# Patient Record
Sex: MALE | Race: WHITE | Employment: UNEMPLOYED | URBAN - METROPOLITAN AREA
[De-identification: names, ages, dates, MRNs, and addresses within clinical notes are randomized per-mention and may not be internally consistent; named-entity substitution may affect disease eponyms.]

---

## 2017-01-04 ENCOUNTER — HOSPITAL ENCOUNTER (OUTPATIENT)
Dept: PREADMISSION TESTING | Age: 47
Discharge: HOME | End: 2017-01-04
Attending: SURGERY | Admitting: SURGERY

## 2017-01-04 VITALS — WEIGHT: 202 LBS | HEIGHT: 70 IN | BODY MASS INDEX: 28.92 KG/M2

## 2017-01-04 LAB
ABO/RH: NORMAL
ANION GAP SERPL CALCULATED.3IONS-SCNC: 9 MMOL/L (ref 3–16)
ANTIBODY SCREEN: NORMAL
BASOPHILS ABSOLUTE: 0 K/UL (ref 0–0.2)
BASOPHILS RELATIVE PERCENT: 0.7 %
BUN BLDV-MCNC: 16 MG/DL (ref 7–20)
CALCIUM SERPL-MCNC: 9.2 MG/DL (ref 8.3–10.6)
CHLORIDE BLD-SCNC: 98 MMOL/L (ref 99–110)
CO2: 29 MMOL/L (ref 21–32)
CREAT SERPL-MCNC: 0.9 MG/DL (ref 0.9–1.3)
EOSINOPHILS ABSOLUTE: 0.2 K/UL (ref 0–0.6)
EOSINOPHILS RELATIVE PERCENT: 2.7 %
GFR AFRICAN AMERICAN: >60
GFR NON-AFRICAN AMERICAN: >60
GLUCOSE BLD-MCNC: 88 MG/DL (ref 70–99)
HCT VFR BLD CALC: 38.3 % (ref 40.5–52.5)
HEMOGLOBIN: 13.2 G/DL (ref 13.5–17.5)
LYMPHOCYTES ABSOLUTE: 0.9 K/UL (ref 1–5.1)
LYMPHOCYTES RELATIVE PERCENT: 13.9 %
MCH RBC QN AUTO: 32.6 PG (ref 26–34)
MCHC RBC AUTO-ENTMCNC: 34.5 G/DL (ref 31–36)
MCV RBC AUTO: 94.3 FL (ref 80–100)
MONOCYTES ABSOLUTE: 0.7 K/UL (ref 0–1.3)
MONOCYTES RELATIVE PERCENT: 11 %
NEUTROPHILS ABSOLUTE: 4.6 K/UL (ref 1.7–7.7)
NEUTROPHILS RELATIVE PERCENT: 71.7 %
PDW BLD-RTO: 15.5 % (ref 12.4–15.4)
PLATELET # BLD: 192 K/UL (ref 135–450)
PMV BLD AUTO: 7.4 FL (ref 5–10.5)
POTASSIUM SERPL-SCNC: 4.6 MMOL/L (ref 3.5–5.1)
RBC # BLD: 4.06 M/UL (ref 4.2–5.9)
SODIUM BLD-SCNC: 136 MMOL/L (ref 136–145)
WBC # BLD: 6.4 K/UL (ref 4–11)

## 2017-01-04 RX ORDER — ACETAMINOPHEN 325 MG/1
650 TABLET ORAL EVERY 6 HOURS PRN
Status: ON HOLD | COMMUNITY
End: 2017-01-14 | Stop reason: HOSPADM

## 2017-01-10 ASSESSMENT — ENCOUNTER SYMPTOMS: SHORTNESS OF BREATH: 1

## 2017-01-15 ENCOUNTER — CARE COORDINATION (OUTPATIENT)
Dept: CASE MANAGEMENT | Age: 47
End: 2017-01-15

## 2017-01-16 ENCOUNTER — CARE COORDINATION (OUTPATIENT)
Dept: CASE MANAGEMENT | Age: 47
End: 2017-01-16

## 2017-01-16 ENCOUNTER — TELEPHONE (OUTPATIENT)
Dept: PHARMACY | Age: 47
End: 2017-01-16

## 2017-01-18 ENCOUNTER — CARE COORDINATION (OUTPATIENT)
Dept: CASE MANAGEMENT | Age: 47
End: 2017-01-18

## 2017-01-20 ENCOUNTER — CARE COORDINATION (OUTPATIENT)
Dept: CASE MANAGEMENT | Age: 47
End: 2017-01-20

## 2017-01-21 ENCOUNTER — CARE COORDINATION (OUTPATIENT)
Dept: CASE MANAGEMENT | Age: 47
End: 2017-01-21

## 2017-01-22 ENCOUNTER — CARE COORDINATION (OUTPATIENT)
Dept: CASE MANAGEMENT | Age: 47
End: 2017-01-22

## 2017-01-23 ENCOUNTER — CARE COORDINATION (OUTPATIENT)
Dept: CASE MANAGEMENT | Age: 47
End: 2017-01-23

## 2017-01-24 ENCOUNTER — OFFICE VISIT (OUTPATIENT)
Dept: SURGERY | Age: 47
End: 2017-01-24

## 2017-01-24 VITALS — WEIGHT: 186 LBS | BODY MASS INDEX: 26.69 KG/M2 | SYSTOLIC BLOOD PRESSURE: 96 MMHG | DIASTOLIC BLOOD PRESSURE: 70 MMHG

## 2017-01-24 DIAGNOSIS — C20 RECTAL CANCER (HCC): Primary | ICD-10-CM

## 2017-01-24 PROCEDURE — 99024 POSTOP FOLLOW-UP VISIT: CPT | Performed by: SURGERY

## 2017-01-24 RX ORDER — HYDROCODONE BITARTRATE AND ACETAMINOPHEN 5; 325 MG/1; MG/1
2 TABLET ORAL EVERY 4 HOURS PRN
Qty: 40 TABLET | Refills: 0 | Status: SHIPPED | OUTPATIENT
Start: 2017-01-24 | End: 2017-01-31

## 2017-01-27 ENCOUNTER — TELEPHONE (OUTPATIENT)
Dept: SURGERY | Age: 47
End: 2017-01-27

## 2017-01-27 RX ORDER — CYCLOBENZAPRINE HCL 10 MG
10 TABLET ORAL EVERY 8 HOURS PRN
Qty: 30 TABLET | Refills: 0 | Status: ON HOLD | OUTPATIENT
Start: 2017-01-27 | End: 2017-02-07 | Stop reason: HOSPADM

## 2017-02-04 PROBLEM — C18.9 COLON CANCER (HCC): Status: ACTIVE | Noted: 2017-02-04

## 2017-02-04 PROBLEM — K65.1 ABSCESS OF MALE PELVIS (HCC): Status: ACTIVE | Noted: 2017-02-04

## 2017-02-08 ENCOUNTER — EPISODE CHANGES (OUTPATIENT)
Dept: CASE MANAGEMENT | Age: 47
End: 2017-02-08

## 2017-02-13 ENCOUNTER — HOSPITAL ENCOUNTER (OUTPATIENT)
Dept: SURGERY | Age: 47
Discharge: HOME | End: 2017-02-13
Attending: SURGERY | Admitting: SURGERY

## 2017-02-13 VITALS
SYSTOLIC BLOOD PRESSURE: 125 MMHG | BODY MASS INDEX: 27.93 KG/M2 | RESPIRATION RATE: 18 BRPM | HEART RATE: 74 BPM | TEMPERATURE: 97.1 F | WEIGHT: 194.67 LBS | DIASTOLIC BLOOD PRESSURE: 77 MMHG | OXYGEN SATURATION: 100 %

## 2017-02-13 DIAGNOSIS — R52 PAIN: ICD-10-CM

## 2017-02-13 LAB
ANION GAP SERPL CALCULATED.3IONS-SCNC: 16 MMOL/L (ref 3–16)
BASOPHILS ABSOLUTE: 0.1 K/UL (ref 0–0.2)
BASOPHILS RELATIVE PERCENT: 1.1 %
BUN BLDV-MCNC: 11 MG/DL (ref 7–20)
CALCIUM SERPL-MCNC: 10 MG/DL (ref 8.3–10.6)
CHLORIDE BLD-SCNC: 101 MMOL/L (ref 99–110)
CO2: 21 MMOL/L (ref 21–32)
CREAT SERPL-MCNC: 0.7 MG/DL (ref 0.9–1.3)
EOSINOPHILS ABSOLUTE: 0.2 K/UL (ref 0–0.6)
EOSINOPHILS RELATIVE PERCENT: 1.5 %
GFR AFRICAN AMERICAN: >60
GFR NON-AFRICAN AMERICAN: >60
GLUCOSE BLD-MCNC: 102 MG/DL (ref 70–99)
HCT VFR BLD CALC: 33.9 % (ref 40.5–52.5)
HEMOGLOBIN: 11.4 G/DL (ref 13.5–17.5)
LYMPHOCYTES ABSOLUTE: 1.1 K/UL (ref 1–5.1)
LYMPHOCYTES RELATIVE PERCENT: 10.8 %
MCH RBC QN AUTO: 30.7 PG (ref 26–34)
MCHC RBC AUTO-ENTMCNC: 33.7 G/DL (ref 31–36)
MCV RBC AUTO: 91.2 FL (ref 80–100)
MONOCYTES ABSOLUTE: 0.9 K/UL (ref 0–1.3)
MONOCYTES RELATIVE PERCENT: 8.8 %
NEUTROPHILS ABSOLUTE: 8.2 K/UL (ref 1.7–7.7)
NEUTROPHILS RELATIVE PERCENT: 77.8 %
PDW BLD-RTO: 13.4 % (ref 12.4–15.4)
PLATELET # BLD: 464 K/UL (ref 135–450)
PMV BLD AUTO: 6.5 FL (ref 5–10.5)
POTASSIUM SERPL-SCNC: 4.4 MMOL/L (ref 3.5–5.1)
RBC # BLD: 3.71 M/UL (ref 4.2–5.9)
SODIUM BLD-SCNC: 138 MMOL/L (ref 136–145)
WBC # BLD: 10.6 K/UL (ref 4–11)

## 2017-02-13 PROCEDURE — 36561 INSERT TUNNELED CV CATH: CPT | Performed by: SURGERY

## 2017-02-13 PROCEDURE — 77001 FLUOROGUIDE FOR VEIN DEVICE: CPT | Performed by: SURGERY

## 2017-02-13 RX ORDER — SODIUM CHLORIDE, SODIUM LACTATE, POTASSIUM CHLORIDE, CALCIUM CHLORIDE 600; 310; 30; 20 MG/100ML; MG/100ML; MG/100ML; MG/100ML
INJECTION, SOLUTION INTRAVENOUS
Status: COMPLETED
Start: 2017-02-13 | End: 2017-02-13

## 2017-02-13 RX ORDER — OXYCODONE HYDROCHLORIDE 5 MG/1
10 TABLET ORAL PRN
Status: ACTIVE | OUTPATIENT
Start: 2017-02-13 | End: 2017-02-13

## 2017-02-13 RX ORDER — LIDOCAINE HYDROCHLORIDE 10 MG/ML
0.5 INJECTION, SOLUTION EPIDURAL; INFILTRATION; INTRACAUDAL; PERINEURAL ONCE
Status: DISCONTINUED | OUTPATIENT
Start: 2017-02-13 | End: 2017-02-14 | Stop reason: HOSPADM

## 2017-02-13 RX ORDER — HYDROMORPHONE HCL 110MG/55ML
0.5 PATIENT CONTROLLED ANALGESIA SYRINGE INTRAVENOUS EVERY 5 MIN PRN
Status: DISCONTINUED | OUTPATIENT
Start: 2017-02-13 | End: 2017-02-14 | Stop reason: HOSPADM

## 2017-02-13 RX ORDER — SODIUM CHLORIDE, SODIUM LACTATE, POTASSIUM CHLORIDE, CALCIUM CHLORIDE 600; 310; 30; 20 MG/100ML; MG/100ML; MG/100ML; MG/100ML
INJECTION, SOLUTION INTRAVENOUS CONTINUOUS
Status: DISCONTINUED | OUTPATIENT
Start: 2017-02-13 | End: 2017-02-14 | Stop reason: HOSPADM

## 2017-02-13 RX ORDER — CEFAZOLIN SODIUM 2 G/100ML
2 INJECTION, SOLUTION INTRAVENOUS
Status: COMPLETED | OUTPATIENT
Start: 2017-02-13 | End: 2017-02-13

## 2017-02-13 RX ORDER — HYDROMORPHONE HCL 110MG/55ML
0.25 PATIENT CONTROLLED ANALGESIA SYRINGE INTRAVENOUS EVERY 5 MIN PRN
Status: DISCONTINUED | OUTPATIENT
Start: 2017-02-13 | End: 2017-02-14 | Stop reason: HOSPADM

## 2017-02-13 RX ORDER — FENTANYL CITRATE 50 UG/ML
50 INJECTION, SOLUTION INTRAMUSCULAR; INTRAVENOUS EVERY 5 MIN PRN
Status: DISCONTINUED | OUTPATIENT
Start: 2017-02-13 | End: 2017-02-14 | Stop reason: HOSPADM

## 2017-02-13 RX ORDER — CARVEDILOL 12.5 MG/1
TABLET ORAL
Qty: 60 TABLET | Refills: 5 | Status: ON HOLD | OUTPATIENT
Start: 2017-02-13 | End: 2017-08-25

## 2017-02-13 RX ORDER — DIPHENHYDRAMINE HYDROCHLORIDE 50 MG/ML
12.5 INJECTION INTRAMUSCULAR; INTRAVENOUS
Status: ACTIVE | OUTPATIENT
Start: 2017-02-13 | End: 2017-02-13

## 2017-02-13 RX ORDER — LABETALOL HYDROCHLORIDE 5 MG/ML
5 INJECTION, SOLUTION INTRAVENOUS EVERY 10 MIN PRN
Status: DISCONTINUED | OUTPATIENT
Start: 2017-02-13 | End: 2017-02-14 | Stop reason: HOSPADM

## 2017-02-13 RX ORDER — CEFAZOLIN SODIUM 2 G/100ML
INJECTION, SOLUTION INTRAVENOUS
Status: DISPENSED
Start: 2017-02-13 | End: 2017-02-13

## 2017-02-13 RX ORDER — PROMETHAZINE HYDROCHLORIDE 25 MG/ML
6.25 INJECTION, SOLUTION INTRAMUSCULAR; INTRAVENOUS PRN
Status: DISCONTINUED | OUTPATIENT
Start: 2017-02-13 | End: 2017-02-14 | Stop reason: HOSPADM

## 2017-02-13 RX ORDER — OXYCODONE HYDROCHLORIDE 5 MG/1
5 TABLET ORAL PRN
Status: ACTIVE | OUTPATIENT
Start: 2017-02-13 | End: 2017-02-13

## 2017-02-13 RX ORDER — SPIRONOLACTONE 50 MG/1
TABLET, FILM COATED ORAL
Qty: 30 TABLET | Refills: 5 | Status: SHIPPED | OUTPATIENT
Start: 2017-02-13 | End: 2017-07-17 | Stop reason: SDUPTHER

## 2017-02-13 RX ORDER — MEPERIDINE HYDROCHLORIDE 25 MG/ML
12.5 INJECTION INTRAMUSCULAR; INTRAVENOUS; SUBCUTANEOUS EVERY 5 MIN PRN
Status: DISCONTINUED | OUTPATIENT
Start: 2017-02-13 | End: 2017-02-14 | Stop reason: HOSPADM

## 2017-02-13 RX ORDER — HYDROCODONE BITARTRATE AND ACETAMINOPHEN 5; 325 MG/1; MG/1
2 TABLET ORAL EVERY 4 HOURS PRN
Qty: 30 TABLET | Refills: 0 | Status: SHIPPED | OUTPATIENT
Start: 2017-02-13 | End: 2017-02-20

## 2017-02-13 RX ADMIN — SODIUM CHLORIDE, SODIUM LACTATE, POTASSIUM CHLORIDE, CALCIUM CHLORIDE: 600; 310; 30; 20 INJECTION, SOLUTION INTRAVENOUS at 08:18

## 2017-02-13 RX ADMIN — CEFAZOLIN SODIUM 2 G: 2 INJECTION, SOLUTION INTRAVENOUS at 08:25

## 2017-02-13 ASSESSMENT — PAIN - FUNCTIONAL ASSESSMENT: PAIN_FUNCTIONAL_ASSESSMENT: 0-10

## 2017-02-13 ASSESSMENT — PAIN SCALES - GENERAL
PAINLEVEL_OUTOF10: 0
PAINLEVEL_OUTOF10: 0

## 2017-02-13 ASSESSMENT — ENCOUNTER SYMPTOMS: SHORTNESS OF BREATH: 1

## 2017-02-16 ENCOUNTER — HOSPITAL ENCOUNTER (OUTPATIENT)
Dept: CT IMAGING | Age: 47
Discharge: HOME | End: 2017-02-16
Attending: SURGERY | Admitting: SURGERY

## 2017-02-16 DIAGNOSIS — I42.8 OTHER CARDIOMYOPATHIES (HCC): ICD-10-CM

## 2017-02-17 ENCOUNTER — TELEPHONE (OUTPATIENT)
Dept: SURGERY | Age: 47
End: 2017-02-17

## 2017-02-17 RX ORDER — HYDROCODONE BITARTRATE AND ACETAMINOPHEN 5; 325 MG/1; MG/1
2 TABLET ORAL EVERY 4 HOURS PRN
Qty: 30 TABLET | Refills: 0 | Status: CANCELLED | OUTPATIENT
Start: 2017-02-17 | End: 2017-02-24

## 2017-02-21 ENCOUNTER — OFFICE VISIT (OUTPATIENT)
Dept: SURGERY | Age: 47
End: 2017-02-21

## 2017-02-21 VITALS — DIASTOLIC BLOOD PRESSURE: 70 MMHG | SYSTOLIC BLOOD PRESSURE: 120 MMHG | BODY MASS INDEX: 28.98 KG/M2 | WEIGHT: 202 LBS

## 2017-02-21 DIAGNOSIS — C20 RECTAL CANCER (HCC): Primary | ICD-10-CM

## 2017-02-21 PROCEDURE — 99024 POSTOP FOLLOW-UP VISIT: CPT | Performed by: SURGERY

## 2017-02-21 RX ORDER — HYDROCODONE BITARTRATE AND ACETAMINOPHEN 5; 325 MG/1; MG/1
2 TABLET ORAL EVERY 6 HOURS PRN
Qty: 40 TABLET | Refills: 0 | Status: SHIPPED | OUTPATIENT
Start: 2017-02-21 | End: 2017-02-27 | Stop reason: SDUPTHER

## 2017-02-23 ENCOUNTER — HOSPITAL ENCOUNTER (OUTPATIENT)
Dept: SURGERY | Age: 47
Discharge: HOME | End: 2017-02-23
Admitting: SURGERY

## 2017-02-23 VITALS
OXYGEN SATURATION: 97 % | TEMPERATURE: 98.2 F | RESPIRATION RATE: 18 BRPM | DIASTOLIC BLOOD PRESSURE: 59 MMHG | HEART RATE: 85 BPM | SYSTOLIC BLOOD PRESSURE: 115 MMHG

## 2017-02-23 DIAGNOSIS — C20 RECTAL CANCER (HCC): ICD-10-CM

## 2017-02-23 LAB
ANION GAP SERPL CALCULATED.3IONS-SCNC: 13 MMOL/L (ref 3–16)
APTT: 37.5 SEC (ref 21–31.8)
BASOPHILS ABSOLUTE: 0.1 K/UL (ref 0–0.2)
BASOPHILS RELATIVE PERCENT: 0.8 %
BUN BLDV-MCNC: 22 MG/DL (ref 7–20)
CALCIUM SERPL-MCNC: 9.2 MG/DL (ref 8.3–10.6)
CHLORIDE BLD-SCNC: 101 MMOL/L (ref 99–110)
CO2: 26 MMOL/L (ref 21–32)
CREAT SERPL-MCNC: 0.7 MG/DL (ref 0.9–1.3)
EOSINOPHILS ABSOLUTE: 0.2 K/UL (ref 0–0.6)
EOSINOPHILS RELATIVE PERCENT: 3.2 %
GFR AFRICAN AMERICAN: >60
GFR NON-AFRICAN AMERICAN: >60
GLUCOSE BLD-MCNC: 94 MG/DL (ref 70–99)
HCT VFR BLD CALC: 30.5 % (ref 40.5–52.5)
HEMOGLOBIN: 10.3 G/DL (ref 13.5–17.5)
INR BLD: 1.18 (ref 0.85–1.15)
LYMPHOCYTES ABSOLUTE: 0.9 K/UL (ref 1–5.1)
LYMPHOCYTES RELATIVE PERCENT: 11.7 %
MCH RBC QN AUTO: 30.7 PG (ref 26–34)
MCHC RBC AUTO-ENTMCNC: 33.8 G/DL (ref 31–36)
MCV RBC AUTO: 91 FL (ref 80–100)
MONOCYTES ABSOLUTE: 0.8 K/UL (ref 0–1.3)
MONOCYTES RELATIVE PERCENT: 10.5 %
NEUTROPHILS ABSOLUTE: 5.6 K/UL (ref 1.7–7.7)
NEUTROPHILS RELATIVE PERCENT: 73.8 %
PDW BLD-RTO: 13.9 % (ref 12.4–15.4)
PLATELET # BLD: 288 K/UL (ref 135–450)
PMV BLD AUTO: 6.9 FL (ref 5–10.5)
POTASSIUM SERPL-SCNC: 4.2 MMOL/L (ref 3.5–5.1)
PROTHROMBIN TIME: 13.3 SEC (ref 9.6–13)
RBC # BLD: 3.35 M/UL (ref 4.2–5.9)
SODIUM BLD-SCNC: 140 MMOL/L (ref 136–145)
WBC # BLD: 7.6 K/UL (ref 4–11)

## 2017-02-23 RX ORDER — SODIUM CHLORIDE 9 MG/ML
INJECTION, SOLUTION INTRAVENOUS
Status: DISCONTINUED
Start: 2017-02-23 | End: 2017-02-24 | Stop reason: HOSPADM

## 2017-02-23 RX ORDER — MIDAZOLAM HYDROCHLORIDE 1 MG/ML
INJECTION INTRAMUSCULAR; INTRAVENOUS
Status: COMPLETED | OUTPATIENT
Start: 2017-02-23 | End: 2017-02-23

## 2017-02-23 RX ORDER — FENTANYL CITRATE 50 UG/ML
INJECTION, SOLUTION INTRAMUSCULAR; INTRAVENOUS
Status: COMPLETED | OUTPATIENT
Start: 2017-02-23 | End: 2017-02-23

## 2017-02-23 RX ADMIN — MIDAZOLAM HYDROCHLORIDE 1 MG: 1 INJECTION INTRAMUSCULAR; INTRAVENOUS at 14:11

## 2017-02-23 RX ADMIN — MIDAZOLAM HYDROCHLORIDE 1 MG: 1 INJECTION INTRAMUSCULAR; INTRAVENOUS at 13:54

## 2017-02-23 RX ADMIN — MIDAZOLAM HYDROCHLORIDE 1 MG: 1 INJECTION INTRAMUSCULAR; INTRAVENOUS at 14:05

## 2017-02-23 RX ADMIN — FENTANYL CITRATE 50 MCG: 50 INJECTION, SOLUTION INTRAMUSCULAR; INTRAVENOUS at 13:54

## 2017-02-23 RX ADMIN — FENTANYL CITRATE 50 MCG: 50 INJECTION, SOLUTION INTRAMUSCULAR; INTRAVENOUS at 14:11

## 2017-02-23 RX ADMIN — FENTANYL CITRATE 50 MCG: 50 INJECTION, SOLUTION INTRAMUSCULAR; INTRAVENOUS at 14:05

## 2017-02-23 RX ADMIN — MIDAZOLAM HYDROCHLORIDE 1 MG: 1 INJECTION INTRAMUSCULAR; INTRAVENOUS at 14:08

## 2017-02-23 RX ADMIN — FENTANYL CITRATE 50 MCG: 50 INJECTION, SOLUTION INTRAMUSCULAR; INTRAVENOUS at 14:08

## 2017-02-23 RX ADMIN — MIDAZOLAM HYDROCHLORIDE 1 MG: 1 INJECTION INTRAMUSCULAR; INTRAVENOUS at 13:49

## 2017-02-23 RX ADMIN — FENTANYL CITRATE 50 MCG: 50 INJECTION, SOLUTION INTRAMUSCULAR; INTRAVENOUS at 13:49

## 2017-02-23 ASSESSMENT — PAIN DESCRIPTION - PAIN TYPE: TYPE: ACUTE PAIN

## 2017-02-23 ASSESSMENT — PAIN - FUNCTIONAL ASSESSMENT: PAIN_FUNCTIONAL_ASSESSMENT: 0-10

## 2017-02-23 ASSESSMENT — PAIN SCALES - GENERAL
PAINLEVEL_OUTOF10: 4

## 2017-02-23 ASSESSMENT — PAIN DESCRIPTION - LOCATION: LOCATION: ABDOMEN

## 2017-02-27 ENCOUNTER — OFFICE VISIT (OUTPATIENT)
Dept: CARDIOLOGY CLINIC | Age: 47
End: 2017-02-27

## 2017-02-27 ENCOUNTER — HOSPITAL ENCOUNTER (OUTPATIENT)
Dept: NON INVASIVE DIAGNOSTICS | Age: 47
Discharge: HOME | End: 2017-02-27
Attending: NURSE PRACTITIONER | Admitting: NURSE PRACTITIONER

## 2017-02-27 ENCOUNTER — TELEPHONE (OUTPATIENT)
Dept: SURGERY | Age: 47
End: 2017-02-27

## 2017-02-27 VITALS
WEIGHT: 199 LBS | BODY MASS INDEX: 28.49 KG/M2 | HEART RATE: 64 BPM | SYSTOLIC BLOOD PRESSURE: 116 MMHG | DIASTOLIC BLOOD PRESSURE: 80 MMHG | HEIGHT: 70 IN

## 2017-02-27 DIAGNOSIS — E78.2 MIXED HYPERLIPIDEMIA: Chronic | ICD-10-CM

## 2017-02-27 DIAGNOSIS — I42.8 OTHER CARDIOMYOPATHIES (HCC): ICD-10-CM

## 2017-02-27 DIAGNOSIS — I50.22 CHRONIC SYSTOLIC HEART FAILURE (HCC): Primary | Chronic | ICD-10-CM

## 2017-02-27 DIAGNOSIS — I42.9 CARDIOMYOPATHY, IDIOPATHIC (HCC): Chronic | ICD-10-CM

## 2017-02-27 DIAGNOSIS — C20 RECTAL CANCER (HCC): ICD-10-CM

## 2017-02-27 DIAGNOSIS — I10 ESSENTIAL HYPERTENSION: Chronic | ICD-10-CM

## 2017-02-27 LAB
LV EF: 33 %
LVEF MODALITY: NORMAL

## 2017-02-27 PROCEDURE — 99214 OFFICE O/P EST MOD 30 MIN: CPT | Performed by: INTERNAL MEDICINE

## 2017-02-27 RX ORDER — HYDROCODONE BITARTRATE AND ACETAMINOPHEN 5; 325 MG/1; MG/1
2 TABLET ORAL EVERY 6 HOURS PRN
Qty: 40 TABLET | Refills: 0 | Status: SHIPPED | OUTPATIENT
Start: 2017-02-27 | End: 2017-03-06

## 2017-02-27 RX ADMIN — Medication 25 MILLICURIE: at 14:27

## 2017-02-28 ENCOUNTER — NURSE ONLY (OUTPATIENT)
Dept: CARDIOLOGY CLINIC | Age: 47
End: 2017-02-28

## 2017-02-28 DIAGNOSIS — I42.9 CARDIOMYOPATHY (HCC): Primary | ICD-10-CM

## 2017-02-28 DIAGNOSIS — I50.21 ACUTE SYSTOLIC CONGESTIVE HEART FAILURE (HCC): ICD-10-CM

## 2017-03-07 ENCOUNTER — OFFICE VISIT (OUTPATIENT)
Dept: SURGERY | Age: 47
End: 2017-03-07

## 2017-03-07 VITALS — DIASTOLIC BLOOD PRESSURE: 60 MMHG | SYSTOLIC BLOOD PRESSURE: 100 MMHG

## 2017-03-07 DIAGNOSIS — C20 RECTAL CANCER (HCC): Primary | ICD-10-CM

## 2017-03-07 PROCEDURE — 99024 POSTOP FOLLOW-UP VISIT: CPT | Performed by: SURGERY

## 2017-03-07 RX ORDER — HYDROCODONE BITARTRATE AND ACETAMINOPHEN 5; 325 MG/1; MG/1
2 TABLET ORAL EVERY 6 HOURS PRN
Qty: 30 TABLET | Refills: 0 | Status: SHIPPED | OUTPATIENT
Start: 2017-03-07 | End: 2017-03-14

## 2017-03-13 ENCOUNTER — HOSPITAL ENCOUNTER (OUTPATIENT)
Dept: CT IMAGING | Age: 47
Discharge: HOME | End: 2017-03-13
Attending: SURGERY | Admitting: SURGERY

## 2017-03-13 DIAGNOSIS — K65.1 PERITONEAL ABSCESS (HCC): ICD-10-CM

## 2017-03-13 DIAGNOSIS — K65.1 ABSCESS OF MALE PELVIS (HCC): Primary | ICD-10-CM

## 2017-03-13 DIAGNOSIS — K65.1 ABSCESS OF MALE PELVIS (HCC): ICD-10-CM

## 2017-03-13 RX ORDER — 0.9 % SODIUM CHLORIDE 0.9 %
10 VIAL (ML) INJECTION ONCE
Status: COMPLETED | OUTPATIENT
Start: 2017-03-13 | End: 2017-03-13

## 2017-03-13 RX ADMIN — Medication 10 ML: at 16:04

## 2017-03-14 ENCOUNTER — OFFICE VISIT (OUTPATIENT)
Dept: SURGERY | Age: 47
End: 2017-03-14

## 2017-03-14 VITALS — SYSTOLIC BLOOD PRESSURE: 130 MMHG | DIASTOLIC BLOOD PRESSURE: 60 MMHG

## 2017-03-14 DIAGNOSIS — C20 RECTAL CANCER (HCC): Primary | ICD-10-CM

## 2017-03-14 PROCEDURE — 99024 POSTOP FOLLOW-UP VISIT: CPT | Performed by: SURGERY

## 2017-03-14 RX ORDER — CYCLOBENZAPRINE HCL 5 MG
5 TABLET ORAL 3 TIMES DAILY PRN
Qty: 60 TABLET | Refills: 1 | Status: SHIPPED | OUTPATIENT
Start: 2017-03-14 | End: 2017-03-28

## 2017-03-14 RX ORDER — HYDROCODONE BITARTRATE AND ACETAMINOPHEN 7.5; 325 MG/1; MG/1
1 TABLET ORAL EVERY 6 HOURS PRN
Qty: 40 TABLET | Refills: 0 | Status: SHIPPED | OUTPATIENT
Start: 2017-03-14 | End: 2017-03-29 | Stop reason: SDUPTHER

## 2017-03-15 ENCOUNTER — TELEPHONE (OUTPATIENT)
Dept: SURGERY | Age: 47
End: 2017-03-15

## 2017-03-17 ENCOUNTER — TELEPHONE (OUTPATIENT)
Dept: SURGERY | Age: 47
End: 2017-03-17

## 2017-03-23 ENCOUNTER — OFFICE VISIT (OUTPATIENT)
Dept: SURGERY | Age: 47
End: 2017-03-23

## 2017-03-23 VITALS — SYSTOLIC BLOOD PRESSURE: 90 MMHG | DIASTOLIC BLOOD PRESSURE: 62 MMHG

## 2017-03-23 DIAGNOSIS — C20 RECTAL CANCER (HCC): Primary | ICD-10-CM

## 2017-03-23 PROCEDURE — 99024 POSTOP FOLLOW-UP VISIT: CPT | Performed by: SURGERY

## 2017-03-23 RX ORDER — OXYCODONE HYDROCHLORIDE AND ACETAMINOPHEN 5; 325 MG/1; MG/1
1 TABLET ORAL EVERY 4 HOURS PRN
Qty: 30 TABLET | Refills: 0 | Status: SHIPPED | OUTPATIENT
Start: 2017-03-23 | End: 2017-03-30

## 2017-03-27 ENCOUNTER — OFFICE VISIT (OUTPATIENT)
Dept: SURGERY | Age: 47
End: 2017-03-27

## 2017-03-27 VITALS — SYSTOLIC BLOOD PRESSURE: 112 MMHG | DIASTOLIC BLOOD PRESSURE: 60 MMHG

## 2017-03-27 DIAGNOSIS — C20 RECTAL CANCER (HCC): Primary | ICD-10-CM

## 2017-03-27 PROCEDURE — 99024 POSTOP FOLLOW-UP VISIT: CPT | Performed by: SURGERY

## 2017-04-06 ENCOUNTER — OFFICE VISIT (OUTPATIENT)
Dept: SURGERY | Age: 47
End: 2017-04-06

## 2017-04-06 VITALS — WEIGHT: 216 LBS | SYSTOLIC BLOOD PRESSURE: 100 MMHG | DIASTOLIC BLOOD PRESSURE: 60 MMHG | BODY MASS INDEX: 30.99 KG/M2

## 2017-04-06 DIAGNOSIS — C20 RECTAL CANCER (HCC): ICD-10-CM

## 2017-04-06 DIAGNOSIS — K65.1 PERITONEAL ABSCESS (HCC): Primary | ICD-10-CM

## 2017-04-06 DIAGNOSIS — K65.1 ABSCESS OF MALE PELVIS (HCC): ICD-10-CM

## 2017-04-06 PROCEDURE — 99024 POSTOP FOLLOW-UP VISIT: CPT | Performed by: SURGERY

## 2017-04-06 PROCEDURE — 93268 ECG RECORD/REVIEW: CPT | Performed by: INTERNAL MEDICINE

## 2017-04-18 ENCOUNTER — HOSPITAL ENCOUNTER (OUTPATIENT)
Dept: OTHER | Age: 47
Discharge: HOME | End: 2017-04-18
Attending: INTERNAL MEDICINE | Admitting: INTERNAL MEDICINE

## 2017-04-18 ENCOUNTER — HOSPITAL ENCOUNTER (OUTPATIENT)
Dept: CT IMAGING | Age: 47
Discharge: HOME | End: 2017-04-18
Attending: SURGERY | Admitting: SURGERY

## 2017-04-18 DIAGNOSIS — K65.1 PERITONEAL ABSCESS (HCC): ICD-10-CM

## 2017-04-18 LAB
ANION GAP SERPL CALCULATED.3IONS-SCNC: 10 MMOL/L (ref 3–16)
BUN BLDV-MCNC: 13 MG/DL (ref 7–20)
CALCIUM SERPL-MCNC: 9.3 MG/DL (ref 8.3–10.6)
CHLORIDE BLD-SCNC: 101 MMOL/L (ref 99–110)
CO2: 28 MMOL/L (ref 21–32)
CREAT SERPL-MCNC: 0.9 MG/DL (ref 0.9–1.3)
GFR AFRICAN AMERICAN: >60
GFR NON-AFRICAN AMERICAN: >60
GLUCOSE BLD-MCNC: 102 MG/DL (ref 70–99)
POTASSIUM SERPL-SCNC: 5 MMOL/L (ref 3.5–5.1)
PRO-BNP: 3571 PG/ML (ref 0–124)
SODIUM BLD-SCNC: 139 MMOL/L (ref 136–145)

## 2017-04-19 ENCOUNTER — TELEPHONE (OUTPATIENT)
Dept: CARDIOLOGY CLINIC | Age: 47
End: 2017-04-19

## 2017-04-19 PROBLEM — Z51.11 ENCOUNTER FOR ANTINEOPLASTIC CHEMOTHERAPY: Status: ACTIVE | Noted: 2017-04-19

## 2017-04-20 ENCOUNTER — OFFICE VISIT (OUTPATIENT)
Dept: SURGERY | Age: 47
End: 2017-04-20

## 2017-04-20 VITALS — WEIGHT: 216 LBS | BODY MASS INDEX: 30.99 KG/M2 | DIASTOLIC BLOOD PRESSURE: 60 MMHG | SYSTOLIC BLOOD PRESSURE: 122 MMHG

## 2017-04-20 DIAGNOSIS — C20 RECTAL CANCER (HCC): Primary | ICD-10-CM

## 2017-04-20 PROCEDURE — 99024 POSTOP FOLLOW-UP VISIT: CPT | Performed by: SURGERY

## 2017-04-27 ENCOUNTER — OFFICE VISIT (OUTPATIENT)
Dept: SURGERY | Age: 47
End: 2017-04-27

## 2017-04-27 VITALS — SYSTOLIC BLOOD PRESSURE: 122 MMHG | DIASTOLIC BLOOD PRESSURE: 60 MMHG

## 2017-04-27 DIAGNOSIS — C20 RECTAL CANCER (HCC): Primary | ICD-10-CM

## 2017-04-27 PROCEDURE — 99024 POSTOP FOLLOW-UP VISIT: CPT | Performed by: SURGERY

## 2017-05-03 ENCOUNTER — TELEPHONE (OUTPATIENT)
Dept: CARDIOLOGY CLINIC | Age: 47
End: 2017-05-03

## 2017-05-03 ENCOUNTER — HOSPITAL ENCOUNTER (OUTPATIENT)
Dept: NON INVASIVE DIAGNOSTICS | Age: 47
Discharge: HOME | End: 2017-05-03
Attending: INTERNAL MEDICINE | Admitting: INTERNAL MEDICINE

## 2017-05-03 DIAGNOSIS — I10 ESSENTIAL HYPERTENSION: Chronic | ICD-10-CM

## 2017-05-03 DIAGNOSIS — I42.9 CARDIOMYOPATHY, IDIOPATHIC (HCC): Chronic | ICD-10-CM

## 2017-05-03 DIAGNOSIS — I50.22 CHRONIC SYSTOLIC HEART FAILURE (HCC): Primary | Chronic | ICD-10-CM

## 2017-05-03 DIAGNOSIS — I42.8 OTHER CARDIOMYOPATHIES (HCC): ICD-10-CM

## 2017-05-04 ENCOUNTER — HOSPITAL ENCOUNTER (OUTPATIENT)
Dept: NON INVASIVE DIAGNOSTICS | Age: 47
Discharge: HOME | End: 2017-05-04
Attending: INTERNAL MEDICINE | Admitting: INTERNAL MEDICINE

## 2017-05-04 ENCOUNTER — OFFICE VISIT (OUTPATIENT)
Dept: CARDIOLOGY CLINIC | Age: 47
End: 2017-05-04

## 2017-05-04 VITALS
BODY MASS INDEX: 29.41 KG/M2 | WEIGHT: 205 LBS | DIASTOLIC BLOOD PRESSURE: 64 MMHG | SYSTOLIC BLOOD PRESSURE: 92 MMHG | HEART RATE: 80 BPM

## 2017-05-04 DIAGNOSIS — R06.02 SHORTNESS OF BREATH: Chronic | ICD-10-CM

## 2017-05-04 DIAGNOSIS — I50.22 CHRONIC SYSTOLIC CONGESTIVE HEART FAILURE (HCC): ICD-10-CM

## 2017-05-04 DIAGNOSIS — I50.22 CHRONIC SYSTOLIC HEART FAILURE (HCC): Primary | Chronic | ICD-10-CM

## 2017-05-04 DIAGNOSIS — I42.9 CARDIOMYOPATHY, IDIOPATHIC (HCC): Chronic | ICD-10-CM

## 2017-05-04 DIAGNOSIS — I10 ESSENTIAL HYPERTENSION: Chronic | ICD-10-CM

## 2017-05-04 LAB
LV EF: 29 %
LVEF MODALITY: NORMAL

## 2017-05-04 PROCEDURE — 99214 OFFICE O/P EST MOD 30 MIN: CPT | Performed by: INTERNAL MEDICINE

## 2017-05-04 RX ADMIN — Medication 25 MILLICURIE: at 13:46

## 2017-05-09 ENCOUNTER — OFFICE VISIT (OUTPATIENT)
Dept: SURGERY | Age: 47
End: 2017-05-09

## 2017-05-09 VITALS — SYSTOLIC BLOOD PRESSURE: 90 MMHG | WEIGHT: 216 LBS | BODY MASS INDEX: 30.99 KG/M2 | DIASTOLIC BLOOD PRESSURE: 62 MMHG

## 2017-05-09 DIAGNOSIS — K65.1 ABSCESS OF MALE PELVIS (HCC): ICD-10-CM

## 2017-05-09 DIAGNOSIS — C20 RECTAL CANCER (HCC): Primary | ICD-10-CM

## 2017-05-09 PROCEDURE — 99212 OFFICE O/P EST SF 10 MIN: CPT | Performed by: SURGERY

## 2017-05-16 PROBLEM — D64.9 ANEMIA: Status: ACTIVE | Noted: 2017-05-16

## 2017-05-17 ENCOUNTER — CARE COORDINATION (OUTPATIENT)
Dept: CASE MANAGEMENT | Age: 47
End: 2017-05-17

## 2017-05-19 ENCOUNTER — CARE COORDINATOR VISIT (OUTPATIENT)
Dept: CASE MANAGEMENT | Age: 47
End: 2017-05-19

## 2017-05-23 ENCOUNTER — OFFICE VISIT (OUTPATIENT)
Dept: SURGERY | Age: 47
End: 2017-05-23

## 2017-05-23 VITALS — SYSTOLIC BLOOD PRESSURE: 100 MMHG | BODY MASS INDEX: 30.42 KG/M2 | DIASTOLIC BLOOD PRESSURE: 60 MMHG | WEIGHT: 212 LBS

## 2017-05-23 DIAGNOSIS — R10.31 RLQ ABDOMINAL PAIN: ICD-10-CM

## 2017-05-23 DIAGNOSIS — C20 RECTAL CANCER (HCC): Primary | ICD-10-CM

## 2017-05-23 PROCEDURE — 99213 OFFICE O/P EST LOW 20 MIN: CPT | Performed by: SURGERY

## 2017-06-07 PROBLEM — G47.01 INSOMNIA DUE TO MEDICAL CONDITION: Status: ACTIVE | Noted: 2017-06-07

## 2017-06-07 PROBLEM — Z51.11 ENCOUNTER FOR ANTINEOPLASTIC CHEMOTHERAPY: Status: ACTIVE | Noted: 2017-06-07

## 2017-06-23 ENCOUNTER — OFFICE VISIT (OUTPATIENT)
Dept: CARDIOLOGY CLINIC | Age: 47
End: 2017-06-23

## 2017-06-23 VITALS
DIASTOLIC BLOOD PRESSURE: 56 MMHG | BODY MASS INDEX: 33.49 KG/M2 | SYSTOLIC BLOOD PRESSURE: 90 MMHG | HEART RATE: 68 BPM | WEIGHT: 221 LBS | HEIGHT: 68 IN

## 2017-06-23 DIAGNOSIS — I50.22 CHRONIC SYSTOLIC HEART FAILURE (HCC): Primary | Chronic | ICD-10-CM

## 2017-06-23 DIAGNOSIS — R06.02 SHORTNESS OF BREATH: Chronic | ICD-10-CM

## 2017-06-23 DIAGNOSIS — I42.9 CARDIOMYOPATHY, IDIOPATHIC (HCC): Chronic | ICD-10-CM

## 2017-06-23 PROCEDURE — 99214 OFFICE O/P EST MOD 30 MIN: CPT | Performed by: INTERNAL MEDICINE

## 2017-07-07 ENCOUNTER — TELEPHONE (OUTPATIENT)
Dept: CARDIOLOGY CLINIC | Age: 47
End: 2017-07-07

## 2017-07-10 ENCOUNTER — HOSPITAL ENCOUNTER (OUTPATIENT)
Dept: OTHER | Age: 47
Discharge: HOME | End: 2017-07-10
Attending: INTERNAL MEDICINE | Admitting: INTERNAL MEDICINE

## 2017-07-10 ENCOUNTER — TELEPHONE (OUTPATIENT)
Dept: CARDIOLOGY CLINIC | Age: 47
End: 2017-07-10

## 2017-07-10 ENCOUNTER — OFFICE VISIT (OUTPATIENT)
Dept: CARDIOLOGY CLINIC | Age: 47
End: 2017-07-10

## 2017-07-10 VITALS
HEART RATE: 70 BPM | OXYGEN SATURATION: 97 % | HEIGHT: 70 IN | SYSTOLIC BLOOD PRESSURE: 98 MMHG | WEIGHT: 222 LBS | DIASTOLIC BLOOD PRESSURE: 60 MMHG | BODY MASS INDEX: 31.78 KG/M2

## 2017-07-10 DIAGNOSIS — I42.9 CARDIOMYOPATHY (HCC): Chronic | ICD-10-CM

## 2017-07-10 DIAGNOSIS — I50.22 CHRONIC SYSTOLIC HEART FAILURE (HCC): Chronic | ICD-10-CM

## 2017-07-10 DIAGNOSIS — E78.2 MIXED HYPERLIPIDEMIA: Chronic | ICD-10-CM

## 2017-07-10 DIAGNOSIS — I50.22 CHRONIC SYSTOLIC HEART FAILURE (HCC): Primary | Chronic | ICD-10-CM

## 2017-07-10 DIAGNOSIS — I10 ESSENTIAL HYPERTENSION: Chronic | ICD-10-CM

## 2017-07-10 DIAGNOSIS — I42.9 CARDIOMYOPATHY, IDIOPATHIC (HCC): Chronic | ICD-10-CM

## 2017-07-10 DIAGNOSIS — R06.02 SHORTNESS OF BREATH: Chronic | ICD-10-CM

## 2017-07-10 LAB
ANION GAP SERPL CALCULATED.3IONS-SCNC: 14 MMOL/L (ref 3–16)
BUN BLDV-MCNC: 22 MG/DL (ref 7–20)
CALCIUM SERPL-MCNC: 9.3 MG/DL (ref 8.3–10.6)
CHLORIDE BLD-SCNC: 94 MMOL/L (ref 99–110)
CO2: 25 MMOL/L (ref 21–32)
CREAT SERPL-MCNC: 1.1 MG/DL (ref 0.9–1.3)
GFR AFRICAN AMERICAN: >60
GFR NON-AFRICAN AMERICAN: >60
GLUCOSE BLD-MCNC: 103 MG/DL (ref 70–99)
POTASSIUM SERPL-SCNC: 4.1 MMOL/L (ref 3.5–5.1)
PRO-BNP: 2394 PG/ML (ref 0–124)
SODIUM BLD-SCNC: 133 MMOL/L (ref 136–145)

## 2017-07-10 PROCEDURE — 99214 OFFICE O/P EST MOD 30 MIN: CPT | Performed by: NURSE PRACTITIONER

## 2017-07-17 ENCOUNTER — OFFICE VISIT (OUTPATIENT)
Dept: CARDIOLOGY CLINIC | Age: 47
End: 2017-07-17

## 2017-07-17 ENCOUNTER — HOSPITAL ENCOUNTER (OUTPATIENT)
Dept: OTHER | Age: 47
Discharge: HOME | End: 2017-07-17
Attending: NURSE PRACTITIONER | Admitting: NURSE PRACTITIONER

## 2017-07-17 VITALS
HEART RATE: 62 BPM | SYSTOLIC BLOOD PRESSURE: 86 MMHG | BODY MASS INDEX: 31.21 KG/M2 | HEIGHT: 70 IN | WEIGHT: 218 LBS | DIASTOLIC BLOOD PRESSURE: 52 MMHG

## 2017-07-17 DIAGNOSIS — I10 ESSENTIAL HYPERTENSION: Primary | Chronic | ICD-10-CM

## 2017-07-17 DIAGNOSIS — I50.22 CHRONIC SYSTOLIC HEART FAILURE (HCC): Chronic | ICD-10-CM

## 2017-07-17 DIAGNOSIS — I42.9 CARDIOMYOPATHY (HCC): Chronic | ICD-10-CM

## 2017-07-17 DIAGNOSIS — E78.2 MIXED HYPERLIPIDEMIA: Chronic | ICD-10-CM

## 2017-07-17 LAB
ANION GAP SERPL CALCULATED.3IONS-SCNC: 14 MMOL/L (ref 3–16)
BUN BLDV-MCNC: 19 MG/DL (ref 7–20)
CALCIUM SERPL-MCNC: 9.9 MG/DL (ref 8.3–10.6)
CHLORIDE BLD-SCNC: 98 MMOL/L (ref 99–110)
CO2: 27 MMOL/L (ref 21–32)
CREAT SERPL-MCNC: 1 MG/DL (ref 0.9–1.3)
GFR AFRICAN AMERICAN: >60
GFR NON-AFRICAN AMERICAN: >60
GLUCOSE BLD-MCNC: 102 MG/DL (ref 70–99)
POTASSIUM SERPL-SCNC: 4.3 MMOL/L (ref 3.5–5.1)
PRO-BNP: 3263 PG/ML (ref 0–124)
SODIUM BLD-SCNC: 139 MMOL/L (ref 136–145)

## 2017-07-17 PROCEDURE — 99214 OFFICE O/P EST MOD 30 MIN: CPT | Performed by: NURSE PRACTITIONER

## 2017-07-17 RX ORDER — TORSEMIDE 20 MG/1
10 TABLET ORAL PRN
Qty: 30 TABLET | Refills: 0 | Status: ON HOLD
Start: 2017-07-17 | End: 2017-08-25 | Stop reason: HOSPADM

## 2017-07-17 RX ORDER — SPIRONOLACTONE 50 MG/1
25 TABLET, FILM COATED ORAL DAILY
Qty: 30 TABLET | Refills: 5 | Status: ON HOLD
Start: 2017-07-17 | End: 2017-08-25 | Stop reason: HOSPADM

## 2017-07-24 PROBLEM — I50.23 ACUTE ON CHRONIC SYSTOLIC (CONGESTIVE) HEART FAILURE (HCC): Status: ACTIVE | Noted: 2017-07-24

## 2017-07-28 ENCOUNTER — TELEPHONE (OUTPATIENT)
Dept: OTHER | Age: 47
End: 2017-07-28

## 2017-08-01 ENCOUNTER — HOSPITAL ENCOUNTER (OUTPATIENT)
Dept: OTHER | Age: 47
Discharge: HOME | End: 2017-08-01
Attending: INTERNAL MEDICINE | Admitting: INTERNAL MEDICINE

## 2017-08-01 ENCOUNTER — OFFICE VISIT (OUTPATIENT)
Dept: CARDIOLOGY CLINIC | Age: 47
End: 2017-08-01

## 2017-08-01 VITALS
DIASTOLIC BLOOD PRESSURE: 56 MMHG | BODY MASS INDEX: 30.35 KG/M2 | WEIGHT: 212 LBS | SYSTOLIC BLOOD PRESSURE: 108 MMHG | HEIGHT: 70 IN | HEART RATE: 68 BPM

## 2017-08-01 DIAGNOSIS — I50.22 CHRONIC SYSTOLIC HEART FAILURE (HCC): Primary | Chronic | ICD-10-CM

## 2017-08-01 DIAGNOSIS — R06.02 SHORTNESS OF BREATH: Chronic | ICD-10-CM

## 2017-08-01 DIAGNOSIS — C20 RECTAL CANCER (HCC): ICD-10-CM

## 2017-08-01 DIAGNOSIS — I50.22 CHRONIC SYSTOLIC HEART FAILURE (HCC): Chronic | ICD-10-CM

## 2017-08-01 LAB
ANION GAP SERPL CALCULATED.3IONS-SCNC: 16 MMOL/L (ref 3–16)
BUN BLDV-MCNC: 12 MG/DL (ref 7–20)
CALCIUM SERPL-MCNC: 9.9 MG/DL (ref 8.3–10.6)
CHLORIDE BLD-SCNC: 99 MMOL/L (ref 99–110)
CO2: 25 MMOL/L (ref 21–32)
CREAT SERPL-MCNC: 0.9 MG/DL (ref 0.9–1.3)
GFR AFRICAN AMERICAN: >60
GFR NON-AFRICAN AMERICAN: >60
GLUCOSE BLD-MCNC: 74 MG/DL (ref 70–99)
POTASSIUM SERPL-SCNC: 4.8 MMOL/L (ref 3.5–5.1)
PRO-BNP: 5800 PG/ML (ref 0–124)
SODIUM BLD-SCNC: 140 MMOL/L (ref 136–145)

## 2017-08-01 PROCEDURE — 99214 OFFICE O/P EST MOD 30 MIN: CPT | Performed by: INTERNAL MEDICINE

## 2017-08-08 ENCOUNTER — OFFICE VISIT (OUTPATIENT)
Dept: SURGERY | Age: 47
End: 2017-08-08

## 2017-08-08 VITALS — WEIGHT: 212 LBS | SYSTOLIC BLOOD PRESSURE: 121 MMHG | BODY MASS INDEX: 30.42 KG/M2 | DIASTOLIC BLOOD PRESSURE: 60 MMHG

## 2017-08-08 DIAGNOSIS — C20 RECTAL CANCER (HCC): Primary | ICD-10-CM

## 2017-08-08 PROCEDURE — 99213 OFFICE O/P EST LOW 20 MIN: CPT | Performed by: SURGERY

## 2017-08-11 ENCOUNTER — OFFICE VISIT (OUTPATIENT)
Dept: CARDIOLOGY CLINIC | Age: 47
End: 2017-08-11

## 2017-08-11 ENCOUNTER — HOSPITAL ENCOUNTER (OUTPATIENT)
Dept: OTHER | Age: 47
Discharge: HOME | End: 2017-08-11
Attending: INTERNAL MEDICINE | Admitting: INTERNAL MEDICINE

## 2017-08-11 VITALS
DIASTOLIC BLOOD PRESSURE: 50 MMHG | BODY MASS INDEX: 30.28 KG/M2 | HEART RATE: 60 BPM | HEIGHT: 70 IN | SYSTOLIC BLOOD PRESSURE: 94 MMHG | WEIGHT: 211.5 LBS

## 2017-08-11 DIAGNOSIS — I50.22 CHRONIC SYSTOLIC HEART FAILURE (HCC): Chronic | ICD-10-CM

## 2017-08-11 DIAGNOSIS — I42.9 CARDIOMYOPATHY, IDIOPATHIC (HCC): Chronic | ICD-10-CM

## 2017-08-11 DIAGNOSIS — R06.02 SHORTNESS OF BREATH: Chronic | ICD-10-CM

## 2017-08-11 DIAGNOSIS — I10 ESSENTIAL HYPERTENSION: Chronic | ICD-10-CM

## 2017-08-11 DIAGNOSIS — I50.22 CHRONIC SYSTOLIC HEART FAILURE (HCC): Primary | Chronic | ICD-10-CM

## 2017-08-11 LAB
ANION GAP SERPL CALCULATED.3IONS-SCNC: 13 MMOL/L (ref 3–16)
BUN BLDV-MCNC: 15 MG/DL (ref 7–20)
CALCIUM SERPL-MCNC: 9.8 MG/DL (ref 8.3–10.6)
CHLORIDE BLD-SCNC: 101 MMOL/L (ref 99–110)
CO2: 25 MMOL/L (ref 21–32)
CREAT SERPL-MCNC: 1.1 MG/DL (ref 0.9–1.3)
GFR AFRICAN AMERICAN: >60
GFR NON-AFRICAN AMERICAN: >60
GLUCOSE BLD-MCNC: 89 MG/DL (ref 70–99)
POTASSIUM SERPL-SCNC: 5.2 MMOL/L (ref 3.5–5.1)
PRO-BNP: 3406 PG/ML (ref 0–124)
SODIUM BLD-SCNC: 139 MMOL/L (ref 136–145)

## 2017-08-11 PROCEDURE — 99214 OFFICE O/P EST MOD 30 MIN: CPT | Performed by: INTERNAL MEDICINE

## 2017-08-15 PROBLEM — R06.09 DYSPNEA ON EXERTION: Status: ACTIVE | Noted: 2017-08-15

## 2017-08-21 PROBLEM — K65.1 ABSCESS OF MALE PELVIS (HCC): Status: RESOLVED | Noted: 2017-02-04 | Resolved: 2017-08-21

## 2017-08-21 PROBLEM — I50.23 ACUTE ON CHRONIC SYSTOLIC (CONGESTIVE) HEART FAILURE (HCC): Status: RESOLVED | Noted: 2017-07-24 | Resolved: 2017-08-21

## 2017-08-29 ENCOUNTER — TELEPHONE (OUTPATIENT)
Dept: OTHER | Age: 47
End: 2017-08-29

## 2017-08-31 ENCOUNTER — OFFICE VISIT (OUTPATIENT)
Dept: CARDIOLOGY CLINIC | Age: 47
End: 2017-08-31

## 2017-08-31 VITALS
WEIGHT: 204 LBS | HEIGHT: 64 IN | DIASTOLIC BLOOD PRESSURE: 60 MMHG | BODY MASS INDEX: 34.83 KG/M2 | HEART RATE: 60 BPM | SYSTOLIC BLOOD PRESSURE: 96 MMHG

## 2017-08-31 DIAGNOSIS — I42.9 CARDIOMYOPATHY, IDIOPATHIC (HCC): Chronic | ICD-10-CM

## 2017-08-31 DIAGNOSIS — R06.02 SOB (SHORTNESS OF BREATH): Chronic | ICD-10-CM

## 2017-08-31 DIAGNOSIS — I10 ESSENTIAL HYPERTENSION: Chronic | ICD-10-CM

## 2017-08-31 DIAGNOSIS — I50.22 CHRONIC SYSTOLIC HEART FAILURE (HCC): Primary | Chronic | ICD-10-CM

## 2017-08-31 PROCEDURE — 99215 OFFICE O/P EST HI 40 MIN: CPT | Performed by: INTERNAL MEDICINE

## 2017-08-31 RX ORDER — TORSEMIDE 20 MG/1
TABLET ORAL
Qty: 90 TABLET | Refills: 3 | Status: SHIPPED | OUTPATIENT
Start: 2017-08-31 | End: 2018-10-22 | Stop reason: SDUPTHER

## 2017-09-11 ENCOUNTER — TELEPHONE (OUTPATIENT)
Dept: CARDIOLOGY CLINIC | Age: 47
End: 2017-09-11

## 2017-09-13 ENCOUNTER — TELEPHONE (OUTPATIENT)
Dept: CARDIOLOGY CLINIC | Age: 47
End: 2017-09-13

## 2017-09-18 RX ORDER — CARVEDILOL 12.5 MG/1
6.25 TABLET ORAL 2 TIMES DAILY
Qty: 90 TABLET | Refills: 3 | Status: SHIPPED | OUTPATIENT
Start: 2017-09-18 | End: 2018-06-01 | Stop reason: SDUPTHER

## 2017-09-26 ENCOUNTER — OFFICE VISIT (OUTPATIENT)
Dept: CARDIOLOGY CLINIC | Age: 47
End: 2017-09-26

## 2017-09-26 VITALS
BODY MASS INDEX: 29.08 KG/M2 | OXYGEN SATURATION: 97 % | HEART RATE: 64 BPM | DIASTOLIC BLOOD PRESSURE: 68 MMHG | SYSTOLIC BLOOD PRESSURE: 118 MMHG | WEIGHT: 203.12 LBS | HEIGHT: 70 IN

## 2017-09-26 DIAGNOSIS — E78.2 MIXED HYPERLIPIDEMIA: Chronic | ICD-10-CM

## 2017-09-26 DIAGNOSIS — I50.22 CHRONIC SYSTOLIC HEART FAILURE (HCC): Chronic | ICD-10-CM

## 2017-09-26 DIAGNOSIS — I10 ESSENTIAL HYPERTENSION: Chronic | ICD-10-CM

## 2017-09-26 DIAGNOSIS — I42.0 DILATED CARDIOMYOPATHY (HCC): Primary | Chronic | ICD-10-CM

## 2017-09-26 PROCEDURE — 93000 ELECTROCARDIOGRAM COMPLETE: CPT | Performed by: INTERNAL MEDICINE

## 2017-09-26 PROCEDURE — 99215 OFFICE O/P EST HI 40 MIN: CPT | Performed by: INTERNAL MEDICINE

## 2017-10-03 ENCOUNTER — HOSPITAL ENCOUNTER (OUTPATIENT)
Dept: ENDOSCOPY | Age: 47
Discharge: HOME | End: 2017-10-03
Attending: INTERNAL MEDICINE | Admitting: INTERNAL MEDICINE

## 2017-10-03 VITALS
DIASTOLIC BLOOD PRESSURE: 73 MMHG | SYSTOLIC BLOOD PRESSURE: 103 MMHG | RESPIRATION RATE: 16 BRPM | OXYGEN SATURATION: 96 % | TEMPERATURE: 98 F | HEART RATE: 63 BPM

## 2017-10-03 RX ORDER — SODIUM CHLORIDE 0.9 % (FLUSH) 0.9 %
10 SYRINGE (ML) INJECTION PRN
Status: DISCONTINUED | OUTPATIENT
Start: 2017-10-03 | End: 2017-10-04 | Stop reason: HOSPADM

## 2017-10-03 RX ORDER — SODIUM CHLORIDE 9 MG/ML
INJECTION, SOLUTION INTRAVENOUS CONTINUOUS
Status: CANCELLED | OUTPATIENT
Start: 2017-10-03

## 2017-10-03 RX ORDER — SODIUM CHLORIDE 0.9 % (FLUSH) 0.9 %
10 SYRINGE (ML) INJECTION EVERY 12 HOURS SCHEDULED
Status: DISCONTINUED | OUTPATIENT
Start: 2017-10-03 | End: 2017-10-04 | Stop reason: HOSPADM

## 2017-10-03 RX ORDER — SODIUM CHLORIDE 0.9 % (FLUSH) 0.9 %
10 SYRINGE (ML) INJECTION EVERY 12 HOURS SCHEDULED
Status: CANCELLED | OUTPATIENT
Start: 2017-10-03

## 2017-10-03 RX ORDER — LIDOCAINE HYDROCHLORIDE 10 MG/ML
1 INJECTION, SOLUTION EPIDURAL; INFILTRATION; INTRACAUDAL; PERINEURAL
Status: CANCELLED | OUTPATIENT
Start: 2017-10-03 | End: 2017-10-03

## 2017-10-03 RX ORDER — SODIUM CHLORIDE 0.9 % (FLUSH) 0.9 %
10 SYRINGE (ML) INJECTION PRN
Status: CANCELLED | OUTPATIENT
Start: 2017-10-03

## 2017-10-03 RX ORDER — SODIUM CHLORIDE 9 MG/ML
INJECTION, SOLUTION INTRAVENOUS CONTINUOUS
Status: DISCONTINUED | OUTPATIENT
Start: 2017-10-03 | End: 2017-10-04 | Stop reason: HOSPADM

## 2017-10-03 RX ORDER — ONDANSETRON 2 MG/ML
4 INJECTION INTRAMUSCULAR; INTRAVENOUS PRN
Status: CANCELLED | OUTPATIENT
Start: 2017-10-03

## 2017-10-03 RX ADMIN — SODIUM CHLORIDE: 9 INJECTION, SOLUTION INTRAVENOUS at 09:15

## 2017-10-03 ASSESSMENT — ENCOUNTER SYMPTOMS
DYSPNEA ACTIVITY LEVEL: NO INTERVAL CHANGE
SHORTNESS OF BREATH: 1

## 2017-10-03 NOTE — IP AVS SNAPSHOT
After Visit Summary  (Discharge Instructions)    Medication List for Home    Based on the information you provided to us as well as any changes during this visit, the following is your updated medication list.  Compare this with your prescription bottles at home. If you have any questions or concerns, contact your primary care physician's office. Daily Medication List (This medication list can be shared with any healthcare provider who is helping you manage your medications)      You told us you were taking these medications at home, but the amount or how often you take this medication has CHANGED        Last Dose    Next Dose Due AM NOON PM NIGHT    traZODone 50 MG tablet   Commonly known as:  DESYREL   Take 1 tablet by mouth nightly as needed for Sleep   What changed:  how much to take                                           These are medications you told us you were taking at home, CONTINUE taking them after you leave the hospital        Last Dose    Next Dose Due AM NOON PM NIGHT    carvedilol 12.5 MG tablet   Commonly known as:  COREG   Take 0.5 tablets by mouth 2 times daily                                         diazepam 5 MG tablet   Commonly known as:  VALIUM   Take 5 mg by mouth three times daily. digoxin 125 MCG tablet   Commonly known as:  LANOXIN   Take 1 tablet by mouth daily                                         HYDROcodone-acetaminophen 7.5-325 MG per tablet   Commonly known as:  NORCO   1-2 tabs po q6-8h prn pain. lisinopril 5 MG tablet   Commonly known as:  PRINIVIL;ZESTRIL   Take 1 tablet by mouth 2 times daily                                         lovastatin 40 MG tablet   Commonly known as:  MEVACOR   Take 40 mg by mouth. Take 1 tablet (40 mg total) by mouth at bedtime.                                          pantoprazole sodium 40 MG Pack packet   Commonly known as:  PROTONIX Take 1 packet by mouth every morning (before breakfast)                                         prochlorperazine 10 MG tablet   Commonly known as:  COMPAZINE   Take 1 tablet by mouth every 6 hours as needed (nausea or vomiting)                                         sertraline 100 MG tablet   Commonly known as:  ZOLOFT   Take 200 mg by mouth nightly                                         spironolactone 25 MG tablet   Commonly known as:  ALDACTONE   Take 1 tablet by mouth daily                                         therapeutic multivitamin-minerals tablet   Take 1 tablet by mouth daily. torsemide 20 MG tablet   Commonly known as:  DEMADEX   Take 1 tablet by mouth daily                                         torsemide 20 MG tablet   Commonly known as:  DEMADEX   Take 1 tablet MWF and start today for 2 doses this week. ZYRTEC PO   Take 1 tablet by mouth daily. Take only as needed for scrachy eyes. Allergies as of 10/3/2017        Reactions    Vancomycin Hives, Swelling      Immunizations as of 10/3/2017     No immunizations on file. Last Vitals          Most Recent Value    Temp  98.5 °F (36.9 °C)    Pulse  66    Resp  16    BP  111/73         After Visit Summary    This summary was created for you. Thank you for entrusting your care to us. The following information includes details about your hospital/visit stay along with steps you should take to help with your recovery once you leave the hospital.  In this packet, you will find information about the topics listed below:    · Instructions about your medications including a list of your home medications  · A summary of your hospital visit  · Follow-up appointments once you have left the hospital  · Your care plan at home      You may receive a survey regarding the care you received during your stay. Please arrive 15 minutes prior to appointment, bring photo ID and insurance card. [address]  [address]       10/26/2017 8:00 AM     Appointment with Mitch Del Valle MD; [facility] CARDIAC CATH LAB RM 03 at [address]\[facility] [place] [facility]\"" ([phone])   [address]       10/30/2017 11:30 AM     Appointment with Christin Viramontes at [address] [facility] ([phone])   [address] [place] [address]        Date Due    HIV screening is recommended for all people regardless of risk factors  aged 15-65 years at least once (lifetime) who have never been HIV tested. 7/20/1985    Tetanus Combination Vaccine (1 - Tdap) 7/20/1989    Pneumococcal Vaccine - Pneumovax for adults aged 19-64 years with: chronic heart disease, chronic lung disease, diabetes mellitus, alcoholism, chronic liver disease, or cigarette smoking. (1 of 1 - PPSV23) 7/20/1989    Diabetes Screening 6/21/2016    Yearly Flu Vaccine (1) 9/1/2017    Cholesterol Screening 8/16/2022                 Care Plan Once You Return Home    This section includes instructions you will need to follow once you leave the hospital.  Your care team will discuss these with you, so you and those caring for you know how to best care for your health needs at home. This section may also include educational information about certain health topics that may be of help to you. Discharge Instructions       ANESTHESIA DISCHARGE INSTRUCTIONS    · Wear your seatbelt home. · You are under the influence of drugs-do not drink alcohol,drive,operate machinery,or make any important decisions or sign any legal documentsfor 24 hours  · Children should not ride bikes,skate boards or play on gym sets  for 24 hours after surgery. · A responsible adult needs to be with you for 24 hours. · You may experience lightheadedness,dizziness,or sleepiness following surgery.

## 2017-10-03 NOTE — H&P
[address] [place] [address]   Pre-operative History and Physical    Patient: Remedios Liriano  : 1970  Acct#:     History Obtained From:  patient    HISTORY OF PRESENT ILLNESS:    The patient is a 52 y.o. male with significant past medical history of epigastric pain  Past Medical History:        Diagnosis Date    Allergic     Anesthesia     tremors    Arthritis     CAD (coronary artery disease)     [facility]     colon    CHF (congestive heart failure) ([facility]     Clostridium difficile infection 2016    PCR+    Depression motion     Diverticulitis     Diverticulitis     Diverticulosis     History of alcohol abuse     Hyperlipidemia     Hypertension     Irregular heart beat     states been told he has had in past. Never treated. Sees PCP every 3 mos.  and EKG yearly    Pneumonia     Right knee pain     Shoulder injury     and arthrisits, injury rotaotr cuff    Sleep difficulties     with depression     Past Surgical History:        Procedure Laterality Date    ABSCESS DRAINAGE      CARDIAC CATHETERIZATION  2016    No stents placed    CHOLECYSTECTOMY      COLECTOMY      COLONOSCOPY  2016    with biopsy and polypectomy    ENDOSCOPIC ULTRASOUND (LOWER)  2016    for staging - Dr. Pena Villegas  tube right ear    JOINT REPLACEMENT      left TKR    KNEE ARTHROSCOPY Right [id number]    KNEE ARTHROSCOPY Right 2014    medial meniscus    KNEE SURGERY  2011    removal of tibial component    OTHER SURGICAL HISTORY  13    ACL Rt knee meniscus repair synovectomy    SHOULDER ARTHROSCOPY Right 9/22/15    SUBACROMIAL DECOMPRESSION, DISTAL CLAVICLE EXCISION, LABRAL DEBRIDEMETN    SINUS SURGERY  4 surgeries    SMALL INTESTINE SURGERY  2017    LAPAROSCOPIC LOW ANTERIOR RESECTION, ILEOSTOMY    TUNNELED VENOUS PORT PLACEMENT Left 2017    PORT-A-CATH INSERTION                     TUNNELED VENOUS PORT PLACEMENT      history. His smokeless tobacco use includes Chew. Family History:       Problem Relation Age of Onset    Cancer Mother     High Blood Pressure Father     Cancer Father        PHYSICAL EXAM:      There were no vitals taken for this visit. I        Heart:  Normal apical impulse, regular rate and rhythm, normal S1 and S2, no S3 or S4, and no murmur noted    Lungs:  No increased work of breathing, good air exchange, clear to auscultation bilaterally, no crackles or wheezing    Abdomen:  Ileostomy, normal bowel sounds, soft, non-distended, non-tender, no masses palpated, no hepatosplenomegally      ASA Grade:  ASA 4 - Patient with severe systemic disease that is a constant threat to life    Mallampati Class:  Class I: Soft palate, uvula, fauces, pillars visible  __________  Class II: Soft palate, uvula, fauces visible  __________   Class III: Soft palate, base of uvula visible  _____x_____  Class IV: Hard palate only visible   __________      ASSESSMENT AND PLAN:    1. Patient is a 52 y.o. male here for EGD with anesthesia  2. Procedure options, risks and benefits reviewed with patient. Patient expresses understanding.     Hilario Viera MD  [address]

## 2017-10-03 NOTE — ANESTHESIA PRE-OP
[address] [facility] Anesthesiology  Pre-Anesthesia Evaluation/Consultation       Name:  Stephanie Bernabe                                         Age:  52 y.o. MRN:    [id number]           Procedure (Scheduled): EGD ESOPHAGOGASTRODUODENOSCOPY   Surgeon:     Dr. Manuel Ghotra MD     Allergies   Allergen Reactions    Vancomycin Hives and Swelling     Patient Active Problem List   Diagnosis    S/P total knee replacement using cement    Mixed hyperlipidemia    Essential hypertension    Depression    Umbilical hernia    Lower abdominal pain    Rectal mass    Hepatitis    Localized edema    SOB (shortness of breath)    Acute systolic heart failure (HCC)    Anasarca    Dilated cardiomyopathy (HCC)    Abnormal nuclear cardiac imaging test    Rectal cancer ([address]    Neoplasm related pain    Chronic systolic heart failure ([address]    Cardiomyopathy, idiopathic ([address]    Colon cancer ([address]    Poor venous access    Encounter for antineoplastic chemotherapy    Anemia    Insomnia due to medical condition    Encounter for antineoplastic chemotherapy    Chest pain    Dyspnea on exertion    Peristomal hernia    Episodic lightheadedness     Past Medical History:   Diagnosis Date    Allergic     Anesthesia     tremors    Arthritis     CAD (coronary artery disease)     Cancer ([address]     colon    CHF (congestive heart failure) (HCC)     Clostridium difficile infection 07/11/2016    PCR+    Depression motion     Diverticulitis     Diverticulitis     Diverticulosis     History of alcohol abuse     Hyperlipidemia     Hypertension     Irregular heart beat     states been told he has had in past. Never treated. Sees PCP every 3 mos.  and EKG yearly    Pneumonia     Right knee pain     Shoulder injury     and arthrisits, injury rotaotr cuff    Sleep difficulties     with depression     Past Surgical History:   Procedure Laterality Date    ABSCESS DRAINAGE      CARDIAC CATHETERIZATION  09/14/2016 No stents placed    CHOLECYSTECTOMY      COLECTOMY      COLONOSCOPY  08/29/2016    with biopsy and polypectomy    ENDOSCOPIC ULTRASOUND (LOWER)  09/14/2016    for staging - Dr. Gamez Andclaude  tube right ear    JOINT REPLACEMENT      left TKR    KNEE ARTHROSCOPY Right [id number]    KNEE ARTHROSCOPY Right 8/4/2014    medial meniscus    KNEE SURGERY  08/24/2011    removal of tibial component    OTHER SURGICAL HISTORY  7/1/13    ACL Rt knee meniscus repair synovectomy    SHOULDER ARTHROSCOPY Right 9/22/15    SUBACROMIAL DECOMPRESSION, DISTAL CLAVICLE EXCISION, LABRAL DEBRIDEMETN    SINUS SURGERY  4 surgeries    SMALL INTESTINE SURGERY  01/11/2017    LAPAROSCOPIC LOW ANTERIOR RESECTION, ILEOSTOMY    TUNNELED VENOUS PORT PLACEMENT Left 02/13/2017    PORT-A-CATH INSERTION                     TUNNELED VENOUS PORT PLACEMENT      UMBILICAL HERNIA REPAIR  11/11/14     Social History   Substance Use Topics    Smoking status: Former Smoker     Packs/day: 1.00     Years: 5.00     Quit date: 1/1/2007    Smokeless tobacco: Current User     Types: Chew      Comment: Occas dips     Alcohol use No      Comment: history of alcohol abuse        Prior to Admission medications    Medication Sig Start Date End Date Taking? Authorizing Provider   carvedilol (COREG) 12.5 MG tablet Take 0.5 tablets by mouth 2 times daily 9/18/17   Mercy Gonzalez MD   torsemide (DEMADEX) 20 MG tablet Take 1 tablet [facility] and start today for 2 doses this week. 8/31/17   Mercy Gonzalez MD   lisinopril (PRINIVIL;ZESTRIL) 5 MG tablet Take 1 tablet by mouth 2 times daily 8/25/17   Mercy Gonzalez MD   spironolactone (ALDACTONE) 25 MG tablet Take 1 tablet by mouth daily 8/25/17   Mercy Gonzalez MD   torsemide [facility]) 20 MG tablet Take 1 tablet by mouth daily 8/25/17   Mercy Gonzalez MD   HYDROcodone-acetaminophen [facility]) 7.5-325 MG per tablet 1-2 tabs po q6-8h prn pain.  8/2/17   Donna Li, ARUNA   traZODone (DESYREL) 50 MG tablet Take 1 tablet by mouth nightly as needed for Sleep  Patient taking differently: Take 100 mg by mouth nightly as needed for Sleep  6/12/17   Kemar Son MD   pantoprazole sodium (PROTONIX) 40 MG PACK packet Take 1 packet by mouth every morning (before breakfast) 3/29/17   Kemar Son MD   prochlorperazine (COMPAZINE) 10 MG tablet Take 1 tablet by mouth every 6 hours as needed (nausea or vomiting) 3/29/17   Kemar Son MD   digoxin (LANOXIN) 125 MCG tablet Take 1 tablet by mouth daily 12/16/16   Venezoltana , NP   lovastatin (MEVACOR) 40 MG tablet Take 40 mg by mouth. Take 1 tablet (40 mg total) by mouth at bedtime. 8/19/13   Historical Provider, MD   sertraline (ZOLOFT) 100 MG tablet Take 200 mg by mouth nightly     Historical Provider, MD   diazepam (VALIUM) 5 MG tablet Take 5 mg by mouth three times daily. Historical Provider, MD   therapeutic multivitamin-minerals (THERAGRAN-M) tablet Take 1 tablet by mouth daily. Historical Provider, MD   Cetirizine HCl (ZYRTEC PO) Take 1 tablet by mouth daily. Take only as needed for scrachy eyes. Historical Provider, MD       Current Outpatient Prescriptions   Medication Sig Dispense Refill    carvedilol (COREG) 12.5 MG tablet Take 0.5 tablets by mouth 2 times daily 90 tablet 3    torsemide (DEMADEX) 20 MG tablet Take 1 tablet MWF and start today for 2 doses this week. 90 tablet 3    lisinopril (PRINIVIL;ZESTRIL) 5 MG tablet Take 1 tablet by mouth 2 times daily 60 tablet 11    spironolactone (ALDACTONE) 25 MG tablet Take 1 tablet by mouth daily 30 tablet 3    torsemide (DEMADEX) 20 MG tablet Take 1 tablet by mouth daily 30 tablet 3    HYDROcodone-acetaminophen (NORCO) 7.5-325 MG per tablet 1-2 tabs po q6-8h prn pain.  120 tablet 0    traZODone (DESYREL) 50 MG tablet Take 1 tablet by mouth nightly as needed for Sleep (Patient taking differently: Take 100 mg by mouth nightly as needed for Sleep ) 30 tablet 0    3.2 09/26/2017    CALCIUM 9.2 09/26/2017    GFRAA 25 09/26/2017    GFRAA >60 12/19/2012    LABGLOM 21 09/26/2017    GLUCOSE 111 09/26/2017    GLUCOSE 131 07/05/2017       Coags   Lab Results   Component Value Date    PROTIME 13.3 02/23/2017    INR 1.18 02/23/2017    APTT 37.5 02/23/2017       HCG (If Applicable) No results found for: PREGTESTUR, PREGSERUM, HCG, HCGQUANT     ABGs  Lab Results   Component Value Date    PHART 7.329 01/11/2017    PO2ART 165 01/11/2017    APT2BTY 49 01/11/2017    JGY7GKH 25.5 01/11/2017    BEART -1 01/11/2017    L6BXFKZI 99 01/11/2017        Type & Screen (If Applicable)  Lab Results   Component Value Date    LABABO O 08/24/2011    LABRH Positive 08/24/2011         POCGlucose  No results for input(s): GLUCOSE in the last 72 hours. NPO Status  > 8 hours                       BMI  There is no height or weight on file to calculate BMI. Estimated body mass index is 29.14 kg/(m^2) as calculated from the following:    Height as of 9/26/17: 5' 10\" (1.778 m). Weight as of 9/26/17: 203 lb 1.9 oz (92.1 kg).       Additional Testing (Echo, Stress, ECG, PFTs, etc)        Anesthesia Evaluation  Patient summary reviewed and Nursing notes reviewed no history of anesthetic complications:   Airway: Mallampati: III  TM distance: >3 FB   Neck ROM: full  Mouth opening: > = 3 FB Dental: normal exam         Pulmonary:normal exam    (+) shortness of breath: chronic,      (-) pneumonia       Cardiovascular:  Exercise tolerance: poor (<4 METS),   (+) hypertension: moderate, valvular problems/murmurs: MR, CAD: non-obstructive, dysrhythmias:, CHF: systolic, orthopnea, PND, MONTEZ: no interval change, hyperlipidemia      NYHA Classification: III  ECG reviewed  Rhythm: regular  Rate: normal  Echocardiogram reviewed  Stress test reviewed       ROS comment: 9/17:  -Limited bedside echocardiogram was performed to evaluate EF.   -Left ventricular size is moderately increased.   -Global ejection fraction is severely decreased and estimated at the 20%   range.  -EF 22% by 3D volume evaluation.   -Moderate mitral regurgitation is present.   Left atria enlargement. Beta Blocker:  Dose within 24 Hrs   Neuro/Psych:   (+) psychiatric history: stable with treatment   GI/Hepatic/Renal:   (+) liver disease:,         Endo/Other:    (+) : arthritis: OA. Abdominal:                    Anesthesia Plan    ASA 4     MAC   (Plan for MAC with standard ASA monitoring. Additional monitoring as dictated by intra-operative course. Patient appropriately NPO for the procedure. Risk/Benefits reviewed with patient and all anesthetic questions answered prior to procedure.  )  intravenous induction   Anesthetic plan and risks discussed with patient. Plan discussed with CRNA. DOS STAFF ADDENDUM:    Pt seen and examined, chart reviewed (including anesthesia, drug and allergy history). No interval changes to history and physical examination. Anesthetic plan, risks, benefits, alternatives, and personnel involved discussed with patient. Patient verbalized an understanding and agrees to proceed.       Jodi Lopez DO  October 3, 2017  8:52 AM

## 2017-10-10 ENCOUNTER — HOSPITAL ENCOUNTER (OUTPATIENT)
Dept: OTHER | Age: 47
Discharge: HOME | End: 2017-10-10
Attending: INTERNAL MEDICINE | Admitting: INTERNAL MEDICINE

## 2017-10-10 ENCOUNTER — OFFICE VISIT (OUTPATIENT)
Dept: SURGERY | Age: 47
End: 2017-10-10

## 2017-10-10 ENCOUNTER — OFFICE VISIT (OUTPATIENT)
Dept: CARDIOLOGY CLINIC | Age: 47
End: 2017-10-10

## 2017-10-10 VITALS
HEIGHT: 70 IN | DIASTOLIC BLOOD PRESSURE: 52 MMHG | HEART RATE: 61 BPM | WEIGHT: 205 LBS | SYSTOLIC BLOOD PRESSURE: 90 MMHG | BODY MASS INDEX: 29.35 KG/M2

## 2017-10-10 VITALS — DIASTOLIC BLOOD PRESSURE: 52 MMHG | WEIGHT: 205 LBS | SYSTOLIC BLOOD PRESSURE: 90 MMHG | BODY MASS INDEX: 29.41 KG/M2

## 2017-10-10 DIAGNOSIS — I50.22 CHRONIC SYSTOLIC HEART FAILURE (HCC): Primary | Chronic | ICD-10-CM

## 2017-10-10 DIAGNOSIS — I42.0 DILATED CARDIOMYOPATHY (HCC): Chronic | ICD-10-CM

## 2017-10-10 DIAGNOSIS — R06.02 SOB (SHORTNESS OF BREATH): Chronic | ICD-10-CM

## 2017-10-10 DIAGNOSIS — I42.9 CARDIOMYOPATHY, IDIOPATHIC (HCC): Chronic | ICD-10-CM

## 2017-10-10 DIAGNOSIS — C20 RECTAL CANCER (HCC): Primary | ICD-10-CM

## 2017-10-10 DIAGNOSIS — I10 ESSENTIAL HYPERTENSION: Chronic | ICD-10-CM

## 2017-10-10 LAB
ANION GAP SERPL CALCULATED.3IONS-SCNC: 16 MMOL/L (ref 3–16)
BASOPHILS ABSOLUTE: 0.1 K/UL (ref 0–0.2)
BASOPHILS RELATIVE PERCENT: 1 %
BUN BLDV-MCNC: 23 MG/DL (ref 7–20)
CALCIUM SERPL-MCNC: 9.7 MG/DL (ref 8.3–10.6)
CHLORIDE BLD-SCNC: 99 MMOL/L (ref 99–110)
CO2: 26 MMOL/L (ref 21–32)
CREAT SERPL-MCNC: 1.4 MG/DL (ref 0.9–1.3)
EOSINOPHILS ABSOLUTE: 0.4 K/UL (ref 0–0.6)
EOSINOPHILS RELATIVE PERCENT: 3 %
GFR AFRICAN AMERICAN: >60
GFR NON-AFRICAN AMERICAN: 54
GLUCOSE BLD-MCNC: 66 MG/DL (ref 70–99)
HCT VFR BLD CALC: 40.4 % (ref 40.5–52.5)
HEMOGLOBIN: 13.8 G/DL (ref 13.5–17.5)
LYMPHOCYTES ABSOLUTE: 0.9 K/UL (ref 1–5.1)
LYMPHOCYTES RELATIVE PERCENT: 7 %
MCH RBC QN AUTO: 31.1 PG (ref 26–34)
MCHC RBC AUTO-ENTMCNC: 34.1 G/DL (ref 31–36)
MCV RBC AUTO: 91.3 FL (ref 80–100)
MONOCYTES ABSOLUTE: 1.1 K/UL (ref 0–1.3)
MONOCYTES RELATIVE PERCENT: 9 %
MYELOCYTE PERCENT: 2 %
NEUTROPHILS ABSOLUTE: 9.8 K/UL (ref 1.7–7.7)
NEUTROPHILS RELATIVE PERCENT: 78 %
PDW BLD-RTO: 14.1 % (ref 12.4–15.4)
PLATELET # BLD: 249 K/UL (ref 135–450)
PLATELET SLIDE REVIEW: ADEQUATE
PMV BLD AUTO: 7 FL (ref 5–10.5)
POTASSIUM SERPL-SCNC: 3.9 MMOL/L (ref 3.5–5.1)
PRO-BNP: 3689 PG/ML (ref 0–124)
RBC # BLD: 4.42 M/UL (ref 4.2–5.9)
RBC # BLD: NORMAL 10*6/UL
SLIDE REVIEW: ABNORMAL
SODIUM BLD-SCNC: 141 MMOL/L (ref 136–145)
WBC # BLD: 12.2 K/UL (ref 4–11)

## 2017-10-10 PROCEDURE — 99213 OFFICE O/P EST LOW 20 MIN: CPT | Performed by: SURGERY

## 2017-10-10 PROCEDURE — 99214 OFFICE O/P EST MOD 30 MIN: CPT | Performed by: INTERNAL MEDICINE

## 2017-10-10 NOTE — PROGRESS NOTES
[facility] GENERAL AND LAPAROSCOPIC SURGERY          PATIENT NAME: Thomas Guevara     TODAY'S DATE: 10/10/2017    SUBJECTIVE:    Pt with some upper abd pain, sore and cramping after eating. Pt with good stomal output No N/V. No F/C. No new lower abd issues.      OBJECTIVE:  VITALS:  BP (!) 90/52   Wt 205 lb (93 kg)   BMI 29.41 kg/m²     CONSTITUTIONAL:  awake and alert  LUNGS:  clear to auscultation  HEART: RRR  ABDOMEN:  normal bowel sounds, soft, non-distended, non-tender, no mass     Data:    Radiology Review:  No new abd studies      ASSESSMENT AND PLAN:  Rectal cancer  Completed chemo  Now to do AICD  See back in Nov and begin holm / studies to check for next surgical options    Jeb Nageotte

## 2017-10-26 ENCOUNTER — TELEPHONE (OUTPATIENT)
Dept: CARDIOLOGY CLINIC | Age: 47
End: 2017-10-26

## 2017-10-26 NOTE — TELEPHONE ENCOUNTER
[facility] order pharmacy wants [facility] to be aware that this patient just filled a prescription for Diazepam 5mg on 10/24/17 from Dr Samuel Hickman .

## 2017-10-31 ENCOUNTER — PROCEDURE VISIT (OUTPATIENT)
Dept: CARDIOLOGY CLINIC | Age: 47
End: 2017-10-31

## 2017-10-31 DIAGNOSIS — Z95.810 ICD (IMPLANTABLE CARDIOVERTER-DEFIBRILLATOR) IN PLACE: Primary | ICD-10-CM

## 2017-10-31 DIAGNOSIS — I42.9 CARDIOMYOPATHY, IDIOPATHIC (HCC): Chronic | ICD-10-CM

## 2017-10-31 PROCEDURE — 93282 PRGRMG EVAL IMPLANTABLE DFB: CPT | Performed by: INTERNAL MEDICINE

## 2017-10-31 NOTE — PROGRESS NOTES
Patient comes in for programming evaluation for his defibrillator. All sensing and pacing parameters are within normal range. His incision is healing nicely. No changes need to be made at this time. Please see interrogation for more detail. Patient will follow up in 3 months in office or remotely.

## 2017-11-01 ENCOUNTER — HOSPITAL ENCOUNTER (OUTPATIENT)
Dept: OTHER | Age: 47
Discharge: HOME | End: 2017-11-30
Attending: INTERNAL MEDICINE | Admitting: INTERNAL MEDICINE

## 2017-11-02 ENCOUNTER — TELEPHONE (OUTPATIENT)
Dept: CARDIOLOGY CLINIC | Age: 47
End: 2017-11-02

## 2017-11-02 RX ORDER — DIGOXIN 125 MCG
125 TABLET ORAL DAILY
Qty: 90 TABLET | Refills: 3 | Status: SHIPPED | OUTPATIENT
Start: 2017-11-02 | End: 2017-11-03 | Stop reason: SDUPTHER

## 2017-11-02 NOTE — TELEPHONE ENCOUNTER
Review of records indicate he has been on both digoxin 0.125 mg daily and valium 5 mg tid since 9/2016 without issue. Has there been a change in dosing that pharmacist is concerned about?

## 2017-11-02 NOTE — TELEPHONE ENCOUNTER
Neeru - patient prescribed valium. Pharmacy calling due to a possible interaction with digoxin. Should they fill valium?   TY

## 2017-11-03 RX ORDER — DIGOXIN 125 MCG
125 TABLET ORAL DAILY
Qty: 30 TABLET | Refills: 3 | Status: SHIPPED | OUTPATIENT
Start: 2017-11-03 | End: 2017-11-03 | Stop reason: SDUPTHER

## 2017-11-03 RX ORDER — DIGOXIN 125 MCG
125 TABLET ORAL DAILY
Qty: 90 TABLET | Refills: 3 | Status: SHIPPED | OUTPATIENT
Start: 2017-11-03 | End: 2018-10-29 | Stop reason: SDUPTHER

## 2017-11-08 ENCOUNTER — TELEPHONE (OUTPATIENT)
Dept: CARDIOLOGY CLINIC | Age: 47
End: 2017-11-08

## 2017-11-08 NOTE — TELEPHONE ENCOUNTER
Who is requesting records: Jeferson Jean     What is needed: Need last OV notes    Phone number of the doctor/facility where records are to be sent:     Fax number of the doctor/facility where records are to be sent[de-identified] [phone]

## 2017-11-16 ENCOUNTER — OFFICE VISIT (OUTPATIENT)
Dept: SURGERY | Age: 47
End: 2017-11-16

## 2017-11-16 VITALS — DIASTOLIC BLOOD PRESSURE: 80 MMHG | BODY MASS INDEX: 29.84 KG/M2 | SYSTOLIC BLOOD PRESSURE: 130 MMHG | WEIGHT: 208 LBS

## 2017-11-16 DIAGNOSIS — Z93.2 ILEOSTOMY IN PLACE (HCC): ICD-10-CM

## 2017-11-16 DIAGNOSIS — C20 RECTAL CANCER (HCC): Primary | ICD-10-CM

## 2017-11-16 PROCEDURE — 99213 OFFICE O/P EST LOW 20 MIN: CPT | Performed by: SURGERY

## 2017-11-16 NOTE — PROGRESS NOTES
[facility] GENERAL AND LAPAROSCOPIC SURGERY          PATIENT NAME: Norman Guevara     TODAY'S DATE: 11/16/2017    SUBJECTIVE:    Pt having abd pain, cramping in mid abd, feels decreased appetite, no emesis. Has same mild tenderness around stoma, able to drink fluids. No F/C. OBJECTIVE:  VITALS:  /80   Wt 208 lb (94.3 kg)   BMI 29.84 kg/m²     CONSTITUTIONAL:  awake and alert  LUNGS:  clear to auscultation, RR  ABDOMEN:  normal bowel sounds, soft, non-distended, tenderness noted in mid ab and or right in stoma region. Has hernia in that region with prolapse     Data:       Ref.  Range 10/24/2017 12:16 10/26/2017 07:24 10/26/2017 07:45 10/26/2017 07:48   Sodium Latest Ref Range: 136 - 145 mmol/L 141      Potassium Latest Ref Range: 3.5 - 5.1 mmol/L 5.3 (H)      Chloride Latest Ref Range: 99 - 110 mmol/L 105      CO2 Latest Ref Range: 21 - 32 mmol/L 20 (L)      BUN Latest Ref Range: 7 - 20 mg/dL 12      Creatinine Latest Ref Range: 0.9 - 1.3 mg/dL 0.8 (L)      Anion Gap Latest Ref Range: 3 - 16  16      GFR Non- Latest Ref Range: >60  >60   >60   GFR  Latest Ref Range: >60  >60   >60   Glucose Latest Ref Range: 70 - 99 mg/dL 110 (H)      POC Glucose Latest Ref Range: 70 - 99 mg/dl    96   Calcium Latest Ref Range: 8.3 - 10.6 mg/dL 9.7      POC Sodium Latest Ref Range: 136 - 145 mmol/L    139   POC Potassium Latest Ref Range: 3.5 - 5.1 mmol/L    4.5   POC BUN Latest Ref Range: 7 - 18 mg/dL    15   POC Creatinine Latest Ref Range: 0.9 - 1.3 mg/dL    0.9   POC Hematocrit Latest Ref Range: 41 - 53 %    37 (L)   Pro-BNP Latest Ref Range: 0 - 124 pg/mL 2,213 (H)      WBC Latest Ref Range: 4.0 - 11.0 K/uL   7.6    Hemoglobin Quant Latest Ref Range: 13.5 - 17.5 g/dL   13.2 (L)    Platelet Count Latest Ref Range: 135 - 450 K/uL   213        Radiology Review:  8/2017 CT      ASSESSMENT AND PLAN:  Rectal Cancer  Ileostomy in place    Stable post recent defib placement  Stoma area coiled in SQ, maybe worse and cramping  Will check CT and see pt back to discuss plans for possible tx / revision.  Need also to see if pelvic collection is resolved     Bety Sim

## 2017-11-21 ENCOUNTER — HOSPITAL ENCOUNTER (OUTPATIENT)
Dept: CT IMAGING | Age: 47
Discharge: HOME | End: 2017-11-21
Attending: SURGERY | Admitting: SURGERY

## 2017-11-21 DIAGNOSIS — Z93.2 ILEOSTOMY IN PLACE (HCC): ICD-10-CM

## 2017-11-21 DIAGNOSIS — C20 RECTAL CANCER (HCC): ICD-10-CM

## 2017-11-21 DIAGNOSIS — C20 MALIGNANT NEOPLASM OF RECTUM (HCC): ICD-10-CM

## 2017-11-21 LAB
ANION GAP SERPL CALCULATED.3IONS-SCNC: 14 MMOL/L (ref 3–16)
BUN BLDV-MCNC: 12 MG/DL (ref 7–20)
CALCIUM SERPL-MCNC: 9.8 MG/DL (ref 8.3–10.6)
CHLORIDE BLD-SCNC: 99 MMOL/L (ref 99–110)
CO2: 25 MMOL/L (ref 21–32)
CREAT SERPL-MCNC: 0.8 MG/DL (ref 0.9–1.3)
GFR AFRICAN AMERICAN: >60
GFR NON-AFRICAN AMERICAN: >60
GLUCOSE BLD-MCNC: 85 MG/DL (ref 70–99)
POTASSIUM SERPL-SCNC: 4.7 MMOL/L (ref 3.5–5.1)
PRO-BNP: 2534 PG/ML (ref 0–124)
SODIUM BLD-SCNC: 138 MMOL/L (ref 136–145)

## 2017-11-27 ENCOUNTER — TELEPHONE (OUTPATIENT)
Dept: CARDIOLOGY CLINIC | Age: 47
End: 2017-11-27

## 2017-11-27 NOTE — TELEPHONE ENCOUNTER
Medication Refill    When was your last appointment with cardiology? 10/10/17  (if 1year or longer, please schedule an appointment)    Medication needing refilled:spironolactone (ALDACTONE) 25 MG tablet    Doseage of the medication: 25 mg    How are you taking this medication (QD, BID, TID, QID, PRN):Take 1 tablet by mouth daily - Oral    Patient want a 30 or 90 day supply called in:90    Which Pharmacy are we sending the medication to:    Pharmacy Phone number:    Pharmacy Fax number:

## 2017-11-28 ENCOUNTER — OFFICE VISIT (OUTPATIENT)
Dept: SURGERY | Age: 47
End: 2017-11-28

## 2017-11-28 VITALS — DIASTOLIC BLOOD PRESSURE: 78 MMHG | SYSTOLIC BLOOD PRESSURE: 120 MMHG | BODY MASS INDEX: 29.84 KG/M2 | WEIGHT: 208 LBS

## 2017-11-28 DIAGNOSIS — I10 ESSENTIAL HYPERTENSION: Primary | Chronic | ICD-10-CM

## 2017-11-28 DIAGNOSIS — C20 RECTAL CANCER (HCC): ICD-10-CM

## 2017-11-28 DIAGNOSIS — K46.9 PERISTOMAL HERNIA: ICD-10-CM

## 2017-11-28 DIAGNOSIS — Z93.2 ILEOSTOMY IN PLACE (HCC): Primary | ICD-10-CM

## 2017-11-28 PROCEDURE — 99213 OFFICE O/P EST LOW 20 MIN: CPT | Performed by: SURGERY

## 2017-11-28 RX ORDER — SPIRONOLACTONE 25 MG/1
25 TABLET ORAL DAILY
Qty: 90 TABLET | Refills: 1 | Status: SHIPPED | OUTPATIENT
Start: 2017-11-28 | End: 2018-06-14 | Stop reason: SDUPTHER

## 2017-11-28 NOTE — PROGRESS NOTES
[facility] GENERAL AND LAPAROSCOPIC SURGERY          PATIENT NAME: Leo Fitzpatrick     TODAY'S DATE: 11/28/2017    SUBJECTIVE:    Pt returns after CT. Having pain in / around stomal area. No emesis, feels cramping, can't eat well. No F/C. Some stomal output. No recent CP, SOB or cardiac concerns. OBJECTIVE:  VITALS:  /78   Wt 208 lb (94.3 kg)   BMI 29.84 kg/m²     CONSTITUTIONAL:  awake and alert  LUNGS:  clear to auscultation  HEART: RRR  ABDOMEN:  hypoactive bowel sounds, soft, non-distended, tenderness noted on the right, and across from there, with tenderness around stoma     Data:     Ref.  Range 11/23/2017 21:36   Sodium Latest Ref Range: 136 - 145 mmol/L 136   Potassium Latest Ref Range: 3.5 - 5.1 mmol/L 4.3   Chloride Latest Ref Range: 99 - 110 mmol/L 97 (L)   CO2 Latest Ref Range: 21 - 32 mmol/L 25   BUN Latest Ref Range: 7 - 20 mg/dL 15   Creatinine Latest Ref Range: 0.9 - 1.3 mg/dL 0.7 (L)   Anion Gap Latest Ref Range: 3 - 16  14   GFR Non- Latest Ref Range: >60  >60   GFR  Latest Ref Range: >60  >60   Lactic Acid Latest Ref Range: 0.4 - 2.0 mmol/L 1.5   Glucose Latest Ref Range: 70 - 99 mg/dL 114 (H)   Calcium Latest Ref Range: 8.3 - 10.6 mg/dL 9.9   Total Protein Latest Ref Range: 6.4 - 8.2 g/dL 7.6   Pro-BNP Latest Ref Range: 0 - 124 pg/mL 3,161 (H)   Troponin Latest Ref Range: <0.01 ng/mL <0.01   Albumin Latest Ref Range: 3.4 - 5.0 g/dL 4.5   Globulin Latest Units: g/dL 3.1   Albumin/Globulin Ratio Latest Ref Range: 1.1 - 2.2  1.5   Alk Phos Latest Ref Range: 40 - 129 U/L 89   ALT Latest Ref Range: 10 - 40 U/L 15   AST Latest Ref Range: 15 - 37 U/L 12 (L)   Bilirubin Latest Ref Range: 0.0 - 1.0 mg/dL <0.2   Digoxin Lvl Latest Ref Range: 0.8 - 2.0 ng/mL <0.3 (L)   WBC Latest Ref Range: 4.0 - 11.0 K/uL 10.8   RBC Latest Ref Range: 4.20 - 5.90 M/uL 4.10 (L)   Hemoglobin Quant Latest Ref Range: 13.5 - 17.5 g/dL 13.2 (L)   Hematocrit Latest Ref Range: 40.5 - 52.5 % 37.9 (L)   MCV Latest Ref Range: 80.0 - 100.0 fL 92.5   MCH Latest Ref Range: 26.0 - 34.0 pg 32.3   MCHC Latest Ref Range: 31.0 - 36.0 g/dL 34.9   MPV Latest Ref Range: 5.0 - 10.5 fL 6.5   RDW Latest Ref Range: 12.4 - 15.4 % 14.6         Radiology Review:  CT scan reviewed I office with pt      ASSESSMENT AND PLAN:  Ileostomy in place, peristomal hernia with partial obstruction  Rectal cancer  Has presacral collection, smaller but present, not ready for reanastomosis yet, so will close loop ileostomy, repair stomal hernia, and do new end TV colon colostomy   DW pt in detail, plans, R/B/A reviewed, will do at [facility]    Shira Banks

## 2017-11-28 NOTE — LETTER
Surgery Scheduling Form  PATIENT DEMOGRAPHICS:  Patient Name:  Michi Choudhary  Patient :  1970   Patient SS#:      Patient Phone:  [phone] (home) [phone] (work) Alt. Patient Phone:    Patient Address:  [address] [place] [address] Road  PCP:  Manish Mcfadden MD   Insurance:  Payor: [employer] / Plan: [employer]AN [address] [id number] / Product Type: *No Product type* /   Insurance ID Number:    Payor/Plan Subscr  Sex Relation Sub. Ins. ID Effective Group Num   1. MEDICARE - MEAnastacio Police 1970 Male  [id number]                                    [address]   2. [address] 1977 Female  [id number]                                   [address]     Allergies: Vancomycin; Corn-containing products; and Eggs or egg-derived products  DIAGNOSIS & PROCEDURE:  Diagnosis:   Parastomal Hernia  Operation:  Open repair of parastomal hernia and colostomy placement  Location:  [facility] [place] MAIN  Surgeon:  Spenser Kraft   SCHEDULING INFORMATION:   Surgeon's Scheduling Instruction:  elective  Requested Date: 17   OR Time: 12:15 pm  Patient Arrival Time: 10:45 am  OR Time Required:  90  Minutes  Anesthesia:  General  Equipment:  none   Status:  Inpatient  PAT Required:  In Epic  Patient Requested to see PCP for Pre-op H & P:  NO  Special Comments:       17  Time : 4:13 pm    Conformation Number:   ______________________________________________________________________  PRE-CERTIFICATION INFORMATION:  ICD 10 Code: K43.5         CPT Code: 83261, 46604      Insurance Correspondence:

## 2017-11-30 ENCOUNTER — HOSPITAL ENCOUNTER (OUTPATIENT)
Dept: NON INVASIVE DIAGNOSTICS | Age: 47
Discharge: HOME | End: 2017-11-30
Attending: SURGERY | Admitting: SURGERY

## 2017-11-30 DIAGNOSIS — I42.9 CARDIOMYOPATHY, IDIOPATHIC (HCC): Primary | Chronic | ICD-10-CM

## 2017-11-30 DIAGNOSIS — I42.8 OTHER CARDIOMYOPATHIES (CODE): ICD-10-CM

## 2017-11-30 LAB
LV EF: 23 %
LVEF MODALITY: NORMAL

## 2017-12-01 ENCOUNTER — HOSPITAL ENCOUNTER (OUTPATIENT)
Dept: OTHER | Age: 47
Discharge: HOME | End: 2017-12-31
Attending: INTERNAL MEDICINE | Admitting: INTERNAL MEDICINE

## 2017-12-07 ENCOUNTER — TELEPHONE (OUTPATIENT)
Dept: SURGERY | Age: 47
End: 2017-12-07

## 2017-12-07 NOTE — TELEPHONE ENCOUNTER
Talked to Dr. Ann Christian, pt needs to stop eating and drink water only for the rest of the night. Let the laxatives work and cut back on the narcotics because that is causing his constipation.

## 2017-12-19 ENCOUNTER — OFFICE VISIT (OUTPATIENT)
Dept: SURGERY | Age: 47
End: 2017-12-19

## 2017-12-19 VITALS — WEIGHT: 198 LBS | SYSTOLIC BLOOD PRESSURE: 108 MMHG | DIASTOLIC BLOOD PRESSURE: 58 MMHG | BODY MASS INDEX: 28.41 KG/M2

## 2017-12-19 DIAGNOSIS — Z93.3 COLOSTOMY IN PLACE (HCC): Primary | ICD-10-CM

## 2017-12-19 PROCEDURE — 99024 POSTOP FOLLOW-UP VISIT: CPT | Performed by: SURGERY

## 2018-01-09 DIAGNOSIS — I50.22 CHRONIC SYSTOLIC HEART FAILURE (HCC): Primary | Chronic | ICD-10-CM

## 2018-01-09 DIAGNOSIS — Z95.810 ICD (IMPLANTABLE CARDIOVERTER-DEFIBRILLATOR) IN PLACE: ICD-10-CM

## 2018-01-10 ENCOUNTER — OFFICE VISIT (OUTPATIENT)
Dept: CARDIOLOGY CLINIC | Age: 48
End: 2018-01-10

## 2018-01-10 ENCOUNTER — TELEPHONE (OUTPATIENT)
Dept: CARDIOLOGY CLINIC | Age: 48
End: 2018-01-10

## 2018-01-10 VITALS
DIASTOLIC BLOOD PRESSURE: 50 MMHG | HEART RATE: 66 BPM | WEIGHT: 204.4 LBS | RESPIRATION RATE: 16 BRPM | HEIGHT: 70 IN | BODY MASS INDEX: 29.26 KG/M2 | OXYGEN SATURATION: 97 % | SYSTOLIC BLOOD PRESSURE: 90 MMHG

## 2018-01-10 DIAGNOSIS — Z95.810 ICD (IMPLANTABLE CARDIOVERTER-DEFIBRILLATOR) IN PLACE: ICD-10-CM

## 2018-01-10 DIAGNOSIS — D64.9 ANEMIA, UNSPECIFIED TYPE: ICD-10-CM

## 2018-01-10 DIAGNOSIS — R06.02 SOB (SHORTNESS OF BREATH): Chronic | ICD-10-CM

## 2018-01-10 DIAGNOSIS — I50.22 CHRONIC SYSTOLIC HEART FAILURE (HCC): Chronic | ICD-10-CM

## 2018-01-10 DIAGNOSIS — I42.9 CARDIOMYOPATHY, IDIOPATHIC (HCC): Chronic | ICD-10-CM

## 2018-01-10 DIAGNOSIS — E78.2 MIXED HYPERLIPIDEMIA: Chronic | ICD-10-CM

## 2018-01-10 DIAGNOSIS — I50.22 CHRONIC SYSTOLIC HEART FAILURE (HCC): Primary | Chronic | ICD-10-CM

## 2018-01-10 DIAGNOSIS — R06.02 SOB (SHORTNESS OF BREATH): Primary | Chronic | ICD-10-CM

## 2018-01-10 DIAGNOSIS — I42.8 NON-ISCHEMIC CARDIOMYOPATHY (HCC): Chronic | ICD-10-CM

## 2018-01-10 DIAGNOSIS — I10 ESSENTIAL HYPERTENSION: Chronic | ICD-10-CM

## 2018-01-10 PROCEDURE — 99214 OFFICE O/P EST MOD 30 MIN: CPT | Performed by: INTERNAL MEDICINE

## 2018-01-10 PROCEDURE — 93290 INTERROG DEV EVAL ICPMS IP: CPT | Performed by: INTERNAL MEDICINE

## 2018-01-10 RX ORDER — LANOLIN ALCOHOL/MO/W.PET/CERES
10 CREAM (GRAM) TOPICAL NIGHTLY PRN
COMMUNITY

## 2018-01-10 NOTE — PROGRESS NOTES
on Lisinopril. He states his blood pressure remains on the low side. Recent Hospitalization or Testing:   Echo 11/30/17:  Left ventricular size is markedly dilated and hypokinetic. LV function is   severely reduced at 20-25%   Mild mitral regurgitation is present.   The left atrium is dilated.   Pacer / ICD wire is visualized in the right ventricle.       Lab Results   Component Value Date    LVEF 20 08/16/2017    LVEFMODE Echo 08/16/2017     Echo 11/13/17:  Left ventricular size is markedly dilated and hypokinetic. LV function is   severely reduced at 20-25%   Mild mitral regurgitation is present.   The left atrium is dilated.   Pacer / ICD wire is visualized in the right ventricle.   Mild tricuspid regurgitation with RVSP estimated at 30 mmHg. Echo 8/25/2017  Summary   Technically limited examination to eval LVEF.   Dilated left ventricle.   Severely decreased left ventricular systolic function with an estimated EF   20-25%.   Mild to moderate mitral regurgitation.  Christean Chroman is mild-moderate tricuspid regurgitation with RVSP estimated at 46   mmHg.   There is a trivial posterior pericardial effusion noted.     Echo 8/15/2017   Summary   -Limited bedside echocardiogram was performed to evaluate EF.   -Left ventricular size is moderately increased.   -Global ejection fraction is severely decreased and estimated at the 20%   range.  -EF 22% by 3D volume evaluation.   -Moderate mitral regurgitation is present.   Left atria enlargement. MUGA 5/3/17  Abnormal resting left ventricular function with severe global hypokinesis  and EF= 29% . Echo: 9/6/16:  Global ejection fraction is severely decreased and estimated from 15 % to 20 %. Global LV hypokinesis. Mildly dilated left atrium. RV systolic function appears to be reduced. Moderate mitral regurgitation is present. There is moderate tricuspid regurgitation with RVSP estimated at 38 mmHg. This is suggestive of mild pulmonary hypertension.   Mild pulmonic regurgitation present. There is a moderate pleural effusion noted measuring 7.0 cm. [facility] 9/15/16:  Cath via radial unable to manipulate catheter into ascending aorta-small subintimal dissection in subclavian occurred. Cath via right femoral shows normal coronaries,LVEDP 20-25. EF 15%  No coronary artery disease, EF 15%, LVEDP 20-25 mmHg. NYHA:   II  ACC/ AHA Stage:    C    Past Medical History:   has a past medical history of Allergic; Anesthesia; Arthritis; CAD (coronary artery disease); Cancer [facility]); CHF (congestive heart failure) ([facility] [address] Clostridium difficile infection; Depression motion; Diverticulitis; Diverticulitis; Diverticulosis; History of alcohol abuse; Hyperlipidemia; Hypertension; Irregular heart beat; Pneumonia; Right knee pain; Shoulder injury; and Sleep difficulties. Surgical History:   has a past surgical history that includes sinus surgery (4 surgeries); Inner ear surgery (tube right ear); knee surgery (08/24/2011); other surgical history (7/1/13); Knee arthroscopy (Right, [id number]); Knee arthroscopy (Right, 8/4/2014); Umbilical hernia repair (11/11/14); joint replacement; Shoulder arthroscopy (Right, 9/22/15); Colonoscopy (08/29/2016); Endoscopic ultrasonography, GI (09/14/2016); Cardiac catheterization (09/14/2016); Small intestine surgery (01/11/2017); Tunneled venous port placement (Left, 02/13/2017); Cholecystectomy; colectomy; Abscess Drainage; Tunneled venous port placement; hernia repair; Upper gastrointestinal endoscopy (10/03/2017); pacemaker placement (10/26/2017); hernia repair (12/01/2017); Small intestine surgery (12/01/2017); and other surgical history (12/01/2017). Social History:   reports that he quit smoking about 11 years ago. He has a 5.00 pack-year smoking history. He quit smokeless tobacco use about 5 months ago. His smokeless tobacco use included Chew. He reports that he does not drink alcohol or use drugs.    Family History:   Family History   Problem Relation Age of Onset    Cancer Mother     High Blood Pressure Father     Cancer Father        Home Medications:  Prior to Admission medications    Medication Sig Start Date End Date Taking? Authorizing Provider   melatonin 3 MG TABS tablet Take 3 mg by mouth nightly as needed   Yes Historical Provider, MD   spironolactone (ALDACTONE) 25 MG tablet Take 1 tablet by mouth daily 11/28/17  Yes David Bello MD   digoxin [facility]) 125 MCG tablet Take 1 tablet by mouth daily 11/3/17  Yes Purnima Valero MD   carvedilol (COREG) 12.5 MG tablet Take 0.5 tablets by mouth 2 times daily 9/18/17  Yes David Bello MD   torsemide (DEMADEX) 20 MG tablet Take 1 tablet MWF and start today for 2 doses this week. Patient taking differently: Take 20 mg by mouth Take 1 tablet MWF 8/31/17  Yes David Bello MD   lisinopril (PRINIVIL;ZESTRIL) 5 MG tablet Take 1 tablet by mouth 2 times daily 8/25/17  Yes David Bello MD   HYDROcodone-acetaminophen [facility]) 7.5-325 MG per tablet 1-2 tabs po q6-8h prn pain. 8/2/17  Yes Maryann Wakefield CNP   traZODone (DESYREL) 50 MG tablet Take 1 tablet by mouth nightly as needed for Sleep  Patient taking differently: Take 100 mg by mouth nightly as needed for Sleep  6/12/17  Yes Leopoldo Baker, MD   prochlorperazine (COMPAZINE) 10 MG tablet Take 1 tablet by mouth every 6 hours as needed (nausea or vomiting) 3/29/17  Yes Leopoldo Baker, MD   lovastatin (MEVACOR) 40 MG tablet Take 40 mg by mouth. Take 1 tablet (40 mg total) by mouth at bedtime. 8/19/13  Yes Historical Provider, MD   sertraline (ZOLOFT) 100 MG tablet Take 100 mg by mouth nightly    Yes Historical Provider, MD   diazepam (VALIUM) 5 MG tablet Take 5 mg by mouth three times daily. Yes Historical Provider, MD   therapeutic multivitamin-minerals (THERAGRAN-M) tablet Take 1 tablet by mouth daily. Yes Historical Provider, MD   Cetirizine HCl (ZYRTEC PO) Take 1 tablet by mouth daily. Take only as needed for scrachy eyes.    Yes surgery 12/2017 due to hypotension)   2. Cardiomyopathy, idiopathic ([address] --Possibly related to chemotherapy. 8/2017 Echo EF 20-25%. AICD implanted 10/26/17.  11/2017 Echo EF 20-25%. 3. Mixed hyperlipidemia -8/2017 - , TG- 286, HDL- 37, LDL- 133.    4. Essential hypertension --BP (!) 90/50 (Site: Right Arm, Position: Sitting, Cuff Size: Medium Adult)   Pulse 66   Resp 16   Ht 5' 10\" (1.778 m)   Wt 204 lb 6.4 oz (92.7 kg)   SpO2 97%   BMI 29.33 kg/m²   Has occasional light headedness at time, but none reported today. Plan:   1. Continue medications  2. Get standing blood work drawn today. 3.  ICD interrogation every three months.    4.  Follow up in 3months    I appreciate the opportunity of cooperating in the care of this individual.    Kenny Webber MD. [facility] [place]

## 2018-01-17 DIAGNOSIS — Z95.810 ICD (IMPLANTABLE CARDIOVERTER-DEFIBRILLATOR) IN PLACE: ICD-10-CM

## 2018-01-17 DIAGNOSIS — I50.22 CHRONIC SYSTOLIC HEART FAILURE (HCC): Chronic | ICD-10-CM

## 2018-01-17 DIAGNOSIS — I10 ESSENTIAL HYPERTENSION: Chronic | ICD-10-CM

## 2018-01-17 DIAGNOSIS — D64.9 ANEMIA, UNSPECIFIED TYPE: ICD-10-CM

## 2018-01-17 DIAGNOSIS — I42.9 CARDIOMYOPATHY, IDIOPATHIC (HCC): Chronic | ICD-10-CM

## 2018-01-17 DIAGNOSIS — R06.02 SOB (SHORTNESS OF BREATH): Chronic | ICD-10-CM

## 2018-01-17 LAB
ANION GAP SERPL CALCULATED.3IONS-SCNC: 12 MMOL/L (ref 3–16)
BASOPHILS ABSOLUTE: 0 K/UL (ref 0–0.2)
BASOPHILS RELATIVE PERCENT: 0.6 %
BUN BLDV-MCNC: 9 MG/DL (ref 7–20)
CALCIUM SERPL-MCNC: 9.2 MG/DL (ref 8.3–10.6)
CHLORIDE BLD-SCNC: 102 MMOL/L (ref 99–110)
CO2: 28 MMOL/L (ref 21–32)
CREAT SERPL-MCNC: 0.7 MG/DL (ref 0.9–1.3)
EOSINOPHILS ABSOLUTE: 0.2 K/UL (ref 0–0.6)
EOSINOPHILS RELATIVE PERCENT: 2.2 %
GFR AFRICAN AMERICAN: >60
GFR NON-AFRICAN AMERICAN: >60
GLUCOSE BLD-MCNC: 99 MG/DL (ref 70–99)
HCT VFR BLD CALC: 37.4 % (ref 40.5–52.5)
HEMOGLOBIN: 12.8 G/DL (ref 13.5–17.5)
LYMPHOCYTES ABSOLUTE: 0.8 K/UL (ref 1–5.1)
LYMPHOCYTES RELATIVE PERCENT: 11.2 %
MCH RBC QN AUTO: 32.8 PG (ref 26–34)
MCHC RBC AUTO-ENTMCNC: 34.2 G/DL (ref 31–36)
MCV RBC AUTO: 95.8 FL (ref 80–100)
MONOCYTES ABSOLUTE: 0.6 K/UL (ref 0–1.3)
MONOCYTES RELATIVE PERCENT: 7.8 %
NEUTROPHILS ABSOLUTE: 5.5 K/UL (ref 1.7–7.7)
NEUTROPHILS RELATIVE PERCENT: 78.2 %
PDW BLD-RTO: 14.1 % (ref 12.4–15.4)
PLATELET # BLD: 213 K/UL (ref 135–450)
PMV BLD AUTO: 7.6 FL (ref 5–10.5)
POTASSIUM SERPL-SCNC: 4.3 MMOL/L (ref 3.5–5.1)
PRO-BNP: 5173 PG/ML (ref 0–124)
RBC # BLD: 3.91 M/UL (ref 4.2–5.9)
SODIUM BLD-SCNC: 142 MMOL/L (ref 136–145)
WBC # BLD: 7.1 K/UL (ref 4–11)

## 2018-01-22 ENCOUNTER — TELEPHONE (OUTPATIENT)
Dept: CARDIOLOGY CLINIC | Age: 48
End: 2018-01-22

## 2018-01-24 PROBLEM — J18.9 PNEUMONIA: Status: ACTIVE | Noted: 2018-01-24

## 2018-03-06 ENCOUNTER — OFFICE VISIT (OUTPATIENT)
Dept: SURGERY | Age: 48
End: 2018-03-06

## 2018-03-06 VITALS — BODY MASS INDEX: 29.27 KG/M2 | DIASTOLIC BLOOD PRESSURE: 68 MMHG | WEIGHT: 204 LBS | SYSTOLIC BLOOD PRESSURE: 110 MMHG

## 2018-03-06 DIAGNOSIS — C20 RECTAL CANCER (HCC): Primary | ICD-10-CM

## 2018-03-06 PROCEDURE — 99213 OFFICE O/P EST LOW 20 MIN: CPT | Performed by: SURGERY

## 2018-03-06 NOTE — PROGRESS NOTES
administration of   intravenous contrast. Multiplanar reformatted images are provided for review. Dose modulation, iterative reconstruction, and/or weight based adjustment of   the mA/kV was utilized to reduce the radiation dose to as low as reasonably   achievable. COMPARISON:   11/21/2017 and chest x-ray 01/23/2018 at 2153 hours       HISTORY:   ORDERING PHYSICIAN PROVIDED HISTORY: ABDOMINAL PAIN   TECHNOLOGIST PROVIDED HISTORY:   Additional Contrast?->None   Technologist Provided Reason for Exam: abdominal pain   abd pain   Acuity: Unknown   Type of Encounter: Unknown   Relevant Medical/Surgical History: (pain around colostomy site, states he   felt it pop 3 weeks ago and pain is getting worse)       FINDINGS:   Lower Chest: There is extensive airspace disease in the middle lobe and right   lower lobe with relative sparing peripherally. There is minimal left lower   lobe airspace disease. There are trace/small bilateral low-attenuation   pleural effusions. The distal esophagus is mildly dilated with a thick wall. The heart is mildly to moderately enlarged with an AICD. Organs: The liver, spleen, pancreas, adrenal glands and kidneys are normal.   There are no calcified gallstones       GI/Bowel: There is a right lower quadrant colostomy with parastomal hernia   containing fat and small amount of fluid. Evidence prior bowel surgery with   stable posttreatment changes in the presacral soft tissues. The appendix is   normal.       Pelvis: Urinary bladder is grossly normal.       Peritoneum/Retroperitoneum: There is no adenopathy, free air or free fluid. Bones/Soft Tissues: There are bilateral pars interarticularis defects at L5. There is no significant spondylolisthesis. No acute bone finding. Impression   There is extensive airspace disease in the middle lobe and right lower lobe   with relative peripheral sparing. Lung disease was incompletely visualized.        There are

## 2018-03-09 ENCOUNTER — HOSPITAL ENCOUNTER (OUTPATIENT)
Dept: GENERAL RADIOLOGY | Age: 48
Discharge: HOME | End: 2018-03-09
Attending: SURGERY | Admitting: SURGERY

## 2018-03-09 DIAGNOSIS — C20 RECTAL CANCER (HCC): ICD-10-CM

## 2018-03-09 DIAGNOSIS — C20 MALIGNANT NEOPLASM OF RECTUM (HCC): ICD-10-CM

## 2018-03-13 ENCOUNTER — TELEPHONE (OUTPATIENT)
Dept: SURGERY | Age: 48
End: 2018-03-13

## 2018-03-13 NOTE — TELEPHONE ENCOUNTER
Pt called wondering what the next step is after the [facility] Water Soluble Enema. He had the procedure 03/09/18.  He was wondering if he would contacted or if he needed to make an appt    Please Advise  [phone]

## 2018-03-19 ENCOUNTER — HOSPITAL ENCOUNTER (OUTPATIENT)
Dept: ENDOSCOPY | Age: 48
Discharge: HOME | End: 2018-03-19
Attending: SURGERY | Admitting: SURGERY

## 2018-03-19 VITALS
SYSTOLIC BLOOD PRESSURE: 140 MMHG | HEART RATE: 71 BPM | OXYGEN SATURATION: 97 % | HEIGHT: 70 IN | BODY MASS INDEX: 29.2 KG/M2 | WEIGHT: 204 LBS | TEMPERATURE: 98 F | DIASTOLIC BLOOD PRESSURE: 69 MMHG | RESPIRATION RATE: 16 BRPM

## 2018-03-19 PROCEDURE — 45330 DIAGNOSTIC SIGMOIDOSCOPY: CPT | Performed by: SURGERY

## 2018-03-19 RX ORDER — SODIUM CHLORIDE 0.9 % (FLUSH) 0.9 %
10 SYRINGE (ML) INJECTION PRN
Status: DISCONTINUED | OUTPATIENT
Start: 2018-03-19 | End: 2018-03-20 | Stop reason: HOSPADM

## 2018-03-19 RX ORDER — SODIUM CHLORIDE 9 MG/ML
INJECTION, SOLUTION INTRAVENOUS CONTINUOUS
Status: DISCONTINUED | OUTPATIENT
Start: 2018-03-19 | End: 2018-03-20 | Stop reason: HOSPADM

## 2018-03-19 RX ORDER — SODIUM CHLORIDE 0.9 % (FLUSH) 0.9 %
10 SYRINGE (ML) INJECTION EVERY 12 HOURS SCHEDULED
Status: DISCONTINUED | OUTPATIENT
Start: 2018-03-19 | End: 2018-03-20 | Stop reason: HOSPADM

## 2018-03-19 RX ORDER — RANITIDINE 150 MG/1
150 TABLET ORAL DAILY
COMMUNITY

## 2018-03-19 RX ADMIN — SODIUM CHLORIDE: 9 INJECTION, SOLUTION INTRAVENOUS at 12:45

## 2018-03-19 ASSESSMENT — PAIN - FUNCTIONAL ASSESSMENT: PAIN_FUNCTIONAL_ASSESSMENT: 0-10

## 2018-03-19 ASSESSMENT — ENCOUNTER SYMPTOMS: SHORTNESS OF BREATH: 1

## 2018-03-19 NOTE — H&P
4 surgeries    SMALL INTESTINE SURGERY  01/11/2017    LAPAROSCOPIC LOW ANTERIOR RESECTION, ILEOSTOMY    SMALL INTESTINE SURGERY  12/01/2017    small bowel resection    TUNNELED VENOUS PORT PLACEMENT Left 02/13/2017    PORT-A-CATH INSERTION                     TUNNELED VENOUS PORT PLACEMENT      UMBILICAL HERNIA REPAIR  11/11/14    UPPER GASTROINTESTINAL ENDOSCOPY  10/03/2017       Current Medications:   Current Facility-Administered Medications: 0.9 % sodium chloride infusion, , Intravenous, Continuous  sodium chloride flush 0.9 % injection 10 mL, 10 mL, Intravenous, 2 times per day  sodium chloride flush 0.9 % injection 10 mL, 10 mL, Intravenous, PRN  Prior to Admission medications    Medication Sig Start Date End Date Taking? Authorizing Provider   ranitidine (ZANTAC) 150 MG tablet Take 150 mg by mouth daily   Yes Historical Provider, MD   melatonin 3 MG TABS tablet Take 3 mg by mouth nightly as needed   Yes Historical Provider, MD   spironolactone (ALDACTONE) 25 MG tablet Take 1 tablet by mouth daily 11/28/17  Yes Carmelina Buckner MD   digoxin [facility]) 125 MCG tablet Take 1 tablet by mouth daily 11/3/17  Yes Luzmaria Bardales MD   carvedilol (COREG) 12.5 MG tablet Take 0.5 tablets by mouth 2 times daily 9/18/17  Yes Carmelina Buckner MD   lisinopril (PRINIVIL;ZESTRIL) 5 MG tablet Take 1 tablet by mouth 2 times daily 8/25/17  Yes Carmelina Buckner MD   HYDROcodone-acetaminophen [facility]) 7.5-325 MG per tablet 1-2 tabs po q6-8h prn pain. 8/2/17  Yes Malik Hutchinson CNP   traZODone (DESYREL) 50 MG tablet Take 1 tablet by mouth nightly as needed for Sleep  Patient taking differently: Take 100 mg by mouth nightly as needed for Sleep  6/12/17  Yes Aimee Dominguez MD   lovastatin (MEVACOR) 40 MG tablet Take 40 mg by mouth. Take 1 tablet (40 mg total) by mouth at bedtime.  8/19/13  Yes Historical Provider, MD   sertraline (ZOLOFT) 100 MG tablet Take 100 mg by mouth nightly    Yes Historical Provider, MD   diazepam (VALIUM) 5

## 2018-03-19 NOTE — PROGRESS NOTES
Awake, VSS tolerating po, nopain, seen by anesthesia and Dr. Stovall [facility]

## 2018-03-19 NOTE — PROGRESS NOTES
Teaching / education initiated regarding perioperative experience, expectations, and pain management during stay. Patient verbalized understanding. Pt to unit for flexible sigmoidoscopy. Pt with hx of colon cancer. Pt reporting current stoma site herniated. Patient to be evaluated for possible colostomy reversal. Patient reporting ABD pain at 5. Colostomy bag intact,large bulging area noted at stoma site.

## 2018-03-19 NOTE — ANESTHESIA PRE-OP
Historical Provider, MD   Cetirizine HCl (ZYRTEC PO) Take 1 tablet by mouth daily. Take only as needed for scrachy eyes. Historical Provider, MD       Current Outpatient Prescriptions   Medication Sig Dispense Refill    lidocaine (LIDODERM) 5 % Place 1 patch onto the skin daily 12 hours on, 12 hours off. 30 patch 0    melatonin 3 MG TABS tablet Take 3 mg by mouth nightly as needed      spironolactone (ALDACTONE) 25 MG tablet Take 1 tablet by mouth daily 90 tablet 1    digoxin (LANOXIN) 125 MCG tablet Take 1 tablet by mouth daily 90 tablet 3    carvedilol (COREG) 12.5 MG tablet Take 0.5 tablets by mouth 2 times daily 90 tablet 3    torsemide (DEMADEX) 20 MG tablet Take 1 tablet MWF and start today for 2 doses this week. (Patient taking differently: Take 20 mg by mouth Take 1 tablet MWF) 90 tablet 3    lisinopril (PRINIVIL;ZESTRIL) 5 MG tablet Take 1 tablet by mouth 2 times daily 60 tablet 11    HYDROcodone-acetaminophen (NORCO) 7.5-325 MG per tablet 1-2 tabs po q6-8h prn pain. 120 tablet 0    traZODone (DESYREL) 50 MG tablet Take 1 tablet by mouth nightly as needed for Sleep (Patient taking differently: Take 100 mg by mouth nightly as needed for Sleep ) 30 tablet 0    prochlorperazine (COMPAZINE) 10 MG tablet Take 1 tablet by mouth every 6 hours as needed (nausea or vomiting) 120 tablet 3    lovastatin (MEVACOR) 40 MG tablet Take 40 mg by mouth. Take 1 tablet (40 mg total) by mouth at bedtime.  sertraline (ZOLOFT) 100 MG tablet Take 100 mg by mouth nightly       diazepam (VALIUM) 5 MG tablet Take 5 mg by mouth three times daily.  therapeutic multivitamin-minerals (THERAGRAN-M) tablet Take 1 tablet by mouth daily.  Cetirizine HCl (ZYRTEC PO) Take 1 tablet by mouth daily. Take only as needed for scrachy eyes.        Current Facility-Administered Medications   Medication Dose Route Frequency Provider Last Rate Last Dose    0.9 % sodium chloride infusion   Intravenous Continuous Duane KONG

## 2018-03-21 ENCOUNTER — HOSPITAL ENCOUNTER (OUTPATIENT)
Dept: OTHER | Age: 48
Discharge: HOME | End: 2018-03-21
Attending: FAMILY MEDICINE | Admitting: FAMILY MEDICINE

## 2018-03-21 DIAGNOSIS — J69.0 ASPIRATION PNEUMONIA, UNSPECIFIED ASPIRATION PNEUMONIA TYPE, UNSPECIFIED LATERALITY, UNSPECIFIED PART OF LUNG (HCC): ICD-10-CM

## 2018-03-22 NOTE — ANESTHESIA POST-OP
Anesthesia Post-op Note    Patient: Alpa Austin  MRN: [id number]  YOB: 1970  Date of evaluation: 3/22/2018  Time:  1:15 PM     Procedure(s) Performed:     Last Vitals: BP (!) 140/69   Pulse 71   Temp 98 °F (36.7 °C) (Temporal)   Resp 16   Ht 5' 10\" (1.778 m)   Wt 204 lb (92.5 kg)   SpO2 97%   BMI 29.27 kg/m²     Mikaela Phase I: Mikaela Score: 10    Mikaela Phase II: Mikaela Score: 10    Anesthesia Post Evaluation    Final anesthesia type: MAC  Patient location during evaluation: PACU  Patient participation: complete - patient participated  Level of consciousness: awake and alert  Pain score: 0  Airway patency: patent  Nausea & Vomiting: no nausea and no vomiting  Complications: no  Cardiovascular status: blood pressure returned to baseline  Respiratory status: acceptable  Hydration status: euvolemic        Vidya Guillen DO  1:15 PM

## 2018-04-11 ENCOUNTER — OFFICE VISIT (OUTPATIENT)
Dept: CARDIOLOGY CLINIC | Age: 48
End: 2018-04-11

## 2018-04-11 VITALS
HEART RATE: 77 BPM | HEIGHT: 70 IN | DIASTOLIC BLOOD PRESSURE: 64 MMHG | WEIGHT: 207 LBS | SYSTOLIC BLOOD PRESSURE: 112 MMHG | BODY MASS INDEX: 29.63 KG/M2

## 2018-04-11 DIAGNOSIS — Z95.810 ICD (IMPLANTABLE CARDIOVERTER-DEFIBRILLATOR) IN PLACE: ICD-10-CM

## 2018-04-11 DIAGNOSIS — I10 ESSENTIAL HYPERTENSION: Chronic | ICD-10-CM

## 2018-04-11 DIAGNOSIS — I42.8 NON-ISCHEMIC CARDIOMYOPATHY (HCC): Chronic | ICD-10-CM

## 2018-04-11 DIAGNOSIS — I49.9 IRREGULAR HEART RATE: ICD-10-CM

## 2018-04-11 DIAGNOSIS — R06.02 SOB (SHORTNESS OF BREATH): Chronic | ICD-10-CM

## 2018-04-11 DIAGNOSIS — I50.22 CHRONIC SYSTOLIC HEART FAILURE (HCC): Chronic | ICD-10-CM

## 2018-04-11 DIAGNOSIS — Z01.810 PREOP CARDIOVASCULAR EXAM: Primary | ICD-10-CM

## 2018-04-11 PROCEDURE — 93290 INTERROG DEV EVAL ICPMS IP: CPT | Performed by: INTERNAL MEDICINE

## 2018-04-11 PROCEDURE — 99215 OFFICE O/P EST HI 40 MIN: CPT | Performed by: INTERNAL MEDICINE

## 2018-04-18 PROBLEM — Z93.3 COLOSTOMY IN PLACE (HCC): Status: ACTIVE | Noted: 2018-04-18

## 2018-04-26 ENCOUNTER — OFFICE VISIT (OUTPATIENT)
Dept: SURGERY | Age: 48
End: 2018-04-26

## 2018-04-26 VITALS — SYSTOLIC BLOOD PRESSURE: 102 MMHG | BODY MASS INDEX: 29.13 KG/M2 | WEIGHT: 203 LBS | DIASTOLIC BLOOD PRESSURE: 62 MMHG

## 2018-04-26 DIAGNOSIS — Z93.3 COLOSTOMY IN PLACE (HCC): Primary | ICD-10-CM

## 2018-04-26 PROCEDURE — 99024 POSTOP FOLLOW-UP VISIT: CPT | Performed by: SURGERY

## 2018-05-03 ENCOUNTER — OFFICE VISIT (OUTPATIENT)
Dept: SURGERY | Age: 48
End: 2018-05-03

## 2018-05-03 VITALS — BODY MASS INDEX: 29.13 KG/M2 | DIASTOLIC BLOOD PRESSURE: 68 MMHG | SYSTOLIC BLOOD PRESSURE: 110 MMHG | WEIGHT: 203 LBS

## 2018-05-03 DIAGNOSIS — Z93.3 COLOSTOMY IN PLACE (HCC): Primary | ICD-10-CM

## 2018-05-03 PROCEDURE — 99024 POSTOP FOLLOW-UP VISIT: CPT | Performed by: SURGERY

## 2018-05-04 ENCOUNTER — TELEPHONE (OUTPATIENT)
Dept: SURGERY | Age: 48
End: 2018-05-04

## 2018-05-04 ENCOUNTER — HOSPITAL ENCOUNTER (OUTPATIENT)
Dept: OTHER | Age: 48
Discharge: HOME | End: 2018-05-04
Attending: SURGERY | Admitting: SURGERY

## 2018-05-04 DIAGNOSIS — R10.30 LOWER ABDOMINAL PAIN: ICD-10-CM

## 2018-05-04 DIAGNOSIS — K92.1 BLACK STOOL: Primary | ICD-10-CM

## 2018-05-04 LAB
ANION GAP SERPL CALCULATED.3IONS-SCNC: 12 MMOL/L (ref 3–16)
BUN BLDV-MCNC: 9 MG/DL (ref 7–20)
CALCIUM SERPL-MCNC: 9.2 MG/DL (ref 8.3–10.6)
CHLORIDE BLD-SCNC: 101 MMOL/L (ref 99–110)
CO2: 27 MMOL/L (ref 21–32)
CREAT SERPL-MCNC: 0.8 MG/DL (ref 0.9–1.3)
GFR AFRICAN AMERICAN: >60
GFR NON-AFRICAN AMERICAN: >60
GLUCOSE BLD-MCNC: 107 MG/DL (ref 70–99)
HCT VFR BLD CALC: 34.5 % (ref 40.5–52.5)
HEMOGLOBIN: 11.6 G/DL (ref 13.5–17.5)
MCH RBC QN AUTO: 32 PG (ref 26–34)
MCHC RBC AUTO-ENTMCNC: 33.8 G/DL (ref 31–36)
MCV RBC AUTO: 94.5 FL (ref 80–100)
PDW BLD-RTO: 13.3 % (ref 12.4–15.4)
PLATELET # BLD: 316 K/UL (ref 135–450)
PMV BLD AUTO: 6.7 FL (ref 5–10.5)
POTASSIUM SERPL-SCNC: 4.5 MMOL/L (ref 3.5–5.1)
RBC # BLD: 3.65 M/UL (ref 4.2–5.9)
SODIUM BLD-SCNC: 140 MMOL/L (ref 136–145)
WBC # BLD: 8.3 K/UL (ref 4–11)

## 2018-05-11 PROBLEM — Z01.810 PREOP CARDIOVASCULAR EXAM: Status: RESOLVED | Noted: 2018-04-11 | Resolved: 2018-05-11

## 2018-05-15 ENCOUNTER — NURSE ONLY (OUTPATIENT)
Dept: CARDIOLOGY CLINIC | Age: 48
End: 2018-05-15

## 2018-05-15 DIAGNOSIS — I50.22 CHRONIC SYSTOLIC HEART FAILURE (HCC): Chronic | ICD-10-CM

## 2018-05-15 DIAGNOSIS — I42.9 CARDIOMYOPATHY, IDIOPATHIC (HCC): Chronic | ICD-10-CM

## 2018-05-15 DIAGNOSIS — Z95.810 ICD (IMPLANTABLE CARDIOVERTER-DEFIBRILLATOR) IN PLACE: Primary | ICD-10-CM

## 2018-05-15 PROCEDURE — 93297 REM INTERROG DEV EVAL ICPMS: CPT | Performed by: INTERNAL MEDICINE

## 2018-05-15 PROCEDURE — 93296 REM INTERROG EVL PM/IDS: CPT | Performed by: INTERNAL MEDICINE

## 2018-05-15 PROCEDURE — 93295 DEV INTERROG REMOTE 1/2/MLT: CPT | Performed by: INTERNAL MEDICINE

## 2018-05-24 ENCOUNTER — OFFICE VISIT (OUTPATIENT)
Dept: SURGERY | Age: 48
End: 2018-05-24

## 2018-05-24 ENCOUNTER — TELEPHONE (OUTPATIENT)
Dept: SURGERY | Age: 48
End: 2018-05-24

## 2018-05-24 VITALS — DIASTOLIC BLOOD PRESSURE: 60 MMHG | SYSTOLIC BLOOD PRESSURE: 112 MMHG | WEIGHT: 198 LBS | BODY MASS INDEX: 28.41 KG/M2

## 2018-05-24 DIAGNOSIS — Z93.3 COLOSTOMY IN PLACE (HCC): Primary | ICD-10-CM

## 2018-05-24 PROCEDURE — 99024 POSTOP FOLLOW-UP VISIT: CPT | Performed by: SURGERY

## 2018-05-29 ENCOUNTER — HOSPITAL ENCOUNTER (OUTPATIENT)
Dept: CT IMAGING | Age: 48
Discharge: HOME | End: 2018-05-29
Attending: SURGERY | Admitting: SURGERY

## 2018-05-29 DIAGNOSIS — K52.9 NONINFECTIVE GASTROENTERITIS AND COLITIS: ICD-10-CM

## 2018-05-29 DIAGNOSIS — K52.9 COLITIS: Primary | ICD-10-CM

## 2018-05-29 DIAGNOSIS — K52.9 COLITIS: ICD-10-CM

## 2018-05-29 LAB
BASOPHILS ABSOLUTE: 0.1 K/UL (ref 0–0.2)
BASOPHILS RELATIVE PERCENT: 0.7 %
EOSINOPHILS ABSOLUTE: 0.1 K/UL (ref 0–0.6)
EOSINOPHILS RELATIVE PERCENT: 1.5 %
HCT VFR BLD CALC: 36.5 % (ref 40.5–52.5)
HEMOGLOBIN: 12.5 G/DL (ref 13.5–17.5)
LYMPHOCYTES ABSOLUTE: 1.1 K/UL (ref 1–5.1)
LYMPHOCYTES RELATIVE PERCENT: 13.1 %
MCH RBC QN AUTO: 31.2 PG (ref 26–34)
MCHC RBC AUTO-ENTMCNC: 34.3 G/DL (ref 31–36)
MCV RBC AUTO: 90.9 FL (ref 80–100)
MONOCYTES ABSOLUTE: 0.9 K/UL (ref 0–1.3)
MONOCYTES RELATIVE PERCENT: 10.3 %
NEUTROPHILS ABSOLUTE: 6.5 K/UL (ref 1.7–7.7)
NEUTROPHILS RELATIVE PERCENT: 74.4 %
PDW BLD-RTO: 13.3 % (ref 12.4–15.4)
PLATELET # BLD: 300 K/UL (ref 135–450)
PMV BLD AUTO: 6.4 FL (ref 5–10.5)
RBC # BLD: 4.01 M/UL (ref 4.2–5.9)
WBC # BLD: 8.7 K/UL (ref 4–11)

## 2018-05-30 ENCOUNTER — TELEPHONE (OUTPATIENT)
Dept: SURGERY | Age: 48
End: 2018-05-30

## 2018-05-30 RX ORDER — LEVOFLOXACIN 750 MG/1
750 TABLET ORAL DAILY
Qty: 10 TABLET | Refills: 0 | Status: SHIPPED | OUTPATIENT
Start: 2018-05-30 | End: 2018-06-09

## 2018-06-01 ENCOUNTER — OFFICE VISIT (OUTPATIENT)
Dept: CARDIOLOGY CLINIC | Age: 48
End: 2018-06-01

## 2018-06-01 VITALS
BODY MASS INDEX: 27.63 KG/M2 | SYSTOLIC BLOOD PRESSURE: 106 MMHG | WEIGHT: 193 LBS | DIASTOLIC BLOOD PRESSURE: 66 MMHG | HEIGHT: 70 IN | HEART RATE: 64 BPM

## 2018-06-01 DIAGNOSIS — I42.9 CARDIOMYOPATHY, IDIOPATHIC (HCC): Chronic | ICD-10-CM

## 2018-06-01 DIAGNOSIS — E78.2 MIXED HYPERLIPIDEMIA: Chronic | ICD-10-CM

## 2018-06-01 DIAGNOSIS — Z95.810 ICD (IMPLANTABLE CARDIOVERTER-DEFIBRILLATOR) IN PLACE: ICD-10-CM

## 2018-06-01 DIAGNOSIS — I50.22 CHRONIC SYSTOLIC HEART FAILURE (HCC): Primary | Chronic | ICD-10-CM

## 2018-06-01 DIAGNOSIS — R06.02 SOB (SHORTNESS OF BREATH): Chronic | ICD-10-CM

## 2018-06-01 DIAGNOSIS — I10 ESSENTIAL HYPERTENSION: Chronic | ICD-10-CM

## 2018-06-01 PROCEDURE — 99214 OFFICE O/P EST MOD 30 MIN: CPT | Performed by: INTERNAL MEDICINE

## 2018-06-01 PROCEDURE — 93290 INTERROG DEV EVAL ICPMS IP: CPT | Performed by: INTERNAL MEDICINE

## 2018-06-01 RX ORDER — CARVEDILOL 12.5 MG/1
TABLET ORAL
Qty: 180 TABLET | Refills: 1 | Status: SHIPPED | OUTPATIENT
Start: 2018-06-01 | End: 2018-09-27 | Stop reason: DRUGHIGH

## 2018-06-01 RX ORDER — LISINOPRIL 5 MG/1
5 TABLET ORAL 2 TIMES DAILY
Qty: 180 TABLET | Refills: 1 | Status: SHIPPED | OUTPATIENT
Start: 2018-06-01 | End: 2018-12-02 | Stop reason: SDUPTHER

## 2018-06-07 ENCOUNTER — OFFICE VISIT (OUTPATIENT)
Dept: SURGERY | Age: 48
End: 2018-06-07

## 2018-06-07 VITALS — WEIGHT: 190 LBS | SYSTOLIC BLOOD PRESSURE: 90 MMHG | BODY MASS INDEX: 27.26 KG/M2 | DIASTOLIC BLOOD PRESSURE: 60 MMHG

## 2018-06-07 DIAGNOSIS — C20 RECTAL CANCER (HCC): Primary | ICD-10-CM

## 2018-06-07 PROCEDURE — 99024 POSTOP FOLLOW-UP VISIT: CPT | Performed by: SURGERY

## 2018-06-13 DIAGNOSIS — I10 ESSENTIAL HYPERTENSION: Chronic | ICD-10-CM

## 2018-06-13 RX ORDER — SPIRONOLACTONE 25 MG/1
TABLET ORAL
Qty: 90 TABLET | Refills: 1 | Status: CANCELLED | OUTPATIENT
Start: 2018-06-13

## 2018-06-14 ENCOUNTER — OFFICE VISIT (OUTPATIENT)
Dept: SURGERY | Age: 48
End: 2018-06-14

## 2018-06-14 VITALS — BODY MASS INDEX: 28.27 KG/M2 | SYSTOLIC BLOOD PRESSURE: 100 MMHG | WEIGHT: 197 LBS | DIASTOLIC BLOOD PRESSURE: 62 MMHG

## 2018-06-14 DIAGNOSIS — C20 RECTAL CANCER (HCC): Primary | ICD-10-CM

## 2018-06-14 DIAGNOSIS — I10 ESSENTIAL HYPERTENSION: Chronic | ICD-10-CM

## 2018-06-14 PROCEDURE — 99024 POSTOP FOLLOW-UP VISIT: CPT | Performed by: SURGERY

## 2018-06-14 RX ORDER — LEVOFLOXACIN 750 MG/1
750 TABLET ORAL DAILY
Qty: 14 TABLET | Refills: 0 | Status: SHIPPED | OUTPATIENT
Start: 2018-06-14 | End: 2018-06-28

## 2018-06-14 RX ORDER — SPIRONOLACTONE 25 MG/1
25 TABLET ORAL DAILY
Qty: 90 TABLET | Refills: 1 | Status: SHIPPED | OUTPATIENT
Start: 2018-06-14 | End: 2018-10-03 | Stop reason: SDUPTHER

## 2018-07-03 ENCOUNTER — OFFICE VISIT (OUTPATIENT)
Dept: SURGERY | Age: 48
End: 2018-07-03

## 2018-07-03 VITALS — BODY MASS INDEX: 27.98 KG/M2 | DIASTOLIC BLOOD PRESSURE: 68 MMHG | WEIGHT: 195 LBS | SYSTOLIC BLOOD PRESSURE: 116 MMHG

## 2018-07-03 DIAGNOSIS — Z93.3 COLOSTOMY IN PLACE (HCC): Primary | ICD-10-CM

## 2018-07-03 PROCEDURE — 99024 POSTOP FOLLOW-UP VISIT: CPT | Performed by: SURGERY

## 2018-07-31 ENCOUNTER — OFFICE VISIT (OUTPATIENT)
Dept: SURGERY | Age: 48
End: 2018-07-31

## 2018-07-31 VITALS — WEIGHT: 195 LBS | BODY MASS INDEX: 27.98 KG/M2 | SYSTOLIC BLOOD PRESSURE: 110 MMHG | DIASTOLIC BLOOD PRESSURE: 66 MMHG

## 2018-07-31 DIAGNOSIS — R10.9 RIGHT SIDED ABDOMINAL PAIN: Primary | ICD-10-CM

## 2018-07-31 PROCEDURE — 99213 OFFICE O/P EST LOW 20 MIN: CPT | Performed by: SURGERY

## 2018-08-08 ENCOUNTER — OFFICE VISIT (OUTPATIENT)
Dept: CARDIOLOGY CLINIC | Age: 48
End: 2018-08-08

## 2018-08-08 VITALS
WEIGHT: 193 LBS | SYSTOLIC BLOOD PRESSURE: 100 MMHG | HEART RATE: 56 BPM | BODY MASS INDEX: 27.02 KG/M2 | DIASTOLIC BLOOD PRESSURE: 50 MMHG | HEIGHT: 71 IN

## 2018-08-08 DIAGNOSIS — R06.02 SOB (SHORTNESS OF BREATH): Chronic | ICD-10-CM

## 2018-08-08 DIAGNOSIS — Z95.810 ICD (IMPLANTABLE CARDIOVERTER-DEFIBRILLATOR) IN PLACE: ICD-10-CM

## 2018-08-08 DIAGNOSIS — I10 ESSENTIAL HYPERTENSION: Chronic | ICD-10-CM

## 2018-08-08 DIAGNOSIS — I50.22 CHRONIC SYSTOLIC HEART FAILURE (HCC): Primary | Chronic | ICD-10-CM

## 2018-08-08 DIAGNOSIS — I42.9 CARDIOMYOPATHY, IDIOPATHIC (HCC): Chronic | ICD-10-CM

## 2018-08-08 PROCEDURE — 99214 OFFICE O/P EST MOD 30 MIN: CPT | Performed by: INTERNAL MEDICINE

## 2018-08-08 RX ORDER — SIMETHICONE 80 MG
80 TABLET,CHEWABLE ORAL EVERY 6 HOURS PRN
COMMUNITY

## 2018-08-08 NOTE — PROGRESS NOTES
regurgitation is present.   The left atrium is dilated.   Pacer / ICD wire is visualized in the right ventricle.       Lab Results   Component Value Date    LVEF 20 08/16/2017    LVEFMODE Echo 08/16/2017     Echo 11/13/17:  Left ventricular size is markedly dilated and hypokinetic. LV function is   severely reduced at 20-25%   Mild mitral regurgitation is present.   The left atrium is dilated.   Pacer / ICD wire is visualized in the right ventricle.   Mild tricuspid regurgitation with RVSP estimated at 30 mmHg. Echo 8/25/2017  Summary   Technically limited examination to eval LVEF.   Dilated left ventricle.   Severely decreased left ventricular systolic function with an estimated EF   20-25%.   Mild to moderate mitral regurgitation.  K[place] is mild-moderate tricuspid regurgitation with RVSP estimated at 46   mmHg.   There is a trivial posterior pericardial effusion noted.     Echo 8/15/2017   Summary   Limited bedside echocardiogram was performed to evaluate EF.   Left ventricular size is moderately increased.   Global ejection fraction is severely decreased and estimated at the 20%   range. EF 22% by 3D volume evaluation. Moderate mitral regurgitation is present. Left atria enlargement. MUGA 5/3/17  Abnormal resting left ventricular function with severe global hypokinesis  and EF= 29% . Echo: 9/6/16:  Global ejection fraction is severely decreased and estimated from 15 % to 20 %. Global LV hypokinesis. Mildly dilated left atrium. RV systolic function appears to be reduced. Moderate mitral regurgitation is present. There is moderate tricuspid regurgitation with RVSP estimated at 38 mmHg. This is suggestive of mild pulmonary hypertension. Mild pulmonic regurgitation present. There is a moderate pleural effusion noted measuring 7.0 cm. [facility] 9/15/16:  Cath via radial unable to manipulate catheter into ascending aorta-small subintimal dissection in subclavian occurred.  Cath via right femoral Date End Date Taking? Authorizing Provider   simethicone (MYLICON) 80 MG chewable tablet Take 80 mg by mouth every 6 hours as needed for Flatulence   Yes Historical Provider, MD   spironolactone (ALDACTONE) 25 MG tablet Take 1 tablet by mouth daily 6/14/18  Yes Meri Daniels MD   carvedilol (COREG) 12.5 MG tablet Take a half tab in the morning and a whole tab at night 6/1/18  Yes Meri Daniels MD   lisinopril (PRINIVIL;ZESTRIL) 5 MG tablet Take 1 tablet by mouth 2 times daily 6/1/18  Yes Meri Daniels MD   ranitidine (ZANTAC) 150 MG tablet Take 150 mg by mouth daily   Yes Historical Provider, MD   melatonin 3 MG TABS tablet Take 3 mg by mouth nightly as needed   Yes Historical Provider, MD   digoxin (LANOXIN) 125 MCG tablet Take 1 tablet by mouth daily 11/3/17  Yes King Reynoso MD   torsemide (DEMADEX) 20 MG tablet Take 1 tablet MW and start today for 2 doses this week. Patient taking differently: Take 20 mg by mouth Take 1 tablet MWF 8/31/17  Yes Meri Daniels MD   HYDROcodone-acetaminophen [facility]) 7.5-325 MG per tablet 1-2 tabs po q6-8h prn pain. 8/2/17  Yes BENITEZ Fox - CNP   lovastatin (MEVACOR) 40 MG tablet Take 40 mg by mouth. Take 1 tablet (40 mg total) by mouth at bedtime. 8/19/13  Yes Historical Provider, MD   sertraline (ZOLOFT) 100 MG tablet Take 100 mg by mouth nightly    Yes Historical Provider, MD   diazepam (VALIUM) 5 MG tablet Take 5 mg by mouth three times daily. Yes Historical Provider, MD   Cetirizine HCl (ZYRTEC PO) Take 1 tablet by mouth daily. Take only as needed for scrachy eyes.    Yes Historical Provider, MD   lidocaine (LIDODERM) 5 % Place 1 patch onto the skin daily 12 hours on, 12 hours off. 3/5/18   Anisha Farnsworth DO   traZODone (DESYREL) 50 MG tablet Take 1 tablet by mouth nightly as needed for Sleep  Patient taking differently: Take 100 mg by mouth nightly as needed for Sleep  6/12/17   Kelley Mahonye MD   prochlorperazine (COMPAZINE) 10 MG tablet Take 1 tablet by mouth every 6 hours as needed (nausea or vomiting) 3/29/17   Chirag Hsu MD        Allergies:  Vancomycin; Bactrim [sulfamethoxazole-trimethoprim]; Corn-containing products; Eggs or egg-derived products; and Seasonal     Review of Systems:   · Constitutional: there has been no unanticipated weight loss. There's been no change in energy level, sleep pattern, or activity level. · Eyes: No visual changes or diplopia. No scleral icterus. · ENT: No Headaches, hearing loss or vertigo. No mouth sores or sore throat. · Cardiovascular: Reviewed in HPI  · Respiratory: No cough or wheezing, no sputum production. No hematemesis. · Gastrointestinal: + for constipation and abdominal pain  · Genitourinary: No dysuria, trouble voiding, or hematuria. · Musculoskeletal:  No gait disturbance, weakness or joint complaints. · Integumentary: No rash or pruritis. · Neurological: No headache, diplopia, change in muscle strength, numbness or tingling. No change in gait, balance, coordination, mood, affect, memory, mentation, behavior. · Psychiatric: No anxiety, no depression. · Endocrine: No malaise, fatigue or temperature intolerance. No excessive thirst, fluid intake, or urination. No tremor. · Hematologic/Lymphatic: No abnormal bruising or bleeding, blood clots or swollen lymph nodes. · Allergic/Immunologic: No nasal congestion or hives.     Physical Examination:    Vitals:    08/08/18 1613   BP: (!) 100/50   Pulse: 56   Weight: 193 lb (87.5 kg)   Height: 5' 10.5\" (1.791 m)      Constitutional and General Appearance: Warm and dry, no apparent distress, normal coloration  HEENT:  Normocephalic, atraumatic   Respiratory:  · Normal excursion and expansion without use of accessory muscles   · Resp Auscultation: Normal breath sounds without dullness  Cardiovascular:  · The apical impulses not displaced  · Heart tones are crisp and normal  · JVP less than 8 cm H2O  · Regular rate and rhythm, normal S1S2, no m/g/r/c  · Peripheral pulses are symmetrical and full  · There is no clubbing, cyanosis of the extremities. · No edema. · Pedal Pulses: 2+ and equal .  Abdomen:  · No masses or tenderness. · Liver/Spleen: No Abnormalities Noted. Neurological/Psychiatric:  · Alert and oriented in all spheres  · Moves all extremities well  · Exhibits normal gait balance and coordination  · No abnormalities of mood, affect, memory, mentation, or behavior are noted    Lab Data:  CBC:   Lab Results   Component Value Date    WBC 9.1 07/30/2018    WBC 8.7 05/29/2018    WBC 8.3 05/04/2018    RBC 4.28 07/30/2018    RBC 4.01 05/29/2018    RBC 3.65 05/04/2018    RBC 3.74 07/05/2017    RBC 3.91 06/21/2017    RBC 3.95 06/07/2017    HGB 13.3 07/30/2018    HGB 12.5 05/29/2018    HGB 11.6 05/04/2018    HCT 38.9 07/30/2018    HCT 36.5 05/29/2018    HCT 34.5 05/04/2018    MCV 91.0 07/30/2018    MCV 90.9 05/29/2018    MCV 94.5 05/04/2018    RDW 13.3 07/30/2018    RDW 13.3 05/29/2018    RDW 13.3 05/04/2018     07/30/2018     05/29/2018     05/04/2018     BMP:   Lab Results   Component Value Date     07/30/2018     05/04/2018     04/23/2018    K 4.8 07/30/2018    K 4.5 05/04/2018    K 4.1 04/23/2018    K 4.3 04/19/2018    CL 96 07/30/2018     05/04/2018    CL 96 04/23/2018    CO2 26 07/30/2018    CO2 27 05/04/2018    CO2 27 04/23/2018    PHOS 3.6 04/23/2018    BUN 12 07/30/2018    BUN 9 05/04/2018    BUN 9 04/23/2018    CREATININE 0.8 07/30/2018    CREATININE 0.8 05/04/2018    CREATININE 0.6 04/23/2018     BNP:   Lab Results   Component Value Date    PROBNP 3,440 04/21/2018    PROBNP 5,029 03/05/2018    PROBNP 8,239 01/24/2018         Assessment:    1. Chronic systolic heart failure ([facility] [address]    Pro bnp 1/24/18 -- 8239 (time of admission of PNA)  Pro bnp 3/5/18--5029.     Pro bnp 4/21/18 3440  Taking Coreg dose to 6.25 mg in am and 12.5 mg in pm  Spironolactone, Torsemide, Dig, and Lisinopril (off [place]a Fail

## 2018-08-16 ENCOUNTER — OFFICE VISIT (OUTPATIENT)
Dept: SURGERY | Age: 48
End: 2018-08-16

## 2018-08-16 VITALS — SYSTOLIC BLOOD PRESSURE: 108 MMHG | BODY MASS INDEX: 26.17 KG/M2 | WEIGHT: 185 LBS | DIASTOLIC BLOOD PRESSURE: 70 MMHG

## 2018-08-16 DIAGNOSIS — C20 RECTAL CANCER (HCC): Primary | ICD-10-CM

## 2018-08-16 PROCEDURE — 99212 OFFICE O/P EST SF 10 MIN: CPT | Performed by: SURGERY

## 2018-08-21 ENCOUNTER — NURSE ONLY (OUTPATIENT)
Dept: CARDIOLOGY CLINIC | Age: 48
End: 2018-08-21

## 2018-08-21 DIAGNOSIS — I42.9 CARDIOMYOPATHY, IDIOPATHIC (HCC): Chronic | ICD-10-CM

## 2018-08-21 DIAGNOSIS — Z95.810 ICD (IMPLANTABLE CARDIOVERTER-DEFIBRILLATOR) IN PLACE: ICD-10-CM

## 2018-08-21 DIAGNOSIS — I50.21 ACUTE SYSTOLIC HEART FAILURE (HCC): Chronic | ICD-10-CM

## 2018-08-21 PROCEDURE — 93297 REM INTERROG DEV EVAL ICPMS: CPT | Performed by: INTERNAL MEDICINE

## 2018-08-21 PROCEDURE — 93296 REM INTERROG EVL PM/IDS: CPT | Performed by: INTERNAL MEDICINE

## 2018-08-21 PROCEDURE — 93295 DEV INTERROG REMOTE 1/2/MLT: CPT | Performed by: INTERNAL MEDICINE

## 2018-08-21 NOTE — LETTER
[facility] [phone]  [address] [address] [phone]    Pacemaker/Defibrillator Clinic          08/23/18        Jude Guevara  [address] [place] [address]        Dear Mercedes Carlson    This letter is to inform you that we received the transmission from your monitor at home that checks your pacemaker and/or defibrillator, or implanted heart monitor. Your device shows normal function. The next date your monitor will automatically transmit will be 11-27-18. Please do not send additional routine transmissions unless specifically requested. Your device and monitor are wireless and most transmit cellularly, but please periodically check your monitor is still plugged in to the electrical outlet. If you still use the telephone land line to send please ensure the connection to the phone thuy is secure. This will help to ensure successful automatic transmissions in the future. Also, the monitor needs to be close to you while sleeping at night. Please be aware that the remote device transmission sites are periodically monitored only during regular business hours during which simultaneous in-office device clinics are being run. If your transmission requires attention, we will contact you as soon as possible. Thank you.             Shubham 81

## 2018-08-30 ENCOUNTER — TELEPHONE (OUTPATIENT)
Dept: SURGERY | Age: 48
End: 2018-08-30

## 2018-08-30 RX ORDER — DICYCLOMINE HYDROCHLORIDE 10 MG/1
10 CAPSULE ORAL 3 TIMES DAILY
Qty: 90 CAPSULE | Refills: 3 | Status: SHIPPED | OUTPATIENT
Start: 2018-08-30 | End: 2018-10-02 | Stop reason: SDUPTHER

## 2018-09-19 ENCOUNTER — HOSPITAL ENCOUNTER (OUTPATIENT)
Dept: NON INVASIVE DIAGNOSTICS | Age: 48
Discharge: HOME | End: 2018-09-19
Attending: INTERNAL MEDICINE | Admitting: INTERNAL MEDICINE

## 2018-09-19 DIAGNOSIS — I10 ESSENTIAL HYPERTENSION: Chronic | ICD-10-CM

## 2018-09-19 DIAGNOSIS — I10 ESSENTIAL (PRIMARY) HYPERTENSION: ICD-10-CM

## 2018-09-19 DIAGNOSIS — I50.22 CHRONIC SYSTOLIC HEART FAILURE (HCC): Chronic | ICD-10-CM

## 2018-09-19 DIAGNOSIS — Z95.810 ICD (IMPLANTABLE CARDIOVERTER-DEFIBRILLATOR) IN PLACE: ICD-10-CM

## 2018-09-19 DIAGNOSIS — I42.9 CARDIOMYOPATHY, IDIOPATHIC (HCC): Chronic | ICD-10-CM

## 2018-09-19 DIAGNOSIS — R06.02 SOB (SHORTNESS OF BREATH): Chronic | ICD-10-CM

## 2018-09-19 LAB
ANION GAP SERPL CALCULATED.3IONS-SCNC: 13 MMOL/L (ref 3–16)
BUN BLDV-MCNC: 15 MG/DL (ref 7–20)
CALCIUM SERPL-MCNC: 9.9 MG/DL (ref 8.3–10.6)
CHLORIDE BLD-SCNC: 96 MMOL/L (ref 99–110)
CO2: 28 MMOL/L (ref 21–32)
CREAT SERPL-MCNC: 0.8 MG/DL (ref 0.9–1.3)
GFR AFRICAN AMERICAN: >60
GFR NON-AFRICAN AMERICAN: >60
GLUCOSE BLD-MCNC: 104 MG/DL (ref 70–99)
LEFT VENTRICULAR EJECTION FRACTION HIGH VALUE: 30 %
LEFT VENTRICULAR EJECTION FRACTION MODE: NORMAL
LV EF: 25 %
POTASSIUM SERPL-SCNC: 5.1 MMOL/L (ref 3.5–5.1)
PRO-BNP: 3256 PG/ML (ref 0–124)
SODIUM BLD-SCNC: 137 MMOL/L (ref 136–145)

## 2018-09-20 ENCOUNTER — TELEPHONE (OUTPATIENT)
Dept: CARDIOLOGY CLINIC | Age: 48
End: 2018-09-20

## 2018-09-20 NOTE — TELEPHONE ENCOUNTER
Had an echo yesterday and is very anxious to find out if his EF went up .  He has appt next week and he will keep the appt but wants to know the results of his echo today please

## 2018-09-21 NOTE — TELEPHONE ENCOUNTER
Pt wants you to know that he had to reschedule his upcoming appointment due to conflict with another appt. His appt is now with [facility] on 9/27. He wants to remind you that at this appt you were going to consider restarting Entresto. He says he's game for anything you want to do. If you could coordinate with [facility], he would be happy to follow whatever plan you have in mind. He is thrilled with the increase in his EF. Relayed Results and instructions as directed  You can give him that info. Probably 25-30%, calculated to 33%.   LAKHWINDER  Prev 11/2017 EF 20-25%

## 2018-09-21 NOTE — TELEPHONE ENCOUNTER
Bessie Pena, I want to retry him on Entresto. You see him on the 27th. [facility]?!   LAKHWINDER

## 2018-09-25 ENCOUNTER — OFFICE VISIT (OUTPATIENT)
Dept: SURGERY | Age: 48
End: 2018-09-25
Payer: COMMERCIAL

## 2018-09-25 VITALS — BODY MASS INDEX: 26.88 KG/M2 | DIASTOLIC BLOOD PRESSURE: 60 MMHG | WEIGHT: 190 LBS | SYSTOLIC BLOOD PRESSURE: 110 MMHG

## 2018-09-25 DIAGNOSIS — C20 RECTAL CANCER (HCC): Primary | ICD-10-CM

## 2018-09-25 PROCEDURE — 99212 OFFICE O/P EST SF 10 MIN: CPT | Performed by: SURGERY

## 2018-09-25 NOTE — PROGRESS NOTES
[facility] GENERAL AND LAPAROSCOPIC SURGERY          PATIENT NAME: Javier Guevara     TODAY'S DATE: 9/25/2018    SUBJECTIVE:    Pt with some concern of abd pain, mid, lower and upper, moderate, intermittent, increased with pressure. No weight change, no N/V. Has had some loose and irregular BM. No F/C.      OBJECTIVE:  VITALS:  /60   Wt 190 lb (86.2 kg)   BMI 26.88 kg/m²     CONSTITUTIONAL:  awake and alert  LUNGS:  clear to auscultation  ABDOMEN:  normal bowel sounds, soft, non-distended, non-tender     Data:    Radiology Review:  None      ASSESSMENT AND PLAN:  Rectal Ca  Abd pain, no focal abn PE issues  Possible enteritis with loose BM  Will check back in a week  Do rectal exam then too if abnormal concern persists of irregular BM, possible stricture      Taryn Andrade

## 2018-09-26 PROBLEM — Z51.11 ENCOUNTER FOR ANTINEOPLASTIC CHEMOTHERAPY: Status: RESOLVED | Noted: 2017-06-07 | Resolved: 2018-09-26

## 2018-09-26 PROBLEM — Z51.11 ENCOUNTER FOR ANTINEOPLASTIC CHEMOTHERAPY: Status: RESOLVED | Noted: 2017-04-19 | Resolved: 2018-09-26

## 2018-09-27 ENCOUNTER — OFFICE VISIT (OUTPATIENT)
Dept: CARDIOLOGY CLINIC | Age: 48
End: 2018-09-27
Payer: COMMERCIAL

## 2018-09-27 VITALS
SYSTOLIC BLOOD PRESSURE: 100 MMHG | HEART RATE: 68 BPM | WEIGHT: 187.12 LBS | HEIGHT: 71 IN | BODY MASS INDEX: 26.2 KG/M2 | OXYGEN SATURATION: 97 % | RESPIRATION RATE: 16 BRPM | DIASTOLIC BLOOD PRESSURE: 52 MMHG

## 2018-09-27 DIAGNOSIS — Z95.810 ICD (IMPLANTABLE CARDIOVERTER-DEFIBRILLATOR) IN PLACE: ICD-10-CM

## 2018-09-27 DIAGNOSIS — I10 ESSENTIAL HYPERTENSION: ICD-10-CM

## 2018-09-27 DIAGNOSIS — I42.8 NICM (NONISCHEMIC CARDIOMYOPATHY) (HCC): ICD-10-CM

## 2018-09-27 DIAGNOSIS — I50.22 CHRONIC SYSTOLIC HEART FAILURE (HCC): Primary | Chronic | ICD-10-CM

## 2018-09-27 PROCEDURE — 93290 INTERROG DEV EVAL ICPMS IP: CPT | Performed by: NURSE PRACTITIONER

## 2018-09-27 PROCEDURE — 99214 OFFICE O/P EST MOD 30 MIN: CPT | Performed by: NURSE PRACTITIONER

## 2018-09-27 RX ORDER — CARVEDILOL 12.5 MG/1
TABLET ORAL
Qty: 180 TABLET | Refills: 1 | Status: SHIPPED
Start: 2018-09-27 | End: 2018-10-01 | Stop reason: SDUPTHER

## 2018-09-27 NOTE — PATIENT INSTRUCTIONS
1. Continue current medications. 2. Call office when GI symptoms improved and we will transition to [facility] [employer]. 3. Anticipate recheck labs two weeks after starting Entresto. 4. Follow up in two weeks after starting Entresto. 5. Check with surgery regarding resuming the gym. 6. Check potassium content of protein drinks.

## 2018-09-27 NOTE — PROGRESS NOTES
[facility] [place] - Cardiology      Chief Complaint: \"GI symptoms\"    Chief Complaint   Patient presents with    Follow-up     Denies chest pain, dizziness, edema or sob. Recent uppper/lower GI infection (Dr. Jaleel Issa for this)   Nikos Junior Congestive Heart Failure    Hypertension    Cardiomyopathy    Fatigue       History of present illness: Kiran Garcia is a 50 y.o. male with PMH of NICM, sCHF, HTN and rectal cancer. C 9/15/16 with no coronary artery disease, EF 15%, LVEDP 20-25 mmHg. ICD placed 10/2016 for primary prevention. Most recent echo (9/2018) showed dilated LV, EF 25-30%. He had been on Entresto at one time but this was stopped secondary to hypotension following surgery in December for small bowel resection, hernia repair and colostomy placement. Colostomy was closed 4/2018,   He has been on antibiotic for abd cellulitis. Patient is here today for follow up chronic systolic heart failure. He presents to office ambulatory. He says he has been \"hanging in there. \" For the past week, he has been struggling with diarrhea and abdominal discomfort. He has seen by Dr Arsen Hairston and per patient was told he has lower GI infection, presently on liquid diet. In regards to heart failure, he denies shortness of breath, orthopnea, PND, chest pain, lightheadedness, edema. He is currently taking torsemide 20 mg three days a week. Weight 187 lbs (baseline 183-187 lbs). Carelink today with stable fluid status.             Past Medical History:   Diagnosis Date    Allergic     Anesthesia     tremors    Arthritis     CAD (coronary artery disease)     Cancer (HCC)     colon    CHF [facility]congestive heart failure) ([facility]     Clostridium difficile infection 07/11/2016    PCR+    Depression motion     Diverticulitis     Diverticulitis     Diverticulosis     History of alcohol abuse     Hyperlipidemia     Hypertension     Irregular heart beat     states been told he has had in past. 1/1/2007    Smokeless tobacco: Former User     Types: Chew     Quit date: 8/10/2017    Alcohol use No      Comment: history of alcohol abuse        Review of Systems:   Constitutional: No significant change in weight, + fatigue, no weakness. HEENT: No change in vision or ringing in the ears. Respiratory: No MONTEZ, PND, orthopnea or cough. Cardiovascular: No chest pain, palpitations, edema. GI: No n/v, + abdominal pain, + diarrhea. No melena, no hematochezia  : No dysuria or hematuria. Skin: No rash or new skin lesions. Musculoskeletal: No new muscle or joint pain. Neurological: No lightheadedness, dizziness, syncope or TIA-like symptoms. Psychiatric: No anxiety, insomnia or depression    Physical Exam:  BP (!) 100/52 (Site: Right Upper Arm, Position: Sitting, Cuff Size: Large Adult)   Pulse 68   Resp 16   Ht 5' 10.5\" (1.791 m)   Wt 187 lb 1.9 oz (84.9 kg)   SpO2 97%   BMI 26.47 kg/m²     General:  Awake, alert, oriented in NAD  Skin:  Warm and dry. No unusual bruising or rash  HEENT: Normocephalic and atraumatic. Oral mucosa moist, no cyanosis. Neck:  Supple. No JVP appreciated  Chest:  Normal effort.   Clear to auscultation  Cardiovascular:  RRR, S1/S2, no murmur/gallop/rub  Abdomen:  Soft, nontender, +bowel sounds  Extremities:  No edema  Neurological: No focal deficits  Psychological: Normal mood and affect      Current Outpatient Prescriptions   Medication Sig Dispense Refill    dicyclomine (BENTYL) 10 MG capsule Take 1 capsule by mouth 3 times daily 90 capsule 3    simethicone (MYLICON) 80 MG chewable tablet Take 80 mg by mouth every 6 hours as needed for Flatulence      spironolactone (ALDACTONE) 25 MG tablet Take 1 tablet by mouth daily 90 tablet 1    carvedilol (COREG) 12.5 MG tablet Take a half tab in the morning and a whole tab at night (Patient taking differently: Take a 1 tab in am and 1/2 tab in pm) 180 tablet 1    lisinopril (PRINIVIL;ZESTRIL) 5 MG tablet Take 1 tablet by mouth 2 times daily 180 tablet 1    ranitidine (ZANTAC) 150 MG tablet Take 150 mg by mouth daily      melatonin 3 MG TABS tablet Take 3 mg by mouth nightly as needed      digoxin (LANOXIN) 125 MCG tablet Take 1 tablet by mouth daily 90 tablet 3    torsemide (DEMADEX) 20 MG tablet Take 1 tablet MWF and start today for 2 doses this week. (Patient taking differently: Take 20 mg by mouth Take 1 tablet MWF) 90 tablet 3    HYDROcodone-acetaminophen (NORCO) 7.5-325 MG per tablet 1-2 tabs po q6-8h prn pain. 120 tablet 0    prochlorperazine (COMPAZINE) 10 MG tablet Take 1 tablet by mouth every 6 hours as needed (nausea or vomiting) 120 tablet 3    lovastatin (MEVACOR) 40 MG tablet Take 40 mg by mouth. Take 1 tablet (40 mg total) by mouth at bedtime.  sertraline (ZOLOFT) 100 MG tablet Take 100 mg by mouth nightly       diazepam (VALIUM) 5 MG tablet Take 5 mg by mouth three times daily.  Cetirizine HCl (ZYRTEC PO) Take 1 tablet by mouth daily. Take only as needed for scrachy eyes. No current facility-administered medications for this visit. Labs:   Lab Results   Component Value Date    WBC 9.1 07/30/2018    HGB 13.3 (L) 07/30/2018    HCT 38.9 (L) 07/30/2018    MCV 91.0 07/30/2018     07/30/2018     Lab Results   Component Value Date     09/19/2018    K 5.1 09/19/2018    K 4.3 04/19/2018    CL 96 09/19/2018    CO2 28 09/19/2018    BUN 15 09/19/2018    CREATININE 0.8 09/19/2018    GLUCOSE 104 09/19/2018    GLUCOSE 131 07/05/2017    CALCIUM 9.9 09/19/2018      Lab Results   Component Value Date    TRIG 286 08/16/2017    HDL 37 08/16/2017    HDL 36 02/20/2012    LDLCALC 133 08/16/2017    LABVLDL 57 08/16/2017       Diagnostics:    Echo 9/19/2018:  Summary   -Limited echo to evaluate ejection fraction.   -Left ventricular cavity size is dilated with normal wall thickness.   -Overall left ventricular systolic function appears reduced with a estimated   ejection fraction of 25-30%.    -3D ejection fraction calculated at 33.4%   Pacer / ICD wire is visualized in the right heart. [facility] 9/15/2016:  Cath via radial unable to manipulate catheter into ascending aorta-small subintimal dissection in subclavian occurred. Cath via right femoral shows normal coronaries,LVEDP 20-25. EF 15%    Echo 11/30/2017:  Summary  Left ventricular size is markedly dilated and hypokinetic. LV function is   severely reduced at 20-25%  Mild mitral regurgitation is present. The left atrium is dilated. Pacer / ICD wire is visualized in the right ventricle. Mild tricuspid regurgitation with RVSP estimated at 30 mmHg. Assessment:  1. Chronic systolic heart failure. NYHA class II. Appears stable. 2. NICM (nonischemic cardiomyopathy). On ACE, evidence based beta blocker and aldosterone antagonist.    3. ICD (implantable cardioverter-defibrillator) in place. Followed in device clinic. Optivol today with stable fluid status, no evidence atrial fib. 4. Essential hypertension. Controlled. I am reluctant to start patient on Entresto today while he is actively being treated for what sounds like enteritis. He has an appointment next week with Dr Ryan Mandujano. I have asked Natty Parham to call once symptoms have abated and will considering starting Entresto at that time. Plan:  1. Continue current medications. 2. Call office when GI symptoms improved and we will transition to [facility]. 3. Anticipate recheck labs two weeks after starting Entresto. 4. Follow up in office two weeks after starting Entresto. 5. Check with surgery regarding resuming the gym. 6. Check potassium content of protein drinks. Thank you for allowing me to participate in the care of your patient. Gasper Hartmann, APRN-CNP        QUALITY MEASURES  1. Tobacco Cessation Counseling: N/A  2. BP retake if >140/90: N/A  3. Communication to PCP: Office note forwarded/faxed to PCP  4.  CAD antiplatelet therapy: N/A  5. CAD lipid lowering therapy:

## 2018-10-01 RX ORDER — CARVEDILOL 12.5 MG/1
TABLET ORAL
Qty: 30 TABLET | Refills: 0 | OUTPATIENT
Start: 2018-10-01

## 2018-10-01 RX ORDER — CARVEDILOL 12.5 MG/1
TABLET ORAL
Qty: 135 TABLET | Refills: 1 | Status: SHIPPED | OUTPATIENT
Start: 2018-10-01 | End: 2019-03-13 | Stop reason: SDUPTHER

## 2018-10-01 NOTE — TELEPHONE ENCOUNTER
Last appt 9/27/18  Follow up in 3 weeks (10/18)    Next appt --- none on file     Please advise - pended medication with 0 refill

## 2018-10-02 ENCOUNTER — OFFICE VISIT (OUTPATIENT)
Dept: SURGERY | Age: 48
End: 2018-10-02
Payer: COMMERCIAL

## 2018-10-02 VITALS — BODY MASS INDEX: 26.74 KG/M2 | WEIGHT: 189 LBS | DIASTOLIC BLOOD PRESSURE: 68 MMHG | SYSTOLIC BLOOD PRESSURE: 104 MMHG

## 2018-10-02 DIAGNOSIS — C20 RECTAL CANCER (HCC): Primary | ICD-10-CM

## 2018-10-02 PROCEDURE — 99212 OFFICE O/P EST SF 10 MIN: CPT | Performed by: SURGERY

## 2018-10-02 RX ORDER — DICYCLOMINE HYDROCHLORIDE 10 MG/1
10 CAPSULE ORAL 3 TIMES DAILY
Qty: 90 CAPSULE | Refills: 3 | Status: SHIPPED | OUTPATIENT
Start: 2018-10-02 | End: 2018-12-21 | Stop reason: ALTCHOICE

## 2018-10-02 RX ORDER — METRONIDAZOLE 250 MG/1
250 TABLET ORAL 3 TIMES DAILY
Qty: 30 TABLET | Refills: 0 | Status: SHIPPED | OUTPATIENT
Start: 2018-10-02 | End: 2018-10-12

## 2018-10-03 DIAGNOSIS — I10 ESSENTIAL HYPERTENSION: Chronic | ICD-10-CM

## 2018-10-03 RX ORDER — SPIRONOLACTONE 25 MG/1
TABLET ORAL
Qty: 90 TABLET | Refills: 1 | Status: SHIPPED | OUTPATIENT
Start: 2018-10-03 | End: 2019-02-13 | Stop reason: SINTOL

## 2018-10-08 ENCOUNTER — TELEPHONE (OUTPATIENT)
Dept: CARDIOLOGY CLINIC | Age: 48
End: 2018-10-08

## 2018-10-08 NOTE — TELEPHONE ENCOUNTER
Pt calling still has an infection and is still on Antibiotics that is why he hasn't called back to usha appt with [facility].  Pls call to advise Thank you

## 2018-10-16 ENCOUNTER — OFFICE VISIT (OUTPATIENT)
Dept: SURGERY | Age: 48
End: 2018-10-16
Payer: COMMERCIAL

## 2018-10-16 VITALS — SYSTOLIC BLOOD PRESSURE: 96 MMHG | WEIGHT: 186 LBS | DIASTOLIC BLOOD PRESSURE: 52 MMHG | BODY MASS INDEX: 26.31 KG/M2

## 2018-10-16 DIAGNOSIS — C20 RECTAL CANCER (HCC): Primary | ICD-10-CM

## 2018-10-16 PROCEDURE — 99213 OFFICE O/P EST LOW 20 MIN: CPT | Performed by: SURGERY

## 2018-10-22 DIAGNOSIS — I42.9 CARDIOMYOPATHY, IDIOPATHIC (HCC): Chronic | ICD-10-CM

## 2018-10-22 DIAGNOSIS — R06.02 SOB (SHORTNESS OF BREATH): Chronic | ICD-10-CM

## 2018-10-22 DIAGNOSIS — I10 ESSENTIAL HYPERTENSION: Chronic | ICD-10-CM

## 2018-10-22 DIAGNOSIS — I50.22 CHRONIC SYSTOLIC HEART FAILURE (HCC): Chronic | ICD-10-CM

## 2018-10-22 RX ORDER — TORSEMIDE 20 MG/1
TABLET ORAL
Qty: 36 TABLET | Refills: 3 | Status: SHIPPED | OUTPATIENT
Start: 2018-10-22 | End: 2019-02-13 | Stop reason: SDUPTHER

## 2018-10-22 NOTE — TELEPHONE ENCOUNTER
Medication Refill    When was your last appointment with cardiology?  (if 1year or longer, please schedule an appointment)    Medication needing refilled:  torsemide (DEMADEX) 20 MG tablet       Doseage of the medication:    How are you taking this medication (QD, BID, TID, QID, PRN):    Patient want a 30 or 90 day supply called in:90    Which Pharmacy are we sending the medication to:  :  [facility] [address] [facility] [address] [phone]  Pharmacy Phone number:    Pharmacy Fax number:

## 2018-10-23 ENCOUNTER — HOSPITAL ENCOUNTER (OUTPATIENT)
Dept: CT IMAGING | Age: 48
Discharge: HOME | End: 2018-10-23
Payer: COMMERCIAL

## 2018-10-23 ENCOUNTER — TELEPHONE (OUTPATIENT)
Dept: SURGERY | Age: 48
End: 2018-10-23

## 2018-10-23 DIAGNOSIS — C20 RECTAL CANCER (HCC): ICD-10-CM

## 2018-10-23 LAB
A/G RATIO: 1.5 (ref 1.1–2.2)
ALBUMIN SERPL-MCNC: 4.8 G/DL (ref 3.4–5)
ALP BLD-CCNC: 111 U/L (ref 40–129)
ALT SERPL-CCNC: 13 U/L (ref 10–40)
ANION GAP SERPL CALCULATED.3IONS-SCNC: 10 MMOL/L (ref 3–16)
AST SERPL-CCNC: 17 U/L (ref 15–37)
BILIRUB SERPL-MCNC: <0.2 MG/DL (ref 0–1)
BUN BLDV-MCNC: 19 MG/DL (ref 7–20)
CALCIUM SERPL-MCNC: 10.2 MG/DL (ref 8.3–10.6)
CHLORIDE BLD-SCNC: 98 MMOL/L (ref 99–110)
CO2: 28 MMOL/L (ref 21–32)
CREAT SERPL-MCNC: 0.8 MG/DL (ref 0.9–1.3)
GFR AFRICAN AMERICAN: >60
GFR NON-AFRICAN AMERICAN: >60
GLOBULIN: 3.1 G/DL
GLUCOSE BLD-MCNC: 99 MG/DL (ref 70–99)
POTASSIUM SERPL-SCNC: 5 MMOL/L (ref 3.5–5.1)
SODIUM BLD-SCNC: 136 MMOL/L (ref 136–145)
TOTAL PROTEIN: 7.9 G/DL (ref 6.4–8.2)

## 2018-10-23 PROCEDURE — 74177 CT ABD & PELVIS W/CONTRAST: CPT

## 2018-10-23 PROCEDURE — 6360000004 HC RX CONTRAST MEDICATION: Performed by: SURGERY

## 2018-10-23 PROCEDURE — 36415 COLL VENOUS BLD VENIPUNCTURE: CPT

## 2018-10-23 PROCEDURE — 80053 COMPREHEN METABOLIC PANEL: CPT

## 2018-10-23 RX ADMIN — IOHEXOL 50 ML: 240 INJECTION, SOLUTION INTRATHECAL; INTRAVASCULAR; INTRAVENOUS; ORAL at 14:43

## 2018-10-23 RX ADMIN — IOPAMIDOL 75 ML: 755 INJECTION, SOLUTION INTRAVENOUS at 14:42

## 2018-10-29 ENCOUNTER — TELEPHONE (OUTPATIENT)
Dept: SURGERY | Age: 48
End: 2018-10-29

## 2018-10-29 RX ORDER — DIGOXIN 125 MCG
TABLET ORAL
Qty: 90 TABLET | Refills: 0 | Status: SHIPPED | OUTPATIENT
Start: 2018-10-29 | End: 2019-01-23 | Stop reason: SDUPTHER

## 2018-11-06 ENCOUNTER — OFFICE VISIT (OUTPATIENT)
Dept: SURGERY | Age: 48
End: 2018-11-06
Payer: COMMERCIAL

## 2018-11-06 VITALS — WEIGHT: 198 LBS | BODY MASS INDEX: 28.01 KG/M2 | DIASTOLIC BLOOD PRESSURE: 78 MMHG | SYSTOLIC BLOOD PRESSURE: 116 MMHG

## 2018-11-06 DIAGNOSIS — C20 RECTAL CANCER (HCC): Primary | ICD-10-CM

## 2018-11-06 PROCEDURE — 99212 OFFICE O/P EST SF 10 MIN: CPT | Performed by: SURGERY

## 2018-11-13 ENCOUNTER — OFFICE VISIT (OUTPATIENT)
Dept: SURGERY | Age: 48
End: 2018-11-13
Payer: COMMERCIAL

## 2018-11-13 VITALS — DIASTOLIC BLOOD PRESSURE: 65 MMHG | SYSTOLIC BLOOD PRESSURE: 110 MMHG | BODY MASS INDEX: 28.01 KG/M2 | WEIGHT: 198 LBS

## 2018-11-13 DIAGNOSIS — K92.2 LOWER GI BLEED: Primary | ICD-10-CM

## 2018-11-13 DIAGNOSIS — K92.1 HEMATOCHEZIA: ICD-10-CM

## 2018-11-13 DIAGNOSIS — K92.2 LOWER GI BLEED: ICD-10-CM

## 2018-11-13 LAB
ANION GAP SERPL CALCULATED.3IONS-SCNC: 14 MMOL/L (ref 3–16)
BASOPHILS ABSOLUTE: 0 K/UL (ref 0–0.2)
BASOPHILS RELATIVE PERCENT: 0.4 %
BUN BLDV-MCNC: 22 MG/DL (ref 7–20)
CALCIUM SERPL-MCNC: 9.8 MG/DL (ref 8.3–10.6)
CHLORIDE BLD-SCNC: 96 MMOL/L (ref 99–110)
CO2: 24 MMOL/L (ref 21–32)
CREAT SERPL-MCNC: 0.6 MG/DL (ref 0.9–1.3)
EOSINOPHILS ABSOLUTE: 0.3 K/UL (ref 0–0.6)
EOSINOPHILS RELATIVE PERCENT: 2.4 %
GFR AFRICAN AMERICAN: >60
GFR NON-AFRICAN AMERICAN: >60
GLUCOSE BLD-MCNC: 91 MG/DL (ref 70–99)
HCT VFR BLD CALC: 36.8 % (ref 40.5–52.5)
HEMOGLOBIN: 12.2 G/DL (ref 13.5–17.5)
LYMPHOCYTES ABSOLUTE: 1 K/UL (ref 1–5.1)
LYMPHOCYTES RELATIVE PERCENT: 9.8 %
MCH RBC QN AUTO: 30.6 PG (ref 26–34)
MCHC RBC AUTO-ENTMCNC: 33.2 G/DL (ref 31–36)
MCV RBC AUTO: 92.1 FL (ref 80–100)
MONOCYTES ABSOLUTE: 1.5 K/UL (ref 0–1.3)
MONOCYTES RELATIVE PERCENT: 14.5 %
NEUTROPHILS ABSOLUTE: 7.7 K/UL (ref 1.7–7.7)
NEUTROPHILS RELATIVE PERCENT: 72.9 %
PDW BLD-RTO: 13 % (ref 12.4–15.4)
PLATELET # BLD: 305 K/UL (ref 135–450)
PMV BLD AUTO: 7.1 FL (ref 5–10.5)
POTASSIUM SERPL-SCNC: 4.4 MMOL/L (ref 3.5–5.1)
RBC # BLD: 3.99 M/UL (ref 4.2–5.9)
SODIUM BLD-SCNC: 134 MMOL/L (ref 136–145)
WBC # BLD: 10.6 K/UL (ref 4–11)

## 2018-11-13 PROCEDURE — 99213 OFFICE O/P EST LOW 20 MIN: CPT | Performed by: SURGERY

## 2018-11-18 ENCOUNTER — HOSPITAL ENCOUNTER (EMERGENCY)
Age: 48
Discharge: HOME | End: 2018-11-19
Attending: EMERGENCY MEDICINE
Payer: COMMERCIAL

## 2018-11-18 ENCOUNTER — APPOINTMENT (OUTPATIENT)
Dept: CT IMAGING | Age: 48
End: 2018-11-18
Payer: COMMERCIAL

## 2018-11-18 DIAGNOSIS — R10.30 LOWER ABDOMINAL PAIN: ICD-10-CM

## 2018-11-18 DIAGNOSIS — K92.2 LOWER GI BLEED: Primary | ICD-10-CM

## 2018-11-18 LAB
A/G RATIO: 1.2 (ref 1.1–2.2)
ALBUMIN SERPL-MCNC: 4.4 G/DL (ref 3.4–5)
ALP BLD-CCNC: 118 U/L (ref 40–129)
ALT SERPL-CCNC: 18 U/L (ref 10–40)
ANION GAP SERPL CALCULATED.3IONS-SCNC: 13 MMOL/L (ref 3–16)
APTT: 47.2 SEC (ref 26–36)
AST SERPL-CCNC: 18 U/L (ref 15–37)
BANDED NEUTROPHILS RELATIVE PERCENT: 4 % (ref 0–7)
BASOPHILS ABSOLUTE: 0.1 K/UL (ref 0–0.2)
BASOPHILS RELATIVE PERCENT: 1 %
BILIRUB SERPL-MCNC: <0.2 MG/DL (ref 0–1)
BILIRUBIN URINE: NEGATIVE
BLOOD, URINE: ABNORMAL
BUN BLDV-MCNC: 18 MG/DL (ref 7–20)
CALCIUM SERPL-MCNC: 10.4 MG/DL (ref 8.3–10.6)
CHLORIDE BLD-SCNC: 99 MMOL/L (ref 99–110)
CLARITY: CLEAR
CO2: 28 MMOL/L (ref 21–32)
COLOR: YELLOW
CREAT SERPL-MCNC: 0.6 MG/DL (ref 0.9–1.3)
EOSINOPHILS ABSOLUTE: 0 K/UL (ref 0–0.6)
EOSINOPHILS RELATIVE PERCENT: 0 %
EPITHELIAL CELLS, UA: 0 /HPF (ref 0–5)
GFR AFRICAN AMERICAN: >60
GFR NON-AFRICAN AMERICAN: >60
GLOBULIN: 3.6 G/DL
GLUCOSE BLD-MCNC: 88 MG/DL (ref 70–99)
GLUCOSE URINE: NEGATIVE MG/DL
HCT VFR BLD CALC: 35.1 % (ref 40.5–52.5)
HEMOGLOBIN: 11.9 G/DL (ref 13.5–17.5)
HYALINE CASTS: 0 /LPF (ref 0–8)
INR BLD: 1.09 (ref 0.86–1.14)
KETONES, URINE: NEGATIVE MG/DL
LEUKOCYTE ESTERASE, URINE: NEGATIVE
LYMPHOCYTES ABSOLUTE: 1.3 K/UL (ref 1–5.1)
LYMPHOCYTES RELATIVE PERCENT: 17 %
MCH RBC QN AUTO: 31.2 PG (ref 26–34)
MCHC RBC AUTO-ENTMCNC: 34 G/DL (ref 31–36)
MCV RBC AUTO: 91.9 FL (ref 80–100)
METAMYELOCYTES RELATIVE PERCENT: 1 %
MICROSCOPIC EXAMINATION: YES
MONOCYTES ABSOLUTE: 0.6 K/UL (ref 0–1.3)
MONOCYTES RELATIVE PERCENT: 8 %
NEUTROPHILS ABSOLUTE: 5.8 K/UL (ref 1.7–7.7)
NEUTROPHILS RELATIVE PERCENT: 69 %
NITRITE, URINE: NEGATIVE
PDW BLD-RTO: 12.8 % (ref 12.4–15.4)
PH UA: 5.5
PLATELET # BLD: 329 K/UL (ref 135–450)
PLATELET SLIDE REVIEW: ADEQUATE
PMV BLD AUTO: 6.6 FL (ref 5–10.5)
POLYCHROMASIA: ABNORMAL
POTASSIUM SERPL-SCNC: 4.7 MMOL/L (ref 3.5–5.1)
PROTEIN UA: NEGATIVE MG/DL
PROTHROMBIN TIME: 12.4 SEC (ref 9.8–13)
RBC # BLD: 3.82 M/UL (ref 4.2–5.9)
RBC # BLD: NORMAL 10*6/UL
RBC UA: 1 /HPF (ref 0–4)
SLIDE REVIEW: ABNORMAL
SODIUM BLD-SCNC: 140 MMOL/L (ref 136–145)
SPECIFIC GRAVITY UA: 1.02
TOTAL PROTEIN: 8 G/DL (ref 6.4–8.2)
URINE REFLEX TO CULTURE: ABNORMAL
URINE TYPE: ABNORMAL
UROBILINOGEN, URINE: 0.2 E.U./DL
WBC # BLD: 7.8 K/UL (ref 4–11)
WBC UA: 0 /HPF (ref 0–5)

## 2018-11-18 PROCEDURE — 85610 PROTHROMBIN TIME: CPT

## 2018-11-18 PROCEDURE — 85025 COMPLETE CBC W/AUTO DIFF WBC: CPT

## 2018-11-18 PROCEDURE — 80053 COMPREHEN METABOLIC PANEL: CPT

## 2018-11-18 PROCEDURE — 85730 THROMBOPLASTIN TIME PARTIAL: CPT

## 2018-11-18 PROCEDURE — 81001 URINALYSIS AUTO W/SCOPE: CPT

## 2018-11-18 PROCEDURE — 6360000002 HC RX W HCPCS: Performed by: EMERGENCY MEDICINE

## 2018-11-18 PROCEDURE — 99284 EMERGENCY DEPT VISIT MOD MDM: CPT

## 2018-11-18 PROCEDURE — 96374 THER/PROPH/DIAG INJ IV PUSH: CPT

## 2018-11-18 RX ORDER — MORPHINE SULFATE 4 MG/ML
4 INJECTION, SOLUTION INTRAMUSCULAR; INTRAVENOUS ONCE
Status: COMPLETED | OUTPATIENT
Start: 2018-11-19 | End: 2018-11-18

## 2018-11-18 RX ADMIN — MORPHINE SULFATE 4 MG: 4 INJECTION INTRAVENOUS at 23:55

## 2018-11-18 ASSESSMENT — PAIN SCALES - GENERAL: PAINLEVEL_OUTOF10: 8

## 2018-11-19 ENCOUNTER — APPOINTMENT (OUTPATIENT)
Dept: CT IMAGING | Age: 48
End: 2018-11-19
Payer: COMMERCIAL

## 2018-11-19 VITALS
HEART RATE: 75 BPM | HEIGHT: 71 IN | SYSTOLIC BLOOD PRESSURE: 161 MMHG | WEIGHT: 198 LBS | OXYGEN SATURATION: 97 % | TEMPERATURE: 98.2 F | DIASTOLIC BLOOD PRESSURE: 69 MMHG | RESPIRATION RATE: 18 BRPM | BODY MASS INDEX: 27.72 KG/M2

## 2018-11-19 PROCEDURE — 6360000004 HC RX CONTRAST MEDICATION: Performed by: EMERGENCY MEDICINE

## 2018-11-19 PROCEDURE — 74177 CT ABD & PELVIS W/CONTRAST: CPT

## 2018-11-19 RX ORDER — FENTANYL CITRATE 50 UG/ML
25 INJECTION, SOLUTION INTRAMUSCULAR; INTRAVENOUS ONCE
Status: DISCONTINUED | OUTPATIENT
Start: 2018-11-19 | End: 2018-11-19 | Stop reason: HOSPADM

## 2018-11-19 RX ADMIN — IOPAMIDOL 75 ML: 755 INJECTION, SOLUTION INTRAVENOUS at 00:23

## 2018-11-19 NOTE — ED PROVIDER NOTES
Marital status:      Spouse name: N/A    Number of children: N/A    Years of education: N/A     Social History Main Topics    Smoking status: Former Smoker     Packs/day: 1.00     Years: 5.00     Quit date: 1/1/2007    Smokeless tobacco: Former User     Types: Chew     Quit date: 8/10/2017    Alcohol use No      Comment: history of alcohol abuse     Drug use: No    Sexual activity: No     Other Topics Concern    None     Social History Narrative    None       Medications/Allergies     Previous Medications    CARVEDILOL (COREG) 12.5 MG TABLET    Take a 1 tab in am and 1/2 tab in pm    CETIRIZINE HCL (ZYRTEC PO)    Take 1 tablet by mouth daily. Take only as needed for scrachy eyes. DIAZEPAM (VALIUM) 5 MG TABLET    Take 5 mg by mouth three times daily. DICYCLOMINE (BENTYL) 10 MG CAPSULE    Take 1 capsule by mouth 3 times daily    DIGOXIN (LANOXIN) 125 MCG TABLET    TAKE ONE TABLET BY MOUTH ONE TIME A DAY    HYDROCODONE-ACETAMINOPHEN (NORCO) 7.5-325 MG PER TABLET    1-2 tabs po q6-8h prn pain. LISINOPRIL (PRINIVIL;ZESTRIL) 5 MG TABLET    Take 1 tablet by mouth 2 times daily    LOVASTATIN (MEVACOR) 40 MG TABLET    Take 40 mg by mouth. Take 1 tablet (40 mg total) by mouth at bedtime.     MELATONIN 3 MG TABS TABLET    Take 3 mg by mouth nightly as needed    PROCHLORPERAZINE (COMPAZINE) 10 MG TABLET    Take 1 tablet by mouth every 6 hours as needed (nausea or vomiting)    RANITIDINE (ZANTAC) 150 MG TABLET    Take 150 mg by mouth daily    SERTRALINE (ZOLOFT) 100 MG TABLET    Take 100 mg by mouth nightly     SIMETHICONE (MYLICON) 80 MG CHEWABLE TABLET    Take 80 mg by mouth every 6 hours as needed for Flatulence    SPIRONOLACTONE (ALDACTONE) 25 MG TABLET    TAKE ONE TABLET BY MOUTH ONE TIME A DAY    TORSEMIDE (DEMADEX) 20 MG TABLET    Take 1 tablet MWF     Allergies   Allergen Reactions    Vancomycin Hives and Swelling     Throat swells    Bactrim [Sulfamethoxazole-Trimethoprim] Other (See (A) Negative    pH, UA 5.5 5.0 - 8.0    Protein, UA Negative Negative mg/dL    Urobilinogen, Urine 0.2 <2.0 E.U./dL    Nitrite, Urine Negative Negative    Leukocyte Esterase, Urine Negative Negative    Microscopic Examination YES     Urine Reflex to Culture Not Indicated     Urine Type Not Specified    CBC auto differential   Result Value Ref Range    WBC 7.8 4.0 - 11.0 K/uL    RBC 3.82 (L) 4.20 - 5.90 M/uL    Hemoglobin 11.9 (L) 13.5 - 17.5 g/dL    Hematocrit 35.1 (L) 40.5 - 52.5 %    MCV 91.9 80.0 - 100.0 fL    MCH 31.2 26.0 - 34.0 pg    MCHC 34.0 31.0 - 36.0 g/dL    RDW 12.8 12.4 - 15.4 %    Platelets 060 642 - 465 K/uL    MPV 6.6 5.0 - 10.5 fL    PLATELET SLIDE REVIEW Adequate     SLIDE REVIEW see below     Neutrophils % 69.0 %    Lymphocytes % 17.0 %    Monocytes % 8.0 %    Eosinophils % 0.0 %    Basophils % 1.0 %    Neutrophils # 5.8 1.7 - 7.7 K/uL    Lymphocytes # 1.3 1.0 - 5.1 K/uL    Monocytes # 0.6 0.0 - 1.3 K/uL    Eosinophils # 0.0 0.0 - 0.6 K/uL    Basophils # 0.1 0.0 - 0.2 K/uL    Bands Relative 4 0 - 7 %    Metamyelocytes Relative 1 (A) %    RBC Morphology Normal     Polychromasia Occasional (A)    Comprehensive metabolic panel   Result Value Ref Range    Sodium 140 136 - 145 mmol/L    Potassium 4.7 3.5 - 5.1 mmol/L    Chloride 99 99 - 110 mmol/L    CO2 28 21 - 32 mmol/L    Anion Gap 13 3 - 16    Glucose 88 70 - 99 mg/dL    BUN 18 7 - 20 mg/dL    CREATININE 0.6 (L) 0.9 - 1.3 mg/dL    GFR Non-African American >60 >60    GFR African American >60 >60    Calcium 10.4 8.3 - 10.6 mg/dL    Total Protein 8.0 6.4 - 8.2 g/dL    Alb 4.4 3.4 - 5.0 g/dL    Albumin/Globulin Ratio 1.2 1.1 - 2.2    Total Bilirubin <0.2 0.0 - 1.0 mg/dL    Alkaline Phosphatase 118 40 - 129 U/L    ALT 18 10 - 40 U/L    AST 18 15 - 37 U/L    Globulin 3.6 g/dL   Protime-INR   Result Value Ref Range    Protime 12.4 9.8 - 13.0 sec    INR 1.09 0.86 - 1.14   APTT   Result Value Ref Range    aPTT 47.2 (H) 26.0 - 36.0 sec   Microscopic

## 2018-11-21 ENCOUNTER — ANESTHESIA EVENT (OUTPATIENT)
Dept: ENDOSCOPY | Age: 48
End: 2018-11-21
Payer: COMMERCIAL

## 2018-11-23 ENCOUNTER — ANESTHESIA (OUTPATIENT)
Dept: ENDOSCOPY | Age: 48
End: 2018-11-23
Payer: COMMERCIAL

## 2018-11-23 ENCOUNTER — HOSPITAL ENCOUNTER (OUTPATIENT)
Age: 48
Setting detail: OUTPATIENT SURGERY
Discharge: HOME | End: 2018-11-23
Attending: INTERNAL MEDICINE | Admitting: INTERNAL MEDICINE
Payer: COMMERCIAL

## 2018-11-23 VITALS — OXYGEN SATURATION: 99 % | SYSTOLIC BLOOD PRESSURE: 87 MMHG | DIASTOLIC BLOOD PRESSURE: 51 MMHG

## 2018-11-23 VITALS
HEIGHT: 70 IN | WEIGHT: 190.6 LBS | HEART RATE: 71 BPM | BODY MASS INDEX: 27.29 KG/M2 | TEMPERATURE: 97.8 F | RESPIRATION RATE: 15 BRPM | DIASTOLIC BLOOD PRESSURE: 68 MMHG | SYSTOLIC BLOOD PRESSURE: 114 MMHG | OXYGEN SATURATION: 98 %

## 2018-11-23 PROCEDURE — 6360000002 HC RX W HCPCS: Performed by: NURSE ANESTHETIST, CERTIFIED REGISTERED

## 2018-11-23 PROCEDURE — 7100000010 HC PHASE II RECOVERY - FIRST 15 MIN: Performed by: INTERNAL MEDICINE

## 2018-11-23 PROCEDURE — 6360000002 HC RX W HCPCS: Performed by: FAMILY MEDICINE

## 2018-11-23 PROCEDURE — 3700000000 HC ANESTHESIA ATTENDED CARE: Performed by: INTERNAL MEDICINE

## 2018-11-23 PROCEDURE — 7100000011 HC PHASE II RECOVERY - ADDTL 15 MIN: Performed by: INTERNAL MEDICINE

## 2018-11-23 PROCEDURE — 2709999900 HC NON-CHARGEABLE SUPPLY: Performed by: INTERNAL MEDICINE

## 2018-11-23 PROCEDURE — 88305 TISSUE EXAM BY PATHOLOGIST: CPT

## 2018-11-23 PROCEDURE — 2580000003 HC RX 258: Performed by: ANESTHESIOLOGY

## 2018-11-23 PROCEDURE — 7100000000 HC PACU RECOVERY - FIRST 15 MIN: Performed by: INTERNAL MEDICINE

## 2018-11-23 PROCEDURE — 3609008300 HC SIGMOIDOSCOPY W/BIOPSY SINGLE/MULTIPLE: Performed by: INTERNAL MEDICINE

## 2018-11-23 PROCEDURE — 7100000001 HC PACU RECOVERY - ADDTL 15 MIN: Performed by: INTERNAL MEDICINE

## 2018-11-23 RX ORDER — FENTANYL CITRATE 50 UG/ML
100 INJECTION, SOLUTION INTRAMUSCULAR; INTRAVENOUS ONCE
Status: COMPLETED | OUTPATIENT
Start: 2018-11-23 | End: 2018-11-23

## 2018-11-23 RX ORDER — SODIUM CHLORIDE 0.9 % (FLUSH) 0.9 %
10 SYRINGE (ML) INJECTION PRN
Status: DISCONTINUED | OUTPATIENT
Start: 2018-11-23 | End: 2018-11-23 | Stop reason: HOSPADM

## 2018-11-23 RX ORDER — SODIUM CHLORIDE 9 MG/ML
INJECTION, SOLUTION INTRAVENOUS CONTINUOUS
Status: DISCONTINUED | OUTPATIENT
Start: 2018-11-23 | End: 2018-11-23 | Stop reason: HOSPADM

## 2018-11-23 RX ORDER — SODIUM CHLORIDE 0.9 % (FLUSH) 0.9 %
10 SYRINGE (ML) INJECTION EVERY 12 HOURS SCHEDULED
Status: DISCONTINUED | OUTPATIENT
Start: 2018-11-23 | End: 2018-11-23 | Stop reason: HOSPADM

## 2018-11-23 RX ORDER — LIDOCAINE HYDROCHLORIDE 10 MG/ML
1 INJECTION, SOLUTION EPIDURAL; INFILTRATION; INTRACAUDAL; PERINEURAL
Status: DISCONTINUED | OUTPATIENT
Start: 2018-11-23 | End: 2018-11-23 | Stop reason: HOSPADM

## 2018-11-23 RX ORDER — PROPOFOL 10 MG/ML
INJECTION, EMULSION INTRAVENOUS PRN
Status: DISCONTINUED | OUTPATIENT
Start: 2018-11-23 | End: 2018-11-23 | Stop reason: SDUPTHER

## 2018-11-23 RX ADMIN — PHENYLEPHRINE HYDROCHLORIDE 100 MCG: 10 INJECTION INTRAVENOUS at 09:54

## 2018-11-23 RX ADMIN — PROPOFOL 10 MG: 10 INJECTION, EMULSION INTRAVENOUS at 09:50

## 2018-11-23 RX ADMIN — SODIUM CHLORIDE: 9 INJECTION, SOLUTION INTRAVENOUS at 09:33

## 2018-11-23 RX ADMIN — FENTANYL CITRATE 50 MCG: 50 INJECTION INTRAMUSCULAR; INTRAVENOUS at 09:29

## 2018-11-23 RX ADMIN — PROPOFOL 30 MG: 10 INJECTION, EMULSION INTRAVENOUS at 09:48

## 2018-11-23 RX ADMIN — PROPOFOL 10 MG: 10 INJECTION, EMULSION INTRAVENOUS at 09:52

## 2018-11-23 ASSESSMENT — PULMONARY FUNCTION TESTS
PIF_VALUE: 0

## 2018-11-23 ASSESSMENT — ENCOUNTER SYMPTOMS: SHORTNESS OF BREATH: 1

## 2018-11-23 ASSESSMENT — PAIN SCALES - GENERAL
PAINLEVEL_OUTOF10: 0
PAINLEVEL_OUTOF10: 8
PAINLEVEL_OUTOF10: 0

## 2018-11-23 ASSESSMENT — PAIN DESCRIPTION - DESCRIPTORS: DESCRIPTORS: ACHING;CRAMPING

## 2018-11-23 ASSESSMENT — PAIN - FUNCTIONAL ASSESSMENT: PAIN_FUNCTIONAL_ASSESSMENT: 0-10

## 2018-11-23 NOTE — ANESTHESIA PRE PROCEDURE
Department of Anesthesiology  Preprocedure Note       Name:  Sanya Kiser   Age:  50 y.o.  :  1970                                          MRN:  [id number]         Date:  2018      Surgeon: Norman Sims):  Sean Ojeda MD    Procedure: FLEXIBLE SIGMOIDOSCOPY (N/A )    Medications prior to admission:   Prior to Admission medications    Medication Sig Start Date End Date Taking? Authorizing Provider   digoxin (LANOXIN) 125 MCG tablet TAKE ONE TABLET BY MOUTH ONE TIME A DAY 10/29/18  Yes BENITEZ Liz CNP   spironolactone (ALDACTONE) 25 MG tablet TAKE ONE TABLET BY MOUTH ONE TIME A DAY 10/3/18  Yes Jitendra Rodriguez MD   carvedilol (COREG) 12.5 MG tablet Take a 1 tab in am and 1/2 tab in pm 10/1/18  Yes BENITEZ Liz CNP   simethicone (MYLICON) 80 MG chewable tablet Take 80 mg by mouth every 6 hours as needed for Flatulence   Yes Historical Provider, MD   lisinopril (PRINIVIL;ZESTRIL) 5 MG tablet Take 1 tablet by mouth 2 times daily 18  Yes Jitendra Rodriguez MD   ranitidine (ZANTAC) 150 MG tablet Take 150 mg by mouth daily   Yes Historical Provider, MD   melatonin 3 MG TABS tablet Take 3 mg by mouth nightly as needed   Yes Historical Provider, MD   HYDROcodone-acetaminophen (NORCO) 7.5-325 MG per tablet 1-2 tabs po q6-8h prn pain. 17  Yes BENITEZ Morales CNP   lovastatin (MEVACOR) 40 MG tablet Take 40 mg by mouth. Take 1 tablet (40 mg total) by mouth at bedtime. 13  Yes Historical Provider, MD   sertraline (ZOLOFT) 100 MG tablet Take 100 mg by mouth nightly    Yes Historical Provider, MD   diazepam (VALIUM) 5 MG tablet Take 5 mg by mouth three times daily. Yes Historical Provider, MD   Cetirizine HCl (ZYRTEC PO) Take 1 tablet by mouth daily. Take only as needed for scrachy eyes.    Yes Historical Provider, MD   torsemide (DEMADEX) 20 MG tablet Take 1 tablet MW 10/22/18   Jitendra Rodriguez MD   dicyclomine (BENTYL) 10 MG capsule Take 1 capsule by mouth 3 times 3.74 07/05/2017    HGB 11.9 11/18/2018    HCT 35.1 11/18/2018    MCV 91.9 11/18/2018    RDW 12.8 11/18/2018     11/18/2018       CMP:   Lab Results   Component Value Date     11/18/2018    K 4.7 11/18/2018    K 4.3 04/19/2018    CL 99 11/18/2018    CO2 28 11/18/2018    BUN 18 11/18/2018    CREATININE 0.6 11/18/2018    GFRAA >60 11/18/2018    GFRAA >60 12/19/2012    AGRATIO 1.2 11/18/2018    LABGLOM >60 11/18/2018    GLUCOSE 88 11/18/2018    GLUCOSE 131 07/05/2017    PROT 8.0 11/18/2018    PROT 6.3 07/05/2017    CALCIUM 10.4 11/18/2018    BILITOT <0.2 11/18/2018    ALKPHOS 118 11/18/2018    AST 18 11/18/2018    ALT 18 11/18/2018       POC Tests: No results for input(s): POCGLU, POCNA, POCK, POCCL, POCBUN, POCHEMO, POCHCT in the last 72 hours.     Coags:   Lab Results   Component Value Date    PROTIME 12.4 11/18/2018    INR 1.09 11/18/2018    APTT 47.2 11/18/2018       HCG (If Applicable): No results found for: PREGTESTUR, PREGSERUM, HCG, HCGQUANT     ABGs:   Lab Results   Component Value Date    PHART 7.329 01/11/2017    PO2ART 165 01/11/2017    HRE5NEZ 49 01/11/2017    LMK9RDZ 25.5 01/11/2017    BEART -1 01/11/2017    F0DUGIWY 99 01/11/2017        Type & Screen (If Applicable):  Lab Results   Component Value Date    LABABO O 08/24/2011    LABRH Positive 08/24/2011       Anesthesia Evaluation   history of anesthetic complications:   Airway: Mallampati: II  TM distance: >3 FB   Neck ROM: full  Mouth opening: > = 3 FB Dental:          Pulmonary:   (+) pneumonia:  shortness of breath:                             Cardiovascular:    (+) hypertension:, pacemaker:, CAD:, dysrhythmias:, CHF:, MONTEZ:,         Rhythm: regular  Rate: normal                    Neuro/Psych:               GI/Hepatic/Renal:   (+) liver disease:,           Endo/Other:                     Abdominal:           Vascular:                                 Conclusions      Summary   -Limited echo to evaluate ejection fraction.   -Left ventricular

## 2018-11-23 NOTE — PROGRESS NOTES
Patient getting fentanyl  IV per Dr. Roth Fall order. Placed on pulse oximeter.  \[id number]\
surgery. All body piercing jewelry must be removed. 11. If you have ___dentures, they will be removed before going to the OR; we will provide you a container. If you wear ___contact lenses or ___glasses, they will be removed; please bring a case for them. 12. Please see your family doctor/pediatrician for a history & physical and/or concerning medications. Bring any test results/reports from your physician's office. PCP__________________Phone___________H&P Appt. Date________             13 If you  have a Living Will and Durable Power of  for Healthcare, please bring in a copy. 15. Notify your Surgeon if you develop any illness between now and surgery  time, cough, cold, fever, sore throat, nausea, vomiting, etc.  Please notify your surgeon if you experience dizziness, shortness of breath or blurred vision between now & the time of your surgery             15. DO NOT shave your operative site 96 hours prior to surgery. For face & neck surgery, men may use an electric razor 48 hours prior to surgery. 16. Shower the night before surgery with ___Antibacterial soap ___Hibiclens             17. To provide excellent care visitors will be limited to one in the room at any given time. 18.  Please bring picture ID and insurance card. 19.  Visit our web site for additional information:  [email or web address]              20.During flu season no children under the age of 15 are permitted in the hospital for the safety of all patients. 21. If you take a long acting insulin in the evening only  take half of your usual  dose the night  before your procedure              22. If you use a c-pap please bring DOS if staying overnight,             23.For your convenience [facility] has a pharmacy on site to fill your prescriptions.              24. If you use oxygen and have a portable tank please bring it  with you the DOS

## 2018-11-25 PROCEDURE — 93297 REM INTERROG DEV EVAL ICPMS: CPT | Performed by: INTERNAL MEDICINE

## 2018-11-25 PROCEDURE — 93296 REM INTERROG EVL PM/IDS: CPT | Performed by: INTERNAL MEDICINE

## 2018-11-25 PROCEDURE — 93295 DEV INTERROG REMOTE 1/2/MLT: CPT | Performed by: INTERNAL MEDICINE

## 2018-11-27 ENCOUNTER — NURSE ONLY (OUTPATIENT)
Dept: CARDIOLOGY CLINIC | Age: 48
End: 2018-11-27
Payer: COMMERCIAL

## 2018-11-27 DIAGNOSIS — I50.21 ACUTE SYSTOLIC HEART FAILURE (HCC): Chronic | ICD-10-CM

## 2018-11-27 DIAGNOSIS — I42.9 CARDIOMYOPATHY, IDIOPATHIC (HCC): Chronic | ICD-10-CM

## 2018-11-27 DIAGNOSIS — Z95.810 ICD (IMPLANTABLE CARDIOVERTER-DEFIBRILLATOR) IN PLACE: ICD-10-CM

## 2018-12-03 ENCOUNTER — TELEPHONE (OUTPATIENT)
Dept: CARDIOLOGY CLINIC | Age: 48
End: 2018-12-03

## 2018-12-03 RX ORDER — LISINOPRIL 5 MG/1
TABLET ORAL
Qty: 180 TABLET | Refills: 1 | Status: SHIPPED | OUTPATIENT
Start: 2018-12-03 | End: 2018-12-21 | Stop reason: ALTCHOICE

## 2018-12-03 NOTE — TELEPHONE ENCOUNTER
Pt calling was supposed to call [facility] back awhile ago about his medication changes and has been sick, had some procedures, and other things going on and hasn't had time until now to get back to [facility]. Needs to talk to someone to check in with what's been going on.  Pls call to advise thank you

## 2018-12-04 ENCOUNTER — OFFICE VISIT (OUTPATIENT)
Dept: SURGERY | Age: 48
End: 2018-12-04
Payer: COMMERCIAL

## 2018-12-04 VITALS — WEIGHT: 193 LBS | BODY MASS INDEX: 27.69 KG/M2 | DIASTOLIC BLOOD PRESSURE: 72 MMHG | SYSTOLIC BLOOD PRESSURE: 110 MMHG

## 2018-12-04 DIAGNOSIS — K92.1 HEMATOCHEZIA: ICD-10-CM

## 2018-12-04 DIAGNOSIS — C20 RECTAL CANCER (HCC): Primary | ICD-10-CM

## 2018-12-04 PROCEDURE — 99213 OFFICE O/P EST LOW 20 MIN: CPT | Performed by: SURGERY

## 2018-12-17 ENCOUNTER — HOSPITAL ENCOUNTER (EMERGENCY)
Age: 48
Discharge: HOME | End: 2018-12-17
Attending: EMERGENCY MEDICINE
Payer: COMMERCIAL

## 2018-12-17 ENCOUNTER — APPOINTMENT (OUTPATIENT)
Dept: CT IMAGING | Age: 48
End: 2018-12-17
Payer: COMMERCIAL

## 2018-12-17 VITALS
HEART RATE: 79 BPM | HEIGHT: 70 IN | SYSTOLIC BLOOD PRESSURE: 116 MMHG | TEMPERATURE: 98 F | RESPIRATION RATE: 18 BRPM | BODY MASS INDEX: 27.2 KG/M2 | WEIGHT: 190 LBS | OXYGEN SATURATION: 98 % | DIASTOLIC BLOOD PRESSURE: 65 MMHG

## 2018-12-17 DIAGNOSIS — Z87.2 HX OF ABSCESS OF SKIN AND SUBCUTANEOUS TISSUE: Primary | ICD-10-CM

## 2018-12-17 LAB
A/G RATIO: 0.9 (ref 1.1–2.2)
ALBUMIN SERPL-MCNC: 3.7 G/DL (ref 3.4–5)
ALP BLD-CCNC: 110 U/L (ref 40–129)
ALT SERPL-CCNC: 26 U/L (ref 10–40)
ANION GAP SERPL CALCULATED.3IONS-SCNC: 12 MMOL/L (ref 3–16)
AST SERPL-CCNC: 28 U/L (ref 15–37)
BASOPHILS ABSOLUTE: 0 K/UL (ref 0–0.2)
BASOPHILS RELATIVE PERCENT: 0.6 %
BILIRUB SERPL-MCNC: <0.2 MG/DL (ref 0–1)
BUN BLDV-MCNC: 15 MG/DL (ref 7–20)
CALCIUM SERPL-MCNC: 9.5 MG/DL (ref 8.3–10.6)
CHLORIDE BLD-SCNC: 101 MMOL/L (ref 99–110)
CO2: 21 MMOL/L (ref 21–32)
CREAT SERPL-MCNC: 0.6 MG/DL (ref 0.9–1.3)
EOSINOPHILS ABSOLUTE: 0.2 K/UL (ref 0–0.6)
EOSINOPHILS RELATIVE PERCENT: 2.1 %
GFR AFRICAN AMERICAN: >60
GFR NON-AFRICAN AMERICAN: >60
GLOBULIN: 3.9 G/DL
GLUCOSE BLD-MCNC: 142 MG/DL (ref 70–99)
HCT VFR BLD CALC: 35.5 % (ref 40.5–52.5)
HEMOGLOBIN: 11.7 G/DL (ref 13.5–17.5)
LACTIC ACID: 1.7 MMOL/L (ref 0.4–2)
LYMPHOCYTES ABSOLUTE: 0.9 K/UL (ref 1–5.1)
LYMPHOCYTES RELATIVE PERCENT: 10.8 %
MCH RBC QN AUTO: 30.2 PG (ref 26–34)
MCHC RBC AUTO-ENTMCNC: 33 G/DL (ref 31–36)
MCV RBC AUTO: 91.7 FL (ref 80–100)
MONOCYTES ABSOLUTE: 1.1 K/UL (ref 0–1.3)
MONOCYTES RELATIVE PERCENT: 12.6 %
NEUTROPHILS ABSOLUTE: 6.4 K/UL (ref 1.7–7.7)
NEUTROPHILS RELATIVE PERCENT: 73.9 %
PDW BLD-RTO: 12.6 % (ref 12.4–15.4)
PLATELET # BLD: 226 K/UL (ref 135–450)
PMV BLD AUTO: 6.6 FL (ref 5–10.5)
POTASSIUM REFLEX MAGNESIUM: 4.5 MMOL/L (ref 3.5–5.1)
RBC # BLD: 3.87 M/UL (ref 4.2–5.9)
SODIUM BLD-SCNC: 134 MMOL/L (ref 136–145)
TOTAL PROTEIN: 7.6 G/DL (ref 6.4–8.2)
WBC # BLD: 8.6 K/UL (ref 4–11)

## 2018-12-17 PROCEDURE — 80053 COMPREHEN METABOLIC PANEL: CPT

## 2018-12-17 PROCEDURE — 96376 TX/PRO/DX INJ SAME DRUG ADON: CPT

## 2018-12-17 PROCEDURE — 96375 TX/PRO/DX INJ NEW DRUG ADDON: CPT

## 2018-12-17 PROCEDURE — 96361 HYDRATE IV INFUSION ADD-ON: CPT

## 2018-12-17 PROCEDURE — 74177 CT ABD & PELVIS W/CONTRAST: CPT

## 2018-12-17 PROCEDURE — 6360000002 HC RX W HCPCS: Performed by: PHYSICIAN ASSISTANT

## 2018-12-17 PROCEDURE — 85025 COMPLETE CBC W/AUTO DIFF WBC: CPT

## 2018-12-17 PROCEDURE — 6360000004 HC RX CONTRAST MEDICATION: Performed by: PHYSICIAN ASSISTANT

## 2018-12-17 PROCEDURE — 87040 BLOOD CULTURE FOR BACTERIA: CPT

## 2018-12-17 PROCEDURE — 83605 ASSAY OF LACTIC ACID: CPT

## 2018-12-17 PROCEDURE — 2580000003 HC RX 258: Performed by: PHYSICIAN ASSISTANT

## 2018-12-17 PROCEDURE — 99284 EMERGENCY DEPT VISIT MOD MDM: CPT

## 2018-12-17 PROCEDURE — 6360000002 HC RX W HCPCS: Performed by: EMERGENCY MEDICINE

## 2018-12-17 PROCEDURE — 96374 THER/PROPH/DIAG INJ IV PUSH: CPT

## 2018-12-17 RX ORDER — ONDANSETRON 2 MG/ML
4 INJECTION INTRAMUSCULAR; INTRAVENOUS ONCE
Status: COMPLETED | OUTPATIENT
Start: 2018-12-17 | End: 2018-12-17

## 2018-12-17 RX ORDER — 0.9 % SODIUM CHLORIDE 0.9 %
1000 INTRAVENOUS SOLUTION INTRAVENOUS ONCE
Status: COMPLETED | OUTPATIENT
Start: 2018-12-17 | End: 2018-12-17

## 2018-12-17 RX ADMIN — HYDROMORPHONE HYDROCHLORIDE 1 MG: 1 INJECTION, SOLUTION INTRAMUSCULAR; INTRAVENOUS; SUBCUTANEOUS at 15:28

## 2018-12-17 RX ADMIN — SODIUM CHLORIDE 1000 ML: 9 INJECTION, SOLUTION INTRAVENOUS at 12:03

## 2018-12-17 RX ADMIN — HYDROMORPHONE HYDROCHLORIDE 0.5 MG: 1 INJECTION, SOLUTION INTRAMUSCULAR; INTRAVENOUS; SUBCUTANEOUS at 12:03

## 2018-12-17 RX ADMIN — IOPAMIDOL 75 ML: 755 INJECTION, SOLUTION INTRAVENOUS at 13:18

## 2018-12-17 RX ADMIN — ONDANSETRON HYDROCHLORIDE 4 MG: 2 INJECTION, SOLUTION INTRAMUSCULAR; INTRAVENOUS at 12:03

## 2018-12-17 RX ADMIN — HYDROMORPHONE HYDROCHLORIDE 0.5 MG: 1 INJECTION, SOLUTION INTRAMUSCULAR; INTRAVENOUS; SUBCUTANEOUS at 13:05

## 2018-12-17 ASSESSMENT — PAIN SCALES - GENERAL
PAINLEVEL_OUTOF10: 7
PAINLEVEL_OUTOF10: 7
PAINLEVEL_OUTOF10: 8

## 2018-12-17 ASSESSMENT — PAIN DESCRIPTION - ORIENTATION: ORIENTATION: RIGHT

## 2018-12-17 ASSESSMENT — ENCOUNTER SYMPTOMS
COUGH: 0
VOMITING: 0
RECTAL PAIN: 0
ABDOMINAL PAIN: 1
RESPIRATORY NEGATIVE: 1
BACK PAIN: 0
NAUSEA: 0
COLOR CHANGE: 0
CONSTIPATION: 0
SHORTNESS OF BREATH: 0
DIARRHEA: 0

## 2018-12-17 ASSESSMENT — PAIN DESCRIPTION - PAIN TYPE: TYPE: CHRONIC PAIN

## 2018-12-17 ASSESSMENT — PAIN DESCRIPTION - PROGRESSION: CLINICAL_PROGRESSION: NOT CHANGED

## 2018-12-17 ASSESSMENT — PAIN DESCRIPTION - LOCATION
LOCATION: BUTTOCKS
LOCATION: BUTTOCKS;BACK

## 2018-12-17 NOTE — ED PROVIDER NOTES
 HERNIA REPAIR      HERNIA REPAIR  12/01/2017    peristomal hernia repair    HERNIA REPAIR  04/18/2018    S/P: colostomy closure with hernia repair with mesh, with Dr. Lucie Bartlett at [facility].  INNER EAR SURGERY  tube right ear    JOINT REPLACEMENT      left TKR    KNEE ARTHROSCOPY Right [id number]    KNEE ARTHROSCOPY Right 8/4/2014    medial meniscus    KNEE SURGERY  08/24/2011    removal of tibial component    OTHER SURGICAL HISTORY  7/1/13    ACL Rt knee meniscus repair synovectomy    OTHER SURGICAL HISTORY  12/01/2017    takedown of ileostomy    PACEMAKER PLACEMENT  10/26/2017    ICD placed right chest    REVISION COLOSTOMY  04/18/2018    S/P: colostomy closure with hernia repair with mesh, with Dr. Lucie Bartlett at [facility].     SHOULDER ARTHROSCOPY Right 9/22/15    SUBACROMIAL DECOMPRESSION, DISTAL CLAVICLE EXCISION, LABRAL DEBRIDEMETN    SIGMOIDOSCOPY N/A 11/23/2018    SIGMOIDOSCOPY BIOPSY FLEXIBLE performed by Yovana Mondragon MD at [address]  4 surgeries    SMALL INTESTINE SURGERY  01/11/2017    LAPAROSCOPIC LOW ANTERIOR RESECTION, ILEOSTOMY    SMALL INTESTINE SURGERY  12/01/2017    small bowel resection    TUNNELED VENOUS PORT PLACEMENT Left 02/13/2017    PORT-A-CATH INSERTION                     TUNNELED VENOUS PORT PLACEMENT      UMBILICAL HERNIA REPAIR  11/11/14    UPPER GASTROINTESTINAL ENDOSCOPY  10/03/2017         CURRENTMEDICATIONS       Discharge Medication List as of 12/17/2018  3:30 PM      CONTINUE these medications which have NOT CHANGED    Details   lisinopril (PRINIVIL;ZESTRIL) 5 MG tablet TAKE 1 TABLET BY MOUTH TWO TIMES A DAY, Disp-180 tablet, R-1Normal      digoxin (LANOXIN) 125 MCG tablet TAKE ONE TABLET BY MOUTH ONE TIME A DAY, Disp-90 tablet, R-0Normal      torsemide (DEMADEX) 20 MG tablet Take 1 tablet MWF, Disp-36 tablet, R-3Normal      spironolactone (ALDACTONE) 25 MG tablet TAKE ONE TABLET BY MOUTH ONE TIME A DAY, Lymphocytes # 0.9 (*)     All other components within normal limits    Narrative:     Performed at:  [facility]  [address] [facility], [address]   Phone [phone]   COMPREHENSIVE METABOLIC PANEL W/ REFLEX TO MG FOR LOW K - Abnormal; Notable for the following:     Sodium 134 (*)     Glucose 142 (*)     CREATININE 0.6 (*)     Albumin/Globulin Ratio 0.9 (*)     All other components within normal limits    Narrative:     Performed at:  [facility]  [address] [facility], [address]   Phone [phone]   CULTURE BLOOD #2   CULTURE BLOOD #1   LACTIC ACID, PLASMA    Narrative:     Performed at:  [facility]  [address] [facility], [address]   Phone [phone]       All other labs were within normal range or not returned as of this dictation. EKG: All EKG's are interpreted by the Emergency Department Physician who either signs orCo-signs this chart in the absence of a cardiologist.  Please see their note for interpretation of EKG. RADIOLOGY:   Non-plain film images such as CT, Ultrasound and MRI are read by the radiologist. Plain radiographic images are visualized andpreliminarily interpreted by the  ED Provider with the below findings:        Interpretation perthe Radiologist below, if available at the time of this note:    CT ABDOMEN PELVIS W IV CONTRAST Additional Contrast? None   Final Result   1. Stable appearing amorphous soft tissue with central gas in the presacral   region along posterior aspect of the colorectal resection and anastomosis. 2. Unchanged hernia through right rectus abdominus muscle at level of   umbilicus containing small portion of small bowel probably related to prior   colostomy. 3. No significant new findings.            Ct Abdomen Pelvis W Iv Contrast Additional Contrast? None    Result Date: 12/17/2018  EXAMINATION: CT OF THE ABDOMEN AND PELVIS WITH CONTRAST

## 2018-12-18 ENCOUNTER — OFFICE VISIT (OUTPATIENT)
Dept: SURGERY | Age: 48
End: 2018-12-18
Payer: COMMERCIAL

## 2018-12-18 VITALS — BODY MASS INDEX: 27.98 KG/M2 | WEIGHT: 195 LBS | SYSTOLIC BLOOD PRESSURE: 116 MMHG | DIASTOLIC BLOOD PRESSURE: 70 MMHG

## 2018-12-18 DIAGNOSIS — L03.317 CELLULITIS, GLUTEAL, RIGHT: Primary | ICD-10-CM

## 2018-12-18 PROCEDURE — 99213 OFFICE O/P EST LOW 20 MIN: CPT | Performed by: SURGERY

## 2018-12-18 RX ORDER — LEVOFLOXACIN 750 MG/1
750 TABLET ORAL DAILY
Qty: 14 TABLET | Refills: 0 | Status: SHIPPED | OUTPATIENT
Start: 2018-12-18 | End: 2018-12-27 | Stop reason: SDUPTHER

## 2018-12-18 NOTE — PROGRESS NOTES
abnormalities. Kidneys, adrenal glands and gallbladder unremarkable. GI/Bowel: There is limited evaluation due to absence of oral contrast.   Stomach grossly normal.  Small duodenal diverticulum in the 3rd part noted. Small bowel shows no obstruction or focal lesions. Bowel resection and   anastomotic suture line noted in the low rectal region with amorphous soft   tissue surrounding the anastomosis. Pelvis: Amorphous presacral soft tissue with central gas collection along   posterior aspect of anastomosis measuring about 4.3 x 6.2 X 9.2 cm not   significantly changed. Normal appendix. Under distended urinary bladder with   circumferential wall thickening probably due to the underdistention. Similar   finding on prior. Peritoneum/Retroperitoneum: No ascites. No significant lymphadenopathy. Bones/Soft Tissues: No acute abnormality of the bones. Unchanged hernia through right rectus abdominus muscle at level of umbilicus   containing small portion of small bowel probably related to prior colostomy. Impression   1. Stable appearing amorphous soft tissue with central gas in the presacral   region along posterior aspect of the colorectal resection and anastomosis. 2. Unchanged hernia through right rectus abdominus muscle at level of   umbilicus containing small portion of small bowel probably related to prior   colostomy. 3. No significant new findings.                ASSESSMENT AND PLAN:  Gluteal tenderness and nodularity / induration on the right, possible cellulitis or infection of the area  Will treat with with abx - Levaquin ordered  Do local care, ice, sitz baths  Constipated on CT - reviewed - pt to add laxative  See back in 2 weeks    Marianna Strauss

## 2018-12-19 ENCOUNTER — TELEPHONE (OUTPATIENT)
Dept: CARDIOLOGY CLINIC | Age: 48
End: 2018-12-19

## 2018-12-20 NOTE — PROGRESS NOTES
diazepam (VALIUM) 5 MG tablet Take 5 mg by mouth three times daily. Historical Provider, MD   Cetirizine HCl (ZYRTEC PO) Take 1 tablet by mouth daily. Take only as needed for scrachy eyes. Historical Provider, MD      Allergies:  Vancomycin; Bactrim [sulfamethoxazole-trimethoprim]; Corn-containing products; Eggs or egg-derived products; and Seasonal     Review of Systems:   · Constitutional: there has been no unanticipated weight loss. There's been no change in energy level, sleep pattern, or activity level. · Eyes: No visual changes or diplopia. No scleral icterus. · ENT: No Headaches, hearing loss or vertigo. No mouth sores or sore throat. · Cardiovascular: Reviewed in HPI  · Respiratory: No cough or wheezing, no sputum production. No hematemesis. · Gastrointestinal: + for constipation and abdominal pain  · Genitourinary: No dysuria, trouble voiding, or hematuria. · Musculoskeletal:  No gait disturbance, weakness or joint complaints. · Integumentary: No rash or pruritis. · Neurological: No headache, diplopia, change in muscle strength, numbness or tingling. No change in gait, balance, coordination, mood, affect, memory, mentation, behavior. · Psychiatric: No anxiety, no depression. · Endocrine: No malaise, fatigue or temperature intolerance. No excessive thirst, fluid intake, or urination. No tremor. · Hematologic/Lymphatic: No abnormal bruising or bleeding, blood clots or swollen lymph nodes. · Allergic/Immunologic: No nasal congestion or hives.     Physical Examination:    Vitals:    12/21/18 1323   BP: 126/64   Site: Left Upper Arm   Position: Sitting   Cuff Size: Medium Adult   Pulse: 93   Resp: 16   SpO2: 99%   Weight: 192 lb 12.8 oz (87.5 kg)   Height: 5' 10.5\" (1.791 m)      Constitutional and General Appearance: Warm and dry, no apparent distress, normal coloration  HEENT:  Normocephalic, atraumatic   Respiratory:  · Normal excursion and expansion without use of accessory muscles · Resp Auscultation: Normal breath sounds without dullness  Cardiovascular:  · The apical impulses not displaced  · Heart tones are crisp and normal  · JVP less than 8 cm H2O  · Regular rate and rhythm, normal S1S2, no m/g/r/c  · Peripheral pulses are symmetrical and full  · There is no clubbing, cyanosis of the extremities. · No edema. · Pedal Pulses: 2+ and equal .  Abdomen:  · No masses or tenderness. · Liver/Spleen: No Abnormalities Noted. Neurological/Psychiatric:  · Alert and oriented in all spheres  · Moves all extremities well  · Exhibits normal gait balance and coordination  · No abnormalities of mood, affect, memory, mentation, or behavior are noted    Lab Data:  CBC:   Lab Results   Component Value Date    WBC 8.6 12/17/2018    WBC 7.8 11/18/2018    WBC 10.6 11/13/2018    RBC 3.87 12/17/2018    RBC 3.82 11/18/2018    RBC 3.99 11/13/2018    RBC 3.74 07/05/2017    RBC 3.91 06/21/2017    RBC 3.95 06/07/2017    HGB 11.7 12/17/2018    HGB 11.9 11/18/2018    HGB 12.2 11/13/2018    HCT 35.5 12/17/2018    HCT 35.1 11/18/2018    HCT 36.8 11/13/2018    MCV 91.7 12/17/2018    MCV 91.9 11/18/2018    MCV 92.1 11/13/2018    RDW 12.6 12/17/2018    RDW 12.8 11/18/2018    RDW 13.0 11/13/2018     12/17/2018     11/18/2018     11/13/2018     BMP:   Lab Results   Component Value Date     12/17/2018     11/18/2018     11/13/2018    K 4.5 12/17/2018    K 4.7 11/18/2018    K 4.4 11/13/2018    K 5.0 10/23/2018    K 4.3 04/19/2018     12/17/2018    CL 99 11/18/2018    CL 96 11/13/2018    CO2 21 12/17/2018    CO2 28 11/18/2018    CO2 24 11/13/2018    PHOS 3.6 04/23/2018    BUN 15 12/17/2018    BUN 18 11/18/2018    BUN 22 11/13/2018    CREATININE 0.6 12/17/2018    CREATININE 0.6 11/18/2018    CREATININE 0.6 11/13/2018     BNP:   Lab Results   Component Value Date    PROBNP 3,256 09/19/2018    PROBNP 3,440 04/21/2018    PROBNP 5,029 03/05/2018     Assessment:    1.  Chronic systolic

## 2018-12-21 ENCOUNTER — OFFICE VISIT (OUTPATIENT)
Dept: CARDIOLOGY CLINIC | Age: 48
End: 2018-12-21
Payer: COMMERCIAL

## 2018-12-21 VITALS
BODY MASS INDEX: 26.99 KG/M2 | SYSTOLIC BLOOD PRESSURE: 126 MMHG | HEART RATE: 93 BPM | WEIGHT: 192.8 LBS | HEIGHT: 71 IN | DIASTOLIC BLOOD PRESSURE: 64 MMHG | OXYGEN SATURATION: 99 % | RESPIRATION RATE: 16 BRPM

## 2018-12-21 DIAGNOSIS — I10 ESSENTIAL HYPERTENSION: Chronic | ICD-10-CM

## 2018-12-21 DIAGNOSIS — I42.8 NICM (NONISCHEMIC CARDIOMYOPATHY) (HCC): Chronic | ICD-10-CM

## 2018-12-21 DIAGNOSIS — E78.2 MIXED HYPERLIPIDEMIA: Chronic | ICD-10-CM

## 2018-12-21 DIAGNOSIS — Z95.810 ICD (IMPLANTABLE CARDIOVERTER-DEFIBRILLATOR) IN PLACE: ICD-10-CM

## 2018-12-21 DIAGNOSIS — R06.02 SOB (SHORTNESS OF BREATH): Chronic | ICD-10-CM

## 2018-12-21 DIAGNOSIS — I50.22 CHRONIC SYSTOLIC HEART FAILURE (HCC): Primary | Chronic | ICD-10-CM

## 2018-12-21 PROCEDURE — 99214 OFFICE O/P EST MOD 30 MIN: CPT | Performed by: INTERNAL MEDICINE

## 2018-12-21 PROCEDURE — 93290 INTERROG DEV EVAL ICPMS IP: CPT | Performed by: INTERNAL MEDICINE

## 2018-12-21 RX ORDER — NORTRIPTYLINE HYDROCHLORIDE 10 MG/1
10 CAPSULE ORAL NIGHTLY
Qty: 30 CAPSULE | Refills: 3
Start: 2018-12-21 | End: 2019-01-01

## 2018-12-22 LAB — BLOOD CULTURE, ROUTINE: NORMAL

## 2018-12-26 ENCOUNTER — TELEPHONE (OUTPATIENT)
Dept: SURGERY | Age: 48
End: 2018-12-26

## 2018-12-26 NOTE — TELEPHONE ENCOUNTER
Talked to Dr. Moody Leger, wants to see the pt in the office tomorrow for possible I&D. Called pt and made appointment.

## 2018-12-27 ENCOUNTER — TELEPHONE (OUTPATIENT)
Dept: CARDIOLOGY CLINIC | Age: 48
End: 2018-12-27

## 2018-12-27 ENCOUNTER — OFFICE VISIT (OUTPATIENT)
Dept: SURGERY | Age: 48
End: 2018-12-27
Payer: COMMERCIAL

## 2018-12-27 VITALS — DIASTOLIC BLOOD PRESSURE: 46 MMHG | WEIGHT: 198 LBS | SYSTOLIC BLOOD PRESSURE: 82 MMHG | BODY MASS INDEX: 28.01 KG/M2

## 2018-12-27 DIAGNOSIS — L02.31 ABSCESS OF BUTTOCK, RIGHT: Primary | ICD-10-CM

## 2018-12-27 PROCEDURE — 10061 I&D ABSCESS COMP/MULTIPLE: CPT | Performed by: SURGERY

## 2018-12-27 PROCEDURE — 99213 OFFICE O/P EST LOW 20 MIN: CPT | Performed by: SURGERY

## 2018-12-27 RX ORDER — LEVOFLOXACIN 750 MG/1
750 TABLET ORAL DAILY
Qty: 14 TABLET | Refills: 0 | Status: SHIPPED | OUTPATIENT
Start: 2018-12-27 | End: 2019-01-10

## 2018-12-27 NOTE — TELEPHONE ENCOUNTER
Pt was seen in Dr Tr Vazquez' office today to mimi an abscess. Pt b/p was very low. Pt would like someone to call Katty at Dr Troy Fenton office [phone] to get reading on b/p. Needs to know if pt should stop Entresto. Please advise.

## 2018-12-27 NOTE — TELEPHONE ENCOUNTER
Spoke to pt and he states that he has not been taking the Lisinopril. Pt stated that he did hear the message from [address] [facility] [place] just wanted to clarify that he had the correct instructions. Pt has an OV with Dr. Catherine Romo in a few days. He will have his office check his BP. Pt has a wrist BP cuff at home as well and will check his BP occasionally.

## 2019-01-01 ENCOUNTER — APPOINTMENT (OUTPATIENT)
Dept: CT IMAGING | Age: 49
DRG: 441 | End: 2019-01-01
Payer: COMMERCIAL

## 2019-01-01 ENCOUNTER — OFFICE VISIT (OUTPATIENT)
Dept: CARDIOLOGY CLINIC | Age: 49
End: 2019-01-01
Payer: COMMERCIAL

## 2019-01-01 ENCOUNTER — ANESTHESIA EVENT (OUTPATIENT)
Dept: OPERATING ROOM | Age: 49
DRG: 329 | End: 2019-01-01
Payer: COMMERCIAL

## 2019-01-01 ENCOUNTER — APPOINTMENT (OUTPATIENT)
Dept: CT IMAGING | Age: 49
End: 2019-01-01
Payer: COMMERCIAL

## 2019-01-01 ENCOUNTER — TELEPHONE (OUTPATIENT)
Dept: PAIN MANAGEMENT | Age: 49
End: 2019-01-01

## 2019-01-01 ENCOUNTER — HOSPITAL ENCOUNTER (OUTPATIENT)
Age: 49
Discharge: HOME | End: 2019-08-02
Payer: COMMERCIAL

## 2019-01-01 ENCOUNTER — HOSPITAL ENCOUNTER (OUTPATIENT)
Age: 49
Setting detail: OUTPATIENT SURGERY
Discharge: HOME | End: 2019-05-14
Attending: INTERNAL MEDICINE | Admitting: INTERNAL MEDICINE
Payer: COMMERCIAL

## 2019-01-01 ENCOUNTER — TELEPHONE (OUTPATIENT)
Dept: CARDIOLOGY CLINIC | Age: 49
End: 2019-01-01

## 2019-01-01 ENCOUNTER — HOSPITAL ENCOUNTER (INPATIENT)
Age: 49
LOS: 6 days | Discharge: HOME | DRG: 292 | End: 2019-06-07
Attending: EMERGENCY MEDICINE | Admitting: INTERNAL MEDICINE
Payer: COMMERCIAL

## 2019-01-01 ENCOUNTER — OFFICE VISIT (OUTPATIENT)
Dept: SURGERY | Age: 49
End: 2019-01-01
Payer: COMMERCIAL

## 2019-01-01 ENCOUNTER — OFFICE VISIT (OUTPATIENT)
Dept: SURGERY | Age: 49
End: 2019-01-01

## 2019-01-01 ENCOUNTER — CARE COORDINATION (OUTPATIENT)
Dept: OTHER | Facility: CLINIC | Age: 49
End: 2019-01-01

## 2019-01-01 ENCOUNTER — HOSPITAL ENCOUNTER (OUTPATIENT)
Age: 49
Discharge: HOME | End: 2019-10-08
Payer: COMMERCIAL

## 2019-01-01 ENCOUNTER — CARE COORDINATION (OUTPATIENT)
Dept: CASE MANAGEMENT | Age: 49
End: 2019-01-01

## 2019-01-01 ENCOUNTER — APPOINTMENT (OUTPATIENT)
Dept: GENERAL RADIOLOGY | Age: 49
End: 2019-01-01
Payer: COMMERCIAL

## 2019-01-01 ENCOUNTER — OFFICE VISIT (OUTPATIENT)
Dept: PAIN MANAGEMENT | Age: 49
End: 2019-01-01
Payer: COMMERCIAL

## 2019-01-01 ENCOUNTER — HOSPITAL ENCOUNTER (OUTPATIENT)
Age: 49
Discharge: HOME | End: 2019-07-10
Payer: COMMERCIAL

## 2019-01-01 ENCOUNTER — TELEPHONE (OUTPATIENT)
Dept: SURGERY | Age: 49
End: 2019-01-01

## 2019-01-01 ENCOUNTER — APPOINTMENT (OUTPATIENT)
Dept: CT IMAGING | Age: 49
DRG: 292 | End: 2019-01-01
Payer: COMMERCIAL

## 2019-01-01 ENCOUNTER — APPOINTMENT (OUTPATIENT)
Dept: GENERAL RADIOLOGY | Age: 49
DRG: 193 | End: 2019-01-01
Payer: COMMERCIAL

## 2019-01-01 ENCOUNTER — HOSPITAL ENCOUNTER (EMERGENCY)
Age: 49
Discharge: HOME | End: 2019-05-06
Attending: EMERGENCY MEDICINE
Payer: COMMERCIAL

## 2019-01-01 ENCOUNTER — HOSPITAL ENCOUNTER (EMERGENCY)
Age: 49
Discharge: HOME | End: 2019-04-02
Attending: EMERGENCY MEDICINE
Payer: COMMERCIAL

## 2019-01-01 ENCOUNTER — APPOINTMENT (OUTPATIENT)
Dept: ULTRASOUND IMAGING | Age: 49
DRG: 441 | End: 2019-01-01
Payer: COMMERCIAL

## 2019-01-01 ENCOUNTER — APPOINTMENT (OUTPATIENT)
Dept: CT IMAGING | Age: 49
DRG: 193 | End: 2019-01-01
Payer: COMMERCIAL

## 2019-01-01 ENCOUNTER — NURSE ONLY (OUTPATIENT)
Dept: CARDIOLOGY CLINIC | Age: 49
End: 2019-01-01
Payer: COMMERCIAL

## 2019-01-01 ENCOUNTER — HOSPITAL ENCOUNTER (EMERGENCY)
Age: 49
Discharge: HOME | End: 2019-12-01
Attending: EMERGENCY MEDICINE
Payer: COMMERCIAL

## 2019-01-01 ENCOUNTER — APPOINTMENT (OUTPATIENT)
Dept: GENERAL RADIOLOGY | Age: 49
DRG: 441 | End: 2019-01-01
Payer: COMMERCIAL

## 2019-01-01 ENCOUNTER — TELEPHONE (OUTPATIENT)
Dept: OTHER | Facility: CLINIC | Age: 49
End: 2019-01-01

## 2019-01-01 ENCOUNTER — ANESTHESIA (OUTPATIENT)
Dept: ENDOSCOPY | Age: 49
End: 2019-01-01

## 2019-01-01 ENCOUNTER — APPOINTMENT (OUTPATIENT)
Dept: GENERAL RADIOLOGY | Age: 49
DRG: 291 | End: 2019-01-01
Payer: COMMERCIAL

## 2019-01-01 ENCOUNTER — APPOINTMENT (OUTPATIENT)
Dept: ULTRASOUND IMAGING | Age: 49
DRG: 291 | End: 2019-01-01
Payer: COMMERCIAL

## 2019-01-01 ENCOUNTER — HOSPITAL ENCOUNTER (INPATIENT)
Age: 49
LOS: 1 days | Discharge: HOME | DRG: 193 | End: 2019-11-08
Attending: EMERGENCY MEDICINE | Admitting: INTERNAL MEDICINE
Payer: COMMERCIAL

## 2019-01-01 ENCOUNTER — APPOINTMENT (OUTPATIENT)
Dept: GENERAL RADIOLOGY | Age: 49
DRG: 292 | End: 2019-01-01
Payer: COMMERCIAL

## 2019-01-01 ENCOUNTER — HOSPITAL ENCOUNTER (EMERGENCY)
Age: 49
Discharge: HOME | End: 2019-10-29
Payer: COMMERCIAL

## 2019-01-01 ENCOUNTER — APPOINTMENT (OUTPATIENT)
Dept: CT IMAGING | Age: 49
DRG: 291 | End: 2019-01-01
Payer: COMMERCIAL

## 2019-01-01 ENCOUNTER — HOSPITAL ENCOUNTER (INPATIENT)
Age: 49
LOS: 10 days | Discharge: HOME | DRG: 441 | End: 2019-12-18
Attending: EMERGENCY MEDICINE | Admitting: INTERNAL MEDICINE
Payer: COMMERCIAL

## 2019-01-01 ENCOUNTER — HOSPITAL ENCOUNTER (OUTPATIENT)
Age: 49
Setting detail: OBSERVATION
Discharge: HOME | End: 2019-04-11
Attending: EMERGENCY MEDICINE | Admitting: INTERNAL MEDICINE
Payer: COMMERCIAL

## 2019-01-01 ENCOUNTER — ANESTHESIA (OUTPATIENT)
Dept: ENDOSCOPY | Age: 49
End: 2019-01-01
Payer: COMMERCIAL

## 2019-01-01 ENCOUNTER — HOSPITAL ENCOUNTER (OUTPATIENT)
Dept: CT IMAGING | Age: 49
Discharge: HOME | End: 2019-10-29
Payer: COMMERCIAL

## 2019-01-01 ENCOUNTER — ANESTHESIA (OUTPATIENT)
Dept: OPERATING ROOM | Age: 49
DRG: 329 | End: 2019-01-01
Payer: COMMERCIAL

## 2019-01-01 ENCOUNTER — TELEPHONE (OUTPATIENT)
Dept: OTHER | Age: 49
End: 2019-01-01

## 2019-01-01 ENCOUNTER — HOSPITAL ENCOUNTER (INPATIENT)
Age: 49
LOS: 3 days | Discharge: HOME | DRG: 291 | End: 2019-12-23
Attending: EMERGENCY MEDICINE | Admitting: INTERNAL MEDICINE
Payer: COMMERCIAL

## 2019-01-01 ENCOUNTER — ANESTHESIA EVENT (OUTPATIENT)
Dept: ENDOSCOPY | Age: 49
End: 2019-01-01
Payer: COMMERCIAL

## 2019-01-01 ENCOUNTER — HOSPITAL ENCOUNTER (INPATIENT)
Age: 49
LOS: 10 days | Discharge: NURSING, REHAB OR LONG-TERM CARE | DRG: 291 | End: 2020-01-09
Attending: EMERGENCY MEDICINE | Admitting: INTERNAL MEDICINE
Payer: COMMERCIAL

## 2019-01-01 ENCOUNTER — ANESTHESIA EVENT (OUTPATIENT)
Dept: ENDOSCOPY | Age: 49
End: 2019-01-01

## 2019-01-01 ENCOUNTER — HOSPITAL ENCOUNTER (INPATIENT)
Age: 49
LOS: 3 days | Discharge: HOME | DRG: 329 | End: 2019-08-24
Attending: SURGERY | Admitting: SURGERY
Payer: COMMERCIAL

## 2019-01-01 VITALS
SYSTOLIC BLOOD PRESSURE: 110 MMHG | HEIGHT: 70 IN | DIASTOLIC BLOOD PRESSURE: 56 MMHG | OXYGEN SATURATION: 96 % | BODY MASS INDEX: 29.08 KG/M2 | WEIGHT: 203.12 LBS | HEART RATE: 94 BPM

## 2019-01-01 VITALS — DIASTOLIC BLOOD PRESSURE: 63 MMHG | WEIGHT: 179 LBS | BODY MASS INDEX: 25.68 KG/M2 | SYSTOLIC BLOOD PRESSURE: 109 MMHG

## 2019-01-01 VITALS
RESPIRATION RATE: 16 BRPM | BODY MASS INDEX: 27.87 KG/M2 | TEMPERATURE: 97.8 F | HEART RATE: 84 BPM | OXYGEN SATURATION: 97 % | HEIGHT: 70 IN | WEIGHT: 194.7 LBS | DIASTOLIC BLOOD PRESSURE: 64 MMHG | SYSTOLIC BLOOD PRESSURE: 98 MMHG

## 2019-01-01 VITALS
RESPIRATION RATE: 18 BRPM | HEART RATE: 95 BPM | WEIGHT: 187 LBS | HEIGHT: 70 IN | TEMPERATURE: 98.4 F | DIASTOLIC BLOOD PRESSURE: 79 MMHG | BODY MASS INDEX: 26.77 KG/M2 | SYSTOLIC BLOOD PRESSURE: 116 MMHG | OXYGEN SATURATION: 100 %

## 2019-01-01 VITALS
DIASTOLIC BLOOD PRESSURE: 56 MMHG | WEIGHT: 164.9 LBS | OXYGEN SATURATION: 96 % | SYSTOLIC BLOOD PRESSURE: 100 MMHG | RESPIRATION RATE: 16 BRPM | BODY MASS INDEX: 23.66 KG/M2 | HEART RATE: 84 BPM

## 2019-01-01 VITALS
SYSTOLIC BLOOD PRESSURE: 96 MMHG | WEIGHT: 166 LBS | HEART RATE: 76 BPM | BODY MASS INDEX: 23.82 KG/M2 | DIASTOLIC BLOOD PRESSURE: 62 MMHG

## 2019-01-01 VITALS — WEIGHT: 197 LBS | BODY MASS INDEX: 28.27 KG/M2 | DIASTOLIC BLOOD PRESSURE: 76 MMHG | SYSTOLIC BLOOD PRESSURE: 118 MMHG

## 2019-01-01 VITALS
DIASTOLIC BLOOD PRESSURE: 57 MMHG | SYSTOLIC BLOOD PRESSURE: 86 MMHG | BODY MASS INDEX: 24.39 KG/M2 | HEART RATE: 86 BPM | WEIGHT: 170 LBS

## 2019-01-01 VITALS
DIASTOLIC BLOOD PRESSURE: 61 MMHG | BODY MASS INDEX: 26.69 KG/M2 | WEIGHT: 186 LBS | SYSTOLIC BLOOD PRESSURE: 97 MMHG | HEART RATE: 92 BPM

## 2019-01-01 VITALS
HEIGHT: 70 IN | TEMPERATURE: 98.2 F | DIASTOLIC BLOOD PRESSURE: 79 MMHG | SYSTOLIC BLOOD PRESSURE: 104 MMHG | BODY MASS INDEX: 29.06 KG/M2 | WEIGHT: 203 LBS | HEART RATE: 96 BPM | RESPIRATION RATE: 16 BRPM | OXYGEN SATURATION: 96 %

## 2019-01-01 VITALS
BODY MASS INDEX: 26.34 KG/M2 | SYSTOLIC BLOOD PRESSURE: 100 MMHG | WEIGHT: 184 LBS | DIASTOLIC BLOOD PRESSURE: 60 MMHG | HEIGHT: 70 IN | HEART RATE: 62 BPM

## 2019-01-01 VITALS
WEIGHT: 196.2 LBS | SYSTOLIC BLOOD PRESSURE: 109 MMHG | TEMPERATURE: 98.5 F | HEIGHT: 70 IN | RESPIRATION RATE: 18 BRPM | BODY MASS INDEX: 28.09 KG/M2 | DIASTOLIC BLOOD PRESSURE: 69 MMHG | HEART RATE: 73 BPM | OXYGEN SATURATION: 95 %

## 2019-01-01 VITALS
OXYGEN SATURATION: 98 % | RESPIRATION RATE: 16 BRPM | TEMPERATURE: 97.9 F | SYSTOLIC BLOOD PRESSURE: 95 MMHG | HEART RATE: 85 BPM | DIASTOLIC BLOOD PRESSURE: 58 MMHG | WEIGHT: 158 LBS | BODY MASS INDEX: 22.62 KG/M2 | HEIGHT: 70 IN

## 2019-01-01 VITALS
SYSTOLIC BLOOD PRESSURE: 104 MMHG | BODY MASS INDEX: 26.79 KG/M2 | OXYGEN SATURATION: 99 % | WEIGHT: 186.7 LBS | DIASTOLIC BLOOD PRESSURE: 60 MMHG | HEART RATE: 62 BPM

## 2019-01-01 VITALS
DIASTOLIC BLOOD PRESSURE: 66 MMHG | RESPIRATION RATE: 15 BRPM | HEART RATE: 92 BPM | TEMPERATURE: 98.2 F | OXYGEN SATURATION: 96 % | SYSTOLIC BLOOD PRESSURE: 101 MMHG | WEIGHT: 173.06 LBS | HEIGHT: 70 IN | BODY MASS INDEX: 24.78 KG/M2

## 2019-01-01 VITALS
HEIGHT: 70 IN | HEART RATE: 91 BPM | SYSTOLIC BLOOD PRESSURE: 102 MMHG | BODY MASS INDEX: 25.77 KG/M2 | DIASTOLIC BLOOD PRESSURE: 68 MMHG | WEIGHT: 180 LBS

## 2019-01-01 VITALS
DIASTOLIC BLOOD PRESSURE: 62 MMHG | OXYGEN SATURATION: 99 % | HEART RATE: 70 BPM | RESPIRATION RATE: 16 BRPM | WEIGHT: 172 LBS | HEIGHT: 70 IN | SYSTOLIC BLOOD PRESSURE: 103 MMHG | BODY MASS INDEX: 24.62 KG/M2 | TEMPERATURE: 97 F

## 2019-01-01 VITALS
BODY MASS INDEX: 27.57 KG/M2 | RESPIRATION RATE: 16 BRPM | SYSTOLIC BLOOD PRESSURE: 114 MMHG | HEIGHT: 70 IN | DIASTOLIC BLOOD PRESSURE: 64 MMHG | OXYGEN SATURATION: 98 % | WEIGHT: 192.6 LBS | TEMPERATURE: 98.3 F | HEART RATE: 102 BPM

## 2019-01-01 VITALS
HEART RATE: 85 BPM | SYSTOLIC BLOOD PRESSURE: 95 MMHG | BODY MASS INDEX: 24.39 KG/M2 | DIASTOLIC BLOOD PRESSURE: 62 MMHG | WEIGHT: 170 LBS

## 2019-01-01 VITALS
OXYGEN SATURATION: 97 % | DIASTOLIC BLOOD PRESSURE: 67 MMHG | TEMPERATURE: 96.6 F | WEIGHT: 197.2 LBS | HEIGHT: 70 IN | SYSTOLIC BLOOD PRESSURE: 104 MMHG | BODY MASS INDEX: 28.23 KG/M2 | RESPIRATION RATE: 14 BRPM | HEART RATE: 99 BPM

## 2019-01-01 VITALS
HEIGHT: 70 IN | RESPIRATION RATE: 18 BRPM | WEIGHT: 184 LBS | BODY MASS INDEX: 26.34 KG/M2 | TEMPERATURE: 98.1 F | OXYGEN SATURATION: 94 % | HEART RATE: 64 BPM | DIASTOLIC BLOOD PRESSURE: 61 MMHG | SYSTOLIC BLOOD PRESSURE: 103 MMHG

## 2019-01-01 VITALS — WEIGHT: 197 LBS | BODY MASS INDEX: 28.27 KG/M2 | SYSTOLIC BLOOD PRESSURE: 118 MMHG | DIASTOLIC BLOOD PRESSURE: 80 MMHG

## 2019-01-01 VITALS
RESPIRATION RATE: 11 BRPM | DIASTOLIC BLOOD PRESSURE: 58 MMHG | OXYGEN SATURATION: 99 % | SYSTOLIC BLOOD PRESSURE: 96 MMHG | TEMPERATURE: 97.2 F

## 2019-01-01 VITALS — WEIGHT: 173 LBS | BODY MASS INDEX: 24.82 KG/M2 | DIASTOLIC BLOOD PRESSURE: 60 MMHG | SYSTOLIC BLOOD PRESSURE: 98 MMHG

## 2019-01-01 VITALS
DIASTOLIC BLOOD PRESSURE: 68 MMHG | WEIGHT: 177 LBS | BODY MASS INDEX: 25.4 KG/M2 | HEART RATE: 92 BPM | SYSTOLIC BLOOD PRESSURE: 106 MMHG

## 2019-01-01 VITALS
BODY MASS INDEX: 24.36 KG/M2 | WEIGHT: 170.12 LBS | OXYGEN SATURATION: 97 % | RESPIRATION RATE: 15 BRPM | HEIGHT: 70 IN | SYSTOLIC BLOOD PRESSURE: 98 MMHG | HEART RATE: 72 BPM | DIASTOLIC BLOOD PRESSURE: 56 MMHG

## 2019-01-01 VITALS — DIASTOLIC BLOOD PRESSURE: 76 MMHG | BODY MASS INDEX: 26.26 KG/M2 | SYSTOLIC BLOOD PRESSURE: 96 MMHG | WEIGHT: 183 LBS

## 2019-01-01 VITALS
OXYGEN SATURATION: 97 % | HEART RATE: 78 BPM | DIASTOLIC BLOOD PRESSURE: 58 MMHG | HEIGHT: 70 IN | RESPIRATION RATE: 16 BRPM | BODY MASS INDEX: 26.36 KG/M2 | WEIGHT: 184.12 LBS | SYSTOLIC BLOOD PRESSURE: 98 MMHG

## 2019-01-01 VITALS — DIASTOLIC BLOOD PRESSURE: 68 MMHG | BODY MASS INDEX: 24.54 KG/M2 | WEIGHT: 171 LBS | SYSTOLIC BLOOD PRESSURE: 110 MMHG

## 2019-01-01 VITALS — SYSTOLIC BLOOD PRESSURE: 118 MMHG | WEIGHT: 162 LBS | DIASTOLIC BLOOD PRESSURE: 66 MMHG | BODY MASS INDEX: 23.24 KG/M2

## 2019-01-01 VITALS
RESPIRATION RATE: 18 BRPM | DIASTOLIC BLOOD PRESSURE: 66 MMHG | BODY MASS INDEX: 25.98 KG/M2 | SYSTOLIC BLOOD PRESSURE: 100 MMHG | OXYGEN SATURATION: 99 % | HEART RATE: 80 BPM | WEIGHT: 181.5 LBS | TEMPERATURE: 98.6 F | HEIGHT: 70 IN

## 2019-01-01 VITALS
HEIGHT: 70 IN | WEIGHT: 170.5 LBS | OXYGEN SATURATION: 99 % | HEART RATE: 71 BPM | BODY MASS INDEX: 24.41 KG/M2 | SYSTOLIC BLOOD PRESSURE: 108 MMHG | DIASTOLIC BLOOD PRESSURE: 70 MMHG

## 2019-01-01 VITALS — WEIGHT: 176 LBS | BODY MASS INDEX: 25.25 KG/M2 | DIASTOLIC BLOOD PRESSURE: 56 MMHG | SYSTOLIC BLOOD PRESSURE: 90 MMHG

## 2019-01-01 VITALS — DIASTOLIC BLOOD PRESSURE: 61 MMHG | WEIGHT: 177 LBS | BODY MASS INDEX: 25.4 KG/M2 | SYSTOLIC BLOOD PRESSURE: 92 MMHG

## 2019-01-01 VITALS — DIASTOLIC BLOOD PRESSURE: 56 MMHG | SYSTOLIC BLOOD PRESSURE: 92 MMHG | OXYGEN SATURATION: 99 %

## 2019-01-01 DIAGNOSIS — I10 ESSENTIAL HYPERTENSION: ICD-10-CM

## 2019-01-01 DIAGNOSIS — Z95.810 ICD (IMPLANTABLE CARDIOVERTER-DEFIBRILLATOR) IN PLACE: ICD-10-CM

## 2019-01-01 DIAGNOSIS — I50.22 SYSTOLIC CHF, CHRONIC (HCC): Primary | Chronic | ICD-10-CM

## 2019-01-01 DIAGNOSIS — L02.31 ABSCESS OF BUTTOCK, RIGHT: Primary | ICD-10-CM

## 2019-01-01 DIAGNOSIS — M79.7 FIBROMYALGIA: ICD-10-CM

## 2019-01-01 DIAGNOSIS — G89.4 CHRONIC PAIN SYNDROME: ICD-10-CM

## 2019-01-01 DIAGNOSIS — F51.01 PRIMARY INSOMNIA: ICD-10-CM

## 2019-01-01 DIAGNOSIS — I50.22 CHRONIC SYSTOLIC HEART FAILURE (HCC): Primary | Chronic | ICD-10-CM

## 2019-01-01 DIAGNOSIS — K92.1 HEMATOCHEZIA: ICD-10-CM

## 2019-01-01 DIAGNOSIS — R60.9 EDEMA, UNSPECIFIED TYPE: ICD-10-CM

## 2019-01-01 DIAGNOSIS — K43.0 RECURRENT INCISIONAL HERNIA WITH INCARCERATION: ICD-10-CM

## 2019-01-01 DIAGNOSIS — T45.1X5S ADVERSE EFFECT OF CHEMOTHERAPY, SEQUELA: ICD-10-CM

## 2019-01-01 DIAGNOSIS — I42.9 CARDIOMYOPATHY, IDIOPATHIC (HCC): Chronic | ICD-10-CM

## 2019-01-01 DIAGNOSIS — I42.9 CARDIOMYOPATHY, IDIOPATHIC (HCC): ICD-10-CM

## 2019-01-01 DIAGNOSIS — I42.8 NICM (NONISCHEMIC CARDIOMYOPATHY) (HCC): Chronic | ICD-10-CM

## 2019-01-01 DIAGNOSIS — E87.1 HYPONATREMIA: ICD-10-CM

## 2019-01-01 DIAGNOSIS — I10 ESSENTIAL HYPERTENSION: Chronic | ICD-10-CM

## 2019-01-01 DIAGNOSIS — K94.09 COLOSTOMY HERNIA (HCC): Primary | ICD-10-CM

## 2019-01-01 DIAGNOSIS — E87.8 HYPOCHLOREMIA: ICD-10-CM

## 2019-01-01 DIAGNOSIS — I42.8 NICM (NONISCHEMIC CARDIOMYOPATHY) (HCC): ICD-10-CM

## 2019-01-01 DIAGNOSIS — I50.22 CHRONIC SYSTOLIC HEART FAILURE (HCC): Chronic | ICD-10-CM

## 2019-01-01 DIAGNOSIS — I50.22 SYSTOLIC CHF, CHRONIC (HCC): Chronic | ICD-10-CM

## 2019-01-01 DIAGNOSIS — K59.03 DRUG-INDUCED CONSTIPATION: Primary | ICD-10-CM

## 2019-01-01 DIAGNOSIS — T45.1X5A ADVERSE EFFECT OF CHEMOTHERAPY, INITIAL ENCOUNTER: ICD-10-CM

## 2019-01-01 DIAGNOSIS — R06.02 SOB (SHORTNESS OF BREATH): Chronic | ICD-10-CM

## 2019-01-01 DIAGNOSIS — I42.9 CARDIOMYOPATHY, IDIOPATHIC (HCC): Primary | ICD-10-CM

## 2019-01-01 DIAGNOSIS — Z48.89 ENCOUNTER FOR POSTOPERATIVE CARE: ICD-10-CM

## 2019-01-01 DIAGNOSIS — R06.02 SOB (SHORTNESS OF BREATH): ICD-10-CM

## 2019-01-01 DIAGNOSIS — Z93.3 COLOSTOMY STATUS (HCC): ICD-10-CM

## 2019-01-01 DIAGNOSIS — R10.84 GENERALIZED ABDOMINAL PAIN: ICD-10-CM

## 2019-01-01 DIAGNOSIS — Z93.3 COLOSTOMY IN PLACE (HCC): Primary | ICD-10-CM

## 2019-01-01 DIAGNOSIS — R07.9 CHEST PAIN, UNSPECIFIED TYPE: Primary | ICD-10-CM

## 2019-01-01 DIAGNOSIS — C20 RECTAL CANCER (HCC): Primary | ICD-10-CM

## 2019-01-01 DIAGNOSIS — R10.84 GENERALIZED ABDOMINAL PAIN: Primary | ICD-10-CM

## 2019-01-01 DIAGNOSIS — R10.30 LOWER ABDOMINAL PAIN: Primary | ICD-10-CM

## 2019-01-01 DIAGNOSIS — C18.9 MALIGNANT NEOPLASM OF COLON, UNSPECIFIED PART OF COLON (HCC): ICD-10-CM

## 2019-01-01 DIAGNOSIS — R11.2 NAUSEA AND VOMITING, INTRACTABILITY OF VOMITING NOT SPECIFIED, UNSPECIFIED VOMITING TYPE: Primary | ICD-10-CM

## 2019-01-01 DIAGNOSIS — I50.22 CHRONIC SYSTOLIC HEART FAILURE (HCC): Primary | ICD-10-CM

## 2019-01-01 DIAGNOSIS — F33.1 MODERATE EPISODE OF RECURRENT MAJOR DEPRESSIVE DISORDER (HCC): ICD-10-CM

## 2019-01-01 DIAGNOSIS — G89.29 OTHER CHRONIC PAIN: ICD-10-CM

## 2019-01-01 DIAGNOSIS — Z95.810 AICD (AUTOMATIC CARDIOVERTER/DEFIBRILLATOR) PRESENT: ICD-10-CM

## 2019-01-01 DIAGNOSIS — K43.2 INCISIONAL HERNIA, WITHOUT OBSTRUCTION OR GANGRENE: Primary | ICD-10-CM

## 2019-01-01 DIAGNOSIS — I50.23 ACUTE ON CHRONIC SYSTOLIC (CONGESTIVE) HEART FAILURE (HCC): ICD-10-CM

## 2019-01-01 DIAGNOSIS — Z01.810 PREOP CARDIOVASCULAR EXAM: ICD-10-CM

## 2019-01-01 DIAGNOSIS — C20 RECTAL CANCER (HCC): ICD-10-CM

## 2019-01-01 DIAGNOSIS — R10.9 ACUTE ABDOMINAL PAIN: ICD-10-CM

## 2019-01-01 DIAGNOSIS — R61 DIAPHORESIS: ICD-10-CM

## 2019-01-01 DIAGNOSIS — J67.9 PULMONARY ALVEOLITIS (HCC): Primary | ICD-10-CM

## 2019-01-01 DIAGNOSIS — J06.9 ACUTE UPPER RESPIRATORY INFECTION: ICD-10-CM

## 2019-01-01 DIAGNOSIS — R10.9 LEFT SIDED ABDOMINAL PAIN: ICD-10-CM

## 2019-01-01 DIAGNOSIS — I95.9 HYPOTENSION, UNSPECIFIED HYPOTENSION TYPE: ICD-10-CM

## 2019-01-01 DIAGNOSIS — K80.50 CHOLEDOCHOLITHIASIS: ICD-10-CM

## 2019-01-01 DIAGNOSIS — G89.4 CHRONIC PAIN SYNDROME: Primary | ICD-10-CM

## 2019-01-01 DIAGNOSIS — Z48.89 ENCOUNTER FOR POSTOPERATIVE CARE: Primary | ICD-10-CM

## 2019-01-01 DIAGNOSIS — K43.0 RECURRENT INCISIONAL HERNIA WITH INCARCERATION: Primary | ICD-10-CM

## 2019-01-01 DIAGNOSIS — R10.9 ABDOMINAL PAIN, UNSPECIFIED ABDOMINAL LOCATION: Primary | ICD-10-CM

## 2019-01-01 DIAGNOSIS — I50.22 CHRONIC SYSTOLIC HEART FAILURE (HCC): ICD-10-CM

## 2019-01-01 DIAGNOSIS — R10.9 ABDOMINAL PAIN, UNSPECIFIED ABDOMINAL LOCATION: ICD-10-CM

## 2019-01-01 DIAGNOSIS — Z85.038 HISTORY OF COLON CANCER: ICD-10-CM

## 2019-01-01 LAB
A/G RATIO: 1 (ref 1.1–2.2)
A/G RATIO: 1.1 (ref 1.1–2.2)
A/G RATIO: 1.2 (ref 1.1–2.2)
A/G RATIO: 1.3 (ref 1.1–2.2)
A/G RATIO: 1.4 (ref 1.1–2.2)
A/G RATIO: 1.4 (ref 1.1–2.2)
A/G RATIO: 1.5 (ref 1.1–2.2)
A/G RATIO: 2.5 (ref 1.1–2.2)
ABO/RH: NORMAL
ABO/RH: NORMAL
ACETAMINOPHEN LEVEL: <5 UG/ML (ref 10–30)
ALBUMIN SERPL-MCNC: 2.7 G/DL (ref 3.4–5)
ALBUMIN SERPL-MCNC: 2.8 G/DL (ref 3.4–5)
ALBUMIN SERPL-MCNC: 2.9 G/DL (ref 3.4–5)
ALBUMIN SERPL-MCNC: 3.1 G/DL (ref 3.4–5)
ALBUMIN SERPL-MCNC: 3.2 G/DL (ref 3.4–5)
ALBUMIN SERPL-MCNC: 3.3 G/DL (ref 3.4–5)
ALBUMIN SERPL-MCNC: 3.4 G/DL (ref 3.4–5)
ALBUMIN SERPL-MCNC: 3.5 G/DL (ref 3.4–5)
ALBUMIN SERPL-MCNC: 3.5 G/DL (ref 3.4–5)
ALBUMIN SERPL-MCNC: 3.6 G/DL (ref 3.4–5)
ALBUMIN SERPL-MCNC: 3.6 G/DL (ref 3.4–5)
ALBUMIN SERPL-MCNC: 3.7 G/DL (ref 3.4–5)
ALBUMIN SERPL-MCNC: 3.8 G/DL (ref 3.4–5)
ALBUMIN SERPL-MCNC: 3.8 G/DL (ref 3.4–5)
ALBUMIN SERPL-MCNC: 3.9 G/DL (ref 3.4–5)
ALBUMIN SERPL-MCNC: 4 G/DL (ref 3.4–5)
ALBUMIN SERPL-MCNC: 4.2 G/DL (ref 3.4–5)
ALBUMIN SERPL-MCNC: 4.3 G/DL (ref 3.4–5)
ALBUMIN SERPL-MCNC: 4.7 G/DL (ref 3.4–5)
ALBUMIN SERPL-MCNC: 5.2 G/DL (ref 3.4–5)
ALP BLD-CCNC: 119 U/L (ref 40–129)
ALP BLD-CCNC: 121 U/L (ref 40–129)
ALP BLD-CCNC: 121 U/L (ref 40–129)
ALP BLD-CCNC: 154 U/L (ref 40–129)
ALP BLD-CCNC: 157 U/L (ref 40–129)
ALP BLD-CCNC: 166 U/L (ref 40–129)
ALP BLD-CCNC: 166 U/L (ref 40–129)
ALP BLD-CCNC: 167 U/L (ref 40–129)
ALP BLD-CCNC: 167 U/L (ref 40–129)
ALP BLD-CCNC: 170 U/L (ref 40–129)
ALP BLD-CCNC: 171 U/L (ref 40–129)
ALP BLD-CCNC: 180 U/L (ref 40–129)
ALP BLD-CCNC: 187 U/L (ref 40–129)
ALP BLD-CCNC: 198 U/L (ref 40–129)
ALP BLD-CCNC: 199 U/L (ref 40–129)
ALP BLD-CCNC: 200 U/L (ref 40–129)
ALP BLD-CCNC: 205 U/L (ref 40–129)
ALP BLD-CCNC: 222 U/L (ref 40–129)
ALP BLD-CCNC: 76 U/L (ref 40–129)
ALP BLD-CCNC: 78 U/L (ref 40–129)
ALP BLD-CCNC: 79 U/L (ref 40–129)
ALP BLD-CCNC: 79 U/L (ref 40–129)
ALP BLD-CCNC: 88 U/L (ref 40–129)
ALP BLD-CCNC: 92 U/L (ref 40–129)
ALP BLD-CCNC: 93 U/L (ref 40–129)
ALPHA-1 ANTITRYPSIN PHENOTYPE: ABNORMAL
ALPHA-1 ANTITRYPSIN: 232 MG/DL (ref 90–200)
ALT SERPL-CCNC: 1073 U/L (ref 10–40)
ALT SERPL-CCNC: 11 U/L (ref 10–40)
ALT SERPL-CCNC: 11 U/L (ref 10–40)
ALT SERPL-CCNC: 1189 U/L (ref 10–40)
ALT SERPL-CCNC: 1339 U/L (ref 10–40)
ALT SERPL-CCNC: 1354 U/L (ref 10–40)
ALT SERPL-CCNC: 14 U/L (ref 10–40)
ALT SERPL-CCNC: 14 U/L (ref 10–40)
ALT SERPL-CCNC: 1413 U/L (ref 10–40)
ALT SERPL-CCNC: 15 U/L (ref 10–40)
ALT SERPL-CCNC: 15 U/L (ref 10–40)
ALT SERPL-CCNC: 168 U/L (ref 10–40)
ALT SERPL-CCNC: 177 U/L (ref 10–40)
ALT SERPL-CCNC: 23 U/L (ref 10–40)
ALT SERPL-CCNC: 251 U/L (ref 10–40)
ALT SERPL-CCNC: 291 U/L (ref 10–40)
ALT SERPL-CCNC: 348 U/L (ref 10–40)
ALT SERPL-CCNC: 442 U/L (ref 10–40)
ALT SERPL-CCNC: 534 U/L (ref 10–40)
ALT SERPL-CCNC: 681 U/L (ref 10–40)
ALT SERPL-CCNC: 729 U/L (ref 10–40)
ALT SERPL-CCNC: 838 U/L (ref 10–40)
ALT SERPL-CCNC: 857 U/L (ref 10–40)
ALT SERPL-CCNC: 881 U/L (ref 10–40)
ALT SERPL-CCNC: 9 U/L (ref 10–40)
ALT SERPL-CCNC: 9 U/L (ref 10–40)
ALT SERPL-CCNC: 917 U/L (ref 10–40)
AMMONIA: 21 UMOL/L (ref 16–60)
AMMONIA: 43 UMOL/L (ref 16–60)
AMMONIA: 53 UMOL/L (ref 16–60)
AMPHETAMINES SCREEN BLOOD: NEGATIVE NG/ML
AMYLASE: 34 U/L (ref 25–115)
AMYLASE: 38 U/L (ref 25–115)
AMYLASE: 40 U/L (ref 25–115)
AMYLASE: 44 U/L (ref 25–115)
ANION GAP SERPL CALCULATED.3IONS-SCNC: 10 MMOL/L (ref 3–16)
ANION GAP SERPL CALCULATED.3IONS-SCNC: 11 MMOL/L (ref 3–16)
ANION GAP SERPL CALCULATED.3IONS-SCNC: 12 MMOL/L (ref 3–16)
ANION GAP SERPL CALCULATED.3IONS-SCNC: 13 MMOL/L (ref 3–16)
ANION GAP SERPL CALCULATED.3IONS-SCNC: 14 MMOL/L (ref 3–16)
ANION GAP SERPL CALCULATED.3IONS-SCNC: 15 MMOL/L (ref 3–16)
ANION GAP SERPL CALCULATED.3IONS-SCNC: 15 MMOL/L (ref 3–16)
ANION GAP SERPL CALCULATED.3IONS-SCNC: 16 MMOL/L (ref 3–16)
ANION GAP SERPL CALCULATED.3IONS-SCNC: 16 MMOL/L (ref 3–16)
ANION GAP SERPL CALCULATED.3IONS-SCNC: 22 MMOL/L (ref 3–16)
ANION GAP SERPL CALCULATED.3IONS-SCNC: 25 MMOL/L (ref 3–16)
ANION GAP SERPL CALCULATED.3IONS-SCNC: 8 MMOL/L (ref 3–16)
ANION GAP SERPL CALCULATED.3IONS-SCNC: 9 MMOL/L (ref 3–16)
ANISOCYTOSIS: ABNORMAL
ANTI-NUCLEAR ANTIBODY (ANA): NEGATIVE
ANTIBODY SCREEN: NORMAL
ANTIBODY SCREEN: NORMAL
APTT: 33.6 SEC (ref 26–36)
APTT: 35.7 SEC (ref 26–36)
APTT: 40.8 SEC (ref 26–36)
APTT: 45.1 SEC (ref 24.2–36.2)
AST SERPL-CCNC: 103 U/L (ref 15–37)
AST SERPL-CCNC: 11 U/L (ref 15–37)
AST SERPL-CCNC: 13 U/L (ref 15–37)
AST SERPL-CCNC: 138 U/L (ref 15–37)
AST SERPL-CCNC: 14 U/L (ref 15–37)
AST SERPL-CCNC: 15 U/L (ref 15–37)
AST SERPL-CCNC: 15 U/L (ref 15–37)
AST SERPL-CCNC: 1508 U/L (ref 15–37)
AST SERPL-CCNC: 17 U/L (ref 15–37)
AST SERPL-CCNC: 18 U/L (ref 15–37)
AST SERPL-CCNC: 1842 U/L (ref 15–37)
AST SERPL-CCNC: 190 U/L (ref 15–37)
AST SERPL-CCNC: 20 U/L (ref 15–37)
AST SERPL-CCNC: 227 U/L (ref 15–37)
AST SERPL-CCNC: 227 U/L (ref 15–37)
AST SERPL-CCNC: 25 U/L (ref 15–37)
AST SERPL-CCNC: 257 U/L (ref 15–37)
AST SERPL-CCNC: 3159 U/L (ref 15–37)
AST SERPL-CCNC: 3650 U/L (ref 15–37)
AST SERPL-CCNC: 427 U/L (ref 15–37)
AST SERPL-CCNC: 464 U/L (ref 15–37)
AST SERPL-CCNC: 54 U/L (ref 15–37)
AST SERPL-CCNC: 574 U/L (ref 15–37)
AST SERPL-CCNC: 788 U/L (ref 15–37)
AST SERPL-CCNC: 797 U/L (ref 15–37)
AST SERPL-CCNC: 88 U/L (ref 15–37)
AST SERPL-CCNC: 92 U/L (ref 15–37)
BACTERIA: ABNORMAL /HPF
BANDED NEUTROPHILS RELATIVE PERCENT: 4 % (ref 0–7)
BARBITURATES SCREEN BLOOD: NEGATIVE NG/ML
BASE EXCESS ARTERIAL: 0.9 MMOL/L (ref -3–3)
BASE EXCESS ARTERIAL: 9.1 MMOL/L (ref -3–3)
BASOPHILS ABSOLUTE: 0 K/UL (ref 0–0.2)
BASOPHILS ABSOLUTE: 0.1 K/UL (ref 0–0.2)
BASOPHILS RELATIVE PERCENT: 0 %
BASOPHILS RELATIVE PERCENT: 0.1 %
BASOPHILS RELATIVE PERCENT: 0.1 %
BASOPHILS RELATIVE PERCENT: 0.3 %
BASOPHILS RELATIVE PERCENT: 0.4 %
BASOPHILS RELATIVE PERCENT: 0.5 %
BASOPHILS RELATIVE PERCENT: 0.6 %
BASOPHILS RELATIVE PERCENT: 0.7 %
BASOPHILS RELATIVE PERCENT: 0.8 %
BASOPHILS RELATIVE PERCENT: 1 %
BENZODIAZEPINES SCREEN BLOOD: POSITIVE NG/ML
BILIRUB SERPL-MCNC: 0.3 MG/DL (ref 0–1)
BILIRUB SERPL-MCNC: 0.4 MG/DL (ref 0–1)
BILIRUB SERPL-MCNC: 0.4 MG/DL (ref 0–1)
BILIRUB SERPL-MCNC: 0.6 MG/DL (ref 0–1)
BILIRUB SERPL-MCNC: 0.7 MG/DL (ref 0–1)
BILIRUB SERPL-MCNC: 0.8 MG/DL (ref 0–1)
BILIRUB SERPL-MCNC: 0.9 MG/DL (ref 0–1)
BILIRUB SERPL-MCNC: 1 MG/DL (ref 0–1)
BILIRUB SERPL-MCNC: 1 MG/DL (ref 0–1)
BILIRUB SERPL-MCNC: 1.2 MG/DL (ref 0–1)
BILIRUB SERPL-MCNC: 1.2 MG/DL (ref 0–1)
BILIRUB SERPL-MCNC: 1.3 MG/DL (ref 0–1)
BILIRUB SERPL-MCNC: 1.4 MG/DL (ref 0–1)
BILIRUB SERPL-MCNC: 1.5 MG/DL (ref 0–1)
BILIRUB SERPL-MCNC: 1.8 MG/DL (ref 0–1)
BILIRUB SERPL-MCNC: 2.3 MG/DL (ref 0–1)
BILIRUB SERPL-MCNC: 2.5 MG/DL (ref 0–1)
BILIRUB SERPL-MCNC: 3.1 MG/DL (ref 0–1)
BILIRUB SERPL-MCNC: <0.2 MG/DL (ref 0–1)
BILIRUB SERPL-MCNC: <0.2 MG/DL (ref 0–1)
BILIRUBIN DIRECT: 0.3 MG/DL (ref 0–0.3)
BILIRUBIN DIRECT: 0.3 MG/DL (ref 0–0.3)
BILIRUBIN DIRECT: 0.4 MG/DL (ref 0–0.3)
BILIRUBIN DIRECT: 0.5 MG/DL (ref 0–0.3)
BILIRUBIN DIRECT: 0.6 MG/DL (ref 0–0.3)
BILIRUBIN DIRECT: 0.6 MG/DL (ref 0–0.3)
BILIRUBIN DIRECT: 0.7 MG/DL (ref 0–0.3)
BILIRUBIN DIRECT: 0.8 MG/DL (ref 0–0.3)
BILIRUBIN DIRECT: <0.2 MG/DL (ref 0–0.3)
BILIRUBIN URINE: ABNORMAL
BILIRUBIN URINE: NEGATIVE
BILIRUBIN, INDIRECT: 0.3 MG/DL (ref 0–1)
BILIRUBIN, INDIRECT: 0.3 MG/DL (ref 0–1)
BILIRUBIN, INDIRECT: 0.4 MG/DL (ref 0–1)
BILIRUBIN, INDIRECT: 0.5 MG/DL (ref 0–1)
BILIRUBIN, INDIRECT: 0.6 MG/DL (ref 0–1)
BILIRUBIN, INDIRECT: 0.6 MG/DL (ref 0–1)
BILIRUBIN, INDIRECT: NORMAL MG/DL (ref 0–1)
BLOOD CULTURE, ROUTINE: NORMAL
BLOOD, URINE: ABNORMAL
BLOOD, URINE: NEGATIVE
BUN BLDV-MCNC: 11 MG/DL (ref 7–20)
BUN BLDV-MCNC: 11 MG/DL (ref 7–20)
BUN BLDV-MCNC: 12 MG/DL (ref 7–20)
BUN BLDV-MCNC: 12 MG/DL (ref 7–20)
BUN BLDV-MCNC: 14 MG/DL (ref 7–20)
BUN BLDV-MCNC: 15 MG/DL (ref 7–20)
BUN BLDV-MCNC: 15 MG/DL (ref 7–20)
BUN BLDV-MCNC: 16 MG/DL (ref 7–20)
BUN BLDV-MCNC: 16 MG/DL (ref 7–20)
BUN BLDV-MCNC: 18 MG/DL (ref 7–20)
BUN BLDV-MCNC: 19 MG/DL (ref 7–20)
BUN BLDV-MCNC: 20 MG/DL (ref 7–20)
BUN BLDV-MCNC: 20 MG/DL (ref 7–20)
BUN BLDV-MCNC: 22 MG/DL (ref 7–20)
BUN BLDV-MCNC: 23 MG/DL (ref 7–20)
BUN BLDV-MCNC: 24 MG/DL (ref 7–20)
BUN BLDV-MCNC: 25 MG/DL (ref 7–20)
BUN BLDV-MCNC: 25 MG/DL (ref 7–20)
BUN BLDV-MCNC: 26 MG/DL (ref 7–20)
BUN BLDV-MCNC: 26 MG/DL (ref 7–20)
BUN BLDV-MCNC: 28 MG/DL (ref 7–20)
BUN BLDV-MCNC: 28 MG/DL (ref 7–20)
BUN BLDV-MCNC: 30 MG/DL (ref 7–20)
BUN BLDV-MCNC: 45 MG/DL (ref 7–20)
BUN BLDV-MCNC: 46 MG/DL (ref 7–20)
BUN BLDV-MCNC: 5 MG/DL (ref 7–20)
BUN BLDV-MCNC: 6 MG/DL (ref 7–20)
BUN BLDV-MCNC: 7 MG/DL (ref 7–20)
BUN BLDV-MCNC: 9 MG/DL (ref 7–20)
BUPRENORPHINE: NEGATIVE NG/ML
C DIFF TOXIN/ANTIGEN: NORMAL
C. DIFFICILE TOXIN MOLECULAR: ABNORMAL
CALCIUM SERPL-MCNC: 10.1 MG/DL (ref 8.3–10.6)
CALCIUM SERPL-MCNC: 10.3 MG/DL (ref 8.3–10.6)
CALCIUM SERPL-MCNC: 7.8 MG/DL (ref 8.3–10.6)
CALCIUM SERPL-MCNC: 7.9 MG/DL (ref 8.3–10.6)
CALCIUM SERPL-MCNC: 7.9 MG/DL (ref 8.3–10.6)
CALCIUM SERPL-MCNC: 8 MG/DL (ref 8.3–10.6)
CALCIUM SERPL-MCNC: 8.1 MG/DL (ref 8.3–10.6)
CALCIUM SERPL-MCNC: 8.2 MG/DL (ref 8.3–10.6)
CALCIUM SERPL-MCNC: 8.3 MG/DL (ref 8.3–10.6)
CALCIUM SERPL-MCNC: 8.5 MG/DL (ref 8.3–10.6)
CALCIUM SERPL-MCNC: 8.6 MG/DL (ref 8.3–10.6)
CALCIUM SERPL-MCNC: 8.7 MG/DL (ref 8.3–10.6)
CALCIUM SERPL-MCNC: 8.7 MG/DL (ref 8.3–10.6)
CALCIUM SERPL-MCNC: 8.8 MG/DL (ref 8.3–10.6)
CALCIUM SERPL-MCNC: 9.1 MG/DL (ref 8.3–10.6)
CALCIUM SERPL-MCNC: 9.2 MG/DL (ref 8.3–10.6)
CALCIUM SERPL-MCNC: 9.2 MG/DL (ref 8.3–10.6)
CALCIUM SERPL-MCNC: 9.3 MG/DL (ref 8.3–10.6)
CALCIUM SERPL-MCNC: 9.4 MG/DL (ref 8.3–10.6)
CALCIUM SERPL-MCNC: 9.5 MG/DL (ref 8.3–10.6)
CALCIUM SERPL-MCNC: 9.6 MG/DL (ref 8.3–10.6)
CALCIUM SERPL-MCNC: 9.8 MG/DL (ref 8.3–10.6)
CALCIUM SERPL-MCNC: 9.8 MG/DL (ref 8.3–10.6)
CALCIUM SERPL-MCNC: 9.9 MG/DL (ref 8.3–10.6)
CANNABINOID SCREEN BLOOD: NEGATIVE NG/ML
CARBOXYHEMOGLOBIN ARTERIAL: 1.1 % (ref 0–1.5)
CARBOXYHEMOGLOBIN ARTERIAL: 1.1 % (ref 0–1.5)
CASTS 2: ABNORMAL /LPF
CASTS: ABNORMAL /LPF
CERULOPLASMIN: 53 MG/DL (ref 15–30)
CHLORIDE BLD-SCNC: 100 MMOL/L (ref 99–110)
CHLORIDE BLD-SCNC: 100 MMOL/L (ref 99–110)
CHLORIDE BLD-SCNC: 101 MMOL/L (ref 99–110)
CHLORIDE BLD-SCNC: 107 MMOL/L (ref 99–110)
CHLORIDE BLD-SCNC: 77 MMOL/L (ref 99–110)
CHLORIDE BLD-SCNC: 82 MMOL/L (ref 99–110)
CHLORIDE BLD-SCNC: 83 MMOL/L (ref 99–110)
CHLORIDE BLD-SCNC: 86 MMOL/L (ref 99–110)
CHLORIDE BLD-SCNC: 88 MMOL/L (ref 99–110)
CHLORIDE BLD-SCNC: 88 MMOL/L (ref 99–110)
CHLORIDE BLD-SCNC: 89 MMOL/L (ref 99–110)
CHLORIDE BLD-SCNC: 89 MMOL/L (ref 99–110)
CHLORIDE BLD-SCNC: 90 MMOL/L (ref 99–110)
CHLORIDE BLD-SCNC: 90 MMOL/L (ref 99–110)
CHLORIDE BLD-SCNC: 92 MMOL/L (ref 99–110)
CHLORIDE BLD-SCNC: 94 MMOL/L (ref 99–110)
CHLORIDE BLD-SCNC: 95 MMOL/L (ref 99–110)
CHLORIDE BLD-SCNC: 95 MMOL/L (ref 99–110)
CHLORIDE BLD-SCNC: 96 MMOL/L (ref 99–110)
CHLORIDE BLD-SCNC: 97 MMOL/L (ref 99–110)
CHLORIDE BLD-SCNC: 98 MMOL/L (ref 99–110)
CHLORIDE BLD-SCNC: 99 MMOL/L (ref 99–110)
CHLORIDE BLD-SCNC: 99 MMOL/L (ref 99–110)
CHOLESTEROL, TOTAL: 85 MG/DL (ref 0–199)
CLARITY: ABNORMAL
CLARITY: ABNORMAL
CLARITY: CLEAR
CMV DNA QNT PCR: <2.6 LOG CPY/ML
CMV DNA QUANTATATIVE INTERPRETATION: <2.4 LOG IU/ML
CMV DNA QUANTATATIVE INTERPRETATION: NOT DETECTED
CMV DNA QUANTITATIVE: <227 IU/ML
CMV SOURCE: NORMAL
CMVQ COPY/ML: <390 CPY/ML
CO2: 20 MMOL/L (ref 21–32)
CO2: 23 MMOL/L (ref 21–32)
CO2: 24 MMOL/L (ref 21–32)
CO2: 24 MMOL/L (ref 21–32)
CO2: 25 MMOL/L (ref 21–32)
CO2: 26 MMOL/L (ref 21–32)
CO2: 27 MMOL/L (ref 21–32)
CO2: 27 MMOL/L (ref 21–32)
CO2: 28 MMOL/L (ref 21–32)
CO2: 29 MMOL/L (ref 21–32)
CO2: 30 MMOL/L (ref 21–32)
CO2: 31 MMOL/L (ref 21–32)
CO2: 31 MMOL/L (ref 21–32)
CO2: 32 MMOL/L (ref 21–32)
CO2: 33 MMOL/L (ref 21–32)
CO2: 35 MMOL/L (ref 21–32)
CO2: 37 MMOL/L (ref 21–32)
COCAINE SCREEN BLOOD: NEGATIVE NG/ML
COLOR: ABNORMAL
COLOR: YELLOW
CREAT SERPL-MCNC: 0.5 MG/DL (ref 0.9–1.3)
CREAT SERPL-MCNC: 0.6 MG/DL (ref 0.9–1.3)
CREAT SERPL-MCNC: 0.7 MG/DL (ref 0.9–1.3)
CREAT SERPL-MCNC: 0.8 MG/DL (ref 0.9–1.3)
CREAT SERPL-MCNC: 0.9 MG/DL (ref 0.9–1.3)
CREAT SERPL-MCNC: 1 MG/DL (ref 0.9–1.3)
CREAT SERPL-MCNC: 1.1 MG/DL (ref 0.9–1.3)
CREAT SERPL-MCNC: 1.2 MG/DL (ref 0.9–1.3)
CREAT SERPL-MCNC: 1.4 MG/DL (ref 0.9–1.3)
CREAT SERPL-MCNC: 1.6 MG/DL (ref 0.9–1.3)
CREAT SERPL-MCNC: 1.9 MG/DL (ref 0.9–1.3)
CULTURE, BLOOD 2: NORMAL
CULTURE, RESPIRATORY: NORMAL
D DIMER: <200 NG/ML DDU (ref 0–229)
DIGOXIN LEVEL: <0.3 NG/ML (ref 0.8–2)
EKG ATRIAL RATE: 110 BPM
EKG ATRIAL RATE: 78 BPM
EKG ATRIAL RATE: 80 BPM
EKG ATRIAL RATE: 82 BPM
EKG ATRIAL RATE: 87 BPM
EKG ATRIAL RATE: 88 BPM
EKG ATRIAL RATE: 94 BPM
EKG ATRIAL RATE: 94 BPM
EKG ATRIAL RATE: 99 BPM
EKG DIAGNOSIS: NORMAL
EKG P AXIS: 12 DEGREES
EKG P AXIS: 21 DEGREES
EKG P AXIS: 32 DEGREES
EKG P AXIS: 42 DEGREES
EKG P AXIS: 45 DEGREES
EKG P AXIS: 61 DEGREES
EKG P AXIS: 63 DEGREES
EKG P AXIS: 66 DEGREES
EKG P AXIS: 75 DEGREES
EKG P-R INTERVAL: 176 MS
EKG P-R INTERVAL: 176 MS
EKG P-R INTERVAL: 180 MS
EKG P-R INTERVAL: 184 MS
EKG P-R INTERVAL: 186 MS
EKG P-R INTERVAL: 188 MS
EKG P-R INTERVAL: 200 MS
EKG P-R INTERVAL: 202 MS
EKG P-R INTERVAL: 220 MS
EKG Q-T INTERVAL: 342 MS
EKG Q-T INTERVAL: 370 MS
EKG Q-T INTERVAL: 374 MS
EKG Q-T INTERVAL: 384 MS
EKG Q-T INTERVAL: 386 MS
EKG Q-T INTERVAL: 390 MS
EKG Q-T INTERVAL: 390 MS
EKG Q-T INTERVAL: 406 MS
EKG Q-T INTERVAL: 434 MS
EKG QRS DURATION: 114 MS
EKG QRS DURATION: 114 MS
EKG QRS DURATION: 118 MS
EKG QRS DURATION: 118 MS
EKG QRS DURATION: 122 MS
EKG QRS DURATION: 124 MS
EKG QRS DURATION: 124 MS
EKG QRS DURATION: 128 MS
EKG QRS DURATION: 128 MS
EKG QTC CALCULATION (BAZETT): 432 MS
EKG QTC CALCULATION (BAZETT): 462 MS
EKG QTC CALCULATION (BAZETT): 464 MS
EKG QTC CALCULATION (BAZETT): 467 MS
EKG QTC CALCULATION (BAZETT): 468 MS
EKG QTC CALCULATION (BAZETT): 471 MS
EKG QTC CALCULATION (BAZETT): 480 MS
EKG QTC CALCULATION (BAZETT): 494 MS
EKG QTC CALCULATION (BAZETT): 500 MS
EKG R AXIS: -19 DEGREES
EKG R AXIS: -28 DEGREES
EKG R AXIS: -29 DEGREES
EKG R AXIS: -31 DEGREES
EKG R AXIS: -32 DEGREES
EKG R AXIS: -35 DEGREES
EKG R AXIS: -39 DEGREES
EKG R AXIS: -46 DEGREES
EKG R AXIS: -53 DEGREES
EKG T AXIS: 30 DEGREES
EKG T AXIS: 39 DEGREES
EKG T AXIS: 55 DEGREES
EKG T AXIS: 62 DEGREES
EKG T AXIS: 73 DEGREES
EKG T AXIS: 78 DEGREES
EKG T AXIS: 87 DEGREES
EKG T AXIS: 92 DEGREES
EKG T AXIS: 92 DEGREES
EKG VENTRICULAR RATE: 110 BPM
EKG VENTRICULAR RATE: 78 BPM
EKG VENTRICULAR RATE: 80 BPM
EKG VENTRICULAR RATE: 82 BPM
EKG VENTRICULAR RATE: 87 BPM
EKG VENTRICULAR RATE: 88 BPM
EKG VENTRICULAR RATE: 94 BPM
EKG VENTRICULAR RATE: 94 BPM
EKG VENTRICULAR RATE: 99 BPM
EOSINOPHILS ABSOLUTE: 0 K/UL (ref 0–0.6)
EOSINOPHILS ABSOLUTE: 0.1 K/UL (ref 0–0.6)
EOSINOPHILS ABSOLUTE: 0.2 K/UL (ref 0–0.6)
EOSINOPHILS ABSOLUTE: 0.3 K/UL (ref 0–0.6)
EOSINOPHILS RELATIVE PERCENT: 0 %
EOSINOPHILS RELATIVE PERCENT: 0.1 %
EOSINOPHILS RELATIVE PERCENT: 0.2 %
EOSINOPHILS RELATIVE PERCENT: 0.5 %
EOSINOPHILS RELATIVE PERCENT: 0.6 %
EOSINOPHILS RELATIVE PERCENT: 0.7 %
EOSINOPHILS RELATIVE PERCENT: 0.8 %
EOSINOPHILS RELATIVE PERCENT: 0.8 %
EOSINOPHILS RELATIVE PERCENT: 0.9 %
EOSINOPHILS RELATIVE PERCENT: 0.9 %
EOSINOPHILS RELATIVE PERCENT: 1 %
EOSINOPHILS RELATIVE PERCENT: 1 %
EOSINOPHILS RELATIVE PERCENT: 1.1 %
EOSINOPHILS RELATIVE PERCENT: 1.1 %
EOSINOPHILS RELATIVE PERCENT: 1.2 %
EOSINOPHILS RELATIVE PERCENT: 1.3 %
EOSINOPHILS RELATIVE PERCENT: 1.4 %
EOSINOPHILS RELATIVE PERCENT: 1.5 %
EOSINOPHILS RELATIVE PERCENT: 1.6 %
EOSINOPHILS RELATIVE PERCENT: 1.6 %
EOSINOPHILS RELATIVE PERCENT: 1.7 %
EOSINOPHILS RELATIVE PERCENT: 1.7 %
EOSINOPHILS RELATIVE PERCENT: 1.9 %
EOSINOPHILS RELATIVE PERCENT: 2 %
EOSINOPHILS RELATIVE PERCENT: 2.2 %
EOSINOPHILS RELATIVE PERCENT: 2.3 %
EOSINOPHILS RELATIVE PERCENT: 2.5 %
EOSINOPHILS RELATIVE PERCENT: 3 %
EOSINOPHILS RELATIVE PERCENT: 3.1 %
EOSINOPHILS RELATIVE PERCENT: 3.9 %
EPITHELIAL CELLS, UA: 0 /HPF (ref 0–5)
EPITHELIAL CELLS, UA: 1 /HPF (ref 0–5)
F-ACTIN AB IGG: 6 UNITS (ref 0–19)
GFR AFRICAN AMERICAN: 46
GFR AFRICAN AMERICAN: 56
GFR AFRICAN AMERICAN: >60
GFR NON-AFRICAN AMERICAN: 38
GFR NON-AFRICAN AMERICAN: 46
GFR NON-AFRICAN AMERICAN: 54
GFR NON-AFRICAN AMERICAN: >60
GLOBULIN: 2.1 G/DL
GLOBULIN: 2.3 G/DL
GLOBULIN: 2.5 G/DL
GLOBULIN: 2.6 G/DL
GLOBULIN: 2.6 G/DL
GLOBULIN: 2.7 G/DL
GLOBULIN: 2.7 G/DL
GLOBULIN: 2.8 G/DL
GLOBULIN: 2.8 G/DL
GLOBULIN: 2.9 G/DL
GLOBULIN: 3 G/DL
GLOBULIN: 3 G/DL
GLOBULIN: 3.1 G/DL
GLOBULIN: 3.2 G/DL
GLUCOSE BLD-MCNC: 102 MG/DL (ref 70–99)
GLUCOSE BLD-MCNC: 105 MG/DL (ref 70–99)
GLUCOSE BLD-MCNC: 107 MG/DL (ref 70–99)
GLUCOSE BLD-MCNC: 107 MG/DL (ref 70–99)
GLUCOSE BLD-MCNC: 109 MG/DL (ref 70–99)
GLUCOSE BLD-MCNC: 111 MG/DL (ref 70–99)
GLUCOSE BLD-MCNC: 111 MG/DL (ref 70–99)
GLUCOSE BLD-MCNC: 113 MG/DL (ref 70–99)
GLUCOSE BLD-MCNC: 113 MG/DL (ref 70–99)
GLUCOSE BLD-MCNC: 115 MG/DL (ref 70–99)
GLUCOSE BLD-MCNC: 117 MG/DL (ref 70–99)
GLUCOSE BLD-MCNC: 125 MG/DL (ref 70–99)
GLUCOSE BLD-MCNC: 131 MG/DL (ref 70–99)
GLUCOSE BLD-MCNC: 132 MG/DL (ref 70–99)
GLUCOSE BLD-MCNC: 132 MG/DL (ref 70–99)
GLUCOSE BLD-MCNC: 144 MG/DL (ref 70–99)
GLUCOSE BLD-MCNC: 146 MG/DL (ref 70–99)
GLUCOSE BLD-MCNC: 154 MG/DL (ref 70–99)
GLUCOSE BLD-MCNC: 161 MG/DL (ref 70–99)
GLUCOSE BLD-MCNC: 231 MG/DL (ref 70–99)
GLUCOSE BLD-MCNC: 78 MG/DL (ref 70–99)
GLUCOSE BLD-MCNC: 79 MG/DL (ref 70–99)
GLUCOSE BLD-MCNC: 82 MG/DL (ref 70–99)
GLUCOSE BLD-MCNC: 82 MG/DL (ref 70–99)
GLUCOSE BLD-MCNC: 83 MG/DL (ref 70–99)
GLUCOSE BLD-MCNC: 84 MG/DL (ref 70–99)
GLUCOSE BLD-MCNC: 86 MG/DL (ref 70–99)
GLUCOSE BLD-MCNC: 87 MG/DL (ref 70–99)
GLUCOSE BLD-MCNC: 88 MG/DL (ref 70–99)
GLUCOSE BLD-MCNC: 89 MG/DL (ref 70–99)
GLUCOSE BLD-MCNC: 90 MG/DL (ref 70–99)
GLUCOSE BLD-MCNC: 90 MG/DL (ref 70–99)
GLUCOSE BLD-MCNC: 91 MG/DL (ref 70–99)
GLUCOSE BLD-MCNC: 91 MG/DL (ref 70–99)
GLUCOSE BLD-MCNC: 93 MG/DL (ref 70–99)
GLUCOSE BLD-MCNC: 94 MG/DL (ref 70–99)
GLUCOSE BLD-MCNC: 97 MG/DL (ref 70–99)
GLUCOSE BLD-MCNC: 97 MG/DL (ref 70–99)
GLUCOSE BLD-MCNC: 98 MG/DL (ref 70–99)
GLUCOSE BLD-MCNC: 99 MG/DL (ref 70–99)
GLUCOSE URINE: NEGATIVE MG/DL
GRAM STAIN RESULT: NORMAL
HAV IGM SER IA-ACNC: NORMAL
HAV IGM SER IA-ACNC: NORMAL
HCO3 ARTERIAL: 24.2 MMOL/L (ref 21–29)
HCO3 ARTERIAL: 32.6 MMOL/L (ref 21–29)
HCT VFR BLD CALC: 34.2 % (ref 40.5–52.5)
HCT VFR BLD CALC: 35.5 % (ref 40.5–52.5)
HCT VFR BLD CALC: 35.7 % (ref 40.5–52.5)
HCT VFR BLD CALC: 35.8 % (ref 40.5–52.5)
HCT VFR BLD CALC: 36.1 % (ref 40.5–52.5)
HCT VFR BLD CALC: 36.8 % (ref 40.5–52.5)
HCT VFR BLD CALC: 37 % (ref 40.5–52.5)
HCT VFR BLD CALC: 37.1 % (ref 40.5–52.5)
HCT VFR BLD CALC: 37.2 % (ref 40.5–52.5)
HCT VFR BLD CALC: 37.3 % (ref 40.5–52.5)
HCT VFR BLD CALC: 37.6 % (ref 40.5–52.5)
HCT VFR BLD CALC: 37.7 % (ref 40.5–52.5)
HCT VFR BLD CALC: 38 % (ref 40.5–52.5)
HCT VFR BLD CALC: 39.6 % (ref 40.5–52.5)
HCT VFR BLD CALC: 39.6 % (ref 40.5–52.5)
HCT VFR BLD CALC: 39.8 % (ref 40.5–52.5)
HCT VFR BLD CALC: 40 % (ref 40.5–52.5)
HCT VFR BLD CALC: 40 % (ref 40.5–52.5)
HCT VFR BLD CALC: 40.1 % (ref 40.5–52.5)
HCT VFR BLD CALC: 40.3 % (ref 40.5–52.5)
HCT VFR BLD CALC: 40.3 % (ref 40.5–52.5)
HCT VFR BLD CALC: 40.5 % (ref 40.5–52.5)
HCT VFR BLD CALC: 40.5 % (ref 40.5–52.5)
HCT VFR BLD CALC: 40.7 % (ref 40.5–52.5)
HCT VFR BLD CALC: 40.7 % (ref 40.5–52.5)
HCT VFR BLD CALC: 41.1 % (ref 40.5–52.5)
HCT VFR BLD CALC: 41.2 % (ref 40.5–52.5)
HCT VFR BLD CALC: 41.4 % (ref 40.5–52.5)
HCT VFR BLD CALC: 41.9 % (ref 40.5–52.5)
HCT VFR BLD CALC: 42 % (ref 40.5–52.5)
HCT VFR BLD CALC: 42.2 % (ref 40.5–52.5)
HCT VFR BLD CALC: 42.8 % (ref 40.5–52.5)
HCT VFR BLD CALC: 42.8 % (ref 40.5–52.5)
HCT VFR BLD CALC: 43.4 % (ref 40.5–52.5)
HCT VFR BLD CALC: 45.2 % (ref 40.5–52.5)
HCT VFR BLD CALC: 46.9 % (ref 40.5–52.5)
HDLC SERPL-MCNC: 30 MG/DL (ref 40–60)
HEMOGLOBIN, ART, EXTENDED: 14.5 G/DL (ref 13.5–17.5)
HEMOGLOBIN, ART, EXTENDED: 15.1 G/DL (ref 13.5–17.5)
HEMOGLOBIN: 11.7 G/DL (ref 13.5–17.5)
HEMOGLOBIN: 12.2 G/DL (ref 13.5–17.5)
HEMOGLOBIN: 12.2 G/DL (ref 13.5–17.5)
HEMOGLOBIN: 12.3 G/DL (ref 13.5–17.5)
HEMOGLOBIN: 12.3 G/DL (ref 13.5–17.5)
HEMOGLOBIN: 12.6 G/DL (ref 13.5–17.5)
HEMOGLOBIN: 12.6 G/DL (ref 13.5–17.5)
HEMOGLOBIN: 12.7 G/DL (ref 13.5–17.5)
HEMOGLOBIN: 12.7 G/DL (ref 13.5–17.5)
HEMOGLOBIN: 12.8 G/DL (ref 13.5–17.5)
HEMOGLOBIN: 12.9 G/DL (ref 13.5–17.5)
HEMOGLOBIN: 12.9 G/DL (ref 13.5–17.5)
HEMOGLOBIN: 13 G/DL (ref 13.5–17.5)
HEMOGLOBIN: 13.2 G/DL (ref 13.5–17.5)
HEMOGLOBIN: 13.3 G/DL (ref 13.5–17.5)
HEMOGLOBIN: 13.4 G/DL (ref 13.5–17.5)
HEMOGLOBIN: 13.4 G/DL (ref 13.5–17.5)
HEMOGLOBIN: 13.5 G/DL (ref 13.5–17.5)
HEMOGLOBIN: 13.6 G/DL (ref 13.5–17.5)
HEMOGLOBIN: 13.7 G/DL (ref 13.5–17.5)
HEMOGLOBIN: 13.9 G/DL (ref 13.5–17.5)
HEMOGLOBIN: 13.9 G/DL (ref 13.5–17.5)
HEMOGLOBIN: 14 G/DL (ref 13.5–17.5)
HEMOGLOBIN: 14.1 G/DL (ref 13.5–17.5)
HEMOGLOBIN: 14.2 G/DL (ref 13.5–17.5)
HEMOGLOBIN: 14.2 G/DL (ref 13.5–17.5)
HEMOGLOBIN: 14.5 G/DL (ref 13.5–17.5)
HEMOGLOBIN: 14.6 G/DL (ref 13.5–17.5)
HEMOGLOBIN: 15.4 G/DL (ref 13.5–17.5)
HEPATITIS B CORE IGM ANTIBODY: NORMAL
HEPATITIS B CORE IGM ANTIBODY: NORMAL
HEPATITIS B SURFACE ANTIGEN INTERPRETATION: NORMAL
HEPATITIS B SURFACE ANTIGEN INTERPRETATION: NORMAL
HEPATITIS C ANTIBODY INTERPRETATION: NORMAL
HEPATITIS C ANTIBODY INTERPRETATION: NORMAL
HERPES SIMPLEX VIRUS BY PCR: NOT DETECTED
HSV SOURCE: NORMAL
INR BLD: 1.1 (ref 0.86–1.14)
INR BLD: 1.13 (ref 0.86–1.14)
INR BLD: 1.32 (ref 0.86–1.14)
INR BLD: 1.35 (ref 0.86–1.14)
INR BLD: 1.48 (ref 0.86–1.14)
INR BLD: 1.48 (ref 0.86–1.14)
INR BLD: 1.5 (ref 0.86–1.14)
INR BLD: 1.57 (ref 0.86–1.14)
INR BLD: 1.64 (ref 0.86–1.14)
INR BLD: 1.69 (ref 0.86–1.14)
INR BLD: 1.78 (ref 0.86–1.14)
INR BLD: 1.86 (ref 0.86–1.14)
INR BLD: 2.14 (ref 0.86–1.14)
INR BLD: 2.15 (ref 0.86–1.14)
INR BLD: 3.49 (ref 0.86–1.14)
INR BLD: 4.24 (ref 0.86–1.14)
INR BLD: 4.75 (ref 0.86–1.14)
KETONES, URINE: ABNORMAL MG/DL
KETONES, URINE: NEGATIVE MG/DL
LACTIC ACID, SEPSIS: 0.7 MMOL/L (ref 0.4–1.9)
LACTIC ACID, SEPSIS: 1.7 MMOL/L (ref 0.4–1.9)
LACTIC ACID, SEPSIS: 12.7 MMOL/L (ref 0.4–1.9)
LACTIC ACID, SEPSIS: 2.8 MMOL/L (ref 0.4–1.9)
LACTIC ACID: 0.7 MMOL/L (ref 0.4–2)
LACTIC ACID: 1.5 MMOL/L (ref 0.4–2)
LACTIC ACID: 1.7 MMOL/L (ref 0.4–2)
LACTIC ACID: 1.9 MMOL/L (ref 0.4–2)
LACTIC ACID: 12 MMOL/L (ref 0.4–2)
LACTIC ACID: 2.2 MMOL/L (ref 0.4–2)
LACTIC ACID: 3.6 MMOL/L (ref 0.4–2)
LACTIC ACID: 9.1 MMOL/L (ref 0.4–2)
LDL CHOLESTEROL CALCULATED: 42 MG/DL
LEFT VENTRICULAR EJECTION FRACTION HIGH VALUE: 35 %
LEFT VENTRICULAR EJECTION FRACTION MODE: NORMAL
LEUKOCYTE ESTERASE, URINE: NEGATIVE
LIPASE: 10 U/L (ref 13–60)
LIPASE: 11 U/L (ref 13–60)
LIPASE: 11 U/L (ref 13–60)
LIPASE: 13 U/L (ref 13–60)
LIPASE: 13 U/L (ref 13–60)
LIPASE: 19 U/L (ref 13–60)
LIPASE: 24 U/L (ref 13–60)
LIPASE: 27 U/L (ref 13–60)
LIPASE: 30 U/L (ref 13–60)
LIPASE: 8 U/L (ref 13–60)
LV EF: 20 %
LV EF: 30 %
LV EF: 33 %
LVEF MODALITY: NORMAL
LVEF MODALITY: NORMAL
LYMPHOCYTES ABSOLUTE: 0.3 K/UL (ref 1–5.1)
LYMPHOCYTES ABSOLUTE: 0.3 K/UL (ref 1–5.1)
LYMPHOCYTES ABSOLUTE: 0.5 K/UL (ref 1–5.1)
LYMPHOCYTES ABSOLUTE: 0.5 K/UL (ref 1–5.1)
LYMPHOCYTES ABSOLUTE: 0.6 K/UL (ref 1–5.1)
LYMPHOCYTES ABSOLUTE: 0.7 K/UL (ref 1–5.1)
LYMPHOCYTES ABSOLUTE: 0.8 K/UL (ref 1–5.1)
LYMPHOCYTES ABSOLUTE: 0.9 K/UL (ref 1–5.1)
LYMPHOCYTES ABSOLUTE: 1 K/UL (ref 1–5.1)
LYMPHOCYTES ABSOLUTE: 1 K/UL (ref 1–5.1)
LYMPHOCYTES ABSOLUTE: 1.1 K/UL (ref 1–5.1)
LYMPHOCYTES ABSOLUTE: 1.1 K/UL (ref 1–5.1)
LYMPHOCYTES ABSOLUTE: 1.2 K/UL (ref 1–5.1)
LYMPHOCYTES ABSOLUTE: 1.2 K/UL (ref 1–5.1)
LYMPHOCYTES ABSOLUTE: 2.3 K/UL (ref 1–5.1)
LYMPHOCYTES RELATIVE PERCENT: 10.1 %
LYMPHOCYTES RELATIVE PERCENT: 11.3 %
LYMPHOCYTES RELATIVE PERCENT: 11.4 %
LYMPHOCYTES RELATIVE PERCENT: 11.5 %
LYMPHOCYTES RELATIVE PERCENT: 12.3 %
LYMPHOCYTES RELATIVE PERCENT: 12.5 %
LYMPHOCYTES RELATIVE PERCENT: 12.8 %
LYMPHOCYTES RELATIVE PERCENT: 13.5 %
LYMPHOCYTES RELATIVE PERCENT: 13.6 %
LYMPHOCYTES RELATIVE PERCENT: 13.9 %
LYMPHOCYTES RELATIVE PERCENT: 14.2 %
LYMPHOCYTES RELATIVE PERCENT: 15.5 %
LYMPHOCYTES RELATIVE PERCENT: 15.7 %
LYMPHOCYTES RELATIVE PERCENT: 18.3 %
LYMPHOCYTES RELATIVE PERCENT: 2.3 %
LYMPHOCYTES RELATIVE PERCENT: 21 %
LYMPHOCYTES RELATIVE PERCENT: 3.2 %
LYMPHOCYTES RELATIVE PERCENT: 3.5 %
LYMPHOCYTES RELATIVE PERCENT: 4.2 %
LYMPHOCYTES RELATIVE PERCENT: 6.5 %
LYMPHOCYTES RELATIVE PERCENT: 7 %
LYMPHOCYTES RELATIVE PERCENT: 7.6 %
LYMPHOCYTES RELATIVE PERCENT: 7.9 %
LYMPHOCYTES RELATIVE PERCENT: 8 %
LYMPHOCYTES RELATIVE PERCENT: 8 %
LYMPHOCYTES RELATIVE PERCENT: 8.2 %
LYMPHOCYTES RELATIVE PERCENT: 8.2 %
LYMPHOCYTES RELATIVE PERCENT: 8.6 %
LYMPHOCYTES RELATIVE PERCENT: 9.1 %
LYMPHOCYTES RELATIVE PERCENT: 9.2 %
LYMPHOCYTES RELATIVE PERCENT: 9.3 %
LYMPHOCYTES RELATIVE PERCENT: 9.4 %
LYMPHOCYTES RELATIVE PERCENT: 9.6 %
LYMPHOCYTES RELATIVE PERCENT: 9.8 %
Lab: NORMAL
MAGNESIUM: 1.8 MG/DL (ref 1.8–2.4)
MAGNESIUM: 1.9 MG/DL (ref 1.8–2.4)
MAGNESIUM: 2 MG/DL (ref 1.8–2.4)
MAGNESIUM: 2 MG/DL (ref 1.8–2.4)
MAGNESIUM: 2.2 MG/DL (ref 1.8–2.4)
MAGNESIUM: 2.2 MG/DL (ref 1.8–2.4)
MAGNESIUM: 2.3 MG/DL (ref 1.8–2.4)
MAGNESIUM: 2.4 MG/DL (ref 1.8–2.4)
MAGNESIUM: 2.4 MG/DL (ref 1.8–2.4)
MAGNESIUM: 2.5 MG/DL (ref 1.8–2.4)
MCH RBC QN AUTO: 30.5 PG (ref 26–34)
MCH RBC QN AUTO: 30.6 PG (ref 26–34)
MCH RBC QN AUTO: 30.7 PG (ref 26–34)
MCH RBC QN AUTO: 30.8 PG (ref 26–34)
MCH RBC QN AUTO: 30.8 PG (ref 26–34)
MCH RBC QN AUTO: 30.9 PG (ref 26–34)
MCH RBC QN AUTO: 30.9 PG (ref 26–34)
MCH RBC QN AUTO: 31.1 PG (ref 26–34)
MCH RBC QN AUTO: 31.2 PG (ref 26–34)
MCH RBC QN AUTO: 31.3 PG (ref 26–34)
MCH RBC QN AUTO: 31.3 PG (ref 26–34)
MCH RBC QN AUTO: 31.4 PG (ref 26–34)
MCH RBC QN AUTO: 31.4 PG (ref 26–34)
MCH RBC QN AUTO: 31.6 PG (ref 26–34)
MCH RBC QN AUTO: 31.6 PG (ref 26–34)
MCH RBC QN AUTO: 31.8 PG (ref 26–34)
MCH RBC QN AUTO: 31.9 PG (ref 26–34)
MCH RBC QN AUTO: 31.9 PG (ref 26–34)
MCH RBC QN AUTO: 32 PG (ref 26–34)
MCH RBC QN AUTO: 32.2 PG (ref 26–34)
MCH RBC QN AUTO: 32.5 PG (ref 26–34)
MCH RBC QN AUTO: 32.5 PG (ref 26–34)
MCH RBC QN AUTO: 32.7 PG (ref 26–34)
MCH RBC QN AUTO: 32.8 PG (ref 26–34)
MCH RBC QN AUTO: 32.8 PG (ref 26–34)
MCH RBC QN AUTO: 33 PG (ref 26–34)
MCH RBC QN AUTO: 33 PG (ref 26–34)
MCHC RBC AUTO-ENTMCNC: 32.3 G/DL (ref 31–36)
MCHC RBC AUTO-ENTMCNC: 32.8 G/DL (ref 31–36)
MCHC RBC AUTO-ENTMCNC: 32.9 G/DL (ref 31–36)
MCHC RBC AUTO-ENTMCNC: 32.9 G/DL (ref 31–36)
MCHC RBC AUTO-ENTMCNC: 33 G/DL (ref 31–36)
MCHC RBC AUTO-ENTMCNC: 33.2 G/DL (ref 31–36)
MCHC RBC AUTO-ENTMCNC: 33.3 G/DL (ref 31–36)
MCHC RBC AUTO-ENTMCNC: 33.4 G/DL (ref 31–36)
MCHC RBC AUTO-ENTMCNC: 33.5 G/DL (ref 31–36)
MCHC RBC AUTO-ENTMCNC: 33.5 G/DL (ref 31–36)
MCHC RBC AUTO-ENTMCNC: 33.6 G/DL (ref 31–36)
MCHC RBC AUTO-ENTMCNC: 33.7 G/DL (ref 31–36)
MCHC RBC AUTO-ENTMCNC: 33.7 G/DL (ref 31–36)
MCHC RBC AUTO-ENTMCNC: 33.9 G/DL (ref 31–36)
MCHC RBC AUTO-ENTMCNC: 34 G/DL (ref 31–36)
MCHC RBC AUTO-ENTMCNC: 34.1 G/DL (ref 31–36)
MCHC RBC AUTO-ENTMCNC: 34.1 G/DL (ref 31–36)
MCHC RBC AUTO-ENTMCNC: 34.2 G/DL (ref 31–36)
MCHC RBC AUTO-ENTMCNC: 34.3 G/DL (ref 31–36)
MCHC RBC AUTO-ENTMCNC: 34.4 G/DL (ref 31–36)
MCHC RBC AUTO-ENTMCNC: 34.4 G/DL (ref 31–36)
MCHC RBC AUTO-ENTMCNC: 34.5 G/DL (ref 31–36)
MCHC RBC AUTO-ENTMCNC: 34.6 G/DL (ref 31–36)
MCV RBC AUTO: 90.7 FL (ref 80–100)
MCV RBC AUTO: 90.8 FL (ref 80–100)
MCV RBC AUTO: 91.2 FL (ref 80–100)
MCV RBC AUTO: 91.6 FL (ref 80–100)
MCV RBC AUTO: 91.9 FL (ref 80–100)
MCV RBC AUTO: 92.1 FL (ref 80–100)
MCV RBC AUTO: 92.5 FL (ref 80–100)
MCV RBC AUTO: 92.8 FL (ref 80–100)
MCV RBC AUTO: 93 FL (ref 80–100)
MCV RBC AUTO: 93.1 FL (ref 80–100)
MCV RBC AUTO: 93.1 FL (ref 80–100)
MCV RBC AUTO: 93.2 FL (ref 80–100)
MCV RBC AUTO: 93.3 FL (ref 80–100)
MCV RBC AUTO: 93.5 FL (ref 80–100)
MCV RBC AUTO: 93.6 FL (ref 80–100)
MCV RBC AUTO: 93.7 FL (ref 80–100)
MCV RBC AUTO: 93.8 FL (ref 80–100)
MCV RBC AUTO: 93.9 FL (ref 80–100)
MCV RBC AUTO: 94 FL (ref 80–100)
MCV RBC AUTO: 94.4 FL (ref 80–100)
MCV RBC AUTO: 94.6 FL (ref 80–100)
MCV RBC AUTO: 95.4 FL (ref 80–100)
MCV RBC AUTO: 95.4 FL (ref 80–100)
MCV RBC AUTO: 95.6 FL (ref 80–100)
MCV RBC AUTO: 95.6 FL (ref 80–100)
MCV RBC AUTO: 95.8 FL (ref 80–100)
MCV RBC AUTO: 96.7 FL (ref 80–100)
METHADONE SCREEN BLOOD: NEGATIVE NG/ML
METHAMPHETAMINES SERUM/ PLASMA: NEGATIVE NG/ML
METHEMOGLOBIN ARTERIAL: 0.2 %
METHEMOGLOBIN ARTERIAL: 0.3 %
MICROSCOPIC EXAMINATION: NORMAL
MICROSCOPIC EXAMINATION: YES
MICROSCOPIC EXAMINATION: YES
MITOCHONDRIAL M2 AB, IGG: 9.2 UNITS (ref 0–20)
MONO TEST: NEGATIVE
MONOCYTES ABSOLUTE: 0.1 K/UL (ref 0–1.3)
MONOCYTES ABSOLUTE: 0.3 K/UL (ref 0–1.3)
MONOCYTES ABSOLUTE: 0.5 K/UL (ref 0–1.3)
MONOCYTES ABSOLUTE: 0.5 K/UL (ref 0–1.3)
MONOCYTES ABSOLUTE: 0.6 K/UL (ref 0–1.3)
MONOCYTES ABSOLUTE: 0.7 K/UL (ref 0–1.3)
MONOCYTES ABSOLUTE: 0.8 K/UL (ref 0–1.3)
MONOCYTES ABSOLUTE: 0.9 K/UL (ref 0–1.3)
MONOCYTES ABSOLUTE: 1 K/UL (ref 0–1.3)
MONOCYTES ABSOLUTE: 1.1 K/UL (ref 0–1.3)
MONOCYTES ABSOLUTE: 1.2 K/UL (ref 0–1.3)
MONOCYTES ABSOLUTE: 1.2 K/UL (ref 0–1.3)
MONOCYTES ABSOLUTE: 1.3 K/UL (ref 0–1.3)
MONOCYTES RELATIVE PERCENT: 0.5 %
MONOCYTES RELATIVE PERCENT: 10 %
MONOCYTES RELATIVE PERCENT: 10.1 %
MONOCYTES RELATIVE PERCENT: 10.5 %
MONOCYTES RELATIVE PERCENT: 10.5 %
MONOCYTES RELATIVE PERCENT: 10.8 %
MONOCYTES RELATIVE PERCENT: 11 %
MONOCYTES RELATIVE PERCENT: 11 %
MONOCYTES RELATIVE PERCENT: 11.4 %
MONOCYTES RELATIVE PERCENT: 11.8 %
MONOCYTES RELATIVE PERCENT: 11.8 %
MONOCYTES RELATIVE PERCENT: 12.2 %
MONOCYTES RELATIVE PERCENT: 12.5 %
MONOCYTES RELATIVE PERCENT: 12.7 %
MONOCYTES RELATIVE PERCENT: 12.9 %
MONOCYTES RELATIVE PERCENT: 13 %
MONOCYTES RELATIVE PERCENT: 13 %
MONOCYTES RELATIVE PERCENT: 13.5 %
MONOCYTES RELATIVE PERCENT: 13.9 %
MONOCYTES RELATIVE PERCENT: 14.5 %
MONOCYTES RELATIVE PERCENT: 14.5 %
MONOCYTES RELATIVE PERCENT: 15.5 %
MONOCYTES RELATIVE PERCENT: 15.7 %
MONOCYTES RELATIVE PERCENT: 15.7 %
MONOCYTES RELATIVE PERCENT: 16 %
MONOCYTES RELATIVE PERCENT: 2.7 %
MONOCYTES RELATIVE PERCENT: 6 %
MONOCYTES RELATIVE PERCENT: 6.6 %
MONOCYTES RELATIVE PERCENT: 7.7 %
MONOCYTES RELATIVE PERCENT: 8.4 %
MONOCYTES RELATIVE PERCENT: 8.5 %
MONOCYTES RELATIVE PERCENT: 9 %
NEUTROPHILS ABSOLUTE: 10.6 K/UL (ref 1.7–7.7)
NEUTROPHILS ABSOLUTE: 12.4 K/UL (ref 1.7–7.7)
NEUTROPHILS ABSOLUTE: 13.1 K/UL (ref 1.7–7.7)
NEUTROPHILS ABSOLUTE: 3.1 K/UL (ref 1.7–7.7)
NEUTROPHILS ABSOLUTE: 3.4 K/UL (ref 1.7–7.7)
NEUTROPHILS ABSOLUTE: 4.5 K/UL (ref 1.7–7.7)
NEUTROPHILS ABSOLUTE: 4.6 K/UL (ref 1.7–7.7)
NEUTROPHILS ABSOLUTE: 4.9 K/UL (ref 1.7–7.7)
NEUTROPHILS ABSOLUTE: 5 K/UL (ref 1.7–7.7)
NEUTROPHILS ABSOLUTE: 5 K/UL (ref 1.7–7.7)
NEUTROPHILS ABSOLUTE: 5.1 K/UL (ref 1.7–7.7)
NEUTROPHILS ABSOLUTE: 5.1 K/UL (ref 1.7–7.7)
NEUTROPHILS ABSOLUTE: 5.2 K/UL (ref 1.7–7.7)
NEUTROPHILS ABSOLUTE: 5.6 K/UL (ref 1.7–7.7)
NEUTROPHILS ABSOLUTE: 5.6 K/UL (ref 1.7–7.7)
NEUTROPHILS ABSOLUTE: 5.8 K/UL (ref 1.7–7.7)
NEUTROPHILS ABSOLUTE: 5.9 K/UL (ref 1.7–7.7)
NEUTROPHILS ABSOLUTE: 6 K/UL (ref 1.7–7.7)
NEUTROPHILS ABSOLUTE: 6.2 K/UL (ref 1.7–7.7)
NEUTROPHILS ABSOLUTE: 6.4 K/UL (ref 1.7–7.7)
NEUTROPHILS ABSOLUTE: 6.6 K/UL (ref 1.7–7.7)
NEUTROPHILS ABSOLUTE: 6.9 K/UL (ref 1.7–7.7)
NEUTROPHILS ABSOLUTE: 7.2 K/UL (ref 1.7–7.7)
NEUTROPHILS ABSOLUTE: 7.4 K/UL (ref 1.7–7.7)
NEUTROPHILS ABSOLUTE: 7.7 K/UL (ref 1.7–7.7)
NEUTROPHILS ABSOLUTE: 7.7 K/UL (ref 1.7–7.7)
NEUTROPHILS ABSOLUTE: 9.2 K/UL (ref 1.7–7.7)
NEUTROPHILS ABSOLUTE: 9.2 K/UL (ref 1.7–7.7)
NEUTROPHILS RELATIVE PERCENT: 66 %
NEUTROPHILS RELATIVE PERCENT: 66 %
NEUTROPHILS RELATIVE PERCENT: 68.3 %
NEUTROPHILS RELATIVE PERCENT: 68.8 %
NEUTROPHILS RELATIVE PERCENT: 69.4 %
NEUTROPHILS RELATIVE PERCENT: 69.8 %
NEUTROPHILS RELATIVE PERCENT: 69.9 %
NEUTROPHILS RELATIVE PERCENT: 70.4 %
NEUTROPHILS RELATIVE PERCENT: 71.2 %
NEUTROPHILS RELATIVE PERCENT: 71.7 %
NEUTROPHILS RELATIVE PERCENT: 73.1 %
NEUTROPHILS RELATIVE PERCENT: 73.3 %
NEUTROPHILS RELATIVE PERCENT: 73.7 %
NEUTROPHILS RELATIVE PERCENT: 73.8 %
NEUTROPHILS RELATIVE PERCENT: 74.6 %
NEUTROPHILS RELATIVE PERCENT: 74.7 %
NEUTROPHILS RELATIVE PERCENT: 75.1 %
NEUTROPHILS RELATIVE PERCENT: 76.1 %
NEUTROPHILS RELATIVE PERCENT: 77.7 %
NEUTROPHILS RELATIVE PERCENT: 78.1 %
NEUTROPHILS RELATIVE PERCENT: 78.1 %
NEUTROPHILS RELATIVE PERCENT: 79.2 %
NEUTROPHILS RELATIVE PERCENT: 79.3 %
NEUTROPHILS RELATIVE PERCENT: 79.3 %
NEUTROPHILS RELATIVE PERCENT: 79.5 %
NEUTROPHILS RELATIVE PERCENT: 79.5 %
NEUTROPHILS RELATIVE PERCENT: 79.8 %
NEUTROPHILS RELATIVE PERCENT: 81.1 %
NEUTROPHILS RELATIVE PERCENT: 81.2 %
NEUTROPHILS RELATIVE PERCENT: 83.4 %
NEUTROPHILS RELATIVE PERCENT: 88.4 %
NEUTROPHILS RELATIVE PERCENT: 88.4 %
NEUTROPHILS RELATIVE PERCENT: 94 %
NEUTROPHILS RELATIVE PERCENT: 97 %
NITRITE, URINE: NEGATIVE
O2 CONTENT ARTERIAL: 20 ML/DL
O2 CONTENT ARTERIAL: 21 ML/DL
O2 SAT, ARTERIAL: 98.9 %
O2 SAT, ARTERIAL: 99.2 %
O2 THERAPY: ABNORMAL
O2 THERAPY: ABNORMAL
OPIATES SCREEN BLOOD: NEGATIVE NG/ML
ORGANISM: ABNORMAL
OXYCODONE: NEGATIVE NG/ML
PCO2 ARTERIAL: 34 MMHG (ref 35–45)
PCO2 ARTERIAL: 39.1 MMHG (ref 35–45)
PDW BLD-RTO: 13.2 % (ref 12.4–15.4)
PDW BLD-RTO: 13.3 % (ref 12.4–15.4)
PDW BLD-RTO: 13.4 % (ref 12.4–15.4)
PDW BLD-RTO: 13.4 % (ref 12.4–15.4)
PDW BLD-RTO: 13.6 % (ref 12.4–15.4)
PDW BLD-RTO: 13.8 % (ref 12.4–15.4)
PDW BLD-RTO: 13.9 % (ref 12.4–15.4)
PDW BLD-RTO: 13.9 % (ref 12.4–15.4)
PDW BLD-RTO: 14 % (ref 12.4–15.4)
PDW BLD-RTO: 14.1 % (ref 12.4–15.4)
PDW BLD-RTO: 14.2 % (ref 12.4–15.4)
PDW BLD-RTO: 14.3 % (ref 12.4–15.4)
PDW BLD-RTO: 14.3 % (ref 12.4–15.4)
PDW BLD-RTO: 14.4 % (ref 12.4–15.4)
PDW BLD-RTO: 14.5 % (ref 12.4–15.4)
PDW BLD-RTO: 14.5 % (ref 12.4–15.4)
PDW BLD-RTO: 14.6 % (ref 12.4–15.4)
PDW BLD-RTO: 14.6 % (ref 12.4–15.4)
PDW BLD-RTO: 14.7 % (ref 12.4–15.4)
PDW BLD-RTO: 15.3 % (ref 12.4–15.4)
PDW BLD-RTO: 15.3 % (ref 12.4–15.4)
PDW BLD-RTO: 15.4 % (ref 12.4–15.4)
PDW BLD-RTO: 15.5 % (ref 12.4–15.4)
PDW BLD-RTO: 15.6 % (ref 12.4–15.4)
PDW BLD-RTO: 15.7 % (ref 12.4–15.4)
PDW BLD-RTO: 16.1 % (ref 12.4–15.4)
PDW BLD-RTO: 16.1 % (ref 12.4–15.4)
PDW BLD-RTO: 16.2 % (ref 12.4–15.4)
PDW BLD-RTO: 16.5 % (ref 12.4–15.4)
PERFORMED ON: ABNORMAL
PERFORMED ON: ABNORMAL
PERFORMED ON: NORMAL
PH ARTERIAL: 7.46 (ref 7.35–7.45)
PH ARTERIAL: 7.53 (ref 7.35–7.45)
PH UA: 5 (ref 5–8)
PH UA: 5.5 (ref 5–8)
PH UA: 5.5 (ref 5–8)
PH UA: 6.5 (ref 5–8)
PH UA: 7 (ref 5–8)
PH UA: 7.5 (ref 5–8)
PHENCYCLIDINE SCREEN BLOOD: NEGATIVE NG/ML
PHOSPHORUS: 3.2 MG/DL (ref 2.5–4.9)
PHOSPHORUS: 3.6 MG/DL (ref 2.5–4.9)
PHOSPHORUS: 3.7 MG/DL (ref 2.5–4.9)
PHOSPHORUS: 4.2 MG/DL (ref 2.5–4.9)
PHOSPHORUS: 4.3 MG/DL (ref 2.5–4.9)
PHOSPHORUS: 4.6 MG/DL (ref 2.5–4.9)
PLATELET # BLD: 106 K/UL (ref 135–450)
PLATELET # BLD: 177 K/UL (ref 135–450)
PLATELET # BLD: 178 K/UL (ref 135–450)
PLATELET # BLD: 192 K/UL (ref 135–450)
PLATELET # BLD: 193 K/UL (ref 135–450)
PLATELET # BLD: 196 K/UL (ref 135–450)
PLATELET # BLD: 201 K/UL (ref 135–450)
PLATELET # BLD: 206 K/UL (ref 135–450)
PLATELET # BLD: 208 K/UL (ref 135–450)
PLATELET # BLD: 210 K/UL (ref 135–450)
PLATELET # BLD: 212 K/UL (ref 135–450)
PLATELET # BLD: 213 K/UL (ref 135–450)
PLATELET # BLD: 213 K/UL (ref 135–450)
PLATELET # BLD: 216 K/UL (ref 135–450)
PLATELET # BLD: 217 K/UL (ref 135–450)
PLATELET # BLD: 218 K/UL (ref 135–450)
PLATELET # BLD: 221 K/UL (ref 135–450)
PLATELET # BLD: 223 K/UL (ref 135–450)
PLATELET # BLD: 229 K/UL (ref 135–450)
PLATELET # BLD: 233 K/UL (ref 135–450)
PLATELET # BLD: 237 K/UL (ref 135–450)
PLATELET # BLD: 237 K/UL (ref 135–450)
PLATELET # BLD: 239 K/UL (ref 135–450)
PLATELET # BLD: 240 K/UL (ref 135–450)
PLATELET # BLD: 247 K/UL (ref 135–450)
PLATELET # BLD: 247 K/UL (ref 135–450)
PLATELET # BLD: 248 K/UL (ref 135–450)
PLATELET # BLD: 249 K/UL (ref 135–450)
PLATELET # BLD: 254 K/UL (ref 135–450)
PLATELET # BLD: 255 K/UL (ref 135–450)
PLATELET # BLD: 264 K/UL (ref 135–450)
PLATELET # BLD: 266 K/UL (ref 135–450)
PLATELET # BLD: 267 K/UL (ref 135–450)
PLATELET # BLD: 277 K/UL (ref 135–450)
PLATELET # BLD: 285 K/UL (ref 135–450)
PLATELET # BLD: 86 K/UL (ref 135–450)
PLATELET SLIDE REVIEW: ABNORMAL
PMV BLD AUTO: 6.9 FL (ref 5–10.5)
PMV BLD AUTO: 7 FL (ref 5–10.5)
PMV BLD AUTO: 7.3 FL (ref 5–10.5)
PMV BLD AUTO: 7.3 FL (ref 5–10.5)
PMV BLD AUTO: 7.4 FL (ref 5–10.5)
PMV BLD AUTO: 7.5 FL (ref 5–10.5)
PMV BLD AUTO: 7.6 FL (ref 5–10.5)
PMV BLD AUTO: 7.7 FL (ref 5–10.5)
PMV BLD AUTO: 7.8 FL (ref 5–10.5)
PMV BLD AUTO: 7.8 FL (ref 5–10.5)
PMV BLD AUTO: 7.9 FL (ref 5–10.5)
PMV BLD AUTO: 7.9 FL (ref 5–10.5)
PMV BLD AUTO: 8 FL (ref 5–10.5)
PMV BLD AUTO: 8.1 FL (ref 5–10.5)
PMV BLD AUTO: 8.5 FL (ref 5–10.5)
PMV BLD AUTO: 8.7 FL (ref 5–10.5)
PO2 ARTERIAL: 109 MMHG (ref 75–108)
PO2 ARTERIAL: 125 MMHG (ref 75–108)
POTASSIUM REFLEX MAGNESIUM: 3.4 MMOL/L (ref 3.5–5.1)
POTASSIUM REFLEX MAGNESIUM: 3.6 MMOL/L (ref 3.5–5.1)
POTASSIUM REFLEX MAGNESIUM: 3.8 MMOL/L (ref 3.5–5.1)
POTASSIUM REFLEX MAGNESIUM: 4.1 MMOL/L (ref 3.5–5.1)
POTASSIUM REFLEX MAGNESIUM: 4.2 MMOL/L (ref 3.5–5.1)
POTASSIUM REFLEX MAGNESIUM: 4.3 MMOL/L (ref 3.5–5.1)
POTASSIUM REFLEX MAGNESIUM: 4.3 MMOL/L (ref 3.5–5.1)
POTASSIUM SERPL-SCNC: 2.5 MMOL/L (ref 3.5–5.1)
POTASSIUM SERPL-SCNC: 2.9 MMOL/L (ref 3.5–5.1)
POTASSIUM SERPL-SCNC: 3.2 MMOL/L (ref 3.5–5.1)
POTASSIUM SERPL-SCNC: 3.3 MMOL/L (ref 3.5–5.1)
POTASSIUM SERPL-SCNC: 3.4 MMOL/L (ref 3.5–5.1)
POTASSIUM SERPL-SCNC: 3.5 MMOL/L (ref 3.5–5.1)
POTASSIUM SERPL-SCNC: 3.6 MMOL/L (ref 3.5–5.1)
POTASSIUM SERPL-SCNC: 3.7 MMOL/L (ref 3.5–5.1)
POTASSIUM SERPL-SCNC: 3.8 MMOL/L (ref 3.5–5.1)
POTASSIUM SERPL-SCNC: 4 MMOL/L (ref 3.5–5.1)
POTASSIUM SERPL-SCNC: 4.1 MMOL/L (ref 3.5–5.1)
POTASSIUM SERPL-SCNC: 4.2 MMOL/L (ref 3.5–5.1)
POTASSIUM SERPL-SCNC: 4.2 MMOL/L (ref 3.5–5.1)
POTASSIUM SERPL-SCNC: 4.3 MMOL/L (ref 3.5–5.1)
POTASSIUM SERPL-SCNC: 4.4 MMOL/L (ref 3.5–5.1)
POTASSIUM SERPL-SCNC: 4.5 MMOL/L (ref 3.5–5.1)
POTASSIUM SERPL-SCNC: 4.9 MMOL/L (ref 3.5–5.1)
POTASSIUM SERPL-SCNC: 5 MMOL/L (ref 3.5–5.1)
POTASSIUM SERPL-SCNC: 5 MMOL/L (ref 3.5–5.1)
POTASSIUM SERPL-SCNC: 5.3 MMOL/L (ref 3.5–5.1)
PRO-BNP: 1950 PG/ML (ref 0–124)
PRO-BNP: 3931 PG/ML (ref 0–124)
PRO-BNP: 3950 PG/ML (ref 0–124)
PRO-BNP: 4951 PG/ML (ref 0–124)
PRO-BNP: 5141 PG/ML (ref 0–124)
PRO-BNP: 5411 PG/ML (ref 0–124)
PRO-BNP: 5501 PG/ML (ref 0–124)
PRO-BNP: 5645 PG/ML (ref 0–124)
PRO-BNP: 8173 PG/ML (ref 0–124)
PRO-BNP: 9730 PG/ML (ref 0–124)
PRO-BNP: ABNORMAL PG/ML (ref 0–124)
PROCALCITONIN: 0.03 NG/ML (ref 0–0.15)
PROCALCITONIN: 0.49 NG/ML (ref 0–0.15)
PROTEIN UA: 100 MG/DL
PROTEIN UA: NEGATIVE MG/DL
PROTHROMBIN TIME: 12.5 SEC (ref 9.8–13)
PROTHROMBIN TIME: 12.9 SEC (ref 9.8–13)
PROTHROMBIN TIME: 15 SEC (ref 9.8–13)
PROTHROMBIN TIME: 15.7 SEC (ref 10–13.2)
PROTHROMBIN TIME: 17.2 SEC (ref 10–13.2)
PROTHROMBIN TIME: 17.2 SEC (ref 10–13.2)
PROTHROMBIN TIME: 17.5 SEC (ref 10–13.2)
PROTHROMBIN TIME: 18.3 SEC (ref 10–13.2)
PROTHROMBIN TIME: 19.1 SEC (ref 10–13.2)
PROTHROMBIN TIME: 19.7 SEC (ref 10–13.2)
PROTHROMBIN TIME: 20.8 SEC (ref 10–13.2)
PROTHROMBIN TIME: 21.7 SEC (ref 10–13.2)
PROTHROMBIN TIME: 25 SEC (ref 10–13.2)
PROTHROMBIN TIME: 25.1 SEC (ref 10–13.2)
PROTHROMBIN TIME: 41 SEC (ref 10–13.2)
PROTHROMBIN TIME: 49.9 SEC (ref 10–13.2)
PROTHROMBIN TIME: 56 SEC (ref 10–13.2)
RAPID INFLUENZA  B AGN: NEGATIVE
RAPID INFLUENZA A AGN: NEGATIVE
RBC # BLD: 3.67 M/UL (ref 4.2–5.9)
RBC # BLD: 3.74 M/UL (ref 4.2–5.9)
RBC # BLD: 3.78 M/UL (ref 4.2–5.9)
RBC # BLD: 3.84 M/UL (ref 4.2–5.9)
RBC # BLD: 3.88 M/UL (ref 4.2–5.9)
RBC # BLD: 3.91 M/UL (ref 4.2–5.9)
RBC # BLD: 3.93 M/UL (ref 4.2–5.9)
RBC # BLD: 3.93 M/UL (ref 4.2–5.9)
RBC # BLD: 3.94 M/UL (ref 4.2–5.9)
RBC # BLD: 3.98 M/UL (ref 4.2–5.9)
RBC # BLD: 3.99 M/UL (ref 4.2–5.9)
RBC # BLD: 4 M/UL (ref 4.2–5.9)
RBC # BLD: 4.08 M/UL (ref 4.2–5.9)
RBC # BLD: 4.23 M/UL (ref 4.2–5.9)
RBC # BLD: 4.23 M/UL (ref 4.2–5.9)
RBC # BLD: 4.27 M/UL (ref 4.2–5.9)
RBC # BLD: 4.28 M/UL (ref 4.2–5.9)
RBC # BLD: 4.3 M/UL (ref 4.2–5.9)
RBC # BLD: 4.3 M/UL (ref 4.2–5.9)
RBC # BLD: 4.35 M/UL (ref 4.2–5.9)
RBC # BLD: 4.35 M/UL (ref 4.2–5.9)
RBC # BLD: 4.36 M/UL (ref 4.2–5.9)
RBC # BLD: 4.37 M/UL (ref 4.2–5.9)
RBC # BLD: 4.41 M/UL (ref 4.2–5.9)
RBC # BLD: 4.44 M/UL (ref 4.2–5.9)
RBC # BLD: 4.47 M/UL (ref 4.2–5.9)
RBC # BLD: 4.49 M/UL (ref 4.2–5.9)
RBC # BLD: 4.55 M/UL (ref 4.2–5.9)
RBC # BLD: 4.56 M/UL (ref 4.2–5.9)
RBC # BLD: 4.59 M/UL (ref 4.2–5.9)
RBC # BLD: 4.62 M/UL (ref 4.2–5.9)
RBC # BLD: 4.66 M/UL (ref 4.2–5.9)
RBC # BLD: 4.72 M/UL (ref 4.2–5.9)
RBC # BLD: 5.01 M/UL (ref 4.2–5.9)
RBC UA: 1 /HPF (ref 0–4)
RBC UA: 4 /HPF (ref 0–4)
REASON FOR REJECTION: NORMAL
REJECTED TEST: NORMAL
SALICYLATE, SERUM: 0.4 MG/DL (ref 15–30)
SLIDE REVIEW: ABNORMAL
SODIUM BLD-SCNC: 122 MMOL/L (ref 136–145)
SODIUM BLD-SCNC: 122 MMOL/L (ref 136–145)
SODIUM BLD-SCNC: 129 MMOL/L (ref 136–145)
SODIUM BLD-SCNC: 130 MMOL/L (ref 136–145)
SODIUM BLD-SCNC: 132 MMOL/L (ref 136–145)
SODIUM BLD-SCNC: 133 MMOL/L (ref 136–145)
SODIUM BLD-SCNC: 134 MMOL/L (ref 136–145)
SODIUM BLD-SCNC: 135 MMOL/L (ref 136–145)
SODIUM BLD-SCNC: 136 MMOL/L (ref 136–145)
SODIUM BLD-SCNC: 137 MMOL/L (ref 136–145)
SODIUM BLD-SCNC: 138 MMOL/L (ref 136–145)
SODIUM BLD-SCNC: 139 MMOL/L (ref 136–145)
SODIUM BLD-SCNC: 140 MMOL/L (ref 136–145)
SODIUM BLD-SCNC: 141 MMOL/L (ref 136–145)
SODIUM BLD-SCNC: 144 MMOL/L (ref 136–145)
SODIUM BLD-SCNC: 145 MMOL/L (ref 136–145)
SPECIFIC GRAVITY UA: 1 (ref 1–1.03)
SPECIFIC GRAVITY UA: 1 (ref 1–1.03)
SPECIFIC GRAVITY UA: 1.01 (ref 1–1.03)
SPECIFIC GRAVITY UA: 1.03 (ref 1–1.03)
STOMATOCYTES: ABNORMAL
TCO2 ARTERIAL: 56.6 MMOL/L
TCO2 ARTERIAL: 75.7 MMOL/L
TOTAL PROTEIN: 5 G/DL (ref 6.4–8.2)
TOTAL PROTEIN: 5.5 G/DL (ref 6.4–8.2)
TOTAL PROTEIN: 5.7 G/DL (ref 6.4–8.2)
TOTAL PROTEIN: 5.7 G/DL (ref 6.4–8.2)
TOTAL PROTEIN: 5.8 G/DL (ref 6.4–8.2)
TOTAL PROTEIN: 5.9 G/DL (ref 6.4–8.2)
TOTAL PROTEIN: 6 G/DL (ref 6.4–8.2)
TOTAL PROTEIN: 6.1 G/DL (ref 6.4–8.2)
TOTAL PROTEIN: 6.1 G/DL (ref 6.4–8.2)
TOTAL PROTEIN: 6.2 G/DL (ref 6.4–8.2)
TOTAL PROTEIN: 6.3 G/DL (ref 6.4–8.2)
TOTAL PROTEIN: 6.5 G/DL (ref 6.4–8.2)
TOTAL PROTEIN: 6.7 G/DL (ref 6.4–8.2)
TOTAL PROTEIN: 6.8 G/DL (ref 6.4–8.2)
TOTAL PROTEIN: 7 G/DL (ref 6.4–8.2)
TOTAL PROTEIN: 7 G/DL (ref 6.4–8.2)
TOTAL PROTEIN: 7.3 G/DL (ref 6.4–8.2)
TOTAL PROTEIN: 7.3 G/DL (ref 6.4–8.2)
TOTAL PROTEIN: 7.8 G/DL (ref 6.4–8.2)
TRIGL SERPL-MCNC: 66 MG/DL (ref 0–150)
TRIGL SERPL-MCNC: 80 MG/DL (ref 0–150)
TRIGL SERPL-MCNC: 88 MG/DL (ref 0–150)
TROPONIN: 0.01 NG/ML
TROPONIN: <0.01 NG/ML
TSH SERPL DL<=0.05 MIU/L-ACNC: 2.49 UIU/ML (ref 0.27–4.2)
URINE REFLEX TO CULTURE: ABNORMAL
URINE REFLEX TO CULTURE: ABNORMAL
URINE REFLEX TO CULTURE: NORMAL
URINE TYPE: ABNORMAL
URINE TYPE: ABNORMAL
URINE TYPE: NORMAL
UROBILINOGEN, URINE: 0.2 E.U./DL
UROBILINOGEN, URINE: 1 E.U./DL
UROBILINOGEN, URINE: 1 E.U./DL
VITAMIN B-12: >2000 PG/ML (ref 211–911)
VLDLC SERPL CALC-MCNC: 13 MG/DL
WBC # BLD: 10.5 K/UL (ref 4–11)
WBC # BLD: 10.9 K/UL (ref 4–11)
WBC # BLD: 11 K/UL (ref 4–11)
WBC # BLD: 11.3 K/UL (ref 4–11)
WBC # BLD: 14 K/UL (ref 4–11)
WBC # BLD: 14.8 K/UL (ref 4–11)
WBC # BLD: 4.5 K/UL (ref 4–11)
WBC # BLD: 5.2 K/UL (ref 4–11)
WBC # BLD: 6.1 K/UL (ref 4–11)
WBC # BLD: 6.2 K/UL (ref 4–11)
WBC # BLD: 6.5 K/UL (ref 4–11)
WBC # BLD: 6.9 K/UL (ref 4–11)
WBC # BLD: 7 K/UL (ref 4–11)
WBC # BLD: 7.1 K/UL (ref 4–11)
WBC # BLD: 7.2 K/UL (ref 4–11)
WBC # BLD: 7.3 K/UL (ref 4–11)
WBC # BLD: 7.3 K/UL (ref 4–11)
WBC # BLD: 7.4 K/UL (ref 4–11)
WBC # BLD: 7.6 K/UL (ref 4–11)
WBC # BLD: 7.6 K/UL (ref 4–11)
WBC # BLD: 7.7 K/UL (ref 4–11)
WBC # BLD: 7.8 K/UL (ref 4–11)
WBC # BLD: 7.9 K/UL (ref 4–11)
WBC # BLD: 8.1 K/UL (ref 4–11)
WBC # BLD: 8.1 K/UL (ref 4–11)
WBC # BLD: 8.3 K/UL (ref 4–11)
WBC # BLD: 8.3 K/UL (ref 4–11)
WBC # BLD: 8.5 K/UL (ref 4–11)
WBC # BLD: 8.5 K/UL (ref 4–11)
WBC # BLD: 8.7 K/UL (ref 4–11)
WBC # BLD: 8.8 K/UL (ref 4–11)
WBC # BLD: 9.1 K/UL (ref 4–11)
WBC # BLD: 9.3 K/UL (ref 4–11)
WBC # BLD: 9.5 K/UL (ref 4–11)
WBC # BLD: 9.7 K/UL (ref 4–11)
WBC # BLD: 9.8 K/UL (ref 4–11)
WBC UA: 1 /HPF (ref 0–5)
WBC UA: 1 /HPF (ref 0–5)

## 2019-01-01 PROCEDURE — 6360000002 HC RX W HCPCS: Performed by: INTERNAL MEDICINE

## 2019-01-01 PROCEDURE — 96374 THER/PROPH/DIAG INJ IV PUSH: CPT

## 2019-01-01 PROCEDURE — 99214 OFFICE O/P EST MOD 30 MIN: CPT | Performed by: CLINICAL NURSE SPECIALIST

## 2019-01-01 PROCEDURE — 80053 COMPREHEN METABOLIC PANEL: CPT

## 2019-01-01 PROCEDURE — 6370000000 HC RX 637 (ALT 250 FOR IP): Performed by: INTERNAL MEDICINE

## 2019-01-01 PROCEDURE — 74176 CT ABD & PELVIS W/O CONTRAST: CPT

## 2019-01-01 PROCEDURE — 2500000003 HC RX 250 WO HCPCS: Performed by: INTERNAL MEDICINE

## 2019-01-01 PROCEDURE — 83605 ASSAY OF LACTIC ACID: CPT

## 2019-01-01 PROCEDURE — 99233 SBSQ HOSP IP/OBS HIGH 50: CPT | Performed by: INTERNAL MEDICINE

## 2019-01-01 PROCEDURE — 99223 1ST HOSP IP/OBS HIGH 75: CPT | Performed by: INTERNAL MEDICINE

## 2019-01-01 PROCEDURE — 2580000003 HC RX 258: Performed by: INTERNAL MEDICINE

## 2019-01-01 PROCEDURE — 83880 ASSAY OF NATRIURETIC PEPTIDE: CPT

## 2019-01-01 PROCEDURE — 80048 BASIC METABOLIC PNL TOTAL CA: CPT

## 2019-01-01 PROCEDURE — 93005 ELECTROCARDIOGRAM TRACING: CPT | Performed by: EMERGENCY MEDICINE

## 2019-01-01 PROCEDURE — 80076 HEPATIC FUNCTION PANEL: CPT

## 2019-01-01 PROCEDURE — 71046 X-RAY EXAM CHEST 2 VIEWS: CPT

## 2019-01-01 PROCEDURE — 6360000002 HC RX W HCPCS: Performed by: EMERGENCY MEDICINE

## 2019-01-01 PROCEDURE — 83735 ASSAY OF MAGNESIUM: CPT

## 2019-01-01 PROCEDURE — 85610 PROTHROMBIN TIME: CPT

## 2019-01-01 PROCEDURE — 83690 ASSAY OF LIPASE: CPT

## 2019-01-01 PROCEDURE — 6360000002 HC RX W HCPCS: Performed by: SURGERY

## 2019-01-01 PROCEDURE — 96361 HYDRATE IV INFUSION ADD-ON: CPT

## 2019-01-01 PROCEDURE — G0378 HOSPITAL OBSERVATION PER HR: HCPCS

## 2019-01-01 PROCEDURE — 99213 OFFICE O/P EST LOW 20 MIN: CPT | Performed by: INTERNAL MEDICINE

## 2019-01-01 PROCEDURE — 2000000000 HC ICU R&B

## 2019-01-01 PROCEDURE — 87804 INFLUENZA ASSAY W/OPTIC: CPT

## 2019-01-01 PROCEDURE — 96372 THER/PROPH/DIAG INJ SC/IM: CPT

## 2019-01-01 PROCEDURE — 99213 OFFICE O/P EST LOW 20 MIN: CPT | Performed by: SURGERY

## 2019-01-01 PROCEDURE — 2700000000 HC OXYGEN THERAPY PER DAY

## 2019-01-01 PROCEDURE — 76705 ECHO EXAM OF ABDOMEN: CPT

## 2019-01-01 PROCEDURE — 94760 N-INVAS EAR/PLS OXIMETRY 1: CPT

## 2019-01-01 PROCEDURE — 80069 RENAL FUNCTION PANEL: CPT

## 2019-01-01 PROCEDURE — 2500000003 HC RX 250 WO HCPCS: Performed by: SURGERY

## 2019-01-01 PROCEDURE — 2709999900 HC NON-CHARGEABLE SUPPLY: Performed by: SURGERY

## 2019-01-01 PROCEDURE — 99284 EMERGENCY DEPT VISIT MOD MDM: CPT

## 2019-01-01 PROCEDURE — 99232 SBSQ HOSP IP/OBS MODERATE 35: CPT | Performed by: INTERNAL MEDICINE

## 2019-01-01 PROCEDURE — 70450 CT HEAD/BRAIN W/O DYE: CPT

## 2019-01-01 PROCEDURE — 93010 ELECTROCARDIOGRAM REPORT: CPT | Performed by: INTERNAL MEDICINE

## 2019-01-01 PROCEDURE — 6370000000 HC RX 637 (ALT 250 FOR IP): Performed by: PHYSICIAN ASSISTANT

## 2019-01-01 PROCEDURE — 96375 TX/PRO/DX INJ NEW DRUG ADDON: CPT

## 2019-01-01 PROCEDURE — 84484 ASSAY OF TROPONIN QUANT: CPT

## 2019-01-01 PROCEDURE — 94761 N-INVAS EAR/PLS OXIMETRY MLT: CPT

## 2019-01-01 PROCEDURE — 74177 CT ABD & PELVIS W/CONTRAST: CPT

## 2019-01-01 PROCEDURE — 80162 ASSAY OF DIGOXIN TOTAL: CPT

## 2019-01-01 PROCEDURE — 82140 ASSAY OF AMMONIA: CPT

## 2019-01-01 PROCEDURE — C8923 2D TTE W OR W/O FOL W/CON,CO: HCPCS

## 2019-01-01 PROCEDURE — 99214 OFFICE O/P EST MOD 30 MIN: CPT | Performed by: SURGERY

## 2019-01-01 PROCEDURE — 44320 COLOSTOMY: CPT | Performed by: SURGERY

## 2019-01-01 PROCEDURE — 99233 SBSQ HOSP IP/OBS HIGH 50: CPT | Performed by: NURSE PRACTITIONER

## 2019-01-01 PROCEDURE — 97116 GAIT TRAINING THERAPY: CPT

## 2019-01-01 PROCEDURE — 99222 1ST HOSP IP/OBS MODERATE 55: CPT | Performed by: INTERNAL MEDICINE

## 2019-01-01 PROCEDURE — 99215 OFFICE O/P EST HI 40 MIN: CPT | Performed by: INTERNAL MEDICINE

## 2019-01-01 PROCEDURE — 93297 REM INTERROG DEV EVAL ICPMS: CPT | Performed by: INTERNAL MEDICINE

## 2019-01-01 PROCEDURE — 99232 SBSQ HOSP IP/OBS MODERATE 35: CPT | Performed by: CLINICAL NURSE SPECIALIST

## 2019-01-01 PROCEDURE — 1200000000 HC SEMI PRIVATE

## 2019-01-01 PROCEDURE — 71045 X-RAY EXAM CHEST 1 VIEW: CPT

## 2019-01-01 PROCEDURE — 6360000002 HC RX W HCPCS: Performed by: PHYSICIAN ASSISTANT

## 2019-01-01 PROCEDURE — 6370000000 HC RX 637 (ALT 250 FOR IP): Performed by: SURGERY

## 2019-01-01 PROCEDURE — 93295 DEV INTERROG REMOTE 1/2/MLT: CPT | Performed by: INTERNAL MEDICINE

## 2019-01-01 PROCEDURE — C9113 INJ PANTOPRAZOLE SODIUM, VIA: HCPCS | Performed by: INTERNAL MEDICINE

## 2019-01-01 PROCEDURE — 36591 DRAW BLOOD OFF VENOUS DEVICE: CPT

## 2019-01-01 PROCEDURE — 81001 URINALYSIS AUTO W/SCOPE: CPT

## 2019-01-01 PROCEDURE — 96376 TX/PRO/DX INJ SAME DRUG ADON: CPT

## 2019-01-01 PROCEDURE — 36415 COLL VENOUS BLD VENIPUNCTURE: CPT

## 2019-01-01 PROCEDURE — 99024 POSTOP FOLLOW-UP VISIT: CPT | Performed by: SURGERY

## 2019-01-01 PROCEDURE — 85025 COMPLETE CBC W/AUTO DIFF WBC: CPT

## 2019-01-01 PROCEDURE — 6370000000 HC RX 637 (ALT 250 FOR IP): Performed by: FAMILY MEDICINE

## 2019-01-01 PROCEDURE — 88302 TISSUE EXAM BY PATHOLOGIST: CPT

## 2019-01-01 PROCEDURE — 93975 VASCULAR STUDY: CPT

## 2019-01-01 PROCEDURE — 6370000000 HC RX 637 (ALT 250 FOR IP): Performed by: EMERGENCY MEDICINE

## 2019-01-01 PROCEDURE — 82104 ALPHA-1-ANTITRYPSIN PHENO: CPT

## 2019-01-01 PROCEDURE — C9113 INJ PANTOPRAZOLE SODIUM, VIA: HCPCS | Performed by: EMERGENCY MEDICINE

## 2019-01-01 PROCEDURE — 99291 CRITICAL CARE FIRST HOUR: CPT | Performed by: INTERNAL MEDICINE

## 2019-01-01 PROCEDURE — 80061 LIPID PANEL: CPT

## 2019-01-01 PROCEDURE — 83516 IMMUNOASSAY NONANTIBODY: CPT

## 2019-01-01 PROCEDURE — 99244 OFF/OP CNSLTJ NEW/EST MOD 40: CPT | Performed by: INTERNAL MEDICINE

## 2019-01-01 PROCEDURE — 2580000003 HC RX 258: Performed by: ANESTHESIOLOGY

## 2019-01-01 PROCEDURE — 97165 OT EVAL LOW COMPLEX 30 MIN: CPT

## 2019-01-01 PROCEDURE — 99214 OFFICE O/P EST MOD 30 MIN: CPT | Performed by: INTERNAL MEDICINE

## 2019-01-01 PROCEDURE — 97530 THERAPEUTIC ACTIVITIES: CPT

## 2019-01-01 PROCEDURE — 99214 OFFICE O/P EST MOD 30 MIN: CPT | Performed by: NURSE PRACTITIONER

## 2019-01-01 PROCEDURE — 97161 PT EVAL LOW COMPLEX 20 MIN: CPT

## 2019-01-01 PROCEDURE — 82150 ASSAY OF AMYLASE: CPT

## 2019-01-01 PROCEDURE — 87449 NOS EACH ORGANISM AG IA: CPT

## 2019-01-01 PROCEDURE — 99282 EMERGENCY DEPT VISIT SF MDM: CPT

## 2019-01-01 PROCEDURE — 93005 ELECTROCARDIOGRAM TRACING: CPT | Performed by: INTERNAL MEDICINE

## 2019-01-01 PROCEDURE — 36600 WITHDRAWAL OF ARTERIAL BLOOD: CPT

## 2019-01-01 PROCEDURE — C1726 CATH, BAL DIL, NON-VASCULAR: HCPCS | Performed by: INTERNAL MEDICINE

## 2019-01-01 PROCEDURE — 84145 PROCALCITONIN (PCT): CPT

## 2019-01-01 PROCEDURE — 2580000003 HC RX 258: Performed by: PHYSICIAN ASSISTANT

## 2019-01-01 PROCEDURE — 3600000004 HC SURGERY LEVEL 4 BASE: Performed by: SURGERY

## 2019-01-01 PROCEDURE — 2580000003 HC RX 258: Performed by: SURGERY

## 2019-01-01 PROCEDURE — 82803 BLOOD GASES ANY COMBINATION: CPT

## 2019-01-01 PROCEDURE — 3609009400 HC COLONOSCOPY WITH DILATION: Performed by: INTERNAL MEDICINE

## 2019-01-01 PROCEDURE — 81003 URINALYSIS AUTO W/O SCOPE: CPT

## 2019-01-01 PROCEDURE — C1713 ANCHOR/SCREW BN/BN,TIS/BN: HCPCS | Performed by: SURGERY

## 2019-01-01 PROCEDURE — 7100000001 HC PACU RECOVERY - ADDTL 15 MIN: Performed by: SURGERY

## 2019-01-01 PROCEDURE — 86308 HETEROPHILE ANTIBODY SCREEN: CPT

## 2019-01-01 PROCEDURE — 99233 SBSQ HOSP IP/OBS HIGH 50: CPT | Performed by: CLINICAL NURSE SPECIALIST

## 2019-01-01 PROCEDURE — 93290 INTERROG DEV EVAL ICPMS IP: CPT | Performed by: CLINICAL NURSE SPECIALIST

## 2019-01-01 PROCEDURE — 99285 EMERGENCY DEPT VISIT HI MDM: CPT

## 2019-01-01 PROCEDURE — 88305 TISSUE EXAM BY PATHOLOGIST: CPT

## 2019-01-01 PROCEDURE — 82103 ALPHA-1-ANTITRYPSIN TOTAL: CPT

## 2019-01-01 PROCEDURE — 6360000002 HC RX W HCPCS

## 2019-01-01 PROCEDURE — 87040 BLOOD CULTURE FOR BACTERIA: CPT

## 2019-01-01 PROCEDURE — G0480 DRUG TEST DEF 1-7 CLASSES: HCPCS

## 2019-01-01 PROCEDURE — 6360000004 HC RX CONTRAST MEDICATION: Performed by: PHYSICIAN ASSISTANT

## 2019-01-01 PROCEDURE — 96365 THER/PROPH/DIAG IV INF INIT: CPT

## 2019-01-01 PROCEDURE — 80074 ACUTE HEPATITIS PANEL: CPT

## 2019-01-01 PROCEDURE — 93005 ELECTROCARDIOGRAM TRACING: CPT | Performed by: PHYSICIAN ASSISTANT

## 2019-01-01 PROCEDURE — 2500000003 HC RX 250 WO HCPCS: Performed by: NURSE ANESTHETIST, CERTIFIED REGISTERED

## 2019-01-01 PROCEDURE — 6370000000 HC RX 637 (ALT 250 FOR IP): Performed by: NURSE PRACTITIONER

## 2019-01-01 PROCEDURE — 99222 1ST HOSP IP/OBS MODERATE 55: CPT | Performed by: SURGERY

## 2019-01-01 PROCEDURE — 84478 ASSAY OF TRIGLYCERIDES: CPT

## 2019-01-01 PROCEDURE — 2580000003 HC RX 258: Performed by: EMERGENCY MEDICINE

## 2019-01-01 PROCEDURE — APPNB30 APP NON BILLABLE TIME 0-30 MINS: Performed by: NURSE PRACTITIONER

## 2019-01-01 PROCEDURE — 85730 THROMBOPLASTIN TIME PARTIAL: CPT

## 2019-01-01 PROCEDURE — 85027 COMPLETE CBC AUTOMATED: CPT

## 2019-01-01 PROCEDURE — 6370000000 HC RX 637 (ALT 250 FOR IP): Performed by: CLINICAL NURSE SPECIALIST

## 2019-01-01 PROCEDURE — 2580000003 HC RX 258: Performed by: NURSE ANESTHETIST, CERTIFIED REGISTERED

## 2019-01-01 PROCEDURE — C9113 INJ PANTOPRAZOLE SODIUM, VIA: HCPCS | Performed by: SURGERY

## 2019-01-01 PROCEDURE — 94640 AIRWAY INHALATION TREATMENT: CPT

## 2019-01-01 PROCEDURE — 86900 BLOOD TYPING SEROLOGIC ABO: CPT

## 2019-01-01 PROCEDURE — 93296 REM INTERROG EVL PM/IDS: CPT | Performed by: INTERNAL MEDICINE

## 2019-01-01 PROCEDURE — 93290 INTERROG DEV EVAL ICPMS IP: CPT | Performed by: INTERNAL MEDICINE

## 2019-01-01 PROCEDURE — 6370000000 HC RX 637 (ALT 250 FOR IP): Performed by: ANESTHESIOLOGY

## 2019-01-01 PROCEDURE — 86901 BLOOD TYPING SEROLOGIC RH(D): CPT

## 2019-01-01 PROCEDURE — 2580000003 HC RX 258

## 2019-01-01 PROCEDURE — 6360000002 HC RX W HCPCS: Performed by: FAMILY MEDICINE

## 2019-01-01 PROCEDURE — 2060000000 HC ICU INTERMEDIATE R&B

## 2019-01-01 PROCEDURE — 6360000002 HC RX W HCPCS: Performed by: NURSE ANESTHETIST, CERTIFIED REGISTERED

## 2019-01-01 PROCEDURE — 87205 SMEAR GRAM STAIN: CPT

## 2019-01-01 PROCEDURE — 85379 FIBRIN DEGRADATION QUANT: CPT

## 2019-01-01 PROCEDURE — 84132 ASSAY OF SERUM POTASSIUM: CPT

## 2019-01-01 PROCEDURE — 49566 PR REPAIR RECURR INCIS HERNIA,STRANG: CPT | Performed by: SURGERY

## 2019-01-01 PROCEDURE — 82607 VITAMIN B-12: CPT

## 2019-01-01 PROCEDURE — 99232 SBSQ HOSP IP/OBS MODERATE 35: CPT | Performed by: SURGERY

## 2019-01-01 PROCEDURE — 49568 PR IMPLANT MESH HERNIA REPAIR/DEBRIDEMENT CLOSURE: CPT | Performed by: SURGERY

## 2019-01-01 PROCEDURE — 93005 ELECTROCARDIOGRAM TRACING: CPT

## 2019-01-01 PROCEDURE — 82390 ASSAY OF CERULOPLASMIN: CPT

## 2019-01-01 PROCEDURE — 93306 TTE W/DOPPLER COMPLETE: CPT

## 2019-01-01 PROCEDURE — 87324 CLOSTRIDIUM AG IA: CPT

## 2019-01-01 PROCEDURE — 3600000014 HC SURGERY LEVEL 4 ADDTL 15MIN: Performed by: SURGERY

## 2019-01-01 PROCEDURE — 87529 HSV DNA AMP PROBE: CPT

## 2019-01-01 PROCEDURE — 99255 IP/OBS CONSLTJ NEW/EST HI 80: CPT | Performed by: INTERNAL MEDICINE

## 2019-01-01 PROCEDURE — 80307 DRUG TEST PRSMV CHEM ANLYZR: CPT

## 2019-01-01 PROCEDURE — 6360000004 HC RX CONTRAST MEDICATION: Performed by: EMERGENCY MEDICINE

## 2019-01-01 PROCEDURE — 3700000001 HC ADD 15 MINUTES (ANESTHESIA): Performed by: INTERNAL MEDICINE

## 2019-01-01 PROCEDURE — 2500000003 HC RX 250 WO HCPCS: Performed by: EMERGENCY MEDICINE

## 2019-01-01 PROCEDURE — 6360000004 HC RX CONTRAST MEDICATION: Performed by: INTERNAL MEDICINE

## 2019-01-01 PROCEDURE — 87493 C DIFF AMPLIFIED PROBE: CPT

## 2019-01-01 PROCEDURE — 99223 1ST HOSP IP/OBS HIGH 75: CPT | Performed by: PSYCHIATRY & NEUROLOGY

## 2019-01-01 PROCEDURE — 2720000010 HC SURG SUPPLY STERILE: Performed by: SURGERY

## 2019-01-01 PROCEDURE — 96367 TX/PROPH/DG ADDL SEQ IV INF: CPT

## 2019-01-01 PROCEDURE — 93290 INTERROG DEV EVAL ICPMS IP: CPT | Performed by: NURSE PRACTITIONER

## 2019-01-01 PROCEDURE — 86850 RBC ANTIBODY SCREEN: CPT

## 2019-01-01 PROCEDURE — 84443 ASSAY THYROID STIM HORMONE: CPT

## 2019-01-01 PROCEDURE — 2580000003 HC RX 258: Performed by: HOSPITALIST

## 2019-01-01 PROCEDURE — 71260 CT THORAX DX C+: CPT

## 2019-01-01 PROCEDURE — 0D1L0Z4 BYPASS TRANSVERSE COLON TO CUTANEOUS, OPEN APPROACH: ICD-10-PCS | Performed by: SURGERY

## 2019-01-01 PROCEDURE — 99212 OFFICE O/P EST SF 10 MIN: CPT | Performed by: SURGERY

## 2019-01-01 PROCEDURE — 7100000000 HC PACU RECOVERY - FIRST 15 MIN: Performed by: INTERNAL MEDICINE

## 2019-01-01 PROCEDURE — APPSS15 APP SPLIT SHARED TIME 0-15 MINUTES: Performed by: NURSE PRACTITIONER

## 2019-01-01 PROCEDURE — 0WUF0JZ SUPPLEMENT ABDOMINAL WALL WITH SYNTHETIC SUBSTITUTE, OPEN APPROACH: ICD-10-PCS | Performed by: SURGERY

## 2019-01-01 PROCEDURE — 6360000002 HC RX W HCPCS: Performed by: ANESTHESIOLOGY

## 2019-01-01 PROCEDURE — 3700000000 HC ANESTHESIA ATTENDED CARE: Performed by: SURGERY

## 2019-01-01 PROCEDURE — 97162 PT EVAL MOD COMPLEX 30 MIN: CPT

## 2019-01-01 PROCEDURE — 97535 SELF CARE MNGMENT TRAINING: CPT

## 2019-01-01 PROCEDURE — 2709999900 HC NON-CHARGEABLE SUPPLY: Performed by: INTERNAL MEDICINE

## 2019-01-01 PROCEDURE — 99225 PR SBSQ OBSERVATION CARE/DAY 25 MINUTES: CPT | Performed by: SURGERY

## 2019-01-01 PROCEDURE — 7100000010 HC PHASE II RECOVERY - FIRST 15 MIN: Performed by: INTERNAL MEDICINE

## 2019-01-01 PROCEDURE — C9113 INJ PANTOPRAZOLE SODIUM, VIA: HCPCS

## 2019-01-01 PROCEDURE — 74019 RADEX ABDOMEN 2 VIEWS: CPT

## 2019-01-01 PROCEDURE — 6360000004 HC RX CONTRAST MEDICATION: Performed by: SURGERY

## 2019-01-01 PROCEDURE — 93283 PRGRMG EVAL IMPLANTABLE DFB: CPT | Performed by: INTERNAL MEDICINE

## 2019-01-01 PROCEDURE — 7100000011 HC PHASE II RECOVERY - ADDTL 15 MIN: Performed by: INTERNAL MEDICINE

## 2019-01-01 PROCEDURE — 02HV33Z INSERTION OF INFUSION DEVICE INTO SUPERIOR VENA CAVA, PERCUTANEOUS APPROACH: ICD-10-PCS | Performed by: INTERNAL MEDICINE

## 2019-01-01 PROCEDURE — 0DBU0ZZ EXCISION OF OMENTUM, OPEN APPROACH: ICD-10-PCS | Performed by: SURGERY

## 2019-01-01 PROCEDURE — 87070 CULTURE OTHR SPECIMN AEROBIC: CPT

## 2019-01-01 PROCEDURE — 7100000000 HC PACU RECOVERY - FIRST 15 MIN: Performed by: SURGERY

## 2019-01-01 PROCEDURE — 51702 INSERT TEMP BLADDER CATH: CPT

## 2019-01-01 PROCEDURE — 7100000001 HC PACU RECOVERY - ADDTL 15 MIN: Performed by: INTERNAL MEDICINE

## 2019-01-01 PROCEDURE — 86038 ANTINUCLEAR ANTIBODIES: CPT

## 2019-01-01 PROCEDURE — 36556 INSERT NON-TUNNEL CV CATH: CPT

## 2019-01-01 PROCEDURE — 3700000001 HC ADD 15 MINUTES (ANESTHESIA): Performed by: SURGERY

## 2019-01-01 PROCEDURE — 3700000000 HC ANESTHESIA ATTENDED CARE: Performed by: INTERNAL MEDICINE

## 2019-01-01 PROCEDURE — 94660 CPAP INITIATION&MGMT: CPT

## 2019-01-01 PROCEDURE — 99232 SBSQ HOSP IP/OBS MODERATE 35: CPT | Performed by: PSYCHIATRY & NEUROLOGY

## 2019-01-01 DEVICE — MESH SURG W9XL15CM ABSRB BIO-A: Type: IMPLANTABLE DEVICE | Site: ABDOMEN | Status: FUNCTIONAL

## 2019-01-01 RX ORDER — DIGOXIN 125 MCG
125 TABLET ORAL DAILY
Status: DISCONTINUED | OUTPATIENT
Start: 2019-01-01 | End: 2019-01-01

## 2019-01-01 RX ORDER — SODIUM CHLORIDE 0.9 % (FLUSH) 0.9 %
10 SYRINGE (ML) INJECTION PRN
Status: DISCONTINUED | OUTPATIENT
Start: 2019-01-01 | End: 2019-01-01 | Stop reason: HOSPADM

## 2019-01-01 RX ORDER — POTASSIUM CHLORIDE 7.45 MG/ML
10 INJECTION INTRAVENOUS PRN
Status: DISCONTINUED | OUTPATIENT
Start: 2019-01-01 | End: 2019-01-01

## 2019-01-01 RX ORDER — SODIUM CHLORIDE 0.9 % (FLUSH) 0.9 %
10 SYRINGE (ML) INJECTION EVERY 12 HOURS SCHEDULED
Status: DISCONTINUED | OUTPATIENT
Start: 2019-01-01 | End: 2020-01-01 | Stop reason: HOSPADM

## 2019-01-01 RX ORDER — EPLERENONE 25 MG/1
25 TABLET, FILM COATED ORAL DAILY
Status: DISCONTINUED | OUTPATIENT
Start: 2019-01-01 | End: 2019-01-01

## 2019-01-01 RX ORDER — 0.9 % SODIUM CHLORIDE 0.9 %
1000 INTRAVENOUS SOLUTION INTRAVENOUS ONCE
Status: COMPLETED | OUTPATIENT
Start: 2019-01-01 | End: 2019-01-01

## 2019-01-01 RX ORDER — HYDROMORPHONE HYDROCHLORIDE 1 MG/ML
1 INJECTION, SOLUTION INTRAMUSCULAR; INTRAVENOUS; SUBCUTANEOUS EVERY 4 HOURS PRN
Status: CANCELLED | OUTPATIENT
Start: 2019-01-01

## 2019-01-01 RX ORDER — FUROSEMIDE 10 MG/ML
40 INJECTION INTRAMUSCULAR; INTRAVENOUS ONCE
Status: COMPLETED | OUTPATIENT
Start: 2019-01-01 | End: 2019-01-01

## 2019-01-01 RX ORDER — EPLERENONE 25 MG/1
12.5 TABLET, FILM COATED ORAL DAILY
Status: DISCONTINUED | OUTPATIENT
Start: 2019-01-01 | End: 2019-01-01

## 2019-01-01 RX ORDER — PANTOPRAZOLE SODIUM 40 MG/10ML
INJECTION, POWDER, LYOPHILIZED, FOR SOLUTION INTRAVENOUS
Status: COMPLETED
Start: 2019-01-01 | End: 2019-01-01

## 2019-01-01 RX ORDER — HYDRALAZINE HYDROCHLORIDE 20 MG/ML
10 INJECTION INTRAMUSCULAR; INTRAVENOUS EVERY 6 HOURS PRN
Status: DISCONTINUED | OUTPATIENT
Start: 2019-01-01 | End: 2019-01-01 | Stop reason: HOSPADM

## 2019-01-01 RX ORDER — BUPRENORPHINE 7.5 UG/H
1 PATCH TRANSDERMAL WEEKLY
Status: ON HOLD | COMMUNITY
End: 2019-01-01 | Stop reason: HOSPADM

## 2019-01-01 RX ORDER — QUETIAPINE FUMARATE 25 MG/1
25-50 TABLET, FILM COATED ORAL NIGHTLY
Qty: 60 TABLET | Refills: 0 | Status: SHIPPED | OUTPATIENT
Start: 2019-01-01 | End: 2019-01-01 | Stop reason: SDUPTHER

## 2019-01-01 RX ORDER — 0.9 % SODIUM CHLORIDE 0.9 %
250 INTRAVENOUS SOLUTION INTRAVENOUS ONCE
Status: COMPLETED | OUTPATIENT
Start: 2019-01-01 | End: 2019-01-01

## 2019-01-01 RX ORDER — SODIUM CHLORIDE 9 MG/ML
INJECTION, SOLUTION INTRAVENOUS CONTINUOUS
Status: DISCONTINUED | OUTPATIENT
Start: 2019-01-01 | End: 2019-01-01

## 2019-01-01 RX ORDER — RANITIDINE 150 MG/1
150 TABLET ORAL DAILY
Status: DISCONTINUED | OUTPATIENT
Start: 2019-01-01 | End: 2019-01-01

## 2019-01-01 RX ORDER — CARVEDILOL 12.5 MG/1
TABLET ORAL
Qty: 180 TABLET | Refills: 0
Start: 2019-01-01 | End: 2019-01-01

## 2019-01-01 RX ORDER — MORPHINE SULFATE 4 MG/ML
INJECTION, SOLUTION INTRAMUSCULAR; INTRAVENOUS
Status: COMPLETED
Start: 2019-01-01 | End: 2019-01-01

## 2019-01-01 RX ORDER — FUROSEMIDE 10 MG/ML
40 INJECTION INTRAMUSCULAR; INTRAVENOUS 2 TIMES DAILY
Status: DISCONTINUED | OUTPATIENT
Start: 2019-01-01 | End: 2019-01-01 | Stop reason: HOSPADM

## 2019-01-01 RX ORDER — METRONIDAZOLE 500 MG/1
500 TABLET ORAL EVERY 8 HOURS SCHEDULED
Qty: 42 TABLET | Refills: 0 | Status: ON HOLD | OUTPATIENT
Start: 2019-01-01 | End: 2020-01-01 | Stop reason: HOSPADM

## 2019-01-01 RX ORDER — MORPHINE SULFATE 4 MG/ML
4 INJECTION, SOLUTION INTRAMUSCULAR; INTRAVENOUS
Status: DISCONTINUED | OUTPATIENT
Start: 2019-01-01 | End: 2019-01-01 | Stop reason: SDUPTHER

## 2019-01-01 RX ORDER — KETOROLAC TROMETHAMINE 30 MG/ML
15 INJECTION, SOLUTION INTRAMUSCULAR; INTRAVENOUS ONCE
Status: COMPLETED | OUTPATIENT
Start: 2019-01-01 | End: 2019-01-01

## 2019-01-01 RX ORDER — LUBIPROSTONE 24 UG/1
24 CAPSULE, GELATIN COATED ORAL
Qty: 30 CAPSULE | Refills: 0 | Status: SHIPPED | OUTPATIENT
Start: 2019-01-01 | End: 2019-01-01

## 2019-01-01 RX ORDER — 0.9 % SODIUM CHLORIDE 0.9 %
500 INTRAVENOUS SOLUTION INTRAVENOUS PRN
Status: DISCONTINUED | OUTPATIENT
Start: 2019-01-01 | End: 2019-01-01 | Stop reason: HOSPADM

## 2019-01-01 RX ORDER — BUPRENORPHINE 7.5 UG/H
1 PATCH TRANSDERMAL WEEKLY
Status: DISCONTINUED | OUTPATIENT
Start: 2019-01-01 | End: 2019-01-01 | Stop reason: HOSPADM

## 2019-01-01 RX ORDER — SODIUM CHLORIDE 0.9 % (FLUSH) 0.9 %
SYRINGE (ML) INJECTION
Status: DISCONTINUED
Start: 2019-01-01 | End: 2019-01-01 | Stop reason: HOSPADM

## 2019-01-01 RX ORDER — POTASSIUM CHLORIDE 20 MEQ/1
20 TABLET, EXTENDED RELEASE ORAL 2 TIMES DAILY WITH MEALS
Status: DISCONTINUED | OUTPATIENT
Start: 2019-01-01 | End: 2019-01-01 | Stop reason: HOSPADM

## 2019-01-01 RX ORDER — PANTOPRAZOLE SODIUM 40 MG/10ML
40 INJECTION, POWDER, LYOPHILIZED, FOR SOLUTION INTRAVENOUS DAILY
Status: DISCONTINUED | OUTPATIENT
Start: 2019-01-01 | End: 2020-01-01 | Stop reason: HOSPADM

## 2019-01-01 RX ORDER — QUETIAPINE FUMARATE 25 MG/1
25-50 TABLET, FILM COATED ORAL NIGHTLY
Qty: 60 TABLET | Refills: 0 | Status: ON HOLD | OUTPATIENT
Start: 2019-01-01 | End: 2019-01-01 | Stop reason: HOSPADM

## 2019-01-01 RX ORDER — EPLERENONE 25 MG/1
25 TABLET, FILM COATED ORAL DAILY
Status: DISCONTINUED | OUTPATIENT
Start: 2019-01-01 | End: 2019-01-01 | Stop reason: HOSPADM

## 2019-01-01 RX ORDER — ONDANSETRON 2 MG/ML
INJECTION INTRAMUSCULAR; INTRAVENOUS PRN
Status: DISCONTINUED | OUTPATIENT
Start: 2019-01-01 | End: 2019-01-01 | Stop reason: SDUPTHER

## 2019-01-01 RX ORDER — BUPRENORPHINE 7.5 UG/H
1 PATCH TRANSDERMAL WEEKLY
Qty: 4 PATCH | Refills: 0 | Status: SHIPPED | OUTPATIENT
Start: 2019-01-01 | End: 2019-01-01 | Stop reason: SDUPTHER

## 2019-01-01 RX ORDER — TORSEMIDE 20 MG/1
20 TABLET ORAL DAILY
Status: DISCONTINUED | OUTPATIENT
Start: 2019-01-01 | End: 2019-01-01

## 2019-01-01 RX ORDER — POTASSIUM CHLORIDE 7.45 MG/ML
10 INJECTION INTRAVENOUS PRN
Status: DISCONTINUED | OUTPATIENT
Start: 2019-01-01 | End: 2019-01-01 | Stop reason: SDUPTHER

## 2019-01-01 RX ORDER — ONDANSETRON 2 MG/ML
4 INJECTION INTRAMUSCULAR; INTRAVENOUS EVERY 30 MIN PRN
Status: DISCONTINUED | OUTPATIENT
Start: 2019-01-01 | End: 2019-01-01

## 2019-01-01 RX ORDER — QUETIAPINE FUMARATE 25 MG/1
25 TABLET, FILM COATED ORAL NIGHTLY
Status: DISCONTINUED | OUTPATIENT
Start: 2019-01-01 | End: 2019-01-01 | Stop reason: HOSPADM

## 2019-01-01 RX ORDER — PANTOPRAZOLE SODIUM 40 MG/1
40 TABLET, DELAYED RELEASE ORAL
Status: DISCONTINUED | OUTPATIENT
Start: 2019-01-01 | End: 2019-01-01 | Stop reason: HOSPADM

## 2019-01-01 RX ORDER — LEVOFLOXACIN 5 MG/ML
500 INJECTION, SOLUTION INTRAVENOUS EVERY 24 HOURS
Status: DISCONTINUED | OUTPATIENT
Start: 2019-01-01 | End: 2019-01-01 | Stop reason: HOSPADM

## 2019-01-01 RX ORDER — AMOXICILLIN AND CLAVULANATE POTASSIUM 875; 125 MG/1; MG/1
1 TABLET, FILM COATED ORAL 2 TIMES DAILY
Qty: 20 TABLET | Refills: 0 | Status: SHIPPED | OUTPATIENT
Start: 2019-01-01 | End: 2019-01-01

## 2019-01-01 RX ORDER — CARVEDILOL 6.25 MG/1
6.25 TABLET ORAL 2 TIMES DAILY WITH MEALS
Status: DISCONTINUED | OUTPATIENT
Start: 2019-01-01 | End: 2019-01-01 | Stop reason: HOSPADM

## 2019-01-01 RX ORDER — LIDOCAINE HYDROCHLORIDE 10 MG/ML
1 INJECTION, SOLUTION EPIDURAL; INFILTRATION; INTRACAUDAL; PERINEURAL
Status: DISCONTINUED | OUTPATIENT
Start: 2019-01-01 | End: 2019-01-01 | Stop reason: HOSPADM

## 2019-01-01 RX ORDER — MORPHINE SULFATE 2 MG/ML
2 INJECTION, SOLUTION INTRAMUSCULAR; INTRAVENOUS EVERY 4 HOURS PRN
Status: DISCONTINUED | OUTPATIENT
Start: 2019-01-01 | End: 2019-01-01 | Stop reason: HOSPADM

## 2019-01-01 RX ORDER — 0.9 % SODIUM CHLORIDE 0.9 %
10 VIAL (ML) INJECTION DAILY
Status: DISCONTINUED | OUTPATIENT
Start: 2019-01-01 | End: 2019-01-01 | Stop reason: HOSPADM

## 2019-01-01 RX ORDER — DULOXETIN HYDROCHLORIDE 30 MG/1
30 CAPSULE, DELAYED RELEASE ORAL DAILY
Status: DISCONTINUED | OUTPATIENT
Start: 2019-01-01 | End: 2019-01-01 | Stop reason: HOSPADM

## 2019-01-01 RX ORDER — TORSEMIDE 20 MG/1
40 TABLET ORAL DAILY
Status: DISCONTINUED | OUTPATIENT
Start: 2019-01-01 | End: 2019-01-01 | Stop reason: HOSPADM

## 2019-01-01 RX ORDER — DULOXETIN HYDROCHLORIDE 30 MG/1
30 CAPSULE, DELAYED RELEASE ORAL DAILY
Qty: 30 CAPSULE | Refills: 0 | Status: SHIPPED | OUTPATIENT
Start: 2019-01-01 | End: 2019-01-01 | Stop reason: SDUPTHER

## 2019-01-01 RX ORDER — POTASSIUM CHLORIDE 7.45 MG/ML
10 INJECTION INTRAVENOUS
Status: DISPENSED | OUTPATIENT
Start: 2019-01-01 | End: 2019-01-01

## 2019-01-01 RX ORDER — MORPHINE SULFATE 4 MG/ML
4 INJECTION, SOLUTION INTRAMUSCULAR; INTRAVENOUS ONCE
Status: COMPLETED | OUTPATIENT
Start: 2019-01-01 | End: 2019-01-01

## 2019-01-01 RX ORDER — FUROSEMIDE 10 MG/ML
40 INJECTION INTRAMUSCULAR; INTRAVENOUS 2 TIMES DAILY
Status: DISCONTINUED | OUTPATIENT
Start: 2019-01-01 | End: 2019-01-01

## 2019-01-01 RX ORDER — HYDRALAZINE HYDROCHLORIDE 20 MG/ML
10 INJECTION INTRAMUSCULAR; INTRAVENOUS EVERY 6 HOURS PRN
Status: DISCONTINUED | OUTPATIENT
Start: 2019-01-01 | End: 2019-01-01 | Stop reason: SDUPTHER

## 2019-01-01 RX ORDER — LANOLIN ALCOHOL/MO/W.PET/CERES
3 CREAM (GRAM) TOPICAL NIGHTLY PRN
Status: DISCONTINUED | OUTPATIENT
Start: 2019-01-01 | End: 2019-01-01 | Stop reason: HOSPADM

## 2019-01-01 RX ORDER — KETOROLAC TROMETHAMINE 30 MG/ML
30 INJECTION, SOLUTION INTRAMUSCULAR; INTRAVENOUS EVERY 6 HOURS PRN
Status: DISPENSED | OUTPATIENT
Start: 2019-01-01 | End: 2019-01-01

## 2019-01-01 RX ORDER — HYDROCODONE BITARTRATE AND ACETAMINOPHEN 5; 325 MG/1; MG/1
1 TABLET ORAL EVERY 6 HOURS PRN
Qty: 56 TABLET | Refills: 0 | Status: SHIPPED | OUTPATIENT
Start: 2019-01-01 | End: 2019-01-01

## 2019-01-01 RX ORDER — MORPHINE SULFATE 2 MG/ML
1 INJECTION, SOLUTION INTRAMUSCULAR; INTRAVENOUS EVERY 4 HOURS PRN
Status: DISCONTINUED | OUTPATIENT
Start: 2019-01-01 | End: 2019-01-01 | Stop reason: HOSPADM

## 2019-01-01 RX ORDER — FUROSEMIDE 10 MG/ML
60 INJECTION INTRAMUSCULAR; INTRAVENOUS ONCE
Status: COMPLETED | OUTPATIENT
Start: 2019-01-01 | End: 2019-01-01

## 2019-01-01 RX ORDER — LISINOPRIL 5 MG/1
5 TABLET ORAL DAILY
Qty: 90 TABLET | Refills: 3 | Status: SHIPPED
Start: 2019-01-01 | End: 2020-01-01

## 2019-01-01 RX ORDER — FENTANYL CITRATE 50 UG/ML
INJECTION, SOLUTION INTRAMUSCULAR; INTRAVENOUS PRN
Status: DISCONTINUED | OUTPATIENT
Start: 2019-01-01 | End: 2019-01-01 | Stop reason: SDUPTHER

## 2019-01-01 RX ORDER — ONDANSETRON 2 MG/ML
4 INJECTION INTRAMUSCULAR; INTRAVENOUS EVERY 4 HOURS PRN
Status: DISCONTINUED | OUTPATIENT
Start: 2019-01-01 | End: 2019-01-01 | Stop reason: HOSPADM

## 2019-01-01 RX ORDER — SIMETHICONE 80 MG
80 TABLET,CHEWABLE ORAL 4 TIMES DAILY PRN
Status: DISCONTINUED | OUTPATIENT
Start: 2019-01-01 | End: 2019-01-01 | Stop reason: HOSPADM

## 2019-01-01 RX ORDER — TORSEMIDE 20 MG/1
40 TABLET ORAL 2 TIMES DAILY
Qty: 60 TABLET | Refills: 3 | Status: ON HOLD | OUTPATIENT
Start: 2019-01-01 | End: 2019-01-01 | Stop reason: HOSPADM

## 2019-01-01 RX ORDER — DEXTROSE, SODIUM CHLORIDE, AND POTASSIUM CHLORIDE 5; .45; .15 G/100ML; G/100ML; G/100ML
INJECTION INTRAVENOUS CONTINUOUS
Status: DISCONTINUED | OUTPATIENT
Start: 2019-01-01 | End: 2019-01-01

## 2019-01-01 RX ORDER — DIAZEPAM 5 MG/1
5 TABLET ORAL 3 TIMES DAILY
Status: DISCONTINUED | OUTPATIENT
Start: 2019-01-01 | End: 2019-01-01

## 2019-01-01 RX ORDER — TORSEMIDE 20 MG/1
40 TABLET ORAL 2 TIMES DAILY
Status: DISCONTINUED | OUTPATIENT
Start: 2019-01-01 | End: 2019-01-01 | Stop reason: HOSPADM

## 2019-01-01 RX ORDER — PREGABALIN 25 MG/1
75 CAPSULE ORAL 2 TIMES DAILY
Status: DISCONTINUED | OUTPATIENT
Start: 2019-01-01 | End: 2019-01-01 | Stop reason: HOSPADM

## 2019-01-01 RX ORDER — MILRINONE LACTATE 0.2 MG/ML
0.2 INJECTION, SOLUTION INTRAVENOUS CONTINUOUS
Status: DISCONTINUED | OUTPATIENT
Start: 2019-01-01 | End: 2020-01-01

## 2019-01-01 RX ORDER — FAMOTIDINE 20 MG/1
20 TABLET, FILM COATED ORAL DAILY
Status: DISCONTINUED | OUTPATIENT
Start: 2019-01-01 | End: 2019-01-01 | Stop reason: HOSPADM

## 2019-01-01 RX ORDER — SIMETHICONE 80 MG
80 TABLET,CHEWABLE ORAL EVERY 6 HOURS PRN
Status: DISCONTINUED | OUTPATIENT
Start: 2019-01-01 | End: 2019-01-01 | Stop reason: HOSPADM

## 2019-01-01 RX ORDER — MORPHINE SULFATE 2 MG/ML
1 INJECTION, SOLUTION INTRAMUSCULAR; INTRAVENOUS EVERY 4 HOURS PRN
Status: DISCONTINUED | OUTPATIENT
Start: 2019-01-01 | End: 2019-01-01

## 2019-01-01 RX ORDER — DOBUTAMINE HYDROCHLORIDE 200 MG/100ML
2.5 INJECTION INTRAVENOUS CONTINUOUS
Status: DISCONTINUED | OUTPATIENT
Start: 2019-01-01 | End: 2019-01-01

## 2019-01-01 RX ORDER — SODIUM CHLORIDE 0.9 % (FLUSH) 0.9 %
10 SYRINGE (ML) INJECTION PRN
Status: DISCONTINUED | OUTPATIENT
Start: 2019-01-01 | End: 2020-01-01 | Stop reason: HOSPADM

## 2019-01-01 RX ORDER — IPRATROPIUM BROMIDE AND ALBUTEROL SULFATE 2.5; .5 MG/3ML; MG/3ML
1 SOLUTION RESPIRATORY (INHALATION)
Status: DISCONTINUED | OUTPATIENT
Start: 2019-01-01 | End: 2019-01-01 | Stop reason: HOSPADM

## 2019-01-01 RX ORDER — POTASSIUM CHLORIDE 20 MEQ/1
40 TABLET, EXTENDED RELEASE ORAL PRN
Status: DISCONTINUED | OUTPATIENT
Start: 2019-01-01 | End: 2019-01-01 | Stop reason: SDUPTHER

## 2019-01-01 RX ORDER — DICYCLOMINE HYDROCHLORIDE 10 MG/1
10 CAPSULE ORAL EVERY 6 HOURS PRN
Status: DISCONTINUED | OUTPATIENT
Start: 2019-01-01 | End: 2019-01-01 | Stop reason: HOSPADM

## 2019-01-01 RX ORDER — FUROSEMIDE 10 MG/ML
20 INJECTION INTRAMUSCULAR; INTRAVENOUS ONCE
Status: COMPLETED | OUTPATIENT
Start: 2019-01-01 | End: 2019-01-01

## 2019-01-01 RX ORDER — LISINOPRIL 10 MG/1
5 TABLET ORAL DAILY
Status: DISCONTINUED | OUTPATIENT
Start: 2019-01-01 | End: 2019-01-01 | Stop reason: HOSPADM

## 2019-01-01 RX ORDER — PEG-3350, SODIUM SULFATE, SODIUM CHLORIDE, POTASSIUM CHLORIDE, SODIUM ASCORBATE AND ASCORBIC ACID 7.5-2.691G
100 KIT ORAL ONCE
Status: DISCONTINUED | OUTPATIENT
Start: 2019-01-01 | End: 2019-01-01

## 2019-01-01 RX ORDER — HYDROMORPHONE HCL 110MG/55ML
0.25 PATIENT CONTROLLED ANALGESIA SYRINGE INTRAVENOUS EVERY 5 MIN PRN
Status: DISCONTINUED | OUTPATIENT
Start: 2019-01-01 | End: 2019-01-01 | Stop reason: HOSPADM

## 2019-01-01 RX ORDER — CARVEDILOL 12.5 MG/1
6.25 TABLET ORAL 2 TIMES DAILY WITH MEALS
Qty: 60 TABLET | Refills: 3
Start: 2019-01-01 | End: 2019-01-01 | Stop reason: SDUPTHER

## 2019-01-01 RX ORDER — POTASSIUM CHLORIDE 20 MEQ/1
40 TABLET, EXTENDED RELEASE ORAL PRN
Status: DISCONTINUED | OUTPATIENT
Start: 2019-01-01 | End: 2019-01-01 | Stop reason: HOSPADM

## 2019-01-01 RX ORDER — 0.9 % SODIUM CHLORIDE 0.9 %
1500 INTRAVENOUS SOLUTION INTRAVENOUS ONCE
Status: DISCONTINUED | OUTPATIENT
Start: 2019-01-01 | End: 2019-01-01

## 2019-01-01 RX ORDER — SODIUM CHLORIDE 9 MG/ML
INJECTION, SOLUTION INTRAVENOUS CONTINUOUS
Status: DISCONTINUED | OUTPATIENT
Start: 2019-01-01 | End: 2019-01-01 | Stop reason: SDUPTHER

## 2019-01-01 RX ORDER — TORSEMIDE 20 MG/1
20 TABLET ORAL DAILY
Status: DISCONTINUED | OUTPATIENT
Start: 2019-01-01 | End: 2019-01-01 | Stop reason: ALTCHOICE

## 2019-01-01 RX ORDER — SODIUM CHLORIDE, SODIUM LACTATE, POTASSIUM CHLORIDE, AND CALCIUM CHLORIDE .6; .31; .03; .02 G/100ML; G/100ML; G/100ML; G/100ML
500 INJECTION, SOLUTION INTRAVENOUS ONCE
Status: DISCONTINUED | OUTPATIENT
Start: 2019-01-01 | End: 2019-01-01

## 2019-01-01 RX ORDER — ACETAMINOPHEN 325 MG/1
650 TABLET ORAL EVERY 4 HOURS PRN
Status: DISCONTINUED | OUTPATIENT
Start: 2019-01-01 | End: 2019-01-01 | Stop reason: HOSPADM

## 2019-01-01 RX ORDER — AMOXICILLIN AND CLAVULANATE POTASSIUM 875; 125 MG/1; MG/1
1 TABLET, FILM COATED ORAL 2 TIMES DAILY
COMMUNITY
End: 2019-01-01

## 2019-01-01 RX ORDER — EPLERENONE 25 MG/1
25 TABLET, FILM COATED ORAL DAILY
Qty: 30 TABLET | Refills: 3 | Status: SHIPPED | OUTPATIENT
Start: 2019-01-01 | End: 2019-01-01 | Stop reason: SDUPTHER

## 2019-01-01 RX ORDER — LISINOPRIL 5 MG/1
5 TABLET ORAL DAILY
Status: DISCONTINUED | OUTPATIENT
Start: 2019-01-01 | End: 2019-01-01 | Stop reason: HOSPADM

## 2019-01-01 RX ORDER — QUETIAPINE FUMARATE 50 MG/1
50 TABLET, FILM COATED ORAL NIGHTLY
Qty: 60 TABLET | Refills: 3 | Status: SHIPPED | OUTPATIENT
Start: 2019-01-01 | End: 2019-01-01 | Stop reason: DRUGHIGH

## 2019-01-01 RX ORDER — DIAZEPAM 5 MG/1
5 TABLET ORAL 3 TIMES DAILY
Status: DISCONTINUED | OUTPATIENT
Start: 2019-01-01 | End: 2019-01-01 | Stop reason: HOSPADM

## 2019-01-01 RX ORDER — HYDROCODONE BITARTRATE AND ACETAMINOPHEN 5; 325 MG/1; MG/1
1-2 TABLET ORAL EVERY 4 HOURS PRN
Status: ON HOLD | COMMUNITY
End: 2020-01-01 | Stop reason: SDUPTHER

## 2019-01-01 RX ORDER — HYDROCODONE BITARTRATE AND ACETAMINOPHEN 5; 325 MG/1; MG/1
1 TABLET ORAL EVERY 4 HOURS PRN
Status: DISCONTINUED | OUTPATIENT
Start: 2019-01-01 | End: 2019-01-01 | Stop reason: HOSPADM

## 2019-01-01 RX ORDER — MORPHINE SULFATE 2 MG/ML
0.5 INJECTION, SOLUTION INTRAMUSCULAR; INTRAVENOUS EVERY 4 HOURS PRN
Status: DISCONTINUED | OUTPATIENT
Start: 2019-01-01 | End: 2019-01-01 | Stop reason: HOSPADM

## 2019-01-01 RX ORDER — POTASSIUM CHLORIDE 7.45 MG/ML
10 INJECTION INTRAVENOUS PRN
Status: DISCONTINUED | OUTPATIENT
Start: 2019-01-01 | End: 2020-01-01 | Stop reason: HOSPADM

## 2019-01-01 RX ORDER — PREGABALIN 75 MG/1
75 CAPSULE ORAL DAILY
Status: DISCONTINUED | OUTPATIENT
Start: 2019-01-01 | End: 2019-01-01 | Stop reason: HOSPADM

## 2019-01-01 RX ORDER — 0.9 % SODIUM CHLORIDE 0.9 %
500 INTRAVENOUS SOLUTION INTRAVENOUS PRN
Status: DISCONTINUED | OUTPATIENT
Start: 2019-01-01 | End: 2019-01-01 | Stop reason: SDUPTHER

## 2019-01-01 RX ORDER — PREGABALIN 75 MG/1
CAPSULE ORAL
Qty: 90 CAPSULE | Refills: 0 | Status: SHIPPED | OUTPATIENT
Start: 2019-01-01 | End: 2019-01-01 | Stop reason: SDUPTHER

## 2019-01-01 RX ORDER — DICYCLOMINE HYDROCHLORIDE 10 MG/1
10 CAPSULE ORAL ONCE
Status: COMPLETED | OUTPATIENT
Start: 2019-01-01 | End: 2019-01-01

## 2019-01-01 RX ORDER — ONDANSETRON 2 MG/ML
4 INJECTION INTRAMUSCULAR; INTRAVENOUS EVERY 6 HOURS PRN
Status: DISCONTINUED | OUTPATIENT
Start: 2019-01-01 | End: 2019-01-01 | Stop reason: SDUPTHER

## 2019-01-01 RX ORDER — MIDAZOLAM HYDROCHLORIDE 1 MG/ML
2 INJECTION INTRAMUSCULAR; INTRAVENOUS ONCE
Status: COMPLETED | OUTPATIENT
Start: 2019-01-01 | End: 2019-01-01

## 2019-01-01 RX ORDER — EPLERENONE 25 MG/1
25 TABLET, FILM COATED ORAL DAILY
Qty: 90 TABLET | Refills: 3 | Status: ON HOLD
Start: 2019-01-01 | End: 2020-01-01 | Stop reason: HOSPADM

## 2019-01-01 RX ORDER — PANTOPRAZOLE SODIUM 40 MG/10ML
40 INJECTION, POWDER, LYOPHILIZED, FOR SOLUTION INTRAVENOUS ONCE
Status: COMPLETED | OUTPATIENT
Start: 2019-01-01 | End: 2019-01-01

## 2019-01-01 RX ORDER — FUROSEMIDE 40 MG/1
40 TABLET ORAL 2 TIMES DAILY
Status: DISCONTINUED | OUTPATIENT
Start: 2019-01-01 | End: 2019-01-01 | Stop reason: HOSPADM

## 2019-01-01 RX ORDER — CARVEDILOL 6.25 MG/1
12.5 TABLET ORAL 2 TIMES DAILY WITH MEALS
Status: DISCONTINUED | OUTPATIENT
Start: 2019-01-01 | End: 2019-01-01

## 2019-01-01 RX ORDER — SODIUM CHLORIDE 9 MG/ML
INJECTION, SOLUTION INTRAVENOUS CONTINUOUS
Status: CANCELLED | OUTPATIENT
Start: 2019-01-01

## 2019-01-01 RX ORDER — FUROSEMIDE 10 MG/ML
80 INJECTION INTRAMUSCULAR; INTRAVENOUS ONCE
Status: COMPLETED | OUTPATIENT
Start: 2019-01-01 | End: 2019-01-01

## 2019-01-01 RX ORDER — POTASSIUM CHLORIDE 7.45 MG/ML
10 INJECTION INTRAVENOUS PRN
Status: DISCONTINUED | OUTPATIENT
Start: 2019-01-01 | End: 2019-01-01 | Stop reason: HOSPADM

## 2019-01-01 RX ORDER — DIGOXIN 125 MCG
125 TABLET ORAL DAILY
Status: DISCONTINUED | OUTPATIENT
Start: 2019-01-01 | End: 2019-01-01 | Stop reason: HOSPADM

## 2019-01-01 RX ORDER — METHYLPREDNISOLONE SODIUM SUCCINATE 125 MG/2ML
60 INJECTION, POWDER, LYOPHILIZED, FOR SOLUTION INTRAMUSCULAR; INTRAVENOUS EVERY 6 HOURS
Status: DISCONTINUED | OUTPATIENT
Start: 2019-01-01 | End: 2019-01-01 | Stop reason: HOSPADM

## 2019-01-01 RX ORDER — NORTRIPTYLINE HYDROCHLORIDE 25 MG/1
25 CAPSULE ORAL NIGHTLY
Status: DISCONTINUED | OUTPATIENT
Start: 2019-01-01 | End: 2019-01-01 | Stop reason: HOSPADM

## 2019-01-01 RX ORDER — QUETIAPINE FUMARATE 25 MG/1
50 TABLET, FILM COATED ORAL NIGHTLY
Status: DISCONTINUED | OUTPATIENT
Start: 2019-01-01 | End: 2019-01-01 | Stop reason: HOSPADM

## 2019-01-01 RX ORDER — IPRATROPIUM BROMIDE AND ALBUTEROL SULFATE 2.5; .5 MG/3ML; MG/3ML
3 SOLUTION RESPIRATORY (INHALATION) ONCE
Status: COMPLETED | OUTPATIENT
Start: 2019-01-01 | End: 2019-01-01

## 2019-01-01 RX ORDER — ASPIRIN 81 MG/1
324 TABLET, CHEWABLE ORAL ONCE
Status: COMPLETED | OUTPATIENT
Start: 2019-01-01 | End: 2019-01-01

## 2019-01-01 RX ORDER — ROSUVASTATIN CALCIUM 10 MG/1
5 TABLET, COATED ORAL NIGHTLY
Status: DISCONTINUED | OUTPATIENT
Start: 2019-01-01 | End: 2019-01-01 | Stop reason: HOSPADM

## 2019-01-01 RX ORDER — ONDANSETRON 2 MG/ML
4 INJECTION INTRAMUSCULAR; INTRAVENOUS
Status: DISCONTINUED | OUTPATIENT
Start: 2019-01-01 | End: 2019-01-01 | Stop reason: SDUPTHER

## 2019-01-01 RX ORDER — LISINOPRIL 5 MG/1
2.5 TABLET ORAL EVERY 12 HOURS
Status: DISCONTINUED | OUTPATIENT
Start: 2019-01-01 | End: 2019-01-01 | Stop reason: HOSPADM

## 2019-01-01 RX ORDER — EPLERENONE 25 MG/1
25 TABLET, FILM COATED ORAL DAILY
Status: DISCONTINUED | OUTPATIENT
Start: 2019-01-01 | End: 2019-01-01 | Stop reason: SDUPTHER

## 2019-01-01 RX ORDER — ONDANSETRON 2 MG/ML
4 INJECTION INTRAMUSCULAR; INTRAVENOUS ONCE
Status: COMPLETED | OUTPATIENT
Start: 2019-01-01 | End: 2019-01-01

## 2019-01-01 RX ORDER — ACETAMINOPHEN 10 MG/ML
1000 INJECTION, SOLUTION INTRAVENOUS EVERY 6 HOURS
Status: COMPLETED | OUTPATIENT
Start: 2019-01-01 | End: 2019-01-01

## 2019-01-01 RX ORDER — ONDANSETRON 2 MG/ML
4 INJECTION INTRAMUSCULAR; INTRAVENOUS EVERY 6 HOURS PRN
Status: DISCONTINUED | OUTPATIENT
Start: 2019-01-01 | End: 2019-01-01 | Stop reason: HOSPADM

## 2019-01-01 RX ORDER — FUROSEMIDE 10 MG/ML
20 INJECTION INTRAMUSCULAR; INTRAVENOUS 2 TIMES DAILY
Status: DISCONTINUED | OUTPATIENT
Start: 2019-01-01 | End: 2019-01-01

## 2019-01-01 RX ORDER — SODIUM CHLORIDE 0.9 % (FLUSH) 0.9 %
10 SYRINGE (ML) INJECTION EVERY 12 HOURS SCHEDULED
Status: DISCONTINUED | OUTPATIENT
Start: 2019-01-01 | End: 2019-01-01 | Stop reason: HOSPADM

## 2019-01-01 RX ORDER — MORPHINE SULFATE 4 MG/ML
4 INJECTION, SOLUTION INTRAMUSCULAR; INTRAVENOUS
Status: DISCONTINUED | OUTPATIENT
Start: 2019-01-01 | End: 2019-01-01

## 2019-01-01 RX ORDER — PREGABALIN 75 MG/1
150 CAPSULE ORAL NIGHTLY
Status: DISCONTINUED | OUTPATIENT
Start: 2019-01-01 | End: 2019-01-01 | Stop reason: HOSPADM

## 2019-01-01 RX ORDER — MAGNESIUM SULFATE 1 G/100ML
1 INJECTION INTRAVENOUS ONCE
Status: COMPLETED | OUTPATIENT
Start: 2019-01-01 | End: 2019-01-01

## 2019-01-01 RX ORDER — MAGNESIUM HYDROXIDE 1200 MG/15ML
LIQUID ORAL CONTINUOUS PRN
Status: COMPLETED | OUTPATIENT
Start: 2019-01-01 | End: 2019-01-01

## 2019-01-01 RX ORDER — NEOSTIGMINE METHYLSULFATE 5 MG/5 ML
SYRINGE (ML) INTRAVENOUS PRN
Status: DISCONTINUED | OUTPATIENT
Start: 2019-01-01 | End: 2019-01-01 | Stop reason: SDUPTHER

## 2019-01-01 RX ORDER — DOBUTAMINE HYDROCHLORIDE 200 MG/100ML
7.5 INJECTION INTRAVENOUS CONTINUOUS
Status: DISCONTINUED | OUTPATIENT
Start: 2019-01-01 | End: 2019-01-01

## 2019-01-01 RX ORDER — HYDROMORPHONE HYDROCHLORIDE 1 MG/ML
1 INJECTION, SOLUTION INTRAMUSCULAR; INTRAVENOUS; SUBCUTANEOUS ONCE
Status: COMPLETED | OUTPATIENT
Start: 2019-01-01 | End: 2019-01-01

## 2019-01-01 RX ORDER — POTASSIUM CHLORIDE 20 MEQ/1
40 TABLET, EXTENDED RELEASE ORAL ONCE
Status: COMPLETED | OUTPATIENT
Start: 2019-01-01 | End: 2019-01-01

## 2019-01-01 RX ORDER — ACETAMINOPHEN 80 MG
TABLET,CHEWABLE ORAL
Status: DISPENSED
Start: 2019-01-01 | End: 2019-01-01

## 2019-01-01 RX ORDER — BUPRENORPHINE 7.5 UG/H
1 PATCH TRANSDERMAL WEEKLY
Qty: 4 PATCH | Refills: 1 | Status: SHIPPED | OUTPATIENT
Start: 2019-01-01 | End: 2019-01-01

## 2019-01-01 RX ORDER — METHYLPREDNISOLONE SODIUM SUCCINATE 125 MG/2ML
125 INJECTION, POWDER, LYOPHILIZED, FOR SOLUTION INTRAMUSCULAR; INTRAVENOUS ONCE
Status: COMPLETED | OUTPATIENT
Start: 2019-01-01 | End: 2019-01-01

## 2019-01-01 RX ORDER — CARVEDILOL 6.25 MG/1
12.5 TABLET ORAL 2 TIMES DAILY WITH MEALS
Status: DISCONTINUED | OUTPATIENT
Start: 2019-01-01 | End: 2019-01-01 | Stop reason: HOSPADM

## 2019-01-01 RX ORDER — DULOXETIN HYDROCHLORIDE 30 MG/1
30 CAPSULE, DELAYED RELEASE ORAL DAILY
Qty: 30 CAPSULE | Refills: 0 | Status: SHIPPED | OUTPATIENT
Start: 2019-01-01

## 2019-01-01 RX ORDER — NORTRIPTYLINE HYDROCHLORIDE 10 MG/1
10 CAPSULE ORAL NIGHTLY
Status: DISCONTINUED | OUTPATIENT
Start: 2019-01-01 | End: 2019-01-01 | Stop reason: HOSPADM

## 2019-01-01 RX ORDER — MILRINONE LACTATE 0.2 MG/ML
0.1 INJECTION, SOLUTION INTRAVENOUS CONTINUOUS
Status: DISCONTINUED | OUTPATIENT
Start: 2019-01-01 | End: 2019-01-01 | Stop reason: HOSPADM

## 2019-01-01 RX ORDER — FUROSEMIDE 10 MG/ML
40 INJECTION INTRAMUSCULAR; INTRAVENOUS 3 TIMES DAILY
Status: DISCONTINUED | OUTPATIENT
Start: 2019-01-01 | End: 2019-01-01

## 2019-01-01 RX ORDER — CARVEDILOL 3.12 MG/1
3.12 TABLET ORAL 2 TIMES DAILY WITH MEALS
Status: DISCONTINUED | OUTPATIENT
Start: 2019-01-01 | End: 2020-01-01 | Stop reason: HOSPADM

## 2019-01-01 RX ORDER — PANTOPRAZOLE SODIUM 40 MG/10ML
40 INJECTION, POWDER, LYOPHILIZED, FOR SOLUTION INTRAVENOUS DAILY
Status: DISCONTINUED | OUTPATIENT
Start: 2019-01-01 | End: 2019-01-01 | Stop reason: HOSPADM

## 2019-01-01 RX ORDER — LEVOFLOXACIN 5 MG/ML
500 INJECTION, SOLUTION INTRAVENOUS ONCE
Status: COMPLETED | OUTPATIENT
Start: 2019-01-01 | End: 2019-01-01

## 2019-01-01 RX ORDER — PROMETHAZINE HYDROCHLORIDE 25 MG/ML
12.5 INJECTION, SOLUTION INTRAMUSCULAR; INTRAVENOUS ONCE
Status: COMPLETED | OUTPATIENT
Start: 2019-01-01 | End: 2019-01-01

## 2019-01-01 RX ORDER — SODIUM CHLORIDE 9 MG/ML
INJECTION, SOLUTION INTRAVENOUS
Status: DISPENSED
Start: 2019-01-01 | End: 2019-01-01

## 2019-01-01 RX ORDER — TORSEMIDE 20 MG/1
40 TABLET ORAL EVERY OTHER DAY
Qty: 36 TABLET | Refills: 3 | Status: SHIPPED | OUTPATIENT
Start: 2019-01-01 | End: 2019-01-01

## 2019-01-01 RX ORDER — MIDAZOLAM HYDROCHLORIDE 1 MG/ML
1 INJECTION INTRAMUSCULAR; INTRAVENOUS ONCE
Status: COMPLETED | OUTPATIENT
Start: 2019-01-01 | End: 2019-01-01

## 2019-01-01 RX ORDER — FUROSEMIDE 10 MG/ML
80 INJECTION INTRAMUSCULAR; INTRAVENOUS 2 TIMES DAILY
Status: DISCONTINUED | OUTPATIENT
Start: 2019-01-01 | End: 2020-01-01

## 2019-01-01 RX ORDER — PROPOFOL 10 MG/ML
INJECTION, EMULSION INTRAVENOUS PRN
Status: DISCONTINUED | OUTPATIENT
Start: 2019-01-01 | End: 2019-01-01 | Stop reason: SDUPTHER

## 2019-01-01 RX ORDER — MAGNESIUM SULFATE 1 G/100ML
1 INJECTION INTRAVENOUS PRN
Status: DISCONTINUED | OUTPATIENT
Start: 2019-01-01 | End: 2019-01-01 | Stop reason: HOSPADM

## 2019-01-01 RX ORDER — DULOXETIN HYDROCHLORIDE 30 MG/1
30 CAPSULE, DELAYED RELEASE ORAL DAILY
Qty: 30 CAPSULE | Refills: 1 | Status: SHIPPED | OUTPATIENT
Start: 2019-01-01 | End: 2019-01-01 | Stop reason: SDUPTHER

## 2019-01-01 RX ORDER — CETIRIZINE HYDROCHLORIDE 10 MG/1
10 TABLET ORAL DAILY
Status: DISCONTINUED | OUTPATIENT
Start: 2019-01-01 | End: 2019-01-01 | Stop reason: HOSPADM

## 2019-01-01 RX ORDER — BUPRENORPHINE 7.5 UG/H
1 PATCH TRANSDERMAL WEEKLY
COMMUNITY
End: 2019-01-01 | Stop reason: SDUPTHER

## 2019-01-01 RX ORDER — ACETAMINOPHEN 10 MG/ML
INJECTION, SOLUTION INTRAVENOUS
Status: COMPLETED
Start: 2019-01-01 | End: 2019-01-01

## 2019-01-01 RX ORDER — LOVASTATIN 20 MG/1
40 TABLET ORAL NIGHTLY
Status: DISCONTINUED | OUTPATIENT
Start: 2019-01-01 | End: 2019-01-01 | Stop reason: CLARIF

## 2019-01-01 RX ORDER — BUPRENORPHINE 7.5 UG/H
1 PATCH TRANSDERMAL WEEKLY
Qty: 4 PATCH | Refills: 0 | Status: SHIPPED | OUTPATIENT
Start: 2019-01-01 | End: 2019-01-01

## 2019-01-01 RX ORDER — BUPRENORPHINE 7.5 UG/H
1 PATCH TRANSDERMAL WEEKLY
Qty: 4 PATCH | Refills: 0 | OUTPATIENT
Start: 2019-01-01 | End: 2019-01-01 | Stop reason: SDUPTHER

## 2019-01-01 RX ORDER — LANOLIN ALCOHOL/MO/W.PET/CERES
10 CREAM (GRAM) TOPICAL NIGHTLY PRN
Status: DISCONTINUED | OUTPATIENT
Start: 2019-01-01 | End: 2019-01-01 | Stop reason: HOSPADM

## 2019-01-01 RX ORDER — CARVEDILOL 12.5 MG/1
6.25 TABLET ORAL 2 TIMES DAILY WITH MEALS
Qty: 90 TABLET | Refills: 3 | Status: ON HOLD | OUTPATIENT
Start: 2019-01-01 | End: 2020-01-01 | Stop reason: HOSPADM

## 2019-01-01 RX ORDER — METOCLOPRAMIDE HYDROCHLORIDE 5 MG/ML
10 INJECTION INTRAMUSCULAR; INTRAVENOUS EVERY 6 HOURS
Status: COMPLETED | OUTPATIENT
Start: 2019-01-01 | End: 2019-01-01

## 2019-01-01 RX ORDER — LIDOCAINE HYDROCHLORIDE 20 MG/ML
INJECTION, SOLUTION EPIDURAL; INFILTRATION; INTRACAUDAL; PERINEURAL PRN
Status: DISCONTINUED | OUTPATIENT
Start: 2019-01-01 | End: 2019-01-01 | Stop reason: SDUPTHER

## 2019-01-01 RX ORDER — LACTULOSE 10 G/15ML
20 SOLUTION ORAL 4 TIMES DAILY
Status: DISCONTINUED | OUTPATIENT
Start: 2019-01-01 | End: 2019-01-01 | Stop reason: HOSPADM

## 2019-01-01 RX ORDER — FUROSEMIDE 40 MG/1
40 TABLET ORAL 2 TIMES DAILY
Qty: 60 TABLET | Refills: 3 | Status: SHIPPED | OUTPATIENT
Start: 2019-01-01 | End: 2020-01-01 | Stop reason: SDUPTHER

## 2019-01-01 RX ORDER — RANITIDINE 150 MG/1
150 TABLET ORAL DAILY
Status: DISCONTINUED | OUTPATIENT
Start: 2019-01-01 | End: 2019-01-01 | Stop reason: CLARIF

## 2019-01-01 RX ORDER — HYDROCODONE BITARTRATE AND ACETAMINOPHEN 7.5; 325 MG/1; MG/1
2 TABLET ORAL EVERY 6 HOURS PRN
Status: DISCONTINUED | OUTPATIENT
Start: 2019-01-01 | End: 2019-01-01

## 2019-01-01 RX ORDER — TORSEMIDE 20 MG/1
40 TABLET ORAL DAILY
Qty: 60 TABLET | Refills: 3 | Status: SHIPPED | OUTPATIENT
Start: 2019-01-01 | End: 2019-01-01

## 2019-01-01 RX ORDER — HYDROCODONE BITARTRATE AND ACETAMINOPHEN 5; 325 MG/1; MG/1
1 TABLET ORAL EVERY 6 HOURS PRN
Qty: 70 TABLET | Refills: 0 | Status: SHIPPED | OUTPATIENT
Start: 2019-01-01 | End: 2019-01-01 | Stop reason: SDUPTHER

## 2019-01-01 RX ORDER — CETIRIZINE HYDROCHLORIDE 10 MG/1
10 TABLET ORAL DAILY
Status: DISCONTINUED | OUTPATIENT
Start: 2019-01-01 | End: 2019-01-01

## 2019-01-01 RX ORDER — MORPHINE SULFATE 2 MG/ML
2 INJECTION, SOLUTION INTRAMUSCULAR; INTRAVENOUS
Status: DISCONTINUED | OUTPATIENT
Start: 2019-01-01 | End: 2019-01-01 | Stop reason: HOSPADM

## 2019-01-01 RX ORDER — ALBUTEROL SULFATE 2.5 MG/3ML
2.5 SOLUTION RESPIRATORY (INHALATION) ONCE
Status: DISCONTINUED | OUTPATIENT
Start: 2019-01-01 | End: 2019-01-01

## 2019-01-01 RX ORDER — FUROSEMIDE 10 MG/ML
80 INJECTION INTRAMUSCULAR; INTRAVENOUS DAILY
Status: DISCONTINUED | OUTPATIENT
Start: 2019-01-01 | End: 2019-01-01

## 2019-01-01 RX ORDER — TORSEMIDE 20 MG/1
TABLET ORAL
Qty: 36 TABLET | Refills: 3 | Status: ON HOLD
Start: 2019-01-01 | End: 2019-01-01 | Stop reason: HOSPADM

## 2019-01-01 RX ORDER — DIGOXIN 125 MCG
125 TABLET ORAL DAILY
Qty: 90 TABLET | Refills: 3 | Status: SHIPPED | OUTPATIENT
Start: 2019-01-01 | End: 2020-01-01 | Stop reason: SDUPTHER

## 2019-01-01 RX ORDER — FAMOTIDINE 20 MG/1
20 TABLET, FILM COATED ORAL 2 TIMES DAILY
Status: DISCONTINUED | OUTPATIENT
Start: 2019-01-01 | End: 2019-01-01 | Stop reason: RX

## 2019-01-01 RX ORDER — LISINOPRIL 5 MG/1
2.5 TABLET ORAL 2 TIMES DAILY
Status: DISCONTINUED | OUTPATIENT
Start: 2019-01-01 | End: 2019-01-01

## 2019-01-01 RX ORDER — 0.9 % SODIUM CHLORIDE 0.9 %
500 INTRAVENOUS SOLUTION INTRAVENOUS ONCE
Status: DISCONTINUED | OUTPATIENT
Start: 2019-01-01 | End: 2019-01-01 | Stop reason: HOSPADM

## 2019-01-01 RX ORDER — QUETIAPINE FUMARATE 25 MG/1
25 TABLET, FILM COATED ORAL NIGHTLY
Status: DISCONTINUED | OUTPATIENT
Start: 2019-01-01 | End: 2019-01-01

## 2019-01-01 RX ORDER — HYDROMORPHONE HCL 110MG/55ML
0.5 PATIENT CONTROLLED ANALGESIA SYRINGE INTRAVENOUS EVERY 5 MIN PRN
Status: COMPLETED | OUTPATIENT
Start: 2019-01-01 | End: 2019-01-01

## 2019-01-01 RX ORDER — LEVOFLOXACIN 500 MG/1
500 TABLET, FILM COATED ORAL DAILY
Qty: 7 TABLET | Refills: 0 | Status: SHIPPED | OUTPATIENT
Start: 2019-01-01 | End: 2019-01-01

## 2019-01-01 RX ORDER — METRONIDAZOLE 250 MG/1
500 TABLET ORAL EVERY 8 HOURS SCHEDULED
Status: DISCONTINUED | OUTPATIENT
Start: 2019-01-01 | End: 2019-01-01 | Stop reason: HOSPADM

## 2019-01-01 RX ORDER — PREGABALIN 75 MG/1
75 CAPSULE ORAL 3 TIMES DAILY
Status: DISCONTINUED | OUTPATIENT
Start: 2019-01-01 | End: 2019-01-01 | Stop reason: HOSPADM

## 2019-01-01 RX ORDER — LIDOCAINE HYDROCHLORIDE 20 MG/ML
INJECTION, SOLUTION INFILTRATION; PERINEURAL PRN
Status: DISCONTINUED | OUTPATIENT
Start: 2019-01-01 | End: 2019-01-01 | Stop reason: SDUPTHER

## 2019-01-01 RX ORDER — 0.9 % SODIUM CHLORIDE 0.9 %
500 INTRAVENOUS SOLUTION INTRAVENOUS ONCE
Status: COMPLETED | OUTPATIENT
Start: 2019-01-01 | End: 2019-01-01

## 2019-01-01 RX ORDER — HYOSCYAMINE SULFATE 0.5 MG/ML
500 INJECTION, SOLUTION SUBCUTANEOUS ONCE
Status: DISCONTINUED | OUTPATIENT
Start: 2019-01-01 | End: 2019-01-01 | Stop reason: HOSPADM

## 2019-01-01 RX ORDER — TORSEMIDE 20 MG/1
40 TABLET ORAL 2 TIMES DAILY
Status: DISCONTINUED | OUTPATIENT
Start: 2019-01-01 | End: 2019-01-01

## 2019-01-01 RX ORDER — GLYCOPYRROLATE 1 MG/5 ML
SYRINGE (ML) INTRAVENOUS PRN
Status: DISCONTINUED | OUTPATIENT
Start: 2019-01-01 | End: 2019-01-01 | Stop reason: SDUPTHER

## 2019-01-01 RX ORDER — MORPHINE SULFATE 4 MG/ML
4 INJECTION, SOLUTION INTRAMUSCULAR; INTRAVENOUS
Status: DISCONTINUED | OUTPATIENT
Start: 2019-01-01 | End: 2019-01-01 | Stop reason: HOSPADM

## 2019-01-01 RX ORDER — EPHEDRINE SULFATE 50 MG/ML
INJECTION INTRAVENOUS PRN
Status: DISCONTINUED | OUTPATIENT
Start: 2019-01-01 | End: 2019-01-01 | Stop reason: SDUPTHER

## 2019-01-01 RX ORDER — 0.9 % SODIUM CHLORIDE 0.9 %
30 INTRAVENOUS SOLUTION INTRAVENOUS ONCE
Status: COMPLETED | OUTPATIENT
Start: 2019-01-01 | End: 2019-01-01

## 2019-01-01 RX ORDER — DIGOXIN 125 MCG
125 TABLET ORAL DAILY
Status: DISCONTINUED | OUTPATIENT
Start: 2019-01-01 | End: 2020-01-01 | Stop reason: HOSPADM

## 2019-01-01 RX ORDER — MAGNESIUM SULFATE 1 G/100ML
1 INJECTION INTRAVENOUS PRN
Status: DISCONTINUED | OUTPATIENT
Start: 2019-01-01 | End: 2020-01-01 | Stop reason: HOSPADM

## 2019-01-01 RX ORDER — MORPHINE SULFATE 4 MG/ML
4 INJECTION, SOLUTION INTRAMUSCULAR; INTRAVENOUS
Status: COMPLETED | OUTPATIENT
Start: 2019-01-01 | End: 2019-01-01

## 2019-01-01 RX ORDER — PREGABALIN 75 MG/1
75 CAPSULE ORAL 2 TIMES DAILY
Qty: 60 CAPSULE | Refills: 3 | Status: SHIPPED | OUTPATIENT
Start: 2019-01-01 | End: 2019-01-01 | Stop reason: DRUGHIGH

## 2019-01-01 RX ORDER — LANOLIN ALCOHOL/MO/W.PET/CERES
9 CREAM (GRAM) TOPICAL NIGHTLY PRN
Status: DISCONTINUED | OUTPATIENT
Start: 2019-01-01 | End: 2019-01-01 | Stop reason: HOSPADM

## 2019-01-01 RX ORDER — MORPHINE SULFATE 2 MG/ML
2 INJECTION, SOLUTION INTRAMUSCULAR; INTRAVENOUS ONCE
Status: COMPLETED | OUTPATIENT
Start: 2019-01-01 | End: 2019-01-01

## 2019-01-01 RX ORDER — IPRATROPIUM BROMIDE AND ALBUTEROL SULFATE 2.5; .5 MG/3ML; MG/3ML
1 SOLUTION RESPIRATORY (INHALATION) ONCE
Status: COMPLETED | OUTPATIENT
Start: 2019-01-01 | End: 2019-01-01

## 2019-01-01 RX ORDER — ONDANSETRON 2 MG/ML
4 INJECTION INTRAMUSCULAR; INTRAVENOUS EVERY 6 HOURS PRN
Status: DISCONTINUED | OUTPATIENT
Start: 2019-01-01 | End: 2020-01-01 | Stop reason: HOSPADM

## 2019-01-01 RX ORDER — SODIUM CHLORIDE 9 MG/ML
INJECTION, SOLUTION INTRAVENOUS CONTINUOUS
Status: DISCONTINUED | OUTPATIENT
Start: 2019-01-01 | End: 2019-01-01 | Stop reason: HOSPADM

## 2019-01-01 RX ORDER — FUROSEMIDE 10 MG/ML
40 INJECTION INTRAMUSCULAR; INTRAVENOUS ONCE
Status: DISCONTINUED | OUTPATIENT
Start: 2019-01-01 | End: 2019-01-01

## 2019-01-01 RX ORDER — HYDROCODONE BITARTRATE AND ACETAMINOPHEN 7.5; 325 MG/1; MG/1
.5-1 TABLET ORAL 3 TIMES DAILY
Qty: 56 TABLET | Refills: 0 | Status: SHIPPED | OUTPATIENT
Start: 2019-01-01 | End: 2019-01-01 | Stop reason: SDUPTHER

## 2019-01-01 RX ORDER — LINEZOLID 2 MG/ML
600 INJECTION, SOLUTION INTRAVENOUS EVERY 12 HOURS
Status: DISCONTINUED | OUTPATIENT
Start: 2019-01-01 | End: 2020-01-01

## 2019-01-01 RX ORDER — LOVASTATIN 40 MG/1
40 TABLET ORAL NIGHTLY
Status: DISCONTINUED | OUTPATIENT
Start: 2019-01-01 | End: 2019-01-01 | Stop reason: CLARIF

## 2019-01-01 RX ORDER — ATORVASTATIN CALCIUM 20 MG/1
20 TABLET, FILM COATED ORAL NIGHTLY
Status: DISCONTINUED | OUTPATIENT
Start: 2019-01-01 | End: 2019-01-01 | Stop reason: HOSPADM

## 2019-01-01 RX ORDER — KETAMINE HYDROCHLORIDE 10 MG/ML
INJECTION, SOLUTION INTRAMUSCULAR; INTRAVENOUS PRN
Status: DISCONTINUED | OUTPATIENT
Start: 2019-01-01 | End: 2019-01-01 | Stop reason: SDUPTHER

## 2019-01-01 RX ORDER — LUBIPROSTONE 24 UG/1
24 CAPSULE, GELATIN COATED ORAL
Qty: 30 CAPSULE | Refills: 1 | Status: SHIPPED | OUTPATIENT
Start: 2019-01-01 | End: 2019-01-01 | Stop reason: SDUPTHER

## 2019-01-01 RX ORDER — HYDROCODONE BITARTRATE AND ACETAMINOPHEN 5; 325 MG/1; MG/1
2 TABLET ORAL EVERY 4 HOURS PRN
Status: DISCONTINUED | OUTPATIENT
Start: 2019-01-01 | End: 2019-01-01 | Stop reason: HOSPADM

## 2019-01-01 RX ORDER — MORPHINE SULFATE 4 MG/ML
INJECTION, SOLUTION INTRAMUSCULAR; INTRAVENOUS
Status: DISPENSED
Start: 2019-01-01 | End: 2019-01-01

## 2019-01-01 RX ORDER — TORSEMIDE 20 MG/1
40 TABLET ORAL EVERY OTHER DAY
Status: DISCONTINUED | OUTPATIENT
Start: 2019-01-01 | End: 2019-01-01 | Stop reason: HOSPADM

## 2019-01-01 RX ORDER — DOBUTAMINE HYDROCHLORIDE 200 MG/100ML
10 INJECTION INTRAVENOUS CONTINUOUS
Status: DISCONTINUED | OUTPATIENT
Start: 2019-01-01 | End: 2019-01-01 | Stop reason: SDUPTHER

## 2019-01-01 RX ORDER — SODIUM CHLORIDE 9 MG/ML
INJECTION, SOLUTION INTRAVENOUS CONTINUOUS PRN
Status: DISCONTINUED | OUTPATIENT
Start: 2019-01-01 | End: 2019-01-01 | Stop reason: SDUPTHER

## 2019-01-01 RX ORDER — PREGABALIN 75 MG/1
CAPSULE ORAL
Qty: 90 CAPSULE | Refills: 0 | OUTPATIENT
Start: 2019-01-01 | End: 2020-01-01

## 2019-01-01 RX ORDER — PREGABALIN 75 MG/1
75 CAPSULE ORAL 2 TIMES DAILY
Status: DISCONTINUED | OUTPATIENT
Start: 2019-01-01 | End: 2019-01-01

## 2019-01-01 RX ORDER — SODIUM CHLORIDE 9 MG/ML
INJECTION, SOLUTION INTRAVENOUS
Status: COMPLETED
Start: 2019-01-01 | End: 2019-01-01

## 2019-01-01 RX ORDER — OXYCODONE HYDROCHLORIDE 5 MG/1
5 TABLET ORAL EVERY 6 HOURS PRN
Status: DISCONTINUED | OUTPATIENT
Start: 2019-01-01 | End: 2019-01-01 | Stop reason: HOSPADM

## 2019-01-01 RX ORDER — KETOROLAC TROMETHAMINE 30 MG/ML
30 INJECTION, SOLUTION INTRAMUSCULAR; INTRAVENOUS ONCE
Status: COMPLETED | OUTPATIENT
Start: 2019-01-01 | End: 2019-01-01

## 2019-01-01 RX ORDER — PHENYLEPHRINE HCL IN 0.9% NACL 1 MG/10 ML
SYRINGE (ML) INTRAVENOUS PRN
Status: DISCONTINUED | OUTPATIENT
Start: 2019-01-01 | End: 2019-01-01 | Stop reason: SDUPTHER

## 2019-01-01 RX ORDER — CIPROFLOXACIN 2 MG/ML
400 INJECTION, SOLUTION INTRAVENOUS ONCE
Status: COMPLETED | OUTPATIENT
Start: 2019-01-01 | End: 2019-01-01

## 2019-01-01 RX ORDER — HYDROCODONE BITARTRATE AND ACETAMINOPHEN 7.5; 325 MG/1; MG/1
2 TABLET ORAL EVERY 4 HOURS PRN
Status: DISCONTINUED | OUTPATIENT
Start: 2019-01-01 | End: 2019-01-01 | Stop reason: HOSPADM

## 2019-01-01 RX ORDER — CARVEDILOL 3.12 MG/1
6.25 TABLET ORAL 2 TIMES DAILY WITH MEALS
Status: DISCONTINUED | OUTPATIENT
Start: 2019-01-01 | End: 2019-01-01 | Stop reason: HOSPADM

## 2019-01-01 RX ORDER — FUROSEMIDE 10 MG/ML
80 INJECTION INTRAMUSCULAR; INTRAVENOUS 2 TIMES DAILY
Status: DISCONTINUED | OUTPATIENT
Start: 2019-01-01 | End: 2019-01-01 | Stop reason: HOSPADM

## 2019-01-01 RX ORDER — FUROSEMIDE 10 MG/ML
INJECTION INTRAMUSCULAR; INTRAVENOUS
Status: DISPENSED
Start: 2019-01-01 | End: 2019-01-01

## 2019-01-01 RX ORDER — PHYTONADIONE 10 MG/ML
10 INJECTION, EMULSION INTRAMUSCULAR; INTRAVENOUS; SUBCUTANEOUS ONCE
Status: COMPLETED | OUTPATIENT
Start: 2019-01-01 | End: 2019-01-01

## 2019-01-01 RX ORDER — PREDNISONE 10 MG/1
TABLET ORAL
Qty: 40 TABLET | Refills: 0 | Status: SHIPPED | OUTPATIENT
Start: 2019-01-01 | End: 2019-01-01

## 2019-01-01 RX ORDER — PROMETHAZINE HYDROCHLORIDE AND CODEINE PHOSPHATE 6.25; 1 MG/5ML; MG/5ML
5 SYRUP ORAL EVERY 4 HOURS PRN
Status: DISCONTINUED | OUTPATIENT
Start: 2019-01-01 | End: 2019-01-01 | Stop reason: HOSPADM

## 2019-01-01 RX ORDER — FAMOTIDINE 20 MG/1
20 TABLET, FILM COATED ORAL 2 TIMES DAILY
Status: DISCONTINUED | OUTPATIENT
Start: 2019-01-01 | End: 2019-01-01 | Stop reason: HOSPADM

## 2019-01-01 RX ORDER — LISINOPRIL 5 MG/1
5 TABLET ORAL DAILY
Status: DISCONTINUED | OUTPATIENT
Start: 2019-01-01 | End: 2019-01-01

## 2019-01-01 RX ORDER — PREGABALIN 75 MG/1
75 CAPSULE ORAL EVERY MORNING
Status: DISCONTINUED | OUTPATIENT
Start: 2019-01-01 | End: 2019-01-01 | Stop reason: HOSPADM

## 2019-01-01 RX ORDER — DEXAMETHASONE SODIUM PHOSPHATE 4 MG/ML
INJECTION, SOLUTION INTRA-ARTICULAR; INTRALESIONAL; INTRAMUSCULAR; INTRAVENOUS; SOFT TISSUE PRN
Status: DISCONTINUED | OUTPATIENT
Start: 2019-01-01 | End: 2019-01-01 | Stop reason: SDUPTHER

## 2019-01-01 RX ORDER — KETOROLAC TROMETHAMINE 15 MG/ML
15 INJECTION, SOLUTION INTRAMUSCULAR; INTRAVENOUS EVERY 6 HOURS PRN
Status: DISCONTINUED | OUTPATIENT
Start: 2019-01-01 | End: 2019-01-01 | Stop reason: HOSPADM

## 2019-01-01 RX ORDER — VECURONIUM BROMIDE 1 MG/ML
INJECTION, POWDER, LYOPHILIZED, FOR SOLUTION INTRAVENOUS PRN
Status: DISCONTINUED | OUTPATIENT
Start: 2019-01-01 | End: 2019-01-01 | Stop reason: SDUPTHER

## 2019-01-01 RX ORDER — POTASSIUM CHLORIDE 29.8 MG/ML
20 INJECTION INTRAVENOUS PRN
Status: DISCONTINUED | OUTPATIENT
Start: 2019-01-01 | End: 2020-01-01 | Stop reason: HOSPADM

## 2019-01-01 RX ORDER — POTASSIUM CHLORIDE 20 MEQ/1
40 TABLET, EXTENDED RELEASE ORAL PRN
Status: DISCONTINUED | OUTPATIENT
Start: 2019-01-01 | End: 2020-01-01 | Stop reason: HOSPADM

## 2019-01-01 RX ORDER — CIPROFLOXACIN 2 MG/ML
400 INJECTION, SOLUTION INTRAVENOUS EVERY 12 HOURS
Status: DISCONTINUED | OUTPATIENT
Start: 2019-01-01 | End: 2019-01-01 | Stop reason: HOSPADM

## 2019-01-01 RX ORDER — PREGABALIN 75 MG/1
CAPSULE ORAL
Qty: 90 CAPSULE | Refills: 0 | OUTPATIENT
Start: 2019-01-01 | End: 2019-01-01 | Stop reason: SDUPTHER

## 2019-01-01 RX ORDER — HYDROCODONE BITARTRATE AND ACETAMINOPHEN 7.5; 325 MG/1; MG/1
1 TABLET ORAL EVERY 6 HOURS PRN
Status: DISCONTINUED | OUTPATIENT
Start: 2019-01-01 | End: 2019-01-01 | Stop reason: HOSPADM

## 2019-01-01 RX ORDER — MORPHINE SULFATE 2 MG/ML
2 INJECTION, SOLUTION INTRAMUSCULAR; INTRAVENOUS ONCE
Status: DISCONTINUED | OUTPATIENT
Start: 2019-01-01 | End: 2019-01-01 | Stop reason: HOSPADM

## 2019-01-01 RX ORDER — MIDAZOLAM HYDROCHLORIDE 1 MG/ML
INJECTION INTRAMUSCULAR; INTRAVENOUS PRN
Status: DISCONTINUED | OUTPATIENT
Start: 2019-01-01 | End: 2019-01-01 | Stop reason: SDUPTHER

## 2019-01-01 RX ORDER — QUETIAPINE FUMARATE 25 MG/1
TABLET, FILM COATED ORAL
Qty: 60 TABLET | Refills: 0 | Status: ON HOLD | OUTPATIENT
Start: 2019-01-01 | End: 2019-01-01 | Stop reason: HOSPADM

## 2019-01-01 RX ORDER — HYDROCODONE BITARTRATE AND ACETAMINOPHEN 7.5; 325 MG/1; MG/1
.5-1 TABLET ORAL EVERY 6 HOURS PRN
Qty: 84 TABLET | Refills: 0 | Status: SHIPPED | OUTPATIENT
Start: 2019-01-01 | End: 2019-01-01

## 2019-01-01 RX ORDER — PREGABALIN 75 MG/1
CAPSULE ORAL
Qty: 90 CAPSULE | Refills: 0 | Status: ON HOLD | OUTPATIENT
Start: 2019-01-01 | End: 2019-01-01 | Stop reason: HOSPADM

## 2019-01-01 RX ORDER — DICYCLOMINE HYDROCHLORIDE 10 MG/1
10 CAPSULE ORAL EVERY 6 HOURS PRN
Qty: 20 CAPSULE | Refills: 0 | Status: ON HOLD | OUTPATIENT
Start: 2019-01-01 | End: 2020-01-01 | Stop reason: HOSPADM

## 2019-01-01 RX ORDER — HYDROCODONE BITARTRATE AND ACETAMINOPHEN 7.5; 325 MG/1; MG/1
1-2 TABLET ORAL EVERY 6 HOURS PRN
Qty: 20 TABLET | Refills: 0 | Status: SHIPPED | OUTPATIENT
Start: 2019-01-01 | End: 2019-01-01 | Stop reason: SDUPTHER

## 2019-01-01 RX ORDER — FUROSEMIDE 10 MG/ML
40 INJECTION INTRAMUSCULAR; INTRAVENOUS DAILY
Status: DISCONTINUED | OUTPATIENT
Start: 2019-01-01 | End: 2019-01-01 | Stop reason: HOSPADM

## 2019-01-01 RX ORDER — FUROSEMIDE 10 MG/ML
80 INJECTION INTRAMUSCULAR; INTRAVENOUS ONCE
Status: DISCONTINUED | OUTPATIENT
Start: 2019-01-01 | End: 2019-01-01

## 2019-01-01 RX ORDER — BENZONATATE 100 MG/1
100 CAPSULE ORAL 3 TIMES DAILY PRN
Status: DISCONTINUED | OUTPATIENT
Start: 2019-01-01 | End: 2019-01-01 | Stop reason: HOSPADM

## 2019-01-01 RX ORDER — CARVEDILOL 12.5 MG/1
6.25 TABLET ORAL 2 TIMES DAILY WITH MEALS
Qty: 30 TABLET | Refills: 2 | Status: SHIPPED | OUTPATIENT
Start: 2019-01-01 | End: 2019-01-01 | Stop reason: SDUPTHER

## 2019-01-01 RX ORDER — POTASSIUM CHLORIDE 20 MEQ/1
20 TABLET, EXTENDED RELEASE ORAL ONCE
Status: COMPLETED | OUTPATIENT
Start: 2019-01-01 | End: 2019-01-01

## 2019-01-01 RX ORDER — ONDANSETRON 2 MG/ML
4 INJECTION INTRAMUSCULAR; INTRAVENOUS
Status: DISCONTINUED | OUTPATIENT
Start: 2019-01-01 | End: 2019-01-01 | Stop reason: HOSPADM

## 2019-01-01 RX ORDER — ACETAMINOPHEN 80 MG
TABLET,CHEWABLE ORAL
Status: COMPLETED
Start: 2019-01-01 | End: 2019-01-01

## 2019-01-01 RX ORDER — PREGABALIN 75 MG/1
75 CAPSULE ORAL 2 TIMES DAILY
Status: DISCONTINUED | OUTPATIENT
Start: 2019-01-01 | End: 2019-01-01 | Stop reason: HOSPADM

## 2019-01-01 RX ORDER — TORSEMIDE 20 MG/1
20 TABLET ORAL DAILY
Qty: 30 TABLET | Refills: 3 | Status: SHIPPED | OUTPATIENT
Start: 2019-01-01 | End: 2019-01-01

## 2019-01-01 RX ORDER — PROPOFOL 10 MG/ML
INJECTION, EMULSION INTRAVENOUS PRN
Status: DISCONTINUED | OUTPATIENT
Start: 2019-01-01 | End: 2019-01-01

## 2019-01-01 RX ORDER — METRONIDAZOLE 500 MG/1
500 TABLET ORAL EVERY 8 HOURS SCHEDULED
Qty: 30 TABLET | Refills: 0 | Status: SHIPPED | OUTPATIENT
Start: 2019-01-01 | End: 2019-01-01

## 2019-01-01 RX ORDER — SODIUM CHLORIDE 9 MG/ML
1000 INJECTION, SOLUTION INTRAVENOUS ONCE
Status: COMPLETED | OUTPATIENT
Start: 2019-01-01 | End: 2019-01-01

## 2019-01-01 RX ORDER — HYDROCODONE BITARTRATE AND ACETAMINOPHEN 5; 325 MG/1; MG/1
1 TABLET ORAL EVERY 4 HOURS PRN
Status: CANCELLED | OUTPATIENT
Start: 2019-01-01

## 2019-01-01 RX ORDER — SODIUM CHLORIDE, SODIUM LACTATE, POTASSIUM CHLORIDE, CALCIUM CHLORIDE 600; 310; 30; 20 MG/100ML; MG/100ML; MG/100ML; MG/100ML
INJECTION, SOLUTION INTRAVENOUS CONTINUOUS
Status: DISCONTINUED | OUTPATIENT
Start: 2019-01-01 | End: 2019-01-01

## 2019-01-01 RX ADMIN — TORSEMIDE 40 MG: 20 TABLET ORAL at 21:42

## 2019-01-01 RX ADMIN — FAMOTIDINE 20 MG: 20 TABLET ORAL at 20:50

## 2019-01-01 RX ADMIN — POTASSIUM CHLORIDE 40 MEQ: 1500 TABLET, EXTENDED RELEASE ORAL at 09:31

## 2019-01-01 RX ADMIN — OXYCODONE HYDROCHLORIDE 5 MG: 5 TABLET ORAL at 04:43

## 2019-01-01 RX ADMIN — MORPHINE SULFATE 4 MG: 4 INJECTION INTRAVENOUS at 19:01

## 2019-01-01 RX ADMIN — SACUBITRIL AND VALSARTAN 1 TABLET: 24; 26 TABLET, FILM COATED ORAL at 21:04

## 2019-01-01 RX ADMIN — KETOROLAC TROMETHAMINE 15 MG: 15 INJECTION, SOLUTION INTRAMUSCULAR; INTRAVENOUS at 03:40

## 2019-01-01 RX ADMIN — ACETAMINOPHEN 1000 MG: 10 INJECTION, SOLUTION INTRAVENOUS at 22:52

## 2019-01-01 RX ADMIN — SACUBITRIL AND VALSARTAN 1 TABLET: 24; 26 TABLET, FILM COATED ORAL at 09:48

## 2019-01-01 RX ADMIN — DULOXETINE HYDROCHLORIDE 30 MG: 30 CAPSULE, DELAYED RELEASE ORAL at 08:47

## 2019-01-01 RX ADMIN — PREGABALIN 75 MG: 25 CAPSULE ORAL at 00:41

## 2019-01-01 RX ADMIN — PANTOPRAZOLE SODIUM 40 MG: 40 INJECTION, POWDER, FOR SOLUTION INTRAVENOUS at 18:52

## 2019-01-01 RX ADMIN — ONDANSETRON 4 MG: 2 INJECTION INTRAMUSCULAR; INTRAVENOUS at 23:09

## 2019-01-01 RX ADMIN — DULOXETINE HYDROCHLORIDE 30 MG: 30 CAPSULE, DELAYED RELEASE ORAL at 10:21

## 2019-01-01 RX ADMIN — ROSUVASTATIN CALCIUM 5 MG: 10 TABLET, FILM COATED ORAL at 23:57

## 2019-01-01 RX ADMIN — EPLERENONE 25 MG: 25 TABLET, FILM COATED ORAL at 10:23

## 2019-01-01 RX ADMIN — ENOXAPARIN SODIUM 40 MG: 40 INJECTION SUBCUTANEOUS at 11:29

## 2019-01-01 RX ADMIN — POTASSIUM CHLORIDE, DEXTROSE MONOHYDRATE AND SODIUM CHLORIDE: 150; 5; 450 INJECTION, SOLUTION INTRAVENOUS at 04:17

## 2019-01-01 RX ADMIN — CARVEDILOL 6.25 MG: 6.25 TABLET, FILM COATED ORAL at 09:36

## 2019-01-01 RX ADMIN — TAZOBACTAM SODIUM AND PIPERACILLIN SODIUM 3.38 G: 375; 3 INJECTION, SOLUTION INTRAVENOUS at 16:19

## 2019-01-01 RX ADMIN — EPLERENONE 25 MG: 25 TABLET, FILM COATED ORAL at 11:36

## 2019-01-01 RX ADMIN — HYDROCODONE BITARTRATE AND ACETAMINOPHEN 2 TABLET: 7.5; 325 TABLET ORAL at 05:12

## 2019-01-01 RX ADMIN — CARVEDILOL 6.25 MG: 6.25 TABLET, FILM COATED ORAL at 08:50

## 2019-01-01 RX ADMIN — PREGABALIN 75 MG: 75 CAPSULE ORAL at 09:05

## 2019-01-01 RX ADMIN — IPRATROPIUM BROMIDE AND ALBUTEROL SULFATE 1 AMPULE: .5; 3 SOLUTION RESPIRATORY (INHALATION) at 09:10

## 2019-01-01 RX ADMIN — CEFEPIME HYDROCHLORIDE 2 G: 2 INJECTION, POWDER, FOR SOLUTION INTRAVENOUS at 22:54

## 2019-01-01 RX ADMIN — MORPHINE SULFATE 4 MG: 4 INJECTION INTRAVENOUS at 19:48

## 2019-01-01 RX ADMIN — BENZONATATE 100 MG: 100 CAPSULE ORAL at 13:57

## 2019-01-01 RX ADMIN — ONDANSETRON 4 MG: 2 INJECTION INTRAMUSCULAR; INTRAVENOUS at 09:37

## 2019-01-01 RX ADMIN — FUROSEMIDE 20 MG: 10 INJECTION, SOLUTION INTRAMUSCULAR; INTRAVENOUS at 10:01

## 2019-01-01 RX ADMIN — TORSEMIDE 20 MG: 20 TABLET ORAL at 13:02

## 2019-01-01 RX ADMIN — ALTEPLASE 1 MG: 2.2 INJECTION, POWDER, LYOPHILIZED, FOR SOLUTION INTRAVENOUS at 19:29

## 2019-01-01 RX ADMIN — HYDROMORPHONE HYDROCHLORIDE 1 MG: 1 INJECTION, SOLUTION INTRAMUSCULAR; INTRAVENOUS; SUBCUTANEOUS at 07:40

## 2019-01-01 RX ADMIN — Medication 200 MCG: at 15:58

## 2019-01-01 RX ADMIN — METRONIDAZOLE 500 MG: 500 INJECTION, SOLUTION INTRAVENOUS at 14:53

## 2019-01-01 RX ADMIN — ACETAMINOPHEN 650 MG: 325 TABLET, FILM COATED ORAL at 08:50

## 2019-01-01 RX ADMIN — TORSEMIDE 40 MG: 20 TABLET ORAL at 08:42

## 2019-01-01 RX ADMIN — METOCLOPRAMIDE 10 MG: 5 INJECTION, SOLUTION INTRAMUSCULAR; INTRAVENOUS at 05:25

## 2019-01-01 RX ADMIN — SODIUM CHLORIDE: 9 INJECTION, SOLUTION INTRAVENOUS at 20:49

## 2019-01-01 RX ADMIN — ENOXAPARIN SODIUM 40 MG: 40 INJECTION SUBCUTANEOUS at 09:05

## 2019-01-01 RX ADMIN — METRONIDAZOLE 500 MG: 500 INJECTION, SOLUTION INTRAVENOUS at 15:15

## 2019-01-01 RX ADMIN — Medication 10 ML: at 08:40

## 2019-01-01 RX ADMIN — FAMOTIDINE 20 MG: 20 TABLET ORAL at 10:10

## 2019-01-01 RX ADMIN — LACTULOSE 20 G: 20 SOLUTION ORAL at 11:35

## 2019-01-01 RX ADMIN — MELATONIN TAB 3 MG 3 MG: 3 TAB at 21:31

## 2019-01-01 RX ADMIN — FAMOTIDINE 20 MG: 20 TABLET, FILM COATED ORAL at 09:06

## 2019-01-01 RX ADMIN — CARVEDILOL 12.5 MG: 6.25 TABLET, FILM COATED ORAL at 09:48

## 2019-01-01 RX ADMIN — ROSUVASTATIN CALCIUM 5 MG: 10 TABLET, FILM COATED ORAL at 21:04

## 2019-01-01 RX ADMIN — KETOROLAC TROMETHAMINE 15 MG: 15 INJECTION, SOLUTION INTRAMUSCULAR; INTRAVENOUS at 08:52

## 2019-01-01 RX ADMIN — ONDANSETRON 4 MG: 2 INJECTION INTRAMUSCULAR; INTRAVENOUS at 18:27

## 2019-01-01 RX ADMIN — TAZOBACTAM SODIUM AND PIPERACILLIN SODIUM 3.38 G: 375; 3 INJECTION, SOLUTION INTRAVENOUS at 06:39

## 2019-01-01 RX ADMIN — MORPHINE SULFATE 2 MG: 2 INJECTION, SOLUTION INTRAMUSCULAR; INTRAVENOUS at 17:35

## 2019-01-01 RX ADMIN — ALBUTEROL SULFATE 5 MG: 2.5 SOLUTION RESPIRATORY (INHALATION) at 07:59

## 2019-01-01 RX ADMIN — ATORVASTATIN CALCIUM 20 MG: 20 TABLET, FILM COATED ORAL at 21:57

## 2019-01-01 RX ADMIN — Medication 10 ML: at 22:21

## 2019-01-01 RX ADMIN — LISINOPRIL 2.5 MG: 5 TABLET ORAL at 09:36

## 2019-01-01 RX ADMIN — LINEZOLID 600 MG: 600 INJECTION, SOLUTION INTRAVENOUS at 13:20

## 2019-01-01 RX ADMIN — FUROSEMIDE 40 MG: 10 INJECTION, SOLUTION INTRAMUSCULAR; INTRAVENOUS at 18:18

## 2019-01-01 RX ADMIN — QUETIAPINE FUMARATE 25 MG: 25 TABLET ORAL at 21:31

## 2019-01-01 RX ADMIN — MORPHINE SULFATE 4 MG: 4 INJECTION INTRAVENOUS at 08:28

## 2019-01-01 RX ADMIN — BENZOCAINE AND MENTHOL 1 LOZENGE: 15; 3.6 LOZENGE ORAL at 22:12

## 2019-01-01 RX ADMIN — EPLERENONE 25 MG: 25 TABLET, FILM COATED ORAL at 09:37

## 2019-01-01 RX ADMIN — CARVEDILOL 6.25 MG: 6.25 TABLET, FILM COATED ORAL at 16:33

## 2019-01-01 RX ADMIN — FUROSEMIDE 80 MG: 10 INJECTION, SOLUTION INTRAMUSCULAR; INTRAVENOUS at 10:56

## 2019-01-01 RX ADMIN — FAMOTIDINE 20 MG: 20 TABLET ORAL at 21:42

## 2019-01-01 RX ADMIN — Medication 10 ML: at 20:22

## 2019-01-01 RX ADMIN — FAMOTIDINE 20 MG: 20 TABLET ORAL at 10:57

## 2019-01-01 RX ADMIN — POTASSIUM CHLORIDE 20 MEQ: 1500 TABLET, EXTENDED RELEASE ORAL at 09:08

## 2019-01-01 RX ADMIN — ENOXAPARIN SODIUM 40 MG: 40 INJECTION SUBCUTANEOUS at 08:15

## 2019-01-01 RX ADMIN — MORPHINE SULFATE 4 MG: 4 INJECTION INTRAVENOUS at 05:25

## 2019-01-01 RX ADMIN — Medication 10 ML: at 21:34

## 2019-01-01 RX ADMIN — NORTRIPTYLINE HYDROCHLORIDE 10 MG: 10 CAPSULE ORAL at 20:36

## 2019-01-01 RX ADMIN — MORPHINE SULFATE 4 MG: 4 INJECTION INTRAVENOUS at 21:11

## 2019-01-01 RX ADMIN — MORPHINE SULFATE 2 MG: 2 INJECTION, SOLUTION INTRAMUSCULAR; INTRAVENOUS at 01:31

## 2019-01-01 RX ADMIN — Medication 10 ML: at 21:18

## 2019-01-01 RX ADMIN — OXYCODONE HYDROCHLORIDE 5 MG: 5 TABLET ORAL at 22:10

## 2019-01-01 RX ADMIN — ENOXAPARIN SODIUM 40 MG: 40 INJECTION SUBCUTANEOUS at 08:10

## 2019-01-01 RX ADMIN — ACETAMINOPHEN 650 MG: 325 TABLET, FILM COATED ORAL at 19:53

## 2019-01-01 RX ADMIN — DICYCLOMINE HYDROCHLORIDE 10 MG: 10 CAPSULE ORAL at 09:15

## 2019-01-01 RX ADMIN — ROSUVASTATIN CALCIUM 5 MG: 10 TABLET, FILM COATED ORAL at 21:06

## 2019-01-01 RX ADMIN — KETOROLAC TROMETHAMINE 30 MG: 30 INJECTION, SOLUTION INTRAMUSCULAR at 09:21

## 2019-01-01 RX ADMIN — DICYCLOMINE HYDROCHLORIDE 10 MG: 10 CAPSULE ORAL at 16:33

## 2019-01-01 RX ADMIN — MORPHINE SULFATE 1 MG: 2 INJECTION, SOLUTION INTRAMUSCULAR; INTRAVENOUS at 16:15

## 2019-01-01 RX ADMIN — PREGABALIN 75 MG: 75 CAPSULE ORAL at 10:34

## 2019-01-01 RX ADMIN — QUETIAPINE FUMARATE 25 MG: 25 TABLET ORAL at 21:56

## 2019-01-01 RX ADMIN — LISINOPRIL 2.5 MG: 5 TABLET ORAL at 09:43

## 2019-01-01 RX ADMIN — QUETIAPINE FUMARATE 50 MG: 25 TABLET ORAL at 19:53

## 2019-01-01 RX ADMIN — LACTULOSE 20 G: 20 SOLUTION ORAL at 08:53

## 2019-01-01 RX ADMIN — CARVEDILOL 6.25 MG: 6.25 TABLET, FILM COATED ORAL at 09:43

## 2019-01-01 RX ADMIN — MORPHINE SULFATE 4 MG: 4 INJECTION INTRAVENOUS at 19:29

## 2019-01-01 RX ADMIN — KETOROLAC TROMETHAMINE 15 MG: 15 INJECTION, SOLUTION INTRAMUSCULAR; INTRAVENOUS at 09:55

## 2019-01-01 RX ADMIN — DIGOXIN 125 MCG: 125 TABLET ORAL at 09:48

## 2019-01-01 RX ADMIN — PREGABALIN 150 MG: 75 CAPSULE ORAL at 21:28

## 2019-01-01 RX ADMIN — Medication 10 ML: at 00:28

## 2019-01-01 RX ADMIN — PANTOPRAZOLE SODIUM 40 MG: 40 INJECTION, POWDER, FOR SOLUTION INTRAVENOUS at 17:07

## 2019-01-01 RX ADMIN — FUROSEMIDE 80 MG: 10 INJECTION, SOLUTION INTRAMUSCULAR; INTRAVENOUS at 09:35

## 2019-01-01 RX ADMIN — HYDROCODONE BITARTRATE AND ACETAMINOPHEN 2 TABLET: 5; 325 TABLET ORAL at 13:47

## 2019-01-01 RX ADMIN — DICYCLOMINE HYDROCHLORIDE 10 MG: 10 CAPSULE ORAL at 04:53

## 2019-01-01 RX ADMIN — POTASSIUM CHLORIDE, DEXTROSE MONOHYDRATE AND SODIUM CHLORIDE: 150; 5; 450 INJECTION, SOLUTION INTRAVENOUS at 18:19

## 2019-01-01 RX ADMIN — ONDANSETRON 4 MG: 2 INJECTION INTRAMUSCULAR; INTRAVENOUS at 18:56

## 2019-01-01 RX ADMIN — ONDANSETRON 4 MG: 2 INJECTION INTRAMUSCULAR; INTRAVENOUS at 10:37

## 2019-01-01 RX ADMIN — IOPAMIDOL 75 ML: 755 INJECTION, SOLUTION INTRAVENOUS at 20:24

## 2019-01-01 RX ADMIN — DULOXETINE HYDROCHLORIDE 30 MG: 30 CAPSULE, DELAYED RELEASE ORAL at 09:17

## 2019-01-01 RX ADMIN — Medication 5 ML: at 10:27

## 2019-01-01 RX ADMIN — ACETAMINOPHEN 1000 MG: 10 INJECTION, SOLUTION INTRAVENOUS at 11:09

## 2019-01-01 RX ADMIN — LIDOCAINE HYDROCHLORIDE: 20 SOLUTION ORAL; TOPICAL at 14:50

## 2019-01-01 RX ADMIN — CARVEDILOL 6.25 MG: 6.25 TABLET, FILM COATED ORAL at 18:06

## 2019-01-01 RX ADMIN — TORSEMIDE 40 MG: 20 TABLET ORAL at 09:28

## 2019-01-01 RX ADMIN — FUROSEMIDE 40 MG: 10 INJECTION, SOLUTION INTRAMUSCULAR; INTRAVENOUS at 08:09

## 2019-01-01 RX ADMIN — SACUBITRIL AND VALSARTAN 0.5 TABLET: 24; 26 TABLET, FILM COATED ORAL at 08:51

## 2019-01-01 RX ADMIN — SODIUM CHLORIDE: 9 INJECTION, SOLUTION INTRAVENOUS at 07:01

## 2019-01-01 RX ADMIN — MORPHINE SULFATE 4 MG: 4 INJECTION, SOLUTION INTRAMUSCULAR; INTRAVENOUS at 04:26

## 2019-01-01 RX ADMIN — DIAZEPAM 5 MG: 5 TABLET ORAL at 13:32

## 2019-01-01 RX ADMIN — FAMOTIDINE 20 MG: 10 INJECTION, SOLUTION INTRAVENOUS at 21:16

## 2019-01-01 RX ADMIN — ROSUVASTATIN CALCIUM 5 MG: 10 TABLET, FILM COATED ORAL at 22:29

## 2019-01-01 RX ADMIN — DIAZEPAM 5 MG: 5 TABLET ORAL at 22:52

## 2019-01-01 RX ADMIN — LISINOPRIL 5 MG: 10 TABLET ORAL at 15:18

## 2019-01-01 RX ADMIN — Medication 10 ML: at 11:30

## 2019-01-01 RX ADMIN — HYDROCODONE BITARTRATE AND ACETAMINOPHEN 2 TABLET: 7.5; 325 TABLET ORAL at 00:52

## 2019-01-01 RX ADMIN — FAMOTIDINE 20 MG: 20 TABLET ORAL at 21:17

## 2019-01-01 RX ADMIN — IOHEXOL 50 ML: 240 INJECTION, SOLUTION INTRATHECAL; INTRAVASCULAR; INTRAVENOUS; ORAL at 12:40

## 2019-01-01 RX ADMIN — MORPHINE SULFATE 4 MG: 4 INJECTION INTRAVENOUS at 23:09

## 2019-01-01 RX ADMIN — SODIUM CHLORIDE: 9 INJECTION, SOLUTION INTRAVENOUS at 11:28

## 2019-01-01 RX ADMIN — MORPHINE SULFATE 4 MG: 4 INJECTION INTRAVENOUS at 23:20

## 2019-01-01 RX ADMIN — DIAZEPAM 5 MG: 5 TABLET ORAL at 14:36

## 2019-01-01 RX ADMIN — DICYCLOMINE HYDROCHLORIDE 10 MG: 10 CAPSULE ORAL at 22:19

## 2019-01-01 RX ADMIN — PANTOPRAZOLE SODIUM 40 MG: 40 INJECTION, POWDER, LYOPHILIZED, FOR SOLUTION INTRAVENOUS at 17:07

## 2019-01-01 RX ADMIN — ROSUVASTATIN CALCIUM 5 MG: 10 TABLET, FILM COATED ORAL at 23:28

## 2019-01-01 RX ADMIN — FUROSEMIDE 40 MG: 10 INJECTION, SOLUTION INTRAMUSCULAR; INTRAVENOUS at 02:20

## 2019-01-01 RX ADMIN — HYDROCODONE BITARTRATE AND ACETAMINOPHEN 1 TABLET: 7.5; 325 TABLET ORAL at 12:16

## 2019-01-01 RX ADMIN — Medication 10 ML: at 17:26

## 2019-01-01 RX ADMIN — LINEZOLID 600 MG: 600 INJECTION, SOLUTION INTRAVENOUS at 00:38

## 2019-01-01 RX ADMIN — ROSUVASTATIN CALCIUM 5 MG: 10 TABLET, FILM COATED ORAL at 22:09

## 2019-01-01 RX ADMIN — MORPHINE SULFATE 0.5 MG: 2 INJECTION, SOLUTION INTRAMUSCULAR; INTRAVENOUS at 04:42

## 2019-01-01 RX ADMIN — MORPHINE SULFATE 4 MG: 4 INJECTION INTRAVENOUS at 16:56

## 2019-01-01 RX ADMIN — QUETIAPINE FUMARATE 50 MG: 25 TABLET ORAL at 23:21

## 2019-01-01 RX ADMIN — FUROSEMIDE 40 MG: 10 INJECTION, SOLUTION INTRAMUSCULAR; INTRAVENOUS at 17:21

## 2019-01-01 RX ADMIN — PREGABALIN 75 MG: 75 CAPSULE ORAL at 20:39

## 2019-01-01 RX ADMIN — MIDAZOLAM 1 MG: 1 INJECTION INTRAMUSCULAR; INTRAVENOUS at 14:05

## 2019-01-01 RX ADMIN — CETIRIZINE HYDROCHLORIDE 10 MG: 10 TABLET, FILM COATED ORAL at 00:23

## 2019-01-01 RX ADMIN — BENZOCAINE AND MENTHOL 1 LOZENGE: 15; 3.6 LOZENGE ORAL at 01:43

## 2019-01-01 RX ADMIN — Medication 10 ML: at 20:04

## 2019-01-01 RX ADMIN — PANTOPRAZOLE SODIUM 40 MG: 40 INJECTION, POWDER, LYOPHILIZED, FOR SOLUTION INTRAVENOUS at 08:41

## 2019-01-01 RX ADMIN — QUETIAPINE FUMARATE 25 MG: 25 TABLET ORAL at 21:28

## 2019-01-01 RX ADMIN — OXYCODONE HYDROCHLORIDE 5 MG: 5 TABLET ORAL at 06:55

## 2019-01-01 RX ADMIN — ENOXAPARIN SODIUM 40 MG: 40 INJECTION SUBCUTANEOUS at 09:28

## 2019-01-01 RX ADMIN — DICYCLOMINE HYDROCHLORIDE 10 MG: 10 CAPSULE ORAL at 01:29

## 2019-01-01 RX ADMIN — PREGABALIN 75 MG: 25 CAPSULE ORAL at 22:54

## 2019-01-01 RX ADMIN — PREGABALIN 75 MG: 75 CAPSULE ORAL at 20:36

## 2019-01-01 RX ADMIN — DIAZEPAM 5 MG: 5 TABLET ORAL at 16:31

## 2019-01-01 RX ADMIN — CEFEPIME HYDROCHLORIDE 2 G: 2 INJECTION, POWDER, FOR SOLUTION INTRAVENOUS at 23:57

## 2019-01-01 RX ADMIN — Medication 10 ML: at 08:55

## 2019-01-01 RX ADMIN — TORSEMIDE 20 MG: 20 TABLET ORAL at 09:33

## 2019-01-01 RX ADMIN — HYDROCODONE BITARTRATE AND ACETAMINOPHEN 2 TABLET: 7.5; 325 TABLET ORAL at 21:04

## 2019-01-01 RX ADMIN — PREGABALIN 75 MG: 25 CAPSULE ORAL at 23:57

## 2019-01-01 RX ADMIN — LIDOCAINE HYDROCHLORIDE 100 MG: 20 INJECTION, SOLUTION EPIDURAL; INFILTRATION; INTRACAUDAL; PERINEURAL at 15:01

## 2019-01-01 RX ADMIN — MELATONIN TAB 3 MG 3 MG: 3 TAB at 22:09

## 2019-01-01 RX ADMIN — PREGABALIN 75 MG: 75 CAPSULE ORAL at 23:21

## 2019-01-01 RX ADMIN — KETOROLAC TROMETHAMINE 15 MG: 30 INJECTION, SOLUTION INTRAMUSCULAR at 00:31

## 2019-01-01 RX ADMIN — DIAZEPAM 5 MG: 5 TABLET ORAL at 21:06

## 2019-01-01 RX ADMIN — FENTANYL CITRATE 50 MCG: 50 INJECTION, SOLUTION INTRAMUSCULAR; INTRAVENOUS at 14:59

## 2019-01-01 RX ADMIN — ENOXAPARIN SODIUM 40 MG: 40 INJECTION SUBCUTANEOUS at 10:00

## 2019-01-01 RX ADMIN — ENOXAPARIN SODIUM 40 MG: 40 INJECTION SUBCUTANEOUS at 08:57

## 2019-01-01 RX ADMIN — IOHEXOL 50 ML: 240 INJECTION, SOLUTION INTRATHECAL; INTRAVASCULAR; INTRAVENOUS; ORAL at 16:00

## 2019-01-01 RX ADMIN — DIAZEPAM 5 MG: 5 TABLET ORAL at 08:15

## 2019-01-01 RX ADMIN — CARVEDILOL 12.5 MG: 6.25 TABLET, FILM COATED ORAL at 09:54

## 2019-01-01 RX ADMIN — ENOXAPARIN SODIUM 40 MG: 40 INJECTION SUBCUTANEOUS at 08:51

## 2019-01-01 RX ADMIN — MIDAZOLAM HYDROCHLORIDE 2 MG: 1 INJECTION, SOLUTION INTRAMUSCULAR; INTRAVENOUS at 14:54

## 2019-01-01 RX ADMIN — DOBUTAMINE IN DEXTROSE 5 MCG/KG/MIN: 200 INJECTION, SOLUTION INTRAVENOUS at 03:46

## 2019-01-01 RX ADMIN — MORPHINE SULFATE 2 MG: 2 INJECTION, SOLUTION INTRAMUSCULAR; INTRAVENOUS at 13:57

## 2019-01-01 RX ADMIN — HYDROCODONE BITARTRATE AND ACETAMINOPHEN 2 TABLET: 7.5; 325 TABLET ORAL at 11:35

## 2019-01-01 RX ADMIN — MELATONIN TAB 3 MG 3 MG: 3 TAB at 20:36

## 2019-01-01 RX ADMIN — DICYCLOMINE HYDROCHLORIDE 10 MG: 10 CAPSULE ORAL at 22:26

## 2019-01-01 RX ADMIN — Medication 10 ML: at 09:37

## 2019-01-01 RX ADMIN — BENZOCAINE AND MENTHOL 1 LOZENGE: 15; 3.6 LOZENGE ORAL at 08:19

## 2019-01-01 RX ADMIN — CARVEDILOL 6.25 MG: 6.25 TABLET, FILM COATED ORAL at 10:40

## 2019-01-01 RX ADMIN — POTASSIUM CHLORIDE 40 MEQ: 20 TABLET, EXTENDED RELEASE ORAL at 15:25

## 2019-01-01 RX ADMIN — KETOROLAC TROMETHAMINE 15 MG: 15 INJECTION, SOLUTION INTRAMUSCULAR; INTRAVENOUS at 04:43

## 2019-01-01 RX ADMIN — FUROSEMIDE 80 MG: 10 INJECTION, SOLUTION INTRAMUSCULAR; INTRAVENOUS at 11:30

## 2019-01-01 RX ADMIN — ENOXAPARIN SODIUM 40 MG: 40 INJECTION SUBCUTANEOUS at 10:56

## 2019-01-01 RX ADMIN — HYDROCODONE BITARTRATE AND ACETAMINOPHEN 2 TABLET: 5; 325 TABLET ORAL at 14:49

## 2019-01-01 RX ADMIN — ENOXAPARIN SODIUM 40 MG: 40 INJECTION SUBCUTANEOUS at 10:35

## 2019-01-01 RX ADMIN — SODIUM CHLORIDE: 9 INJECTION, SOLUTION INTRAVENOUS at 14:54

## 2019-01-01 RX ADMIN — DIAZEPAM 5 MG: 5 TABLET ORAL at 21:56

## 2019-01-01 RX ADMIN — DIGOXIN 125 MCG: 125 TABLET ORAL at 16:58

## 2019-01-01 RX ADMIN — QUETIAPINE FUMARATE 50 MG: 25 TABLET ORAL at 22:09

## 2019-01-01 RX ADMIN — Medication 10 ML: at 10:00

## 2019-01-01 RX ADMIN — ACETYLCYSTEINE 13600 MG: 200 INJECTION, SOLUTION INTRAVENOUS at 10:12

## 2019-01-01 RX ADMIN — Medication 10 ML: at 17:12

## 2019-01-01 RX ADMIN — IOPAMIDOL 75 ML: 755 INJECTION, SOLUTION INTRAVENOUS at 17:03

## 2019-01-01 RX ADMIN — DIGOXIN 125 MCG: 125 TABLET ORAL at 09:08

## 2019-01-01 RX ADMIN — Medication 10 ML: at 09:30

## 2019-01-01 RX ADMIN — LISINOPRIL 2.5 MG: 5 TABLET ORAL at 08:48

## 2019-01-01 RX ADMIN — EPLERENONE 25 MG: 25 TABLET, FILM COATED ORAL at 08:08

## 2019-01-01 RX ADMIN — KETOROLAC TROMETHAMINE 30 MG: 30 INJECTION, SOLUTION INTRAMUSCULAR at 15:54

## 2019-01-01 RX ADMIN — LIDOCAINE HYDROCHLORIDE 100 MG: 20 INJECTION, SOLUTION INFILTRATION; PERINEURAL at 07:25

## 2019-01-01 RX ADMIN — LISINOPRIL 2.5 MG: 5 TABLET ORAL at 10:57

## 2019-01-01 RX ADMIN — DIGOXIN 125 MCG: 125 TABLET ORAL at 10:21

## 2019-01-01 RX ADMIN — PREGABALIN 75 MG: 75 CAPSULE ORAL at 08:15

## 2019-01-01 RX ADMIN — ACETAMINOPHEN 1000 MG: 10 INJECTION, SOLUTION INTRAVENOUS at 11:38

## 2019-01-01 RX ADMIN — CARVEDILOL 6.25 MG: 6.25 TABLET, FILM COATED ORAL at 08:15

## 2019-01-01 RX ADMIN — FAMOTIDINE 20 MG: 10 INJECTION, SOLUTION INTRAVENOUS at 14:02

## 2019-01-01 RX ADMIN — QUETIAPINE FUMARATE 25 MG: 25 TABLET ORAL at 21:11

## 2019-01-01 RX ADMIN — DULOXETINE HYDROCHLORIDE 30 MG: 30 CAPSULE, DELAYED RELEASE ORAL at 08:42

## 2019-01-01 RX ADMIN — Medication 200 MCG: at 15:24

## 2019-01-01 RX ADMIN — BENZONATATE 100 MG: 100 CAPSULE ORAL at 11:35

## 2019-01-01 RX ADMIN — CARVEDILOL 12.5 MG: 6.25 TABLET, FILM COATED ORAL at 10:14

## 2019-01-01 RX ADMIN — SODIUM CHLORIDE, POTASSIUM CHLORIDE, SODIUM LACTATE AND CALCIUM CHLORIDE: 600; 310; 30; 20 INJECTION, SOLUTION INTRAVENOUS at 20:49

## 2019-01-01 RX ADMIN — LISINOPRIL 2.5 MG: 5 TABLET ORAL at 22:31

## 2019-01-01 RX ADMIN — Medication 10 ML: at 20:50

## 2019-01-01 RX ADMIN — SODIUM CHLORIDE 1 G: 9 INJECTION, SOLUTION INTRAVENOUS at 01:02

## 2019-01-01 RX ADMIN — DIGOXIN 125 MCG: 125 TABLET ORAL at 09:28

## 2019-01-01 RX ADMIN — DULOXETINE HYDROCHLORIDE 30 MG: 30 CAPSULE, DELAYED RELEASE ORAL at 08:51

## 2019-01-01 RX ADMIN — SACUBITRIL AND VALSARTAN 1 TABLET: 24; 26 TABLET, FILM COATED ORAL at 21:06

## 2019-01-01 RX ADMIN — PANTOPRAZOLE SODIUM 40 MG: 40 INJECTION, POWDER, LYOPHILIZED, FOR SOLUTION INTRAVENOUS at 08:15

## 2019-01-01 RX ADMIN — SACUBITRIL AND VALSARTAN 1 TABLET: 24; 26 TABLET, FILM COATED ORAL at 08:09

## 2019-01-01 RX ADMIN — DIAZEPAM 5 MG: 5 TABLET ORAL at 21:04

## 2019-01-01 RX ADMIN — LISINOPRIL 5 MG: 10 TABLET ORAL at 12:34

## 2019-01-01 RX ADMIN — DULOXETINE HYDROCHLORIDE 30 MG: 30 CAPSULE, DELAYED RELEASE ORAL at 09:35

## 2019-01-01 RX ADMIN — DULOXETINE HYDROCHLORIDE 30 MG: 30 CAPSULE, DELAYED RELEASE ORAL at 09:43

## 2019-01-01 RX ADMIN — KETOROLAC TROMETHAMINE 30 MG: 30 INJECTION, SOLUTION INTRAMUSCULAR at 16:58

## 2019-01-01 RX ADMIN — LEVOFLOXACIN 500 MG: 5 INJECTION, SOLUTION INTRAVENOUS at 23:05

## 2019-01-01 RX ADMIN — EPLERENONE 25 MG: 25 TABLET, FILM COATED ORAL at 11:31

## 2019-01-01 RX ADMIN — HYDROMORPHONE HYDROCHLORIDE 0.5 MG: 2 INJECTION INTRAMUSCULAR; INTRAVENOUS; SUBCUTANEOUS at 17:10

## 2019-01-01 RX ADMIN — MORPHINE SULFATE 4 MG: 4 INJECTION INTRAVENOUS at 18:58

## 2019-01-01 RX ADMIN — ROSUVASTATIN CALCIUM 5 MG: 10 TABLET, FILM COATED ORAL at 21:13

## 2019-01-01 RX ADMIN — ACETAMINOPHEN 650 MG: 325 TABLET, FILM COATED ORAL at 01:31

## 2019-01-01 RX ADMIN — ACETAMINOPHEN 650 MG: 325 TABLET, FILM COATED ORAL at 23:21

## 2019-01-01 RX ADMIN — Medication 10 ML: at 08:41

## 2019-01-01 RX ADMIN — PHYTONADIONE 10 MG: 10 INJECTION, EMULSION INTRAMUSCULAR; INTRAVENOUS; SUBCUTANEOUS at 14:49

## 2019-01-01 RX ADMIN — Medication 10 ML: at 21:12

## 2019-01-01 RX ADMIN — ENOXAPARIN SODIUM 40 MG: 40 INJECTION SUBCUTANEOUS at 11:43

## 2019-01-01 RX ADMIN — BENZOCAINE AND MENTHOL 1 LOZENGE: 15; 3.6 LOZENGE ORAL at 14:22

## 2019-01-01 RX ADMIN — DIGOXIN 125 MCG: 125 TABLET ORAL at 10:26

## 2019-01-01 RX ADMIN — Medication 10 ML: at 23:57

## 2019-01-01 RX ADMIN — SODIUM CHLORIDE: 9 INJECTION, SOLUTION INTRAVENOUS at 16:10

## 2019-01-01 RX ADMIN — METHYLPREDNISOLONE SODIUM SUCCINATE 60 MG: 125 INJECTION, POWDER, FOR SOLUTION INTRAMUSCULAR; INTRAVENOUS at 13:52

## 2019-01-01 RX ADMIN — NORTRIPTYLINE HYDROCHLORIDE 25 MG: 25 CAPSULE ORAL at 21:04

## 2019-01-01 RX ADMIN — PHYTONADIONE 10 MG: 10 INJECTION, EMULSION INTRAMUSCULAR; INTRAVENOUS; SUBCUTANEOUS at 17:26

## 2019-01-01 RX ADMIN — MORPHINE SULFATE 2 MG: 2 INJECTION, SOLUTION INTRAMUSCULAR; INTRAVENOUS at 20:49

## 2019-01-01 RX ADMIN — PROPOFOL 100 MG: 10 INJECTION, EMULSION INTRAVENOUS at 15:01

## 2019-01-01 RX ADMIN — CARVEDILOL 6.25 MG: 3.12 TABLET, FILM COATED ORAL at 12:34

## 2019-01-01 RX ADMIN — ROSUVASTATIN CALCIUM 5 MG: 10 TABLET, FILM COATED ORAL at 00:41

## 2019-01-01 RX ADMIN — Medication 10 ML: at 04:48

## 2019-01-01 RX ADMIN — LISINOPRIL 2.5 MG: 5 TABLET ORAL at 20:27

## 2019-01-01 RX ADMIN — ONDANSETRON 4 MG: 2 INJECTION INTRAMUSCULAR; INTRAVENOUS at 19:48

## 2019-01-01 RX ADMIN — DIAZEPAM 5 MG: 5 TABLET ORAL at 13:52

## 2019-01-01 RX ADMIN — METHYLPREDNISOLONE SODIUM SUCCINATE 60 MG: 125 INJECTION, POWDER, FOR SOLUTION INTRAMUSCULAR; INTRAVENOUS at 08:57

## 2019-01-01 RX ADMIN — EPLERENONE 25 MG: 25 TABLET, FILM COATED ORAL at 12:20

## 2019-01-01 RX ADMIN — HYDROCODONE BITARTRATE AND ACETAMINOPHEN 2 TABLET: 5; 325 TABLET ORAL at 13:24

## 2019-01-01 RX ADMIN — FAMOTIDINE 20 MG: 20 TABLET ORAL at 22:31

## 2019-01-01 RX ADMIN — CARVEDILOL 12.5 MG: 6.25 TABLET, FILM COATED ORAL at 17:51

## 2019-01-01 RX ADMIN — DULOXETINE HYDROCHLORIDE 30 MG: 30 CAPSULE, DELAYED RELEASE ORAL at 08:15

## 2019-01-01 RX ADMIN — HYDROMORPHONE HYDROCHLORIDE 1 MG: 1 INJECTION, SOLUTION INTRAMUSCULAR; INTRAVENOUS; SUBCUTANEOUS at 21:56

## 2019-01-01 RX ADMIN — SACUBITRIL AND VALSARTAN 0.5 TABLET: 24; 26 TABLET, FILM COATED ORAL at 09:04

## 2019-01-01 RX ADMIN — CARVEDILOL 6.25 MG: 6.25 TABLET, FILM COATED ORAL at 10:33

## 2019-01-01 RX ADMIN — FAMOTIDINE 20 MG: 20 TABLET ORAL at 09:48

## 2019-01-01 RX ADMIN — FAMOTIDINE 20 MG: 10 INJECTION, SOLUTION INTRAVENOUS at 21:31

## 2019-01-01 RX ADMIN — Medication 10 ML: at 21:58

## 2019-01-01 RX ADMIN — Medication 10 ML: at 21:20

## 2019-01-01 RX ADMIN — DIGOXIN 125 MCG: 125 TABLET ORAL at 10:33

## 2019-01-01 RX ADMIN — HYDROCODONE BITARTRATE AND ACETAMINOPHEN 2 TABLET: 5; 325 TABLET ORAL at 06:20

## 2019-01-01 RX ADMIN — FUROSEMIDE 80 MG: 10 INJECTION, SOLUTION INTRAMUSCULAR; INTRAVENOUS at 18:22

## 2019-01-01 RX ADMIN — Medication 10 ML: at 10:58

## 2019-01-01 RX ADMIN — Medication 10 ML: at 08:51

## 2019-01-01 RX ADMIN — BENZOCAINE AND MENTHOL 1 LOZENGE: 15; 3.6 LOZENGE ORAL at 01:00

## 2019-01-01 RX ADMIN — FUROSEMIDE 80 MG: 10 INJECTION, SOLUTION INTRAMUSCULAR; INTRAVENOUS at 16:45

## 2019-01-01 RX ADMIN — IPRATROPIUM BROMIDE AND ALBUTEROL SULFATE 3 AMPULE: .5; 3 SOLUTION RESPIRATORY (INHALATION) at 19:52

## 2019-01-01 RX ADMIN — HYDROCODONE BITARTRATE AND ACETAMINOPHEN 2 TABLET: 7.5; 325 TABLET ORAL at 16:31

## 2019-01-01 RX ADMIN — CARVEDILOL 6.25 MG: 3.12 TABLET, FILM COATED ORAL at 09:08

## 2019-01-01 RX ADMIN — ONDANSETRON 4 MG: 2 INJECTION INTRAMUSCULAR; INTRAVENOUS at 07:39

## 2019-01-01 RX ADMIN — MORPHINE SULFATE 4 MG: 4 INJECTION INTRAVENOUS at 15:56

## 2019-01-01 RX ADMIN — DIAZEPAM 5 MG: 5 TABLET ORAL at 23:21

## 2019-01-01 RX ADMIN — ONDANSETRON 4 MG: 2 INJECTION INTRAMUSCULAR; INTRAVENOUS at 16:39

## 2019-01-01 RX ADMIN — DIGOXIN 125 MCG: 125 TABLET ORAL at 08:53

## 2019-01-01 RX ADMIN — MELATONIN TAB 3 MG 10.5 MG: 3 TAB at 00:23

## 2019-01-01 RX ADMIN — PREGABALIN 75 MG: 75 CAPSULE ORAL at 10:27

## 2019-01-01 RX ADMIN — FAMOTIDINE 20 MG: 20 TABLET ORAL at 11:28

## 2019-01-01 RX ADMIN — HYDROCODONE BITARTRATE AND ACETAMINOPHEN 2 TABLET: 7.5; 325 TABLET ORAL at 01:41

## 2019-01-01 RX ADMIN — DIGOXIN 125 MCG: 125 TABLET ORAL at 08:23

## 2019-01-01 RX ADMIN — DIGOXIN 125 MCG: 125 TABLET ORAL at 10:10

## 2019-01-01 RX ADMIN — DIGOXIN 125 MCG: 125 TABLET ORAL at 11:28

## 2019-01-01 RX ADMIN — DIAZEPAM 5 MG: 5 TABLET ORAL at 09:48

## 2019-01-01 RX ADMIN — CIPROFLOXACIN 400 MG: 2 INJECTION, SOLUTION INTRAVENOUS at 04:10

## 2019-01-01 RX ADMIN — PREGABALIN 75 MG: 75 CAPSULE ORAL at 09:28

## 2019-01-01 RX ADMIN — LISINOPRIL 5 MG: 5 TABLET ORAL at 10:27

## 2019-01-01 RX ADMIN — LACTULOSE 20 G: 20 SOLUTION ORAL at 13:24

## 2019-01-01 RX ADMIN — BENZONATATE 100 MG: 100 CAPSULE ORAL at 01:38

## 2019-01-01 RX ADMIN — AMIODARONE HYDROCHLORIDE 0.5 MG/MIN: 50 INJECTION, SOLUTION INTRAVENOUS at 19:44

## 2019-01-01 RX ADMIN — FUROSEMIDE 40 MG: 40 TABLET ORAL at 09:07

## 2019-01-01 RX ADMIN — MORPHINE SULFATE 2 MG: 2 INJECTION, SOLUTION INTRAMUSCULAR; INTRAVENOUS at 12:07

## 2019-01-01 RX ADMIN — AMIODARONE HYDROCHLORIDE 1 MG/MIN: 50 INJECTION, SOLUTION INTRAVENOUS at 18:56

## 2019-01-01 RX ADMIN — Medication 10 ML: at 09:49

## 2019-01-01 RX ADMIN — FAMOTIDINE 20 MG: 20 TABLET ORAL at 09:49

## 2019-01-01 RX ADMIN — METOCLOPRAMIDE 10 MG: 5 INJECTION, SOLUTION INTRAMUSCULAR; INTRAVENOUS at 11:09

## 2019-01-01 RX ADMIN — CETIRIZINE HYDROCHLORIDE 10 MG: 10 TABLET, FILM COATED ORAL at 09:29

## 2019-01-01 RX ADMIN — DIGOXIN 125 MCG: 125 TABLET ORAL at 08:51

## 2019-01-01 RX ADMIN — DIAZEPAM 5 MG: 5 TABLET ORAL at 09:43

## 2019-01-01 RX ADMIN — Medication 10 ML: at 09:05

## 2019-01-01 RX ADMIN — CIPROFLOXACIN 400 MG: 2 INJECTION, SOLUTION INTRAVENOUS at 15:55

## 2019-01-01 RX ADMIN — MIDAZOLAM 1 MG: 1 INJECTION INTRAMUSCULAR; INTRAVENOUS at 14:29

## 2019-01-01 RX ADMIN — FAMOTIDINE 20 MG: 20 TABLET, FILM COATED ORAL at 09:08

## 2019-01-01 RX ADMIN — Medication 4 MG: at 16:26

## 2019-01-01 RX ADMIN — LACTULOSE 20 G: 20 SOLUTION ORAL at 13:02

## 2019-01-01 RX ADMIN — FAMOTIDINE 20 MG: 10 INJECTION, SOLUTION INTRAVENOUS at 10:35

## 2019-01-01 RX ADMIN — PREGABALIN 75 MG: 75 CAPSULE ORAL at 13:02

## 2019-01-01 RX ADMIN — Medication 10 ML: at 08:18

## 2019-01-01 RX ADMIN — CARVEDILOL 6.25 MG: 6.25 TABLET, FILM COATED ORAL at 09:17

## 2019-01-01 RX ADMIN — METRONIDAZOLE 500 MG: 500 INJECTION, SOLUTION INTRAVENOUS at 00:26

## 2019-01-01 RX ADMIN — FUROSEMIDE 40 MG: 10 INJECTION, SOLUTION INTRAMUSCULAR; INTRAVENOUS at 12:34

## 2019-01-01 RX ADMIN — SODIUM CHLORIDE: 9 INJECTION, SOLUTION INTRAVENOUS at 09:47

## 2019-01-01 RX ADMIN — POTASSIUM CHLORIDE 20 MEQ: 1500 TABLET, EXTENDED RELEASE ORAL at 11:43

## 2019-01-01 RX ADMIN — DIAZEPAM 5 MG: 5 TABLET ORAL at 20:39

## 2019-01-01 RX ADMIN — DIGOXIN 125 MCG: 125 TABLET ORAL at 10:40

## 2019-01-01 RX ADMIN — EPLERENONE 25 MG: 25 TABLET, FILM COATED ORAL at 11:17

## 2019-01-01 RX ADMIN — CARVEDILOL 6.25 MG: 6.25 TABLET, FILM COATED ORAL at 17:26

## 2019-01-01 RX ADMIN — METOCLOPRAMIDE 10 MG: 5 INJECTION, SOLUTION INTRAMUSCULAR; INTRAVENOUS at 18:19

## 2019-01-01 RX ADMIN — LACTULOSE 20 G: 20 SOLUTION ORAL at 16:45

## 2019-01-01 RX ADMIN — FUROSEMIDE 40 MG: 10 INJECTION, SOLUTION INTRAMUSCULAR; INTRAVENOUS at 09:37

## 2019-01-01 RX ADMIN — DIGOXIN 125 MCG: 125 TABLET ORAL at 09:36

## 2019-01-01 RX ADMIN — METRONIDAZOLE 500 MG: 500 INJECTION, SOLUTION INTRAVENOUS at 22:11

## 2019-01-01 RX ADMIN — PROPOFOL 50 MG: 10 INJECTION, EMULSION INTRAVENOUS at 07:25

## 2019-01-01 RX ADMIN — BENZOCAINE AND MENTHOL 1 LOZENGE: 15; 3.6 LOZENGE ORAL at 10:56

## 2019-01-01 RX ADMIN — DULOXETINE HYDROCHLORIDE 30 MG: 30 CAPSULE, DELAYED RELEASE ORAL at 09:06

## 2019-01-01 RX ADMIN — FUROSEMIDE 20 MG: 10 INJECTION, SOLUTION INTRAMUSCULAR; INTRAVENOUS at 04:21

## 2019-01-01 RX ADMIN — DULOXETINE HYDROCHLORIDE 30 MG: 30 CAPSULE, DELAYED RELEASE ORAL at 11:28

## 2019-01-01 RX ADMIN — ONDANSETRON 4 MG: 2 INJECTION INTRAMUSCULAR; INTRAVENOUS at 21:03

## 2019-01-01 RX ADMIN — PROPOFOL 40 MG: 10 INJECTION, EMULSION INTRAVENOUS at 07:34

## 2019-01-01 RX ADMIN — MORPHINE SULFATE 1 MG: 2 INJECTION, SOLUTION INTRAMUSCULAR; INTRAVENOUS at 14:29

## 2019-01-01 RX ADMIN — HYDROCODONE BITARTRATE AND ACETAMINOPHEN 1 TABLET: 5; 325 TABLET ORAL at 10:44

## 2019-01-01 RX ADMIN — HYDROCODONE BITARTRATE AND ACETAMINOPHEN 2 TABLET: 7.5; 325 TABLET ORAL at 06:35

## 2019-01-01 RX ADMIN — MORPHINE SULFATE 4 MG: 4 INJECTION INTRAVENOUS at 14:47

## 2019-01-01 RX ADMIN — METHYLPREDNISOLONE SODIUM SUCCINATE 60 MG: 125 INJECTION, POWDER, FOR SOLUTION INTRAMUSCULAR; INTRAVENOUS at 01:08

## 2019-01-01 RX ADMIN — MEROPENEM 1 G: 1 INJECTION, POWDER, FOR SOLUTION INTRAVENOUS at 10:08

## 2019-01-01 RX ADMIN — CARVEDILOL 6.25 MG: 6.25 TABLET, FILM COATED ORAL at 10:11

## 2019-01-01 RX ADMIN — DIAZEPAM 5 MG: 5 TABLET ORAL at 15:10

## 2019-01-01 RX ADMIN — HYDROCODONE BITARTRATE AND ACETAMINOPHEN 2 TABLET: 7.5; 325 TABLET ORAL at 11:23

## 2019-01-01 RX ADMIN — Medication 100 MCG: at 15:08

## 2019-01-01 RX ADMIN — PREGABALIN 75 MG: 75 CAPSULE ORAL at 09:29

## 2019-01-01 RX ADMIN — MORPHINE SULFATE 4 MG: 4 INJECTION INTRAVENOUS at 00:09

## 2019-01-01 RX ADMIN — FUROSEMIDE 80 MG: 10 INJECTION, SOLUTION INTRAMUSCULAR; INTRAVENOUS at 09:05

## 2019-01-01 RX ADMIN — DIGOXIN 125 MCG: 125 TABLET ORAL at 08:48

## 2019-01-01 RX ADMIN — SODIUM CHLORIDE 1000 ML: 9 INJECTION, SOLUTION INTRAVENOUS at 01:03

## 2019-01-01 RX ADMIN — PREGABALIN 75 MG: 75 CAPSULE ORAL at 08:53

## 2019-01-01 RX ADMIN — METOCLOPRAMIDE 10 MG: 5 INJECTION, SOLUTION INTRAMUSCULAR; INTRAVENOUS at 05:35

## 2019-01-01 RX ADMIN — Medication 10 ML: at 09:44

## 2019-01-01 RX ADMIN — DULOXETINE HYDROCHLORIDE 30 MG: 30 CAPSULE, DELAYED RELEASE ORAL at 10:57

## 2019-01-01 RX ADMIN — CARVEDILOL 3.12 MG: 3.12 TABLET, FILM COATED ORAL at 08:23

## 2019-01-01 RX ADMIN — DIGOXIN 125 MCG: 125 TABLET ORAL at 08:15

## 2019-01-01 RX ADMIN — VECURONIUM BROMIDE 6 MG: 1 INJECTION, POWDER, LYOPHILIZED, FOR SOLUTION INTRAVENOUS at 15:19

## 2019-01-01 RX ADMIN — POTASSIUM CHLORIDE 10 MEQ: 7.46 INJECTION, SOLUTION INTRAVENOUS at 09:04

## 2019-01-01 RX ADMIN — SODIUM CHLORIDE 1000 ML: 9 INJECTION, SOLUTION INTRAVENOUS at 07:40

## 2019-01-01 RX ADMIN — IOPAMIDOL 75 ML: 755 INJECTION, SOLUTION INTRAVENOUS at 20:50

## 2019-01-01 RX ADMIN — HYDROMORPHONE HYDROCHLORIDE 0.5 MG: 2 INJECTION INTRAMUSCULAR; INTRAVENOUS; SUBCUTANEOUS at 16:59

## 2019-01-01 RX ADMIN — DULOXETINE HYDROCHLORIDE 30 MG: 30 CAPSULE, DELAYED RELEASE ORAL at 09:08

## 2019-01-01 RX ADMIN — PREGABALIN 75 MG: 75 CAPSULE ORAL at 09:35

## 2019-01-01 RX ADMIN — DULOXETINE HYDROCHLORIDE 30 MG: 30 CAPSULE, DELAYED RELEASE ORAL at 09:03

## 2019-01-01 RX ADMIN — EPLERENONE 25 MG: 25 TABLET, FILM COATED ORAL at 09:29

## 2019-01-01 RX ADMIN — ROSUVASTATIN CALCIUM 5 MG: 10 TABLET, FILM COATED ORAL at 22:55

## 2019-01-01 RX ADMIN — LACTULOSE 20 G: 20 SOLUTION ORAL at 17:09

## 2019-01-01 RX ADMIN — TORSEMIDE 40 MG: 20 TABLET ORAL at 09:06

## 2019-01-01 RX ADMIN — CARVEDILOL 12.5 MG: 6.25 TABLET, FILM COATED ORAL at 09:47

## 2019-01-01 RX ADMIN — METRONIDAZOLE 500 MG: 500 INJECTION, SOLUTION INTRAVENOUS at 08:51

## 2019-01-01 RX ADMIN — SODIUM CHLORIDE 1000 ML: 9 INJECTION, SOLUTION INTRAVENOUS at 14:47

## 2019-01-01 RX ADMIN — DULOXETINE HYDROCHLORIDE 30 MG: 30 CAPSULE, DELAYED RELEASE ORAL at 08:11

## 2019-01-01 RX ADMIN — MELATONIN TAB 3 MG 3 MG: 3 TAB at 00:23

## 2019-01-01 RX ADMIN — PREGABALIN 75 MG: 75 CAPSULE ORAL at 09:54

## 2019-01-01 RX ADMIN — MORPHINE SULFATE 2 MG: 2 INJECTION, SOLUTION INTRAMUSCULAR; INTRAVENOUS at 21:42

## 2019-01-01 RX ADMIN — DIAZEPAM 5 MG: 5 TABLET ORAL at 10:10

## 2019-01-01 RX ADMIN — DIGOXIN 125 MCG: 125 TABLET ORAL at 11:27

## 2019-01-01 RX ADMIN — CARVEDILOL 6.25 MG: 6.25 TABLET, FILM COATED ORAL at 17:36

## 2019-01-01 RX ADMIN — SODIUM CHLORIDE: 9 INJECTION, SOLUTION INTRAVENOUS at 11:24

## 2019-01-01 RX ADMIN — HYDROMORPHONE HYDROCHLORIDE 1 MG: 1 INJECTION, SOLUTION INTRAMUSCULAR; INTRAVENOUS; SUBCUTANEOUS at 08:25

## 2019-01-01 RX ADMIN — ONDANSETRON 4 MG: 2 INJECTION INTRAMUSCULAR; INTRAVENOUS at 14:47

## 2019-01-01 RX ADMIN — DIAZEPAM 5 MG: 5 TABLET ORAL at 14:53

## 2019-01-01 RX ADMIN — MELATONIN TAB 3 MG 3 MG: 3 TAB at 01:00

## 2019-01-01 RX ADMIN — IPRATROPIUM BROMIDE AND ALBUTEROL SULFATE 1 AMPULE: .5; 3 SOLUTION RESPIRATORY (INHALATION) at 13:12

## 2019-01-01 RX ADMIN — ACETAMINOPHEN 1000 MG: 10 INJECTION, SOLUTION INTRAVENOUS at 04:45

## 2019-01-01 RX ADMIN — ASPIRIN 81 MG 324 MG: 81 TABLET ORAL at 22:40

## 2019-01-01 RX ADMIN — BENZONATATE 100 MG: 100 CAPSULE ORAL at 09:36

## 2019-01-01 RX ADMIN — ENOXAPARIN SODIUM 40 MG: 40 INJECTION SUBCUTANEOUS at 08:52

## 2019-01-01 RX ADMIN — MORPHINE SULFATE 0.5 MG: 2 INJECTION, SOLUTION INTRAMUSCULAR; INTRAVENOUS at 08:09

## 2019-01-01 RX ADMIN — DIAZEPAM 5 MG: 5 TABLET ORAL at 21:42

## 2019-01-01 RX ADMIN — FAMOTIDINE 20 MG: 20 TABLET ORAL at 10:21

## 2019-01-01 RX ADMIN — FUROSEMIDE 60 MG: 10 INJECTION, SOLUTION INTRAMUSCULAR; INTRAVENOUS at 03:46

## 2019-01-01 RX ADMIN — ACETAMINOPHEN 1000 MG: 10 INJECTION, SOLUTION INTRAVENOUS at 05:36

## 2019-01-01 RX ADMIN — HYDROCODONE BITARTRATE AND ACETAMINOPHEN 2 TABLET: 7.5; 325 TABLET ORAL at 13:31

## 2019-01-01 RX ADMIN — KETOROLAC TROMETHAMINE 15 MG: 15 INJECTION, SOLUTION INTRAMUSCULAR; INTRAVENOUS at 14:53

## 2019-01-01 RX ADMIN — ATORVASTATIN CALCIUM 20 MG: 20 TABLET, FILM COATED ORAL at 21:28

## 2019-01-01 RX ADMIN — Medication 10 ML: at 08:57

## 2019-01-01 RX ADMIN — MAGNESIUM SULFATE HEPTAHYDRATE 1 G: 1 INJECTION, SOLUTION INTRAVENOUS at 14:29

## 2019-01-01 RX ADMIN — DULOXETINE HYDROCHLORIDE 30 MG: 30 CAPSULE, DELAYED RELEASE ORAL at 09:53

## 2019-01-01 RX ADMIN — CIPROFLOXACIN 400 MG: 2 INJECTION, SOLUTION INTRAVENOUS at 04:18

## 2019-01-01 RX ADMIN — CARVEDILOL 6.25 MG: 6.25 TABLET, FILM COATED ORAL at 17:09

## 2019-01-01 RX ADMIN — MELATONIN TAB 3 MG 3 MG: 3 TAB at 23:21

## 2019-01-01 RX ADMIN — HYDROMORPHONE HYDROCHLORIDE 0.5 MG: 2 INJECTION INTRAMUSCULAR; INTRAVENOUS; SUBCUTANEOUS at 16:50

## 2019-01-01 RX ADMIN — FUROSEMIDE 80 MG: 10 INJECTION, SOLUTION INTRAMUSCULAR; INTRAVENOUS at 21:37

## 2019-01-01 RX ADMIN — MORPHINE SULFATE 4 MG: 4 INJECTION INTRAVENOUS at 18:53

## 2019-01-01 RX ADMIN — Medication 10 ML: at 04:14

## 2019-01-01 RX ADMIN — CARVEDILOL 6.25 MG: 6.25 TABLET, FILM COATED ORAL at 17:32

## 2019-01-01 RX ADMIN — NALOXEGOL OXALATE 25 MG: 25 TABLET, FILM COATED ORAL at 09:16

## 2019-01-01 RX ADMIN — FUROSEMIDE 40 MG: 10 INJECTION, SOLUTION INTRAMUSCULAR; INTRAVENOUS at 11:16

## 2019-01-01 RX ADMIN — OXYCODONE HYDROCHLORIDE 5 MG: 5 TABLET ORAL at 15:55

## 2019-01-01 RX ADMIN — Medication 10 ML: at 08:23

## 2019-01-01 RX ADMIN — SODIUM CHLORIDE, POTASSIUM CHLORIDE, SODIUM LACTATE AND CALCIUM CHLORIDE: 600; 310; 30; 20 INJECTION, SOLUTION INTRAVENOUS at 12:35

## 2019-01-01 RX ADMIN — Medication 10 ML: at 19:54

## 2019-01-01 RX ADMIN — SODIUM CHLORIDE 2448 ML: 9 INJECTION, SOLUTION INTRAVENOUS at 22:40

## 2019-01-01 RX ADMIN — MILRINONE LACTATE 0.2 MCG/KG/MIN: 200 INJECTION, SOLUTION INTRAVENOUS at 16:19

## 2019-01-01 RX ADMIN — CETIRIZINE HYDROCHLORIDE 10 MG: 10 TABLET, FILM COATED ORAL at 10:33

## 2019-01-01 RX ADMIN — Medication 10 ML: at 22:10

## 2019-01-01 RX ADMIN — CARVEDILOL 6.25 MG: 6.25 TABLET, FILM COATED ORAL at 09:33

## 2019-01-01 RX ADMIN — SIMETHICONE CHEW TAB 80 MG 80 MG: 80 TABLET ORAL at 00:29

## 2019-01-01 RX ADMIN — MORPHINE SULFATE 1 MG: 2 INJECTION, SOLUTION INTRAMUSCULAR; INTRAVENOUS at 01:00

## 2019-01-01 RX ADMIN — CARVEDILOL 6.25 MG: 6.25 TABLET, FILM COATED ORAL at 10:26

## 2019-01-01 RX ADMIN — FAMOTIDINE 20 MG: 20 TABLET ORAL at 11:36

## 2019-01-01 RX ADMIN — CARVEDILOL 6.25 MG: 6.25 TABLET, FILM COATED ORAL at 08:48

## 2019-01-01 RX ADMIN — FAMOTIDINE 20 MG: 10 INJECTION, SOLUTION INTRAVENOUS at 08:51

## 2019-01-01 RX ADMIN — DIAZEPAM 5 MG: 5 TABLET ORAL at 08:42

## 2019-01-01 RX ADMIN — POTASSIUM CHLORIDE 10 MEQ: 7.46 INJECTION, SOLUTION INTRAVENOUS at 10:35

## 2019-01-01 RX ADMIN — Medication 10 ML: at 20:15

## 2019-01-01 RX ADMIN — IPRATROPIUM BROMIDE AND ALBUTEROL SULFATE 1 AMPULE: .5; 3 SOLUTION RESPIRATORY (INHALATION) at 07:59

## 2019-01-01 RX ADMIN — Medication 10 ML: at 08:24

## 2019-01-01 RX ADMIN — POTASSIUM CHLORIDE 10 MEQ: 7.46 INJECTION, SOLUTION INTRAVENOUS at 12:00

## 2019-01-01 RX ADMIN — OXYCODONE HYDROCHLORIDE 5 MG: 5 TABLET ORAL at 00:53

## 2019-01-01 RX ADMIN — FAMOTIDINE 20 MG: 20 TABLET ORAL at 09:36

## 2019-01-01 RX ADMIN — FAMOTIDINE 20 MG: 10 INJECTION, SOLUTION INTRAVENOUS at 10:00

## 2019-01-01 RX ADMIN — Medication 10 ML: at 08:11

## 2019-01-01 RX ADMIN — PREGABALIN 150 MG: 75 CAPSULE ORAL at 21:57

## 2019-01-01 RX ADMIN — FUROSEMIDE 40 MG: 10 INJECTION, SOLUTION INTRAMUSCULAR; INTRAVENOUS at 08:30

## 2019-01-01 RX ADMIN — QUETIAPINE FUMARATE 25 MG: 25 TABLET ORAL at 21:18

## 2019-01-01 RX ADMIN — PREGABALIN 75 MG: 75 CAPSULE ORAL at 21:39

## 2019-01-01 RX ADMIN — PREGABALIN 75 MG: 75 CAPSULE ORAL at 22:31

## 2019-01-01 RX ADMIN — QUETIAPINE FUMARATE 50 MG: 25 TABLET ORAL at 21:13

## 2019-01-01 RX ADMIN — ACETAMINOPHEN 650 MG: 325 TABLET, FILM COATED ORAL at 06:04

## 2019-01-01 RX ADMIN — DIAZEPAM 5 MG: 5 TABLET ORAL at 14:02

## 2019-01-01 RX ADMIN — KETOROLAC TROMETHAMINE 30 MG: 30 INJECTION, SOLUTION INTRAMUSCULAR at 22:38

## 2019-01-01 RX ADMIN — DIAZEPAM 5 MG: 5 TABLET ORAL at 10:26

## 2019-01-01 RX ADMIN — ACETAMINOPHEN 1000 MG: 10 INJECTION, SOLUTION INTRAVENOUS at 00:09

## 2019-01-01 RX ADMIN — EPHEDRINE SULFATE 15 MG: 50 INJECTION, SOLUTION INTRAVENOUS at 16:10

## 2019-01-01 RX ADMIN — MORPHINE SULFATE 0.5 MG: 2 INJECTION, SOLUTION INTRAMUSCULAR; INTRAVENOUS at 12:16

## 2019-01-01 RX ADMIN — Medication 10 ML: at 08:42

## 2019-01-01 RX ADMIN — ENOXAPARIN SODIUM 40 MG: 40 INJECTION SUBCUTANEOUS at 09:48

## 2019-01-01 RX ADMIN — SIMETHICONE CHEW TAB 80 MG 80 MG: 80 TABLET ORAL at 13:41

## 2019-01-01 RX ADMIN — Medication 0.6 MG: at 16:26

## 2019-01-01 RX ADMIN — PREGABALIN 75 MG: 75 CAPSULE ORAL at 08:51

## 2019-01-01 RX ADMIN — METRONIDAZOLE 500 MG: 500 INJECTION, SOLUTION INTRAVENOUS at 23:20

## 2019-01-01 RX ADMIN — MORPHINE SULFATE 1 MG: 2 INJECTION, SOLUTION INTRAMUSCULAR; INTRAVENOUS at 19:58

## 2019-01-01 RX ADMIN — DIAZEPAM 5 MG: 5 TABLET ORAL at 15:17

## 2019-01-01 RX ADMIN — ONDANSETRON 4 MG: 2 INJECTION INTRAMUSCULAR; INTRAVENOUS at 07:41

## 2019-01-01 RX ADMIN — NALOXEGOL OXALATE 25 MG: 25 TABLET, FILM COATED ORAL at 10:50

## 2019-01-01 RX ADMIN — QUETIAPINE FUMARATE 50 MG: 25 TABLET ORAL at 20:36

## 2019-01-01 RX ADMIN — FUROSEMIDE 40 MG: 10 INJECTION, SOLUTION INTRAMUSCULAR; INTRAVENOUS at 08:57

## 2019-01-01 RX ADMIN — SODIUM CHLORIDE 500 ML: 9 INJECTION, SOLUTION INTRAVENOUS at 20:02

## 2019-01-01 RX ADMIN — METOCLOPRAMIDE 10 MG: 5 INJECTION, SOLUTION INTRAMUSCULAR; INTRAVENOUS at 11:38

## 2019-01-01 RX ADMIN — NORTRIPTYLINE HYDROCHLORIDE 10 MG: 10 CAPSULE ORAL at 19:54

## 2019-01-01 RX ADMIN — EPLERENONE 25 MG: 25 TABLET, FILM COATED ORAL at 10:37

## 2019-01-01 RX ADMIN — FUROSEMIDE 80 MG: 10 INJECTION, SOLUTION INTRAMUSCULAR; INTRAVENOUS at 09:53

## 2019-01-01 RX ADMIN — PREGABALIN 75 MG: 75 CAPSULE ORAL at 21:13

## 2019-01-01 RX ADMIN — Medication 10 ML: at 10:36

## 2019-01-01 RX ADMIN — SACUBITRIL AND VALSARTAN 1 TABLET: 24; 26 TABLET, FILM COATED ORAL at 11:28

## 2019-01-01 RX ADMIN — CEFEPIME HYDROCHLORIDE 2 G: 2 INJECTION, POWDER, FOR SOLUTION INTRAVENOUS at 06:27

## 2019-01-01 RX ADMIN — PREGABALIN 75 MG: 75 CAPSULE ORAL at 09:59

## 2019-01-01 RX ADMIN — HYDROCODONE BITARTRATE AND ACETAMINOPHEN 2 TABLET: 7.5; 325 TABLET ORAL at 04:21

## 2019-01-01 RX ADMIN — CARVEDILOL 6.25 MG: 6.25 TABLET, FILM COATED ORAL at 16:58

## 2019-01-01 RX ADMIN — FUROSEMIDE 5 MG/HR: 10 INJECTION, SOLUTION INTRAVENOUS at 17:08

## 2019-01-01 RX ADMIN — KETOROLAC TROMETHAMINE 15 MG: 15 INJECTION, SOLUTION INTRAMUSCULAR; INTRAVENOUS at 18:19

## 2019-01-01 RX ADMIN — DULOXETINE HYDROCHLORIDE 30 MG: 30 CAPSULE, DELAYED RELEASE ORAL at 08:53

## 2019-01-01 RX ADMIN — NORTRIPTYLINE HYDROCHLORIDE 25 MG: 25 CAPSULE ORAL at 21:05

## 2019-01-01 RX ADMIN — IOPAMIDOL 75 ML: 755 INJECTION, SOLUTION INTRAVENOUS at 15:25

## 2019-01-01 RX ADMIN — ALTEPLASE 2 MG: 2.2 INJECTION, POWDER, LYOPHILIZED, FOR SOLUTION INTRAVENOUS at 12:41

## 2019-01-01 RX ADMIN — FUROSEMIDE 80 MG: 10 INJECTION, SOLUTION INTRAMUSCULAR; INTRAVENOUS at 10:41

## 2019-01-01 RX ADMIN — MORPHINE SULFATE 4 MG: 4 INJECTION INTRAVENOUS at 01:00

## 2019-01-01 RX ADMIN — CARVEDILOL 6.25 MG: 6.25 TABLET, FILM COATED ORAL at 16:45

## 2019-01-01 RX ADMIN — NORTRIPTYLINE HYDROCHLORIDE 25 MG: 25 CAPSULE ORAL at 22:28

## 2019-01-01 RX ADMIN — CARVEDILOL 12.5 MG: 6.25 TABLET, FILM COATED ORAL at 17:56

## 2019-01-01 RX ADMIN — DICYCLOMINE HYDROCHLORIDE 10 MG: 10 CAPSULE ORAL at 11:17

## 2019-01-01 RX ADMIN — Medication: at 21:05

## 2019-01-01 RX ADMIN — LIDOCAINE HYDROCHLORIDE: 20 SOLUTION ORAL; TOPICAL at 08:45

## 2019-01-01 RX ADMIN — QUETIAPINE FUMARATE 25 MG: 25 TABLET ORAL at 21:43

## 2019-01-01 RX ADMIN — PREGABALIN 75 MG: 75 CAPSULE ORAL at 08:42

## 2019-01-01 RX ADMIN — MORPHINE SULFATE 2 MG: 2 INJECTION, SOLUTION INTRAMUSCULAR; INTRAVENOUS at 05:01

## 2019-01-01 RX ADMIN — TORSEMIDE 40 MG: 20 TABLET ORAL at 08:53

## 2019-01-01 RX ADMIN — EPLERENONE 25 MG: 25 TABLET, FILM COATED ORAL at 08:53

## 2019-01-01 RX ADMIN — OXYCODONE HYDROCHLORIDE 5 MG: 5 TABLET ORAL at 22:09

## 2019-01-01 RX ADMIN — DULOXETINE HYDROCHLORIDE 30 MG: 30 CAPSULE, DELAYED RELEASE ORAL at 09:28

## 2019-01-01 RX ADMIN — LACTULOSE 20 G: 20 SOLUTION ORAL at 13:56

## 2019-01-01 RX ADMIN — DIAZEPAM 5 MG: 5 TABLET ORAL at 21:39

## 2019-01-01 RX ADMIN — ALTEPLASE 1 MG: 2.2 INJECTION, POWDER, LYOPHILIZED, FOR SOLUTION INTRAVENOUS at 03:24

## 2019-01-01 RX ADMIN — Medication 500 ML: at 20:02

## 2019-01-01 RX ADMIN — DIGOXIN 125 MCG: 125 TABLET ORAL at 09:44

## 2019-01-01 RX ADMIN — CARVEDILOL 6.25 MG: 3.12 TABLET, FILM COATED ORAL at 19:01

## 2019-01-01 RX ADMIN — FUROSEMIDE 40 MG: 40 TABLET ORAL at 19:00

## 2019-01-01 RX ADMIN — METHYLPREDNISOLONE SODIUM SUCCINATE 125 MG: 125 INJECTION, POWDER, FOR SOLUTION INTRAMUSCULAR; INTRAVENOUS at 19:48

## 2019-01-01 RX ADMIN — FAMOTIDINE 20 MG: 10 INJECTION, SOLUTION INTRAVENOUS at 23:37

## 2019-01-01 RX ADMIN — ACETAMINOPHEN 1000 MG: 10 INJECTION, SOLUTION INTRAVENOUS at 17:33

## 2019-01-01 RX ADMIN — ACETAMINOPHEN 650 MG: 325 TABLET, FILM COATED ORAL at 04:21

## 2019-01-01 RX ADMIN — POTASSIUM CHLORIDE 20 MEQ: 1500 TABLET, EXTENDED RELEASE ORAL at 19:01

## 2019-01-01 RX ADMIN — MORPHINE SULFATE 4 MG: 4 INJECTION INTRAVENOUS at 20:03

## 2019-01-01 RX ADMIN — METRONIDAZOLE 500 MG: 500 INJECTION, SOLUTION INTRAVENOUS at 20:03

## 2019-01-01 RX ADMIN — NALOXEGOL OXALATE 25 MG: 25 TABLET, FILM COATED ORAL at 09:05

## 2019-01-01 RX ADMIN — MORPHINE SULFATE 0.5 MG: 2 INJECTION, SOLUTION INTRAMUSCULAR; INTRAVENOUS at 18:27

## 2019-01-01 RX ADMIN — METOCLOPRAMIDE 10 MG: 5 INJECTION, SOLUTION INTRAMUSCULAR; INTRAVENOUS at 00:09

## 2019-01-01 RX ADMIN — SACUBITRIL AND VALSARTAN 1 TABLET: 24; 26 TABLET, FILM COATED ORAL at 22:28

## 2019-01-01 RX ADMIN — DIAZEPAM 5 MG: 5 TABLET ORAL at 14:44

## 2019-01-01 RX ADMIN — DIGOXIN 125 MCG: 125 TABLET ORAL at 08:50

## 2019-01-01 RX ADMIN — MORPHINE SULFATE 2 MG: 2 INJECTION, SOLUTION INTRAMUSCULAR; INTRAVENOUS at 08:35

## 2019-01-01 RX ADMIN — Medication 10 ML: at 23:30

## 2019-01-01 RX ADMIN — DICYCLOMINE HYDROCHLORIDE 10 MG: 10 CAPSULE ORAL at 18:55

## 2019-01-01 RX ADMIN — Medication 10 ML: at 08:58

## 2019-01-01 RX ADMIN — Medication 10 ML: at 09:15

## 2019-01-01 RX ADMIN — EPLERENONE 25 MG: 25 TABLET, FILM COATED ORAL at 11:32

## 2019-01-01 RX ADMIN — SODIUM CHLORIDE: 9 INJECTION, SOLUTION INTRAVENOUS at 12:45

## 2019-01-01 RX ADMIN — Medication 10 ML: at 08:54

## 2019-01-01 RX ADMIN — SACUBITRIL AND VALSARTAN 0.5 TABLET: 24; 26 TABLET, FILM COATED ORAL at 21:12

## 2019-01-01 RX ADMIN — LISINOPRIL 5 MG: 5 TABLET ORAL at 10:11

## 2019-01-01 RX ADMIN — DIAZEPAM 5 MG: 5 TABLET ORAL at 09:04

## 2019-01-01 RX ADMIN — MORPHINE SULFATE 2 MG: 2 INJECTION, SOLUTION INTRAMUSCULAR; INTRAVENOUS at 20:48

## 2019-01-01 RX ADMIN — LACTULOSE 20 G: 20 SOLUTION ORAL at 10:40

## 2019-01-01 RX ADMIN — METRONIDAZOLE 500 MG: 500 INJECTION, SOLUTION INTRAVENOUS at 14:49

## 2019-01-01 RX ADMIN — DIGOXIN 125 MCG: 125 TABLET ORAL at 12:34

## 2019-01-01 RX ADMIN — DICYCLOMINE HYDROCHLORIDE 10 MG: 10 CAPSULE ORAL at 08:51

## 2019-01-01 RX ADMIN — FAMOTIDINE 20 MG: 10 INJECTION, SOLUTION INTRAVENOUS at 09:53

## 2019-01-01 RX ADMIN — ACETAMINOPHEN 650 MG: 325 TABLET, FILM COATED ORAL at 12:32

## 2019-01-01 RX ADMIN — DULOXETINE HYDROCHLORIDE 30 MG: 30 CAPSULE, DELAYED RELEASE ORAL at 10:26

## 2019-01-01 RX ADMIN — ONDANSETRON 4 MG: 2 INJECTION INTRAMUSCULAR; INTRAVENOUS at 19:29

## 2019-01-01 RX ADMIN — CARVEDILOL 12.5 MG: 6.25 TABLET, FILM COATED ORAL at 17:10

## 2019-01-01 RX ADMIN — FAMOTIDINE 20 MG: 20 TABLET ORAL at 20:13

## 2019-01-01 RX ADMIN — DULOXETINE HYDROCHLORIDE 30 MG: 30 CAPSULE, DELAYED RELEASE ORAL at 10:09

## 2019-01-01 RX ADMIN — METOCLOPRAMIDE 10 MG: 5 INJECTION, SOLUTION INTRAMUSCULAR; INTRAVENOUS at 17:33

## 2019-01-01 RX ADMIN — ENOXAPARIN SODIUM 40 MG: 40 INJECTION SUBCUTANEOUS at 09:06

## 2019-01-01 RX ADMIN — LACTULOSE 20 G: 20 SOLUTION ORAL at 10:56

## 2019-01-01 RX ADMIN — Medication 10 ML: at 09:45

## 2019-01-01 RX ADMIN — CIPROFLOXACIN 400 MG: 2 INJECTION, SOLUTION INTRAVENOUS at 15:18

## 2019-01-01 RX ADMIN — CARVEDILOL 6.25 MG: 6.25 TABLET, FILM COATED ORAL at 10:57

## 2019-01-01 RX ADMIN — FUROSEMIDE 20 MG: 10 INJECTION, SOLUTION INTRAMUSCULAR; INTRAVENOUS at 17:11

## 2019-01-01 RX ADMIN — DIAZEPAM 5 MG: 5 TABLET ORAL at 09:35

## 2019-01-01 RX ADMIN — DULOXETINE HYDROCHLORIDE 30 MG: 30 CAPSULE, DELAYED RELEASE ORAL at 10:41

## 2019-01-01 RX ADMIN — FAMOTIDINE 20 MG: 20 TABLET ORAL at 08:09

## 2019-01-01 RX ADMIN — HYDROCODONE BITARTRATE AND ACETAMINOPHEN 2 TABLET: 7.5; 325 TABLET ORAL at 09:30

## 2019-01-01 RX ADMIN — METRONIDAZOLE 500 MG: 500 INJECTION, SOLUTION INTRAVENOUS at 06:10

## 2019-01-01 RX ADMIN — MIDAZOLAM 2 MG: 1 INJECTION INTRAMUSCULAR; INTRAVENOUS at 07:04

## 2019-01-01 RX ADMIN — PANTOPRAZOLE SODIUM 40 MG: 40 INJECTION, POWDER, LYOPHILIZED, FOR SOLUTION INTRAVENOUS at 08:52

## 2019-01-01 RX ADMIN — CARVEDILOL 6.25 MG: 6.25 TABLET, FILM COATED ORAL at 08:53

## 2019-01-01 RX ADMIN — CARVEDILOL 6.25 MG: 6.25 TABLET, FILM COATED ORAL at 19:20

## 2019-01-01 RX ADMIN — ENOXAPARIN SODIUM 40 MG: 40 INJECTION SUBCUTANEOUS at 09:29

## 2019-01-01 RX ADMIN — SODIUM CHLORIDE: 9 INJECTION, SOLUTION INTRAVENOUS at 17:55

## 2019-01-01 RX ADMIN — EPHEDRINE SULFATE 10 MG: 50 INJECTION, SOLUTION INTRAVENOUS at 15:17

## 2019-01-01 RX ADMIN — LACTULOSE 20 G: 20 SOLUTION ORAL at 11:29

## 2019-01-01 RX ADMIN — IOPAMIDOL 100 ML: 755 INJECTION, SOLUTION INTRAVENOUS at 00:15

## 2019-01-01 RX ADMIN — CARVEDILOL 6.25 MG: 6.25 TABLET, FILM COATED ORAL at 18:31

## 2019-01-01 RX ADMIN — POTASSIUM CHLORIDE 10 MEQ: 7.46 INJECTION, SOLUTION INTRAVENOUS at 09:20

## 2019-01-01 RX ADMIN — MORPHINE SULFATE 4 MG: 4 INJECTION INTRAVENOUS at 11:38

## 2019-01-01 RX ADMIN — ENOXAPARIN SODIUM 40 MG: 40 INJECTION SUBCUTANEOUS at 09:36

## 2019-01-01 RX ADMIN — DEXAMETHASONE SODIUM PHOSPHATE 8 MG: 4 INJECTION, SOLUTION INTRAMUSCULAR; INTRAVENOUS at 15:54

## 2019-01-01 RX ADMIN — Medication 150 MCG: at 15:21

## 2019-01-01 RX ADMIN — Medication 10 ML: at 21:40

## 2019-01-01 RX ADMIN — Medication 50 MCG: at 15:17

## 2019-01-01 RX ADMIN — ROSUVASTATIN CALCIUM 5 MG: 10 TABLET, FILM COATED ORAL at 20:39

## 2019-01-01 RX ADMIN — Medication 10 ML: at 08:31

## 2019-01-01 RX ADMIN — TORSEMIDE 40 MG: 20 TABLET ORAL at 10:10

## 2019-01-01 RX ADMIN — TAZOBACTAM SODIUM AND PIPERACILLIN SODIUM 3.38 G: 375; 3 INJECTION, SOLUTION INTRAVENOUS at 22:54

## 2019-01-01 RX ADMIN — DICYCLOMINE HYDROCHLORIDE 10 MG: 10 CAPSULE ORAL at 09:35

## 2019-01-01 RX ADMIN — NALOXEGOL OXALATE 25 MG: 25 TABLET, FILM COATED ORAL at 10:34

## 2019-01-01 RX ADMIN — NORTRIPTYLINE HYDROCHLORIDE 10 MG: 10 CAPSULE ORAL at 22:10

## 2019-01-01 RX ADMIN — HYDROCODONE BITARTRATE AND ACETAMINOPHEN 2 TABLET: 5; 325 TABLET ORAL at 02:41

## 2019-01-01 RX ADMIN — Medication 10 ML: at 22:52

## 2019-01-01 RX ADMIN — LISINOPRIL 5 MG: 5 TABLET ORAL at 09:28

## 2019-01-01 RX ADMIN — SODIUM CHLORIDE: 9 INJECTION, SOLUTION INTRAVENOUS at 17:30

## 2019-01-01 RX ADMIN — Medication 10 ML: at 09:38

## 2019-01-01 RX ADMIN — DULOXETINE HYDROCHLORIDE 30 MG: 30 CAPSULE, DELAYED RELEASE ORAL at 10:33

## 2019-01-01 RX ADMIN — KETOROLAC TROMETHAMINE 30 MG: 30 INJECTION, SOLUTION INTRAMUSCULAR at 22:29

## 2019-01-01 RX ADMIN — Medication 10 ML: at 10:11

## 2019-01-01 RX ADMIN — PREGABALIN 75 MG: 75 CAPSULE ORAL at 15:38

## 2019-01-01 RX ADMIN — LISINOPRIL 5 MG: 10 TABLET ORAL at 09:08

## 2019-01-01 RX ADMIN — BENZONATATE 100 MG: 100 CAPSULE ORAL at 22:12

## 2019-01-01 RX ADMIN — ACETAMINOPHEN 650 MG: 325 TABLET, FILM COATED ORAL at 18:01

## 2019-01-01 RX ADMIN — IOPAMIDOL 75 ML: 755 INJECTION, SOLUTION INTRAVENOUS at 18:08

## 2019-01-01 RX ADMIN — PREGABALIN 75 MG: 25 CAPSULE ORAL at 08:11

## 2019-01-01 RX ADMIN — Medication 10 ML: at 15:55

## 2019-01-01 RX ADMIN — TORSEMIDE 40 MG: 20 TABLET ORAL at 00:41

## 2019-01-01 RX ADMIN — DIAZEPAM 5 MG: 5 TABLET ORAL at 10:00

## 2019-01-01 RX ADMIN — TORSEMIDE 40 MG: 20 TABLET ORAL at 08:11

## 2019-01-01 RX ADMIN — ACETYLCYSTEINE 9080 MG: 200 INJECTION, SOLUTION INTRAVENOUS at 15:30

## 2019-01-01 RX ADMIN — PREGABALIN 75 MG: 75 CAPSULE ORAL at 22:09

## 2019-01-01 RX ADMIN — CARVEDILOL 6.25 MG: 6.25 TABLET, FILM COATED ORAL at 11:28

## 2019-01-01 RX ADMIN — KETOROLAC TROMETHAMINE 15 MG: 15 INJECTION, SOLUTION INTRAMUSCULAR; INTRAVENOUS at 00:20

## 2019-01-01 RX ADMIN — SIMETHICONE CHEW TAB 80 MG 80 MG: 80 TABLET ORAL at 14:49

## 2019-01-01 RX ADMIN — TORSEMIDE 40 MG: 20 TABLET ORAL at 10:34

## 2019-01-01 RX ADMIN — HYDROMORPHONE HYDROCHLORIDE 0.5 MG: 2 INJECTION INTRAMUSCULAR; INTRAVENOUS; SUBCUTANEOUS at 17:05

## 2019-01-01 RX ADMIN — DIGOXIN 125 MCG: 125 TABLET ORAL at 13:06

## 2019-01-01 RX ADMIN — CARVEDILOL 12.5 MG: 6.25 TABLET, FILM COATED ORAL at 09:35

## 2019-01-01 RX ADMIN — DIAZEPAM 5 MG: 5 TABLET ORAL at 22:09

## 2019-01-01 RX ADMIN — SODIUM CHLORIDE 250 ML: 9 INJECTION, SOLUTION INTRAVENOUS at 17:15

## 2019-01-01 RX ADMIN — HYDROCODONE BITARTRATE AND ACETAMINOPHEN 2 TABLET: 7.5; 325 TABLET ORAL at 22:29

## 2019-01-01 RX ADMIN — FAMOTIDINE 20 MG: 20 TABLET ORAL at 08:51

## 2019-01-01 RX ADMIN — ACETYLCYSTEINE 4540 MG: 200 INJECTION, SOLUTION INTRAVENOUS at 11:20

## 2019-01-01 RX ADMIN — DIAZEPAM 5 MG: 5 TABLET ORAL at 20:36

## 2019-01-01 RX ADMIN — FUROSEMIDE 80 MG: 10 INJECTION, SOLUTION INTRAMUSCULAR; INTRAVENOUS at 16:58

## 2019-01-01 RX ADMIN — PROPOFOL 50 MG: 10 INJECTION, EMULSION INTRAVENOUS at 07:30

## 2019-01-01 RX ADMIN — Medication 0.2 MG: at 15:18

## 2019-01-01 RX ADMIN — SODIUM CHLORIDE: 9 INJECTION, SOLUTION INTRAVENOUS at 02:12

## 2019-01-01 RX ADMIN — Medication 10 ML: at 21:11

## 2019-01-01 RX ADMIN — EPHEDRINE SULFATE 10 MG: 50 INJECTION, SOLUTION INTRAVENOUS at 16:16

## 2019-01-01 RX ADMIN — POTASSIUM CHLORIDE 40 MEQ: 1500 TABLET, EXTENDED RELEASE ORAL at 16:33

## 2019-01-01 RX ADMIN — MELATONIN TAB 3 MG 10.5 MG: 3 TAB at 22:52

## 2019-01-01 RX ADMIN — CARVEDILOL 12.5 MG: 6.25 TABLET, FILM COATED ORAL at 08:09

## 2019-01-01 RX ADMIN — MORPHINE SULFATE 0.5 MG: 2 INJECTION, SOLUTION INTRAMUSCULAR; INTRAVENOUS at 19:59

## 2019-01-01 RX ADMIN — FUROSEMIDE 80 MG: 10 INJECTION, SOLUTION INTRAMUSCULAR; INTRAVENOUS at 08:49

## 2019-01-01 RX ADMIN — ONDANSETRON 4 MG: 2 INJECTION INTRAMUSCULAR; INTRAVENOUS at 12:32

## 2019-01-01 RX ADMIN — PROMETHAZINE HYDROCHLORIDE 12.5 MG: 25 INJECTION INTRAMUSCULAR; INTRAVENOUS at 20:48

## 2019-01-01 RX ADMIN — PREGABALIN 75 MG: 75 CAPSULE ORAL at 19:53

## 2019-01-01 RX ADMIN — Medication 10 ML: at 11:31

## 2019-01-01 RX ADMIN — METRONIDAZOLE 500 MG: 500 INJECTION, SOLUTION INTRAVENOUS at 13:22

## 2019-01-01 RX ADMIN — PANTOPRAZOLE SODIUM 40 MG: 40 TABLET, DELAYED RELEASE ORAL at 06:17

## 2019-01-01 RX ADMIN — ONDANSETRON 4 MG: 2 INJECTION INTRAMUSCULAR; INTRAVENOUS at 18:53

## 2019-01-01 RX ADMIN — DICYCLOMINE HYDROCHLORIDE 10 MG: 10 CAPSULE ORAL at 15:11

## 2019-01-01 RX ADMIN — ACETAMINOPHEN 650 MG: 325 TABLET, FILM COATED ORAL at 17:51

## 2019-01-01 RX ADMIN — EPLERENONE 25 MG: 25 TABLET, FILM COATED ORAL at 10:05

## 2019-01-01 RX ADMIN — OXYCODONE HYDROCHLORIDE 5 MG: 5 TABLET ORAL at 10:06

## 2019-01-01 RX ADMIN — NORTRIPTYLINE HYDROCHLORIDE 10 MG: 10 CAPSULE ORAL at 21:12

## 2019-01-01 RX ADMIN — FUROSEMIDE 40 MG: 10 INJECTION, SOLUTION INTRAMUSCULAR; INTRAVENOUS at 08:10

## 2019-01-01 RX ADMIN — MORPHINE SULFATE 4 MG: 4 INJECTION INTRAVENOUS at 04:26

## 2019-01-01 RX ADMIN — CIPROFLOXACIN 400 MG: 2 INJECTION, SOLUTION INTRAVENOUS at 02:34

## 2019-01-01 RX ADMIN — DIAZEPAM 5 MG: 5 TABLET ORAL at 13:41

## 2019-01-01 RX ADMIN — DIAZEPAM 5 MG: 5 TABLET ORAL at 08:51

## 2019-01-01 RX ADMIN — DIAZEPAM 5 MG: 5 TABLET ORAL at 15:38

## 2019-01-01 RX ADMIN — MILRINONE LACTATE IN DEXTROSE 0.1 MCG/KG/MIN: 200 INJECTION, SOLUTION INTRAVENOUS at 14:36

## 2019-01-01 RX ADMIN — LACTULOSE 20 G: 20 SOLUTION ORAL at 09:35

## 2019-01-01 RX ADMIN — ACETAMINOPHEN 1000 MG: 10 INJECTION, SOLUTION INTRAVENOUS at 17:07

## 2019-01-01 RX ADMIN — PREGABALIN 75 MG: 75 CAPSULE ORAL at 10:10

## 2019-01-01 RX ADMIN — KETOROLAC TROMETHAMINE 15 MG: 15 INJECTION, SOLUTION INTRAMUSCULAR; INTRAVENOUS at 17:32

## 2019-01-01 RX ADMIN — HYDROCODONE BITARTRATE AND ACETAMINOPHEN 2 TABLET: 5; 325 TABLET ORAL at 20:13

## 2019-01-01 RX ADMIN — FUROSEMIDE 80 MG: 10 INJECTION, SOLUTION INTRAMUSCULAR; INTRAVENOUS at 09:44

## 2019-01-01 RX ADMIN — DIAZEPAM 5 MG: 5 TABLET ORAL at 09:29

## 2019-01-01 RX ADMIN — DULOXETINE HYDROCHLORIDE 30 MG: 30 CAPSULE, DELAYED RELEASE ORAL at 09:59

## 2019-01-01 RX ADMIN — NALOXEGOL OXALATE 25 MG: 25 TABLET, FILM COATED ORAL at 09:04

## 2019-01-01 RX ADMIN — Medication 10 ML: at 21:31

## 2019-01-01 RX ADMIN — FAMOTIDINE 20 MG: 10 INJECTION, SOLUTION INTRAVENOUS at 09:29

## 2019-01-01 RX ADMIN — FUROSEMIDE 80 MG: 10 INJECTION, SOLUTION INTRAMUSCULAR; INTRAVENOUS at 16:33

## 2019-01-01 RX ADMIN — BENZOCAINE AND MENTHOL 1 LOZENGE: 15; 3.6 LOZENGE ORAL at 21:09

## 2019-01-01 RX ADMIN — LINEZOLID 600 MG: 600 INJECTION, SOLUTION INTRAVENOUS at 13:40

## 2019-01-01 RX ADMIN — KETOROLAC TROMETHAMINE 30 MG: 30 INJECTION, SOLUTION INTRAMUSCULAR at 04:53

## 2019-01-01 RX ADMIN — EPHEDRINE SULFATE 10 MG: 50 INJECTION, SOLUTION INTRAVENOUS at 15:11

## 2019-01-01 RX ADMIN — Medication 10 ML: at 21:00

## 2019-01-01 RX ADMIN — LISINOPRIL 2.5 MG: 5 TABLET ORAL at 10:41

## 2019-01-01 RX ADMIN — MORPHINE SULFATE 1 MG: 2 INJECTION, SOLUTION INTRAMUSCULAR; INTRAVENOUS at 06:18

## 2019-01-01 RX ADMIN — SIMETHICONE CHEW TAB 80 MG 80 MG: 80 TABLET ORAL at 04:15

## 2019-01-01 RX ADMIN — AMIODARONE HYDROCHLORIDE 0.5 MG/MIN: 50 INJECTION, SOLUTION INTRAVENOUS at 01:58

## 2019-01-01 RX ADMIN — FUROSEMIDE 80 MG: 10 INJECTION, SOLUTION INTRAMUSCULAR; INTRAVENOUS at 17:26

## 2019-01-01 RX ADMIN — HYDROCODONE BITARTRATE AND ACETAMINOPHEN 1 TABLET: 5; 325 TABLET ORAL at 11:17

## 2019-01-01 RX ADMIN — DIGOXIN 125 MCG: 125 TABLET ORAL at 09:05

## 2019-01-01 RX ADMIN — LACTULOSE 20 G: 20 SOLUTION ORAL at 16:33

## 2019-01-01 RX ADMIN — Medication 100 MCG: at 15:54

## 2019-01-01 RX ADMIN — CARVEDILOL 12.5 MG: 6.25 TABLET, FILM COATED ORAL at 11:27

## 2019-01-01 RX ADMIN — NORTRIPTYLINE HYDROCHLORIDE 10 MG: 10 CAPSULE ORAL at 23:21

## 2019-01-01 RX ADMIN — PANTOPRAZOLE SODIUM 40 MG: 40 TABLET, DELAYED RELEASE ORAL at 05:35

## 2019-01-01 RX ADMIN — ENOXAPARIN SODIUM 40 MG: 40 INJECTION SUBCUTANEOUS at 10:11

## 2019-01-01 RX ADMIN — METOCLOPRAMIDE 10 MG: 5 INJECTION, SOLUTION INTRAMUSCULAR; INTRAVENOUS at 23:20

## 2019-01-01 RX ADMIN — EPLERENONE 25 MG: 25 TABLET, FILM COATED ORAL at 14:44

## 2019-01-01 RX ADMIN — FAMOTIDINE 20 MG: 20 TABLET ORAL at 21:39

## 2019-01-01 RX ADMIN — SACUBITRIL AND VALSARTAN 0.5 TABLET: 24; 26 TABLET, FILM COATED ORAL at 09:54

## 2019-01-01 RX ADMIN — Medication 10 ML: at 10:42

## 2019-01-01 RX ADMIN — CIPROFLOXACIN 400 MG: 2 INJECTION, SOLUTION INTRAVENOUS at 13:54

## 2019-01-01 RX ADMIN — DIAZEPAM 5 MG: 5 TABLET ORAL at 21:12

## 2019-01-01 RX ADMIN — CEFEPIME HYDROCHLORIDE 2 G: 2 INJECTION, POWDER, FOR SOLUTION INTRAVENOUS at 12:44

## 2019-01-01 RX ADMIN — PANTOPRAZOLE SODIUM 40 MG: 40 INJECTION, POWDER, LYOPHILIZED, FOR SOLUTION INTRAVENOUS at 08:23

## 2019-01-01 RX ADMIN — PREGABALIN 150 MG: 75 CAPSULE ORAL at 21:11

## 2019-01-01 RX ADMIN — Medication 10 ML: at 00:27

## 2019-01-01 RX ADMIN — ENOXAPARIN SODIUM 40 MG: 40 INJECTION SUBCUTANEOUS at 08:49

## 2019-01-01 RX ADMIN — EPLERENONE 25 MG: 25 TABLET, FILM COATED ORAL at 08:23

## 2019-01-01 RX ADMIN — MORPHINE SULFATE 1 MG: 2 INJECTION, SOLUTION INTRAMUSCULAR; INTRAVENOUS at 10:12

## 2019-01-01 RX ADMIN — MILRINONE LACTATE 0.2 MCG/KG/MIN: 200 INJECTION, SOLUTION INTRAVENOUS at 13:18

## 2019-01-01 RX ADMIN — Medication 10 ML: at 08:49

## 2019-01-01 RX ADMIN — Medication 10 ML: at 20:35

## 2019-01-01 RX ADMIN — FUROSEMIDE 80 MG: 10 INJECTION, SOLUTION INTRAMUSCULAR; INTRAVENOUS at 08:23

## 2019-01-01 RX ADMIN — TORSEMIDE 40 MG: 20 TABLET ORAL at 08:15

## 2019-01-01 RX ADMIN — SACUBITRIL AND VALSARTAN 0.5 TABLET: 24; 26 TABLET, FILM COATED ORAL at 22:08

## 2019-01-01 RX ADMIN — DIAZEPAM 5 MG: 5 TABLET ORAL at 09:54

## 2019-01-01 RX ADMIN — MORPHINE SULFATE 2 MG: 2 INJECTION, SOLUTION INTRAMUSCULAR; INTRAVENOUS at 09:57

## 2019-01-01 RX ADMIN — EPLERENONE 25 MG: 25 TABLET, FILM COATED ORAL at 08:52

## 2019-01-01 RX ADMIN — FAMOTIDINE 20 MG: 10 INJECTION, SOLUTION INTRAVENOUS at 19:54

## 2019-01-01 RX ADMIN — CARVEDILOL 3.12 MG: 3.12 TABLET, FILM COATED ORAL at 17:47

## 2019-01-01 RX ADMIN — Medication 10 ML: at 08:09

## 2019-01-01 RX ADMIN — LISINOPRIL 2.5 MG: 5 TABLET ORAL at 10:22

## 2019-01-01 RX ADMIN — SODIUM CHLORIDE: 9 INJECTION, SOLUTION INTRAVENOUS at 01:08

## 2019-01-01 RX ADMIN — EPLERENONE 25 MG: 25 TABLET, FILM COATED ORAL at 10:10

## 2019-01-01 RX ADMIN — LACTULOSE 20 G: 20 SOLUTION ORAL at 20:13

## 2019-01-01 RX ADMIN — DIAZEPAM 5 MG: 5 TABLET ORAL at 10:34

## 2019-01-01 RX ADMIN — NORTRIPTYLINE HYDROCHLORIDE 10 MG: 10 CAPSULE ORAL at 22:08

## 2019-01-01 RX ADMIN — DOBUTAMINE IN DEXTROSE 7.5 MCG/KG/MIN: 200 INJECTION, SOLUTION INTRAVENOUS at 04:28

## 2019-01-01 RX ADMIN — DIAZEPAM 5 MG: 5 TABLET ORAL at 08:53

## 2019-01-01 RX ADMIN — CARVEDILOL 12.5 MG: 6.25 TABLET, FILM COATED ORAL at 17:09

## 2019-01-01 RX ADMIN — HYDROCODONE BITARTRATE AND ACETAMINOPHEN 2 TABLET: 5; 325 TABLET ORAL at 18:06

## 2019-01-01 RX ADMIN — HYDROCODONE BITARTRATE AND ACETAMINOPHEN 2 TABLET: 7.5; 325 TABLET ORAL at 15:20

## 2019-01-01 RX ADMIN — POTASSIUM CHLORIDE 10 MEQ: 7.46 INJECTION, SOLUTION INTRAVENOUS at 08:12

## 2019-01-01 RX ADMIN — DIAZEPAM 5 MG: 5 TABLET ORAL at 14:49

## 2019-01-01 RX ADMIN — DIAZEPAM 5 MG: 5 TABLET ORAL at 14:29

## 2019-01-01 RX ADMIN — FAMOTIDINE 20 MG: 10 INJECTION, SOLUTION INTRAVENOUS at 20:35

## 2019-01-01 RX ADMIN — MORPHINE SULFATE 2 MG: 2 INJECTION, SOLUTION INTRAMUSCULAR; INTRAVENOUS at 00:42

## 2019-01-01 RX ADMIN — BENZOCAINE, MENTHOL 1 LOZENGE: 15; 3.6 LOZENGE ORAL at 00:30

## 2019-01-01 RX ADMIN — POLYETHYLENE GLYCOL-3350 AND ELECTROLYTES 4000 ML: 236; 6.74; 5.86; 2.97; 22.74 POWDER, FOR SOLUTION ORAL at 19:58

## 2019-01-01 RX ADMIN — DIAZEPAM 5 MG: 5 TABLET ORAL at 00:22

## 2019-01-01 RX ADMIN — ENOXAPARIN SODIUM 40 MG: 40 INJECTION SUBCUTANEOUS at 08:41

## 2019-01-01 RX ADMIN — FUROSEMIDE 80 MG: 10 INJECTION, SOLUTION INTRAMUSCULAR; INTRAVENOUS at 08:35

## 2019-01-01 RX ADMIN — PREGABALIN 75 MG: 25 CAPSULE ORAL at 09:06

## 2019-01-01 RX ADMIN — FAMOTIDINE 20 MG: 20 TABLET ORAL at 09:44

## 2019-01-01 RX ADMIN — LISINOPRIL 2.5 MG: 5 TABLET ORAL at 22:27

## 2019-01-01 RX ADMIN — OXYCODONE HYDROCHLORIDE 5 MG: 5 TABLET ORAL at 13:54

## 2019-01-01 RX ADMIN — HYDROCODONE BITARTRATE AND ACETAMINOPHEN 1 TABLET: 5; 325 TABLET ORAL at 09:15

## 2019-01-01 RX ADMIN — Medication 10 ML: at 01:32

## 2019-01-01 RX ADMIN — PREGABALIN 75 MG: 75 CAPSULE ORAL at 21:42

## 2019-01-01 RX ADMIN — EPLERENONE 25 MG: 25 TABLET, FILM COATED ORAL at 12:13

## 2019-01-01 RX ADMIN — KETOROLAC TROMETHAMINE 30 MG: 30 INJECTION, SOLUTION INTRAMUSCULAR at 23:29

## 2019-01-01 RX ADMIN — DIAZEPAM 5 MG: 5 TABLET ORAL at 13:54

## 2019-01-01 RX ADMIN — ENOXAPARIN SODIUM 40 MG: 40 INJECTION SUBCUTANEOUS at 09:54

## 2019-01-01 RX ADMIN — Medication 10 ML: at 08:35

## 2019-01-01 RX ADMIN — EPLERENONE 25 MG: 25 TABLET, FILM COATED ORAL at 13:01

## 2019-01-01 RX ADMIN — KETAMINE HYDROCHLORIDE 5 MG: 10 INJECTION, SOLUTION INTRAMUSCULAR; INTRAVENOUS at 07:30

## 2019-01-01 RX ADMIN — Medication 10 ML: at 20:27

## 2019-01-01 RX ADMIN — FUROSEMIDE 40 MG: 10 INJECTION, SOLUTION INTRAMUSCULAR; INTRAVENOUS at 00:41

## 2019-01-01 RX ADMIN — CARVEDILOL 12.5 MG: 6.25 TABLET, FILM COATED ORAL at 17:21

## 2019-01-01 RX ADMIN — POTASSIUM CHLORIDE, DEXTROSE MONOHYDRATE AND SODIUM CHLORIDE: 150; 5; 450 INJECTION, SOLUTION INTRAVENOUS at 17:09

## 2019-01-01 RX ADMIN — PREGABALIN 75 MG: 25 CAPSULE ORAL at 09:07

## 2019-01-01 RX ADMIN — DIAZEPAM 5 MG: 5 TABLET ORAL at 08:09

## 2019-01-01 RX ADMIN — ONDANSETRON 4 MG: 2 INJECTION INTRAMUSCULAR; INTRAVENOUS at 15:54

## 2019-01-01 RX ADMIN — Medication 10 ML: at 09:29

## 2019-01-01 RX ADMIN — EPLERENONE 25 MG: 25 TABLET, FILM COATED ORAL at 10:27

## 2019-01-01 RX ADMIN — FAMOTIDINE 20 MG: 10 INJECTION, SOLUTION INTRAVENOUS at 09:05

## 2019-01-01 RX ADMIN — NALOXEGOL OXALATE 25 MG: 25 TABLET, FILM COATED ORAL at 11:43

## 2019-01-01 RX ADMIN — MORPHINE SULFATE 0.5 MG: 2 INJECTION, SOLUTION INTRAMUSCULAR; INTRAVENOUS at 03:39

## 2019-01-01 RX ADMIN — OXYCODONE HYDROCHLORIDE 5 MG: 5 TABLET ORAL at 10:42

## 2019-01-01 RX ADMIN — DIGOXIN 125 MCG: 125 TABLET ORAL at 10:57

## 2019-01-01 RX ADMIN — ENOXAPARIN SODIUM 40 MG: 40 INJECTION SUBCUTANEOUS at 08:09

## 2019-01-01 RX ADMIN — KETAMINE HYDROCHLORIDE 5 MG: 10 INJECTION, SOLUTION INTRAMUSCULAR; INTRAVENOUS at 07:25

## 2019-01-01 RX ADMIN — BENZONATATE 100 MG: 100 CAPSULE ORAL at 20:27

## 2019-01-01 RX ADMIN — FAMOTIDINE 20 MG: 20 TABLET, FILM COATED ORAL at 08:11

## 2019-01-01 RX ADMIN — EPLERENONE 25 MG: 25 TABLET, FILM COATED ORAL at 10:41

## 2019-01-01 RX ADMIN — CEFEPIME HYDROCHLORIDE 2 G: 2 INJECTION, POWDER, FOR SOLUTION INTRAVENOUS at 00:42

## 2019-01-01 RX ADMIN — ENOXAPARIN SODIUM 40 MG: 40 INJECTION SUBCUTANEOUS at 10:24

## 2019-01-01 RX ADMIN — FENTANYL CITRATE 50 MCG: 50 INJECTION, SOLUTION INTRAMUSCULAR; INTRAVENOUS at 16:40

## 2019-01-01 RX ADMIN — SODIUM CHLORIDE 250 ML: 9 INJECTION, SOLUTION INTRAVENOUS at 02:00

## 2019-01-01 RX ADMIN — LISINOPRIL 2.5 MG: 5 TABLET ORAL at 11:28

## 2019-01-01 RX ADMIN — MORPHINE SULFATE 4 MG: 4 INJECTION INTRAVENOUS at 23:05

## 2019-01-01 RX ADMIN — FUROSEMIDE 40 MG: 10 INJECTION, SOLUTION INTRAVENOUS at 13:31

## 2019-01-01 RX ADMIN — NORTRIPTYLINE HYDROCHLORIDE 10 MG: 10 CAPSULE ORAL at 21:11

## 2019-01-01 RX ADMIN — POTASSIUM CHLORIDE 10 MEQ: 7.46 INJECTION, SOLUTION INTRAVENOUS at 07:01

## 2019-01-01 RX ADMIN — PANTOPRAZOLE SODIUM 40 MG: 40 INJECTION, POWDER, LYOPHILIZED, FOR SOLUTION INTRAVENOUS at 08:30

## 2019-01-01 RX ADMIN — LACTULOSE 20 G: 20 SOLUTION ORAL at 22:31

## 2019-01-01 RX ADMIN — FAMOTIDINE 20 MG: 10 INJECTION, SOLUTION INTRAVENOUS at 22:09

## 2019-01-01 RX ADMIN — SODIUM CHLORIDE 1000 ML: 9 INJECTION, SOLUTION INTRAVENOUS at 19:48

## 2019-01-01 RX ADMIN — POTASSIUM CHLORIDE 10 MEQ: 7.46 INJECTION, SOLUTION INTRAVENOUS at 06:08

## 2019-01-01 RX ADMIN — HYDROCODONE BITARTRATE AND ACETAMINOPHEN 2 TABLET: 7.5; 325 TABLET ORAL at 09:48

## 2019-01-01 RX ADMIN — BENZOCAINE, MENTHOL 1 LOZENGE: 15; 3.6 LOZENGE ORAL at 00:23

## 2019-01-01 RX ADMIN — METRONIDAZOLE 500 MG: 500 INJECTION, SOLUTION INTRAVENOUS at 06:26

## 2019-01-01 RX ADMIN — ROSUVASTATIN CALCIUM 5 MG: 10 TABLET, FILM COATED ORAL at 19:54

## 2019-01-01 RX ADMIN — EPHEDRINE SULFATE 5 MG: 50 INJECTION, SOLUTION INTRAVENOUS at 15:06

## 2019-01-01 RX ADMIN — MORPHINE SULFATE 2 MG: 2 INJECTION, SOLUTION INTRAMUSCULAR; INTRAVENOUS at 18:58

## 2019-01-01 RX ADMIN — Medication 10 ML: at 22:34

## 2019-01-01 RX ADMIN — HYDROCODONE BITARTRATE AND ACETAMINOPHEN 2 TABLET: 7.5; 325 TABLET ORAL at 17:58

## 2019-01-01 RX ADMIN — ENOXAPARIN SODIUM 40 MG: 40 INJECTION SUBCUTANEOUS at 08:53

## 2019-01-01 RX ADMIN — HYDROCODONE BITARTRATE AND ACETAMINOPHEN 2 TABLET: 5; 325 TABLET ORAL at 12:49

## 2019-01-01 RX ADMIN — ROSUVASTATIN CALCIUM 5 MG: 10 TABLET, FILM COATED ORAL at 20:36

## 2019-01-01 RX ADMIN — Medication 10 ML: at 09:48

## 2019-01-01 RX ADMIN — MORPHINE SULFATE 0.5 MG: 2 INJECTION, SOLUTION INTRAMUSCULAR; INTRAVENOUS at 00:27

## 2019-01-01 ASSESSMENT — PAIN SCALES - GENERAL
PAINLEVEL_OUTOF10: 6
PAINLEVEL_OUTOF10: 7
PAINLEVEL_OUTOF10: 8
PAINLEVEL_OUTOF10: 7
PAINLEVEL_OUTOF10: 7
PAINLEVEL_OUTOF10: 8
PAINLEVEL_OUTOF10: 7
PAINLEVEL_OUTOF10: 7
PAINLEVEL_OUTOF10: 0
PAINLEVEL_OUTOF10: 9
PAINLEVEL_OUTOF10: 9
PAINLEVEL_OUTOF10: 0
PAINLEVEL_OUTOF10: 6
PAINLEVEL_OUTOF10: 0
PAINLEVEL_OUTOF10: 6
PAINLEVEL_OUTOF10: 6
PAINLEVEL_OUTOF10: 7
PAINLEVEL_OUTOF10: 7
PAINLEVEL_OUTOF10: 0
PAINLEVEL_OUTOF10: 7
PAINLEVEL_OUTOF10: 8
PAINLEVEL_OUTOF10: 8
PAINLEVEL_OUTOF10: 6
PAINLEVEL_OUTOF10: 8
PAINLEVEL_OUTOF10: 0
PAINLEVEL_OUTOF10: 3
PAINLEVEL_OUTOF10: 0
PAINLEVEL_OUTOF10: 7
PAINLEVEL_OUTOF10: 5
PAINLEVEL_OUTOF10: 0
PAINLEVEL_OUTOF10: 8
PAINLEVEL_OUTOF10: 0
PAINLEVEL_OUTOF10: 7
PAINLEVEL_OUTOF10: 5
PAINLEVEL_OUTOF10: 8
PAINLEVEL_OUTOF10: 8
PAINLEVEL_OUTOF10: 0
PAINLEVEL_OUTOF10: 0
PAINLEVEL_OUTOF10: 7
PAINLEVEL_OUTOF10: 6
PAINLEVEL_OUTOF10: 6
PAINLEVEL_OUTOF10: 7
PAINLEVEL_OUTOF10: 7
PAINLEVEL_OUTOF10: 3
PAINLEVEL_OUTOF10: 5
PAINLEVEL_OUTOF10: 9
PAINLEVEL_OUTOF10: 7
PAINLEVEL_OUTOF10: 0
PAINLEVEL_OUTOF10: 6
PAINLEVEL_OUTOF10: 8
PAINLEVEL_OUTOF10: 10
PAINLEVEL_OUTOF10: 4
PAINLEVEL_OUTOF10: 6
PAINLEVEL_OUTOF10: 6
PAINLEVEL_OUTOF10: 0
PAINLEVEL_OUTOF10: 9
PAINLEVEL_OUTOF10: 1
PAINLEVEL_OUTOF10: 7
PAINLEVEL_OUTOF10: 7
PAINLEVEL_OUTOF10: 0
PAINLEVEL_OUTOF10: 0
PAINLEVEL_OUTOF10: 8
PAINLEVEL_OUTOF10: 8
PAINLEVEL_OUTOF10: 0
PAINLEVEL_OUTOF10: 7
PAINLEVEL_OUTOF10: 9
PAINLEVEL_OUTOF10: 8
PAINLEVEL_OUTOF10: 3
PAINLEVEL_OUTOF10: 7
PAINLEVEL_OUTOF10: 10
PAINLEVEL_OUTOF10: 7
PAINLEVEL_OUTOF10: 9
PAINLEVEL_OUTOF10: 8
PAINLEVEL_OUTOF10: 6
PAINLEVEL_OUTOF10: 9
PAINLEVEL_OUTOF10: 6
PAINLEVEL_OUTOF10: 0
PAINLEVEL_OUTOF10: 6
PAINLEVEL_OUTOF10: 0
PAINLEVEL_OUTOF10: 7
PAINLEVEL_OUTOF10: 0
PAINLEVEL_OUTOF10: 6
PAINLEVEL_OUTOF10: 0
PAINLEVEL_OUTOF10: 0
PAINLEVEL_OUTOF10: 7
PAINLEVEL_OUTOF10: 9
PAINLEVEL_OUTOF10: 8
PAINLEVEL_OUTOF10: 7
PAINLEVEL_OUTOF10: 8
PAINLEVEL_OUTOF10: 5
PAINLEVEL_OUTOF10: 0
PAINLEVEL_OUTOF10: 7
PAINLEVEL_OUTOF10: 0
PAINLEVEL_OUTOF10: 9
PAINLEVEL_OUTOF10: 7
PAINLEVEL_OUTOF10: 0
PAINLEVEL_OUTOF10: 6
PAINLEVEL_OUTOF10: 0
PAINLEVEL_OUTOF10: 8
PAINLEVEL_OUTOF10: 0
PAINLEVEL_OUTOF10: 10
PAINLEVEL_OUTOF10: 8
PAINLEVEL_OUTOF10: 8
PAINLEVEL_OUTOF10: 7
PAINLEVEL_OUTOF10: 9
PAINLEVEL_OUTOF10: 9
PAINLEVEL_OUTOF10: 10
PAINLEVEL_OUTOF10: 0
PAINLEVEL_OUTOF10: 8
PAINLEVEL_OUTOF10: 1
PAINLEVEL_OUTOF10: 7
PAINLEVEL_OUTOF10: 9
PAINLEVEL_OUTOF10: 9
PAINLEVEL_OUTOF10: 0
PAINLEVEL_OUTOF10: 9
PAINLEVEL_OUTOF10: 6
PAINLEVEL_OUTOF10: 8
PAINLEVEL_OUTOF10: 8
PAINLEVEL_OUTOF10: 7
PAINLEVEL_OUTOF10: 8
PAINLEVEL_OUTOF10: 9
PAINLEVEL_OUTOF10: 7
PAINLEVEL_OUTOF10: 0
PAINLEVEL_OUTOF10: 7
PAINLEVEL_OUTOF10: 5
PAINLEVEL_OUTOF10: 8
PAINLEVEL_OUTOF10: 10
PAINLEVEL_OUTOF10: 6
PAINLEVEL_OUTOF10: 7
PAINLEVEL_OUTOF10: 4
PAINLEVEL_OUTOF10: 0
PAINLEVEL_OUTOF10: 8
PAINLEVEL_OUTOF10: 0
PAINLEVEL_OUTOF10: 6
PAINLEVEL_OUTOF10: 6
PAINLEVEL_OUTOF10: 0
PAINLEVEL_OUTOF10: 9
PAINLEVEL_OUTOF10: 10
PAINLEVEL_OUTOF10: 7
PAINLEVEL_OUTOF10: 0
PAINLEVEL_OUTOF10: 0
PAINLEVEL_OUTOF10: 7
PAINLEVEL_OUTOF10: 0
PAINLEVEL_OUTOF10: 6
PAINLEVEL_OUTOF10: 8
PAINLEVEL_OUTOF10: 7
PAINLEVEL_OUTOF10: 0
PAINLEVEL_OUTOF10: 6
PAINLEVEL_OUTOF10: 6
PAINLEVEL_OUTOF10: 8
PAINLEVEL_OUTOF10: 10
PAINLEVEL_OUTOF10: 0
PAINLEVEL_OUTOF10: 9
PAINLEVEL_OUTOF10: 8
PAINLEVEL_OUTOF10: 9
PAINLEVEL_OUTOF10: 6
PAINLEVEL_OUTOF10: 8
PAINLEVEL_OUTOF10: 8
PAINLEVEL_OUTOF10: 0
PAINLEVEL_OUTOF10: 7
PAINLEVEL_OUTOF10: 7
PAINLEVEL_OUTOF10: 2
PAINLEVEL_OUTOF10: 0
PAINLEVEL_OUTOF10: 8
PAINLEVEL_OUTOF10: 9
PAINLEVEL_OUTOF10: 0
PAINLEVEL_OUTOF10: 4
PAINLEVEL_OUTOF10: 5
PAINLEVEL_OUTOF10: 5
PAINLEVEL_OUTOF10: 8
PAINLEVEL_OUTOF10: 7
PAINLEVEL_OUTOF10: 3
PAINLEVEL_OUTOF10: 10
PAINLEVEL_OUTOF10: 7
PAINLEVEL_OUTOF10: 10
PAINLEVEL_OUTOF10: 8
PAINLEVEL_OUTOF10: 6
PAINLEVEL_OUTOF10: 0
PAINLEVEL_OUTOF10: 7
PAINLEVEL_OUTOF10: 8
PAINLEVEL_OUTOF10: 6
PAINLEVEL_OUTOF10: 8
PAINLEVEL_OUTOF10: 0
PAINLEVEL_OUTOF10: 4
PAINLEVEL_OUTOF10: 9
PAINLEVEL_OUTOF10: 9
PAINLEVEL_OUTOF10: 7
PAINLEVEL_OUTOF10: 10
PAINLEVEL_OUTOF10: 6
PAINLEVEL_OUTOF10: 8
PAINLEVEL_OUTOF10: 6
PAINLEVEL_OUTOF10: 10
PAINLEVEL_OUTOF10: 8
PAINLEVEL_OUTOF10: 7
PAINLEVEL_OUTOF10: 8
PAINLEVEL_OUTOF10: 0
PAINLEVEL_OUTOF10: 6
PAINLEVEL_OUTOF10: 0
PAINLEVEL_OUTOF10: 0
PAINLEVEL_OUTOF10: 8
PAINLEVEL_OUTOF10: 9
PAINLEVEL_OUTOF10: 7
PAINLEVEL_OUTOF10: 5
PAINLEVEL_OUTOF10: 6
PAINLEVEL_OUTOF10: 5
PAINLEVEL_OUTOF10: 5
PAINLEVEL_OUTOF10: 8
PAINLEVEL_OUTOF10: 6
PAINLEVEL_OUTOF10: 6
PAINLEVEL_OUTOF10: 7
PAINLEVEL_OUTOF10: 8
PAINLEVEL_OUTOF10: 7
PAINLEVEL_OUTOF10: 0
PAINLEVEL_OUTOF10: 8
PAINLEVEL_OUTOF10: 9
PAINLEVEL_OUTOF10: 3
PAINLEVEL_OUTOF10: 7
PAINLEVEL_OUTOF10: 3
PAINLEVEL_OUTOF10: 8
PAINLEVEL_OUTOF10: 0
PAINLEVEL_OUTOF10: 8
PAINLEVEL_OUTOF10: 8
PAINLEVEL_OUTOF10: 0
PAINLEVEL_OUTOF10: 6
PAINLEVEL_OUTOF10: 10
PAINLEVEL_OUTOF10: 7
PAINLEVEL_OUTOF10: 9
PAINLEVEL_OUTOF10: 7
PAINLEVEL_OUTOF10: 8
PAINLEVEL_OUTOF10: 0
PAINLEVEL_OUTOF10: 6
PAINLEVEL_OUTOF10: 8
PAINLEVEL_OUTOF10: 0
PAINLEVEL_OUTOF10: 7
PAINLEVEL_OUTOF10: 8
PAINLEVEL_OUTOF10: 6
PAINLEVEL_OUTOF10: 8
PAINLEVEL_OUTOF10: 7
PAINLEVEL_OUTOF10: 0

## 2019-01-01 ASSESSMENT — PAIN DESCRIPTION - DESCRIPTORS
DESCRIPTORS: SHARP
DESCRIPTORS: THROBBING
DESCRIPTORS: THROBBING
DESCRIPTORS: SORE
DESCRIPTORS: ACHING
DESCRIPTORS: TIGHTNESS;STABBING
DESCRIPTORS: CRAMPING
DESCRIPTORS: SHARP
DESCRIPTORS: JABBING;DISCOMFORT
DESCRIPTORS: CRAMPING;DISCOMFORT
DESCRIPTORS: TIGHTNESS
DESCRIPTORS: PRESSURE;STABBING
DESCRIPTORS: SHARP
DESCRIPTORS: CRAMPING
DESCRIPTORS: SORE
DESCRIPTORS: DISCOMFORT;ACHING
DESCRIPTORS: ACHING
DESCRIPTORS: PRESSURE;THROBBING
DESCRIPTORS: SHARP
DESCRIPTORS: CRAMPING
DESCRIPTORS: ACHING
DESCRIPTORS: CRAMPING
DESCRIPTORS: ACHING;DISCOMFORT
DESCRIPTORS: SHARP;STABBING
DESCRIPTORS: ACHING
DESCRIPTORS: CRAMPING;THROBBING
DESCRIPTORS: CRAMPING
DESCRIPTORS: CRAMPING
DESCRIPTORS: DULL;DISCOMFORT;ACHING
DESCRIPTORS: TIGHTNESS
DESCRIPTORS: ACHING
DESCRIPTORS: STABBING
DESCRIPTORS: CRAMPING

## 2019-01-01 ASSESSMENT — PAIN DESCRIPTION - PROGRESSION
CLINICAL_PROGRESSION: NOT CHANGED
CLINICAL_PROGRESSION: NOT CHANGED
CLINICAL_PROGRESSION: GRADUALLY WORSENING
CLINICAL_PROGRESSION: NOT CHANGED
CLINICAL_PROGRESSION: GRADUALLY WORSENING
CLINICAL_PROGRESSION: NOT CHANGED
CLINICAL_PROGRESSION: GRADUALLY IMPROVING
CLINICAL_PROGRESSION: NOT CHANGED
CLINICAL_PROGRESSION: GRADUALLY WORSENING
CLINICAL_PROGRESSION: NOT CHANGED
CLINICAL_PROGRESSION: GRADUALLY IMPROVING
CLINICAL_PROGRESSION: GRADUALLY IMPROVING
CLINICAL_PROGRESSION: NOT CHANGED
CLINICAL_PROGRESSION: GRADUALLY IMPROVING
CLINICAL_PROGRESSION: NOT CHANGED
CLINICAL_PROGRESSION: GRADUALLY WORSENING
CLINICAL_PROGRESSION: GRADUALLY WORSENING
CLINICAL_PROGRESSION: NOT CHANGED
CLINICAL_PROGRESSION: GRADUALLY WORSENING
CLINICAL_PROGRESSION: NOT CHANGED

## 2019-01-01 ASSESSMENT — PULMONARY FUNCTION TESTS
PIF_VALUE: 15
PIF_VALUE: 16
PIF_VALUE: 15
PIF_VALUE: 12
PIF_VALUE: 15
PIF_VALUE: 0
PIF_VALUE: 15
PIF_VALUE: 0
PIF_VALUE: 15
PIF_VALUE: 16
PIF_VALUE: 0
PIF_VALUE: 0
PIF_VALUE: 12
PIF_VALUE: 15
PIF_VALUE: 0
PIF_VALUE: 6
PIF_VALUE: 16
PIF_VALUE: 15
PIF_VALUE: 12
PIF_VALUE: 0
PIF_VALUE: 0
PIF_VALUE: 15
PIF_VALUE: 0
PIF_VALUE: 0
PIF_VALUE: 16
PIF_VALUE: 12
PIF_VALUE: 12
PIF_VALUE: 16
PIF_VALUE: 15
PIF_VALUE: 14
PIF_VALUE: 0
PIF_VALUE: 16
PIF_VALUE: 16
PIF_VALUE: 1
PIF_VALUE: 15
PIF_VALUE: 16
PIF_VALUE: 0
PIF_VALUE: 15
PIF_VALUE: 16
PIF_VALUE: 15
PIF_VALUE: 0
PIF_VALUE: 15
PIF_VALUE: 16
PIF_VALUE: 15
PIF_VALUE: 16
PIF_VALUE: 0
PIF_VALUE: 15
PIF_VALUE: 16
PIF_VALUE: 16
PIF_VALUE: 15
PIF_VALUE: 0
PIF_VALUE: 16
PIF_VALUE: 15
PIF_VALUE: 1
PIF_VALUE: 16
PIF_VALUE: 12
PIF_VALUE: 16
PIF_VALUE: 0
PIF_VALUE: 0
PIF_VALUE: 12
PIF_VALUE: 16
PIF_VALUE: 1
PIF_VALUE: 0
PIF_VALUE: 3
PIF_VALUE: 15
PIF_VALUE: 16
PIF_VALUE: 16
PIF_VALUE: 0
PIF_VALUE: 15
PIF_VALUE: 0
PIF_VALUE: 16
PIF_VALUE: 1
PIF_VALUE: 0
PIF_VALUE: 0
PIF_VALUE: 5
PIF_VALUE: 15
PIF_VALUE: 0
PIF_VALUE: 16
PIF_VALUE: 15
PIF_VALUE: 16
PIF_VALUE: 14
PIF_VALUE: 16
PIF_VALUE: 0
PIF_VALUE: 15
PIF_VALUE: 16
PIF_VALUE: 16
PIF_VALUE: 15
PIF_VALUE: 15
PIF_VALUE: 16
PIF_VALUE: 15
PIF_VALUE: 15
PIF_VALUE: 16
PIF_VALUE: 16
PIF_VALUE: 15
PIF_VALUE: 15
PIF_VALUE: 0
PIF_VALUE: 0
PIF_VALUE: 16
PIF_VALUE: 16
PIF_VALUE: 12
PIF_VALUE: 15
PIF_VALUE: 19
PIF_VALUE: 15
PIF_VALUE: 1
PIF_VALUE: 3
PIF_VALUE: 16
PIF_VALUE: 0
PIF_VALUE: 15
PIF_VALUE: 1
PIF_VALUE: 0
PIF_VALUE: 16
PIF_VALUE: 16
PIF_VALUE: 15
PIF_VALUE: 1
PIF_VALUE: 15
PIF_VALUE: 2
PIF_VALUE: 16
PIF_VALUE: 0
PIF_VALUE: 15

## 2019-01-01 ASSESSMENT — PAIN DESCRIPTION - PAIN TYPE
TYPE: ACUTE PAIN
TYPE: SURGICAL PAIN
TYPE: ACUTE PAIN
TYPE: ACUTE PAIN
TYPE: CHRONIC PAIN
TYPE: ACUTE PAIN
TYPE: CHRONIC PAIN
TYPE: ACUTE PAIN
TYPE: CHRONIC PAIN
TYPE: ACUTE PAIN
TYPE: ACUTE PAIN
TYPE: SURGICAL PAIN
TYPE: ACUTE PAIN
TYPE: CHRONIC PAIN
TYPE: ACUTE PAIN
TYPE: SURGICAL PAIN
TYPE: ACUTE PAIN
TYPE: ACUTE PAIN;CHRONIC PAIN

## 2019-01-01 ASSESSMENT — PAIN DESCRIPTION - LOCATION
LOCATION: ABDOMEN
LOCATION: CHEST
LOCATION: ABDOMEN
LOCATION: CHEST
LOCATION: CHEST
LOCATION: ABDOMEN
LOCATION: CHEST
LOCATION: ABDOMEN
LOCATION: CHEST
LOCATION: ABDOMEN
LOCATION: ABDOMEN;CHEST
LOCATION: ABDOMEN
LOCATION: ABDOMEN
LOCATION: CHEST
LOCATION: ABDOMEN
LOCATION: ABDOMEN
LOCATION: CHEST
LOCATION: ABDOMEN
LOCATION: GENERALIZED
LOCATION: ABDOMEN
LOCATION: HEAD
LOCATION: ABDOMEN
LOCATION: CHEST
LOCATION: HEAD;ABDOMEN
LOCATION: CHEST;ABDOMEN
LOCATION: ABDOMEN
LOCATION: GENERALIZED
LOCATION: ABDOMEN
LOCATION: CHEST
LOCATION: ABDOMEN
LOCATION: GENERALIZED
LOCATION: ABDOMEN
LOCATION: GENERALIZED
LOCATION: CHEST
LOCATION: ABDOMEN;GENERALIZED
LOCATION: ABDOMEN
LOCATION: PELVIS;ABDOMEN
LOCATION: ABDOMEN
LOCATION: CHEST
LOCATION: CHEST;STERNUM
LOCATION: GENERALIZED;HEAD
LOCATION: ABDOMEN
LOCATION: ABDOMEN

## 2019-01-01 ASSESSMENT — PAIN DESCRIPTION - FREQUENCY
FREQUENCY: INTERMITTENT
FREQUENCY: CONTINUOUS
FREQUENCY: INTERMITTENT
FREQUENCY: CONTINUOUS
FREQUENCY: CONTINUOUS
FREQUENCY: INTERMITTENT
FREQUENCY: CONTINUOUS
FREQUENCY: INTERMITTENT
FREQUENCY: INTERMITTENT
FREQUENCY: CONTINUOUS
FREQUENCY: INTERMITTENT
FREQUENCY: INTERMITTENT
FREQUENCY: CONTINUOUS
FREQUENCY: INTERMITTENT
FREQUENCY: CONTINUOUS

## 2019-01-01 ASSESSMENT — PAIN - FUNCTIONAL ASSESSMENT
PAIN_FUNCTIONAL_ASSESSMENT: PREVENTS OR INTERFERES SOME ACTIVE ACTIVITIES AND ADLS
PAIN_FUNCTIONAL_ASSESSMENT: ACTIVITIES ARE NOT PREVENTED
PAIN_FUNCTIONAL_ASSESSMENT: PREVENTS OR INTERFERES SOME ACTIVE ACTIVITIES AND ADLS
PAIN_FUNCTIONAL_ASSESSMENT: PREVENTS OR INTERFERES SOME ACTIVE ACTIVITIES AND ADLS
PAIN_FUNCTIONAL_ASSESSMENT: 0-10
PAIN_FUNCTIONAL_ASSESSMENT: ACTIVITIES ARE NOT PREVENTED
PAIN_FUNCTIONAL_ASSESSMENT: PREVENTS OR INTERFERES SOME ACTIVE ACTIVITIES AND ADLS

## 2019-01-01 ASSESSMENT — PAIN SCALES - WONG BAKER
WONGBAKER_NUMERICALRESPONSE: 0

## 2019-01-01 ASSESSMENT — ENCOUNTER SYMPTOMS
CHEST TIGHTNESS: 0
EYE PAIN: 0
DIARRHEA: 1
ABDOMINAL PAIN: 1
VOMITING: 1
COUGH: 1
WHEEZING: 1
SHORTNESS OF BREATH: 1
NAUSEA: 1
VOMITING: 0
EYE DISCHARGE: 0
NAUSEA: 0
SHORTNESS OF BREATH: 1
COUGH: 0
SHORTNESS OF BREATH: 1
ABDOMINAL PAIN: 1
NAUSEA: 1
SHORTNESS OF BREATH: 0
DIARRHEA: 0
COUGH: 1
RHINORRHEA: 0
SINUS PAIN: 0
COUGH: 0
COUGH: 1
EYE PAIN: 0
VOMITING: 0
NAUSEA: 0
BACK PAIN: 1
BACK PAIN: 1
SHORTNESS OF BREATH: 0
DIARRHEA: 1
DIARRHEA: 0
ABDOMINAL PAIN: 1
SHORTNESS OF BREATH: 0
ABDOMINAL PAIN: 1
EYE PAIN: 0
EYE REDNESS: 0
ABDOMINAL PAIN: 1
ABDOMINAL DISTENTION: 1
SORE THROAT: 0
EYE REDNESS: 0
SHORTNESS OF BREATH: 0
VOMITING: 1

## 2019-01-01 ASSESSMENT — PAIN DESCRIPTION - ORIENTATION
ORIENTATION: MID;UPPER
ORIENTATION: LEFT
ORIENTATION: MID;UPPER
ORIENTATION: LEFT
ORIENTATION: MID;UPPER
ORIENTATION: MID
ORIENTATION: MID;UPPER
ORIENTATION: MID;UPPER
ORIENTATION: LEFT
ORIENTATION: MID;UPPER
ORIENTATION: RIGHT;LOWER
ORIENTATION: MID;UPPER
ORIENTATION: LEFT

## 2019-01-01 ASSESSMENT — PAIN DESCRIPTION - ONSET
ONSET: ON-GOING

## 2019-01-01 ASSESSMENT — HEART SCORE: ECG: 1

## 2019-01-08 ENCOUNTER — OFFICE VISIT (OUTPATIENT)
Dept: SURGERY | Age: 49
End: 2019-01-08
Payer: COMMERCIAL

## 2019-01-08 VITALS — DIASTOLIC BLOOD PRESSURE: 76 MMHG | SYSTOLIC BLOOD PRESSURE: 116 MMHG | BODY MASS INDEX: 28.43 KG/M2 | WEIGHT: 201 LBS

## 2019-01-08 DIAGNOSIS — L02.31 ABSCESS OF BUTTOCK, RIGHT: Primary | ICD-10-CM

## 2019-01-08 PROCEDURE — 99213 OFFICE O/P EST LOW 20 MIN: CPT | Performed by: SURGERY

## 2019-01-08 RX ORDER — LEVOFLOXACIN 750 MG/1
750 TABLET ORAL DAILY
Qty: 10 TABLET | Refills: 0 | Status: SHIPPED | OUTPATIENT
Start: 2019-01-08 | End: 2019-01-18

## 2019-01-15 ENCOUNTER — OFFICE VISIT (OUTPATIENT)
Dept: SURGERY | Age: 49
End: 2019-01-15
Payer: COMMERCIAL

## 2019-01-15 VITALS — BODY MASS INDEX: 28.01 KG/M2 | SYSTOLIC BLOOD PRESSURE: 102 MMHG | DIASTOLIC BLOOD PRESSURE: 76 MMHG | WEIGHT: 198 LBS

## 2019-01-15 DIAGNOSIS — N63.10 BREAST MASS, RIGHT: Primary | ICD-10-CM

## 2019-01-15 PROCEDURE — 99213 OFFICE O/P EST LOW 20 MIN: CPT | Performed by: SURGERY

## 2019-01-21 ENCOUNTER — ANESTHESIA EVENT (OUTPATIENT)
Dept: OPERATING ROOM | Age: 49
End: 2019-01-21
Payer: COMMERCIAL

## 2019-01-21 ENCOUNTER — HOSPITAL ENCOUNTER (OUTPATIENT)
Age: 49
Setting detail: OUTPATIENT SURGERY
Discharge: HOME | End: 2019-01-21
Attending: SURGERY | Admitting: SURGERY
Payer: COMMERCIAL

## 2019-01-21 ENCOUNTER — ANESTHESIA (OUTPATIENT)
Dept: OPERATING ROOM | Age: 49
End: 2019-01-21
Payer: COMMERCIAL

## 2019-01-21 VITALS
HEIGHT: 70 IN | BODY MASS INDEX: 26.88 KG/M2 | OXYGEN SATURATION: 100 % | WEIGHT: 187.8 LBS | SYSTOLIC BLOOD PRESSURE: 109 MMHG | TEMPERATURE: 97.1 F | RESPIRATION RATE: 16 BRPM | HEART RATE: 72 BPM | DIASTOLIC BLOOD PRESSURE: 70 MMHG

## 2019-01-21 VITALS — SYSTOLIC BLOOD PRESSURE: 89 MMHG | DIASTOLIC BLOOD PRESSURE: 51 MMHG | OXYGEN SATURATION: 99 %

## 2019-01-21 DIAGNOSIS — N63.10 BREAST MASS, RIGHT: Primary | ICD-10-CM

## 2019-01-21 DIAGNOSIS — L76.82 INCISIONAL PAIN: ICD-10-CM

## 2019-01-21 LAB
ANION GAP SERPL CALCULATED.3IONS-SCNC: 10 MMOL/L (ref 3–16)
BUN BLDV-MCNC: 13 MG/DL (ref 7–20)
CALCIUM SERPL-MCNC: 9.4 MG/DL (ref 8.3–10.6)
CHLORIDE BLD-SCNC: 104 MMOL/L (ref 99–110)
CO2: 25 MMOL/L (ref 21–32)
CREAT SERPL-MCNC: 0.6 MG/DL (ref 0.9–1.3)
GFR AFRICAN AMERICAN: >60
GFR NON-AFRICAN AMERICAN: >60
GLUCOSE BLD-MCNC: 108 MG/DL (ref 70–99)
HCT VFR BLD CALC: 35 % (ref 40.5–52.5)
HEMOGLOBIN: 11.9 G/DL (ref 13.5–17.5)
MCH RBC QN AUTO: 30.9 PG (ref 26–34)
MCHC RBC AUTO-ENTMCNC: 34.1 G/DL (ref 31–36)
MCV RBC AUTO: 90.9 FL (ref 80–100)
PDW BLD-RTO: 15.2 % (ref 12.4–15.4)
PLATELET # BLD: 231 K/UL (ref 135–450)
PMV BLD AUTO: 6.8 FL (ref 5–10.5)
POTASSIUM SERPL-SCNC: 4.2 MMOL/L (ref 3.5–5.1)
RBC # BLD: 3.85 M/UL (ref 4.2–5.9)
SODIUM BLD-SCNC: 139 MMOL/L (ref 136–145)
WBC # BLD: 7.2 K/UL (ref 4–11)

## 2019-01-21 PROCEDURE — 3700000000 HC ANESTHESIA ATTENDED CARE: Performed by: SURGERY

## 2019-01-21 PROCEDURE — 3700000001 HC ADD 15 MINUTES (ANESTHESIA): Performed by: SURGERY

## 2019-01-21 PROCEDURE — 7100000000 HC PACU RECOVERY - FIRST 15 MIN: Performed by: SURGERY

## 2019-01-21 PROCEDURE — 2500000003 HC RX 250 WO HCPCS: Performed by: SURGERY

## 2019-01-21 PROCEDURE — 7100000001 HC PACU RECOVERY - ADDTL 15 MIN: Performed by: SURGERY

## 2019-01-21 PROCEDURE — 6360000002 HC RX W HCPCS: Performed by: NURSE ANESTHETIST, CERTIFIED REGISTERED

## 2019-01-21 PROCEDURE — 85027 COMPLETE CBC AUTOMATED: CPT

## 2019-01-21 PROCEDURE — 7100000011 HC PHASE II RECOVERY - ADDTL 15 MIN: Performed by: SURGERY

## 2019-01-21 PROCEDURE — 3600000012 HC SURGERY LEVEL 2 ADDTL 15MIN: Performed by: SURGERY

## 2019-01-21 PROCEDURE — 3600000002 HC SURGERY LEVEL 2 BASE: Performed by: SURGERY

## 2019-01-21 PROCEDURE — 88307 TISSUE EXAM BY PATHOLOGIST: CPT

## 2019-01-21 PROCEDURE — 2580000003 HC RX 258: Performed by: NURSE ANESTHETIST, CERTIFIED REGISTERED

## 2019-01-21 PROCEDURE — 36415 COLL VENOUS BLD VENIPUNCTURE: CPT

## 2019-01-21 PROCEDURE — 19120 REMOVAL OF BREAST LESION: CPT | Performed by: SURGERY

## 2019-01-21 PROCEDURE — 7100000010 HC PHASE II RECOVERY - FIRST 15 MIN: Performed by: SURGERY

## 2019-01-21 PROCEDURE — 2580000003 HC RX 258: Performed by: SURGERY

## 2019-01-21 PROCEDURE — 6360000002 HC RX W HCPCS

## 2019-01-21 PROCEDURE — 2709999900 HC NON-CHARGEABLE SUPPLY: Performed by: SURGERY

## 2019-01-21 PROCEDURE — 2500000003 HC RX 250 WO HCPCS: Performed by: NURSE ANESTHETIST, CERTIFIED REGISTERED

## 2019-01-21 PROCEDURE — 80048 BASIC METABOLIC PNL TOTAL CA: CPT

## 2019-01-21 RX ORDER — ONDANSETRON 2 MG/ML
INJECTION INTRAMUSCULAR; INTRAVENOUS PRN
Status: DISCONTINUED | OUTPATIENT
Start: 2019-01-21 | End: 2019-01-21 | Stop reason: SDUPTHER

## 2019-01-21 RX ORDER — PROPOFOL 10 MG/ML
INJECTION, EMULSION INTRAVENOUS CONTINUOUS PRN
Status: DISCONTINUED | OUTPATIENT
Start: 2019-01-21 | End: 2019-01-21 | Stop reason: SDUPTHER

## 2019-01-21 RX ORDER — SODIUM CHLORIDE 9 MG/ML
INJECTION, SOLUTION INTRAVENOUS CONTINUOUS PRN
Status: DISCONTINUED | OUTPATIENT
Start: 2019-01-21 | End: 2019-01-21 | Stop reason: SDUPTHER

## 2019-01-21 RX ORDER — SODIUM CHLORIDE 9 MG/ML
INJECTION, SOLUTION INTRAVENOUS
Status: COMPLETED
Start: 2019-01-21 | End: 2019-01-21

## 2019-01-21 RX ORDER — ONDANSETRON 2 MG/ML
4 INJECTION INTRAMUSCULAR; INTRAVENOUS
Status: DISCONTINUED | OUTPATIENT
Start: 2019-01-21 | End: 2019-01-21 | Stop reason: HOSPADM

## 2019-01-21 RX ORDER — CEFAZOLIN SODIUM 2 G/100ML
INJECTION, SOLUTION INTRAVENOUS
Status: COMPLETED
Start: 2019-01-21 | End: 2019-01-21

## 2019-01-21 RX ORDER — FENTANYL CITRATE 50 UG/ML
50 INJECTION, SOLUTION INTRAMUSCULAR; INTRAVENOUS EVERY 5 MIN PRN
Status: DISCONTINUED | OUTPATIENT
Start: 2019-01-21 | End: 2019-01-21 | Stop reason: HOSPADM

## 2019-01-21 RX ORDER — OXYCODONE HYDROCHLORIDE AND ACETAMINOPHEN 5; 325 MG/1; MG/1
1 TABLET ORAL EVERY 4 HOURS PRN
Qty: 25 TABLET | Refills: 0 | Status: ON HOLD | OUTPATIENT
Start: 2019-01-21 | End: 2019-01-29 | Stop reason: HOSPADM

## 2019-01-21 RX ORDER — DIPHENHYDRAMINE HYDROCHLORIDE 50 MG/ML
12.5 INJECTION INTRAMUSCULAR; INTRAVENOUS
Status: DISCONTINUED | OUTPATIENT
Start: 2019-01-21 | End: 2019-01-21 | Stop reason: HOSPADM

## 2019-01-21 RX ORDER — LABETALOL HYDROCHLORIDE 5 MG/ML
5 INJECTION, SOLUTION INTRAVENOUS EVERY 10 MIN PRN
Status: DISCONTINUED | OUTPATIENT
Start: 2019-01-21 | End: 2019-01-21 | Stop reason: HOSPADM

## 2019-01-21 RX ORDER — PROPOFOL 10 MG/ML
INJECTION, EMULSION INTRAVENOUS PRN
Status: DISCONTINUED | OUTPATIENT
Start: 2019-01-21 | End: 2019-01-21 | Stop reason: SDUPTHER

## 2019-01-21 RX ORDER — FENTANYL CITRATE 50 UG/ML
25 INJECTION, SOLUTION INTRAMUSCULAR; INTRAVENOUS EVERY 5 MIN PRN
Status: DISCONTINUED | OUTPATIENT
Start: 2019-01-21 | End: 2019-01-21 | Stop reason: HOSPADM

## 2019-01-21 RX ORDER — LIDOCAINE HYDROCHLORIDE 10 MG/ML
INJECTION, SOLUTION EPIDURAL; INFILTRATION; INTRACAUDAL; PERINEURAL
Status: COMPLETED | OUTPATIENT
Start: 2019-01-21 | End: 2019-01-21

## 2019-01-21 RX ORDER — MAGNESIUM HYDROXIDE 1200 MG/15ML
LIQUID ORAL CONTINUOUS PRN
Status: DISCONTINUED | OUTPATIENT
Start: 2019-01-21 | End: 2019-01-21 | Stop reason: HOSPADM

## 2019-01-21 RX ORDER — FENTANYL CITRATE 50 UG/ML
INJECTION, SOLUTION INTRAMUSCULAR; INTRAVENOUS PRN
Status: DISCONTINUED | OUTPATIENT
Start: 2019-01-21 | End: 2019-01-21 | Stop reason: SDUPTHER

## 2019-01-21 RX ORDER — HYDROMORPHONE HCL 110MG/55ML
0.5 PATIENT CONTROLLED ANALGESIA SYRINGE INTRAVENOUS EVERY 5 MIN PRN
Status: DISCONTINUED | OUTPATIENT
Start: 2019-01-21 | End: 2019-01-21 | Stop reason: HOSPADM

## 2019-01-21 RX ORDER — LIDOCAINE HYDROCHLORIDE 20 MG/ML
INJECTION, SOLUTION INFILTRATION; PERINEURAL PRN
Status: DISCONTINUED | OUTPATIENT
Start: 2019-01-21 | End: 2019-01-21 | Stop reason: SDUPTHER

## 2019-01-21 RX ORDER — MIDAZOLAM HYDROCHLORIDE 1 MG/ML
INJECTION INTRAMUSCULAR; INTRAVENOUS PRN
Status: DISCONTINUED | OUTPATIENT
Start: 2019-01-21 | End: 2019-01-21 | Stop reason: SDUPTHER

## 2019-01-21 RX ORDER — MEPERIDINE HYDROCHLORIDE 25 MG/ML
12.5 INJECTION INTRAMUSCULAR; INTRAVENOUS; SUBCUTANEOUS EVERY 5 MIN PRN
Status: DISCONTINUED | OUTPATIENT
Start: 2019-01-21 | End: 2019-01-21 | Stop reason: HOSPADM

## 2019-01-21 RX ADMIN — SODIUM CHLORIDE: 9 INJECTION, SOLUTION INTRAVENOUS at 14:00

## 2019-01-21 RX ADMIN — CEFAZOLIN SODIUM 2 G: 2 INJECTION, SOLUTION INTRAVENOUS at 13:58

## 2019-01-21 RX ADMIN — LIDOCAINE HYDROCHLORIDE 80 MG: 20 INJECTION, SOLUTION INFILTRATION; PERINEURAL at 14:06

## 2019-01-21 RX ADMIN — MIDAZOLAM HYDROCHLORIDE 1 MG: 1 INJECTION, SOLUTION INTRAMUSCULAR; INTRAVENOUS at 14:06

## 2019-01-21 RX ADMIN — PROPOFOL 100 MCG/KG/MIN: 10 INJECTION, EMULSION INTRAVENOUS at 14:08

## 2019-01-21 RX ADMIN — PROPOFOL 50 MG: 10 INJECTION, EMULSION INTRAVENOUS at 14:06

## 2019-01-21 RX ADMIN — PHENYLEPHRINE HYDROCHLORIDE 100 MCG: 10 INJECTION INTRAVENOUS at 14:29

## 2019-01-21 RX ADMIN — MIDAZOLAM HYDROCHLORIDE 1 MG: 1 INJECTION, SOLUTION INTRAMUSCULAR; INTRAVENOUS at 14:01

## 2019-01-21 RX ADMIN — FENTANYL CITRATE 25 MCG: 50 INJECTION, SOLUTION INTRAMUSCULAR; INTRAVENOUS at 14:24

## 2019-01-21 RX ADMIN — ONDANSETRON 4 MG: 2 INJECTION INTRAMUSCULAR; INTRAVENOUS at 14:11

## 2019-01-21 RX ADMIN — FENTANYL CITRATE 50 MCG: 50 INJECTION, SOLUTION INTRAMUSCULAR; INTRAVENOUS at 14:06

## 2019-01-21 ASSESSMENT — PULMONARY FUNCTION TESTS
PIF_VALUE: 1
PIF_VALUE: 0
PIF_VALUE: 1
PIF_VALUE: 0
PIF_VALUE: 1
PIF_VALUE: 0
PIF_VALUE: 1
PIF_VALUE: 0
PIF_VALUE: 1
PIF_VALUE: 0
PIF_VALUE: 1
PIF_VALUE: 1
PIF_VALUE: 0
PIF_VALUE: 0
PIF_VALUE: 1
PIF_VALUE: 0
PIF_VALUE: 1
PIF_VALUE: 0
PIF_VALUE: 0
PIF_VALUE: 1

## 2019-01-21 ASSESSMENT — ENCOUNTER SYMPTOMS: SHORTNESS OF BREATH: 0

## 2019-01-21 ASSESSMENT — PAIN - FUNCTIONAL ASSESSMENT: PAIN_FUNCTIONAL_ASSESSMENT: 0-10

## 2019-01-23 ENCOUNTER — TELEPHONE (OUTPATIENT)
Dept: CARDIOLOGY CLINIC | Age: 49
End: 2019-01-23

## 2019-01-23 RX ORDER — DIGOXIN 125 MCG
125 TABLET ORAL DAILY
Qty: 90 TABLET | Refills: 3 | Status: SHIPPED | OUTPATIENT
Start: 2019-01-23 | End: 2019-01-01 | Stop reason: SINTOL

## 2019-01-24 ENCOUNTER — OFFICE VISIT (OUTPATIENT)
Dept: SURGERY | Age: 49
End: 2019-01-24
Payer: COMMERCIAL

## 2019-01-24 VITALS — DIASTOLIC BLOOD PRESSURE: 76 MMHG | WEIGHT: 198 LBS | SYSTOLIC BLOOD PRESSURE: 102 MMHG | BODY MASS INDEX: 28.41 KG/M2

## 2019-01-24 DIAGNOSIS — L02.31 ABSCESS OF BUTTOCK, RIGHT: Primary | ICD-10-CM

## 2019-01-24 PROCEDURE — 10061 I&D ABSCESS COMP/MULTIPLE: CPT | Performed by: SURGERY

## 2019-01-24 RX ORDER — LEVOFLOXACIN 750 MG/1
750 TABLET ORAL DAILY
Qty: 7 TABLET | Refills: 0 | Status: SHIPPED | OUTPATIENT
Start: 2019-01-24 | End: 2019-01-27

## 2019-01-27 ENCOUNTER — APPOINTMENT (OUTPATIENT)
Dept: CT IMAGING | Age: 49
DRG: 603 | End: 2019-01-27
Payer: COMMERCIAL

## 2019-01-27 ENCOUNTER — HOSPITAL ENCOUNTER (INPATIENT)
Age: 49
LOS: 2 days | Discharge: HOME | DRG: 603 | End: 2019-01-29
Attending: EMERGENCY MEDICINE | Admitting: INTERNAL MEDICINE
Payer: COMMERCIAL

## 2019-01-27 DIAGNOSIS — Z78.9 FAILURE OF OUTPATIENT TREATMENT: ICD-10-CM

## 2019-01-27 DIAGNOSIS — N63.10 BREAST MASS, RIGHT: ICD-10-CM

## 2019-01-27 DIAGNOSIS — L76.82 INCISIONAL PAIN: ICD-10-CM

## 2019-01-27 DIAGNOSIS — L02.91 ABSCESS: Primary | ICD-10-CM

## 2019-01-27 PROBLEM — M46.28 ABSCESS OF SACRUM (HCC): Status: ACTIVE | Noted: 2019-01-27

## 2019-01-27 LAB
A/G RATIO: 1.1 (ref 1.1–2.2)
ALBUMIN SERPL-MCNC: 4 G/DL (ref 3.4–5)
ALP BLD-CCNC: 117 U/L (ref 40–129)
ALT SERPL-CCNC: 17 U/L (ref 10–40)
ANION GAP SERPL CALCULATED.3IONS-SCNC: 15 MMOL/L (ref 3–16)
AST SERPL-CCNC: 18 U/L (ref 15–37)
BASOPHILS ABSOLUTE: 0 K/UL (ref 0–0.2)
BASOPHILS RELATIVE PERCENT: 0.4 %
BILIRUB SERPL-MCNC: <0.2 MG/DL (ref 0–1)
BUN BLDV-MCNC: 11 MG/DL (ref 7–20)
CALCIUM SERPL-MCNC: 9.9 MG/DL (ref 8.3–10.6)
CHLORIDE BLD-SCNC: 96 MMOL/L (ref 99–110)
CO2: 25 MMOL/L (ref 21–32)
CREAT SERPL-MCNC: 0.6 MG/DL (ref 0.9–1.3)
EOSINOPHILS ABSOLUTE: 0.2 K/UL (ref 0–0.6)
EOSINOPHILS RELATIVE PERCENT: 2.6 %
GFR AFRICAN AMERICAN: >60
GFR NON-AFRICAN AMERICAN: >60
GLOBULIN: 3.8 G/DL
GLUCOSE BLD-MCNC: 67 MG/DL (ref 70–99)
HCT VFR BLD CALC: 35.8 % (ref 40.5–52.5)
HEMOGLOBIN: 11.9 G/DL (ref 13.5–17.5)
LACTIC ACID: 1.1 MMOL/L (ref 0.4–2)
LYMPHOCYTES ABSOLUTE: 1 K/UL (ref 1–5.1)
LYMPHOCYTES RELATIVE PERCENT: 13.1 %
MCH RBC QN AUTO: 30.7 PG (ref 26–34)
MCHC RBC AUTO-ENTMCNC: 33.4 G/DL (ref 31–36)
MCV RBC AUTO: 91.9 FL (ref 80–100)
MONOCYTES ABSOLUTE: 1.4 K/UL (ref 0–1.3)
MONOCYTES RELATIVE PERCENT: 19.1 %
NEUTROPHILS ABSOLUTE: 4.8 K/UL (ref 1.7–7.7)
NEUTROPHILS RELATIVE PERCENT: 64.8 %
PDW BLD-RTO: 15.2 % (ref 12.4–15.4)
PLATELET # BLD: 282 K/UL (ref 135–450)
PMV BLD AUTO: 7 FL (ref 5–10.5)
POTASSIUM SERPL-SCNC: 4.4 MMOL/L (ref 3.5–5.1)
RBC # BLD: 3.89 M/UL (ref 4.2–5.9)
SODIUM BLD-SCNC: 136 MMOL/L (ref 136–145)
TOTAL PROTEIN: 7.8 G/DL (ref 6.4–8.2)
WBC # BLD: 7.5 K/UL (ref 4–11)

## 2019-01-27 PROCEDURE — 2500000003 HC RX 250 WO HCPCS: Performed by: SURGERY

## 2019-01-27 PROCEDURE — 96376 TX/PRO/DX INJ SAME DRUG ADON: CPT

## 2019-01-27 PROCEDURE — 2580000003 HC RX 258: Performed by: INTERNAL MEDICINE

## 2019-01-27 PROCEDURE — 96375 TX/PRO/DX INJ NEW DRUG ADDON: CPT

## 2019-01-27 PROCEDURE — 96365 THER/PROPH/DIAG IV INF INIT: CPT

## 2019-01-27 PROCEDURE — 99285 EMERGENCY DEPT VISIT HI MDM: CPT

## 2019-01-27 PROCEDURE — 1200000000 HC SEMI PRIVATE

## 2019-01-27 PROCEDURE — G0378 HOSPITAL OBSERVATION PER HR: HCPCS

## 2019-01-27 PROCEDURE — 83605 ASSAY OF LACTIC ACID: CPT

## 2019-01-27 PROCEDURE — 85025 COMPLETE CBC W/AUTO DIFF WBC: CPT

## 2019-01-27 PROCEDURE — 87040 BLOOD CULTURE FOR BACTERIA: CPT

## 2019-01-27 PROCEDURE — 2580000003 HC RX 258: Performed by: PHYSICIAN ASSISTANT

## 2019-01-27 PROCEDURE — 6360000004 HC RX CONTRAST MEDICATION: Performed by: PHYSICIAN ASSISTANT

## 2019-01-27 PROCEDURE — 6360000002 HC RX W HCPCS: Performed by: PHYSICIAN ASSISTANT

## 2019-01-27 PROCEDURE — 74177 CT ABD & PELVIS W/CONTRAST: CPT

## 2019-01-27 PROCEDURE — 96361 HYDRATE IV INFUSION ADD-ON: CPT

## 2019-01-27 PROCEDURE — 80053 COMPREHEN METABOLIC PANEL: CPT

## 2019-01-27 RX ORDER — DIPHENHYDRAMINE HYDROCHLORIDE 50 MG/ML
25 INJECTION INTRAMUSCULAR; INTRAVENOUS EVERY 6 HOURS PRN
Status: DISCONTINUED | OUTPATIENT
Start: 2019-01-27 | End: 2019-01-29 | Stop reason: HOSPADM

## 2019-01-27 RX ORDER — 0.9 % SODIUM CHLORIDE 0.9 %
1000 INTRAVENOUS SOLUTION INTRAVENOUS ONCE
Status: COMPLETED | OUTPATIENT
Start: 2019-01-27 | End: 2019-01-27

## 2019-01-27 RX ORDER — SODIUM CHLORIDE 9 MG/ML
INJECTION, SOLUTION INTRAVENOUS CONTINUOUS
Status: DISCONTINUED | OUTPATIENT
Start: 2019-01-27 | End: 2019-01-29 | Stop reason: HOSPADM

## 2019-01-27 RX ORDER — MORPHINE SULFATE 4 MG/ML
4 INJECTION, SOLUTION INTRAMUSCULAR; INTRAVENOUS EVERY 30 MIN PRN
Status: DISCONTINUED | OUTPATIENT
Start: 2019-01-27 | End: 2019-01-28 | Stop reason: HOSPADM

## 2019-01-27 RX ORDER — CLINDAMYCIN PHOSPHATE 600 MG/50ML
600 INJECTION INTRAVENOUS ONCE
Status: COMPLETED | OUTPATIENT
Start: 2019-01-27 | End: 2019-01-28

## 2019-01-27 RX ORDER — ONDANSETRON 2 MG/ML
4 INJECTION INTRAMUSCULAR; INTRAVENOUS ONCE
Status: COMPLETED | OUTPATIENT
Start: 2019-01-27 | End: 2019-01-27

## 2019-01-27 RX ADMIN — IOPAMIDOL 75 ML: 755 INJECTION, SOLUTION INTRAVENOUS at 22:04

## 2019-01-27 RX ADMIN — SODIUM CHLORIDE: 9 INJECTION, SOLUTION INTRAVENOUS at 23:26

## 2019-01-27 RX ADMIN — ONDANSETRON 4 MG: 2 INJECTION INTRAMUSCULAR; INTRAVENOUS at 21:42

## 2019-01-27 RX ADMIN — TAZOBACTAM SODIUM AND PIPERACILLIN SODIUM 3.38 G: 375; 3 INJECTION, SOLUTION INTRAVENOUS at 23:30

## 2019-01-27 RX ADMIN — MORPHINE SULFATE 4 MG: 4 INJECTION INTRAVENOUS at 23:26

## 2019-01-27 RX ADMIN — SODIUM CHLORIDE 1000 ML: 9 INJECTION, SOLUTION INTRAVENOUS at 21:41

## 2019-01-27 RX ADMIN — MORPHINE SULFATE 4 MG: 4 INJECTION INTRAVENOUS at 21:43

## 2019-01-27 ASSESSMENT — ENCOUNTER SYMPTOMS
DIARRHEA: 0
VOMITING: 0
RHINORRHEA: 0
SHORTNESS OF BREATH: 0
ABDOMINAL PAIN: 0
NAUSEA: 1
ROS SKIN COMMENTS: ABSCESS, SEE HPI
COUGH: 0

## 2019-01-27 ASSESSMENT — PAIN SCALES - GENERAL
PAINLEVEL_OUTOF10: 10
PAINLEVEL_OUTOF10: 10

## 2019-01-28 PROBLEM — G89.29 CHRONIC PAIN: Status: ACTIVE | Noted: 2019-01-28

## 2019-01-28 LAB
A/G RATIO: 1.1 (ref 1.1–2.2)
ALBUMIN SERPL-MCNC: 3.3 G/DL (ref 3.4–5)
ALP BLD-CCNC: 127 U/L (ref 40–129)
ALT SERPL-CCNC: 16 U/L (ref 10–40)
ANION GAP SERPL CALCULATED.3IONS-SCNC: 10 MMOL/L (ref 3–16)
APTT: 41.5 SEC (ref 26–36)
AST SERPL-CCNC: 18 U/L (ref 15–37)
BASOPHILS ABSOLUTE: 0 K/UL (ref 0–0.2)
BASOPHILS RELATIVE PERCENT: 0.3 %
BILIRUB SERPL-MCNC: <0.2 MG/DL (ref 0–1)
BUN BLDV-MCNC: 11 MG/DL (ref 7–20)
CALCIUM SERPL-MCNC: 8.7 MG/DL (ref 8.3–10.6)
CHLORIDE BLD-SCNC: 101 MMOL/L (ref 99–110)
CO2: 24 MMOL/L (ref 21–32)
CREAT SERPL-MCNC: 0.7 MG/DL (ref 0.9–1.3)
EOSINOPHILS ABSOLUTE: 0.1 K/UL (ref 0–0.6)
EOSINOPHILS RELATIVE PERCENT: 2.4 %
GFR AFRICAN AMERICAN: >60
GFR NON-AFRICAN AMERICAN: >60
GLOBULIN: 3 G/DL
GLUCOSE BLD-MCNC: 99 MG/DL (ref 70–99)
GRAM STAIN RESULT: NORMAL
HCT VFR BLD CALC: 28.6 % (ref 40.5–52.5)
HEMOGLOBIN: 9.6 G/DL (ref 13.5–17.5)
INR BLD: 1.37 (ref 0.86–1.14)
LACTIC ACID: 0.6 MMOL/L (ref 0.4–2)
LYMPHOCYTES ABSOLUTE: 0.6 K/UL (ref 1–5.1)
LYMPHOCYTES RELATIVE PERCENT: 10.6 %
MAGNESIUM: 1.9 MG/DL (ref 1.8–2.4)
MCH RBC QN AUTO: 30.8 PG (ref 26–34)
MCHC RBC AUTO-ENTMCNC: 33.8 G/DL (ref 31–36)
MCV RBC AUTO: 91.3 FL (ref 80–100)
MONOCYTES ABSOLUTE: 1 K/UL (ref 0–1.3)
MONOCYTES RELATIVE PERCENT: 15.9 %
NEUTROPHILS ABSOLUTE: 4.3 K/UL (ref 1.7–7.7)
NEUTROPHILS RELATIVE PERCENT: 70.8 %
PDW BLD-RTO: 15.3 % (ref 12.4–15.4)
PHOSPHORUS: 3.6 MG/DL (ref 2.5–4.9)
PLATELET # BLD: 215 K/UL (ref 135–450)
PMV BLD AUTO: 6.5 FL (ref 5–10.5)
POTASSIUM SERPL-SCNC: 4.2 MMOL/L (ref 3.5–5.1)
PREALBUMIN: 11.7 MG/DL (ref 20–40)
PROTHROMBIN TIME: 15.6 SEC (ref 9.8–13)
RBC # BLD: 3.13 M/UL (ref 4.2–5.9)
SODIUM BLD-SCNC: 135 MMOL/L (ref 136–145)
TOTAL PROTEIN: 6.3 G/DL (ref 6.4–8.2)
TRANSFERRIN: 152 MG/DL (ref 200–360)
WBC # BLD: 6.1 K/UL (ref 4–11)
WOUND/ABSCESS: NORMAL

## 2019-01-28 PROCEDURE — 85610 PROTHROMBIN TIME: CPT

## 2019-01-28 PROCEDURE — 85730 THROMBOPLASTIN TIME PARTIAL: CPT

## 2019-01-28 PROCEDURE — 1200000000 HC SEMI PRIVATE

## 2019-01-28 PROCEDURE — 96375 TX/PRO/DX INJ NEW DRUG ADDON: CPT

## 2019-01-28 PROCEDURE — 6360000002 HC RX W HCPCS: Performed by: INTERNAL MEDICINE

## 2019-01-28 PROCEDURE — 96367 TX/PROPH/DG ADDL SEQ IV INF: CPT

## 2019-01-28 PROCEDURE — 83605 ASSAY OF LACTIC ACID: CPT

## 2019-01-28 PROCEDURE — 85025 COMPLETE CBC W/AUTO DIFF WBC: CPT

## 2019-01-28 PROCEDURE — APPNB30 APP NON BILLABLE TIME 0-30 MINS: Performed by: NURSE PRACTITIONER

## 2019-01-28 PROCEDURE — G0378 HOSPITAL OBSERVATION PER HR: HCPCS

## 2019-01-28 PROCEDURE — 83735 ASSAY OF MAGNESIUM: CPT

## 2019-01-28 PROCEDURE — 84100 ASSAY OF PHOSPHORUS: CPT

## 2019-01-28 PROCEDURE — 84466 ASSAY OF TRANSFERRIN: CPT

## 2019-01-28 PROCEDURE — 6360000002 HC RX W HCPCS: Performed by: NURSE PRACTITIONER

## 2019-01-28 PROCEDURE — 2580000003 HC RX 258: Performed by: INTERNAL MEDICINE

## 2019-01-28 PROCEDURE — 2500000003 HC RX 250 WO HCPCS: Performed by: PHYSICIAN ASSISTANT

## 2019-01-28 PROCEDURE — 94760 N-INVAS EAR/PLS OXIMETRY 1: CPT

## 2019-01-28 PROCEDURE — 80053 COMPREHEN METABOLIC PANEL: CPT

## 2019-01-28 PROCEDURE — APPSS45 APP SPLIT SHARED TIME 31-45 MINUTES: Performed by: NURSE PRACTITIONER

## 2019-01-28 PROCEDURE — 96366 THER/PROPH/DIAG IV INF ADDON: CPT

## 2019-01-28 PROCEDURE — 99222 1ST HOSP IP/OBS MODERATE 55: CPT | Performed by: SURGERY

## 2019-01-28 PROCEDURE — 2500000003 HC RX 250 WO HCPCS: Performed by: INTERNAL MEDICINE

## 2019-01-28 PROCEDURE — 6370000000 HC RX 637 (ALT 250 FOR IP): Performed by: INTERNAL MEDICINE

## 2019-01-28 PROCEDURE — 84134 ASSAY OF PREALBUMIN: CPT

## 2019-01-28 PROCEDURE — 96376 TX/PRO/DX INJ SAME DRUG ADON: CPT

## 2019-01-28 PROCEDURE — 2580000003 HC RX 258

## 2019-01-28 PROCEDURE — 36415 COLL VENOUS BLD VENIPUNCTURE: CPT

## 2019-01-28 RX ORDER — LANOLIN ALCOHOL/MO/W.PET/CERES
3 CREAM (GRAM) TOPICAL NIGHTLY PRN
Status: DISCONTINUED | OUTPATIENT
Start: 2019-01-28 | End: 2019-01-29 | Stop reason: HOSPADM

## 2019-01-28 RX ORDER — SPIRONOLACTONE 25 MG/1
25 TABLET ORAL DAILY
Status: DISCONTINUED | OUTPATIENT
Start: 2019-01-28 | End: 2019-01-29 | Stop reason: HOSPADM

## 2019-01-28 RX ORDER — CARVEDILOL 6.25 MG/1
12.5 TABLET ORAL
Status: DISCONTINUED | OUTPATIENT
Start: 2019-01-28 | End: 2019-01-29 | Stop reason: HOSPADM

## 2019-01-28 RX ORDER — TORSEMIDE 20 MG/1
20 TABLET ORAL
Status: DISCONTINUED | OUTPATIENT
Start: 2019-01-28 | End: 2019-01-29 | Stop reason: HOSPADM

## 2019-01-28 RX ORDER — HYDROCODONE BITARTRATE AND ACETAMINOPHEN 7.5; 325 MG/1; MG/1
2 TABLET ORAL EVERY 8 HOURS PRN
Status: DISCONTINUED | OUTPATIENT
Start: 2019-01-28 | End: 2019-01-28 | Stop reason: SDUPTHER

## 2019-01-28 RX ORDER — MORPHINE SULFATE 4 MG/ML
4 INJECTION, SOLUTION INTRAMUSCULAR; INTRAVENOUS ONCE
Status: COMPLETED | OUTPATIENT
Start: 2019-01-28 | End: 2019-01-28

## 2019-01-28 RX ORDER — PROCHLORPERAZINE MALEATE 5 MG/1
10 TABLET ORAL EVERY 6 HOURS PRN
Status: DISCONTINUED | OUTPATIENT
Start: 2019-01-28 | End: 2019-01-29 | Stop reason: HOSPADM

## 2019-01-28 RX ORDER — NORTRIPTYLINE HYDROCHLORIDE 10 MG/1
10 CAPSULE ORAL NIGHTLY
Status: DISCONTINUED | OUTPATIENT
Start: 2019-01-28 | End: 2019-01-29 | Stop reason: HOSPADM

## 2019-01-28 RX ORDER — FAMOTIDINE 20 MG/1
20 TABLET, FILM COATED ORAL 2 TIMES DAILY
Status: DISCONTINUED | OUTPATIENT
Start: 2019-01-28 | End: 2019-01-29 | Stop reason: HOSPADM

## 2019-01-28 RX ORDER — DIAZEPAM 5 MG/1
5 TABLET ORAL 3 TIMES DAILY
Status: DISCONTINUED | OUTPATIENT
Start: 2019-01-28 | End: 2019-01-29 | Stop reason: HOSPADM

## 2019-01-28 RX ORDER — POTASSIUM CHLORIDE 20 MEQ/1
40 TABLET, EXTENDED RELEASE ORAL PRN
Status: DISCONTINUED | OUTPATIENT
Start: 2019-01-28 | End: 2019-01-28 | Stop reason: SDUPTHER

## 2019-01-28 RX ORDER — POTASSIUM CHLORIDE 20 MEQ/1
40 TABLET, EXTENDED RELEASE ORAL PRN
Status: DISCONTINUED | OUTPATIENT
Start: 2019-01-28 | End: 2019-01-29 | Stop reason: HOSPADM

## 2019-01-28 RX ORDER — SIMETHICONE 80 MG
80 TABLET,CHEWABLE ORAL EVERY 6 HOURS PRN
Status: DISCONTINUED | OUTPATIENT
Start: 2019-01-28 | End: 2019-01-29 | Stop reason: HOSPADM

## 2019-01-28 RX ORDER — RANITIDINE 150 MG/1
150 TABLET ORAL DAILY
Status: DISCONTINUED | OUTPATIENT
Start: 2019-01-28 | End: 2019-01-28 | Stop reason: CLARIF

## 2019-01-28 RX ORDER — ONDANSETRON 2 MG/ML
4 INJECTION INTRAMUSCULAR; INTRAVENOUS EVERY 6 HOURS PRN
Status: DISCONTINUED | OUTPATIENT
Start: 2019-01-28 | End: 2019-01-29 | Stop reason: HOSPADM

## 2019-01-28 RX ORDER — HYDROCODONE BITARTRATE AND ACETAMINOPHEN 10; 325 MG/1; MG/1
2 TABLET ORAL EVERY 4 HOURS PRN
Status: DISCONTINUED | OUTPATIENT
Start: 2019-01-28 | End: 2019-01-29 | Stop reason: HOSPADM

## 2019-01-28 RX ORDER — 0.9 % SODIUM CHLORIDE 0.9 %
500 INTRAVENOUS SOLUTION INTRAVENOUS PRN
Status: DISCONTINUED | OUTPATIENT
Start: 2019-01-28 | End: 2019-01-29 | Stop reason: HOSPADM

## 2019-01-28 RX ORDER — SODIUM CHLORIDE 0.9 % (FLUSH) 0.9 %
10 SYRINGE (ML) INJECTION EVERY 12 HOURS SCHEDULED
Status: DISCONTINUED | OUTPATIENT
Start: 2019-01-28 | End: 2019-01-29 | Stop reason: HOSPADM

## 2019-01-28 RX ORDER — HYDROCODONE BITARTRATE AND ACETAMINOPHEN 7.5; 325 MG/1; MG/1
1 TABLET ORAL EVERY 8 HOURS PRN
Status: DISCONTINUED | OUTPATIENT
Start: 2019-01-28 | End: 2019-01-28

## 2019-01-28 RX ORDER — HYDROCODONE BITARTRATE AND ACETAMINOPHEN 10; 325 MG/1; MG/1
1 TABLET ORAL EVERY 4 HOURS PRN
Status: DISCONTINUED | OUTPATIENT
Start: 2019-01-28 | End: 2019-01-29 | Stop reason: HOSPADM

## 2019-01-28 RX ORDER — CARVEDILOL 6.25 MG/1
6.25 TABLET ORAL
Status: DISCONTINUED | OUTPATIENT
Start: 2019-01-28 | End: 2019-01-29 | Stop reason: HOSPADM

## 2019-01-28 RX ORDER — KETOROLAC TROMETHAMINE 30 MG/ML
30 INJECTION, SOLUTION INTRAMUSCULAR; INTRAVENOUS EVERY 6 HOURS PRN
Status: DISCONTINUED | OUTPATIENT
Start: 2019-01-28 | End: 2019-01-29 | Stop reason: HOSPADM

## 2019-01-28 RX ORDER — LIDOCAINE HYDROCHLORIDE 10 MG/ML
INJECTION, SOLUTION EPIDURAL; INFILTRATION; INTRACAUDAL; PERINEURAL
Status: DISPENSED
Start: 2019-01-28 | End: 2019-01-28

## 2019-01-28 RX ORDER — DIGOXIN 125 MCG
125 TABLET ORAL DAILY
Status: DISCONTINUED | OUTPATIENT
Start: 2019-01-28 | End: 2019-01-29 | Stop reason: HOSPADM

## 2019-01-28 RX ORDER — SODIUM CHLORIDE 9 MG/ML
INJECTION, SOLUTION INTRAVENOUS
Status: COMPLETED
Start: 2019-01-28 | End: 2019-01-28

## 2019-01-28 RX ORDER — SIMVASTATIN 20 MG
20 TABLET ORAL NIGHTLY
Status: DISCONTINUED | OUTPATIENT
Start: 2019-01-28 | End: 2019-01-29 | Stop reason: HOSPADM

## 2019-01-28 RX ORDER — POTASSIUM CHLORIDE 7.45 MG/ML
10 INJECTION INTRAVENOUS PRN
Status: DISCONTINUED | OUTPATIENT
Start: 2019-01-28 | End: 2019-01-28 | Stop reason: SDUPTHER

## 2019-01-28 RX ORDER — POTASSIUM CHLORIDE 7.45 MG/ML
10 INJECTION INTRAVENOUS PRN
Status: DISCONTINUED | OUTPATIENT
Start: 2019-01-28 | End: 2019-01-29 | Stop reason: HOSPADM

## 2019-01-28 RX ORDER — CLINDAMYCIN PHOSPHATE 900 MG/50ML
INJECTION INTRAVENOUS
Status: COMPLETED
Start: 2019-01-28 | End: 2019-01-28

## 2019-01-28 RX ORDER — CLINDAMYCIN PHOSPHATE 600 MG/50ML
600 INJECTION INTRAVENOUS EVERY 8 HOURS
Status: DISCONTINUED | OUTPATIENT
Start: 2019-01-28 | End: 2019-01-29 | Stop reason: HOSPADM

## 2019-01-28 RX ORDER — SODIUM CHLORIDE 0.9 % (FLUSH) 0.9 %
10 SYRINGE (ML) INJECTION PRN
Status: DISCONTINUED | OUTPATIENT
Start: 2019-01-28 | End: 2019-01-29 | Stop reason: HOSPADM

## 2019-01-28 RX ADMIN — KETOROLAC TROMETHAMINE 30 MG: 30 INJECTION, SOLUTION INTRAMUSCULAR at 05:11

## 2019-01-28 RX ADMIN — FAMOTIDINE 20 MG: 20 TABLET, FILM COATED ORAL at 21:20

## 2019-01-28 RX ADMIN — HYDROCODONE BITARTRATE AND ACETAMINOPHEN 2 TABLET: 10; 325 TABLET ORAL at 09:02

## 2019-01-28 RX ADMIN — Medication 10 ML: at 05:12

## 2019-01-28 RX ADMIN — TORSEMIDE 20 MG: 20 TABLET ORAL at 08:29

## 2019-01-28 RX ADMIN — SODIUM CHLORIDE: 9 INJECTION, SOLUTION INTRAVENOUS at 21:41

## 2019-01-28 RX ADMIN — HYDROCODONE BITARTRATE AND ACETAMINOPHEN 1 TABLET: 7.5; 325 TABLET ORAL at 03:03

## 2019-01-28 RX ADMIN — KETOROLAC TROMETHAMINE 30 MG: 30 INJECTION, SOLUTION INTRAMUSCULAR at 15:16

## 2019-01-28 RX ADMIN — HYDROCODONE BITARTRATE AND ACETAMINOPHEN 1 TABLET: 7.5; 325 TABLET ORAL at 02:50

## 2019-01-28 RX ADMIN — DIAZEPAM 5 MG: 5 TABLET ORAL at 08:29

## 2019-01-28 RX ADMIN — DIAZEPAM 5 MG: 5 TABLET ORAL at 15:16

## 2019-01-28 RX ADMIN — SIMVASTATIN 20 MG: 20 TABLET, FILM COATED ORAL at 21:40

## 2019-01-28 RX ADMIN — CLINDAMYCIN PHOSPHATE 600 MG: 600 INJECTION, SOLUTION INTRAVENOUS at 00:23

## 2019-01-28 RX ADMIN — DIAZEPAM 5 MG: 5 TABLET ORAL at 22:53

## 2019-01-28 RX ADMIN — CLINDAMYCIN PHOSPHATE 600 MG: 600 INJECTION, SOLUTION INTRAVENOUS at 15:21

## 2019-01-28 RX ADMIN — HYDROCODONE BITARTRATE AND ACETAMINOPHEN 2 TABLET: 10; 325 TABLET ORAL at 13:32

## 2019-01-28 RX ADMIN — NORTRIPTYLINE HYDROCHLORIDE 10 MG: 10 CAPSULE ORAL at 22:53

## 2019-01-28 RX ADMIN — TAZOBACTAM SODIUM AND PIPERACILLIN SODIUM 3.38 G: 375; 3 INJECTION, SOLUTION INTRAVENOUS at 21:19

## 2019-01-28 RX ADMIN — TAZOBACTAM SODIUM AND PIPERACILLIN SODIUM 3.38 G: 375; 3 INJECTION, SOLUTION INTRAVENOUS at 05:10

## 2019-01-28 RX ADMIN — ALTEPLASE 2 MG: 2.2 INJECTION, POWDER, LYOPHILIZED, FOR SOLUTION INTRAVENOUS at 09:50

## 2019-01-28 RX ADMIN — MORPHINE SULFATE 4 MG: 4 INJECTION INTRAVENOUS at 15:01

## 2019-01-28 RX ADMIN — SODIUM CHLORIDE 250 ML: 9 INJECTION, SOLUTION INTRAVENOUS at 05:10

## 2019-01-28 RX ADMIN — HYDROCODONE BITARTRATE AND ACETAMINOPHEN 2 TABLET: 10; 325 TABLET ORAL at 21:20

## 2019-01-28 RX ADMIN — TAZOBACTAM SODIUM AND PIPERACILLIN SODIUM 3.38 G: 375; 3 INJECTION, SOLUTION INTRAVENOUS at 15:01

## 2019-01-28 RX ADMIN — DIGOXIN 125 MCG: 125 TABLET ORAL at 08:29

## 2019-01-28 RX ADMIN — SPIRONOLACTONE 25 MG: 25 TABLET ORAL at 08:29

## 2019-01-28 RX ADMIN — HYDROCODONE BITARTRATE AND ACETAMINOPHEN 2 TABLET: 10; 325 TABLET ORAL at 17:21

## 2019-01-28 RX ADMIN — SIMETHICONE CHEW TAB 80 MG 80 MG: 80 TABLET ORAL at 10:24

## 2019-01-28 RX ADMIN — FAMOTIDINE 20 MG: 20 TABLET, FILM COATED ORAL at 08:29

## 2019-01-28 ASSESSMENT — PAIN SCALES - GENERAL
PAINLEVEL_OUTOF10: 7
PAINLEVEL_OUTOF10: 10
PAINLEVEL_OUTOF10: 7
PAINLEVEL_OUTOF10: 10
PAINLEVEL_OUTOF10: 8
PAINLEVEL_OUTOF10: 7
PAINLEVEL_OUTOF10: 10

## 2019-01-28 ASSESSMENT — ENCOUNTER SYMPTOMS
EYE REDNESS: 0
NAUSEA: 0
COUGH: 0
EYE PAIN: 0
BACK PAIN: 1
VOMITING: 0
ABDOMINAL PAIN: 1
ABDOMINAL DISTENTION: 0
SHORTNESS OF BREATH: 0

## 2019-01-28 ASSESSMENT — PAIN DESCRIPTION - DESCRIPTORS
DESCRIPTORS: DISCOMFORT;THROBBING
DESCRIPTORS: ACHING;BURNING;CONSTANT;CRAMPING

## 2019-01-28 ASSESSMENT — PAIN DESCRIPTION - ONSET
ONSET: ON-GOING
ONSET: ON-GOING

## 2019-01-28 ASSESSMENT — PAIN DESCRIPTION - PROGRESSION
CLINICAL_PROGRESSION: NOT CHANGED
CLINICAL_PROGRESSION: NOT CHANGED

## 2019-01-28 ASSESSMENT — PAIN DESCRIPTION - FREQUENCY
FREQUENCY: CONTINUOUS
FREQUENCY: CONTINUOUS

## 2019-01-28 ASSESSMENT — PAIN DESCRIPTION - PAIN TYPE
TYPE: ACUTE PAIN
TYPE: ACUTE PAIN

## 2019-01-28 ASSESSMENT — PAIN DESCRIPTION - ORIENTATION
ORIENTATION: RIGHT
ORIENTATION: RIGHT

## 2019-01-28 ASSESSMENT — PAIN DESCRIPTION - LOCATION
LOCATION: BUTTOCKS
LOCATION: BUTTOCKS

## 2019-01-29 VITALS
HEART RATE: 96 BPM | WEIGHT: 189.2 LBS | BODY MASS INDEX: 27.09 KG/M2 | RESPIRATION RATE: 16 BRPM | OXYGEN SATURATION: 97 % | SYSTOLIC BLOOD PRESSURE: 110 MMHG | TEMPERATURE: 98.1 F | HEIGHT: 70 IN | DIASTOLIC BLOOD PRESSURE: 60 MMHG

## 2019-01-29 LAB
ANION GAP SERPL CALCULATED.3IONS-SCNC: 12 MMOL/L (ref 3–16)
BASOPHILS ABSOLUTE: 0 K/UL (ref 0–0.2)
BASOPHILS RELATIVE PERCENT: 0.6 %
BUN BLDV-MCNC: 11 MG/DL (ref 7–20)
CALCIUM SERPL-MCNC: 8.9 MG/DL (ref 8.3–10.6)
CHLORIDE BLD-SCNC: 101 MMOL/L (ref 99–110)
CO2: 24 MMOL/L (ref 21–32)
CREAT SERPL-MCNC: 0.6 MG/DL (ref 0.9–1.3)
EOSINOPHILS ABSOLUTE: 0.1 K/UL (ref 0–0.6)
EOSINOPHILS RELATIVE PERCENT: 2.2 %
GFR AFRICAN AMERICAN: >60
GFR NON-AFRICAN AMERICAN: >60
GLUCOSE BLD-MCNC: 112 MG/DL (ref 70–99)
HCT VFR BLD CALC: 28.1 % (ref 40.5–52.5)
HEMOGLOBIN: 9.5 G/DL (ref 13.5–17.5)
LYMPHOCYTES ABSOLUTE: 0.6 K/UL (ref 1–5.1)
LYMPHOCYTES RELATIVE PERCENT: 11.6 %
MCH RBC QN AUTO: 30.9 PG (ref 26–34)
MCHC RBC AUTO-ENTMCNC: 33.9 G/DL (ref 31–36)
MCV RBC AUTO: 91.2 FL (ref 80–100)
MONOCYTES ABSOLUTE: 1 K/UL (ref 0–1.3)
MONOCYTES RELATIVE PERCENT: 18.2 %
NEUTROPHILS ABSOLUTE: 3.7 K/UL (ref 1.7–7.7)
NEUTROPHILS RELATIVE PERCENT: 67.4 %
PDW BLD-RTO: 15.3 % (ref 12.4–15.4)
PLATELET # BLD: 218 K/UL (ref 135–450)
PMV BLD AUTO: 6.8 FL (ref 5–10.5)
POTASSIUM SERPL-SCNC: 4.1 MMOL/L (ref 3.5–5.1)
RBC # BLD: 3.08 M/UL (ref 4.2–5.9)
SODIUM BLD-SCNC: 137 MMOL/L (ref 136–145)
WBC # BLD: 5.5 K/UL (ref 4–11)

## 2019-01-29 PROCEDURE — 96366 THER/PROPH/DIAG IV INF ADDON: CPT

## 2019-01-29 PROCEDURE — 2500000003 HC RX 250 WO HCPCS: Performed by: INTERNAL MEDICINE

## 2019-01-29 PROCEDURE — 36415 COLL VENOUS BLD VENIPUNCTURE: CPT

## 2019-01-29 PROCEDURE — 6370000000 HC RX 637 (ALT 250 FOR IP): Performed by: INTERNAL MEDICINE

## 2019-01-29 PROCEDURE — 80048 BASIC METABOLIC PNL TOTAL CA: CPT

## 2019-01-29 PROCEDURE — 6360000002 HC RX W HCPCS: Performed by: INTERNAL MEDICINE

## 2019-01-29 PROCEDURE — G0378 HOSPITAL OBSERVATION PER HR: HCPCS

## 2019-01-29 PROCEDURE — APPSS15 APP SPLIT SHARED TIME 0-15 MINUTES: Performed by: NURSE PRACTITIONER

## 2019-01-29 PROCEDURE — 99231 SBSQ HOSP IP/OBS SF/LOW 25: CPT | Performed by: SURGERY

## 2019-01-29 PROCEDURE — 96372 THER/PROPH/DIAG INJ SC/IM: CPT

## 2019-01-29 PROCEDURE — 96376 TX/PRO/DX INJ SAME DRUG ADON: CPT

## 2019-01-29 PROCEDURE — APPNB30 APP NON BILLABLE TIME 0-30 MINS: Performed by: NURSE PRACTITIONER

## 2019-01-29 PROCEDURE — 85025 COMPLETE CBC W/AUTO DIFF WBC: CPT

## 2019-01-29 RX ORDER — CLINDAMYCIN HYDROCHLORIDE 300 MG/1
300 CAPSULE ORAL 4 TIMES DAILY
Qty: 40 CAPSULE | Refills: 0 | Status: SHIPPED | OUTPATIENT
Start: 2019-01-29 | End: 2019-02-08

## 2019-01-29 RX ORDER — AMOXICILLIN AND CLAVULANATE POTASSIUM 875; 125 MG/1; MG/1
1 TABLET, FILM COATED ORAL 2 TIMES DAILY
Qty: 20 TABLET | Refills: 0 | Status: SHIPPED | OUTPATIENT
Start: 2019-01-29 | End: 2019-02-08

## 2019-01-29 RX ADMIN — DIAZEPAM 5 MG: 5 TABLET ORAL at 09:22

## 2019-01-29 RX ADMIN — FAMOTIDINE 20 MG: 20 TABLET, FILM COATED ORAL at 09:22

## 2019-01-29 RX ADMIN — HYDROCODONE BITARTRATE AND ACETAMINOPHEN 2 TABLET: 10; 325 TABLET ORAL at 01:29

## 2019-01-29 RX ADMIN — ENOXAPARIN SODIUM 40 MG: 40 INJECTION SUBCUTANEOUS at 09:24

## 2019-01-29 RX ADMIN — SPIRONOLACTONE 25 MG: 25 TABLET ORAL at 09:22

## 2019-01-29 RX ADMIN — CLINDAMYCIN PHOSPHATE 600 MG: 600 INJECTION, SOLUTION INTRAVENOUS at 09:40

## 2019-01-29 RX ADMIN — HYDROCODONE BITARTRATE AND ACETAMINOPHEN 2 TABLET: 10; 325 TABLET ORAL at 09:12

## 2019-01-29 RX ADMIN — KETOROLAC TROMETHAMINE 30 MG: 30 INJECTION, SOLUTION INTRAMUSCULAR at 09:12

## 2019-01-29 RX ADMIN — CLINDAMYCIN PHOSPHATE 600 MG: 600 INJECTION, SOLUTION INTRAVENOUS at 01:00

## 2019-01-29 RX ADMIN — CARVEDILOL 12.5 MG: 6.25 TABLET, FILM COATED ORAL at 09:22

## 2019-01-29 RX ADMIN — DIGOXIN 125 MCG: 125 TABLET ORAL at 09:22

## 2019-01-29 RX ADMIN — TAZOBACTAM SODIUM AND PIPERACILLIN SODIUM 3.38 G: 375; 3 INJECTION, SOLUTION INTRAVENOUS at 06:00

## 2019-01-29 ASSESSMENT — PAIN SCALES - GENERAL
PAINLEVEL_OUTOF10: 7
PAINLEVEL_OUTOF10: 7

## 2019-01-29 ASSESSMENT — PAIN DESCRIPTION - ONSET: ONSET: ON-GOING

## 2019-01-29 ASSESSMENT — PAIN DESCRIPTION - FREQUENCY: FREQUENCY: CONTINUOUS

## 2019-01-29 ASSESSMENT — PAIN DESCRIPTION - DESCRIPTORS: DESCRIPTORS: ACHING;BURNING;CONSTANT

## 2019-01-29 ASSESSMENT — PAIN DESCRIPTION - PROGRESSION: CLINICAL_PROGRESSION: NOT CHANGED

## 2019-01-29 ASSESSMENT — PAIN DESCRIPTION - LOCATION
LOCATION: BUTTOCKS
LOCATION: BUTTOCKS

## 2019-01-29 ASSESSMENT — PAIN DESCRIPTION - PAIN TYPE
TYPE: ACUTE PAIN;SURGICAL PAIN
TYPE: ACUTE PAIN

## 2019-01-31 ENCOUNTER — OFFICE VISIT (OUTPATIENT)
Dept: SURGERY | Age: 49
End: 2019-01-31

## 2019-01-31 VITALS — BODY MASS INDEX: 27.84 KG/M2 | WEIGHT: 194 LBS | DIASTOLIC BLOOD PRESSURE: 68 MMHG | SYSTOLIC BLOOD PRESSURE: 110 MMHG

## 2019-01-31 DIAGNOSIS — L02.31 ABSCESS OF BUTTOCK, RIGHT: Primary | ICD-10-CM

## 2019-01-31 PROCEDURE — 99024 POSTOP FOLLOW-UP VISIT: CPT | Performed by: SURGERY

## 2019-02-01 LAB
BLOOD CULTURE, ROUTINE: NORMAL
CULTURE, BLOOD 2: NORMAL

## 2019-02-13 ENCOUNTER — TELEPHONE (OUTPATIENT)
Dept: CARDIOLOGY CLINIC | Age: 49
End: 2019-02-13

## 2019-02-13 ENCOUNTER — OFFICE VISIT (OUTPATIENT)
Dept: CARDIOLOGY CLINIC | Age: 49
End: 2019-02-13
Payer: COMMERCIAL

## 2019-02-13 VITALS
HEIGHT: 71 IN | HEART RATE: 89 BPM | RESPIRATION RATE: 16 BRPM | SYSTOLIC BLOOD PRESSURE: 106 MMHG | OXYGEN SATURATION: 98 % | BODY MASS INDEX: 26.9 KG/M2 | DIASTOLIC BLOOD PRESSURE: 54 MMHG | WEIGHT: 192.12 LBS

## 2019-02-13 DIAGNOSIS — I42.9 CARDIOMYOPATHY, IDIOPATHIC (HCC): Chronic | ICD-10-CM

## 2019-02-13 DIAGNOSIS — Z95.810 ICD (IMPLANTABLE CARDIOVERTER-DEFIBRILLATOR) IN PLACE: ICD-10-CM

## 2019-02-13 DIAGNOSIS — I50.22 CHRONIC SYSTOLIC HEART FAILURE (HCC): Primary | Chronic | ICD-10-CM

## 2019-02-13 DIAGNOSIS — R06.02 SOB (SHORTNESS OF BREATH): Chronic | ICD-10-CM

## 2019-02-13 DIAGNOSIS — I10 ESSENTIAL HYPERTENSION: Chronic | ICD-10-CM

## 2019-02-13 PROCEDURE — 99214 OFFICE O/P EST MOD 30 MIN: CPT | Performed by: NURSE PRACTITIONER

## 2019-02-13 PROCEDURE — 93290 INTERROG DEV EVAL ICPMS IP: CPT | Performed by: NURSE PRACTITIONER

## 2019-02-13 RX ORDER — CARVEDILOL 12.5 MG/1
TABLET ORAL
Qty: 135 TABLET | Refills: 1 | Status: CANCELLED | OUTPATIENT
Start: 2019-02-13

## 2019-02-13 RX ORDER — EPLERENONE 25 MG/1
12.5 TABLET, FILM COATED ORAL DAILY
Qty: 15 TABLET | Refills: 0 | Status: SHIPPED | OUTPATIENT
Start: 2019-02-13 | End: 2019-03-11 | Stop reason: SDUPTHER

## 2019-02-13 RX ORDER — TORSEMIDE 20 MG/1
TABLET ORAL
Qty: 36 TABLET | Refills: 3
Start: 2019-02-13 | End: 2019-01-01

## 2019-02-13 RX ORDER — SPIRONOLACTONE 25 MG/1
TABLET ORAL
Qty: 90 TABLET | Refills: 1 | Status: CANCELLED | OUTPATIENT
Start: 2019-02-13

## 2019-02-14 ENCOUNTER — OFFICE VISIT (OUTPATIENT)
Dept: SURGERY | Age: 49
End: 2019-02-14
Payer: COMMERCIAL

## 2019-02-14 VITALS — BODY MASS INDEX: 27.44 KG/M2 | DIASTOLIC BLOOD PRESSURE: 64 MMHG | SYSTOLIC BLOOD PRESSURE: 105 MMHG | WEIGHT: 194 LBS

## 2019-02-14 DIAGNOSIS — L03.317 CELLULITIS, GLUTEAL, RIGHT: Primary | ICD-10-CM

## 2019-02-14 PROCEDURE — 99212 OFFICE O/P EST SF 10 MIN: CPT | Performed by: SURGERY

## 2019-03-03 ENCOUNTER — TELEPHONE (OUTPATIENT)
Dept: CARDIOLOGY CLINIC | Age: 49
End: 2019-03-03

## 2019-03-05 ENCOUNTER — OFFICE VISIT (OUTPATIENT)
Dept: SURGERY | Age: 49
End: 2019-03-05
Payer: COMMERCIAL

## 2019-03-05 ENCOUNTER — NURSE ONLY (OUTPATIENT)
Dept: CARDIOLOGY CLINIC | Age: 49
End: 2019-03-05
Payer: COMMERCIAL

## 2019-03-05 VITALS — WEIGHT: 195 LBS | SYSTOLIC BLOOD PRESSURE: 100 MMHG | DIASTOLIC BLOOD PRESSURE: 72 MMHG | BODY MASS INDEX: 27.58 KG/M2

## 2019-03-05 DIAGNOSIS — Z95.810 ICD (IMPLANTABLE CARDIOVERTER-DEFIBRILLATOR) IN PLACE: ICD-10-CM

## 2019-03-05 DIAGNOSIS — K59.03 DRUG-INDUCED CONSTIPATION: Primary | ICD-10-CM

## 2019-03-05 DIAGNOSIS — I50.22 CHRONIC SYSTOLIC HEART FAILURE (HCC): Chronic | ICD-10-CM

## 2019-03-05 DIAGNOSIS — I42.9 CARDIOMYOPATHY, IDIOPATHIC (HCC): Chronic | ICD-10-CM

## 2019-03-05 PROCEDURE — 93295 DEV INTERROG REMOTE 1/2/MLT: CPT | Performed by: INTERNAL MEDICINE

## 2019-03-05 PROCEDURE — 93297 REM INTERROG DEV EVAL ICPMS: CPT | Performed by: INTERNAL MEDICINE

## 2019-03-05 PROCEDURE — 93296 REM INTERROG EVL PM/IDS: CPT | Performed by: INTERNAL MEDICINE

## 2019-03-05 PROCEDURE — 99212 OFFICE O/P EST SF 10 MIN: CPT | Performed by: SURGERY

## 2019-03-06 ENCOUNTER — TELEPHONE (OUTPATIENT)
Dept: CARDIOLOGY CLINIC | Age: 49
End: 2019-03-06

## 2019-03-07 DIAGNOSIS — C20 RECTAL CANCER (HCC): ICD-10-CM

## 2019-03-07 DIAGNOSIS — I50.22 CHRONIC SYSTOLIC HEART FAILURE (HCC): Chronic | ICD-10-CM

## 2019-03-07 LAB
A/G RATIO: 1.8 (ref 1.1–2.2)
ALBUMIN SERPL-MCNC: 4.3 G/DL (ref 3.4–5)
ALP BLD-CCNC: 103 U/L (ref 40–129)
ALT SERPL-CCNC: 9 U/L (ref 10–40)
ANION GAP SERPL CALCULATED.3IONS-SCNC: 17 MMOL/L (ref 3–16)
ANION GAP SERPL CALCULATED.3IONS-SCNC: 17 MMOL/L (ref 3–16)
AST SERPL-CCNC: 16 U/L (ref 15–37)
BASOPHILS ABSOLUTE: 0 K/UL (ref 0–0.2)
BASOPHILS RELATIVE PERCENT: 0.8 %
BILIRUB SERPL-MCNC: <0.2 MG/DL (ref 0–1)
BUN BLDV-MCNC: 14 MG/DL (ref 7–20)
BUN BLDV-MCNC: 15 MG/DL (ref 7–20)
CALCIUM SERPL-MCNC: 9.3 MG/DL (ref 8.3–10.6)
CALCIUM SERPL-MCNC: 9.4 MG/DL (ref 8.3–10.6)
CHLORIDE BLD-SCNC: 101 MMOL/L (ref 99–110)
CHLORIDE BLD-SCNC: 102 MMOL/L (ref 99–110)
CO2: 23 MMOL/L (ref 21–32)
CO2: 24 MMOL/L (ref 21–32)
CREAT SERPL-MCNC: 0.5 MG/DL (ref 0.9–1.3)
CREAT SERPL-MCNC: 0.6 MG/DL (ref 0.9–1.3)
EOSINOPHILS ABSOLUTE: 0.2 K/UL (ref 0–0.6)
EOSINOPHILS RELATIVE PERCENT: 3.7 %
GFR AFRICAN AMERICAN: >60
GFR AFRICAN AMERICAN: >60
GFR NON-AFRICAN AMERICAN: >60
GFR NON-AFRICAN AMERICAN: >60
GLOBULIN: 2.4 G/DL
GLUCOSE BLD-MCNC: 103 MG/DL (ref 70–99)
GLUCOSE BLD-MCNC: 105 MG/DL (ref 70–99)
HCT VFR BLD CALC: 36.8 % (ref 40.5–52.5)
HEMOGLOBIN: 12.4 G/DL (ref 13.5–17.5)
LYMPHOCYTES ABSOLUTE: 0.9 K/UL (ref 1–5.1)
LYMPHOCYTES RELATIVE PERCENT: 16 %
MCH RBC QN AUTO: 31.4 PG (ref 26–34)
MCHC RBC AUTO-ENTMCNC: 33.7 G/DL (ref 31–36)
MCV RBC AUTO: 93.2 FL (ref 80–100)
MONOCYTES ABSOLUTE: 0.6 K/UL (ref 0–1.3)
MONOCYTES RELATIVE PERCENT: 11.7 %
NEUTROPHILS ABSOLUTE: 3.6 K/UL (ref 1.7–7.7)
NEUTROPHILS RELATIVE PERCENT: 67.8 %
PDW BLD-RTO: 16 % (ref 12.4–15.4)
PLATELET # BLD: 276 K/UL (ref 135–450)
PMV BLD AUTO: 7.4 FL (ref 5–10.5)
POTASSIUM SERPL-SCNC: 4.4 MMOL/L (ref 3.5–5.1)
POTASSIUM SERPL-SCNC: 4.4 MMOL/L (ref 3.5–5.1)
PRO-BNP: 2499 PG/ML (ref 0–124)
RBC # BLD: 3.94 M/UL (ref 4.2–5.9)
SODIUM BLD-SCNC: 142 MMOL/L (ref 136–145)
SODIUM BLD-SCNC: 142 MMOL/L (ref 136–145)
TOTAL PROTEIN: 6.7 G/DL (ref 6.4–8.2)
WBC # BLD: 5.4 K/UL (ref 4–11)

## 2019-03-08 ENCOUNTER — TELEPHONE (OUTPATIENT)
Dept: CARDIOLOGY CLINIC | Age: 49
End: 2019-03-08

## 2019-03-11 ENCOUNTER — TELEPHONE (OUTPATIENT)
Dept: PAIN MANAGEMENT | Age: 49
End: 2019-03-11

## 2019-03-11 ENCOUNTER — TELEPHONE (OUTPATIENT)
Dept: CARDIOLOGY CLINIC | Age: 49
End: 2019-03-11

## 2019-03-11 RX ORDER — EPLERENONE 25 MG/1
12.5 TABLET, FILM COATED ORAL DAILY
Qty: 15 TABLET | Refills: 1 | Status: SHIPPED | OUTPATIENT
Start: 2019-03-11 | End: 2019-03-12 | Stop reason: SDUPTHER

## 2019-03-12 ENCOUNTER — TELEPHONE (OUTPATIENT)
Dept: CARDIOLOGY CLINIC | Age: 49
End: 2019-03-12

## 2019-03-12 RX ORDER — EPLERENONE 25 MG/1
12.5 TABLET, FILM COATED ORAL DAILY
Qty: 45 TABLET | Refills: 0 | Status: ON HOLD | OUTPATIENT
Start: 2019-03-12 | End: 2019-01-01 | Stop reason: HOSPADM

## 2019-03-14 RX ORDER — CARVEDILOL 12.5 MG/1
TABLET ORAL
Qty: 180 TABLET | Refills: 0 | Status: SHIPPED | OUTPATIENT
Start: 2019-03-14 | End: 2019-01-01

## 2019-04-03 NOTE — ED PROVIDER NOTES
[address] [facility]  [facility]ENC[facility]MENT[facility]      Pt Name: Davina Sinclair  MRN: [id number]  [place] 1970  Date ofevaluation: 4/2/2019  Provider: Sia Kurtz MD    CHIEF COMPLAINT       Chief Complaint   Patient presents with    Abdominal Pain     sent by doctor for abdominal pain. Hx of colon cancer. HISTORY OF PRESENT ILLNESS   (Location/Symptom, Timing/Onset,Context/Setting, Quality, Duration, Modifying Factors, Severity)  Note limiting factors. Davina Sinclair is a 50 y.o. male who presents to the emergency department with abdominal pain. This is a 22-year-old male with a long history of abdominal pain. The patient has a history of rectal/colon cancer with a re-anastomosis of his colon. He presents today with increased pain over the last week or 2. The patient sees a GI specialist and a surgeon for this. He has been having diarrhea as well. He denies any fevers or chills. He has Norco at home for pain. HPI    NursingNotes were reviewed. REVIEW OF SYSTEMS    (2-9 systems for level 4, 10 or more for level 5)     Review of Systems  Negative for GI  musculoskeletal neurological pulmonary and cardiac complaints and all is reviewed and negative  General Appearance:  Alert, cooperative, no distress, appears stated age. Head:  Normocephalic, without obvious abnormality, atraumatic. Eyes:  conjunctiva/corneas clear, EOM's intact. Sclera anicteric. ENT: Mucous membranes moist.   Neck: Supple, symmetrical, trachea midline, no adenopathy. No jugular venous distention. Lungs:   No Respiratory Distress. Chest Wall:     Heart:  Regular rate rhythm with no murmurs rubs or gallops    Abdomen:   Soft and benign. Positive bowel sounds. No guarding or rebound. Well-healed surgical scars in place. Extremities:  Full range of motion. Pulses: Good throughout    Skin:  No rashes or lesions to exposed skin.    Neurologic: Alert and oriented X 3.   Motor grossly normal.  Speech clear. Except as noted above the remainder of the review of systems was reviewed and negative. PAST MEDICAL HISTORY     Past Medical History:   Diagnosis Date    Allergic     Anesthesia     tremors    Arthritis     CAD (coronary artery disease)     Cancer ([address]     colon    CHF (congestive heart failure) (HCC)     Clostridium difficile infection 07/11/2016    PCR+    Depression motion     Diverticulitis     Diverticulosis     History of alcohol abuse     Hyperlipidemia     Hypertension     Irregular heart beat     states been told he has had in past. Never treated. Sees PCP every 3 mos. and EKG yearly    Pneumonia     Right knee pain     Shoulder injury     and arthrisits, injury rotaotr cuff    Sleep difficulties     with depression         SURGICALHISTORY       Past Surgical History:   Procedure Laterality Date    ABSCESS DRAINAGE      BREAST SURGERY Right 1/21/2019    RIGHT BREAST EXCISIONAL BIOPSY performed by Malvin aDnielson MD at [address] [place] [address]  09/14/2016    No stents placed   [address] [place] [address] COLONOSCOPY  08/29/2016    with biopsy and polypectomy    COLONOSCOPY  03/19/2018    ENDOSCOPIC ULTRASOUND (LOWER)  09/14/2016    for staging - Dr. Leon Sanchez  12/01/2017    peristomal hernia repair    HERNIA REPAIR  04/18/2018    S/P: colostomy closure with hernia repair with mesh, with Dr. Nadiya Carvajal at [facility].     INNER EAR SURGERY  tube right ear    JOINT REPLACEMENT      left TKR    KNEE ARTHROSCOPY Right [id number]    KNEE ARTHROSCOPY Right 8/4/2014    medial meniscus    KNEE SURGERY  08/24/2011    removal of tibial component    OTHER SURGICAL HISTORY  7/1/13    ACL Rt knee meniscus repair synovectomy    OTHER SURGICAL HISTORY  12/01/2017    takedown of ileostomy    PACEMAKER PLACEMENT  10/26/2017    ICD placed right chest    REVISION kg)       Physical Exam    DIAGNOSTIC RESULTS         RADIOLOGY:   Non-plain filmimages such as CT, Ultrasound and MRI are read by the radiologist. Plain radiographic images are visualized and preliminarily interpreted by the emergency physician with the below findings:    See below    Interpretation per the Radiologist below, if available at the time ofthis note:    CT ABDOMEN PELVIS W IV CONTRAST Additional Contrast? None   Final Result   1. Persistence of soft tissue thickening in the presacral fascia adjacent to   a colorectal anastomosis, likely due in part to scarring. However, there is   suspected persistence of direct communication of the colorectal lumen given a   3.9 cm x 1.9 cm collection likely containing stool in this area. 2. Questionable mild cystitis. ED BEDSIDE ULTRASOUND:   Performed by ED Physician - none    LABS:  Labs Reviewed   CBC WITH AUTO DIFFERENTIAL - Abnormal; Notable for the following components:       Result Value    RBC 4.08 (*)     Hemoglobin 12.7 (*)     Hematocrit 37.0 (*)     All other components within normal limits    Narrative:     Performed at:  [facility] [address], [place], [address]   Phone [phone]   COMPREHENSIVE METABOLIC PANEL W/ REFLEX TO MG FOR LOW K - Abnormal; Notable for the following components:    Sodium 133 (*)     Chloride 95 (*)     CREATININE 0.7 (*)     All other components within normal limits    Narrative:     Performed at:  [facility]  [address] [facility],  [facility] [place], [address]   Phone [phone]   LIPASE    Narrative:     Performed at:  [facility] [address], [place], [address]   Phone [phone]       All other labs were within normal range or not returned as of this dictation.     EMERGENCY DEPARTMENT COURSE and DIFFERENTIAL DIAGNOSIS/MDM:   Vitals:    Vitals:    04/02/19 1715 04/02/19 1730 04/02/19 1734 04/02/19 1800   BP: 109/72 (!) 94/51 102/64 102/61   Pulse:    75   Resp:    14   Temp:       TempSrc:       SpO2:  96%  97%   Weight:       Height:           The patient's has remained stable throughout his hospital course. He was given some morphine for pain control. His workup included a CT of the abdomen and pelvis that shows an abnormal collection of stool posterior to the anastomosis. However, this is not new and has been seen on previous CAT scans. I discussed these findings with the patient and instructed him to follow back up with his surgeon for definitive care. He has Norco at home for pain control. This pain is not new, he has had in the past.  He is to return if worse. He was discharged in stable condition. MDM      REASSESSMENT              CONSULTS:  None    PROCEDURES:  Unless otherwise noted below, none     Procedures    FINAL IMPRESSION      1. Lower abdominal pain          DISPOSITION/PLAN   DISPOSITION Decision To Discharge 04/02/2019 08:20:33 PM      PATIENT REFERREDTO:  Jarred Chaudhary MD  [address] [place] [phone]    Call in 1 day  Call your surgeon for an appointment also see your gastroenterologist.      Celina Reyes:  New Prescriptions    No medications on file     Controlled Substances Monitoring:     RX Monitoring 1/24/2018   Attestation The Prescription Monitoring Report for this patient was reviewed today.    Chronic Pain Routine Monitoring -       (Please note that portions of this note were completed with a voice recognition program.  Efforts were made to edit the dictations but occasionally words are mis-transcribed.)    Claudio Carter MD (electronically signed)  Attending Emergency Physician         Claudio Carter MD  04/02/19 2022

## 2019-04-08 PROBLEM — R10.9 ABDOMINAL PAIN: Status: ACTIVE | Noted: 2019-01-01

## 2019-04-08 PROBLEM — R10.32 LLQ PAIN: Status: ACTIVE | Noted: 2019-01-01

## 2019-04-08 NOTE — ED PROVIDER NOTES
[address] [facility]  eMERGENCY dEPARTMENTeNCOUnter      Pt Name: Bryce Mckeon  MRN: [id number]  [place] 1970  Date ofevaluation: 4/8/2019  Provider: Hamida Minaya III, DO    CHIEF COMPLAINT       Chief Complaint   Patient presents with    Cough     pt states he woke up with cought and congestion also c/o abd pain rates pain 10/10          HISTORY OF PRESENT ILLNESS   (Location/Symptom, Timing/Onset,Context/Setting, Quality, Duration, Modifying Factors, Severity)  Note limiting factors. Bryce Mckeon is a 50 y.o. male who presents to the emergency department with right-sided chest pain , and also some associated shortness of breath. This started several hours ago. Also relates right lower quadrant abdominal pain, which is somewhat chronic in nature but worse in the past 1 week. His GI specialist apparently told him that he has a \"bowel infection\". Both pain areas are moderate with exacerbations to severe at times. Palpation and coughing worsens symptoms. Describes both pain areas as sharp. HPI    NursingNotes were reviewed. REVIEW OF SYSTEMS    (2-9 systems for level 4, 10 or more for level 5)     Review of Systems    Except as noted above the remainder of the review of systems was reviewed and negative. PAST MEDICAL HISTORY     Past Medical History:   Diagnosis Date    Allergic     Anesthesia     tremors    Arthritis     CAD (coronary artery disease)     Cancer ([facility]     colon    CHF [facility]congest[facility] failure) ([facility])     Clostridium difficile infection 07/11/2016    PCR+    Depression motion     Diverticulitis     Diverticulosis     History of alcohol abuse     Hyperlipidemia     Hypertension     Irregular heart beat     states been told he has had in past. Never treated. Sees PCP every 3 mos.  and EKG yearly    Pneumonia     Right knee pain     Shoulder injury     and arthrisits, injury rotaotr cuff    Sleep difficulties     with depression         SURGICALHISTORY       Past Surgical History:   Procedure Laterality Date    ABSCESS DRAINAGE      BREAST SURGERY Right 1/21/2019    RIGHT BREAST EXCISIONAL BIOPSY performed by Fab Jensen MD at [facility]  09/14/2016    No stents placed   [address] [facility] COLONOSCOPY  08/29/2016    with biopsy and polypectomy    COLONOSCOPY  03/19/2018    ENDOSCOPIC ULTRASOUND (LOWER)  09/14/2016    for staging - Dr. Dania Belle  12/01/2017    peristomal hernia repair    HERNIA REPAIR  04/18/2018    S/P: colostomy closure with hernia repair with mesh, with Dr. Tom Juarez at [facility].  INNER EAR SURGERY  tube right ear    JOINT REPLACEMENT      left TKR    KNEE ARTHROSCOPY Right [id number]    KNEE ARTHROSCOPY Right 8/4/2014    medial meniscus    KNEE SURGERY  08/24/2011    removal of tibial component    OTHER SURGICAL HISTORY  7/1/13    ACL Rt knee meniscus repair synovectomy    OTHER SURGICAL HISTORY  12/01/2017    takedown of ileostomy    PACEMAKER PLACEMENT  10/26/2017    ICD placed right chest    REVISION COLOSTOMY  04/18/2018    S/P: colostomy closure with hernia repair with mesh, with Dr. Tom Juarez at [facility].     SHOULDER ARTHROSCOPY Right 9/22/15    SUBACROMIAL DECOMPRESSION, DISTAL CLAVICLE EXCISION, LABRAL DEBRIDEMETN    SIGMOIDOSCOPY N/A 11/23/2018    SIGMOIDOSCOPY BIOPSY FLEXIBLE performed by Aleksandar Marvin MD at [address] [place] [address] surgeries    SMALL INTESTINE SURGERY  01/11/2017    LAPAROSCOPIC LOW ANTERIOR RESECTION, ILEOSTOMY    SMALL INTESTINE SURGERY  12/01/2017    small bowel resection    TUNNELED VENOUS PORT PLACEMENT Left 02/13/2017    PORT-A-CATH INSERTION                     TUNNELED VENOUS PORT PLACEMENT      UMBILICAL HERNIA REPAIR  11/11/14    UPPER GASTROINTESTINAL ENDOSCOPY  10/03/2017         CURRENT MEDICATIONS Last attempt to quit: 2007     Years since quittin.2    Smokeless tobacco: Former User     Types: Chew     Quit date: 8/10/2017   Substance and Sexual Activity    Alcohol use: No     Comment: history of alcohol abuse 20 years ago    Drug use: No    Sexual activity: Never     Partners: Female   Lifestyle    Physical activity:     Days per week: None     Minutes per session: None    Stress: None   Relationships    Social connections:     Talks on phone: None     Gets together: None     Attends Taoism service: None     Active member of club or organization: None     Attends meetings of clubs or organizations: None     Relationship status: None    Intimate partner violence:     Fear of current or ex partner: None     Emotionally abused: None     Physically abused: None     Forced sexual activity: None   Other Topics Concern    None   Social History Narrative    None       SCREENINGS      @FLOW([id number])@      PHYSICAL EXAM    (up to 7 for level 4, 8 or more for level 5)     ED Triage Vitals   BP Temp Temp Source Pulse Resp SpO2 Height Weight   19 0610 19 0613 19 0613 19 0610 19 0610 19 0610 19 0610 19 0610   115/79 96.9 °F (36.1 °C) Oral 94 20 96 % 5' 10\" (1.778 m) 185 lb (83.9 kg)       Physical Exam      Constitutional:  Well developed, well nourished, non-toxic appearance , in distress + pain. Eyes:   conjunctiva normal   HENT:  Atraumatic, external ears normal,   Respiratory:  No respiratory distress, normal breath sounds, no rales, no wheezing   Cardiovascular:  Normal rate, normal rhythm, no murmurs,   GI:  Soft, nondistended, somewhat rather diffusely tender but worse in the right abdomen, prior surgical abdominal scars present, no obvious organomegaly, no mass, no rebound, no guarding   Musculoskeletal:  No tenderness to extremities, no deformities.   Ant chest wall tenderness  Integument:  no rashes on exposed surfaces  Neurologic:  Alert & oriented x 3,  no focal deficits noted   Psychiatric:  Speech and behavior appropriate. No agitation. DIAGNOSTICRESULTS     EKG: All EKG's are interpreted by the Emergency Department Physician who either signs or Co-signs this chartin the absence of a cardiologist.    Collection Time Result Time Ventricular Rate Atrial Rate P-R Interval QRS Duration Q-T Interval QTc Calculation (Bazett) P Axis R Axis T Axis Diagnosis   04/08/19 08:04:48 04/08/19 08:08:52 82 82 176 122 370 432 12 -31 39 Normal sinus rhythmLeft axis deviationNon-specific intra-ventricular conduction delayNonspecific T wave abnormalityAbnormal ECG           RADIOLOGY:   Non-plain film images such as CT, Ultrasound and MRI are read by theradiologist. Plain radiographic images are visualized and preliminarily interpreted by the emergency physician with the below findings:        CT ABDOMEN PELVIS WO CONTRAST Additional Contrast? None (Preliminary result)   Result time 04/08/19 08:19:36   Preliminary result by Jose Somers MD (04/08/19 08:19:36)                Impression:    1. No new intra-abdominal abnormality. 2. Similar appearance since the prior study of extensive soft tissue in the  presacral region, questionable extraluminal gas.  The finding is not well  evaluated without rectal or IV contrast.  3. Interval appearance of two subsolid pulmonary nodules in the left lower  lobe since April 2019, more suggestive of infectious/inflammatory etiology. Recommend continued attention on follow-up. 4. Mild interstitial edema. 5. Stable right ventral hernia containing bowel.  No evidence of obstruction. Narrative:    EXAMINATION:  CT OF THE ABDOMEN AND PELVIS WITHOUT CONTRAST, 4/8/2019 7:49 am    TECHNIQUE:  CT of the abdomen and pelvis was performed without the administration of  intravenous contrast. Multiplanar reformatted images are provided for review.   Dose modulation, iterative reconstruction, and/or weight based adjustment 08:13:23)                Impression:    1. No new intra-abdominal abnormality. 2. Similar appearance since the prior study of extensive soft tissue in the  presacral region questionable extraluminal gas.  The finding is not well  evaluated without rectal or IV contrast.  3. Interval appearance of 2 subsolid pulmonary nodules in the left lower lobe  since April 2019, more suggestive of infectious/inflammatory etiology. Recommend continued attention on follow-up. 4. Mild interstitial edema. 5. Stable right ventral hernia containing bowel.  No evidence of obstruction.                    XR CHEST PORTABLE (Final result)   Result time 04/08/19 34:73:96   Final result by Eileen Krueger MD (04/08/19 16:94:63)                Impression:    1.  No acute abnormality. Narrative:    EXAMINATION:  SINGLE XRAY VIEW OF THE CHEST    4/8/2019 7:14 am    COMPARISON:  07/30/2018    HISTORY:  ORDERING SYSTEM PROVIDED HISTORY: cough  TECHNOLOGIST PROVIDED HISTORY:  Reason for exam:->cough  Ordering Physician Provided Reason for Exam: Cough (pt states he woke up with  cought and congestion also c/o abd pain rates pain 10/10 )  Acuity: Unknown  Type of Exam: Unknown    FINDINGS:  There is bibasilar atelectasis.  Cardiomegaly is stable status post single  lead ICD.  There is no pneumothorax or pleural effusion.  The left port  terminates in the distal SVC.                                 LABS:  Labs Reviewed   CBC WITH AUTO DIFFERENTIAL - Abnormal; Notable for the following components:       Result Value    RBC 3.93 (*)     Hemoglobin 12.3 (*)     Hematocrit 35.7 (*)     All other components within normal limits    Narrative:     Performed at:  [facility] [address], [place], [facility] [address]   Phone [phone]   COMPREHENSIVE METABOLIC PANEL W/ REFLEX TO MG FOR LOW K - Abnormal; Notable for the following components:    Sodium 134 (*)     Chloride 98 (*)     CREATININE 0.6 (*) AST 13 (*)     All other components within normal limits    Narrative:     Performed at:  [facility] [address],  [place], [address]   Phone [phone] PEPTIDE - Abnormal; Notable for the following components:    Pro-BNP 8,173 (*)     All other components within normal limits    Narrative:     Performed at:  [facility] [address],  [place], [address]   Phone [phone]   RAPID INFLUENZA A/B ANTIGENS    Narrative:     Performed at:  [facility] [address],  [place], [address]   Phone [phone]   LIPASE    Narrative:     Performed at:  [facility] [address],  [place], [employer]   Phone [phone]   URINALYSIS    Narrative:     Performed at:  [facility] [address],  [place], [employer]   Phone [phone]   TROPONIN    Narrative:     Performed at:  [facility] [address],  [place], [address]   Phone [phone]   LACTIC ACID, PLASMA    Narrative:     Performed at:  [facility] [address],  [place], [employer]   Phone [phone]       All other labs were within normal range or not returned as ofthis dictation. EMERGENCYDEPARTMENT COURSE and DIFFERENTIAL DIAGNOSIS/MDM:   Vitals:    Vitals:    04/08/19 8796 04/08/19 0745 04/08/19 0801 04/08/19 0818   BP:  108/76  106/67   Pulse:  81  79   Resp:  12 16 14   Temp: 96.9 °F (36.1 °C)      TempSrc: Oral      SpO2:  92% 97% 98%   Weight:       Height:           MDM:   Workup in the emergency department looks stable from prior. Although his issue today is more of a pain control issue. He is having chest pain and some of it is reproducible and right sided abdominal pain. Multiple. loss is of Dilaudid were given.   I did discuss the case with the GI Dr. Vergara Gave who can see the patient in consult. Since he still having a substantial amount of pain and I cannot effectively control this, I recommended admission. CONSULTS:  None          PROCEDURES:  Unless otherwise noted below, none       Procedures    FINAL IMPRESSION      1. Chest pain, unspecified type    2. Abdominal pain, unspecified abdominal location    3. Edema, unspecified type          DISPOSITION/PLAN   DISPOSITION Decision To Admit 04/08/2019 08:41:39 AM      PATIENT REFERRED TO:  No follow-up provider specified.     DISCHARGE MEDICATIONS:  New Prescriptions    No medications on file          (Please note that portions of this note were completed with a voice recognition program.  Efforts weremade to edit the dictations but occasionally words are mis-transcribed.)    Anushka Ulloa III, DO (electronically signed)  Attending Emergency Physician         Braulio Mercado III, DO  04/08/19 9816

## 2019-04-08 NOTE — PROGRESS NOTES
Pt has had two formed BMs since being on unit. Cdiff isolation discontinued per protocol at this time.

## 2019-04-08 NOTE — CONSULTS
Gastroenterology Consult Note      Patient: Price Agudelo  : 1970  Acct#:      Date:  2019    Subjective:       History of Present Illness  Patient is a 50 y.o.  male admitted with LLQ pain [R10.32]  LLQ pain [R10.32] who is seen in consult for abdominal pain. He has h/o rectal ca s/p neoadjuvant chemoradiation then low anterior resection with proximal diverting colostomy with Dr Diony Woodall 3/2018. Then later takedown of colostomy with colorectal anastomosis 2018. He reports chronic lower abdominal pain that he attributes to the radiation. It is cramping and sharp. Not associated with eating or BM. Takes narcotics for this. Usually would be constipated and would have to take stool softeners daily and then mag citrate once weekly. For the past 2 weeks he has had fecal urgency and reports loose to watery diarrhea 3-5 times daily. No N/V. Then had cough and SOB and felt anxious with chest pain so came to the ED. Admitted for the abdominal pain. CT showed pelvic fluid collection at the colorectal anastomosis which has been seen previously on CT and colonoscopy and has been followed by Dr Carmen Vital and treated with antibiotics in the past.    Pt reports black diarrhea here but I saw the stool and it is soft and dark brown. Past Medical History:   Diagnosis Date    Allergic     Anesthesia     tremors    Arthritis     CAD (coronary artery disease)     Cancer (HCC)     colon    CHF (congestive heart failure) (HCC)     Clostridium difficile infection 2016    PCR+    Depression motion     Diverticulitis     Diverticulosis     History of alcohol abuse     Hyperlipidemia     Hypertension     Irregular heart beat     states been told he has had in past. Never treated. Sees PCP every 3 mos.  and EKG yearly    Pneumonia     Right knee pain     Shoulder injury     and arthrisits, injury rotaotr cuff    Sleep difficulties     with depression      Past Surgical with Dr Jamal Conte 11/23/18 for hematochezia    Impression: - Ulcerated opening to the perirectal contained fistula/perforation that is chronic. Ulceration was also noted within that blind pouch. The bowel lumen was otherwise unremarkable. Biopsies obtained from the fistulous opening.      Recommendations:   - await path. Follow up with Dr. Sameera Marcano. - Full colonoscopy in 6 months. EGD 10/2017 with Dr Jamal Conte for epigastric pain    Impression:  - Benign appearing gastric polyps. Biopsied.                         - Periampullary diverticulum                          - Otherwise normal EGD. Biopsied for H. Pylori.            Recommendations:  - F/u pathology                                      - Return to referring provide. Recent US of the abdomen demonstrates normal Gallbladder. Admission Meds  No current facility-administered medications on file prior to encounter.       Current Outpatient Medications on File Prior to Encounter   Medication Sig Dispense Refill    carvedilol (COREG) 12.5 MG tablet TAKE ONE-HALF TABLET BY MOUTH EVERY MORNING AND TAKE 1 TABLET BY MOUTH AT NIGHT 180 tablet 0    eplerenone (INSPRA) 25 MG tablet Take 0.5 tablets by mouth daily (Patient taking differently: Take 25 mg by mouth daily ) 45 tablet 0    torsemide (DEMADEX) 20 MG tablet Take 1 tablet weekly (Patient taking differently: every other day Take 1 tablet weekly) 36 tablet 3    sacubitril-valsartan (ENTRESTO) 24-26 MG per tablet Take 1 tablet by mouth 2 times daily 180 tablet 3    digoxin (LANOXIN) 125 MCG tablet Take 1 tablet by mouth daily 90 tablet 3    nortriptyline (PAMELOR) 10 MG capsule Take 1 capsule by mouth nightly (Patient taking differently: Take 25 mg by mouth nightly ) 30 capsule 3    simethicone (MYLICON) 80 MG chewable tablet Take 80 mg by mouth every 6 hours as needed for Flatulence      ranitidine (ZANTAC) 150 MG tablet Take 150 mg by mouth daily      for dizziness and weakness       Physical Exam  Blood pressure 127/85, pulse 83, temperature 97.4 °F (36.3 °C), temperature source Oral, resp. rate 16, height 5' 10\" (1.778 m), weight 185 lb (83.9 kg), SpO2 93 %. General appearance: alert, cooperative, no distress, appears stated age  Eyes: Anicteric  Head: Normocephalic, without obvious abnormality  Lungs: clear to auscultation bilaterally, Normal Effort  Heart: regular rate and rhythm, normal S1 and S2, no murmurs or rubs  Abdomen: soft, subjective lower abdominal tenderness. No guarding or rebound. Bowel sounds normal. No masses,  no organomegaly. Extremities: atraumatic, no cyanosis or edema  Skin: warm and dry, no jaundice  Neuro: Grossly intact, A&OX3  Musculoskeletal: 5/5  strength BUE      Data Review:    Recent Labs     04/08/19  0729   WBC 9.7   HGB 12.3*   HCT 35.7*   MCV 90.8        Recent Labs     04/08/19  0729   *   K 4.1   CL 98*   CO2 23   BUN 9   CREATININE 0.6*     Recent Labs     04/08/19  0729   AST 13*   ALT 11   BILITOT <0.2   ALKPHOS 79     Recent Labs     04/08/19  0729   LIPASE 13.0     No results for input(s): PROTIME, INR in the last 72 hours. No results for input(s): PTT in the last 72 hours. No results for input(s): OCCULTBLD in the last 72 hours. Imaging Studies:                 CT-scan of abdomen and pelvis WO contrasT: 4/8/19  Impression   1. No new intra-abdominal abnormality. 2. Similar appearance since the prior study of extensive soft tissue in the   presacral region, questionable extraluminal gas.  The finding is not well   evaluated without rectal or IV contrast.   3. Interval appearance of two subsolid pulmonary nodules in the left lower   lobe since April 2019, more suggestive of infectious/inflammatory etiology. Recommend continued attention on follow-up. 4. Mild interstitial edema.    5. Stable right ventral hernia containing bowel.  No evidence of obstruction.     Assessment:     Principal Problem:    Abdominal pain  Active Problems:    Mixed hyperlipidemia    Essential hypertension    Rectal cancer (HCC)    Neoplasm related pain    Anemia    LLQ pain  Resolved Problems:    * No resolved hospital problems. *    Lower abdominal pain, altered bowel habits - lower abdominal pain is chronic. CT showed stable appearance of soft tissue area near colorectal anastomosis. Pt has soft stool here. CT from  with moderate semisolid stool in the right colon. H/o perirectal fistula, abscess - has been followed by Dr Buster Thomas and fistula resolved and abscess has been treated with antibiotics. Recommendations:   - c.diff if has diarrhea    Discussed with CARMEN Kerr-C  [address] [facility]    I have personally performed a face to face diagnostic evaluation on this patient. I have interviewed and examined the patient and I agree with the findings and recommended plan of care. In summary, my findings and plan are the followin/M with h/o rectal ca s/p neoadjuvant chemoradiation then low anterior resection with proximal diverting colostomy with Dr Buster Thomas 3/2018. Then later takedown of colostomy with colorectal anastomosis 2018. He reports chronic lower abdominal pain that he attributes to the radiation. He has previous CT that showed fluid collection/abscess in the pelvis close to the colo-rectal anastomosis. Dr. Dinorah Dowell last flex sigmoidoscopy 2018 revealed rectal fistula. Dr. Buster Thomas had been managing this with oral antibiotics. Patient admitted with chest pain and acute worsening of lower abdominal pain and diarrhea. He also has very tender pelvic/scrotal area (possible neuropathy from previous radiation)     Patient appears to be extremely anxious to me. I am uncertain if the worsening lower abdominal pain is due to the fistula/abscess at the colorectal anastomosis. He also had C. Diff in 2016 treated with Difcid.     Plan-   Check stool C. difficile. Consult Dr. Dg Jane, as I want to see his input regarding the chronic sacral changes seen on CT. I will also speak with Dr. Nancy Khan if repeating a flex sig or colonoscopy is indicated to work this up further.     Vane Diaz MD, MSc  [facility]

## 2019-04-08 NOTE — ED NOTES
Pt complains pain is still \"12\". Pt ordinally rated pain 14/10. Reported to Dr. Felicitas Corley MD came to bedside immediately to give pt results. Pt placed on continuous pulse oximetry and telemetry monitoring. Pt placed on cycling blood pressure. Fall risk precautions in place, call light in reach, bed side table within reach, bed alarm on, will continue to monitor.        Anushka Shaw RN  04/08/19 1 Rogerio Mccabe RN  04/08/19 [id number]

## 2019-04-08 NOTE — ED NOTES
Dr. Lm Nagy in room with patient, patient is alert and oriented, his respirations are easy and unlabored. IV is saline locked.        Clemencia Man RN  04/08/19 [id number]

## 2019-04-08 NOTE — PROGRESS NOTES
Routine VSS. Scheduled meds as well as PRN norco administered. Called surgery to clarify order for golytely. Waiting response.

## 2019-04-08 NOTE — H&P
History and Physical  Dr. Jackson Reason  4/8/2019    PCP: Tracey Gaines MD    Cc:   Chief Complaint   Patient presents with    Cough     pt states he woke up with cought and congestion also c/o abd pain rates pain 10/10        HPI:  John Garg is a 50 y.o. male who has a past medical history of Allergic, Anesthesia, Arthritis, CAD (coronary artery disease), Cancer ([facility] [address] CHF (congestive heart failure) ([facility] [address] Clostridium difficile infection, Depression motion, Diverticulitis, Diverticulosis, History of alcohol abuse, Hyperlipidemia, Hypertension, Irregular heart beat, Pneumonia, Right knee pain, Shoulder injury, and Sleep difficulties. Patient presents with Abdominal pain. HPI of presenting complaint in blockformat: (1-3 for expanded problem focused, ?4 for detailed/comprehensive)   Location: LLQ abd pain  Duration: since yesterday evening  Timing: progressively worsening  Context:  50 y.o. male who presents to the emergency department with LLQ pain and RLQ pain which is somewhat chronic in nature but worse in the past 1 week. His GI specialist apparently told him that he has a \"bowel infection\". Both pain areas are moderate with exacerbations to severe at times. Palpation and coughing worsens symptoms. Severity: rated \"10 out of 10\"  Quality: describes it as sharp  Modifying Factors: nothing makes it better or worse  Associated Signs or Symptoms: complains of recent loose stools. Denies f/c. No n/v    Pt is well known to me. He has had multiple admissions in past for \"intractable pain\". Many of those workups have been negative. Pt appears comfortable this morning. HR is normal. BP is normal (though admittedly, pt has received dilaudid in ER which could have treated any previous pain). CT Abd reviewed by self,   Impression:        1. No new intra-abdominal abnormality.   2. Similar appearance since the prior study of extensive soft tissue in the  presacral region, questionable extraluminal gas.  The finding is not well  evaluated without rectal or IV contrast.  3. Interval appearance of two subsolid pulmonary nodules in the left lower  lobe since April 2019, more suggestive of infectious/inflammatory etiology. Recommend continued attention on follow-up. 4. Mild interstitial edema. 5. Stable right ventral hernia containing bowel.  No evidence of obstruction. Problem list of hospitalization thus far: Active Hospital Problems    Diagnosis    Abdominal pain [R10.9]    Anemia [D64.9]    Neoplasm related pain [G89.3]    Rectal cancer ([facility] [address] [C20]    Mixed hyperlipidemia [E78.2]    Essential hypertension [I10]         Review of Systems: (1 system for EPF, 2-9 for detailed, 10+ for comprehensive)  Review of Systems   Constitutional: Negative for chills and fever. HENT: Negative for nosebleeds and sinus pain. Eyes: Negative for pain and discharge. Respiratory: Negative for cough and shortness of breath. Cardiovascular: Negative for chest pain and leg swelling. Gastrointestinal: Positive for abdominal pain and diarrhea. Negative for nausea and vomiting. Endocrine: Negative for cold intolerance and polydipsia. Genitourinary: Negative for dysuria and frequency. Musculoskeletal: Negative for myalgias and neck pain. Skin: Negative for rash. Allergic/Immunologic: Negative for food allergies. Neurological: Negative for dizziness and light-headedness. Hematological: Negative for adenopathy. Psychiatric/Behavioral: Negative for confusion. The patient is not hyperactive.             Past Medical History:   Past Medical History:   Diagnosis Date    Allergic     Anesthesia     tremors    Arthritis     CAD (coronary artery disease)     Cancer ([facility] [address]     colon    CHF (congestive heart failure) (HCC)     Clostridium difficile infection 07/11/2016    PCR+    Depression motion     Diverticulitis     Diverticulosis     History of alcohol abuse     Hyperlipidemia     Hypertension  Irregular heart beat     states been told he has had in past. Never treated. Sees PCP every 3 mos. and EKG yearly    Pneumonia     Right knee pain     Shoulder injury     and arthrisits, injury rotaotr cuff    Sleep difficulties     with depression       Past Surgical History:   Past Surgical History:   Procedure Laterality Date    ABSCESS DRAINAGE      BREAST SURGERY Right 1/21/2019    RIGHT BREAST EXCISIONAL BIOPSY performed by Gaetano Fuentes MD at [facility]  09/14/2016    No stents placed   [facility] COLONOSCOPY  08/29/2016    with biopsy and polypectomy    COLONOSCOPY  03/19/2018    ENDOSCOPIC ULTRASOUND (LOWER)  09/14/2016    for staging - Dr. Lionel Bojorquez  12/01/2017    peristomal hernia repair    HERNIA REPAIR  04/18/2018    S/P: colostomy closure with hernia repair with mesh, with Dr. Kar Candelario at [facility].  INNER EAR SURGERY  tube right ear    JOINT REPLACEMENT      left TKR    KNEE ARTHROSCOPY Right [id number]    KNEE ARTHROSCOPY Right 8/4/2014    medial meniscus    KNEE SURGERY  08/24/2011    removal of tibial component    OTHER SURGICAL HISTORY  7/1/13    ACL Rt knee meniscus repair synovectomy    OTHER SURGICAL HISTORY  12/01/2017    takedown of ileostomy    PACEMAKER PLACEMENT  10/26/2017    ICD placed right chest    REVISION COLOSTOMY  04/18/2018    S/P: colostomy closure with hernia repair with mesh, with Dr. Kar Candelario at [facility].     SHOULDER ARTHROSCOPY Right 9/22/15    SUBACROMIAL DECOMPRESSION, DISTAL CLAVICLE EXCISION, LABRAL DEBRIDEMETN    SIGMOIDOSCOPY N/A 11/23/2018    SIGMOIDOSCOPY BIOPSY FLEXIBLE performed by Norberto Zheng MD at [address]  4 surgeries    SMALL INTESTINE SURGERY  01/11/2017    LAPAROSCOPIC LOW ANTERIOR RESECTION, ILEOSTOMY    SMALL INTESTINE SURGERY  12/01/2017    small bowel resection    TUNNELED VENOUS PORT PLACEMENT Left 02/13/2017    PORT-A-CATH INSERTION                     TUNNELED VENOUS PORT PLACEMENT      UMBILICAL HERNIA REPAIR  11/11/14    UPPER GASTROINTESTINAL ENDOSCOPY  10/03/2017       Social History:   Social History     Tobacco History     Smoking Status  Former Smoker Quit date  1/1/2007 Smoking Frequency  1 pack/day for 5 years (5 pk yrs)    Smokeless Tobacco Use  Former User Quit date  8/10/2017 Smokeless Tobacco Type  Chew          Alcohol History     Alcohol Use Status  No Comment  history of alcohol abuse 20 years ago          Drug Use     Drug Use Status  No          Sexual Activity     Sexually Active  Never                Fam History:   Family History   Problem Relation Age of Onset    Cancer Mother     High Blood Pressure Father     Cancer Father        PFSH: The above PMHx, PSHx, SocHx, FamHx has been reviewed by myself. (1 area for detailed, 2-3 for comprehensive)      Code Status: Prior    Meds - following list of home medications fromelectronic chart has been reviewed by myself  Prior to Admission medications    Medication Sig Start Date End Date Taking?  Authorizing Provider   carvedilol (COREG) 12.5 MG tablet TAKE ONE-HALF TABLET BY MOUTH EVERY MORNING AND TAKE 1 TABLET BY MOUTH AT NIGHT 3/14/19   BENITEZ Felder - CNP   eplerenone (INSPRA) 25 MG tablet Take 0.5 tablets by mouth daily 3/12/19   Hien Mueller MD   torsemide [facility]) 20 MG tablet Take 1 tablet weekly 2/13/19   BENITEZ Felder - CNP   sacubitril-valsartan (ENTRESTO) 24-26 MG per tablet Take 1 tablet by mouth 2 times daily 2/11/19   Hien Mueller MD   [facility] TRANSPLANT [facility]) 125 MCG tablet Take 1 tablet by mouth daily 1/23/19   Hien Mueller MD   nortriptyline (PAMELOR) 10 MG capsule Take 1 capsule by mouth nightly  Patient taking differently: Take 25 mg by mouth nightly  12/21/18   Hien Mueller MD   simethicone (MYLICON) 80 MG chewable tablet Take 80 mg by mouth every 6 hours as auscultation without wheezes or rales  Cardiovascular: normal rate, regular rhythm, normal S1 and S2 and no gallops  Gastrointestinal: soft, mild LLQ tenderness, mild RLQ tenderness. No rebound. bs+  Lymphatic:   Extremities: trace edema, no clubbing  Skin:No rashes or nodules noted.   Neurologic:negative    LABS:  Labs Reviewed   CBC WITH AUTO DIFFERENTIAL - Abnormal; Notable for the following components:       Result Value    RBC 3.93 (*)     Hemoglobin 12.3 (*)     Hematocrit 35.7 (*)     All other components within normal limits    Narrative:     Performed at:  [facility] [address]   Phone [phone]   COMPREHENSIVE METABOLIC PANEL W/ REFLEX TO MG FOR LOW K - Abnormal; Notable for the following components:    Sodium 134 (*)     Chloride 98 (*)     CREATININE 0.6 (*)     AST 13 (*)     All other components within normal limits    Narrative:     Performed at:  [facility] [address]   Phone [phone]  - Abnormal; Notable for the following components:    Pro-BNP 8,173 (*)     All other components within normal limits    Narrative:     Performed at:  [facility] [address]   Phone [phone]   RAPID INFLUENZA A/B ANTIGENS    Narrative:     Performed at:  [facility] [address]   Phone [phone]   LIPASE    Narrative:     Performed at:  [facility] [address]   Phone [phone]   URINALYSIS    Narrative:     Performed at:  [facility] [address]   Phone [phone]   TROPONIN    Narrative:     Performed at:  [facility] [address]   Phone [phone] vesicles are grossly unremarkable. Peritoneum/Retroperitoneum: The aorta and its branches are normal in course and caliber with minimal atherosclerosis. No pathologic lymphadenopathy. No free fluid or free air. Extensive soft tissue is seen in the presacral region, unchanged from the prior study but not well evaluated without IV contrast.  Some foci of gas appear extraluminal in location. Bones/Soft Tissues: No acute or aggressive osseous lesion. Bilateral L5 pars defects are seen with grade 1 anterolisthesis of L5 on S1.     1. No new intra-abdominal abnormality. 2. Similar appearance since the prior study of extensive soft tissue in the presacral region, questionable extraluminal gas. The finding is not well evaluated without rectal or IV contrast. 3. Interval appearance of two subsolid pulmonary nodules in the left lower lobe since April 2019, more suggestive of infectious/inflammatory etiology. Recommend continued attention on follow-up. 4. Mild interstitial edema. 5. Stable right ventral hernia containing bowel. No evidence of obstruction. Ct Abdomen Pelvis W Iv Contrast Additional Contrast? None    Result Date: 4/2/2019  EXAMINATION: CT OF THE ABDOMEN AND PELVIS WITH CONTRAST 4/2/2019 6:03 pm TECHNIQUE: CT of the abdomen and pelvis was performed with the administration of intravenous contrast. Multiplanar reformatted images are provided for review. Dose modulation, iterative reconstruction, and/or weight based adjustment of the mA/kV was utilized to reduce the radiation dose to as low as reasonably achievable. COMPARISON: Abdomen and pelvis CT 01/27/2019 HISTORY: ORDERING SYSTEM PROVIDED HISTORY: abd pain hx colon surgery TECHNOLOGIST PROVIDED HISTORY: Additional Contrast?->None Ordering Physician Provided Reason for Exam: abd pain hx colon surgery Acuity: Unknown Type of Exam: Unknown FINDINGS: LOWER CHEST:  Clear lung bases. Intact single chamber cardioverter lead.  Mild cardiomegaly with left atrial and left ventricular dilation. No pleural nor pericardial effusions. ORGANS:  Mild gallbladder distention without findings of cholelithiasis or acute cholecystitis. No intrahepatic nor extrahepatic biliary dilation. Moderate to marked pancreatic atrophy. Normal liver, spleen, adrenal glands, kidneys. No calculi nor hydroureteronephrosis. GI/BOWEL:  Changes of partial sigmoid colectomy and primary anastomosis. Otherwise normal course and caliber of the stomach, small bowel, colon, and rectum without obstruction. Duodenal diverticulum without findings of acute diverticulitis. Normal appendix. Moderate semisolid stool predominantly in the right hemicolon. PELVIS:  Mild urinary bladder wall thickening with suggestion of subtle perivesical stranding. Mild prostatomegaly. PERITONEUM/RETROPERITONEUM:  Minimal atherosclerosis. No abdominal nor pelvic lymphadenopathy. No free intraperitoneal fluid nor gas. Persistence of a gas and stool containing collection in the presacral fascia near the colonic anastomosis with extensive surrounding soft tissue density, measuring up to 3.9 cm x 1.9 cm in greatest dimensions. SOFT TISSUES/BONES:  Similar appearance of a stomal hernia through the right rectus sheath, containing mesenteric fat and nonobstructed small bowel. No inguinal lymphadenopathy. No acute fractures nor suspicious osseous lesions. Bilateral spondylolysis at L5 without spondylolisthesis. 1. Persistence of soft tissue thickening in the presacral fascia adjacent to a colorectal anastomosis, likely due in part to scarring. However, there is suspected persistence of direct communication of the colorectal lumen given a 3.9 cm x 1.9 cm collection likely containing stool in this area. 2. Questionable mild cystitis.      Xr Chest Portable    Result Date: 4/8/2019  EXAMINATION: SINGLE XRAY VIEW OF THE CHEST 4/8/2019 7:14 am COMPARISON: 07/30/2018 HISTORY: ORDERING SYSTEM PROVIDED HISTORY: cough TECHNOLOGIST PROVIDED HISTORY: Reason for exam:->cough Ordering Physician Provided Reason for Exam: Cough (pt states he woke up with cought and congestion also c/o abd pain rates pain 10/10 ) Acuity: Unknown Type of Exam: Unknown FINDINGS: There is bibasilar atelectasis. Cardiomegaly is stable status post single lead ICD. There is no pneumothorax or pleural effusion. The left port terminates in the distal SVC. 1.  No acute abnormality. EKG: from ER, interpreted by self. Sinus rhythm at 82. No acute st elevation noted. No TWI seen. Comparison made to old ekg in chart dated 3/5/18 - no significant morphologic differencce          MEDICAL DECISION MAKING:    Patient Active Hospital Problem List:   Abdominal pain (4/8/2019)    Assessment: New Problem to me. Pt with complaints of 10/10 pain, though he does look comfortable currently. CT Abd without specific abnormality. Pt advised by GI to come to ER    Plan: admit, iv morphine ordered sparingly. GI consult requested. ?need for endoscopy. Keep NPO for now   Mixed hyperlipidemia ()    Assessment: Established problem. Stable. Plan: Continue present orders/plan. Essential hypertension ()    Assessment: Established problem. Stable. 113/72. Plan: cont home meds   Rectal cancer ([facility] [address] (9/23/2016)    Assessment: Established problem. Stable. Plan: no new issues   Neoplasm related pain (10/18/2016)   Anemia (5/16/2017)    Assessment: Established problem. Stable. Likely due to chronic disease, hgb 12.3    Plan: No indication for transfusion. Cont to monitor h/h to assess progression of anemia. Recommend ferrous sulfate or MVI as outpatient. Diagnoses as listed above, designated as new or established and plan outlined for each. Data Reviewed:   (1) Lab tests were reviewed or ordered. (1) Radiology tests were reviewed or ordered. (1) Medical test (Echo, EKG, PFT/jane) were not ordered.   (1)History was not obtained from someone other than patient  (1) Old records  were reviewed - see HPI/MDM for pertinent details if review done. (2) Case wasdiscussed with another health care provider: Dr Asa Christianson  (2) Imaging was viewed by myself. Ct abd  (2) EKG  was viewed by myself. The patient isbeing placed in inpatient status with the expectation of requiring a hospital stay spanning at least two midnights for care and treatment of the problems noted in the problem list.  This determination is also based on thepatients comorbidities and past medical history, the severity and timing of the signs and symptoms upon presentation.         Electronically signed by: Kiesha Taipa 4/8/2019

## 2019-04-08 NOTE — ED NOTES
Pt to side of bed and voided per urinal aprox 400cc. Denies any further needs at this time.       Luis Alberto Epstein RN  04/08/19 1000

## 2019-04-08 NOTE — ED NOTES
Pt comes complaining of cough, chest pain \" right in center\", abn pain \" in the middle right across\" pt is a/o x 4 sitting on bed, whimpering. Accessed left chest port with 3/4 \" martinez needle . Pt tolerated well. Pt verbalizes education no to drink any more water. Pt is on a continuous pulse oximetry and telemetry monitoring. Pt continued on cycling blood pressure. Fall risk precautions in place, call light in reach, bed side table within reach, bed alarm on, will continue to monitor.          Jeanine EppsGeisinger Community Medical Center  04/08/19 [id number]

## 2019-04-09 PROBLEM — E44.0 MODERATE MALNUTRITION (HCC): Chronic | Status: ACTIVE | Noted: 2019-01-01

## 2019-04-09 NOTE — PROGRESS NOTES
Progress Note - Dr. Priscila Gaming - Internal Medicine  PCP: Susan Flores, [address] [facility] CHARLIE Kyra / Kassandra Shakeel [phone]    Hospital Day: 1  Code Status: Full Code  Current Diet: DIET FULL LIQUID;        CC: follow up on medical issues    Subjective:   Radha Noriega is a 50 y.o. male. He denies problems    Doing ok  Appears comfortable in bed this am  No loose BM yest afternoon  Bowel prep given overnight - likely endoscopy per GI today? He denies chest pain, denies shortness of breath, denies nausea,  denies emesis. 10 system Review of Systems is reviewed with patient, and pertinent positives are listed here: None . Otherwise, Review of systems is negative. I have reviewed the patient's medical and social history in detail and updated the computerized patient record. To recap: He  has a past medical history of Allergic, Anesthesia, Arthritis, CAD (coronary artery disease), Cancer ([facility] [address] CHF (congestive heart failure) ([facility] [address] Clostridium difficile infection, Depression motion, Diverticulitis, Diverticulosis, History of alcohol abuse, Hyperlipidemia, Hypertension, Irregular heart beat, Pneumonia, Right knee pain, Shoulder injury, and Sleep difficulties. . He  has a past surgical history that includes sinus surgery (4 surgeries); Inner ear surgery (tube right ear); knee surgery (08/24/2011); other surgical history (7/1/13); Knee arthroscopy (Right, [id number]); Knee arthroscopy (Right, 8/4/2014); Umbilical hernia repair (11/11/14); joint replacement; Shoulder arthroscopy (Right, 9/22/15); Colonoscopy (08/29/2016); Endoscopic ultrasonography, GI (09/14/2016); Cardiac catheterization (09/14/2016); Small intestine surgery (01/11/2017); Tunneled venous port placement (Left, 02/13/2017); Cholecystectomy; colectomy; Abscess Drainage; Tunneled venous port placement; hernia repair; Upper gastrointestinal endoscopy (10/03/2017); pacemaker placement (10/26/2017); hernia repair (12/01/2017);  Small intestine surgery (12/01/2017); other surgical history (12/01/2017); Colonoscopy (03/19/2018); Revision Colostomy (04/18/2018); hernia repair (04/18/2018); sigmoidoscopy (N/A, 11/23/2018); Breast surgery (Right, 1/21/2019); Abdomen surgery; and Endoscopy, colon, diagnostic. Braeden Perdomo He  reports that he quit smoking about 12 years ago. He has a 5.00 pack-year smoking history. He quit smokeless tobacco use about 19 months ago. His smokeless tobacco use included chew. He reports that he does not drink alcohol or use drugs. .        Active Hospital Problems    Diagnosis Date Noted    Abdominal pain [R10.9] 04/08/2019    LLQ pain [R10.32] 04/08/2019    Anemia [D64.9] 05/16/2017    Neoplasm related pain [G89.3] 10/18/2016    Rectal cancer ([facility] [address] [C20] 09/23/2016    Mixed hyperlipidemia [E78.2]     Essential hypertension [I10]        Current Facility-Administered Medications: morphine (PF) injection 0.5 mg, 0.5 mg, Intravenous, Q4H PRN  diazepam (VALIUM) tablet 5 mg, 5 mg, Oral, TID  rosuvastatin (CRESTOR) tablet 5 mg, 5 mg, Oral, Nightly  melatonin tablet 3 mg, 3 mg, Oral, Nightly PRN  famotidine (PEPCID) tablet 20 mg, 20 mg, Oral, Daily  simethicone (MYLICON) chewable tablet 80 mg, 80 mg, Oral, Q6H PRN  nortriptyline (PAMELOR) capsule 25 mg, 25 mg, Oral, Nightly  digoxin (LANOXIN) tablet 125 mcg, 125 mcg, Oral, Daily  sacubitril-valsartan (ENTRESTO) 24-26 MG per tablet 1 tablet, 1 tablet, Oral, BID  eplerenone (INSPRA) tablet 12.5 mg- patient supplied , 12.5 mg, Oral, Daily  carvedilol (COREG) tablet 12.5 mg, 12.5 mg, Oral, BID WC  0.9 % sodium chloride infusion, , Intravenous, Continuous  sodium chloride flush 0.9 % injection 10 mL, 10 mL, Intravenous, 2 times per day  sodium chloride flush 0.9 % injection 10 mL, 10 mL, Intravenous, PRN  potassium chloride (KLOR-CON M) extended release tablet 40 mEq, 40 mEq, Oral, PRN **OR** potassium bicarb-citric acid (EFFER-K) effervescent tablet 40 mEq, 40 mEq, Oral, PRN **OR** potassium chloride 10 mEq/100 mL IVPB (Peripheral Line), 10 mEq, Intravenous, PRN  acetaminophen (TYLENOL) tablet 650 mg, 650 mg, Oral, Q4H PRN  ondansetron (ZOFRAN) injection 4 mg, 4 mg, Intravenous, Q6H PRN  enoxaparin (LOVENOX) injection 40 mg, 40 mg, Subcutaneous, Daily  benzocaine-menthol (CEPACOL SORE THROAT) lozenge 1 lozenge, 1 lozenge, Oral, Q2H PRN  HYDROcodone-acetaminophen (NORCO) 7.5-325 MG per tablet 2 tablet, 2 tablet, Oral, Q4H PRN         Objective:  /80   Pulse 78   Temp 98.1 °F (36.7 °C) (Oral)   Resp 16   Ht 5' 10\" (1.778 m)   Wt 190 lb 8 oz (86.4 kg) Comment: w pt's clothes   SpO2 95%   BMI 27.33 kg/m²      Patient Vitals for the past 24 hrs:   BP Temp Temp src Pulse Resp SpO2 Weight   04/09/19 0321 116/80 98.1 °F (36.7 °C) Oral 78 16 95 % 190 lb 8 oz (86.4 kg)   04/08/19 2350 117/74 97.7 °F (36.5 °C) Oral 70 16 97 % --   04/08/19 1949 104/71 97.7 °F (36.5 °C) Oral 79 16 94 % --   04/08/19 1745 120/77 97.9 °F (36.6 °C) Oral 85 14 97 % --   04/08/19 1036 127/85 97.4 °F (36.3 °C) Oral 83 16 93 % --   04/08/19 1015 117/75 -- -- 79 -- 96 % --   04/08/19 1003 115/72 97 °F (36.1 °C) Oral 79 16 95 % --   04/08/19 0930 113/72 -- -- 89 -- -- --   04/08/19 0915 109/63 -- -- 90 -- 96 % --   04/08/19 0900 118/72 -- -- 84 -- 92 % --   04/08/19 0845 113/68 -- -- 79 -- 96 % --   04/08/19 0830 124/69 -- -- 87 -- 92 % --   04/08/19 0818 106/67 -- -- 79 14 98 % --     Patient Vitals for the past 96 hrs (Last 3 readings):   Weight   04/09/19 0321 190 lb 8 oz (86.4 kg)   04/08/19 0610 185 lb (83.9 kg)           Intake/Output Summary (Last 24 hours) at 4/9/2019 0801  Last data filed at 4/9/2019 3687  Gross per 24 hour   Intake 7493 ml   Output 0 ml   Net 7493 ml         Physical Exam:   S1, S2 normal, no murmur, rub or gallop, regular rate and rhythm  clear to auscultation bilaterally  Soft, mild diffuse tenderness, bs+  extremities normal, atraumatic, no cyanosis or edema    Labs:  Lab Results   Component Value Date    WBC 5.2 04/09/2019    HGB 12.2 (L) 04/09/2019    HCT 35.5 (L) 04/09/2019     04/09/2019    CHOL 227 (H) 08/16/2017    TRIG 286 (H) 08/16/2017    HDL 37 (L) 08/16/2017    ALT 9 (L) 04/09/2019    AST 14 (L) 04/09/2019     04/09/2019    K 3.8 04/09/2019     04/09/2019    CREATININE 0.6 (L) 04/09/2019    BUN 6 (L) 04/09/2019    CO2 26 04/09/2019    TSH 1.77 06/21/2013    PSA 0.55 02/20/2012    INR 1.37 (H) 01/28/2019    LABA1C 5.5 06/21/2013    LABMICR Not Indicated 04/08/2019     Lab Results   Component Value Date    CKTOTAL 129 09/06/2016    TROPONINI <0.01 04/08/2019       Recent Imaging Results are Reviewed:  Ct Abdomen Pelvis Wo Contrast Additional Contrast? None    Result Date: 4/8/2019  EXAMINATION: CT OF THE ABDOMEN AND PELVIS WITHOUT CONTRAST, 4/8/2019 7:49 am TECHNIQUE: CT of the abdomen and pelvis was performed without the administration of intravenous contrast. Multiplanar reformatted images are provided for review. Dose modulation, iterative reconstruction, and/or weight based adjustment of the mA/kV was utilized to reduce the radiation dose to as low as reasonably achievable. COMPARISON: April 2, 2019. January 27, 2019. HISTORY: ORDERING SYSTEM PROVIDED HISTORY: Abdominal pain. TECHNOLOGIST PROVIDED HISTORY: Additional Contrast?->None Ordering Physician Provided Reason for Exam: Abd pain. Acuity: Unknown Type of Exam: Unknown FINDINGS: Lower Chest: No focal consolidation or pleural effusion. Mild interlobular septal thickening with ground-glass opacities are identified in the right lung. Two nonspecific pulmonary nodules are seen in the left lower lobe measuring up to 3 mm, new since April 2, 2019, most compatible with an infectious/inflammatory etiology. The heart is enlarged. Organs: The liver, gallbladder, spleen, pancreas, adrenal glands, and kidneys demonstrate no acute abnormality. Bilateral ureters are normal in course and caliber. No ureteral calculi. GI/Bowel:  The stomach is normal.  The small bowel and colon demonstrate extensive postoperative changes. Findings include partial bowel resections. The anastomoses are unremarkable. Extensive soft tissue is identified in the presacral space, unchanged from the prior study. Hernia is identified in the right mid abdomen. No evidence of obstruction. Pelvis: Normal bladder. Prostate and seminal vesicles are grossly unremarkable. Peritoneum/Retroperitoneum: The aorta and its branches are normal in course and caliber with minimal atherosclerosis. No pathologic lymphadenopathy. No free fluid or free air. Extensive soft tissue is seen in the presacral region, unchanged from the prior study but not well evaluated without IV contrast.  Some foci of gas appear extraluminal in location. Bones/Soft Tissues: No acute or aggressive osseous lesion. Bilateral L5 pars defects are seen with grade 1 anterolisthesis of L5 on S1.     1. No new intra-abdominal abnormality. 2. Similar appearance since the prior study of extensive soft tissue in the presacral region, questionable extraluminal gas. The finding is not well evaluated without rectal or IV contrast. 3. Interval appearance of two subsolid pulmonary nodules in the left lower lobe since April 2019, more suggestive of infectious/inflammatory etiology. Recommend continued attention on follow-up. 4. Mild interstitial edema. 5. Stable right ventral hernia containing bowel. No evidence of obstruction. Ct Abdomen Pelvis W Iv Contrast Additional Contrast? None    Result Date: 4/2/2019  EXAMINATION: CT OF THE ABDOMEN AND PELVIS WITH CONTRAST 4/2/2019 6:03 pm TECHNIQUE: CT of the abdomen and pelvis was performed with the administration of intravenous contrast. Multiplanar reformatted images are provided for review. Dose modulation, iterative reconstruction, and/or weight based adjustment of the mA/kV was utilized to reduce the radiation dose to as low as reasonably achievable.  COMPARISON: Abdomen and pelvis CT 01/27/2019 HISTORY: ORDERING SYSTEM PROVIDED HISTORY: abd pain hx colon surgery TECHNOLOGIST PROVIDED HISTORY: Additional Contrast?->None Ordering Physician Provided Reason for Exam: abd pain hx colon surgery Acuity: Unknown Type of Exam: Unknown FINDINGS: LOWER CHEST:  Clear lung bases. Intact single chamber cardioverter lead. Mild cardiomegaly with left atrial and left ventricular dilation. No pleural nor pericardial effusions. ORGANS:  Mild gallbladder distention without findings of cholelithiasis or acute cholecystitis. No intrahepatic nor extrahepatic biliary dilation. Moderate to marked pancreatic atrophy. Normal liver, spleen, adrenal glands, kidneys. No calculi nor hydroureteronephrosis. GI/BOWEL:  Changes of partial sigmoid colectomy and primary anastomosis. Otherwise normal course and caliber of the stomach, small bowel, colon, and rectum without obstruction. Duodenal diverticulum without findings of acute diverticulitis. Normal appendix. Moderate semisolid stool predominantly in the right hemicolon. PELVIS:  Mild urinary bladder wall thickening with suggestion of subtle perivesical stranding. Mild prostatomegaly. PERITONEUM/RETROPERITONEUM:  Minimal atherosclerosis. No abdominal nor pelvic lymphadenopathy. No free intraperitoneal fluid nor gas. Persistence of a gas and stool containing collection in the presacral fascia near the colonic anastomosis with extensive surrounding soft tissue density, measuring up to 3.9 cm x 1.9 cm in greatest dimensions. SOFT TISSUES/BONES:  Similar appearance of a stomal hernia through the right rectus sheath, containing mesenteric fat and nonobstructed small bowel. No inguinal lymphadenopathy. No acute fractures nor suspicious osseous lesions. Bilateral spondylolysis at L5 without spondylolisthesis. 1. Persistence of soft tissue thickening in the presacral fascia adjacent to a colorectal anastomosis, likely due in part to scarring. However, there is suspected persistence of direct communication of the colorectal lumen given a 3.9 cm x 1.9 cm collection likely containing stool in this area. 2. Questionable mild cystitis. Xr Chest Portable    Result Date: 4/8/2019  EXAMINATION: SINGLE XRAY VIEW OF THE CHEST 4/8/2019 7:14 am COMPARISON: 07/30/2018 HISTORY: ORDERING SYSTEM PROVIDED HISTORY: cough TECHNOLOGIST PROVIDED HISTORY: Reason for exam:->cough Ordering Physician Provided Reason for Exam: Cough (pt states he woke up with cought and congestion also c/o abd pain rates pain 10/10 ) Acuity: Unknown Type of Exam: Unknown FINDINGS: There is bibasilar atelectasis. Cardiomegaly is stable status post single lead ICD. There is no pneumothorax or pleural effusion. The left port terminates in the distal SVC. 1.  No acute abnormality. Assessment and Plan:  Patient Active Hospital Problem List:   Abdominal pain (4/8/2019)    Assessment: Established problem. Improving. Appears better. Cause still unclear    Plan: GI and surgery workup in progress. Pt has completed bowel prep for poss scope today   Mixed hyperlipidemia ()    Assessment: Established problem. Stable. Plan: Continue present orders/plan. Essential hypertension ()    Assessment: Established problem. Stable. 116/80    Plan: bp controlled. Stay on same meds   Rectal cancer [facility]) (9/23/2016)    Assessment: Established problem. Stable. Plan: Continue present orders/plan. Neoplasm related pain (10/18/2016)   Anemia (5/16/2017)    Assessment: Established problem. Stable.   Hgb=12.2    Plan: no indication for xfusion   LLQ pain (4/8/2019)    Disp - home when GI workup complete          Javed Gomez  4/9/2019

## 2019-04-09 NOTE — PLAN OF CARE
Nutrition Problem: Unintended weight loss  Intervention: Food and/or Nutrient Delivery: Continue current diet, Start ONS  Nutritional Goals: po intake 75% or greater with consumption of [employer]ure [employer]

## 2019-04-09 NOTE — ANESTHESIA PRE PROCEDURE
Department of Anesthesiology  Preprocedure Note       Name:  Carina Holt   Age:  50 y.o.  :  1970                                          MRN:  [id number]         Date:  2019      Surgeon: Yesenia Hernandez):  Dean Son MD    Procedure: COLONOSCOPY DIAGNOSTIC (N/A )    Medications prior to admission:   Prior to Admission medications    Medication Sig Start Date End Date Taking? Authorizing Provider   carvedilol (COREG) 12.5 MG tablet TAKE ONE-HALF TABLET BY MOUTH EVERY MORNING AND TAKE 1 TABLET BY MOUTH AT NIGHT 3/14/19  Yes BENITEZ Bowman - CNP   eplerenone (INSPRA) 25 MG tablet Take 0.5 tablets by mouth daily  Patient taking differently: Take 25 mg by mouth daily  3/12/19  Yes Leo Granados MD   torsemide (DEMADEX) 20 MG tablet Take 1 tablet weekly  Patient taking differently: every other day Take 1 tablet weekly 19  Yes BENITEZ Bowman - CNP   sacubitril-valsartan (ENTRESTO) 24-26 MG per tablet Take 1 tablet by mouth 2 times daily 19  Yes Leo Granados MD   digoxin (LANOXIN) 125 MCG tablet Take 1 tablet by mouth daily 19  Yes Leo Granados MD   nortriptyline (PAMELOR) 10 MG capsule Take 1 capsule by mouth nightly  Patient taking differently: Take 25 mg by mouth nightly  18  Yes Leo Granados MD   simethicone (MYLICON) 80 MG chewable tablet Take 80 mg by mouth every 6 hours as needed for Flatulence   Yes Historical Provider, MD   ranitidine (ZANTAC) 150 MG tablet Take 150 mg by mouth daily   Yes Historical Provider, MD   melatonin 3 MG TABS tablet Take 3 mg by mouth nightly as needed   Yes Historical Provider, MD   HYDROcodone-acetaminophen (NORCO) 7.5-325 MG per tablet 1-2 tabs po q6-8h prn pain. 17  Yes Ernesto Rose APRN - CNP   lovastatin (MEVACOR) 40 MG tablet Take 40 mg by mouth. Take 1 tablet (40 mg total) by mouth at bedtime. 13  Yes Historical Provider, MD   diazepam (VALIUM) 5 MG tablet Take 5 mg by mouth three times daily.      Yes Historical Provider, MD   Cetirizine HCl (ZYRTEC PO) Take 1 tablet by mouth daily. Take only as needed for scrachy eyes.    Yes Historical Provider, MD       Current medications:    Current Facility-Administered Medications   Medication Dose Route Frequency Provider Last Rate Last Dose    morphine (PF) injection 0.5 mg  0.5 mg Intravenous Q4H PRN Flaquito Colón MD   0.5 mg at 04/09/19 1216    PEG-KCl-NaCl-NaSulf-Na Asc-C (MOVIPREP) solution 100 g  119 g Oral Once Buren CARMEN Snyder-C        PEG-KCl-NaCl-NaSulf-Na Asc-C (MOVIPREP) solution 100 g  866 g Oral Once Buren CARMEN Snyder-C        diazepam (VALIUM) tablet 5 mg  5 mg Oral TID Flaquito Colón MD   5 mg at 04/09/19 0948    rosuvastatin (CRESTOR) tablet 5 mg  5 mg Oral Nightly Flaquito Colón MD   5 mg at 04/08/19 2106    melatonin tablet 3 mg  3 mg Oral Nightly PRN Flaquito Colón MD        famotidine (PEPCID) tablet 20 mg  20 mg Oral Daily Flaquito Colón MD   20 mg at 04/09/19 0948    simethicone (MYLICON) chewable tablet 80 mg  80 mg Oral Q6H PRN Flaquito Colón MD        nortriptyline (PAMELOR) capsule 25 mg  25 mg Oral Nightly Flaquito Colón MD   25 mg at 04/08/19 2105    digoxin (LANOXIN) tablet 125 mcg  125 mcg Oral Daily Flaquito Colón MD   125 mcg at 04/09/19 8043    sacubitril-valsartan (ENTRESTO) 24-26 MG per tablet 1 tablet  1 tablet Oral BID Flaquito Colón MD   1 tablet at 04/09/19 0948    eplerenone (INSPRA) tablet 12.5 mg- patient supplied   12.5 mg Oral Daily Flaquito Colón MD        carvedilol (COREG) tablet 12.5 mg  12.5 mg Oral BID  Flaquito Colón MD   12.5 mg at 04/09/19 0947    0.9 % sodium chloride infusion   Intravenous Continuous Flaquito Colón  mL/hr at 04/09/19 0947      sodium chloride flush 0.9 % injection 10 mL  10 mL Intravenous 2 times per day Flaquito Colón MD   10 mL at 04/09/19 0948    sodium chloride flush 0.9 % injection 10 mL  10 mL Intravenous PRN MD Kimani Dhaliwal potassium chloride (KLOR-CON M) extended release tablet 40 mEq  40 mEq Oral PRN Clint Abraham MD        Or    potassium bicarb-citric acid (EFFER-K) effervescent tablet 40 mEq  40 mEq Oral PRN Clint Abraham MD        Or    potassium chloride 10 mEq/100 mL IVPB (Peripheral Line)  10 mEq Intravenous PRN Clint Abraham MD        acetaminophen (TYLENOL) tablet 650 mg  650 mg Oral Q4H PRN Clint Abraham MD        ondansetron [facility]) injection 4 mg  4 mg Intravenous Q6H PRN Clint Abraham MD        enoxaparin (LOVENOX) injection 40 mg  40 mg Subcutaneous Daily Clint Abraham MD   40 mg at 04/09/19 7522    benzocaine-menthol (CEPACOL SORE THROAT) lozenge 1 lozenge  1 lozenge Oral Q2H PRN Clint Abraham MD   1 lozenge at 04/08/19 9805    HYDROcodone-acetaminophen (NORCO) 7.5-325 MG per tablet 2 tablet  2 tablet Oral Q4H PRN Clint Abraham MD   2 tablet at 04/09/19 9930       Allergies: Allergies   Allergen Reactions    Vancomycin Hives and Swelling     Throat swells    Bactrim [Sulfamethoxazole-Trimethoprim] Other (See Comments)     Thrush    Corn-Containing Products      Patient tells me his throat swells when he eats a large amount of corn or corn products. He can tolerate products with corn oil and corn syrup.  Pt. Reports gets \"congestion\"    Eggs Or Egg-Derived Products Swelling    Seasonal        Problem List:    Patient Active Problem List   Diagnosis Code    S/P total knee replacement using cement Z96.659    Mixed hyperlipidemia E78.2    Essential hypertension I10    Depression D74.0    Umbilical hernia V15.5    Lower abdominal pain R10.30    Rectal mass K62.9    Hepatitis K75.9    Localized edema R60.0    SOB (shortness of breath) O83.94    Acute systolic heart failure (HCC) I50.21    NICM (nonischemic cardiomyopathy) (HCC) I42.8    Abnormal nuclear cardiac imaging test R93.1    Rectal cancer ([facility] [address] C20    Neoplasm related pain G89.3    Chronic systolic heart failure [facility] at [facility].  INNER EAR SURGERY  tube right ear    JOINT REPLACEMENT      left TKR    KNEE ARTHROSCOPY Right [id number]    KNEE ARTHROSCOPY Right 2014    medial meniscus    KNEE SURGERY  2011    removal of tibial component    OTHER SURGICAL HISTORY  13    ACL Rt knee meniscus repair synovectomy    OTHER SURGICAL HISTORY  2017    takedown of ileostomy    PACEMAKER PLACEMENT  10/26/2017    ICD placed right chest    REVISION COLOSTOMY  2018    S/P: colostomy closure with hernia repair with mesh, with Dr. William Romero at [facility].     SHOULDER ARTHROSCOPY Right 9/22/15    SUBACROMIAL DECOMPRESSION, DISTAL CLAVICLE EXCISION, LABRAL DEBRIDEMETN    SIGMOIDOSCOPY N/A 2018    SIG[facility]SY FLEXIBLE [facility]  [facility]    SMALL INTESTINE SURGERY  2017    LAPAROSCOPIC LOW ANTERIOR RESECTION, ILEOSTOMY    SMALL INTESTINE SURGERY  2017    small bowel resection    TUNNELED VENOUS PORT PLACEMENT Left 2017    PORT-A-CATH INSERTION                     TUNNELED VENOUS PORT PLACEMENT      UMBILICAL HERNIA REPAIR  14    UPPER GASTROINTESTINAL ENDOSCOPY  10/03/2017       Social History:    Social History     Tobacco Use    Smoking status: Former Smoker     Packs/day: 1.00     Years: 5.00     Pack years: 5.00     Last attempt to quit: 2007     Years since quittin.2    Smokeless tobacco: Former User     Types: Chew     Quit date: 8/10/2017   Substance Use Topics    Alcohol use: No     Comment: history of alcohol abuse 20 years ago                                Counseling given: Not Answered      Vital Signs (Current):   Vitals:    19 1949 19 2350 19 0321 19 0946   BP: 104/71 117/74 116/80 119/70   Pulse: 79 70 78 80   Resp: 16 16 16 16   Temp: 36.5 °C (97.7 °F) 36.5 °C (97.7 °F) 36.7 °C (98.1 °F) 36.6 °C (97.9 °F)   TempSrc: Oral Oral Oral Oral   SpO2: 94% 97% 95% 95%   Weight:   190 lb 8 oz (86.4 kg)    Height:                                                  BP Readings from Last 3 Encounters:   04/09/19 119/70   04/02/19 103/61   03/05/19 100/72       NPO Status:                                                                                 BMI:   Wt Readings from Last 3 Encounters:   04/09/19 190 lb 8 oz (86.4 kg)   04/02/19 184 lb (83.5 kg)   03/05/19 195 lb (88.5 kg)     Body mass index is 27.33 kg/m². CBC:   Lab Results   Component Value Date    WBC 5.2 04/09/2019    RBC 3.88 04/09/2019    RBC 3.74 07/05/2017    HGB 12.2 04/09/2019    HCT 35.5 04/09/2019    MCV 91.6 04/09/2019    RDW 13.6 04/09/2019     04/09/2019       CMP:   Lab Results   Component Value Date     04/09/2019    K 3.8 04/09/2019    K 3.8 04/09/2019     04/09/2019    CO2 26 04/09/2019    BUN 6 04/09/2019    CREATININE 0.6 04/09/2019    GFRAA >60 04/09/2019    GFRAA >60 12/19/2012    AGRATIO 1.5 04/09/2019    LABGLOM >60 04/09/2019    GLUCOSE 87 04/09/2019    GLUCOSE 131 07/05/2017    PROT 6.5 04/09/2019    PROT 6.3 07/05/2017    CALCIUM 8.7 04/09/2019    BILITOT 0.3 04/09/2019    ALKPHOS 79 04/09/2019    AST 14 04/09/2019    ALT 9 04/09/2019       POC Tests: No results for input(s): POCGLU, POCNA, POCK, POCCL, POCBUN, POCHEMO, POCHCT in the last 72 hours.     Coags:   Lab Results   Component Value Date    PROTIME 15.6 01/28/2019    INR 1.37 01/28/2019    APTT 41.5 01/28/2019       HCG (If Applicable): No results found for: PREGTESTUR, PREGSERUM, HCG, HCGQUANT     ABGs:   Lab Results   Component Value Date    PHART 7.329 01/11/2017    PO2ART 165 01/11/2017    ZDK0HHO 49 01/11/2017    XCQ8DXQ 25.5 01/11/2017    BEART -1 01/11/2017    [id number] 99 01/11/2017        Type & Screen (If Applicable):  Lab Results   Component Value Date    LABABO O 08/24/2011    [facility] CORY Positive 08/24/2011       Anesthesia Evaluation  Patient summary reviewed and Nursing

## 2019-04-09 NOTE — CONSULTS
[facility] GENERAL AND LAPAROSCOPIC SURGERY                       PATIENT NAME: Tesfaye Guevara     ADMISSION DATE: 4/8/2019  6:01 AM      TODAY'S DATE: 4/9/2019    Reason for Consult:  Pain    Requesting Physician:  Dr. Vitaly Coon:              The patient is a 50 y.o. male who presents with abd pain, lower, cramping, intermittent. Worse with pressure, moderate at times. Radiates across. No emesis, has had many BM with prep ordered. Has long abd hx, Rectal cancer tx, surgery, radiation, and chemo. Past Medical History:        Diagnosis Date    Allergic     Anesthesia     tremors    Arthritis     CAD (coronary artery disease)     Cancer (HCC)     colon    CHF (congestive heart failure) (HCC)     Clostridium difficile infection 07/11/2016    PCR+    Depression motion     Diverticulitis     Diverticulosis     History of alcohol abuse     Hyperlipidemia     Hypertension     Irregular heart beat     states been told he has had in past. Never treated. Sees PCP every 3 mos. and EKG yearly    Pneumonia     Right knee pain     Shoulder injury     and arthrisits, injury rotaotr cuff    Sleep difficulties     with depression       Past Surgical History:        Procedure Laterality Date    ABDOMEN SURGERY      ABSCESS DRAINAGE      BREAST SURGERY Right 1/21/2019    RIGHT BREAST EXCISIONAL BIOPSY performed by Kamryn Montemayor MD at [place] [address]  09/14/2016    No stents placed   [facility] COLONOSCOPY  08/29/2016    with biopsy and polypectomy    COLONOSCOPY  03/19/2018    ENDOSCOPIC ULTRASOUND (LOWER)  09/14/2016    for staging - Dr. Sheri Bañuelos, COLON, DIAGNOSTIC     [address]  12/01/2017    peristomal hernia repair    HERNIA REPAIR  04/18/2018    S/P: colostomy closure with hernia repair with mesh, with Dr. Ursula Russell at [facility].     INNER EAR SURGERY  tube right ear    JOINT REPLACEMENT      left TKR    KNEE ARTHROSCOPY Right [id number]    KNEE ARTHROSCOPY Right 8/4/2014    medial meniscus    KNEE SURGERY  08/24/2011    removal of tibial component    OTHER SURGICAL HISTORY  7/1/13    ACL Rt knee meniscus repair synovectomy    OTHER SURGICAL HISTORY  12/01/2017    takedown of ileostomy    PACEMAKER PLACEMENT  10/26/2017    ICD placed right chest    REVISION COLOSTOMY  04/18/2018    S/P: colostomy closure with hernia repair with mesh, with Dr. Noe Ramirez at [facility].     SHOULDER ARTHROSCOPY Right 9/22/15    SUBACROMIAL DECOMPRESSION, DISTAL CLAVICLE EXCISION, LABRAL DEBRIDEMETN    SIGMOIDOSCOPY N/A 11/23/2018    SIGMOIDOSCOPY BIOPSY FLEXIBLE performed by Chico Styles MD at [address]  4 surgeries    SMALL INTESTINE SURGERY  01/11/2017    LAPAROSCOPIC LOW ANTERIOR RESECTION, ILEOSTOMY    SMALL INTESTINE SURGERY  12/01/2017    small bowel resection    TUNNELED VENOUS PORT PLACEMENT Left 02/13/2017    PORT-A-CATH INSERTION                     TUNNELED VENOUS PORT PLACEMENT      UMBILICAL HERNIA REPAIR  11/11/14    UPPER GASTROINTESTINAL ENDOSCOPY  10/03/2017       Current Medications:   Current Facility-Administered Medications: morphine (PF) injection 0.5 mg, 0.5 mg, Intravenous, Q4H PRN  diazepam (VALIUM) tablet 5 mg, 5 mg, Oral, TID  rosuvastatin (CRESTOR) tablet 5 mg, 5 mg, Oral, Nightly  melatonin tablet 3 mg, 3 mg, Oral, Nightly PRN  famotidine (PEPCID) tablet 20 mg, 20 mg, Oral, Daily  simethicone (MYLICON) chewable tablet 80 mg, 80 mg, Oral, Q6H PRN  nortriptyline (PAMELOR) capsule 25 mg, 25 mg, Oral, Nightly  digoxin (LANOXIN) tablet 125 mcg, 125 mcg, Oral, Daily  sacubitril-valsartan (ENTRESTO) 24-26 MG per tablet 1 tablet, 1 tablet, Oral, BID  eplerenone (INSPRA) tablet 12.5 mg- patient supplied , 12.5 mg, Oral, Daily  carvedilol (COREG) tablet 12.5 mg, 12.5 mg, Oral, BID WC  0.9 % sodium chloride infusion, , Intravenous, Continuous  sodium chloride flush 0.9 % injection 10 mL, 10 mL, Intravenous, 2 times per day  sodium chloride flush 0.9 % injection 10 mL, 10 mL, Intravenous, PRN  potassium chloride (KLOR-CON M) extended release tablet 40 mEq, 40 mEq, Oral, PRN **OR** potassium bicarb-citric acid (EFFER-K) effervescent tablet 40 mEq, 40 mEq, Oral, PRN **OR** potassium chloride 10 mEq/100 mL IVPB (Peripheral Line), 10 mEq, Intravenous, PRN  acetaminophen (TYLENOL) tablet 650 mg, 650 mg, Oral, Q4H PRN  ondansetron (ZOFRAN) injection 4 mg, 4 mg, Intravenous, Q6H PRN  enoxaparin (LOVENOX) injection 40 mg, 40 mg, Subcutaneous, Daily  benzocaine-menthol (CEPACOL SORE THROAT) lozenge 1 lozenge, 1 lozenge, Oral, Q2H PRN  HYDROcodone-acetaminophen (NORCO) 7.5-325 MG per tablet 2 tablet, 2 tablet, Oral, Q4H PRN  Prior to Admission medications    Medication Sig Start Date End Date Taking?  Authorizing Provider   carvedilol (COREG) 12.5 MG tablet TAKE ONE-HALF TABLET BY MOUTH EVERY MORNING AND TAKE 1 TABLET BY MOUTH AT NIGHT 3/14/19  Yes BENITEZ Resendiz CNP   eplerenone (INSPRA) 25 MG tablet Take 0.5 tablets by mouth daily  Patient taking differently: Take 25 mg by mouth daily  3/12/19  Yes Gina Helton MD   torsemide (DEMADEX) 20 MG tablet Take 1 tablet weekly  Patient taking differently: every other day Take 1 tablet weekly 2/13/19  Yes BENITEZ Resendiz CNP   sacubitril-valsartan (ENTRESTO) 24-26 MG per tablet Take 1 tablet by mouth 2 times daily 2/11/19  Yes Gina Helton MD   digoxin (LANOXIN) 125 MCG tablet Take 1 tablet by mouth daily 1/23/19  Yes Gina Helton MD   nortriptyline (PAMELOR) 10 MG capsule Take 1 capsule by mouth nightly  Patient taking differently: Take 25 mg by mouth nightly  12/21/18  Yes Gina Helton MD   simethicone (MYLICON) 80 MG chewable tablet Take 80 mg by mouth every 6 hours as needed for Flatulence   Yes Historical Provider, MD   ranitidine (ZANTAC) 150 MG tablet Take 150 mg by mouth daily   Yes Historical Provider, MD   melatonin 3 MG TABS tablet Take 3 mg by mouth nightly as needed   Yes Historical Provider, MD   HYDROcodone-acetaminophen (NORCO) 7.5-325 MG per tablet 1-2 tabs po q6-8h prn pain. 8/2/17  Yes BENITEZ Holbrook - CNP   lovastatin (MEVACOR) 40 MG tablet Take 40 mg by mouth. Take 1 tablet (40 mg total) by mouth at bedtime. 8/19/13  Yes Historical Provider, MD   diazepam (VALIUM) 5 MG tablet Take 5 mg by mouth three times daily. Yes Historical Provider, MD   Cetirizine HCl (ZYRTEC PO) Take 1 tablet by mouth daily. Take only as needed for scrachy eyes. Yes Historical Provider, MD        Allergies:  Vancomycin; Bactrim [sulfamethoxazole-trimethoprim]; Corn-containing products; Eggs or egg-derived products; and Seasonal    Social History:    reports that he quit smoking about 12 years ago. He has a 5.00 pack-year smoking history. He quit smokeless tobacco use about 19 months ago. His smokeless tobacco use included chew. He reports that he does not drink alcohol or use drugs.     Family History:        Problem Relation Age of Onset    Cancer Mother     High Blood Pressure Father     Cancer Father        REVIEW OF SYSTEMS:  CONSTITUTIONAL:  negative  HEENT:  negative  RESPIRATORY:  negative  CARDIOVASCULAR:  negative  GASTROINTESTINAL:  negative except for change in bowel habits and abdominal pain  GENITOURINARY:  negative  HEMATOLOGIC/LYMPHATIC:  negative  NEUROLOGICAL:  negative  SKIN: negative    PHYSICAL EXAM:  VITALS:  /73   Pulse 73   Temp 97.7 °F (36.5 °C) (Oral)   Resp 16   Ht 5' 10\" (1.778 m)   Wt 190 lb 8 oz (86.4 kg) Comment: w pt's clothes   SpO2 96%   BMI 27.33 kg/m²   24HR INTAKE/OUTPUT:      Intake/Output Summary (Last 24 hours) at 4/9/2019 1726  Last data filed at 4/9/2019 1400  Gross per 24 hour   Intake 7718 ml   Output 0 ml   Net 7718 ml     DRAIN/TUBE OUTPUT:     CONSTITUTIONAL:  alert, no apparent distress and normal weight  EYES: sclera clear  ENT:  normocepalic, without obvious abnormality  NECK:  supple, symmetrical, trachea midline and no carotid bruits, no mass  LUNGS:  clear to auscultation  CARDIOVASCULAR:  regular rate and rhythm and no murmur noted  ABDOMEN:  scars noted are healed, normal bowel sounds, soft, non-distended, non-tender, voluntary guarding absent, no masses palpated, no hepatosplenomegally and hernia present , at old colostomy site, reducible, nontender, nonobstructing  MUSCULOSKELETAL:  0+ pitting edema lower extremities  NEUROLOGIC:  Mental Status Exam:  Level of Alertness:   awake  Orientation:   person, place, time  SKIN:  no bruising or bleeding, normal skin color, texture, turgor and no redness, warmth, or swelling    DATA:    CBC:   Recent Labs     04/08/19  0729 04/09/19 0430   WBC 9.7 5.2   HGB 12.3* 12.2*   HCT 35.7* 35.5*    248     BMP:    Recent Labs     04/08/19 0729 04/09/19 0430   * 144   K 4.1 3.8  3.8   CL 98* 107   CO2 23 26   BUN 9 6*   CREATININE 0.6* 0.6*   GLUCOSE 86 87     Hepatic:   Recent Labs     04/08/19  0729 04/09/19 0430   AST 13* 14*   ALT 11 9*   BILITOT <0.2 0.3   ALKPHOS 79 79     Mag:    No results for input(s): MG in the last 72 hours. Phos:   No results for input(s): PHOS in the last 72 hours. INR: No results for input(s): INR in the last 72 hours. Radiology Review:     CT ABDOMEN PELVIS WO CONTRAST Additional Contrast? None [[id number]] Collected: 04/08/19 0806 Updated: 04/08/19 0819 Narrative:   EXAMINATION:  CT OF THE ABDOMEN AND PELVIS WITHOUT CONTRAST, 4/8/2019 7:49 am    TECHNIQUE:  CT of the abdomen and pelvis was performed without the administration of  intravenous contrast. Multiplanar reformatted images are provided for review. Dose modulation, iterative reconstruction, and/or weight based adjustment of  the mA/kV was utilized to reduce the radiation dose to as low as reasonably  achievable. COMPARISON:  April 2, 2019.   January 27, 2019.    HISTORY:  ORDERING SYSTEM PROVIDED HISTORY: Abdominal pain. TECHNOLOGIST PROVIDED HISTORY:  Additional Contrast?->None  Ordering Physician Provided Reason for Exam: Abd pain. Acuity: Unknown  Type of Exam: Unknown    FINDINGS:  Lower Chest: No focal consolidation or pleural effusion. Mild interlobular  septal thickening with ground-glass opacities are identified in the right  lung. Two nonspecific pulmonary nodules are seen in the left lower lobe  measuring up to 3 mm, new since April 2, 2019, most compatible with an  infectious/inflammatory etiology. The heart is enlarged. Organs: The liver, gallbladder, spleen, pancreas, adrenal glands, and kidneys  demonstrate no acute abnormality. Bilateral ureters are normal in course and  caliber. No ureteral calculi. GI/Bowel: The stomach is normal.  The small bowel and colon demonstrate  extensive postoperative changes. Findings include partial bowel resections. The anastomoses are unremarkable. Extensive soft tissue is identified in the  presacral space, unchanged from the prior study. Hernia is identified in the right mid abdomen. No evidence of obstruction. Pelvis: Normal bladder. Prostate and seminal vesicles are grossly  unremarkable. Peritoneum/Retroperitoneum: The aorta and its branches are normal in course  and caliber with minimal atherosclerosis. No pathologic lymphadenopathy. No  free fluid or free air. Extensive soft tissue is seen in the presacral region, unchanged from the  prior study but not well evaluated without IV contrast.  Some foci of gas  appear extraluminal in location. Bones/Soft Tissues: No acute or aggressive osseous lesion. Bilateral L5 pars  defects are seen with grade 1 anterolisthesis of L5 on S1. Impression:   1. No new intra-abdominal abnormality. 2. Similar appearance since the prior study of extensive soft tissue in the  presacral region, questionable extraluminal gas.   The finding is not well  evaluated without rectal or IV contrast.  3. Interval appearance of two subsolid pulmonary nodules in the left lower  lobe since April 2019, more suggestive of infectious/inflammatory etiology. Recommend continued attention on follow-up. 4. Mild interstitial edema. 5. Stable right ventral hernia containing bowel. No evidence of obstruction.      IMPRESSION/RECOMMENDATIONS:    Abd pain and loose BM  Having cramping and overflow stool incontinence  Area of distal sigmoid and rectum also chronically abnormal due to radiation, surgical change, and pelvic / sacral space leak and fluid collection    Prep given to clean out as fair amount of stool in colon on CT above anastomosis  OK for diet as tolerated and discharge at this point  Would not operate, or do colonoscopy at this point    Thank you,    Man Lock

## 2019-04-10 NOTE — PROGRESS NOTES
Sitting up in bed resting and watching TV, pt is alert and oriented and c/o abdominal pain which he rates 8/10. Given 2 Norco 7.5mg PO per pt request.  Assessment done, VSS, call light in reach, will continue to monitor.

## 2019-04-10 NOTE — PROGRESS NOTES
[facility] GI  Gastroenterology Progress Note    Carina Holt is a 50 y.o. male patient. 1. Chest pain, unspecified type    2. Abdominal pain, unspecified abdominal location    3. Edema, unspecified type        SUBJECTIVE:    Ready to go home today   He is awaiting for cardiology to discharge him   Intermittent CP and lower abdominal pain     ROS:  Cardiovascular ROS: no dyspnea on exertion  Gastrointestinal ROS: see above  Respiratory ROS: no cough, shortness of breath, or wheezing    Physical    VITALS:  /70   Pulse 81   Temp 97.6 °F (36.4 °C) (Oral)   Resp 16   Ht 5' 10\" (1.778 m)   Wt 200 lb 12.8 oz (91.1 kg)   SpO2 91%   BMI 28.81 kg/m²   TEMPERATURE:  Current - Temp: 97.6 °F (36.4 °C); Max - Temp  Av.6 °F (36.4 °C)  Min: 97.5 °F (36.4 °C)  Max: 97.9 °F (36.6 °C)    NAD  RRR, Nl s1s2  Lungs CTA Bilaterally, normal effort  Abdomen soft, ND, NT, no HSM, Bowel sounds normal  AAOx3, No asterixis     Data      CBC:   Recent Labs     19  0729 19  0430 04/10/19  0500   WBC 9.7 5.2 4.5   RBC 3.93* 3.88* 3.67*   HGB 12.3* 12.2* 11.7*   HCT 35.7* 35.5* 34.2*    248 178   MCV 90.8 91.6 93.1   MCH 31.3 31.3 32.0   MCHC 34.5 34.2 34.4   RDW 13.9 13.6 13.8        BMP:  Recent Labs     19  0729 19  0430 04/10/19  0012   * 144 141   K 4.1 3.8  3.8 3.6   CL 98* 107 107   CO2 23 26 23   BUN 9 6* 5*   CREATININE 0.6* 0.6* 0.6*   CALCIUM 8.8 8.7 8.1*   GLUCOSE 86 87 117*        Hepatic Function Panel:   Lab Results   Component Value Date    ALKPHOS 79 2019    ALT 9 2019    AST 14 2019    PROT 6.5 2019    PROT 6.3 2017    BILITOT 0.3 2019    BILIDIR <0.2 2019    IBILI see below 2019    LABALBU 3.9 2019       Recent Labs     19  0729 19  0430 04/10/19  0012   LIPASE 13.0 11.0* 11.0*   AMYLASE  --  38 40     No results for input(s): PROTIME, INR in the last 72 hours.   No results for input(s): PTT in the last 72 hours. No results for input(s): OCCULTBLD in the last 72 hours.           ASSESSMENT :     Lower abdominal pain, altered bowel habits - lower abdominal pain is chronic and  He takes narcotics for this. Usually constipated, likely narcotic-induced. CT showed stable appearance of soft tissue area near colorectal anastomosis. Pt has soft stool here. CT from 4/2 with moderate semisolid stool in the right colon. Chronic pain may be worsened by constipation.  s/p Golytely 4/8 for catharsis and having watery stool now. C.diff not sent as he had a solid BM. H/o perirectal fluid collection, abscess - followed by Dr Tali Rosales. abscess has been treated with antibiotics.  CT showed stable appearance of soft tissue area near colorectal anastomosis.     PLAN :  - resume diet  - laxatives to prevent constipation - Miralax 17 grams daily  - He is scheduled to see Dr. Maisha Garcia in clinic next week    - Flo Joy to discharge today from GI standpoint     Max Hernandez MD, MSc  [address] [place] [address]

## 2019-04-10 NOTE — CONSULTS
[address]  Advanced CHF/Pulmonary Hypertension   Cardiac Evaluation      Viki Guevara  YOB: 1970    REquesting PHysician:  Dr. Darel Fothergill      Chief Complaint   Patient presents with    Cough     pt states he woke up with cought and congestion also c/o abd pain rates pain 10/10         History of Present Illness:  Lorie Noriega is a 51 yo male who presents to the ED with abdominal pain and right sided chest pain with associated shortness of breath. He was admitted two days ago. He has chronic lower quadrant abdominal pain but has been worse in the past week. Pain is moderate to severe. He has had worsening cough. Pains are sharp. He has a history of sCHF, NICM, hx HTN, HLD and rectal cancer s/p chemo and XRT. LHC 9/15/16 with no coronary artery disease, EF 15%, LVEDP 20-25 mmHg. He also underwent surgery for repair of ventral hernia and new colostomy on 12/1/17. He has had abscesses post-op that have been drained. Since admission, he has been given IVF at a rate of 150 cc/hr. His  BNP on admission was up to 8000 which is higher than his baseline. He has had increasing shortness of breath over the past 24 hours. Allergies   Allergen Reactions    Vancomycin Hives and Swelling     Throat swells    Bactrim [Sulfamethoxazole-Trimethoprim] Other (See Comments)     Thrush    Corn-Containing Products      Patient tells me his throat swells when he eats a large amount of corn or corn products. He can tolerate products with corn oil and corn syrup.  Pt. Reports gets \"congestion\"    Eggs Or Egg-Derived Products Swelling    Seasonal      Current Facility-Administered Medications   Medication Dose Route Frequency Provider Last Rate Last Dose    morphine (PF) injection 0.5 mg  0.5 mg Intravenous Q4H PRN Shiraz De La Paz MD   0.5 mg at 04/10/19 0442    diazepam (VALIUM) tablet 5 mg  5 mg Oral TID Shiraz De La Paz MD   5 mg at 04/10/19 1631    rosuvastatin (CRESTOR) tablet 5 mg  5 mg Oral Nightly Sonja Faye MD   5 mg at 04/09/19 2104    melatonin tablet 3 mg  3 mg Oral Nightly PRN Sonja Faye MD        famotidine (PEPCID) tablet 20 mg  20 mg Oral Daily Sonja Faye MD   20 mg at 04/10/19 0949    simethicone (MYLICON) chewable tablet 80 mg  80 mg Oral Q6H PRN Sonja Faye MD        nortriptyline (PAMELOR) capsule 25 mg  25 mg Oral Nightly Sonja Faye MD   25 mg at 04/09/19 2104    digoxin (LANOXIN) tablet 125 mcg  125 mcg Oral Daily Sonja Faye MD   125 mcg at 04/10/19 1052    sacubitril-valsartan (ENTRESTO) 24-26 MG per tablet 1 tablet  1 tablet Oral BID Sonja Faye MD   1 tablet at 04/10/19 0948    eplerenone (INSPRA) tablet 12.5 mg- patient supplied   12.5 mg Oral Daily Sonja Faye MD        carvedilol (COREG) tablet 12.5 mg  12.5 mg Oral BID WC Sonja Faye MD   12.5 mg at 04/10/19 0948    0.9 % sodium chloride infusion   Intravenous Continuous Sonja Faye  mL/hr at 04/10/19 1124      sodium chloride flush 0.9 % injection 10 mL  10 mL Intravenous 2 times per day Sonja Faye MD   10 mL at 04/10/19 0949    sodium chloride flush 0.9 % injection 10 mL  10 mL Intravenous PRN Sonja Faye MD   10 mL at 04/10/19 0028    potassium chloride (KLOR-CON M) extended release tablet 40 mEq  40 mEq Oral PRN Sonja Faye MD        Or    potassium bicarb-citric acid (EFFER-K) effervescent tablet 40 mEq  40 mEq Oral PRN Sonja Faye MD        Or    potassium chloride 10 mEq/100 mL IVPB (Peripheral Line)  10 mEq Intravenous PRN Sonja Faye MD        acetaminophen (TYLENOL) tablet 650 mg  650 mg Oral Q4H PRN Sonja Faye MD        ondansetron [facility]) injection 4 mg  4 mg Intravenous Q6H PRN Sonja Faye MD   4 mg at 04/10/19 1639    enoxaparin (LOVENOX) injection 40 mg  40 mg Subcutaneous Daily Sonja Faye MD   40 mg at 04/10/19 0948    benzocaine-menthol (CEPACOL SORE THROAT) lozenge 1 lozenge  1 lozenge PACEMAKER PLACEMENT  10/26/2017    ICD placed right chest    REVISION COLOSTOMY  2018    S/P: colostomy closure with hernia repair with mesh, with Dr. Luther Dominguez at [facility].     SHOULDER ARTHROSCOPY Right 9/22/15    SUBACROMIAL DECOMPRESSION, DISTAL CLAVICLE EXCISION, LABRAL DEBRIDEMETN    SIGMOIDOSCOPY N/A 2018    SIGMOIDOSCOPY BIOPSY FLEXIBLE performed by Meryle Locks, MD at [address]  4 surgeries    SMALL INTESTINE SURGERY  2017    LAPAROSCOPIC LOW ANTERIOR RESECTION, ILEOSTOMY    SMALL INTESTINE SURGERY  2017    small bowel resection    TUNNELED VENOUS PORT PLACEMENT Left 2017    PORT-A-CATH INSERTION                     TUNNELED VENOUS PORT PLACEMENT      UMBILICAL HERNIA REPAIR  14    UPPER GASTROINTESTINAL ENDOSCOPY  10/03/2017     Family History   Problem Relation Age of Onset    Cancer Mother     High Blood Pressure Father     Cancer Father      Social History     Socioeconomic History    Marital status:      Spouse name: Not on file    Number of children: Not on file    Years of education: Not on file    Highest education level: Not on file   Occupational History    Not on file   Social Needs    Financial resource strain: Not on file    Food insecurity:     Worry: Not on file     Inability: Not on file    Transportation needs:     Medical: Not on file     Non-medical: Not on file   Tobacco Use    Smoking status: Former Smoker     Packs/day: 1.00     Years: 5.00     Pack years: 5.00     Last attempt to quit: 2007     Years since quittin.2    Smokeless tobacco: Former User     Types: Chew     Quit date: 8/10/2017   Substance and Sexual Activity    Alcohol use: No     Comment: history of alcohol abuse 20 years ago    Drug use: No    Sexual activity: Never     Partners: Female   Lifestyle    Physical activity:     Days per week: Not on file     Minutes per session: Not on file    Stress: Not on file   Relationships    Social connections:     Talks on phone: Not on file     Gets together: Not on file     Attends Hindu service: Not on file     Active member of club or organization: Not on file     Attends meetings of clubs or organizations: Not on file     Relationship status: Not on file    Intimate partner violence:     Fear of current or ex partner: Not on file     Emotionally abused: Not on file     Physically abused: Not on file     Forced sexual activity: Not on file   Other Topics Concern    Not on file   Social History Narrative    Not on file       Review of Systems:   · Constitutional: there has been no unanticipated weight loss. There's been no change in energy level, sleep pattern, or activity level. · Eyes: No visual changes or diplopia. No scleral icterus. · ENT: No Headaches, hearing loss or vertigo. No mouth sores or sore throat. · Cardiovascular: Reviewed in HPI  · Respiratory: No cough or wheezing, no sputum production. No hematemesis. · Gastrointestinal: No abdominal pain, appetite loss, blood in stools. No change in bowel or bladder habits. · Genitourinary: No dysuria, trouble voiding, or hematuria. · Musculoskeletal:  No gait disturbance, weakness or joint complaints. · Integumentary: No rash or pruritis. · Neurological: No headache, diplopia, change in muscle strength, numbness or tingling. No change in gait, balance, coordination, mood, affect, memory, mentation, behavior. · Psychiatric: No anxiety, no depression. · Endocrine: No malaise, fatigue or temperature intolerance. No excessive thirst, fluid intake, or urination. No tremor. · Hematologic/Lymphatic: No abnormal bruising or bleeding, blood clots or swollen lymph nodes. · Allergic/Immunologic: No nasal congestion or hives.     Physical Examination:    Vitals:    04/10/19 0335 04/10/19 0946 04/10/19 1310 04/10/19 1632   BP: 109/67 109/71 115/70 114/73   Pulse: 71 80 81 86   Resp: 16 16 16 16   Temp: 97.6 °F (36.4 °C) 97.6 °F (36.4 °C) 97.6 °F (36.4 °C) 97.7 °F (36.5 °C)   TempSrc: Axillary Oral Oral Oral   SpO2: 94% 93% 91% 93%   Weight: 200 lb 12.8 oz (91.1 kg)      Height:         Body mass index is 28.81 kg/m². Wt Readings from Last 3 Encounters:   04/10/19 200 lb 12.8 oz (91.1 kg)   04/02/19 184 lb (83.5 kg)   03/05/19 195 lb (88.5 kg)     BP Readings from Last 3 Encounters:   04/10/19 114/73   04/02/19 103/61   03/05/19 100/72     Constitutional and General Appearance:   WD/WN with obvious abdominal pain,   HEENT:  NC/AT  MARJAN  No problems with hearing  Skin:  Warm, dry  Respiratory:  · Normal excursion and expansion without use of accessory muscles  · Resp Auscultation: Normal breath sounds without dullness  Cardiovascular:  · The apical impulses not displaced  · Heart tones are crisp and normal  · Cervical veins are not engorged  · The carotid upstroke is normal in amplitude and contour without delay or bruit  · JVP 9-10 cm H2O  RRR with nl S1 and S2 without m,r,g  · Peripheral pulses are symmetrical and full  · There is no clubbing, cyanosis of the extremities. · Trace bilateral edema  · Femoral Arteries: 2+ and equal  · Pedal Pulses: 2+ and equal   Neck:  · No thyromegaly  Abdomen:  · No masses or tenderness  · Liver/Spleen: No Abnormalities Noted  Neurological/Psychiatric:  · Alert and oriented in all spheres  · Moves all extremities well  · Exhibits normal gait balance and coordination  · No abnormalities of mood, affect, memory, mentation, or behavior are noted      Assessment:    1. Chest pain, unspecified type    2. Abdominal pain, unspecified abdominal location    3. Edema, unspecified type     4. Fluid overload from IV fluids  5. Chronic systolic HF    Plan:  He has been getting IV fluids at a rapid rate since admission.   He is now in heart failure  Stop IVF  Give lasix 40 mg IV now and qd  He cannot go home tonight  Check BNP level now  Renal panel in am  Increase eplerenone 25 mg po qd  Continue Entresto  Continue carvedilol  Stop digoxin to be sure some of his symptoms are not digoxin toxicity  Suggest having Dr. William Romero see the patient as he is extremely tender in his abdomen. He is at risk of deterioration including respiratory failure until we get him diuresed. I appreciate the opportunity of cooperating in the care of this patient.     Sugey Shaffer M.D., [facility]

## 2019-04-10 NOTE — PROGRESS NOTES
[facility]        PATIENT NAME: Dub Castleman Prater     TODAY'S DATE: 4/10/2019    SUBJECTIVE:  CC Abd pain  Pt with abd pain, after drinking. Up OOB and walking, able to get to BR. Has more in mid abd,and moderate cramping after Ensure. Current Medications:   Current Facility-Administered Medications: furosemide (LASIX) injection 40 mg, 40 mg, Intravenous, Daily  [START ON 4/11/2019] eplerenone (INSPRA) tablet 25 mg, 25 mg, Oral, Daily  morphine (PF) injection 0.5 mg, 0.5 mg, Intravenous, Q4H PRN  diazepam (VALIUM) tablet 5 mg, 5 mg, Oral, TID  rosuvastatin (CRESTOR) tablet 5 mg, 5 mg, Oral, Nightly  melatonin tablet 3 mg, 3 mg, Oral, Nightly PRN  famotidine (PEPCID) tablet 20 mg, 20 mg, Oral, Daily  simethicone (MYLICON) chewable tablet 80 mg, 80 mg, Oral, Q6H PRN  nortriptyline (PAMELOR) capsule 25 mg, 25 mg, Oral, Nightly  sacubitril-valsartan (ENTRESTO) 24-26 MG per tablet 1 tablet, 1 tablet, Oral, BID  carvedilol (COREG) tablet 12.5 mg, 12.5 mg, Oral, BID WC  sodium chloride flush 0.9 % injection 10 mL, 10 mL, Intravenous, 2 times per day  sodium chloride flush 0.9 % injection 10 mL, 10 mL, Intravenous, PRN  potassium chloride (KLOR-CON M) extended release tablet 40 mEq, 40 mEq, Oral, PRN **OR** potassium bicarb-citric acid (EFFER-K) effervescent tablet 40 mEq, 40 mEq, Oral, PRN **OR** potassium chloride 10 mEq/100 mL IVPB (Peripheral Line), 10 mEq, Intravenous, PRN  acetaminophen (TYLENOL) tablet 650 mg, 650 mg, Oral, Q4H PRN  ondansetron (ZOFRAN) injection 4 mg, 4 mg, Intravenous, Q6H PRN  enoxaparin (LOVENOX) injection 40 mg, 40 mg, Subcutaneous, Daily  benzocaine-menthol (CEPACOL SORE THROAT) lozenge 1 lozenge, 1 lozenge, Oral, Q2H PRN  HYDROcodone-acetaminophen (NORCO) 7.5-325 MG per tablet 2 tablet, 2 tablet, Oral, Q4H PRN  Prior to Admission medications    Medication Sig Start Date End Date Taking?  Authorizing Provider   carvedilol (COREG) 12.5 MG tablet TAKE ONE-HALF TABLET BY MOUTH EVERY MORNING AND TAKE 1 TABLET BY MOUTH AT NIGHT 3/14/19  Yes BENITEZ Ball CNP   eplerenone (INSPRA) 25 MG tablet Take 0.5 tablets by mouth daily  Patient taking differently: Take 25 mg by mouth daily  3/12/19  Yes Fara Mesa MD   torsemide (DEMADEX) 20 MG tablet Take 1 tablet weekly  Patient taking differently: every other day Take 1 tablet weekly 2/13/19  Yes BENITEZ Ball CNP   sacubitril-valsartan (ENTRESTO) 24-26 MG per tablet Take 1 tablet by mouth 2 times daily 2/11/19  Yes Fara Mesa MD   digoxin (LANOXIN) 125 MCG tablet Take 1 tablet by mouth daily 1/23/19  Yes Fara Mesa MD   nortriptyline (PAMELOR) 10 MG capsule Take 1 capsule by mouth nightly  Patient taking differently: Take 25 mg by mouth nightly  12/21/18  Yes Fara Mesa MD   simethicone (MYLICON) 80 MG chewable tablet Take 80 mg by mouth every 6 hours as needed for Flatulence   Yes Historical Provider, MD   ranitidine (ZANTAC) 150 MG tablet Take 150 mg by mouth daily   Yes Historical Provider, MD   melatonin 3 MG TABS tablet Take 3 mg by mouth nightly as needed   Yes Historical Provider, MD   HYDROcodone-acetaminophen (NORCO) 7.5-325 MG per tablet 1-2 tabs po q6-8h prn pain. 8/2/17  Yes BENITEZ Bellamy CNP   lovastatin (MEVACOR) 40 MG tablet Take 40 mg by mouth. Take 1 tablet (40 mg total) by mouth at bedtime. 8/19/13  Yes Historical Provider, MD   diazepam (VALIUM) 5 MG tablet Take 5 mg by mouth three times daily. Yes Historical Provider, MD   Cetirizine HCl (ZYRTEC PO) Take 1 tablet by mouth daily. Take only as needed for scrachy eyes. Yes Historical Provider, MD        Allergies:  Vancomycin; Bactrim [sulfamethoxazole-trimethoprim]; Corn-containing products; Eggs or egg-derived products; and Seasonal    Social History:    reports that he quit smoking about 12 years ago. He has a 5.00 pack-year smoking history. He quit smokeless tobacco use about 20 months ago.  His smokeless exam  Diet / Ensure as able  Check fu AXR  No additional intervention planned at this time    Kamryn Montemayor

## 2019-04-10 NOTE — PROGRESS NOTES
Progress Note - Dr. Satinder Ortiz - Internal Medicine  PCP: Talita Thomas, [address] [facility] / [facility] Day: 1  Code Status: Full Code  Current Diet: DIET FULL LIQUID;  Dietary Nutrition Supplements: Standard High Calorie Oral Supplement        CC: follow up on medical issues    Subjective:   Louisa Tyler is a 50 y.o. male. He denies problems    Pt looks much better  No chest pain now  Case d/w Surgery and GI - no further workup recommended  abd pain better    He denies chest pain, denies shortness of breath, denies nausea,  denies emesis. 10 system Review of Systems is reviewed with patient, and pertinent positives are listed here: None . Otherwise, Review of systems is negative. I have reviewed the patient's medical and social history in detail and updated the computerized patient record. To recap: He  has a past medical history of Allergic, Anesthesia, Arthritis, CAD (coronary artery disease), Cancer ([facility] [address] CHF (congestive heart failure) ([facility] [address] Clostridium difficile infection, Depression motion, Diverticulitis, Diverticulosis, History of alcohol abuse, Hyperlipidemia, Hypertension, Irregular heart beat, Pneumonia, Right knee pain, Shoulder injury, and Sleep difficulties. . He  has a past surgical history that includes sinus surgery (4 surgeries); Inner ear surgery (tube right ear); knee surgery (08/24/2011); other surgical history (7/1/13); Knee arthroscopy (Right, [id number]); Knee arthroscopy (Right, 8/4/2014); Umbilical hernia repair (11/11/14); joint replacement; Shoulder arthroscopy (Right, 9/22/15); Colonoscopy (08/29/2016); Endoscopic ultrasonography, GI (09/14/2016); Cardiac catheterization (09/14/2016); Small intestine surgery (01/11/2017); Tunneled venous port placement (Left, 02/13/2017); Cholecystectomy; colectomy; Abscess Drainage; Tunneled venous port placement; hernia repair;  Upper gastrointestinal endoscopy (10/03/2017); pacemaker placement (10/26/2017); hernia repair (12/01/2017); Small intestine surgery (12/01/2017); other surgical history (12/01/2017); Colonoscopy (03/19/2018); Revision Colostomy (04/18/2018); hernia repair (04/18/2018); sigmoidoscopy (N/A, 11/23/2018); Breast surgery (Right, 1/21/2019); Abdomen surgery; and Endoscopy, colon, diagnostic. Dayami Prescott He  reports that he quit smoking about 12 years ago. He has a 5.00 pack-year smoking history. He quit smokeless tobacco use about 20 months ago. His smokeless tobacco use included chew. He reports that he does not drink alcohol or use drugs. .        Active Hospital Problems    Diagnosis Date Noted    Moderate malnutrition ([facility] [address] [E44.0] 04/09/2019    Abdominal pain [R10.9] 04/08/2019    LLQ pain [R10.32] 04/08/2019    Anemia [D64.9] 05/16/2017    Neoplasm related pain [G89.3] 10/18/2016    Rectal cancer ([facility] [C20] 09/23/2016    Mixed hyperlipidemia [E78.2]     Essential hypertension [I10]        Current Facility-Administered Medications: morphine (PF) injection 0.5 mg, 0.5 mg, Intravenous, Q4H PRN  diazepam (VALIUM) tablet 5 mg, 5 mg, Oral, TID  rosuvastatin (CRESTOR) tablet 5 mg, 5 mg, Oral, Nightly  melatonin tablet 3 mg, 3 mg, Oral, Nightly PRN  famotidine (PEPCID) tablet 20 mg, 20 mg, Oral, Daily  simethicone (MYLICON) chewable tablet 80 mg, 80 mg, Oral, Q6H PRN  nortriptyline (PAMELOR) capsule 25 mg, 25 mg, Oral, Nightly  digoxin (LANOXIN) tablet 125 mcg, 125 mcg, Oral, Daily  sacubitril-valsartan (ENTRESTO) 24-26 MG per tablet 1 tablet, 1 tablet, Oral, BID  eplerenone (INSPRA) tablet 12.5 mg- patient supplied , 12.5 mg, Oral, Daily  carvedilol (COREG) tablet 12.5 mg, 12.5 mg, Oral, BID WC  0.9 % sodium chloride infusion, , Intravenous, Continuous  sodium chloride flush 0.9 % injection 10 mL, 10 mL, Intravenous, 2 times per day  sodium chloride flush 0.9 % injection 10 mL, 10 mL, Intravenous, PRN  potassium chloride (KLOR-CON M) extended release tablet 40 mEq, 40 mEq, Oral, PRN **OR** potassium bicarb-citric acid (EFFER-K) effervescent tablet 40 mEq, 40 mEq, Oral, PRN **OR** potassium chloride 10 mEq/100 mL IVPB (Peripheral Line), 10 mEq, Intravenous, PRN  acetaminophen (TYLENOL) tablet 650 mg, 650 mg, Oral, Q4H PRN  ondansetron (ZOFRAN) injection 4 mg, 4 mg, Intravenous, Q6H PRN  enoxaparin (LOVENOX) injection 40 mg, 40 mg, Subcutaneous, Daily  benzocaine-menthol (CEPACOL SORE THROAT) lozenge 1 lozenge, 1 lozenge, Oral, Q2H PRN  HYDROcodone-acetaminophen (NORCO) 7.5-325 MG per tablet 2 tablet, 2 tablet, Oral, Q4H PRN         Objective:  /67   Pulse 71   Temp 97.6 °F (36.4 °C) (Axillary)   Resp 16   Ht 5' 10\" (1.778 m)   Wt 200 lb 12.8 oz (91.1 kg)   SpO2 94%   BMI 28.81 kg/m²      Patient Vitals for the past 24 hrs:   BP Temp Temp src Pulse Resp SpO2 Weight   04/10/19 0335 109/67 97.6 °F (36.4 °C) Axillary 71 16 94 % 200 lb 12.8 oz (91.1 kg)   04/10/19 0030 126/69 97.9 °F (36.6 °C) Oral 88 16 95 % --   04/09/19 2100 106/65 97.5 °F (36.4 °C) Oral 76 16 97 % --   04/09/19 1708 112/73 97.7 °F (36.5 °C) Oral 73 16 96 % --   04/09/19 1323 119/71 97.6 °F (36.4 °C) Axillary 76 16 95 % --   04/09/19 0946 119/70 97.9 °F (36.6 °C) Oral 80 16 95 % --     Patient Vitals for the past 96 hrs (Last 3 readings):   Weight   04/10/19 0335 200 lb 12.8 oz (91.1 kg)   04/09/19 0321 190 lb 8 oz (86.4 kg)   04/08/19 0610 185 lb (83.9 kg)           Intake/Output Summary (Last 24 hours) at 4/10/2019 0740  Last data filed at 4/10/2019 0457  Gross per 24 hour   Intake 2942 ml   Output --   Net 2942 ml         Physical Exam:   S1, S2 normal, no murmur, rub or gallop, regular rate and rhythm  clear to auscultation bilaterally  Soft, mild distension, bs+  extremities normal, atraumatic, no cyanosis or edema    Labs:  Lab Results   Component Value Date    WBC 4.5 04/10/2019    HGB 11.7 (L) 04/10/2019    HCT 34.2 (L) 04/10/2019     04/10/2019    CHOL 227 (H) 08/16/2017    TRIG 88 04/09/2019    HDL 37 (L) 08/16/2017 ALT 9 (L) 04/09/2019    AST 14 (L) 04/09/2019     04/10/2019    K 3.6 04/10/2019     04/10/2019    CREATININE 0.6 (L) 04/10/2019    BUN 5 (L) 04/10/2019    CO2 23 04/10/2019    TSH 1.77 06/21/2013    PSA 0.55 02/20/2012    INR 1.37 (H) 01/28/2019    LABA1C 5.5 06/21/2013    LABMICR Not Indicated 04/08/2019     Lab Results   Component Value Date    CKTOTAL 129 09/06/2016    TROPONINI <0.01 04/08/2019       Recent Imaging Results are Reviewed:  Ct Abdomen Pelvis Wo Contrast Additional Contrast? None    Result Date: 4/8/2019  EXAMINATION: CT OF THE ABDOMEN AND PELVIS WITHOUT CONTRAST, 4/8/2019 7:49 am TECHNIQUE: CT of the abdomen and pelvis was performed without the administration of intravenous contrast. Multiplanar reformatted images are provided for review. Dose modulation, iterative reconstruction, and/or weight based adjustment of the mA/kV was utilized to reduce the radiation dose to as low as reasonably achievable. COMPARISON: April 2, 2019. January 27, 2019. HISTORY: ORDERING SYSTEM PROVIDED HISTORY: Abdominal pain. TECHNOLOGIST PROVIDED HISTORY: Additional Contrast?->None Ordering Physician Provided Reason for Exam: Abd pain. Acuity: Unknown Type of Exam: Unknown FINDINGS: Lower Chest: No focal consolidation or pleural effusion. Mild interlobular septal thickening with ground-glass opacities are identified in the right lung. Two nonspecific pulmonary nodules are seen in the left lower lobe measuring up to 3 mm, new since April 2, 2019, most compatible with an infectious/inflammatory etiology. The heart is enlarged. Organs: The liver, gallbladder, spleen, pancreas, adrenal glands, and kidneys demonstrate no acute abnormality. Bilateral ureters are normal in course and caliber. No ureteral calculi. GI/Bowel: The stomach is normal.  The small bowel and colon demonstrate extensive postoperative changes. Findings include partial bowel resections. The anastomoses are unremarkable.   Extensive soft tissue is identified in the presacral space, unchanged from the prior study. Hernia is identified in the right mid abdomen. No evidence of obstruction. Pelvis: Normal bladder. Prostate and seminal vesicles are grossly unremarkable. Peritoneum/Retroperitoneum: The aorta and its branches are normal in course and caliber with minimal atherosclerosis. No pathologic lymphadenopathy. No free fluid or free air. Extensive soft tissue is seen in the presacral region, unchanged from the prior study but not well evaluated without IV contrast.  Some foci of gas appear extraluminal in location. Bones/Soft Tissues: No acute or aggressive osseous lesion. Bilateral L5 pars defects are seen with grade 1 anterolisthesis of L5 on S1.     1. No new intra-abdominal abnormality. 2. Similar appearance since the prior study of extensive soft tissue in the presacral region, questionable extraluminal gas. The finding is not well evaluated without rectal or IV contrast. 3. Interval appearance of two subsolid pulmonary nodules in the left lower lobe since April 2019, more suggestive of infectious/inflammatory etiology. Recommend continued attention on follow-up. 4. Mild interstitial edema. 5. Stable right ventral hernia containing bowel. No evidence of obstruction. Ct Abdomen Pelvis W Iv Contrast Additional Contrast? None    Result Date: 4/2/2019  EXAMINATION: CT OF THE ABDOMEN AND PELVIS WITH CONTRAST 4/2/2019 6:03 pm TECHNIQUE: CT of the abdomen and pelvis was performed with the administration of intravenous contrast. Multiplanar reformatted images are provided for review. Dose modulation, iterative reconstruction, and/or weight based adjustment of the mA/kV was utilized to reduce the radiation dose to as low as reasonably achievable.  COMPARISON: Abdomen and pelvis CT 01/27/2019 HISTORY: ORDERING SYSTEM PROVIDED HISTORY: abd pain hx colon surgery TECHNOLOGIST PROVIDED HISTORY: Additional Contrast?->None Ordering Physician Provided Reason for Exam: abd pain hx colon surgery Acuity: Unknown Type of Exam: Unknown FINDINGS: LOWER CHEST:  Clear lung bases. Intact single chamber cardioverter lead. Mild cardiomegaly with left atrial and left ventricular dilation. No pleural nor pericardial effusions. ORGANS:  Mild gallbladder distention without findings of cholelithiasis or acute cholecystitis. No intrahepatic nor extrahepatic biliary dilation. Moderate to marked pancreatic atrophy. Normal liver, spleen, adrenal glands, kidneys. No calculi nor hydroureteronephrosis. GI/BOWEL:  Changes of partial sigmoid colectomy and primary anastomosis. Otherwise normal course and caliber of the stomach, small bowel, colon, and rectum without obstruction. Duodenal diverticulum without findings of acute diverticulitis. Normal appendix. Moderate semisolid stool predominantly in the right hemicolon. PELVIS:  Mild urinary bladder wall thickening with suggestion of subtle perivesical stranding. Mild prostatomegaly. PERITONEUM/RETROPERITONEUM:  Minimal atherosclerosis. No abdominal nor pelvic lymphadenopathy. No free intraperitoneal fluid nor gas. Persistence of a gas and stool containing collection in the presacral fascia near the colonic anastomosis with extensive surrounding soft tissue density, measuring up to 3.9 cm x 1.9 cm in greatest dimensions. SOFT TISSUES/BONES:  Similar appearance of a stomal hernia through the right rectus sheath, containing mesenteric fat and nonobstructed small bowel. No inguinal lymphadenopathy. No acute fractures nor suspicious osseous lesions. Bilateral spondylolysis at L5 without spondylolisthesis. 1. Persistence of soft tissue thickening in the presacral fascia adjacent to a colorectal anastomosis, likely due in part to scarring. However, there is suspected persistence of direct communication of the colorectal lumen given a 3.9 cm x 1.9 cm collection likely containing stool in this area.  2. Questionable

## 2019-04-10 NOTE — PROGRESS NOTES
Shift assessment completed. VSS, pt reported pain 8 on the scale of 10. Pain meds administered as per MAR. Call light within reach. Pt is encouraged to call when need something. Will continue monitoring pt for pain.

## 2019-04-11 NOTE — PROGRESS NOTES
[facility]        PATIENT NAME: Trevor MALONE'S DATE: 4/11/2019    SUBJECTIVE: CC: Abdominal pain  No acute events overnight  Continued baseline abdominal pain  Reports cramping pain after drinking Ensure last evening  No complaints of nausea or vomiting  Passing flatus       Current Medications:   Current Facility-Administered Medications: furosemide (LASIX) injection 40 mg, 40 mg, Intravenous, Daily  eplerenone (INSPRA) tablet 25 mg, 25 mg, Oral, Daily  morphine (PF) injection 0.5 mg, 0.5 mg, Intravenous, Q4H PRN  diazepam (VALIUM) tablet 5 mg, 5 mg, Oral, TID  rosuvastatin (CRESTOR) tablet 5 mg, 5 mg, Oral, Nightly  melatonin tablet 3 mg, 3 mg, Oral, Nightly PRN  famotidine (PEPCID) tablet 20 mg, 20 mg, Oral, Daily  simethicone (MYLICON) chewable tablet 80 mg, 80 mg, Oral, Q6H PRN  nortriptyline (PAMELOR) capsule 25 mg, 25 mg, Oral, Nightly  sacubitril-valsartan (ENTRESTO) 24-26 MG per tablet 1 tablet, 1 tablet, Oral, BID  carvedilol (COREG) tablet 12.5 mg, 12.5 mg, Oral, BID WC  sodium chloride flush 0.9 % injection 10 mL, 10 mL, Intravenous, 2 times per day  sodium chloride flush 0.9 % injection 10 mL, 10 mL, Intravenous, PRN  potassium chloride (KLOR-CON M) extended release tablet 40 mEq, 40 mEq, Oral, PRN **OR** potassium bicarb-citric acid (EFFER-K) effervescent tablet 40 mEq, 40 mEq, Oral, PRN **OR** potassium chloride 10 mEq/100 mL IVPB (Peripheral Line), 10 mEq, Intravenous, PRN  acetaminophen (TYLENOL) tablet 650 mg, 650 mg, Oral, Q4H PRN  ondansetron (ZOFRAN) injection 4 mg, 4 mg, Intravenous, Q6H PRN  enoxaparin (LOVENOX) injection 40 mg, 40 mg, Subcutaneous, Daily  benzocaine-menthol (CEPACOL SORE THROAT) lozenge 1 lozenge, 1 lozenge, Oral, Q2H PRN  HYDROcodone-acetaminophen (NORCO) 7.5-325 MG per tablet 2 tablet, 2 tablet, Oral, Q4H PRN  Prior to Admission medications    Medication Sig Start Date End Date Taking?  Authorizing Provider   eplerenone (INSPRA) 25 MG tablet Take 1 tablet by mouth daily 4/12/19  Yes Mayito Peña MD   carvedilol (COREG) 12.5 MG tablet TAKE ONE-HALF TABLET BY MOUTH EVERY MORNING AND TAKE 1 TABLET BY MOUTH AT NIGHT 3/14/19  Yes BENITEZ Orosco CNP   torsemide (DEMADEX) 20 MG tablet Take 1 tablet weekly  Patient taking differently: every other day Take 1 tablet weekly 2/13/19  Yes BENITEZ Orosco CNP   sacubitril-valsartan (ENTRESTO) 24-26 MG per tablet Take 1 tablet by mouth 2 times daily 2/11/19  Yes Fred Euceda MD   digoxin (LANOXIN) 125 MCG tablet Take 1 tablet by mouth daily 1/23/19  Yes Fred Euceda MD   nortriptyline (PAMELOR) 10 MG capsule Take 1 capsule by mouth nightly  Patient taking differently: Take 25 mg by mouth nightly  12/21/18  Yes Fred Euceda MD   simethicone (MYLICON) 80 MG chewable tablet Take 80 mg by mouth every 6 hours as needed for Flatulence   Yes Historical Provider, MD   ranitidine (ZANTAC) 150 MG tablet Take 150 mg by mouth daily   Yes Historical Provider, MD   melatonin 3 MG TABS tablet Take 3 mg by mouth nightly as needed   Yes Historical Provider, MD   HYDROcodone-acetaminophen (NORCO) 7.5-325 MG per tablet 1-2 tabs po q6-8h prn pain. 8/2/17  Yes BENITEZ Morton CNP   lovastatin (MEVACOR) 40 MG tablet Take 40 mg by mouth. Take 1 tablet (40 mg total) by mouth at bedtime. 8/19/13  Yes Historical Provider, MD   diazepam (VALIUM) 5 MG tablet Take 5 mg by mouth three times daily. Yes Historical Provider, MD   Cetirizine HCl (ZYRTEC PO) Take 1 tablet by mouth daily. Take only as needed for scrachy eyes. Yes Historical Provider, MD        Allergies:  Vancomycin; Bactrim [sulfamethoxazole-trimethoprim]; Corn-containing products; Eggs or egg-derived products; and Seasonal    Social History:    reports that he quit smoking about 12 years ago. He has a 5.00 pack-year smoking history. He quit smokeless tobacco use about 20 months ago.  His smokeless tobacco use included chew. He reports that he does not drink alcohol or use drugs. Family History:        Problem Relation Age of Onset    Cancer Mother     High Blood Pressure Father     Cancer Father        REVIEW OF SYSTEMS:  CONSTITUTIONAL:  negative  HEENT:  negative  RESPIRATORY:  negative  CARDIOVASCULAR:  negative  GASTROINTESTINAL:  negative except for abdominal pain  GENITOURINARY:  negative  HEMATOLOGIC/LYMPHATIC:  negative  NEUROLOGICAL:  negative  SKIN: negative      OBJECTIVE:  VITALS:  /64   Pulse 80   Temp 98.5 °F (36.9 °C) (Oral)   Resp 18   Ht 5' 10\" (1.778 m)   Wt 196 lb 3.2 oz (89 kg)   SpO2 95%   BMI 28.15 kg/m²     INTAKE/OUTPUT:    I/O last 3 completed shifts: In: Betty Horne [P.O.:1880]  Out: 5200 [Urine:5200]  No intake/output data recorded. CONSTITUTIONAL:  awake and alert  LUNGS:  clear to auscultation  HEART: RRR  ABDOMEN:  normal bowel sounds, soft, non-distended, non-tender      Data:  CBC:   Recent Labs     04/09/19  0430 04/10/19  0500   WBC 5.2 4.5   HGB 12.2* 11.7*   HCT 35.5* 34.2*    178     BMP:    Recent Labs     04/09/19  0430 04/10/19  0012 04/11/19  0246    141 145   K 3.8  3.8 3.6 3.7    107 107   CO2 26 23 26   BUN 6* 5* 7   CREATININE 0.6* 0.6* 0.7*   GLUCOSE 87 117* 94     Hepatic:   Recent Labs     04/09/19  0430   AST 14*   ALT 9*   BILITOT 0.3   ALKPHOS 79     Mag:    No results for input(s): MG in the last 72 hours. Phos:   No results for input(s): PHOS in the last 72 hours. INR: No results for input(s): INR in the last 72 hours. Radiology Review:  None      ASSESSMENT:  Abdominal pain  Overflow constipation  History of rectal cancer      Plan:  1. No acute surgical issues; abdominal pain and constipation resolved  2. Diet with nutritional supplements as tolerated  3. Diuresis per Cardiology  4. Activity as tolerated  5. Pulmonary toilet, incentive spirometry  6. PRN analgesics and antiemetics--minimizing narcotics as tolerated  7.  DVT prophylaxis defer to primary team  8. Management of medical comorbid etiologies per primary team and consulting services  9. Disposition: Okay for discharge home from a surgical perspective    EDUCATION:  Educated patient on plan of care and disease process--all questions answered. Plans discussed with patient and nursing. Reviewed and discussed with Dr. Angélica Duran.       Signed:  BENITEZ Lo - CNP  4/11/2019 4:02 PM        Abd exam is benign, nontender, non distended, Has his usual abdomen and rectal exam  Diet / Ensure as able  Check fu AXR  No additional intervention planned at this time    [place]      Surg Staff  Pt seen and examined with NP  See full note above  He has a benign stable abd exam  No additional intervention at this time  Will continue office follow up    [facility]

## 2019-04-11 NOTE — PROGRESS NOTES
Reassessment complete, VSS, no changes. Pt resting comfortably in bed, 0.5mg Morphine IV for pain, states has improved, labs drawn and sent to lab, call light within reach.

## 2019-04-11 NOTE — CARE COORDINATION
Patient discharged 04/10/19 to home w/no needs. All discharge needs met per case management.   Electronically signed by DOUG Khan on 4/10/2019 at 12:00 PM
The Utilization Review Committee members, including its physician members, have reviewed this case and agree that this patient does not meet evidence based criteria for inpatient services, requiring a change in patient status from \"admit as inpatient\" to \"place in observation\" in accordance with condition code 44. Medical care will continue to be provided by Leonarda Fothergill, MD, who concurs with this decision and has documented his/her concurrence in the patient's medical record.
(bathing/dressing/grooming): Independent  Ability to manage medications: Independent  Ability to prepare food:  Independent  Ability to maintain home (clean home, laundry): Independent  Ability to drive and/or has transportation:  Independent  Ability to do shopping:  Independent  Ability to manage finances: Independent  Is patient able to live independently?:  Yes  Hearing and Vision  Visual Impairment:  Reading glasses  Hearing Impairment:  None  Care Transitions Interventions         Follow Up: [facility]: Met with patient to discuss care transition. Role of CTC and care transition program explained to patient. If meets criteria, patient agreeable to participate in care transition program post discharge. Patient lives in private residence with wife and family. He is an active  and is independent with all ADL's. He does not report any discharge needs at this time. CTC provided contact information and will continue to monitor for discharge. Future Appointments   Date Time Provider Adis Finnegan   4/29/2019 10:20 AM Corazon Knott MD [facility] KW PAIN [facility]   5/29/2019 12:00 PM BENITEZ Desir - CNP  [facility]   6/11/2019  8:30 AM SCHEDULE, [place] PHONE TRANSMISSION  [facility]       [facility]  There are no preventive care reminders to display for this patient.     Kaycee Ramirez RN

## 2019-04-11 NOTE — PROGRESS NOTES
CLINICAL PHARMACY NOTE: MEDS TO 9950 Do It In Person Select Patient?: No  Total # of Prescriptions Filled: 1   The following medications were delivered to the patient:  · eplerenone 25mg  Total # of Interventions Completed: 1  Time Spent (min): 60    Additional Documentation:  Transferred RX from [facility]. Medication delivered- patient signed.   [facility]

## 2019-04-11 NOTE — PLAN OF CARE
Problem: Pain:  Description  Pain management should include both nonpharmacologic and pharmacologic interventions. Goal: Control of acute pain  Description  Control of acute pain  Outcome: Ongoing  Note:   Pain/discomfort being managed w/ PRN analgesics per MD orders. Pt. Able to express presence and absence of pain and rate pain appropriately using numerical scale. Problem: Falls - Risk of:  Goal: Will remain free from falls  Description  Will remain free from falls  Outcome: Completed  Note:   Pt. Free from falls this shift. Fall precautions in place at all times. Call light always in reach. Pt. Bonnielee Foots and agreeable to contact for safety appropriately. Problem: Bowel/Gastric:  Goal: Occurrences of nausea will decrease  Description  Occurrences of nausea will decrease  Outcome: Completed  Note:   Occurrences of nausea decreased w/ diet modification & PRN antiemetics. Pt able to verbalize onset of nausea and call nurse appropriately for intervention. Problem: Fluid Volume:  Goal: Maintenance of adequate hydration will improve  Description  Maintenance of adequate hydration will improve  Outcome: Completed  Note:   Maintenance of adequate hydration will improve by measuring intake & output & managing nausea.

## 2019-04-11 NOTE — DISCHARGE SUMMARY
[facility] -- Physician Discharge Summary     Radha Benavides  1970  MRN: [id number]    Admit Date: 4/8/2019  Discharge Date: No discharge date for patient encounter. Attending MD: Ravinder Steele MD  Discharging MD: Ravinder Steele MD  PCP: Carolyne Clifton MD [address] [id number] / [place] [phone]    Admission Diagnosis: LLQ pain [R10.32]  LLQ pain [R10.32]  LLQ pain [R10.32]  DISCHARGE DIAGNOSIS: abdominal pain    Full Hospital Problem List:  [facility] [address] Problems    Diagnosis Date Noted    Hypervolemia [E87.70]     Moderate malnutrition ([address] [E44.0] 04/09/2019    Abdominal pain [R10.9] 04/08/2019    LLQ pain [R10.32] 04/08/2019    Anemia [D64.9] 05/16/2017    Neoplasm related pain [G89.3] 10/18/2016    Rectal cancer ([facility] [address] [C20] 09/23/2016    Mixed hyperlipidemia [E78.2]     Essential hypertension [I10]            Hospital Course:  50 y. o. male who presents to the emergency department with LLQ pain and RLQ pain which is somewhat chronic in nature but worse in the past 1 week.  His GI specialist apparently told him that he has a \"bowel infection\".  Both pain areas are moderate with exacerbations to severe at times.  Palpation and coughing worsens symptoms.        Pt is admitted, placed on small doses of iv morphine for pain control. Seen by both GI and surgery for pain. CT showed pelvic fluid collection at the colorectal anastomosis which has been seen previously on CT and colonoscopy and has been followed by Dr Lenn Councilman and treated with antibiotics in the past.  Pt reports black diarrhea but the BMs here are noted to be soft and dark brown. Per Dr Franco Cha:  \"Dr. Ursula Russell gave the patient golytel for bowel purging, as his CT shows large amount of stool in the colon, suggesting overflow diarrhea from constipation   Patient's lower abdominal pain is the same   He has more BM over night      I spoke with Dr. Ursula Russell.   Dr. Ursula Russell believe that repeating the colonoscopy will not be helpful at this time. Patient has a chronic fluid collection in the sacrum on CT and this is walled off from the colo-rectal anastomosis. The colonoscopy with air insufflation can potentially open up the weak portion of the anastomosis, potentially causing bacterial translocation, infection, abscess and fistula formation. \"      His abdominal exam improves with conservative management  Pt encouraged to continue miralax    He is seen by his cardiologist while here and felt to be in mild/moderate overload from IVF  IVF are stopped, pt given IV lasix, and he feels much better  While here, Dr Devora Alvarez does increase his eplernone dose. Pt states that he feels at his baseline at time of discharge      Consults made during Hospitalization:  [address] TO CARDIOLOGY    Treatment team at time of Discharge: Treatment Team: Attending Provider: Fabian Jaramillo MD; Consulting Physician: Fabian Jaramillo MD; Surgeon: Nany Ag MD; Consulting Physician: Nany Ag MD; Consulting Physician: MD Carmelina; Consulting Physician: Billie Gray DO; Nursing Student: Cristhian Mckenzie; Registered Nurse: Genice Factor, RN; Respiratory Therapist (Day): Pedro Jacobson RCP; Registered Nurse: Beltran Chahal RN    Imaging Results:  Ct Abdomen Pelvis Wo Contrast Additional Contrast? None    Result Date: 4/8/2019  EXAMINATION: CT OF THE ABDOMEN AND PELVIS WITHOUT CONTRAST, 4/8/2019 7:49 am TECHNIQUE: CT of the abdomen and pelvis was performed without the administration of intravenous contrast. Multiplanar reformatted images are provided for review. Dose modulation, iterative reconstruction, and/or weight based adjustment of the mA/kV was utilized to reduce the radiation dose to as low as reasonably achievable. COMPARISON: April 2, 2019. January 27, 2019. HISTORY: ORDERING SYSTEM PROVIDED HISTORY: Abdominal pain.  TECHNOLOGIST PROVIDED HISTORY: Additional Contrast?->None Ordering Physician Provided Reason for Exam: Abd pain. Acuity: Unknown Type of Exam: Unknown FINDINGS: Lower Chest: No focal consolidation or pleural effusion. Mild interlobular septal thickening with ground-glass opacities are identified in the right lung. Two nonspecific pulmonary nodules are seen in the left lower lobe measuring up to 3 mm, new since April 2, 2019, most compatible with an infectious/inflammatory etiology. The heart is enlarged. Organs: The liver, gallbladder, spleen, pancreas, adrenal glands, and kidneys demonstrate no acute abnormality. Bilateral ureters are normal in course and caliber. No ureteral calculi. GI/Bowel: The stomach is normal.  The small bowel and colon demonstrate extensive postoperative changes. Findings include partial bowel resections. The anastomoses are unremarkable. Extensive soft tissue is identified in the presacral space, unchanged from the prior study. Hernia is identified in the right mid abdomen. No evidence of obstruction. Pelvis: Normal bladder. Prostate and seminal vesicles are grossly unremarkable. Peritoneum/Retroperitoneum: The aorta and its branches are normal in course and caliber with minimal atherosclerosis. No pathologic lymphadenopathy. No free fluid or free air. Extensive soft tissue is seen in the presacral region, unchanged from the prior study but not well evaluated without IV contrast.  Some foci of gas appear extraluminal in location. Bones/Soft Tissues: No acute or aggressive osseous lesion. Bilateral L5 pars defects are seen with grade 1 anterolisthesis of L5 on S1.     1. No new intra-abdominal abnormality. 2. Similar appearance since the prior study of extensive soft tissue in the presacral region, questionable extraluminal gas.   The finding is not well evaluated without rectal or IV contrast. 3. Interval appearance of two subsolid pulmonary nodules in the left lower lobe since April 2019, pelvic lymphadenopathy. No free intraperitoneal fluid nor gas. Persistence of a gas and stool containing collection in the presacral fascia near the colonic anastomosis with extensive surrounding soft tissue density, measuring up to 3.9 cm x 1.9 cm in greatest dimensions. SOFT TISSUES/BONES:  Similar appearance of a stomal hernia through the right rectus sheath, containing mesenteric fat and nonobstructed small bowel. No inguinal lymphadenopathy. No acute fractures nor suspicious osseous lesions. Bilateral spondylolysis at L5 without spondylolisthesis. 1. Persistence of soft tissue thickening in the presacral fascia adjacent to a colorectal anastomosis, likely due in part to scarring. However, there is suspected persistence of direct communication of the colorectal lumen given a 3.9 cm x 1.9 cm collection likely containing stool in this area. 2. Questionable mild cystitis. Xr Chest Portable    Result Date: 4/8/2019  EXAMINATION: SINGLE XRAY VIEW OF THE CHEST 4/8/2019 7:14 am COMPARISON: 07/30/2018 HISTORY: ORDERING SYSTEM PROVIDED HISTORY: cough TECHNOLOGIST PROVIDED HISTORY: Reason for exam:->cough Ordering Physician Provided Reason for Exam: Cough (pt states he woke up with cought and congestion also c/o abd pain rates pain 10/10 ) Acuity: Unknown Type of Exam: Unknown FINDINGS: There is bibasilar atelectasis. Cardiomegaly is stable status post single lead ICD. There is no pneumothorax or pleural effusion. The left port terminates in the distal SVC. 1.  No acute abnormality. Xr Abdomen (2 Views)    Result Date: 4/10/2019  EXAMINATION: TWO XRAY VIEWS OF THE ABDOMEN 4/10/2019 6:58 pm COMPARISON: 10/26/2016. HISTORY: ORDERING SYSTEM PROVIDED HISTORY: Abdominal Pain TECHNOLOGIST PROVIDED HISTORY: Reason for exam:->Abdominal Pain Ordering Physician Provided Reason for Exam: C/o abdominal pain. Acuity: Acute Type of Exam: Initial FINDINGS: There is a right-sided cardiac device.   There is a normal bowel gas pattern without evidence of obstruction. No renal calculi. The visualized osseous structures are unremarkable. 1. Normal bowel gas pattern without evidence of obstruction. Discharge Exam:  /70   Pulse 71   Temp 98.5 °F (36.9 °C) (Oral)   Resp 18   Ht 5' 10\" (1.778 m)   Wt 196 lb 3.2 oz (89 kg)   SpO2 91%   BMI 28.15 kg/m²    General appearance: alert, appears stated age and cooperative  Head: Normocephalic, without obvious abnormality, atraumatic  Lungs: clear to auscultation bilaterally  Heart: regular rate and rhythm, S1, S2 normal, no murmur, click, rub or gallop  Abdomen: soft, non-tender; bowel sounds normal; no masses,  no organomegaly  Extremities: trace edema, no clubbing  Pulses: 2+ and symmetric      Disposition: home    Condition: stable    Discharge Medications:   Minerva Nair   Home Medication Instructions NRU:[id number]    Printed on:04/11/19 4851   Medication Information                      carvedilol (COREG) 12.5 MG tablet  TAKE ONE-HALF TABLET BY MOUTH EVERY MORNING AND TAKE 1 TABLET BY MOUTH AT NIGHT             Cetirizine HCl (ZYRTEC PO)  Take 1 tablet by mouth daily. Take only as needed for scrachy eyes. diazepam (VALIUM) 5 MG tablet  Take 5 mg by mouth three times daily. digoxin (LANOXIN) 125 MCG tablet  Take 1 tablet by mouth daily             eplerenone (INSPRA) 25 MG tablet  Take 1 tablet by mouth daily             HYDROcodone-acetaminophen (NORCO) 7.5-325 MG per tablet  1-2 tabs po q6-8h prn pain.             lovastatin (MEVACOR) 40 MG tablet  Take 40 mg by mouth. Take 1 tablet (40 mg total) by mouth at bedtime.              melatonin 3 MG TABS tablet  Take 3 mg by mouth nightly as needed             nortriptyline (PAMELOR) 10 MG capsule  Take 1 capsule by mouth nightly             ranitidine (ZANTAC) 150 MG tablet  Take 150 mg by mouth daily             sacubitril-valsartan (ENTRESTO) 24-26 MG per tablet  Take 1 tablet by

## 2019-04-11 NOTE — PROGRESS NOTES
toxicity     okay for discharge. Will get him back to the office in the next 2 weeks.     I appreciate the opportunity of cooperating in the care of this patient.         NYHA Class:     Marilee Buckner MD, 4/11/2019 11:51 AM

## 2019-04-11 NOTE — PROGRESS NOTES
Assessment complete, VSS, medications administered. Pt satisfied w/ [place] for pain. Resting comfortably in bed, able to use urinal independently, less drowsy, will continue to monitor needs.

## 2019-04-17 NOTE — PROGRESS NOTES
[address]  Progress Note    Primary Care Doctor:  Lior Lopes MD    Chief Complaint   Patient presents with    Follow-Up from Hospital     Denies cp, dizziness, edema    Hasn't been able to eat, having issues with colostomy    Congestive Heart Failure    Hypertension    Fatigue    Shortness of Breath     when walking distance when first getting out of the hospital; not an every day thing    Cough     cough and hacking, from seasonal allergies        History of Present Illness:  50 y.o. male with history of sHF, NICM, HTN, HLD and rectal cancer s/p chemo and XRT. [facility] 9/15/16 with no coronary artery disease, LVEF 15%, LVEDP 20-25 mmHg. He also underwent surgery for repair of ventral hernia and new colostomy 12/17 and then a reversal of colostomy, abscesses post op that have been drained. I had the pleasure of seeing Rachel Aquino in follow up for hospitalization 4/8-11/19 for LLQ and RLQ abdominal pain (chronic in nature) but worse over past week. He was seen by surgery and GI for pain, CT showed pelvic fluid collection at the colorectal anastomosis (tx with antibiotics in past), overflow diarrhea from constipation. He was given IVF and then IV lasix. He is ambulatory by his self. His weight has gone from 200->196->184. He continues with some abdominal pain but worse is no appetite. He continues on digoxin which was stopped in the hospital.  He is not sure of his medications. He is to see Dr Marcelo Fried later today. His sob is less and is only taking the torsemide on mon wed and fri. optival is showing improvement in his fluid.       Past Medical History:   has a past medical history of Allergic, Anesthesia, Arthritis, CAD (coronary artery disease), Cancer ([facility] [address] CHF (congestive heart failure) ([facility] [address] Clostridium difficile infection, Depression motion, Diverticulitis, Diverticulosis, History of alcohol abuse, Hyperlipidemia, Hypertension, Irregular heart beat, Pneumonia, Right knee 04/10/2019    RBC 3.88 04/09/2019    RBC 3.93 04/08/2019    RBC 3.74 07/05/2017    RBC 3.91 06/21/2017    RBC 3.95 06/07/2017    HGB 11.7 04/10/2019    HGB 12.2 04/09/2019    HGB 12.3 04/08/2019    HCT 34.2 04/10/2019    HCT 35.5 04/09/2019    HCT 35.7 04/08/2019    MCV 93.1 04/10/2019    MCV 91.6 04/09/2019    MCV 90.8 04/08/2019    RDW 13.8 04/10/2019    RDW 13.6 04/09/2019    RDW 13.9 04/08/2019     04/10/2019     04/09/2019     04/08/2019     BMP:  Lab Results   Component Value Date     04/11/2019     04/10/2019     04/09/2019    K 3.7 04/11/2019    K 3.6 04/10/2019    K 3.8 04/09/2019    K 3.8 04/09/2019    K 4.1 04/08/2019    K 4.3 04/02/2019     04/11/2019     04/10/2019     04/09/2019    CO2 26 04/11/2019    CO2 23 04/10/2019    CO2 26 04/09/2019    PHOS 3.6 01/28/2019    PHOS 3.6 04/23/2018    BUN 7 04/11/2019    BUN 5 04/10/2019    BUN 6 04/09/2019    CREATININE 0.7 04/11/2019    CREATININE 0.6 04/10/2019    CREATININE 0.6 04/09/2019     BNP:   Lab Results   Component Value Date    PROBNP 4,951 04/10/2019    PROBNP 8,173 04/08/2019    PROBNP 2,499 03/07/2019     Cardiac Imaging:  Echo 9/19/2018:  Summary   -Limited echo to evaluate ejection fraction.   -Left ventricular cavity size is dilated with normal wall thickness.   -Overall left ventricular systolic function appears reduced with a estimated ejection fraction of 25-30%. -3D ejection fraction calculated at 33.4%   Pacer / ICD wire is visualized in the right heart. [facility] 9/15/2016:  Cath via radial unable to manipulate catheter into ascending aorta-small subintimal dissection in subclavian occurred. Cath via right femoral shows normal coronaries,LVEDP 20-25. EF 15%     Echo 11/30/2017:  Summary  Left ventricular size is markedly dilated and hypokinetic. LV function is   severely reduced at 20-25%  Mild mitral regurgitation is present. The left atrium is dilated.   Pacer / ICD wire is visualized in the right ventricle. Mild tricuspid regurgitation with RVSP estimated at 30 mmHg. Assessment:    1. Chronic systolic heart failure (HCC) on arni, bb and inspra   2. ICD (implantable cardioverter-defibrillator) in place    3. Cardiomyopathy, idiopathic ([address]    4. Essential hypertension controlled       Plan:   Patient Instructions   1. Stop digoxin for now  2. Check blood work   3. Continue all other medication  4. RTO May 29 th or sooner with issues    2 pm He called me when he got home and tells me that he was not on his entresto for several weeks prior to recent admission. He had only been taking 1/2 of the 24-26 mg bid due to some hypotension prior to that. He is unsure of all his medications and will call us with what is in his \"soup\" bowl where he keeps all his medications when he gets home from appt with Dr Angela Reyes. He will take entresto 1/2 of the 24-26 mg bid for now    I appreciate the opportunity of cooperating in the care of this individual.    Vani Hutchinson, CNS, 4/17/2019, 2:17 PM    QUALITY MEASURES  1. Tobacco Cessation Counseling: NA  2. Retake of BP if >140/90:   NA  3. Documentation to PCP/referring for new patient:  Sent to PCP at close of office visit  4. CAD patient on anti-platelet: NA  5. CAD patient on STATIN therapy:  NA  6.  Patient with CHF and aFib on anticoagulation:  NA

## 2019-04-17 NOTE — PATIENT INSTRUCTIONS
1.  Stop digoxin for now  2. Check blood work   3. Continue all other medication  4.   RTO May 29 th or sooner with issues

## 2019-04-17 NOTE — TELEPHONE ENCOUNTER
Pt just saw NPRG and thought she stated to continue all meds. He is confused, he thought LAKHWINDER took him off the [place]. He said he was just put on eplerenone (INSPRA) 25 MG tablet  And at first he was taking 1 tab Entresto 2x daily then LAKHWINDER lowered it to 1/2 tab 2x daily but then he thought while in hospital she stopped the [place] totally. He said that he took 1/2 tab of Entresto today when he got home but then started thinking that he wasn't supposed to take that any longer. He is confused on what meds & doses he is actually supposed to be taking and would like to discuss this with someone. Pls call to advise. Thank you.

## 2019-04-18 NOTE — TELEPHONE ENCOUNTER
I have added this information to the telephone encounter from yesterday and forwarded to [facility]    Will close this one

## 2019-04-18 NOTE — TELEPHONE ENCOUNTER
Pt called, NPRG wanted him to call and provide all medications he is currently taking. Carvedilol - 12.5 mg - 1 whole tab am 1/2 tab midnight  Nortriptyline - 50 mg - 1 tab @ night  Torsemide - 20 mg - M/W/F  1 tab daily  Entresto - 24-26  1/2 tab twice daily  Diazepam - 5 mg up to 4 tab daily as needed  Eplerenone - 25 mg - 1 tab daily in am  Norco -  2 tab every 4 hrs as needed  Melatonin - 10 mg as needed for sleep  GasX as needed for bloating  Zyrtec - 10 mg - 1 tab at night  Lovastatin - 40 mg - 1 tab at night  [place] (Zantac) 150 mg  2 times daily    Pls call to advise. Thank you.

## 2019-04-18 NOTE — TELEPHONE ENCOUNTER
I called the patient as I hadn't received a call back. He did call back this AM and left list with Hortencia Naylor in call center:(in another encounter)      PLEASE NOTE:  I went over our med list with the one below, here are the differences that need changes on MAR:  1. Nortiptyline we had he is taking 25 mg q hs - He called in 50 mg po at hs    2. Diazepam 5mg tabs - we have 3 times a day prn, he stated he is taking up to 4 times a day prn    3. Norco, we have strength as 7.5-325 mg q 6-8 hours; he is taking  mg 2 tabs every 4 hrs prn    Those are the only changes needed.     Hortencia Naylor           4/18/19 1:13 PM   Note      Pt called, NPRG wanted him to call and provide all medications he is currently taking.      Carvedilol - 12.5 mg - 1 whole tab am 1/2 tab midnight  Nortriptyline - 50 mg - 1 tab @ night  Torsemide - 20 mg - M/W/F  1 tab daily  Entresto - 24-26  1/2 tab twice daily  Diazepam - 5 mg up to 4 times daily as needed  Eplerenone - 25 mg - 1 tab daily in am  Norco -  2 tab every 4 hrs as needed  Melatonin - 10 mg as needed for sleep  GasX as needed for bloating  Zyrtec - 10 mg - 1 tab at night  Lovastatin - 40 mg - 1 tab at night  [place] (Zantac) 150 mg  2 times daily

## 2019-04-29 PROBLEM — T45.1X5A CHEMOTHERAPY ADVERSE REACTION: Status: RESOLVED | Noted: 2019-01-01 | Resolved: 2019-01-01

## 2019-04-29 PROBLEM — C18.9 COLON CANCER (HCC): Status: ACTIVE | Noted: 2019-01-01

## 2019-04-29 PROBLEM — F51.01 PRIMARY INSOMNIA: Status: ACTIVE | Noted: 2019-01-01

## 2019-04-29 PROBLEM — M79.7 FIBROMYALGIA: Status: ACTIVE | Noted: 2019-01-01

## 2019-04-29 PROBLEM — C18.9 COLON CANCER (HCC): Status: RESOLVED | Noted: 2017-02-04 | Resolved: 2019-01-01

## 2019-04-29 PROBLEM — R10.84 GENERALIZED ABDOMINAL PAIN: Status: ACTIVE | Noted: 2019-01-01

## 2019-04-29 PROBLEM — G89.4 CHRONIC PAIN SYNDROME: Status: ACTIVE | Noted: 2019-01-01

## 2019-04-29 PROBLEM — T45.1X5A CHEMOTHERAPY ADVERSE REACTION: Status: ACTIVE | Noted: 2019-01-01

## 2019-05-06 NOTE — ED PROVIDER NOTES
[address] [facility] Emergency Department      Pt Name: Greyson Martel  MRN: [id number]  [place] 1970  Date of evaluation: 5/6/2019  Provider: Martin Tavares MD  CHIEF COMPLAINT  Chief Complaint   Patient presents with    Abdominal Pain     pt has been issues with abdominal pain x a few weeks. has been passing bloody stool. states he started to bloat the last few days. HPI  Greyson Martel is a 50 y.o. male who presents because of abdominal pain. Pain is diffuse. He also feels very bloated. He has a history of colon cancer and had colostomy with reversal.  He has had problems with pelvis abscesses. He reports seeing blood in his stool that is bright red for the past 15 days. He's also had increasing pain for several weeks since he was discharged last month. He did take MiraLAX which she takes daily as well as magnesium titrate to move his bowels. He says that he's been passing liquid. He denies any fever. He has nausea. REVIEW OF SYSTEMS:  No fever, no breathing difficulty, no dysuria Pertinent positives and negatives as per the HPI. All other review of systems reviewed and negative. Nursing notes reviewed. PAST MEDICAL HISTORY:  Past Medical History:   Diagnosis Date    Allergic     Anesthesia     tremors    Arthritis     CAD (coronary artery disease)     Cancer ([facility] [address]     colon    Chemotherapy adverse reaction 4/29/2019    CHF (congestive heart failure) ([facility])     Clostridium difficile infection 07/11/2016    PCR+    Depression motion     Diverticulitis     Diverticulosis     History of alcohol abuse     Hyperlipidemia     Hypertension     Irregular heart beat     states been told he has had in past. Never treated. Sees PCP every 3 mos.  and EKG yearly    Pneumonia     Right knee pain     Shoulder injury     and arthrisits, injury rotaotr cuff    Sleep difficulties     with depression     SURGICAL HISTORY  Past Surgical History:   Procedure Laterality Date    ABDOMEN SURGERY  ABSCESS DRAINAGE      BREAST SURGERY Right 1/21/2019    RIGHT BREAST EXCISIONAL BIOPSY performed by Zana Velazquez MD at [facility]  09/14/2016    No stents placed   [facility] COLONOSCOPY  08/29/2016    with biopsy and polypectomy    COLONOSCOPY  03/19/2018    ENDOSCOPIC ULTRASOUND (LOWER)  09/14/2016    for staging - Dr. Adama Honeycutt, COLON, DIAGNOSTIC     [facility] HERNIA REPAIR  12/01/2017    peristomal hernia repair    HERNIA REPAIR  04/18/2018    S/P: colostomy closure with hernia repair with mesh, with Dr. Luther Dominguez at [facility].  INNER EAR SURGERY  tube right ear    JOINT REPLACEMENT      left TKR    KNEE ARTHROSCOPY Right [id number]    KNEE ARTHROSCOPY Right 8/4/2014    medial meniscus    KNEE SURGERY  08/24/2011    removal of tibial component    OTHER SURGICAL HISTORY  7/1/13    ACL Rt knee meniscus repair synovectomy    OTHER SURGICAL HISTORY  12/01/2017    takedown of ileostomy    PACEMAKER PLACEMENT  10/26/2017    ICD placed right chest    REVISION COLOSTOMY  04/18/2018    S/P: colostomy closure with hernia repair with mesh, with Dr. Luther Dominguez at [facility].  SHOULDER ARTHROSCOPY Right 9/22/15    SUBACROMIAL DECOMPRESSION, DISTAL CLAVICLE EXCISION, LABRAL DEBRIDEMETN    SIGMOIDOSCOPY N/A 11/23/2018    SIGMOIDOSCOPY BIOPSY FLEXIBLE performed by Meryle Locks, MD at [address] [place] [address]  4 surgeries    SMALL INTESTINE SURGERY  01/11/2017    LAPAROSCOPIC LOW ANTERIOR RESECTION, ILEOSTOMY    SMALL INTESTINE SURGERY  12/01/2017    small bowel resection    TUNNELED VENOUS PORT PLACEMENT Left 02/13/2017    PORT-A-CATH INSERTION                     TUNNELED VENOUS PORT PLACEMENT      UMBILICAL HERNIA REPAIR  11/11/14    UPPER GASTROINTESTINAL ENDOSCOPY  10/03/2017     MEDICATIONS:  No current facility-administered medications on file prior to encounter. Current Outpatient Medications on File Prior to Encounter   Medication Sig Dispense Refill    DULoxetine (CYMBALTA) 30 MG extended release capsule Take 1 capsule by mouth daily 30 capsule 1    QUEtiapine (SEROQUEL) 25 MG tablet Take 1-2 tablets by mouth nightly 60 tablet 0    pregabalin (LYRICA) 75 MG capsule One tab hs 1 week, 2 tabs hs 1 week, 1 tab am 2 tabs pm 90 capsule 0    eplerenone (INSPRA) 25 MG tablet Take 1 tablet by mouth daily 30 tablet 3    carvedilol (COREG) 12.5 MG tablet TAKE ONE-HALF TABLET BY MOUTH EVERY MORNING AND TAKE 1 TABLET BY MOUTH AT NIGHT (Patient taking differently: TAKE ONE TABLET BY MOUTH EVERY MORNING AND TAKE 1/2 TABLET BY MOUTH AT NIGHT) 180 tablet 0    torsemide (DEMADEX) 20 MG tablet Take 1 tablet weekly (Patient taking differently: every other day Indications: takes three days a week Mon-Wed-Fri Take 1 tablet weekly) 36 tablet 3    sacubitril-valsartan (ENTRESTO) 24-26 MG per tablet Take 1 tablet by mouth 2 times daily 180 tablet 3    nortriptyline (PAMELOR) 10 MG capsule Take 1 capsule by mouth nightly (Patient taking differently: Take 25 mg by mouth nightly ) 30 capsule 3    simethicone (MYLICON) 80 MG chewable tablet Take 80 mg by mouth every 6 hours as needed for Flatulence      ranitidine (ZANTAC) 150 MG tablet Take 150 mg by mouth daily      melatonin 3 MG TABS tablet Take 3 mg by mouth nightly as needed      lovastatin (MEVACOR) 40 MG tablet Take 40 mg by mouth. Take 1 tablet (40 mg total) by mouth at bedtime.  diazepam (VALIUM) 5 MG tablet Take 5 mg by mouth three times daily.  Cetirizine HCl (ZYRTEC PO) Take 1 tablet by mouth daily. Take only as needed for scrachy eyes. ALLERGIES  Vancomycin; Bactrim [sulfamethoxazole-trimethoprim];  Corn-containing products; Eggs or egg-derived products; and Seasonal  FAMILY HISTORY:  Family History   Problem Relation Age of Onset    Cancer Mother     High Blood Pressure Father     Cancer Father SOCIAL HISTORY:  Social History     Tobacco Use    Smoking status: Former Smoker     Packs/day: 1.00     Years: 5.00     Pack years: 5.00     Last attempt to quit: 2007     Years since quittin.3    Smokeless tobacco: Former User     Types: Chew     Quit date: 8/10/2017   Substance Use Topics    Alcohol use: No     Comment: history of alcohol abuse 20 years ago    Drug use: No     IMMUNIZATIONS:  Noncontributory    PHYSICAL EXAM  VITAL SIGNS:  /66   Pulse 80   Temp 98.6 °F (37 °C) (Infrared)   Resp 18   Ht 5' 10\" (1.778 m)   Wt 181 lb 8 oz (82.3 kg)   SpO2 99%   BMI 26.04 kg/m²   Constitutional:  50 y.o. male cooperative, nontoxic  HENT:  Atraumatic, mucous membranes moist  Eyes:   Conjunctiva clear, no icterus  Neck:  Supple, no adenopathy  Cardiovascular:  Regular, no rubs, no discernible murmur  Thorax & Lungs:  No accessory muscle usage, clear  Abdomen:  Soft, slightly distended, bowel sounds present, no tenderness to palpation  Back:  No deformity  Genitalia:  Deferred  Rectal:  Deferred  Extremities:  No cyanosis, no edema  Skin:  Warm, dry  Neurologic:  Alert, no slurred speech  Psychiatric:  Affect appropriate    DIAGNOSTIC RESULTS:  Labs resulted at the time of this note reviewed.    Labs Reviewed   CBC WITH AUTO DIFFERENTIAL - Abnormal; Notable for the following components:       Result Value    RBC 3.99 (*)     Hemoglobin 12.7 (*)     Hematocrit 37.1 (*)     Lymphocytes # 0.9 (*)     All other components within normal limits    Narrative:     Performed at:  [facility] [address], [place], [address]   Phone [phone]   COMPREHENSIVE METABOLIC PANEL W/ REFLEX TO MG FOR LOW K - Abnormal; Notable for the following components:    Sodium 134 (*)     Chloride 98 (*)     CREATININE 0.5 (*)     All other components within normal limits    Narrative:     Performed at:  [facility] [address], [place] [address]   Phone [phone]   URINE RT REFLEX TO CULTURE    Narrative:     Performed at:  [facility]  [address] [facility]  [place], [address]   Phone [phone]   LIPASE    Narrative:     Performed at:  [facility]  [address] [facility],  [place], [address]   Phone [phone]   PROTIME-INR    Narrative:     Performed at:  [facility] [address],  [place], [address]   Phone [phone]   APTT    Narrative:     Performed at:  [facility] [address] [address] [facility],  [place], [address]   Phone [phone]   TYPE AND SCREEN    Narrative:     Performed at:  [facility] [address] [facility],  [place], [address]   Phone [phone]     RADIOLOGY:    CT ABDOMEN PELVIS W IV CONTRAST Additional Contrast? None   Final Result   Cardiomegaly with bibasilar mild ground-glass opacity and interlobular septal   thickening. Correlation for mild edema is recommended. Stable intra-abdominopelvic findings including abnormal soft tissue   attenuation within the pre sacral space status post partial colonic   resection. Findings could be postsurgical passes infectious/inflammatory. Recurrent disease is not excluded. Continued attention to this finding on   follow-up is recommended.            ED COURSE:    Medications administered:  Medications   hyoscyamine (LEVSIN) 500 MCG/ML injection 500 mcg (has no administration in time range)   morphine injection 4 mg (4 mg Intravenous Given 5/6/19 1929)   ondansetron (ZOFRAN) injection 4 mg (4 mg Intravenous Given 5/6/19 1929)   iopamidol (ISOVUE-370) 76 % injection 75 mL (75 mLs Intravenous Given 5/6/19 2050)   HYDROmorphone HCl PF (DILAUDID) injection 1 mg (1 mg Intravenous Given 5/6/19 2156)     PROCEDURES:  None    CRITICAL CARE:  None    CONSULTATIONS:  Dr. Ayden Lambert: Saima Jesus is a 50 y.o. male who presented because of abdominal pain. He does have chronic abdominal pain and is on pain management. He has a lot of side effects from treatment from cancer and radiation. He is followed closely by his surgeon. I did contact Dr. Livan Painting who is covering tonight. He is also familiar with this patient. We reviewed all his test results. He recommends outpatient follow-up in the office tomorrow. Although the patient reports passing blood in his stool for 2 weeks, his H&H is improved for the last time we checked blood work. Shaneka Chidi was given appropriate discharge instructions. Referral to follow up provider. Differential Diagnosis: appendicitis, obstruction, perforated viscus, intestinal ischemia, AAA dissection, other    FOLLOW UP:    Jen Beauchamp MD  [address] [place] [address] [phone]    Call in 1 day      [facility] Emergency Department  [address] [facility]  [address]  Go to   If symptoms worsen    FINAL IMPRESSION:    1. Abdominal pain, unspecified abdominal location      (Please note that I used voice recognition software to generate this note.   Occasionally words are mistranscribed despite my efforts to edit errors.)       Preethi Beverly MD  05/06/19 0475

## 2019-05-06 NOTE — ED NOTES
Bed: 11  Expected date:   Expected time:   Means of arrival:   Comments:  [address], RN  05/06/19 6507

## 2019-05-07 NOTE — ED NOTES
Pt back in room and c/o sever pain in the abdominal region. Pt requesting for a bridger pain medication. This nurse asked pt what they would like to ease they pain they said 'anything but morphine because it doesn't help\"/ Provider made aware.      Ben Carcamo RN  05/06/19 9959

## 2019-05-07 NOTE — PROGRESS NOTES
[facility] GENERAL AND LAPAROSCOPIC SURGERY          PATIENT NAME: Ana Guevara     TODAY'S DATE: 5/7/2019    SUBJECTIVE:  CC pain  Pt with abd pain, left and upper, states sharp, severe, radiates across, disabling, worse with pressure. Can't eat, feels swelling and bloated. Passes blood per rectum. Went to ER last nighr. OBJECTIVE:  VITALS:  BP 92/61   Wt 177 lb (80.3 kg)   BMI 25.40 kg/m²     CONSTITUTIONAL:  fatigued and alert  LUNGS:  clear to auscultation  HEAR: RRR  ABDOMEN:  normal bowel sounds, soft, non-distended, tenderness noted vaguely on the left, more diffuse not focal     Data:  CBC:   Recent Labs     05/06/19 1947   WBC 7.6   HGB 12.7*   HCT 37.1*        BMP:    Recent Labs     05/06/19 1946   *   K 4.3   CL 98*   CO2 24   BUN 11   CREATININE 0.5*   GLUCOSE 97     Hepatic:   Recent Labs     05/06/19 1946   AST 20   ALT 15   BILITOT 0.3   ALKPHOS 76     Mag:    No results for input(s): MG in the last 72 hours. Phos:   No results for input(s): PHOS in the last 72 hours. INR:   Recent Labs     05/06/19 1947   INR 1.10       Radiology Review:   EXAMINATION:   CT OF THE ABDOMEN AND PELVIS WITH CONTRAST 5/6/2019 8:50 pm       TECHNIQUE:   CT of the abdomen and pelvis was performed with the administration of   intravenous contrast. Multiplanar reformatted images are provided for review. Dose modulation, iterative reconstruction, and/or weight based adjustment of   the mA/kV was utilized to reduce the radiation dose to as low as reasonably   achievable. COMPARISON:   04/08/2019       HISTORY:   ORDERING SYSTEM PROVIDED HISTORY: ABDOMINAL PAIN   TECHNOLOGIST PROVIDED HISTORY:   Additional Contrast?->None   Ordering Physician Provided Reason for Exam: Abdominal pain   Acuity: Unknown   Type of Exam: Unknown       FINDINGS:   Lower Chest: The heart is enlarged. The distal aspect of cardiac lead is   noted with its tip within the right ventricular apex.   Minimal ground-glass   opacity interlobular septal thickening are seen within the lung bases. Focal sub solid nodules in the left lower lobe has resolved since 04/09/2019       Organs: The liver, gallbladder, spleen, pancreas, adrenal glands, and kidneys   show no acute abnormality. GI/Bowel: Postsurgical changes the bowel are present. Again identified are   anastomotic sutures within the posterior lower pelvis associated with   extensive presacral soft tissue attenuation. Presacral tissue attenuation   measures up to about 2 cm in thickness, not definitely changed since the   prior study given differences in technique. There is extension of portion of the anterior wall the proximal transverse   colon and small bowel loops in into a anterior abdominal wall defect. No   bowel obstruction is suspected at this time. Pelvis: The urinary bladder is within normal limits. Prostate gland shows no   acute abnormality. Peritoneum/Retroperitoneum: The aorta is normal in caliber and course,   showing a few calcifications. Bones/Soft Tissues: No acute bony abnormality. Impression   Cardiomegaly with bibasilar mild ground-glass opacity and interlobular septal   thickening. Correlation for mild edema is recommended. Stable intra-abdominopelvic findings including abnormal soft tissue   attenuation within the pre sacral space status post partial colonic   resection. Findings could be postsurgical passes infectious/inflammatory. Recurrent disease is not excluded. Continued attention to this finding on   follow-up is recommended.              ASSESSMENT AND PLAN:  Rectal cancer, history, prior resection, neoadj, and adj treatment  Abd pain  Hematochezia  Subjective constipation  Normal hydration B/Cr, and Hb of 12  No acute infection present; afebrile, WBC 7    Pt with progressive symptoms, mentally, and somewhat physically disabling  DW Dr. Don Gutiérrez  Will do a full colonoscopy, screen for addn cancer / recurrence, and attempt dil of colorectal anastomosis  If non helpful will do a colostomy     Tripp Souza

## 2019-05-07 NOTE — ED NOTES
This nurse received report Lazaro Ash. This nurse introduced self to pt. C/o pain 14/10. Medications administered per order. VSS. No symptoms of distress.  Call light is within reach,      Trung Ricardo RN  05/06/19 5240

## 2019-05-10 NOTE — PROGRESS NOTES
(including no eye makeup) or nail polish on your fingers or toes. 10. DO NOT wear any jewelry or piercings on day of surgery. All body piercing jewelry must be removed. 11. If you have ___dentures, they will be removed before going to the OR; we will provide you a container. If you wear ___contact lenses or ___glasses, they will be removed; please bring a case for them. 12. Please see your family doctor/pediatrician for a history & physical and/or concerning medications. Bring any test results/reports from your physician's office. PCP__________________Phone___________H&P Appt. Date________             13 If you  have a Living Will and Durable Power of  for Healthcare, please bring in a copy. 15. Notify your Surgeon if you develop any illness between now and surgery  time, cough, cold, fever, sore throat, nausea, vomiting, etc.  Please notify your surgeon if you experience dizziness, shortness of breath or blurred vision between now & the time of your surgery             15. DO NOT shave your operative site 96 hours prior to surgery. For face & neck surgery, men may use an electric razor 48 hours prior to surgery. 16. Shower the night before surgery with ___Antibacterial soap ___Hibiclens             17. To provide excellent care visitors will be limited to one in the room at any given time. 18.  Please bring picture ID and insurance card. 19.  Visit our web site for additional information:  [email or web address]              20.During flu season no children under the age of 15 are permitted in the hospital for the safety of all patients.                               21. If you take a long acting insulin in the evening only  take half of your usual  dose the night  before your procedure              22. If you use a c-pap please bring DOS if staying overnight,             23.For your convenience [facility] has a pharmacy on site to fill your prescriptions. 24. If you use oxygen and have a portable tank please bring it  with you the DOS             25. Bring a complete list of all your medications with name and dose include any supplements. 26. Other__________________________________________   *Please call pre admission testing if you any further questions   Vivian Garcia [address]    [place] [id number]  [phone]   [address] [place] [address]       All above information reviewed with patient in person or by phone. Patient verbalizes understanding. All questions and concerns addressed.                                                                                                  Patient/Rep____________________                                                                                                                                    PRE OP INSTRUCTIONS

## 2019-05-10 NOTE — TELEPHONE ENCOUNTER
Spoke with , he states he is already is taking the med. Advised to get blood work prior to next office visit.

## 2019-05-14 NOTE — H&P
[address] [place] [address]    Pre-operative History and Physical    Patient: Shaneka Murillo  : 1970  CSN:     History Obtained From:  patient    HISTORY OF PRESENT ILLNESS:    The patient is a 50 y.o. male with significant past medical history of colon cancer. Past Medical History:        Diagnosis Date    Allergic     Anesthesia     tremors    Arthritis     CAD (coronary artery disease)     Cancer ([address]     colon    Chemotherapy adverse reaction 2019    CHF (congestive heart failure) (HCC)     Chronic knee pain     Clostridium difficile infection 2016    PCR+    Depression motion     Diverticulitis     Diverticulosis     History of alcohol abuse     Hyperlipidemia     Hypertension     Irregular heart beat     states been told he has had in past. Never treated. Sees PCP every 3 mos. and EKG yearly    Pneumonia     Right knee pain     Shoulder injury     and arthrisits, injury rotaotr cuff    Sleep difficulties     with depression     Past Surgical History:        Procedure Laterality Date    ABDOMEN SURGERY      ABSCESS DRAINAGE      BREAST SURGERY Right 2019    RIGHT BREAST EXCISIONAL BIOPSY performed by Jen Beauchamp MD at [facility]  2016    No stents placed   [facility]  10/2017    COLECTOMY      COLONOSCOPY  2016    with biopsy and polypectomy    COLONOSCOPY  2018    ENDOSCOPIC ULTRASOUND (LOWER)  2016    for staging - Dr. Owen Claros, COLON, DIAGNOSTIC     [address]  2017    peristomal hernia repair    HERNIA REPAIR  2018    S/P: colostomy closure with hernia repair with mesh, with Dr. Evita Rojas at [facility].     INNER EAR SURGERY  tube right ear    JOINT REPLACEMENT      left TKR    KNEE ARTHROSCOPY Right [id number]    KNEE ARTHROSCOPY Right 2014    medial meniscus    KNEE SURGERY  2011    removal of tibial component    OTHER SURGICAL HISTORY  7/1/13    ACL Rt knee meniscus repair synovectomy    OTHER SURGICAL HISTORY  12/01/2017    takedown of ileostomy    REVISION COLOSTOMY  04/18/2018    S/P: colostomy closure with hernia repair with mesh, with Dr. Steve Hidalgo at [facility].  SHOULDER ARTHROSCOPY Right 9/22/15    SUBACROMIAL DECOMPRESSION, DISTAL CLAVICLE EXCISION, LABRAL DEBRIDEMETN    SIGMOIDOSCOPY N/A 11/23/2018    SIGMOIDOSCOPY BIOPSY FLEXIBLE performed by Fortino Cortez MD at [address]  4 surgeries    SMALL INTESTINE SURGERY  01/11/2017    LAPAROSCOPIC LOW ANTERIOR RESECTION, ILEOSTOMY    SMALL INTESTINE SURGERY  12/01/2017    small bowel resection    TUNNELED VENOUS PORT PLACEMENT Left 02/13/2017    PORT-A-CATH INSERTION                     TUNNELED VENOUS PORT PLACEMENT      UMBILICAL HERNIA REPAIR  11/11/14    UPPER GASTROINTESTINAL ENDOSCOPY  10/03/2017     Medications Prior to Admission:   No current facility-administered medications on file prior to encounter.       Current Outpatient Medications on File Prior to Encounter   Medication Sig Dispense Refill    DULoxetine (CYMBALTA) 30 MG extended release capsule Take 1 capsule by mouth daily 30 capsule 1    QUEtiapine (SEROQUEL) 25 MG tablet Take 1-2 tablets by mouth nightly 60 tablet 0    pregabalin (LYRICA) 75 MG capsule One tab hs 1 week, 2 tabs hs 1 week, 1 tab am 2 tabs pm 90 capsule 0    eplerenone (INSPRA) 25 MG tablet Take 1 tablet by mouth daily 30 tablet 3    carvedilol (COREG) 12.5 MG tablet TAKE ONE-HALF TABLET BY MOUTH EVERY MORNING AND TAKE 1 TABLET BY MOUTH AT NIGHT (Patient taking differently: TAKE ONE TABLET BY MOUTH EVERY MORNING AND TAKE 1/2 TABLET BY MOUTH AT NIGHT) 180 tablet 0    torsemide (DEMADEX) 20 MG tablet Take 1 tablet weekly (Patient taking differently: every other day Indications: takes three days a week Mon-Wed-Fri Take 1 tablet weekly) 36 tablet 3    sacubitril-valsartan (ENTRESTO) 24-26 MG per tablet Take 1 tablet by mouth 2 times daily (Patient taking differently: Take 0.5 tablets by mouth 2 times daily ) 180 tablet 3    nortriptyline (PAMELOR) 10 MG capsule Take 1 capsule by mouth nightly (Patient taking differently: Take 25 mg by mouth nightly ) 30 capsule 3    simethicone (MYLICON) 80 MG chewable tablet Take 80 mg by mouth every 6 hours as needed for Flatulence      ranitidine (ZANTAC) 150 MG tablet Take 150 mg by mouth daily      melatonin 3 MG TABS tablet Take 3 mg by mouth nightly as needed      lovastatin (MEVACOR) 40 MG tablet Take 40 mg by mouth. Take 1 tablet (40 mg total) by mouth at bedtime.  diazepam (VALIUM) 5 MG tablet Take 5 mg by mouth three times daily.  Cetirizine HCl (ZYRTEC PO) Take 1 tablet by mouth daily. Take only as needed for scrachy eyes.  buprenorphine (BUTRANS) 7.5 MCG/HR PTWK Place 1 patch onto the skin once a week for 28 days. 4 patch 0        Allergies:  Vancomycin; Bactrim [sulfamethoxazole-trimethoprim]; Corn-containing products; Eggs or egg-derived products; and Seasonal    History of allergic reaction to anesthesia:  No    Social History:   TOBACCO:   reports that he quit smoking about 12 years ago. He has a 5.00 pack-year smoking history. He quit smokeless tobacco use about 21 months ago. His smokeless tobacco use included chew.   Family History:       Problem Relation Age of Onset    Cancer Mother     High Blood Pressure Father     Cancer Father        PHYSICAL EXAM:      /80   Pulse 81   Temp 98.1 °F (36.7 °C) (Temporal)   Resp 16   Ht 5' 10\" (1.778 m)   Wt 172 lb (78 kg)   SpO2 100%   BMI 24.68 kg/m²  I        Heart:  Normal apical impulse, regular rate and rhythm, normal S1 and S2, no S3 or S4, and no murmur noted    Lungs:  No increased work of breathing, good air exchange, clear to auscultation bilaterally, no crackles or wheezing    Abdomen:   normal bowel sounds, soft, non-distended, non-tender, no masses palpated, no hepatosplenomegally      ASA Grade:  ASA 3 - Patient with moderate systemic disease with functional limitations    Mallampati Class:  Class I: Soft palate, uvula, fauces, pillars visible  __________  Class II: Soft palate, uvula, fauces visible  _____x_____   Class III: Soft palate, base of uvula visible  __________  Class IV: Hard palate only visible   __________        ASSESSMENT AND PLAN:    1. Patient is a 50 y.o. male here for colonoscopy with anesthesia  2. Procedure options, risks and benefits reviewed with patient. Patient expresses understanding.     Liz Grimaldo MD  [address]

## 2019-05-14 NOTE — PROGRESS NOTES
Patient reports anxiety. Dr. Gracy Boast notified. Versed given as ordered. Continuous pulse ox in place. Sats 100% room air.

## 2019-05-14 NOTE — PROGRESS NOTES
Seen by Akila Rodríguez and SHELLEY Dove, Discharge instructions reviewed with patient/responsible adult. All home medications have been reviewed, questions answered and patient verbalized understanding. Discharge instructions signed and copies given. Patient discharged home with belongings.

## 2019-05-14 NOTE — ANESTHESIA PRE PROCEDURE
Department of Anesthesiology  Preprocedure Note       Name:  Michelle Reyes   Age:  50 y.o.  :  1970                                          MRN:  [id number]         Date:  2019      Surgeon: Valentin Li):  Beka Guajardo MD    Procedure: COLONOSCOPY (N/A )    Medications prior to admission:   Prior to Admission medications    Medication Sig Start Date End Date Taking? Authorizing Provider   buprenorphine (BUTRANS) 7.5 MCG/HR PTWK Place 1 patch onto the skin once a week for 28 days.  5/13/19 6/10/19  Franco Castano MD   DULoxetine (CYMBALTA) 30 MG extended release capsule Take 1 capsule by mouth daily 19   Franco Castano MD   QUEtiapine (SEROQUEL) 25 MG tablet Take 1-2 tablets by mouth nightly 19   Franco Castano MD   pregabalin (LYRICA) 75 MG capsule One tab hs 1 week, 2 tabs hs 1 week, 1 tab am 2 tabs pm 19  Franco Castano MD   eplerenone (INSPRA) 25 MG tablet Take 1 tablet by mouth daily 19   Roxanna Elias MD   carvedilol (COREG) 12.5 MG tablet TAKE ONE-HALF TABLET BY MOUTH EVERY MORNING AND TAKE 1 TABLET BY MOUTH AT NIGHT  Patient taking differently: TAKE ONE TABLET BY MOUTH EVERY MORNING AND TAKE 1/2 TABLET BY MOUTH AT NIGHT 3/14/19   BENITEZ Mejias CNP   torsemide (DEMADEX) 20 MG tablet Take 1 tablet weekly  Patient taking differently: every other day Indications: takes three days a week Mon-Wed-Fri Take 1 tablet weekly 19   BENITEZ Mejias CNP   sacubitril-valsartan (ENTRESTO) 24-26 MG per tablet Take 1 tablet by mouth 2 times daily 19   Sunita Penny MD   nortriptyline (PAMELOR) 10 MG capsule Take 1 capsule by mouth nightly  Patient taking differently: Take 25 mg by mouth nightly  18   Sunita Penny MD   simethicone (MYLICON) 80 MG chewable tablet Take 80 mg by mouth every 6 hours as needed for Flatulence    Historical Provider, MD   ranitidine (ZANTAC) 150 MG tablet Take 150 mg by mouth daily    Historical Provider, MD melatonin 3 MG TABS tablet Take 3 mg by mouth nightly as needed    Historical Provider, MD   lovastatin (MEVACOR) 40 MG tablet Take 40 mg by mouth. Take 1 tablet (40 mg total) by mouth at bedtime. 8/19/13   Historical Provider, MD   diazepam (VALIUM) 5 MG tablet Take 5 mg by mouth three times daily. Historical Provider, MD   Cetirizine HCl (ZYRTEC PO) Take 1 tablet by mouth daily. Take only as needed for scrachy eyes. Historical Provider, MD       Current medications:    Current Facility-Administered Medications   Medication Dose Route Frequency Provider Last Rate Last Dose    0.9 % sodium chloride infusion   Intravenous Continuous Patricia Bruce MD        lidocaine PF 1 % injection 1 mL  1 mL Intradermal Once PRN Patricia Bruce MD           Allergies: Allergies   Allergen Reactions    Vancomycin Hives and Swelling     Throat swells    Bactrim [Sulfamethoxazole-Trimethoprim] Other (See Comments)     Thrush    Corn-Containing Products      Patient tells me his throat swells when he eats a large amount of corn or corn products. He can tolerate products with corn oil and corn syrup.  Pt. Reports gets \"congestion\"    Eggs Or Egg-Derived Products Swelling    Seasonal        Problem List:    Patient Active Problem List   Diagnosis Code    S/P total knee replacement using cement Z96.659    Mixed hyperlipidemia E78.2    Essential hypertension I10    Depression V49.0    Umbilical hernia P78.0    Lower abdominal pain R10.30    Rectal mass K62.9    Hepatitis K75.9    Localized edema R60.0    SOB (shortness of breath) J89.92    Acute systolic heart failure (HCC) I50.21    NICM (nonischemic cardiomyopathy) (HCC) I42.8    Abnormal nuclear cardiac imaging test R93.1    Rectal cancer ([facility] C20    Neoplasm related pain G89.3    Chronic systolic heart failure (HCC) I50.22    Cardiomyopathy, idiopathic (HCC) I42.8    Poor venous access I87.8    Anemia D64.9    Insomnia due to medical condition G47.01    Chest pain R07.9    Dyspnea on exertion R06.09    Peristomal hernia K46.9    Episodic lightheadedness R42    ICD (implantable cardioverter-defibrillator) in place Z95.810    Ileostomy in place [facility]) Z93.2    Pneumonia J18.9    Colostomy in place [facility]) Z93.3    Breast mass, right N63.10    Abscess of sacrum ([facility]) M46.28    Chronic pain G89.29    Abscess L02.91    Generalized abdominal pain R10.84    LLQ pain R10.32    Moderate malnutrition ([facility]) E44.0    Hypervolemia E87.70    Chronic pain syndrome G89.4    Colon cancer ([facility]) C18.9    Fibromyalgia M79.7    Primary insomnia F51.01       Past Medical History:        Diagnosis Date    Allergic     Anesthesia     tremors    Arthritis     CAD (coronary artery disease)     Cancer ([facility]     colon    Chemotherapy adverse reaction 4/29/2019    CHF (congestive heart failure) ([facility])     Clostridium difficile infection 07/11/2016    PCR+    Depression motion     Diverticulitis     Diverticulosis     History of alcohol abuse     Hyperlipidemia     Hypertension     Irregular heart beat     states been told he has had in past. Never treated. Sees PCP every 3 mos.  and EKG yearly    Pneumonia     Right knee pain     Shoulder injury     and arthrisits, injury rotaotr cuff    Sleep difficulties     with depression       Past Surgical History:        Procedure Laterality Date    ABDOMEN SURGERY      ABSCESS DRAINAGE      BREAST SURGERY Right 1/21/2019    RIGHT BREAST EXCISIONAL BIOPSY [facility] Erica Harrison MD [facility]  09/14/2016    No stents placed   [address]  10/2017    CHOLECYSTECTOMY      COLECTOMY      COLONOSCOPY  08/29/2016    with biopsy and polypectomy    COLONOSCOPY  03/19/2018    ENDOSCOPIC ULTRASOUND (LOWER)  09/14/2016    for staging - Dr. Patt Buerger, COLON, DIAGNOSTIC     [address]  12/01/2017 05/06/19 100/66   04/29/19 100/60       NPO Status:                                                                                 BMI:   Wt Readings from Last 3 Encounters:   05/10/19 177 lb (80.3 kg)   05/07/19 177 lb (80.3 kg)   05/06/19 181 lb 8 oz (82.3 kg)     Body mass index is 25.4 kg/m². CBC:   Lab Results   Component Value Date    WBC 7.6 05/06/2019    RBC 3.99 05/06/2019    RBC 3.74 07/05/2017    HGB 12.7 05/06/2019    HCT 37.1 05/06/2019    MCV 93.1 05/06/2019    RDW 13.4 05/06/2019     05/06/2019       CMP:   Lab Results   Component Value Date     05/08/2019    K 4.4 05/08/2019    K 4.3 05/06/2019    CL 99 05/08/2019    CO2 27 05/08/2019    BUN 12 05/08/2019    CREATININE 0.8 05/08/2019    GFRAA >60 05/08/2019    GFRAA >60 12/19/2012    AGRATIO 1.8 05/08/2019    LABGLOM >60 05/08/2019    GLUCOSE 97 05/08/2019    GLUCOSE 131 07/05/2017    PROT 6.9 05/08/2019    PROT 6.3 07/05/2017    CALCIUM 9.5 05/08/2019    BILITOT <0.2 05/08/2019    ALKPHOS 80 05/08/2019    AST 17 05/08/2019    ALT 14 05/08/2019       POC Tests: No results for input(s): POCGLU, POCNA, POCK, POCCL, POCBUN, POCHEMO, POCHCT in the last 72 hours.     Coags:   Lab Results   Component Value Date    PROTIME 12.5 05/06/2019    INR 1.10 05/06/2019    APTT 33.6 05/06/2019       HCG (If Applicable): No results found for: PREGTESTUR, PREGSERUM, HCG, HCGQUANT     ABGs:   Lab Results   Component Value Date    PHART 7.329 01/11/2017    PO2ART 165 01/11/2017    VWI7IKP 49 01/11/2017    GSF1IKG 25.5 01/11/2017    BEART -1 01/11/2017    F7FQDGTZ 99 01/11/2017        Type & Screen (If Applicable):  Lab Results   Component Value Date    LABABO O 08/24/2011    LABRH Positive 08/24/2011       Anesthesia Evaluation   history of anesthetic complications:   Airway: Mallampati: II  TM distance: >3 FB   Neck ROM: full  Mouth opening: > = 3 FB Dental:          Pulmonary:   (+) shortness of breath: Cardiovascular:    (+) hypertension:, pacemaker:, CAD:, dysrhythmias:, CHF:, MONTEZ:,         Rhythm: regular  Rate: normal                    Neuro/Psych:   (+) neuromuscular disease:, psychiatric history:            GI/Hepatic/Renal:   (+) liver disease:,           Endo/Other:                     Abdominal:           Vascular:                                        Anesthesia Plan      MAC     ASA 3       Induction: intravenous. Anesthetic plan and risks discussed with patient. Plan discussed with CRNA.                   Parker Redmond MD   5/14/2019

## 2019-05-14 NOTE — OP NOTE
[address] [place] [address]  Endoscopy Note    Patient: Price Agudelo  : 1970  CSN:     Procedure: Colonoscopy with colorectal anastomosis dilation. Date:  2019    Surgeon:  Stu Hoang MD    Referring Physician:  Dr. Anuja Jang, Dr. Diony Woodall    Preoperative Diagnosis:  H/o rectal cancer. Surveillance. abd pain. Postoperative Diagnosis:  Same. Anesthesia:  MAC    EBL: <50 mL        Procedure: An informed consent was obtained from the patient after explanation of indications, benefits, possible risks and complications of the procedure. The patient was then taken to the endoscopy suite, placed in the left lateral decubitus position, and the above IV anesthesia was administered. A digital rectal examination was performed and was normal.     The Olympus CFQ-180-AL video colonoscope was placed in the patient's rectum under digital direction and advanced to the cecum. The cecum was identified by characteristic anatomy and ballottment. The prep was good. The ileocecal valve was identified. The scope was then withdrawn back through the cecum, ascending, transverse, descending and sigmoid colons. Carefull circumferential examination of the mucosa in these areas demonstrated normal mucosa. There as post surgical changes in the transverse colon c/w a previous ostomy takedown and re-anastomosis. The scope was then withdrawn to the colorectal anastomosis. The anastomosis was patent but appeared edematous. This was empirically dilated with at 20mm TTS balloon dilator. EBL was minimal.  There was a known contained perforation/pocket off of the anastomosis area. This was not entered. Retroflexion was not done. The scope was straightened, the colon was decompressed and the scope was withdrawn from the patient. The patient tolerated the procedure well and was taken to the PACU in good condition. Impression:    - edematous colorectal anastomosis.   Dilated to 20mm    -

## 2019-05-16 NOTE — PROGRESS NOTES
functioning were addressed. Patient is currently taking Valium 4 times a day advised try decreasing the Valium and the risk of using Valium with opioids was discussed with the patient switching to long-acting opioid was discussed with patient Antonieta lower his short-acting opiate dose by start Lyrica 75 titrate up to 225 mg a day discontinue the Pamelor and start Seroquel 25 mg 1-2 at bedtime consider starting Cymbalta 30 mg a day to help with neuropathic pain and in the anxiety and and moods patient was advised to start Butrans patch and decrease Norco to 3day for now. Blood tests reviewed which are baseline within normal limits will follow-up as per office protocol we'll do urine drug screen with GCMS opiates and follow-up on the results. Patient profile OARRS website was reviewed. All treatment options were discussed with the patient patient scoring high on the ORT about 8 or 9 will monitor closely. Patient states he will think about it and then decide in the pain on the next follow-up visit. Goals of current treatment regimen include improvement in pain, restoration of functioning- with focus on improvement in physical performance, general activity, work or disability,emotional distress, health care utilization and  decreased medication consumption. Will continue to monitor progress towards achieving/maintaining therapeutic goals with special emphasis on  1. Improvement in perceived interfernce  of pain with ADL's. Ability to do home exercises independently. Ability to do household chores indoor and/or outdoor work and social and leisure activities. To increase flexibility/ROM, strength and endurance. Improve psychosocial and physical functioning. 2. Improving sleep to 6-7 hours a night. Improve mood/ anxiety and depression symptoms such as crying spells, low energy, problems with concentration, motivation. 3. Reduction of reliance on opioid analgesia/more appropriate opioid use.     Risks and benefits of the medications and other alternative treatments have been/were  discussed with the patient. Any questions on the  common side effects of these medications were also answered. Informed verbal consent was obtained. The current treatment regimen is needed to decrease the patient's pain  symptoms, improve the quality of life and ability to function and improve the  sleep and mood symptoms. Patient was advised against drinking alcohol with the narcotic pain medicines, advised against driving or handling machinery when  starting or adjusting the dose of medicines, feeling groggy or drowsy, or if having any cognitive issues related to the current medications. Patient is fully aware of the risk of overdose and death, if medicines are misused and not taken as prescribed. If Patient develops new symptoms or if the symptoms worsen,  was told to call the office.          Thank you for allowing me to participate in the care of this patient. Dragon disclaimer: This note was dictated utilizing voice recognition software. Minor errors in transcription may be present.     CC:  Karlene Dow MD  CC Aishwarya Malik MD

## 2019-05-29 NOTE — PROGRESS NOTES
[address] [facility] - Cardiology      Chief Complaint: Bloating    Chief Complaint   Patient presents with    Check-Up     c/o sob        History of present illness: Radha Benavides is a 50 y.o. male with PMH of NICM, sCHF, HTN and rectal cancer. C 9/15/16 with no coronary artery disease, EF 15%, LVEDP 20-25 mmHg. ICD placed 10/2016 for primary prevention. Most recent echo (9/2018) showed dilated LV, EF 25-30%. On GDMT but hypotension has been limiting factor for up-titration. He was hospitalized 4/2019 with lower abdominal pain. CT scan showed pelvic fluid collection at colorectal anastomosis and constipation. He underwent colonoscopy with colorectal anastomosis dilation 5/14/2019. Patient is here today for follow up chronic systolic heart failure. He presents to office ambulatory and is unaccompanied. He continues to struggle with abdominal fullness and is mostly following liquid diet, he has lost considerable weight (172 lbs compared to previous baseline 185-187 lbs). If symptoms do not improve, he may need to have colostomy which will then be permanent. He notes shortness of breath which he attributes to abdominal issues, unable to lay flat in bed. Optivol consistent with volume overload. He has not been taking torsemide due to weight loss, yesterday was first dose he has taken \"in awhile,\" prescribed 3 days weekly. Past Medical History:   Diagnosis Date    Allergic     Anesthesia     tremors    Arthritis     CAD (coronary artery disease)     Cancer ([facility] [address]     colon    Chemotherapy adverse reaction 4/29/2019    CHF (congestive heart failure) (HCC)     Chronic knee pain     Clostridium difficile infection 07/11/2016    PCR+    Depression motion     Diverticulitis     Diverticulosis     History of alcohol abuse     Hyperlipidemia     Hypertension     Irregular heart beat     states been told he has had in past. Never treated. Sees PCP every 3 mos.  and (CYMBALTA) 30 MG extended release capsule Take 1 capsule by mouth daily 30 capsule 1    pregabalin (LYRICA) 75 MG capsule One tab hs 1 week, 2 tabs hs 1 week, 1 tab am 2 tabs pm (Patient taking differently: 1 tab am 2 tabs pm) 90 capsule 0    eplerenone (INSPRA) 25 MG tablet Take 1 tablet by mouth daily 30 tablet 3    carvedilol (COREG) 12.5 MG tablet TAKE ONE-HALF TABLET BY MOUTH EVERY MORNING AND TAKE 1 TABLET BY MOUTH AT NIGHT (Patient taking differently: TAKE ONE TABLET BY MOUTH EVERY MORNING AND TAKE 1/2 TABLET BY MOUTH AT NIGHT) 180 tablet 0    torsemide (DEMADEX) 20 MG tablet Take 1 tablet weekly (Patient taking differently: Indications: takes three days a week Mon-Wed-Fri ) 36 tablet 3    sacubitril-valsartan (ENTRESTO) 24-26 MG per tablet Take 1 tablet by mouth 2 times daily (Patient taking differently: Take 0.5 tablets by mouth 2 times daily ) 180 tablet 3    nortriptyline (PAMELOR) 10 MG capsule Take 1 capsule by mouth nightly (Patient taking differently: Take 20 mg by mouth nightly ) 30 capsule 3    simethicone (MYLICON) 80 MG chewable tablet Take 80 mg by mouth every 6 hours as needed for Flatulence      ranitidine (ZANTAC) 150 MG tablet Take 150 mg by mouth daily      melatonin 3 MG TABS tablet Take 3 mg by mouth nightly as needed      lovastatin (MEVACOR) 40 MG tablet Take 40 mg by mouth. Take 1 tablet (40 mg total) by mouth at bedtime.  diazepam (VALIUM) 5 MG tablet Take 5 mg by mouth three times daily.  Cetirizine HCl (ZYRTEC PO) Take 1 tablet by mouth daily. Take only as needed for scrachy eyes. No current facility-administered medications for this visit.         Labs:   Lab Results   Component Value Date    WBC 7.6 05/06/2019    HGB 12.7 (L) 05/06/2019    HCT 37.1 (L) 05/06/2019    MCV 93.1 05/06/2019     05/06/2019     Lab Results   Component Value Date     05/08/2019    K 4.4 05/08/2019    K 4.3 05/06/2019    CL 99 05/08/2019    CO2 27 05/08/2019    BUN 12

## 2019-05-29 NOTE — PATIENT INSTRUCTIONS
1. Take torsemide 20 mg daily through Friday and then resume Mvdvmd-Uozhxxcwh-Quakjl dosing. 2. Other meds unchanged. 3. Follow up with PCP regarding hot sweats. 4. Follow up with HF in 3 months. 5. Check labs in 1 month, earlier for worsening.

## 2019-05-31 NOTE — TELEPHONE ENCOUNTER
Spoke with patient. Offered to have patient come to the [facility] for IV Lasix but he is unavailable to come to hospital until later this evening. Infusion Center closes at 4. When seen in office 5/29, he was volume overloaded, he had been off torsemide secondary to excessive weight loss. Torsemide was resumed but patient reports no improvement. He sounds winded on phone, c/o abdominal bloating. He will be evaluated in ER.

## 2019-05-31 NOTE — TELEPHONE ENCOUNTER
Pt calling he is extremely SOB and his swelling has increased. He will be going to the ER @ [facility] today but he first has to get someone to take care of his young children and he has to feel more stable. He is asking if [facility] needs to put an order in for Lasix so that when he does get to ER they will know what to do? Please call to advise. Thank you.

## 2019-06-01 PROBLEM — I50.23 ACUTE ON CHRONIC SYSTOLIC (CONGESTIVE) HEART FAILURE (HCC): Status: ACTIVE | Noted: 2019-01-01

## 2019-06-01 NOTE — PROGRESS NOTES
Pt arrived to floor in wheelchair. Able to ambulate independently. Tele in place, heart rate 88, sinus. Pt reports chest pain has not had pain medication since informing the ED nurse 1 1/2 hours ago. Hospitalist notified.  Terence Vila RN

## 2019-06-01 NOTE — PROGRESS NOTES
H/p dictation id [id number]. Acute on chronic nyha IV schf. NICM. Dyslipidemia. Chronic pain with chronic narcotic dependence. Date of service 06/01/19.

## 2019-06-01 NOTE — PROGRESS NOTES
4 Eyes Skin Assessment     The patient is being assess for  Admission    I agree that 2 RN's have performed a thorough Head to Toe Skin Assessment on the patient. ALL assessment sites listed below have been assessed. Areas assessed by both nurses: Ania and Helena  [x]   Head, Face, and Ears   [x]   Shoulders, Back, and Chest  [x]   Arms, Elbows, and Hands   [x]   Coccyx, Sacrum, and IschIum  [x]   Legs, Feet, and Heels        Does the Patient have Skin Breakdown?   No         Matt Prevention initiated:  NA   Wound Care Orders initiated:  NA      [facility] nurse consulted for Pressure Injury (Stage 3,4, Unstageable, DTI, NWPT, and Complex wounds), New and Established Ostomies:  NA      Nurse 1 eSignature: Electronically signed by Trevin Coronado RN on 6/1/19 at 5:52 AM    **SHARE this note so that the co-signing nurse is able to place an eSignature**    Nurse 2 eSignature: Electronically signed by Candelaria Valentino RN on 6/1/19 at 6:17 AM

## 2019-06-01 NOTE — CONSULTS
 Right knee pain     Shoulder injury     and arthrisits, injury rotaotr cuff    Sleep difficulties     with depression       Surgical History:      Procedure Laterality Date    ABDOMEN SURGERY      ABSCESS DRAINAGE      BREAST SURGERY Right 1/21/2019    RIGHT BREAST EXCISIONAL BIOPSY performed by Kenny Sanz MD at [facility]  09/14/2016    No stents placed   [address]  10/2017    COLECTOMY      COLONOSCOPY  08/29/2016    with biopsy and polypectomy    COLONOSCOPY  03/19/2018    COLONOSCOPY N/A 5/14/2019    COLONOSCOPY WITH DILATION performed by Rj Maldonado MD at [address] [place] [address] ([facility])  09/14/2016    for staging - Dr. Freda Mcgrath, COLON, DIAGNOSTIC     [address]  12/01/2017    peristomal hernia repair    HERNIA REPAIR  04/18/2018    S/P: colostomy closure with hernia repair with mesh, with Dr. Laurie Babcock at [facility].  INNER EAR SURGERY  tube right ear    JOINT REPLACEMENT      left TKR    KNEE ARTHROSCOPY Right [id number]    KNEE ARTHROSCOPY Right 8/4/2014    medial meniscus    KNEE SURGERY  08/24/2011    removal of tibial component    OTHER SURGICAL HISTORY  7/1/13    ACL Rt knee meniscus repair synovectomy    OTHER SURGICAL HISTORY  12/01/2017    takedown of ileostomy    REVISION COLOSTOMY  04/18/2018    S/P: colostomy closure with hernia repair with mesh, with Dr. Laurie Babcock at [facility].     SHOULDER ARTHROSCOPY Right 9/22/15    SUBACROMIAL DECOMPRESSION, DISTAL CLAVICLE EXCISION, LABRAL DEBRIDEMETN    SIGMOIDOSCOPY N/A 11/23/2018    SIGMOIDOSCOPY BIOPSY FLEXIBLE performed by Rj Maldonado MD at [facility]  4 surgeries    SMALL INTESTINE SURGERY  01/11/2017    LAPAROSCOPIC LOW ANTERIOR RESECTION, ILEOSTOMY    SMALL INTESTINE SURGERY  12/01/2017    small bowel resection    TUNNELED VENOUS PORT PLACEMENT Left Father        Medications:    carvedilol (COREG) tablet 12.5 mg BID WC   diazepam (VALIUM) tablet 5 mg TID   DULoxetine (CYMBALTA) extended release capsule 30 mg Daily   eplerenone (INSPRA) tablet 25 mg - PATIENT SUPPLIED Daily   sodium chloride flush 0.9 % injection 10 mL 2 times per day   sodium chloride flush 0.9 % injection 10 mL PRN   ondansetron (ZOFRAN) injection 4 mg Q6H PRN   enoxaparin (LOVENOX) injection 40 mg Daily   potassium chloride 10 mEq/100 mL IVPB (Peripheral Line) PRN   magnesium sulfate 1 g in dextrose 5% 100 mL IVPB PRN   furosemide (LASIX) injection 40 mg BID   rosuvastatin (CRESTOR) tablet 5 mg Nightly   furosemide (LASIX) 10 MG/ML injection    pregabalin (LYRICA) capsule 75 mg BID   morphine 4 MG/ML injection    [START ON 6/2/2019] sacubitril-valsartan (ENTRESTO) 24-26 MG per tablet 0.5 tablet BID       Allergies:  Vancomycin; Bactrim [sulfamethoxazole-trimethoprim];  Corn-containing products; Eggs or egg-derived products; and Seasonal     Review of Systems:   [x]Full ROS obtained and negative except as mentioned in HPI    Physical Examination:    BP 89/63   Pulse 82   Temp 97.4 °F (36.3 °C) (Axillary)   Resp 19   Ht 5' 10\" (1.778 m)   Wt 182 lb 14.4 oz (83 kg)   SpO2 96%   BMI 26.24 kg/m²   Wt Readings from Last 3 Encounters:   06/01/19 182 lb 14.4 oz (83 kg)   05/29/19 180 lb (81.6 kg)   05/14/19 172 lb (78 kg)       GENERAL: Well developed, well nourished, no acute distress  NEUROLOGICAL: Alert and oriented x3  PSYCH: Normal mood and affect   SKIN: Warm and dry, without lesions  HEENT: Normocephalic, atraumatic, Sclera non-icteric, mucous membranes moist  NECK: supple, JVP normal, thyroid not enlarged   CAROTID: Normal upstroke, no bruits  CARDIAC: Normal PMI, regular rate and rhythm, normal S1S2, no murmur, rub  RESPIRATORY: Normal respiratory effort, coarse to auscultation bibasilar  EXTREMITIES: No cyanosis, clubbing or edema, palpable pulses bilaterally   MUSCULOSKELETAL: No joint swelling or tenderness, no chest wall tenderness  GASTROINTESTINAL:  non-tender, mildly distended but soft     Labs:  Lab Review   Lab Results   Component Value Date     2019    K 5.0 2019    K 4.3 2019    CL 96 2019    CO2 28 2019    BUN 19 2019    CREATININE 0.8 2019    GLUCOSE 102 2019    GLUCOSE 131 2017    CALCIUM 9.3 2019     Lab Results   Component Value Date    CKTOTAL 129 2016    TROPONINI <0.01 2019     Lab Results   Component Value Date    WBC 7.2 2019    HGB 13.7 2019    HCT 40.5 2019    MCV 95.8 2019     2019     Lab Results   Component Value Date    CHOL 227 2017    TRIG 88 2019    HDL 37 2017    HDL 36 2012         Imaging:  I have reviewed the below testing personally:    EK/31/19  Normal sinus rhythmPossible Left atrial enlargementPossible Anterior infarct , age undeterminedAbnormal     Echo 2018:  Summary   -Limited echo to evaluate ejection fraction.   -Left ventricular cavity size is dilated with normal wall thickness.   -Overall left ventricular systolic function appears reduced with a estimated ejection fraction of 25-30%.  -3D ejection fraction calculated at 33.4%   Pacer / ICD wire is visualized in the right heart.     [facility] 9/15/2016:  Cath via radial unable to manipulate catheter into ascending aorta-small subintimal dissection in subclavian occurred. Cath via right femoral shows normal coronaries,LVEDP 20-25.  EF 15%    19 CXR  Stable cardiomegaly without evidence of heart failure.  No focal pneumonia.     Impression/Recommendations    Mr. William Vila is a 50 y.o. male patient last seen in [facility]F Clinic 19:    Acute on chronic systolic CHF, NYHA Class III  NICMP  ICD followed in device clinic  Hypertension  Hyperlipidemia  Chronic pain   Colorectal cancer, in remission        On ARNI, Eplerenone, Beta blocker   IV Lasix scheduled   Will monitor continued diuresis off of IV dobutamine  Weight at time of visit today was done out of bed and in gown only- 179 lbs. Goal weight is 172 lbs. Thank you for allowing me to participate in the care of your patient. Please do not hesitate to call. Adela Montaño D.O., [address] [facility]  Interventional Cardiology     o: [phone]  [address], [address], [address]        NOTE:  This report was transcribed using voice recognition software. Every effort was made to ensure accuracy; however, inadvertent computerized transcription errors may be present.

## 2019-06-01 NOTE — FLOWSHEET NOTE
dobutamine drip at 7.5 mcg/kg/min (18.4 ml/hr). pt is now having freq pvc's. his b/p is fine and pt is not in any distress. Hospitalist notified.  Keven Rodriguez RN

## 2019-06-01 NOTE — ED PROVIDER NOTES
[facility] [place] NURSING PROVIDER NOTE    Patient Identification  Pt Name: Michelle Reyes  MRN: [id number]  Konstantin 1970  Date of evaluation: 5/31/2019  Provider: Andria Ford MD  PCP: Fanny Mathias MD    Chief Complaint  Chest Pain (chest pain and sob for the past four days, worse today. pt diaphoretic upon arrival. )      HPI  (History provided by patient)  This is a 50 y.o. male who was brought in by self for intermittent chest pain for the last four days, heavy in character, and localized to the left chest with radiation diffusely out to the rest of the chest. He has had associated diaphoresis and shortness of breath. He has also had a sense of fluttering or palpitations. He is also complaining of abdominal pain, which is chronic and unchanged. This is diffuse. The chest pain is rated 10/10 currently. Nothing seems to trigger the symptoms or make them worse. He became lightheaded and fell about one week ago and again a couple of days. He did hit his head at that time. The patient denies having any current rectal bleeding. ROS  10 systems reviewed, pertinent positives/negatives per HPI otherwise noted to be negative. I have reviewed the following nursing documentation:  Allergies: Vancomycin; Bactrim [sulfamethoxazole-trimethoprim]; Corn-containing products; Eggs or egg-derived products; and Seasonal    Past medical history:   Past Medical History:   Diagnosis Date    Allergic     Anesthesia     tremors    Arthritis     CAD (coronary artery disease)     Cancer ([facility]     colon    Chemotherapy adverse reaction 4/29/2019    [facility]congestive [facility])     Chronic knee pain     Clostridium difficile infection 07/11/2016    PCR+    Depression motion     Diverticulitis     Diverticulosis     History of alcohol abuse     Hyperlipidemia     Hypertension     Irregular heart beat     states been told he has had in past. Never treated. Sees PCP every 3 mos.  and EKG yearly    Pneumonia     Right knee pain     Shoulder injury     and arthrisits, injury rotaotr cuff    Sleep difficulties     with depression     Past surgical history:   Past Surgical History:   Procedure Laterality Date    ABDOMEN SURGERY      ABSCESS DRAINAGE      BREAST SURGERY Right 1/21/2019    RIGHT BREAST EXCISIONAL BIOPSY performed by Sabra Leiva MD at [facility]  09/14/2016    No stents placed   [address]  10/2017    COLECTOMY      COLONOSCOPY  08/29/2016    with biopsy and polypectomy    COLONOSCOPY  03/19/2018    COLONOSCOPY N/A 5/14/2019    COLONOSCOPY WITH DILATION performed by Jose Martin Nair MD at [address] [place] [address] ([facility])  09/14/2016    for staging - Dr. Jena Martinez, COLON, DIAGNOSTIC     [address]  12/01/2017    peristomal hernia repair    HERNIA REPAIR  04/18/2018    S/P: colostomy closure with hernia repair with mesh, with Dr. Angélica Duran at [facility].  INNER EAR SURGERY  tube right ear    JOINT REPLACEMENT      left TKR    KNEE ARTHROSCOPY Right [id number]    KNEE ARTHROSCOPY Right 8/4/2014    medial meniscus    KNEE SURGERY  08/24/2011    removal of tibial component    OTHER SURGICAL HISTORY  7/1/13    ACL Rt knee meniscus repair synovectomy    OTHER SURGICAL HISTORY  12/01/2017    takedown of ileostomy    REVISION COLOSTOMY  04/18/2018    S/P: colostomy closure with hernia repair with mesh, with Dr. Angélica Duran at [facility].     SHOULDER ARTHROSCOPY Right 9/22/15    SUBACROMIAL DECOMPRESSION, DISTAL CLAVICLE EXCISION, LABRAL DEBRIDEMETN    SIGMOIDOSCOPY N/A 11/23/2018    SIGMOIDOSCOPY BIOPSY FLEXIBLE performed by Jose Martin Nair MD at [facility]  4 surgeries    SMALL INTESTINE SURGERY  01/11/2017    LAPAROSCOPIC LOW ANTERIOR RESECTION, ILEOSTOMY    SMALL INTESTINE SURGERY  12/01/2017    small bowel resection    TUNNELED VENOUS PORT PLACEMENT Left 02/13/2017    PORT-A-CATH INSERTION                     TUNNELED VENOUS PORT PLACEMENT      UMBILICAL HERNIA REPAIR  11/11/14    UPPER GASTROINTESTINAL ENDOSCOPY  10/03/2017       Home medications:   Current Discharge Medication List      CONTINUE these medications which have NOT CHANGED    Details   carvedilol (COREG) 12.5 MG tablet TAKE ONE-HALF TABLET BY MOUTH EVERY Night AND TAKE 1 TABLET BY MOUTH EVERY MORNING  Qty: 180 tablet, Refills: 0      torsemide (DEMADEX) 20 MG tablet Take 1 tablet three times weekly Mon-Wed-Fri  Qty: 36 tablet, Refills: 3    Associated Diagnoses: Essential hypertension; Chronic systolic heart failure (HCC); NICM (nonischemic cardiomyopathy) (HCC)      sacubitril-valsartan (ENTRESTO) 24-26 MG per tablet Take 0.5 tablets by mouth 2 times daily  Qty: 60 tablet, Refills: 0      QUEtiapine (SEROQUEL) 25 MG tablet TAKE 1 TO 2 TABLETS BY MOUTH NIGHTLY  Qty: 60 tablet, Refills: 0    Associated Diagnoses: Chronic pain syndrome; Primary insomnia      buprenorphine (BUTRANS) 7.5 MCG/HR PTWK Place 1 patch onto the skin once a week for 28 days. Qty: 4 patch, Refills: 0    Associated Diagnoses: Chronic pain syndrome; Fibromyalgia      DULoxetine (CYMBALTA) 30 MG extended release capsule Take 1 capsule by mouth daily  Qty: 30 capsule, Refills: 1    Associated Diagnoses: Chronic pain syndrome; Fibromyalgia      eplerenone (INSPRA) 25 MG tablet Take 1 tablet by mouth daily  Qty: 30 tablet, Refills: 3      nortriptyline (PAMELOR) 10 MG capsule Take 1 capsule by mouth nightly  Qty: 30 capsule, Refills: 3      simethicone (MYLICON) 80 MG chewable tablet Take 80 mg by mouth every 6 hours as needed for Flatulence      ranitidine (ZANTAC) 150 MG tablet Take 150 mg by mouth daily      melatonin 3 MG TABS tablet Take 3 mg by mouth nightly as needed      lovastatin (MEVACOR) 40 MG tablet Take 40 mg by mouth. Take 1 tablet (40 mg total) by mouth at bedtime.       diazepam (VALIUM) 5 MG tablet Take 5 mg by mouth three times daily. Cetirizine HCl (ZYRTEC PO) Take 1 tablet by mouth daily. Take only as needed for scrachy eyes. pregabalin (LYRICA) 75 MG capsule One tab hs 1 week, 2 tabs hs 1 week, 1 tab am 2 tabs pm  Qty: 90 capsule, Refills: 0    Associated Diagnoses: Adverse effect of chemotherapy, sequela; Chronic pain syndrome; Fibromyalgia; Primary insomnia; Adverse effect of chemotherapy, initial encounter; Malignant neoplasm of colon, unspecified part of colon [facility])             Social history:  reports that he quit smoking about 12 years ago. He has a 5.00 pack-year smoking history. He quit smokeless tobacco use about 21 months ago. His smokeless tobacco use included chew. He reports that he does not drink alcohol or use drugs. Family history:    Family History   Problem Relation Age of Onset    Cancer Mother     High Blood Pressure Father     Cancer Father        Exam  ED Triage Vitals [05/31/19 2136]   BP Temp Temp Source Pulse Resp SpO2 Height Weight   (!) 83/58 98.2 °F (36.8 °C) Infrared 87 17 100 % 5' 10\" (1.778 m) 180 lb (81.6 kg)     Nursing note and vitals reviewed. Constitutional: Patient appears uncomfortable and in mild to moderate distress. HENT:      Head: Normocephalic and atraumatic. Ears: External ears normal.      Nose: Nose normal.     Mouth: Membrane mucosa moist and pink. Eyes: Anicteric sclera. No discharge. Neck: Supple. Trachea midline. Cardiovascular: RRR; no murmurs, rubs, or gallops. Pulmonary/Chest: Effort normal. No respiratory distress. CTAB without wheezes, rales, or rhonchi. No stridor. Abdominal: Soft. Mildly distended. Mild tenderness diffusely without guarding or rebound. Musculoskeletal: Moves all extremities. No gross deformity. No lower extremity edema. Neurological: Alert and oriented. Face symmetric. Speech is clear. Exhibits normal motor function in all extremities. Skin: Warm and dry.  No rash.  Psychiatric: Normal mood and affect. Behavior is normal.    EKG  The Ekg interpreted by me in the absence of a cardiologist shows. normal sinus rhythm with a rate of 87  Axis is   Left axis deviation  QTc is  normal  Intervals and Durations are unremarkable. ST depression in leads V5 and V6  ST depression is seen on previous EKG from 4/8/2019, although it appears deeper today. No other significant changes in comparison to previous EKG. Radiology  CT Head WO Contrast   Final Result   No acute intracranial abnormality. XR CHEST PORTABLE   Final Result   Stable cardiomegaly without evidence of heart failure. No focal pneumonia.              Labs  Results for orders placed or performed during the hospital encounter of 05/31/19   CBC Auto Differential   Result Value Ref Range    WBC 7.2 4.0 - 11.0 K/uL    RBC 4.23 4.20 - 5.90 M/uL    Hemoglobin 13.7 13.5 - 17.5 g/dL    Hematocrit 40.5 40.5 - 52.5 %    MCV 95.8 80.0 - 100.0 fL    MCH 32.5 26.0 - 34.0 pg    MCHC 33.9 31.0 - 36.0 g/dL    RDW 16.1 (H) 12.4 - 15.4 %    Platelets 422 552 - 125 K/uL    MPV 7.3 5.0 - 10.5 fL    Neutrophils % 69.8 %    Lymphocytes % 15.7 %    Monocytes % 11.4 %    Eosinophils % 2.3 %    Basophils % 0.8 %    Neutrophils # 5.0 1.7 - 7.7 K/uL    Lymphocytes # 1.1 1.0 - 5.1 K/uL    Monocytes # 0.8 0.0 - 1.3 K/uL    Eosinophils # 0.2 0.0 - 0.6 K/uL    Basophils # 0.1 0.0 - 0.2 K/uL   Comprehensive Metabolic Panel   Result Value Ref Range    Sodium 132 (L) 136 - 145 mmol/L    Potassium 5.0 3.5 - 5.1 mmol/L    Chloride 96 (L) 99 - 110 mmol/L    CO2 28 21 - 32 mmol/L    Anion Gap 8 3 - 16    Glucose 102 (H) 70 - 99 mg/dL    BUN 19 7 - 20 mg/dL    CREATININE 0.8 (L) 0.9 - 1.3 mg/dL    GFR Non-African American >60 >60    GFR African American >60 >60    Calcium 9.3 8.3 - 10.6 mg/dL    Total Protein 6.8 6.4 - 8.2 g/dL    Alb 3.8 3.4 - 5.0 g/dL    Albumin/Globulin Ratio 1.3 1.1 - 2.2    Total Bilirubin 0.3 0.0 - 1.0 mg/dL    Alkaline Phosphatase 92 40 - 129 U/L    ALT 14 10 - 40 U/L    AST 15 15 - 37 U/L    Globulin 3.0 g/dL   Troponin   Result Value Ref Range    Troponin <0.01 <0.01 ng/mL   Brain Natriuretic Peptide   Result Value Ref Range    Pro-BNP 5,411 (H) 0 - 124 pg/mL   D-Dimer, Quantitative   Result Value Ref Range    D-Dimer, Quant <200 0 - 229 ng/mL DDU   Lactate, Sepsis   Result Value Ref Range    Lactic Acid, Sepsis 0.7 0.4 - 1.9 mmol/L     Screenings  Heart Score for chest pain patients  History: Highly Suspicious  ECG: Non-Specifc repolarization disturbance/LBTB/PM  Patient Age: > 39 and < 65 years  *Risk factors for Atherosclerotic disease: Hypercholesterolemia, Hypertension, Coronary Artery Disease, Cigarette smoking  Risk Factors: > 3 Risk factors or history of atherosclerotic disease*  Troponin: < 1X normal limit  Heart Score Total: 6     MDM and ED Course  On arrival, patient was hypotensive. I reviewed his records and his last EF was 25-30% in September of 2018. He had no evidence of CHF or fluid overload on exam. Given this, we did start some fluid resuscitation as I believed he was intravascularly depleted. His BP improved and stabilized with these fluids. On re-evaluation, the patient was not exhibiting shortness of breath or signs of fluid overload. Further review of records showed his last cath October 2017 was for defibrillator placement. The last coronary cath in 2016 showed normal coronaries, but it has been nearly 3 years since that was performed. Given that his chest pain returned, his EKG has some abnormalities, and he has a HEART score of 6, he is at increased risk of a major acute coronary event. Fortunately, there is no evidence of MI. Further, his D-dimer is normal, so he is very low risk of PE. I spoke with Dr. Yared Murray. We thoroughly discussed the history, physical exam, laboratory and imaging studies, as well as, emergency department course.  Based upon that discussion, we've decided to admit Og Ruiz

## 2019-06-01 NOTE — H&P
[facility]                     [facility], [address]                              HISTORY AND PHYSICAL    PATIENT NAME: Lashawn Anderson                     :        1970  MED REC NO:   [id number]                          ROOM:       9806  ACCOUNT NO:   [de-identified]                           ADMIT DATE: 2019  PROVIDER:     Mandeep Schaefer MD    DATE OF SERVICE:  I obtained the history and performed a physical exam  on the patient on the medical floor on 2019. CHIEF COMPLAINT:  Shortness of breath. HISTORY OF PRESENT ILLNESS:  A 55-year-old  male with known  history of chronic systolic congestive heart failure, who follows up  with Dr. Rachel Wlilis and her nurse practitioner in the [address] [place] [address] states  that for the past week and half or so he is feeling bloated, distended  and he feels more and more short of breath. The patient notes that his  shortness of breath is related to any minimal exertional activity to a  point where in his house from his bedroom to his bathroom and back he  was getting really short of breath. The patient went to his [facility]F Clinic,  was told to double the dose of his diuretic and if he did not feel  better was told to come into the hospital.  At the time of my exam, the  patient is significantly short of breath at rest and states that he just  had to go to the bathroom and back and he is huffing and puffing  already. The patient did get 2.5 liters of saline in the emergency room because  the ER was worried about possibility of sepsis. The patient notes that  after that his shortness of breath has increased significantly. PAST MEDICAL HISTORY:  1. Chronic systolic congestive heart failure with ejection fraction of  around 25-30%. 2.  Dilated nonischemic cardiomyopathy. 3.  Hypertension. 4.  Dyslipidemia. 5.  Colon cancer status post colectomy.   6.  Chronic pain with chronic narcotic dependence. PAST SURGICAL HISTORY:  Significant for the patient's colectomy. The  patient also has had an AICD placement. ALLERGIC HISTORY:  VANCOMYCIN, BACTRIM. FAMILY HISTORY:  Reviewed by me and is currently noncontributory. SOCIAL HISTORY:  Lives at home. No illicit substance use. MEDICATIONS:  Coreg, torsemide, Entresto, quetiapine, buprenorphine  patch once a week, Cymbalta, Inspra, nortriptyline, simethicone,  ranitidine, melatonin, lovastatin, diazepam, cetirizine, pregabalin. REVIEW OF SYSTEMS:  The patient's review of systems is significant for  the shortness of breath and per the history of present illness. All  other systems have been reviewed and are negative except for the history  of present illness. PHYSICAL EXAMINATION:  The patient was examined by me on the medical  floor. VITAL SIGNS:  Temperature 98.2, respiratory rate 17, pulse 87, blood  pressure 83/58 was the lowest, saturating 100%. CNS:  Alert, awake and oriented. PSYCH:  Cooperative, slightly anxious, but is answering questions  appropriately. EYES:  Pupils are reactive to light. Extraocular muscle movements are  intact. NECK:  JVD noted. RESPIRATORY SYSTEM:  The patient has got bibasilar posterior expiratory  rales more on the right side. CVS:  S1, S2 are heard. S3 gallop noted. Left side subcutaneous ICD  placement. ABDOMEN:  Soft. No guarding, rigidity, or rebound. MUSCULOSKELETAL:  No acute deformities. SKIN:  Without rashes or lesions. DIAGNOSTIC DATA:  EKG independently reviewed by me shows normal sinus  rhythm with a rate of 87 beats per minute. Chest x-ray shows  cardiomegaly. CBC showed white count 7.2, hemoglobin 13.7. D-dimer is  less than 200. BNP 5411. Troponin less than 0.01. BUN 19, creatinine  0.8, sodium 132. CT head without contrast shows no acute intracranial  anomaly. REVIEW OF PREVIOUS MEDICAL RECORDS:  Shows:  1.   The previous BNP was 4951 on 04/10/2019.  2.  The patient's previous 2D echo from 09/19/2018 was showing an LVEF  of 30%. 3.  The patient's coronary angiography that was done on 2016 showed no  evidence of any angiographically significant coronary artery disease. CONSULTATIONS:  Cardiology. ASSESSMENT:  1. Acute on chronic NYHA Class III/IV systolic congestive heart failure  exacerbation in a patient with nonischemic dilated cardiomyopathy. 2.  Chronic pain with chronic narcotic dependence. 3.  Hypertension. 4.  Dyslipidemia. PLAN OF CARE:  The patient is admitted to Internal Medicine Service. I  have initiated the patient on IV Lasix scheduled along with IV  dobutamine to improve the forward flow and to improve renal perfusion  for effecting adequate diuresis since the patient is at least 2.5 liters  positive. I have obtained Cardiology consultation. We will continue  the patient's anti-failure therapy including his Entresto, Coreg and  Inspra. Lovenox for DVT prophylaxis. The patient will be provided with adequate  analgesia as necessary for pain control with very very sparing use of  additional narcotic agents since the patient is already on Suboxone  patch. We will aggressively monitor electrolytes and replace them as  indicated. EXPECTED LENGTH OF STAY:  More than two midnights based on plan of care  above. RISK:  High due to the patient's presentation with acute on chronic  systolic congestive heart failure exacerbation. DISPOSITION:  Admitted to Internal Medicine Service.         Stone Goldsmith MD    D: 06/01/2019 4:40:07       T: 06/01/2019 5:31:33     [email or web address]  Job#: [id number]     Doc#: [id number]    CC:

## 2019-06-02 NOTE — PROGRESS NOTES
Patient at high risk for fall. Navarro fall score 60 and reviewed with patient. Patient refuses bed alarm. Reviewed risk of falling and injury with patient. Fall risk arm band on patient. Yellow blanket at foot of bed. Fall risk sign and light visible outside door. Call light in reach. Side rails up 2/4. Personal items within reach of patient. Patient instructed to call for assistance with toileting/ambulation or any other needs.     Electronically signed by Yahaira Wilson RN on 6/2/2019 at 12:41 PM

## 2019-06-02 NOTE — PROGRESS NOTES
tablet 25 mg Nightly       I/O:     Intake/Output Summary (Last 24 hours) at 6/2/2019 1037  Last data filed at 6/2/2019 1014  Gross per 24 hour   Intake 1115 ml   Output 4625 ml   Net -3510 ml       Physical Exam:    /68   Pulse 98   Temp 96.2 °F (35.7 °C) (Axillary)   Resp 18   Ht 5' 10\" (1.778 m)   Wt 176 lb 14.4 oz (80.2 kg)   SpO2 100%   BMI 25.38 kg/m²   Wt Readings from Last 3 Encounters:   06/02/19 176 lb 14.4 oz (80.2 kg)   05/29/19 180 lb (81.6 kg)   05/14/19 172 lb (78 kg)       GENERAL: Well developed, well nourished, no acute distress  NEUROLOGICAL: Alert and oriented x3  PSYCH: Normal mood and affect   SKIN: Warm and dry, without lesions  HEENT: Normocephalic, atraumatic, Sclera non-icteric, mucous membranes moist  NECK: supple, JVP normal, thyroid not enlarged   CAROTID: Normal upstroke, no bruits  CARDIAC: Normal PMI, regular rate and rhythm, normal S1S2, no murmur, rub  RESPIRATORY: Normal respiratory effort, clear to auscultation bilaterally  EXTREMITIES: No cyanosis, clubbing  palpable pulses bilaterally ; trace pedal edema   MUSCULOSKELETAL: No joint swelling or tenderness, no chest wall tenderness  GASTROINTESTINAL:  soft, non-tender, no bruit      Data Review:    CBC:   Recent Labs     05/31/19 2254 06/02/19 0448   WBC 7.2 6.1   HGB 13.7 12.8*   HCT 40.5 37.6*   MCV 95.8 95.6    206     BMP:   Recent Labs     05/31/19 2254 06/02/19 0448   * 137   K 5.0 4.1   CL 96* 101   CO2 28 28   PHOS  --  3.2   BUN 19 12   CREATININE 0.8* 0.5*   GFRAA >60 >60   MG  --  2.40     LFTS:   Recent Labs     05/31/19 2254   ALT 14   AST 15   ALKPHOS 92   PROT 6.8   AGRATIO 1.3   BILITOT 0.3     Cardiac Enzymes:   Recent Labs     05/31/19 2254 06/01/19 2035   TROPONINI <0.01 <0.01           Asuncion Wise D.O., [facility]  Interventional Cardiology     o: [phone]  [address] [facility] [address], [address]      NOTE:  This report was transcribed using voice recognition software. Every effort was made to ensure accuracy; however, inadvertent computerized transcription errors may be present.

## 2019-06-02 NOTE — PROGRESS NOTES
[facility]    6/2/2019 9:56 AM        Name: Montrell Calderon . Admitted: 5/31/2019  Primary Care Provider: Errol Kevin MD (Tel: [phone])      [facility]  Patient with a past medical history colon cancer, chronic pain, nonischemic cardiomyopathy who presented to the emergency department with the complaint of shortness of breath and was admitted for treatment of acute on chronic systolic CHF, patient was diuresed with Lasix, cardiology followed, discussed with cardiology, plan to discharge him tomorrow if stable. Subjective: Romain Borja   Pt is sitting in bed  He said that he slept better today  He is having dyspepsia symptoms and we restart his medications       Reviewed interval ancillary notes    Current Medications    morphine (PF) injection 2 mg Once   [START ON 6/3/2019] sacubitril-valsartan (ENTRESTO) 24-26 MG per tablet 0.5 tablet BID   carvedilol (COREG) tablet 12.5 mg BID WC   diazepam (VALIUM) tablet 5 mg TID   DULoxetine (CYMBALTA) extended release capsule 30 mg Daily   sodium chloride flush 0.9 % injection 10 mL 2 times per day   sodium chloride flush 0.9 % injection 10 mL PRN   ondansetron (ZOFRAN) injection 4 mg Q6H PRN   enoxaparin (LOVENOX) injection 40 mg Daily   potassium chloride 10 mEq/100 mL IVPB (Peripheral Line) PRN   magnesium sulfate 1 g in dextrose 5% 100 mL IVPB PRN   rosuvastatin (CRESTOR) tablet 5 mg Nightly   pregabalin (LYRICA) capsule 75 mg BID   famotidine (PEPCID) injection 20 mg BID   eplerenone (INSPRA) tablet 25 mg Daily   furosemide (LASIX) injection 20 mg BID   acetaminophen (TYLENOL) tablet 650 mg Q4H PRN   magnesium hydroxide (MILK OF MAGNESIA) 400 MG/5ML suspension 30 mL Daily PRN   melatonin tablet 3 mg Nightly PRN   QUEtiapine (SEROQUEL) tablet 25 mg Nightly       Objective:  /66   Pulse 86   Temp 98 °F (36.7 °C) (Oral)   Resp 18   Ht 5' 10\" (1.778 m)   Wt 176 lb 14.4 oz (80.2 kg)   SpO2 96%   BMI 25.38 kg/m²     Intake/Output Summary (Last 24 hours) at 6/2/2019 0956  Last data filed at 6/2/2019 0430  Gross per 24 hour   Intake 1115 ml   Output 4400 ml   Net -3285 ml    Wt Readings from Last 3 Encounters:   06/02/19 176 lb 14.4 oz (80.2 kg)   05/29/19 180 lb (81.6 kg)   05/14/19 172 lb (78 kg)       General appearance:  Appears comfortable  Eyes: Sclera clear. Pupils equal.  ENT: Moist oral mucosa. Trachea midline, no adenopathy. Cardiovascular: Regular rhythm, normal S1, S2. No murmur. No edema in lower extremities  Respiratory: Not using accessory muscles. Good inspiratory effort. Clear to auscultation bilaterally, no wheeze or crackles. GI: Abdomen soft, no tenderness, not distended, normal bowel sounds  Musculoskeletal: No cyanosis in digits, neck supple  Neurology: CN 2-12 grossly intact. No speech or motor deficits  Psych: Normal affect. Alert and oriented in time, place and person  Skin: Warm, dry, normal turgor    Labs and Tests:  CBC:   Recent Labs     05/31/19 2254 06/02/19  0448   WBC 7.2 6.1   HGB 13.7 12.8*    206     BMP:  Recent Labs     05/31/19 2254 06/02/19  0448   * 137   K 5.0 4.1   CL 96* 101   CO2 28 28   BUN 19 12   CREATININE 0.8* 0.5*   GLUCOSE 102* 94     Hepatic: Recent Labs     05/31/19 2254   AST 15   ALT 14   BILITOT 0.3   ALKPHOS 92       Problem List  Active Problems:    Acute on chronic systolic (congestive) heart failure (HCC)  Resolved Problems:    * No resolved hospital problems. *       Assessment & Plan:   Acute on chronic NYHA Class III/IV systolic congestive heart failure exacerbation in a patient with nonischemic dilated cardiomyopathy.   - off dobutamine  - blood pressure was low yesterday, better today  - diuresed total of 3 L  - continue to monitor Is/Os   - will hold entersto for another day to maximize diuresing him if ok with cardiology  - cardiology is following     Chronic pain with chronic narcotic dependence, no pain medication on board, he asked for pain medicine yesterday and received morphine     Hypertension, blood pressure is on the low side, parameters placed for all BP medications     Dyslipidemia, continue on statin          Diet: DIET CARDIAC;   Dietary Nutrition Supplements: Standard High Calorie Oral Supplement  Code:Full Code  DVT PPX zoey Anne MD   6/2/2019 9:56 AM

## 2019-06-02 NOTE — PLAN OF CARE
Assessment complete, see flow sheet. Lung sounds diminished in all lobes with fine crackles in posterior bases. Abdomen distended, no edema in legs. Discussed plan of care regarding elevating the head of bed & foot of bed, up in chair with feet elevated as tolerated, turn cough & deep breath every 2 hours while awake, take rest periods throughout the day, low salt diet. Patient Verbalized understanding. Head & foot of bed elevated. Patient turned coughed & deep breathed with fair effort & no change noted in lung sounds. /74   Pulse 97   Temp 98 °F (36.7 °C) (Oral)   Resp 18   Ht 5' 10\" (1.778 m)   Wt 176 lb 14.4 oz (80.2 kg)   SpO2 97%   BMI 25.38 kg/m² . Explained coreg & iv lasix prior to giving, the patient verbalized understanding. the patient complains of chest tightness #1, declines pain medication. Very anxious & restless,  sent secure message regarding higher home doses of lyrica & seraquel @ home & home pamelor not resumed in hospital. Will continue to monitor [address], BSN, PCCN.

## 2019-06-02 NOTE — PLAN OF CARE
Problem: Falls - Risk of:  Goal: Will remain free from falls  Description  Will remain free from falls  Outcome: Ongoing  Note:   All fall precautions in place, bed in lowest position, frequent rounding to assist with needs. Bed alarm engaged. Pt absent of fall this shift. Problem: HEMODYNAMIC STATUS  Goal: Patient has stable vital signs and fluid balance  Outcome: Ongoing     Problem: FLUID AND ELECTROLYTE IMBALANCE  Goal: Fluid and electrolyte balance are achieved/maintained  Outcome: Ongoing     Problem: ACTIVITY INTOLERANCE/IMPAIRED MOBILITY  Goal: Mobility/activity is maintained at optimum level for patient  Outcome: Ongoing     Problem: Pain:  Goal: Pain level will decrease  Description  Pain level will decrease  Outcome: Ongoing  Goal: Control of acute pain  Description  Control of acute pain  Outcome: Ongoing  Note:   Pt given tylenol for pain which did not relieve pain. MD contacted for pain medication for 6-7/10 mid sternal pain.

## 2019-06-03 NOTE — PLAN OF CARE
Problem: Falls - Risk of:  Goal: Will remain free from falls  Description  Will remain free from falls  6/3/2019 1105 by Rosa Shipman RN  Outcome: Ongoing     Problem: OXYGENATION/RESPIRATORY FUNCTION  Goal: Patient will maintain patent airway  6/3/2019 1105 by Rosa Shipman RN  Outcome: Ongoing  Note:   Patients respiratory status stable at this time. No signs/symptoms distress noted. Respirations even, easy and regular. Nasal canula w/ supplemental O2 available as needed to maintain sp02>90% or per md order. Will continue to monitor. 6/2/2019 2358 by Jesus Casas RN  Outcome: Ongoing     Problem: OXYGENATION/RESPIRATORY FUNCTION  Goal: Patient will achieve/maintain normal respiratory rate/effort  Description  Respiratory rate and effort will be within normal limits for the patient  6/3/2019 1105 by Rosa Shipman RN  Outcome: Ongoing  Note:   Oxygenation assessments with all vital signs and breathing treatments, auscultate lung sounds with assessments. Request RT's intervention PRN. Pt's O2 sats have remained > 90%. Problem: HEMODYNAMIC STATUS  Goal: Patient has stable vital signs and fluid balance  6/3/2019 1105 by Rosa Shipman RN  Outcome: Ongoing  Note:   VS assessed per floor protocol and as needed. Problem: FLUID AND ELECTROLYTE IMBALANCE  Goal: Fluid and electrolyte balance are achieved/maintained  6/3/2019 1105 by Rosa Shipman RN  Outcome: Ongoing  Note:   Strict I&O's     Problem: ACTIVITY INTOLERANCE/IMPAIRED MOBILITY  Goal: Mobility/activity is maintained at optimum level for patient  6/3/2019 1105 by Rosa Shipman RN  Outcome: Ongoing  Note:   PT/OT ordered. Problem: Pain:  Goal: Pain level will decrease  Description  Pain level will decrease  6/3/2019 1105 by Rosa Shipman RN  Outcome: Ongoing  Note:   Pt has tylenol ordered for pain. Pt has one time dose of Morphine ordered if needed.

## 2019-06-03 NOTE — PLAN OF CARE
Re-Assessment complete, see flow sheet. the patient complains of left chest pain #7, no relief from GI cocktail given earlier. Given tylenol epr request. /63   Pulse 85   Temp 97 °F (36.1 °C) (Oral)   Resp 16   Ht 5' 10\" (1.778 m)   Wt 182 lb (82.6 kg)   SpO2 99%   BMI 26.11 kg/m²  room air. Lungs clear, posterior bases diminished. normal sinus rhythm with frequent premature ventricular contractions. Given coreg. Drinking 2 ensures for dinner. To bathroom & back to bed per self, gait steady. Will continue to monitor Daysi Deal RN, BSN, PCCN.

## 2019-06-03 NOTE — PROGRESS NOTES
[facility]    6/3/2019 5:26 PM        Name: Shaneka Murillo . Admitted: 5/31/2019  Primary Care Provider: Luiz Ramirez MD (Tel: [phone])      [facility] course  Patient with a past medical history colon cancer, chronic pain, nonischemic cardiomyopathy who presented to the emergency department with the complaint of shortness of breath and was admitted for treatment of acute on chronic systolic CHF, patient was diuresed with Lasix, cardiology followed, discussed with cardiology, plan to discharge him tomorrow if stable. Subjective:    Pt is sitting in bed  Says that cardiology wants him to stay again tonight as they have adjusted some meds.   Reviewed interval ancillary notes    Current Medications    torsemide (DEMADEX) tablet 20 mg Daily   morphine (PF) injection 2 mg Once   sacubitril-valsartan (ENTRESTO) 24-26 MG per tablet 0.5 tablet BID   naloxegol (MOVANTIK) tablet 25 mg QAM   simethicone (MYLICON) chewable tablet 80 mg 4x Daily PRN   QUEtiapine (SEROQUEL) tablet 50 mg Nightly   nortriptyline (PAMELOR) capsule 10 mg Nightly   carvedilol (COREG) tablet 12.5 mg BID WC   diazepam (VALIUM) tablet 5 mg TID   DULoxetine (CYMBALTA) extended release capsule 30 mg Daily   sodium chloride flush 0.9 % injection 10 mL 2 times per day   sodium chloride flush 0.9 % injection 10 mL PRN   ondansetron (ZOFRAN) injection 4 mg Q6H PRN   enoxaparin (LOVENOX) injection 40 mg Daily   potassium chloride 10 mEq/100 mL IVPB (Peripheral Line) PRN   magnesium sulfate 1 g in dextrose 5% 100 mL IVPB PRN   rosuvastatin (CRESTOR) tablet 5 mg Nightly   pregabalin (LYRICA) capsule 75 mg BID   famotidine (PEPCID) injection 20 mg BID   eplerenone (INSPRA) tablet 25 mg Daily   acetaminophen (TYLENOL) tablet 650 mg Q4H PRN   magnesium hydroxide (MILK OF MAGNESIA) 400 MG/5ML suspension 30 mL Daily PRN   melatonin tablet 3 mg Nightly PRN       Objective:  /69   Pulse 86   Temp 97.2 °F (36.2 °C) (Oral)   Resp 16   Ht 5' 10\" (1.778 m)   Wt 182 lb (82.6 kg)   SpO2 96%   BMI 26.11 kg/m²     Intake/Output Summary (Last 24 hours) at 6/3/2019 1726  Last data filed at 6/3/2019 1451  Gross per 24 hour   Intake 1290 ml   Output 2000 ml   Net -710 ml      Wt Readings from Last 3 Encounters:   06/03/19 182 lb (82.6 kg)   05/29/19 180 lb (81.6 kg)   05/14/19 172 lb (78 kg)       General appearance:  Appears comfortable  Eyes: Sclera clear. Pupils equal.  ENT: Moist oral mucosa. Trachea midline, no adenopathy. Cardiovascular: Regular rhythm, normal S1, S2. No murmur. No edema in lower extremities  Respiratory: Not using accessory muscles. Good inspiratory effort. Clear to auscultation bilaterally, no wheeze or crackles. GI: Abdomen soft, no tenderness, not distended, normal bowel sounds  Musculoskeletal: No cyanosis in digits, neck supple  Neurology: CN 2-12 grossly intact. No speech or motor deficits  Psych: Normal affect. Alert and oriented in time, place and person  Skin: Warm, dry, normal turgor    Labs and Tests:  CBC:   Recent Labs     05/31/19 2254 06/02/19 0448 06/03/19  1052   WBC 7.2 6.1 7.7   HGB 13.7 12.8* 12.9*    206 221     BMP:    Recent Labs     05/31/19 2254 06/02/19 0448 06/03/19  1052   * 137 138   K 5.0 4.1 4.5   CL 96* 101 98*   CO2 28 28 29   BUN 19 12 16   CREATININE 0.8* 0.5* 0.7*   GLUCOSE 102* 94 107*     Hepatic:   Recent Labs     05/31/19 2254   AST 15   ALT 14   BILITOT 0.3   ALKPHOS 92       Problem List  Active Problems:    Acute on chronic systolic (congestive) heart failure (HCC)  Resolved Problems:    * No resolved hospital problems. *       Assessment & Plan:   Acute on chronic NYHA Class III/IV systolic congestive heart failure exacerbation in a patient with nonischemic dilated cardiomyopathy.   - off dobutamine  - blood pressure was low yesterday, better today  - diuresed total

## 2019-06-03 NOTE — PROGRESS NOTES
The [place] Sleepiness Scale       The [place] Sleepiness Scale is widely used in the field of sleep medicine as a subjective measure of a patient's sleepiness. The test is a list of eight situations in which you rate your tendency to become sleepy on a scale of 0, no chance to 3, high chance of dozing. Your score is based on a scale of 0 to 24. The scale estimates whether you are experiencing excessive sleepiness that possibly requires medical attention. How Sleepy Are You? How sleepy are you to doze off or fall asleep in the following situations? You should rate your chances of dozing off, not just feeling tired. Even if you have not done some of these things recently try to determine how they would have affected you.  For each situation, decide whether or not you would have:     0 = No chance of dozing 1 = Slight chance of dozing   2 = Moderate chance of  dozing 3 = High change of dozing       Situation                                                                                     Chance of Dozing    Sitting and reading  0 =  [x]  1 =    [] 2 =    [] 3 =    []    Watching TV  0 =  []  1 =    [x] 2 =    [] 3 =    []      Sitting inactive in public place (e.g., a theater or a meeting)  0 =  [x]  1 =    [] 2 =    [] 3 =    []    As a passenger in a car for an hour without a break          0  =  [x]  1 =    [] 2 =    [] 3 =    []    Lying down to rest in the afternoon when circumstances permit    0 =  []  1 =    [] 2 =    [] 3 =    [x]    Sitting and talking to someone  0 =  [x]  1 =    [] 2 =    [] 3 =    []      Sitting quietly after a lunch without alcohol  0 =  [x]  1 =    [] 2 =    [] 3 =    []    In a car, while stopped for a few minutes in traffic                                                                      0 =  [x]  1 =    [] 2 =    [] 3 =    []    Total Score = 4    If your total score is 10 or greater, you are experiencing excessive sleepiness and should consider seeking a medical follow-up. Take a copy of this screening test to your primary care physician on your next office visit. Interpretation:      0 -   7: It is unlikely that you are abnormally sleepy. 8 -   9: You have an average amount of daytime sleepiness. 10 - 15: You may be excessively sleepy depending on the situation. You may want to consider seeking medical attention. 16 - 24: You are excessively sleepy and should consider seeking medical attention.       Electronically signed by Tremaine Galvan RCP on 6/3/2019 at 12:10 PM

## 2019-06-03 NOTE — CARE COORDINATION
Discharge Planning Assessment  RN/SW discharge planner met with patient/(and family member) to discuss reason for admission, current living situation, and potential needs at the time of discharge     Demographics/Insurance verified Yes     Living arrangements:w/spouse     Level of function/Support:indepenendent w/ADL's     PCP: Yenifer Pinto     Last Visit to PCP: 02/2019     DME: stated none     Active with any community resources/agencies/skilled home care:stated none     Medication compliance issues: stated compliant w/meds     Financial issues that could impact healthcare:stated no issues     Transportation at the time of discharge:family     Tentative discharge plan: home w/no needs  Yordan Juarez

## 2019-06-03 NOTE — PLAN OF CARE
Re-Assessment complete, see flow sheet. Lung sounds diminished in all lobes. Pulse oximeter on room air 96%. Discussed plan of care regarding turn, cough & deep breath every 2 hours while awake. Patient Verbalized understanding, turned coughed & deep breathed with good effort & increased air movement in lungs. Given updated home dose of seraquel, resumed home medication pamelor & prn ambien for restlessness & anxiety per request. Will continue to monitor [address], BSN, PCCN.

## 2019-06-03 NOTE — PROGRESS NOTES
Occupational Therapy   Occupational Therapy Initial Assessment  Date: 6/3/2019   Patient Name: Montrell Calderon  MRN: [id number]     : 1970    Date of Service: 6/3/2019    Discharge Recommendations:Дмитрий Hernandez scored a  on the -[facility] ADL Inpatient form. At this time, no further OT is recommended upon discharge due to pt at baseline function. Recommend patient returns to prior setting with prior services. OT Equipment Recommendations  Equipment Needed: No    Assessment   Performance deficits / Impairments: Decreased endurance  Assessment: pt presents to therapy at reported prior level of function  Treatment Diagnosis: Diagnosed with acute on chronic heart failure. Prognosis: Good  Decision Making: Low Complexity  History: pt lives at home w/ significant other and 3 children,   Patient Education: role of OT, safety, transfers, D/C planning  Barriers to Learning: n/a  REQUIRES OT FOLLOW UP: No  Activity Tolerance  Activity Tolerance: Patient Tolerated treatment well  Safety Devices  Safety Devices in place: Yes  Type of devices: All fall risk precautions in place;Call light within reach; Left in bed  Restraints  Initially in place: No           Patient Diagnosis(es): The primary encounter diagnosis was Chest pain, unspecified type. Diagnoses of Diaphoresis and Other chronic pain were also pertinent to this visit. has a past medical history of Allergic, Anesthesia, Arthritis, CAD (coronary artery disease), Cancer ([address] Chemotherapy adverse reaction, CHF (congestive heart failure) ([address] Chronic knee pain, Clostridium difficile infection, Depression motion, Diverticulitis, Diverticulosis, History of alcohol abuse, Hyperlipidemia, Hypertension, Irregular heart beat, Pneumonia, Right knee pain, Shoulder injury, and Sleep difficulties. has a past surgical history that includes sinus surgery (4 surgeries);  Inner ear surgery (tube right ear); knee surgery (2011); other surgical history (7/1/13); Knee arthroscopy (Right, [id number]); Knee arthroscopy (Right, 8/4/2014); Umbilical hernia repair (11/11/14); joint replacement; Shoulder arthroscopy (Right, 9/22/15); Colonoscopy (08/29/2016); Endoscopic ultrasonography, GI (09/14/2016); Cardiac catheterization (09/14/2016); Small intestine surgery (01/11/2017); Tunneled venous port placement (Left, 02/13/2017); colectomy; Abscess Drainage; Tunneled venous port placement; hernia repair; Upper gastrointestinal endoscopy (10/03/2017); hernia repair (12/01/2017); Small intestine surgery (12/01/2017); other surgical history (12/01/2017); Colonoscopy (03/19/2018); Revision Colostomy (04/18/2018); hernia repair (04/18/2018); sigmoidoscopy (N/A, 11/23/2018); Breast surgery (Right, 1/21/2019); Abdomen surgery; Endoscopy, colon, diagnostic; Cardiac defibrillator placement (10/2017); and Colonoscopy (N/A, 5/14/2019). Treatment Diagnosis: Diagnosed with acute on chronic heart failure. Restrictions  Restrictions/Precautions  Restrictions/Precautions: Fall Risk  Required Braces or Orthoses?: No  Position Activity Restriction  Other position/activity restrictions: 6/1: admitted from cardiologist office with SOB, difficulty with performing minimal ambulation due to SOB. Diagnosed with acute on chronic heart failure. Subjective   General  Chart Reviewed: Yes  Family / Caregiver Present: No  Diagnosis: Diagnosed with acute on chronic heart failure.    Pain Assessment  Pain Assessment: 0-10  Pain Level: 6  Pain Location: Chest  Oxygen Therapy  SpO2: 96 %  Pulse Oximeter Device Mode: Intermittent  Pulse Oximeter Device Location: Right  O2 Device: None (Room air)  Social/Functional History  Social/Functional History  Lives With: Family(significant other and 3 children 14,11,6)  Type of Home: House  Home Layout: Two level, Able to Live on Main level with bedroom/bathroom  Home Access: Stairs to enter with rails  Entrance Stairs - Number of Steps: 4 CHARLIE  Bathroom Shower/Tub: Walk-in shower  Bathroom Toilet: Standard  ADL Assistance: Independent  Homemaking Responsibilities: (shares with family)  Ambulation Assistance: Independent  Transfer Assistance: Independent  Active : Yes  Additional Comments: 2 falls in the last 6 months - unable to recall circumstances - states one due to dizziness       Objective   Vision: Impaired  Hearing: Within functional limits    Orientation  Overall Orientation Status: Within Functional Limits  Observation/Palpation  Posture: Good  Balance  Sitting Balance: Independent  Standing Balance: Independent  Standing Balance  Time: ~3 min  Activity: adjust bedding indep  Functional Mobility  Functional - Mobility Device: No device  Activity: Other  Assist Level: Independent  Functional Mobility Comments: 160 ft  ADL  Additional Comments: pt declined adl skills w/ therapy during eval. stated he got dressed and has been up to use the restroom without difficulty. Tone RUE  RUE Tone: Normotonic  Tone LUE  LUE Tone: Normotonic  Coordination  Movements Are Fluid And Coordinated: Yes     Bed mobility  Supine to Sit: Independent  Sit to Supine: Independent  Transfers  Sit to stand: Independent  Stand to sit:  Independent     Cognition  Overall Cognitive Status: WFL  Perception  Overall Perceptual Status: WFL     Sensation  Overall Sensation Status: WNL        LUE AROM (degrees)  LUE AROM : WFL  Left Hand AROM (degrees)  Left Hand AROM: WFL  RUE AROM (degrees)  RUE AROM : WFL  Right Hand AROM (degrees)  Right Hand AROM: WFL  LUE Strength  Gross LUE Strength: WFL  RUE Strength  Gross RUE Strength: WFL                   Plan        G-Code     OutComes Score                                                  AM-PAC Score        AM-PAC Inpatient Daily Activity Raw Score: 24  AM-PAC Inpatient ADL T-Scale Score : 57.54  ADL Inpatient CMS 0-100% Score: 0  ADL Inpatient CMS G-Code Modifier : CH    Goals          Therapy Time   Individual Concurrent Group Co-treatment   Time In 1051         Time Out 1118         Minutes 27              Timed Code Treatment Minutes:  12 Minutes    Total Treatment Minutes:  27 min    Jose Russell OTR/L [id number]

## 2019-06-03 NOTE — CARE COORDINATION
situations i.e. fire, crime, inclement weather or health crisis. :  Independent  Ability handle personal hygiene needs (bathing/dressing/grooming): Independent  Ability to manage medications: Independent  Ability to prepare food:  Independent  Ability to maintain home (clean home, laundry): Independent  Ability to drive and/or has transportation:  Independent  Ability to do shopping:  Independent  Ability to manage finances: Independent  Is patient able to live independently?:  Yes  Hearing and Vision  Visual Impairment:  Reading glasses  Hearing Impairment:  None  Care Transitions Interventions         Follow Up: CTC met with patient to discuss care transition. Role of CTC and care transition program explained to patient, CTC has met with patient in the past.  He does not anticipate any discharge needs at this time. CTC provided contact information and will follow up as indicated. Future Appointments   Date Time Provider Adis Finnegan   6/11/2019  8:30 AM SCHEDULE, [facility] TRANSMISSION  [facility]   6/13/2019 11:20 AM Franco Castano MD [facility] KW PAIN [facility]   8/30/2019  1:00 PM Sunita Penny MD  [facility]       Health Maintenance  There are no preventive care reminders to display for this patient.     Mj Dominguez RN

## 2019-06-03 NOTE — DISCHARGE SUMMARY
The [place] Sleepiness Scale       The [place] Sleepiness Scale is widely used in the field of sleep medicine as a subjective measure of a patient's sleepiness. The test is a list of eight situations in which you rate your tendency to become sleepy on a scale of 0, no chance to 3, high chance of dozing. Your score is based on a scale of 0 to 24. The scale estimates whether you are experiencing excessive sleepiness that possibly requires medical attention. How Sleepy Are You? How sleepy are you to doze off or fall asleep in the following situations? You should rate your chances of dozing off, not just feeling tired. Even if you have not done some of these things recently try to determine how they would have affected you.  For each situation, decide whether or not you would have:     0 = No chance of dozing 1 = Slight chance of dozing   2 = Moderate chance of  dozing 3 = High change of dozing       Situation                                                                                     Chance of Dozing    Sitting and reading  0 =  [x]  1 =    [] 2 =    [] 3 =    []    Watching TV  0 =  []  1 =    [x] 2 =    [] 3 =    []      Sitting inactive in public place (e.g., a theater or a meeting)  0 =  [x]  1 =    [] 2 =    [] 3 =    []    As a passenger in a car for an hour without a break          0  =  [x]  1 =    [] 2 =    [] 3 =    []    Lying down to rest in the afternoon when circumstances permit    0 =  []  1 =    [] 2 =    [] 3 =    [x]    Sitting and talking to someone  0 =  [x]  1 =    [] 2 =    [] 3 =    []      Sitting quietly after a lunch without alcohol  0 =  [x]  1 =    [] 2 =    [] 3 =    []    In a car, while stopped for a few minutes in traffic                                                                      0 =  [x]  1 =    [] 2 =    [] 3 =    []    Total Score = 4    If your total score is 10 or greater, you are experiencing excessive sleepiness and should consider seeking a medical

## 2019-06-03 NOTE — CONSULTS
[facility]  Advanced CHF/Pulmonary Hypertension   Cardiac Evaluation      Thomas Guevara  YOB: 1970    Requesting PHysician:  Dr. Briana Fulton, Dr. Chung Munoz      Chief Complaint   Patient presents with    Chest Pain     chest pain and sob for the past four days, worse today. pt diaphoretic upon arrival.         History of Present Illness:  Chito Mcnally is a 49 yo male with PMH of NICM, sCHF, HTN, and rectal cancer. [facility] 9/15/16 did not show CAD. AT that time EF was 15%. He has an ICD placed 10/2016 for primary prevention. Most recent echo (9/2018) showed EF 25-30%. He was recently started again on Entresto. Hypotension has been a limiting factor in uptitrating meds. He has had multiple admissions for lower abdominal pain. CT shows pelvic fluid collection at colorectal anastomosis and chronic constipation. He had colonoscopy with colorectal anastomosis dilation 5/14/19. He presented a few days ago feeling bloated, distended, more short of breath. His shortness of breath was worse with minimal exertional activity. He noticed that moving from room to room in his house causes SOB. He called into our office and lasix was doubled, but this did not help significantly. In the ED, initially, he was given 2.5 liters of NS because of possibility of sepsis. `    He is s/p chemo and XRT. He also underwent surgery for repair of ventral hernia and new colostomy on 12/1/17. He has had abscesses post-op that have been drained.     Today he feels better after diuresis, but BNP level is still high and blood pressure remains low. His Entresto was restarted this am after being held through the weekend. He has been having a lot of stress at home due to marital discord. He has chronic pain in his abdomen and only recently has been referred to a pain specialist for management. He has had much more problems since the colorectal reanastomosis.   Recently he has been contemplating possibly going back to colostomy. He has not had much discussion r.e. Management of colostomy but recently had a patient educate him about bands to help with colostomy and hold it in place. So now he is seriously reconsidering going back to colostomy. Allergies   Allergen Reactions    Vancomycin Hives and Swelling     Throat swells    Bactrim [Sulfamethoxazole-Trimethoprim] Other (See Comments)     Thrush    Corn-Containing Products      Patient tells me his throat swells when he eats a large amount of corn or corn products. He can tolerate products with corn oil and corn syrup.  Pt. Reports gets \"congestion\"    Eggs Or Egg-Derived Products Swelling    Seasonal      Current Facility-Administered Medications   Medication Dose Route Frequency Provider Last Rate Last Dose    morphine (PF) injection 2 mg  2 mg Intravenous Once Yandel Chan MD        sacubitril-valsartan (ENTRESTO) 24-26 MG per tablet 0.5 tablet  0.5 tablet Oral BID Ana Maria Carson MD   0.5 tablet at 06/03/19 0851    naloxegol (MOVANTIK) tablet 25 mg  25 mg Oral QAM Ana Maria Carson MD   25 mg at 06/03/19 0905    simethicone (MYLICON) chewable tablet 80 mg  80 mg Oral 4x Daily PRN Ana Maria Carson MD   80 mg at 06/02/19 1341    QUEtiapine (SEROQUEL) tablet 50 mg  50 mg Oral Nightly Chelsea Garay MD   50 mg at 06/02/19 2036    nortriptyline (PAMELOR) capsule 10 mg  10 mg Oral Nightly Chelsea Garay MD   10 mg at 06/02/19 2036    carvedilol (COREG) tablet 12.5 mg  12.5 mg Oral BID  Dalila De La Torre MD   Stopped at 06/03/19 0852    diazepam (VALIUM) tablet 5 mg  5 mg Oral TID Tyrese Guzman MD   5 mg at 06/03/19 0851    DULoxetine (CYMBALTA) extended release capsule 30 mg  30 mg Oral Daily Dwayne Brooke MD   30 mg at 06/03/19 0851    sodium chloride flush 0.9 % injection 10 mL  10 mL Intravenous 2 times per day Tyrese Guzman MD   10 mL at 06/03/19 0851    sodium chloride flush 0.9 % injection 10 mL  10 mL Intravenous PRN Shanna Leonard MD   10 mL at 06/02/19 1712    ondansetron (ZOFRAN) injection 4 mg  4 mg Intravenous Q6H PRN Dwayne Brooke MD        enoxaparin (LOVENOX) injection 40 mg  40 mg Subcutaneous Daily Dwayne Brooke MD   40 mg at 06/03/19 0851    potassium chloride 10 mEq/100 mL IVPB (Peripheral Line)  10 mEq Intravenous PRN Dwayne Brooke MD        magnesium sulfate 1 g in dextrose 5% 100 mL IVPB  1 g Intravenous PRN Dwayne Brooke MD        rosuvastatin (CRESTOR) tablet 5 mg  5 mg Oral Nightly Dwayne Brooke MD   5 mg at 06/02/19 2036    pregabalin (LYRICA) capsule 75 mg  75 mg Oral BID Dwayne Brooke MD   75 mg at 06/03/19 0851    famotidine (PEPCID) injection 20 mg  20 mg Intravenous BID Lynne Anne MD   20 mg at 06/03/19 0851    eplerenone (INSPRA) tablet 25 mg  25 mg Oral Daily [facility] Wilman Cedeño MD   Stopped at 06/03/19 0856    furosemide (LASIX) injection 20 mg  20 mg Intravenous BID Lynne Anne MD   Stopped at 06/03/19 0856    acetaminophen (TYLENOL) tablet 650 mg  650 mg Oral Q4H PRN Genaro Harrison MD   650 mg at 06/03/19 0850    magnesium hydroxide (MILK OF MAGNESIA) 400 MG/5ML suspension 30 mL  30 mL Oral Daily PRN Delbra Hams, APRN - CNP        melatonin tablet 3 mg  3 mg Oral Nightly PRN Delbra Hams, APRN - CNP   3 mg at 06/02/19 2036       Past Medical History:   Diagnosis Date    Allergic     Anesthesia     tremors    Arthritis     CAD (coronary artery disease)     Cancer ([facility] [address]     colon    Chemotherapy adverse reaction 4/29/2019    CHF (congestive heart failure) (HCC)     Chronic knee pain     Clostridium difficile infection 07/11/2016    PCR+    Depression motion     Diverticulitis     Diverticulosis     History of alcohol abuse     Hyperlipidemia     Hypertension     Irregular heart beat     states been told he has had in past. Never treated. Sees PCP every 3 mos.  and EKG yearly    Pneumonia     Right knee pain     Shoulder injury     and arthrisits, injury rotaotr cuff    Sleep difficulties     with depression     Past Surgical History:   Procedure Laterality Date    ABDOMEN SURGERY      ABSCESS DRAINAGE      BREAST SURGERY Right 1/21/2019    RIGHT BREAST EXCISIONAL BIOPSY performed [facility]amika Cao [facility] at [facility]  09/14/2016    No stents placed   [address]  10/2017    COLECTOMY      COLONOSCOPY  08/29/2016    with biopsy and polypectomy    COLONOSCOPY  03/19/2018    COLONOSCOPY N/A 5/14/2019    COLONOSCOPY WITH DILATION performed by Bonita Corley MD at [address] [place] [address] ([facility])  09/14/2016    for staging - Dr. Peggy Baca, COLON, DIAGNOSTIC     [address]  12/01/2017    peristomal hernia repair    HERNIA REPAIR  04/18/2018    S/P: colostomy closure with hernia repair with mesh, with Dr. Emily Reynoso at [facility].  INNER EAR SURGERY  tube right ear    JOINT REPLACEMENT      left TKR    KNEE ARTHROSCOPY Right [id number]    KNEE ARTHROSCOPY Right 8/4/2014    medial meniscus    KNEE SURGERY  08/24/2011    removal of tibial component    OTHER SURGICAL HISTORY  7/1/13    ACL Rt knee meniscus repair synovectomy    OTHER SURGICAL HISTORY  12/01/2017    takedown of ileostomy    REVISION COLOSTOMY  04/18/2018    S/P: colostomy closure with hernia repair with mesh, with Dr. Emily Reynoso at [facility].     SHOULDER ARTHROSCOPY Right 9/22/15    SUBACROMIAL DECOMPRESSION, DISTAL CLAVICLE EXCISION, LABRAL DEBRIDEMETN    SIGMOIDOSCOPY N/A 11/23/2018    SIGMOIDOSCOPY BIOPSY FLEXIBLE performed by Bonita Corley MD at [facility]  4 surgeries    SMALL INTESTINE SURGERY  01/11/2017    LAPAROSCOPIC LOW ANTERIOR RESECTION, ILEOSTOMY    SMALL INTESTINE SURGERY  12/01/2017    small bowel resection    TUNNELED VENOUS PORT PLACEMENT Left weight loss. There's been no change in energy level, sleep pattern, or activity level. · Eyes: No visual changes or diplopia. No scleral icterus. · ENT: No Headaches, hearing loss or vertigo. No mouth sores or sore throat. · Cardiovascular: Reviewed in HPI  · Respiratory: No cough or wheezing, no sputum production. No hematemesis. · Gastrointestinal: No abdominal pain, appetite loss, blood in stools. No change in bowel or bladder habits. · Genitourinary: No dysuria, trouble voiding, or hematuria. · Musculoskeletal:  No gait disturbance, weakness or joint complaints. · Integumentary: No rash or pruritis. · Neurological: No headache, diplopia, change in muscle strength, numbness or tingling. No change in gait, balance, coordination, mood, affect, memory, mentation, behavior. · Psychiatric: No anxiety, no depression. · Endocrine: No malaise, fatigue or temperature intolerance. No excessive thirst, fluid intake, or urination. No tremor. · Hematologic/Lymphatic: No abnormal bruising or bleeding, blood clots or swollen lymph nodes. · Allergic/Immunologic: No nasal congestion or hives. Physical Examination:    Vitals:    06/03/19 0034 06/03/19 0706 06/03/19 0830 06/03/19 0910   BP: 87/61  (!) 88/54    Pulse: 89  91    Resp: 18  18 16   Temp: 97.9 °F (36.6 °C)  97.8 °F (36.6 °C)    TempSrc: Oral  Oral    SpO2: 95%  94% 91%   Weight:  182 lb (82.6 kg)     Height:         Body mass index is 26.11 kg/m².      Wt Readings from Last 3 Encounters:   06/03/19 182 lb (82.6 kg)   05/29/19 180 lb (81.6 kg)   05/14/19 172 lb (78 kg)     BP Readings from Last 3 Encounters:   06/03/19 (!) 88/54   05/29/19 102/68   05/14/19 (!) 92/56     Constitutional and General Appearance:   WD/WN in NAD, somewhat drowsy  HEENT:  NC/AT  MARJAN  No problems with hearing  Skin:  Warm, dry  Respiratory:  · Normal excursion and expansion without use of accessory muscles  · Resp Auscultation: Normal breath sounds without dullness  Cardiovascular:  · The apical impulses not displaced  · Heart tones are crisp and normal  · Cervical veins are not engorged  · The carotid upstroke is normal in amplitude and contour without delay or bruit  · JVP 9-10 cm H2O  RRR with nl S1 and S2 without m,r,g  · Peripheral pulses are symmetrical and full  · There is no clubbing, cyanosis of the extremities. · No edema  · Femoral Arteries: 2+ and equal  · Pedal Pulses: 2+ and equal   Neck:  · No thyromegaly  Abdomen:  · No masses or tenderness  · Liver/Spleen: No Abnormalities Noted  Neurological/Psychiatric:  · Alert and oriented in all spheres  · Moves all extremities well  · Exhibits normal gait balance and coordination  · No abnormalities of mood, affect, memory, mentation, or behavior are noted      Assessment:    1. Chest pain, unspecified type    2. Diaphoresis     3. Acute on chronic systolic HF  4. Hypotension due to meds and HF  5. Chronic abdominal pain with chronic narcotic use    Plan:  1. Change lasix IV to torsemide 20 mg po qd  2. Continue low dose entresto  3. Decrease carvedilol 6.25 mg po bid  4. Continue eplerenone  5. Check labs today and tomorrow  6. Get an optivol today to look at fluid status  7. CHF education reinforced. ~salt restriction  ~fluid restriction  ~medication compliance  ~daily weights and notify of any significant weight gain/loss  ~establish with CHF nurse  ~outpatient follow-up with our CHF team    Needs to stay one more day due to hypotension and the fact that Entresto was just restarted. BNP higher than on admission, needs IV lasix again tomorrow. I spent 70 minutes of face to face time with the patient with more than 50% of the time spent counseling and coordinating care for       I appreciate the opportunity of cooperating in the care of this patient.     Enmanuel Suggs M.D., [address]

## 2019-06-03 NOTE — CARE COORDINATION
[address] Transitions Interview     6/3/2019    Patient: Anni Dixon Patient : 1970   MRN: [id number]  Reason for Admission:   RARS: Readmission Risk Score: 34        Readmission Risk  Patient Active Problem List   Diagnosis    S/P total knee replacement using cement    Mixed hyperlipidemia    Essential hypertension    Depression    Umbilical hernia    Lower abdominal pain    Rectal mass    Hepatitis    Localized edema    SOB (shortness of breath)    Acute systolic heart failure (HCC)    NICM (nonischemic cardiomyopathy) (HCC)    Abnormal nuclear cardiac imaging test    Rectal cancer (HCC)    Neoplasm related pain    Chronic systolic heart failure (HCC)    Cardiomyopathy, idiopathic (HCC)    Poor venous access    Anemia    Insomnia due to medical condition    Chest pain    Dyspnea on exertion    Peristomal hernia    Episodic lightheadedness    ICD (implantable cardioverter-defibrillator) in place    Ileostomy in place [facility])    Pneumonia    Colostomy in place ([facility] [address]    Breast mass, right    Abscess of sacrum (HCC)    Chronic pain    Abscess    Generalized abdominal pain    LLQ pain    Moderate malnutrition (HCC)    Hypervolemia    Chronic pain syndrome    Colon cancer (HCC)    Fibromyalgia    Primary insomnia    Acute on chronic systolic (congestive) heart failure [facility])       Inpatient Assessment  Care Transitions Summary    Care Transitions Inpatient Review  Medication Review  Are you able to afford your medications?:  Yes  How often do you have difficulty taking your medications?:  I always take them as prescribed. Housing Review  Who do you live with?:  Partner/Spouse/SO  Are you an active caregiver in your home?:  No  Social Support  Durable Medical Equipment  Functional Review  Ability to seek help/take action for Emergent/Urgent situations i.e. fire, crime, inclement weather or health crisis. :  Independent  Ability handle personal hygiene needs (bathing/dressing/grooming): Independent  Ability to manage medications: Independent  Ability to prepare food:  Independent  Ability to maintain home (clean home, laundry): Independent  Ability to drive and/or has transportation:  Independent  Ability to do shopping:  Independent  Ability to manage finances: Independent  Is patient able to live independently?:  Yes  Hearing and Vision  Visual Impairment:  Reading glasses  Hearing Impairment:  None  Care Transitions Interventions         Follow Up: Staff in with patient, CTC will attempt to visit at a later time. Future Appointments   Date Time Provider Adis Finnegan   6/11/2019  8:30 AM SCHEDULE, [place] REMOTE TRANSMISSION  Cardio [facility]   6/13/2019 11:20 AM Sommer Walker MD [facility] KW PAIN [facility]   8/30/2019  1:00 PM Gabbie Payne MD  Cardio [facility]       Health Maintenance  There are no preventive care reminders to display for this patient.     Jen Mcdaniels RN

## 2019-06-03 NOTE — PROGRESS NOTES
Physical Therapy    Facility/Department: [address]  Initial Assessment/Discharge Summary    NAME: Bryce Mckeon  : 1970  MRN: [id number]    Date of Service: 6/3/2019    Discharge Recommendations:    Bryce Mckeon scored a 24/24 on the AM-PAC short mobility form. Current research shows that an AM-PAC score of 18 or greater is typically associated with a discharge to the patient's home setting. Based on the patients AM-PAC score and their current functional mobility deficits, it is recommended that the patient have 2-3 sessions per week of Physical Therapy at d/c to increase the patients independence. Recommend out-patient PT. PT Equipment Recommendations  Equipment Needed: No    Assessment   Assessment: Patient presents at baseline, but with high level balance deficits. Recommend out-patient PT to address. Decision Making: Low Complexity  Clinical Presentation: stable  Patient Education: educated on role of acute PT, POC, d/c recommendations  Barriers to Learning: None  REQUIRES PT FOLLOW UP: No  Activity Tolerance  Activity Tolerance: Patient Tolerated treatment well       Patient Diagnosis(es): The primary encounter diagnosis was Chest pain, unspecified type. Diagnoses of Diaphoresis and Other chronic pain were also pertinent to this visit. has a past medical history of Allergic, Anesthesia, Arthritis, CAD (coronary artery disease), Cancer ([address] Chemotherapy adverse reaction, CHF (congestive heart failure) ([address] Chronic knee pain, Clostridium difficile infection, Depression motion, Diverticulitis, Diverticulosis, History of alcohol abuse, Hyperlipidemia, Hypertension, Irregular heart beat, Pneumonia, Right knee pain, Shoulder injury, and Sleep difficulties. has a past surgical history that includes sinus surgery (4 surgeries); Inner ear surgery (tube right ear); knee surgery (2011); other surgical history (13); Knee arthroscopy (Right, [id number]);  Knee arthroscopy Minutes 27         Timed Code Treatment Minutes: [address], PT    Thanks, Mahamed Haddad, PT, DPT [id number]

## 2019-06-03 NOTE — CONSULTS
Annika [address] 1970    History:  Past Medical History:   Diagnosis Date    Allergic     Anesthesia     tremors    Arthritis     CAD (coronary artery disease)     Cancer ([address]     colon    Chemotherapy adverse reaction 4/29/2019    CHF (congestive heart failure) (HCC)     Chronic knee pain     Clostridium difficile infection 07/11/2016    PCR+    Depression motion     Diverticulitis     Diverticulosis     History of alcohol abuse     Hyperlipidemia     Hypertension     Irregular heart beat     states been told he has had in past. Never treated. Sees PCP every 3 mos. and EKG yearly    Pneumonia     Right knee pain     Shoulder injury     and arthrisits, injury rotaotr cuff    Sleep difficulties     with depression       ECHO:  Summary   -Limited echo to evaluate ejection fraction.   -Left ventricular cavity size is dilated with normal wall thickness.   -Overall left ventricular systolic function appears reduced with a estimated   ejection fraction of 25-30%.  -3D ejection fraction calculated at 33.4%   Pacer / ICD wire is visualized in the right heart.     ACE/ARB: . Entresto 24-26 mg (0.5 tab ) bid  BB: coreg 12.5 mg bid  Aldosterone Antagonist: eplerenone 25 mg daily    History of sleep apnea: no  [place] Screen ordered: yes      Last Hospital Admission: 4/8/19 with abdominal pain  Code Status: full code  Discharge plans: lives at home independently    Family Present: austen Gonzales was admitted to the hospital with increased shortness of breath and weight gain. He is well known to CHF team and follows as an outpatient. He attended outpatient [facility] at start of his HF diagnosis. He weighs himself daily. He states recently weight has ranged 178-180 lb however he did get as low as 172 lb. He was seen this past week and found to be fluid overloaded-diuretics were adjusted as an outpatient however symptoms persisted.  He has been drinking over 64 oz since he has been following a liquid diet and drinking 2 ensures at breakfast and again at lunch along with other beverages. He feels he is still following a low sodium diet. He is compliant with medications and with his follow up visits. Patient provided with both written and verbal education on CHF signs/ symptoms, causes, discharge medications,  daily weights, low sodium diet, activity, and follow-up. Pt to call if gains 3 pounds in one day or 5 pounds in one week. Mutually agreed upon goals were discussed such as calling the MD as soon as they recognize symptoms and weight gain, maintaining his proper diet, taking medications as prescribed. Patient provided with CHF Zone Management tool and CHF symptoms magnet. Discussed importance of lifestyle changes: calling with symptoms (yellow zone-)    PATIENT/CAREGIVER TEACHING:    Level of patient/caregiver understanding able to:   [x ] Verbalize understanding [ ] Demonstrate understanding [ ] Teach back   [ ] Needs reinforcement [ ] Other:       Time spent teachin mins    1. WEIGHT: Admit Weight: 180 lb (81.6 kg)      Today  Weight: 182 lb (82.6 kg)   2. I/O     Intake/Output Summary (Last 24 hours) at 6/3/2019 1142  Last data filed at 6/3/2019 0659  Gross per 24 hour   Intake 1530 ml   Output 2145 ml   Net -615 ml       Recommendations:   1. Patient will need a one week hospital follow up appointment. 2. Educate further on fluid restriction  64 oz during inpatient stay so he can understand how to measure intake at home. 3. Continue to educate on S/S.   4. Emphasize daily weights, diet, and knowing when and who to call  5. Provided patient with CHF Resource Line for questions and concerns.        MAXINE MEAD 6/3/2019 11:42 AM

## 2019-06-04 NOTE — DISCHARGE INSTR - DIET

## 2019-06-04 NOTE — PROGRESS NOTES
Was notified by monitor tech that pt just had another run of V-Tach. Pt asleep, easily awakens. BP 86/60, HR 71, afebrile,  c/o chest pain but not requesting anything for pain. Denies nausea,  dizziness,  SOB or diff breathing. No distress noted.   Will continue to monitor.kvng

## 2019-06-04 NOTE — PLAN OF CARE
Problem: Falls - Risk of:  Goal: Will remain free from falls  Description  Will remain free from falls  6/4/2019 0543 by Faiza Jackson RN  Outcome: Ongoing  6/3/2019 2258 by Son Rebollar RN  Outcome: Ongoing  6/3/2019 2021 by Son Rebollar RN  Outcome: Ongoing  6/3/2019 1752 by Son Rebollar RN  Outcome: Ongoing  Goal: Absence of physical injury  Description  Absence of physical injury  6/3/2019 2258 by Son Rebollar RN  Outcome: Ongoing  6/3/2019 2021 by Son Rebollar RN  Outcome: Ongoing  6/3/2019 1752 by Son Rebollar RN  Outcome: Ongoing     Problem: OXYGENATION/RESPIRATORY FUNCTION  Goal: Patient will maintain patent airway  6/3/2019 2258 by Son Rebollar RN  Outcome: Ongoing  6/3/2019 2021 by Son Rebollar RN  Outcome: Ongoing  6/3/2019 1752 by Son Rebollar RN  Outcome: Ongoing  Goal: Patient will achieve/maintain normal respiratory rate/effort  Description  Respiratory rate and effort will be within normal limits for the patient  6/4/2019 0543 by Faiza Jackson RN  Outcome: Ongoing  6/3/2019 2258 by Son Rebollar RN  Outcome: Ongoing  6/3/2019 2021 by Son Rebollar RN  Outcome: Ongoing  6/3/2019 1752 by Son Rebollar RN  Outcome: Ongoing     Problem: HEMODYNAMIC STATUS  Goal: Patient has stable vital signs and fluid balance  6/3/2019 2258 by Son Rebollar RN  Outcome: Ongoing  6/3/2019 2021 by Son Rebollar RN  Outcome: Ongoing  6/3/2019 1752 by Son Rebollar RN  Outcome: Ongoing     Problem: FLUID AND ELECTROLYTE IMBALANCE  Goal: Fluid and electrolyte balance are achieved/maintained  6/3/2019 2258 by Son Rebollar RN  Outcome: Ongoing  6/3/2019 2021 by Son Rebollar RN  Outcome: Ongoing  6/3/2019 1752 by Son Rebollar RN  Outcome: Ongoing     Problem: ACTIVITY INTOLERANCE/IMPAIRED MOBILITY  Goal: Mobility/activity is maintained at optimum level for patient  6/3/2019 2258 by Son Rebollar RN  Outcome: Ongoing  6/3/2019 2021 by Jesus Casas RN  Outcome: Ongoing  6/3/2019 1752 by Jesus Casas RN  Outcome: Ongoing     Problem: Pain:  Goal: Pain level will decrease  Description  Pain level will decrease  6/4/2019 0543 by Devan Dan RN  Outcome: Ongoing  Note:       C/O level 8/10 chest pain, gave Toradol per prn order.jmitchell  6/3/2019 2258 by Jesus Casas RN  Outcome: Ongoing  6/3/2019 2021 by Jesus Casas RN  Outcome: Ongoing  6/3/2019 1752 by Jesus Casas RN  Outcome: Ongoing  Goal: Control of acute pain  Description  Control of acute pain  6/3/2019 2258 by Jesus Casas RN  Outcome: Ongoing  6/3/2019 2021 by Jesus Casas RN  Outcome: Ongoing  6/3/2019 1752 by Jesus Casas RN  Outcome: Ongoing  Goal: Control of chronic pain  Description  Control of chronic pain  6/3/2019 2258 by Jesus Casas RN  Outcome: Ongoing  6/3/2019 2021 by Jesus Casas RN  Outcome: Ongoing  6/3/2019 1752 by Jesus Casas RN  Outcome: Ongoing     Problem: Musculor/Skeletal Functional Status  Goal: Highest potential functional level  6/3/2019 2258 by Jesus Casas RN  Outcome: Ongoing  6/3/2019 2021 by Jesus Casas RN  Outcome: Ongoing  6/3/2019 1752 by Jesus Casas RN  Outcome: Ongoing  Goal: Absence of falls  6/3/2019 2258 by Jesus Casas RN  Outcome: Ongoing  6/3/2019 2021 by Jesus Casas RN  Outcome: Ongoing  6/3/2019 1752 by Jesus Casas RN  Outcome: Ongoing

## 2019-06-04 NOTE — PROGRESS NOTES
[address]   Daily Progress Note      Admit Date:  5/31/2019    HPI:    Mr. Marquis Huston is a 50year old male with history of sHF, NICM, HTN, HLD and rectal cancer s/p chemo and XRT. [facility] 9/15/16 with no coronary artery disease, LVEF 15%, LVEDP 20-25 mmHg. He also underwent surgery for repair of ventral hernia and new colostomy 12/17 and then a reversal of colostomy, abscesses post op that have been drained    He is admitted with bloating, distention, more SOB. Diuretics had been increased but this did not help. In the ER, he was given 2.5 L of NS due to the possibility of sepsis. entresto was held over the weekend and restarted a day ago. optival checked on 6/3/19 showed increased thoracic impedence     Subjective:  Patient is being seen for acute on chronic sHF. There were no acute overnight cardiac events. Creat 0.7 potassium 3.7 probnp 05087 weight unchanged some VT last night magnesium 2.2 BP checked by me is 83/57. He is sitting in the bed without complaints of sob or lightheadedness. He just received IV lasix and entresto    Objective:   BP 96/62   Pulse 67   Temp 98.7 °F (37.1 °C) (Oral)   Resp 16   Ht 5' 10\" (1.778 m)   Wt 179 lb 11.2 oz (81.5 kg)   SpO2 93%   BMI 25.78 kg/m²       Intake/Output Summary (Last 24 hours) at 6/4/2019 1025  Last data filed at 6/3/2019 1944  Gross per 24 hour   Intake 960 ml   Output 1300 ml   Net -340 ml          Physical Exam:  General:  Awake, alert, oriented in NAD  Skin:  Warm and dry. No unusual bruising or rash  Neck:  Supple. + JVD or carotid bruit appreciated  Chest:  Normal effort.   Clear to auscultation, no wheezes/rhonchi/rales  Cardiovascular:  RRR, S1/S2, no murmur/gallop/rub  Abdomen:  Soft, nontender, +bowel sounds  Extremities:  No edema  Neurological: No focal deficits  Psychological: Normal mood and affect      Medications:    furosemide  80 mg Intravenous Daily    morphine  2 mg Intravenous Once    sacubitril-valsartan  0.5 tablet Oral BID    naloxegol  25 mg Oral QAM    QUEtiapine  50 mg Oral Nightly    nortriptyline  10 mg Oral Nightly    carvedilol  12.5 mg Oral BID WC    diazepam  5 mg Oral TID    DULoxetine  30 mg Oral Daily    sodium chloride flush  10 mL Intravenous 2 times per day    enoxaparin  40 mg Subcutaneous Daily    rosuvastatin  5 mg Oral Nightly    pregabalin  75 mg Oral BID    famotidine (PEPCID) injection  20 mg Intravenous BID    eplerenone  25 mg Oral Daily         Lab Data:  CBC:   Recent Labs     06/02/19  0448 06/03/19  1052 06/04/19  0420   WBC 6.1 7.7 9.3   HGB 12.8* 12.9* 13.0*    221 213     BMP:    Recent Labs     06/02/19  0448 06/03/19  1052 06/04/19  0420    138 138   K 4.1 4.5 3.7   CO2 28 29 31   BUN 12 16 22*   CREATININE 0.5* 0.7* 0.7*     INR:  No results for input(s): INR in the last 72 hours. BNP:    Recent Labs     06/03/19  1052   PROBNP 12,229*         Diagnostics:  Echo 9/19/2018:  Summary   -Limited echo to evaluate ejection fraction.   -Left ventricular cavity size is dilated with normal wall thickness.   -Overall left ventricular systolic function appears reduced with a estimated ejection fraction of 25-30%.  -3D ejection fraction calculated at 33.4%   Pacer / ICD wire is visualized in the right heart.     [facility] 9/15/2016:  Cath via radial unable to manipulate catheter into ascending aorta-small subintimal dissection in subclavian occurred. Cath via right femoral shows normal coronaries,LVEDP 20-25. EF 15%      Assessment:    1. Chest pain  2. Acute on chronic systolic heart failure, on arni, bb and aldosterone antagonist  3. Hypotension  4. Chronic abdominal pain with chronic narcotic use    Plan:    1. Continue lasix 80 mg IV today   2. Continue entresto 0.5 of 24-26 mg bid  3. Continue coreg 6.25 mg po bid  4. Continue eplerenone 25 mg daily   5. Will check bnp in am tomorrow  6.   CHF nurse following for diet education, fluid restriction and daily weights  7. Will give a dose of oral potassium today    Hopefully home tomorrow    Discussed with patient who is agreeable with plan of care. Thank you for allowing me to participate in the care of your patient.     Garrison Cantor, CNS

## 2019-06-04 NOTE — PROGRESS NOTES
Was notified by monitor tech that pt just had another run of V-Tach. Pt asleep, easily awakens. BP 86/60, HR 71, afebrile,  c/o chest pain but not requesting anything for pain, sleepy. Denies nausea,  dizziness,  SOB or diff breathing. No distress noted.   Will continue to monitor.kvng

## 2019-06-04 NOTE — PLAN OF CARE
Re-Assessment complete, see flow sheet. BP 94/67   Pulse 86   Temp 97.1 °F (36.2 °C) (Oral)   Resp 18   Ht 5' 10\" (1.778 m)   Wt 182 lb (82.6 kg)   SpO2 98%   BMI 26.11 kg/m² . Given 1x dose toradol per CNP's order for chest pain. Updated on Dr Kyleigh Eisenberg for iv lasix tomorrow am. the patient verbalized understanding. Will continue to monitor Padma Medina RN, BSN, PCCN.

## 2019-06-04 NOTE — PROGRESS NOTES
[facility] PROGRESS NOTE    6/4/2019 5:06 PM        Name: Michelle Reyes . Admitted: 5/31/2019  Primary Care Provider: Fanny Mathias MD (Tel: [phone])      [facility] course  Patient with a past medical history colon cancer, chronic pain, nonischemic cardiomyopathy who presented to the emergency department with the complaint of shortness of breath and was admitted for treatment of acute on chronic systolic CHF, patient was diuresed with Lasix, cardiology followed, discussed with cardiology, plan to discharge him tomorrow if stable. Subjective:    Pt is sitting in bed  Says that cardiology wants him to stay again tonight as they have adjusted some meds.   Reviewed interval ancillary notes    Current Medications    ketorolac (TORADOL) injection 30 mg Q6H PRN   carvedilol (COREG) tablet 6.25 mg BID WC   furosemide (LASIX) injection 80 mg Daily   morphine (PF) injection 2 mg Once   sacubitril-valsartan (ENTRESTO) 24-26 MG per tablet 0.5 tablet BID   naloxegol (MOVANTIK) tablet 25 mg QAM   simethicone (MYLICON) chewable tablet 80 mg 4x Daily PRN   QUEtiapine (SEROQUEL) tablet 50 mg Nightly   nortriptyline (PAMELOR) capsule 10 mg Nightly   diazepam (VALIUM) tablet 5 mg TID   DULoxetine (CYMBALTA) extended release capsule 30 mg Daily   sodium chloride flush 0.9 % injection 10 mL 2 times per day   sodium chloride flush 0.9 % injection 10 mL PRN   ondansetron (ZOFRAN) injection 4 mg Q6H PRN   enoxaparin (LOVENOX) injection 40 mg Daily   potassium chloride 10 mEq/100 mL IVPB (Peripheral Line) PRN   magnesium sulfate 1 g in dextrose 5% 100 mL IVPB PRN   rosuvastatin (CRESTOR) tablet 5 mg Nightly   pregabalin (LYRICA) capsule 75 mg BID   famotidine (PEPCID) injection 20 mg BID   eplerenone (INSPRA) tablet 25 mg Daily   acetaminophen (TYLENOL) tablet 650 mg Q4H PRN   magnesium hydroxide (MILK OF MAGNESIA) 400 MG/5ML suspension 30 mL Daily PRN   melatonin tablet 3 mg Nightly PRN       Objective:  BP 88/61   Pulse 88   Temp 98.3 °F (36.8 °C) (Oral)   Resp 16   Ht 5' 10\" (1.778 m)   Wt 179 lb 11.2 oz (81.5 kg)   SpO2 93%   BMI 25.78 kg/m²     Intake/Output Summary (Last 24 hours) at 6/4/2019 1706  Last data filed at 6/3/2019 1944  Gross per 24 hour   Intake 720 ml   Output 1000 ml   Net -280 ml      Wt Readings from Last 3 Encounters:   06/04/19 179 lb 11.2 oz (81.5 kg)   05/29/19 180 lb (81.6 kg)   05/14/19 172 lb (78 kg)       General appearance:  Appears comfortable  Eyes: Sclera clear. Pupils equal.  ENT: Moist oral mucosa. Trachea midline, no adenopathy. Cardiovascular: Regular rhythm, normal S1, S2. No murmur. No edema in lower extremities  Respiratory: Not using accessory muscles. Good inspiratory effort. Clear to auscultation bilaterally, no wheeze or crackles. GI: Abdomen soft, no tenderness, not distended, normal bowel sounds  Musculoskeletal: No cyanosis in digits, neck supple  Neurology: CN 2-12 grossly intact. No speech or motor deficits  Psych: Normal affect. Alert and oriented in time, place and person  Skin: Warm, dry, normal turgor    Labs and Tests:  CBC:   Recent Labs     06/02/19  0448 06/03/19  1052 06/04/19  0420   WBC 6.1 7.7 9.3   HGB 12.8* 12.9* 13.0*    221 213     BMP:    Recent Labs     06/02/19  0448 06/03/19  1052 06/04/19  0420    138 138   K 4.1 4.5 3.7    98* 96*   CO2 28 29 31   BUN 12 16 22*   CREATININE 0.5* 0.7* 0.7*   GLUCOSE 94 107* 90     Hepatic:   No results for input(s): AST, ALT, ALB, BILITOT, ALKPHOS in the last 72 hours. Problem List  Active Problems:    Acute on chronic systolic (congestive) heart failure (HCC)    Hypotension due to drugs  Resolved Problems:    * No resolved hospital problems.  *       Assessment & Plan:   Acute on chronic NYHA Class III/IV systolic congestive heart failure exacerbation in a patient with nonischemic dilated cardiomyopathy. - off dobutamine  - blood pressure was low yesterday, better today  - diuresed total of 3 L  - continue to monitor Is/Os   - on entresto  - cardiology is following   -switched to oral torsemide. - coreg at 6.25  Blood pressure is low but his EF is 15 % it varies through the day. D/c tomorrow. Chronic pain with chronic narcotic dependence, no pain medication on board. Hypertension, blood pressure is on the low side, parameters placed for all BP medications   -    Dyslipidemia, continue on statin          Diet: DIET CARDIAC;   Dietary Nutrition Supplements: Standard High Calorie Oral Supplement  Code:Full Code  DVT PPX zoey Harris MD   6/4/2019 5:06 PM

## 2019-06-05 NOTE — PROGRESS NOTES
PM assessment complete and documented. Meds given per MAR. No needs or concerns expressed at present. Will continue to monitor.

## 2019-06-05 NOTE — PROGRESS NOTES
[facility] PROGRESS NOTE    6/5/2019 5:50 PM        Name: Price Frias Admitted: 5/31/2019  Primary Care Provider: Manoj Jones MD (Tel: [phone])      [facility] course  Patient with a past medical history colon cancer, chronic pain, nonischemic cardiomyopathy who presented to the emergency department with the complaint of shortness of breath and was admitted for treatment of acute on chronic systolic CHF, patient was diuresed with Lasix, cardiology followed, discussed with cardiology, plan to discharge him tomorrow if stable. Subjective:    Pt is sitting in bed  Says that cardiology wants him to stay again tonight as they have adjusted some meds.   Reviewed interval ancillary notes    Current Medications    benzocaine-menthol (CEPACOL SORE THROAT) lozenge 1 lozenge Q2H PRN   cetirizine (ZYRTEC) tablet 10 mg Daily   torsemide (DEMADEX) tablet 20 mg Daily   perflutren lipid microspheres (DEFINITY) injection 1.65 mg ONCE PRN   milrinone (PRIMACOR) 20 mg in dextrose 5 % 100 mL infusion Continuous   furosemide (LASIX) 100 mg in dextrose 5 % 100 mL infusion Continuous   ketorolac (TORADOL) injection 30 mg Q6H PRN   carvedilol (COREG) tablet 6.25 mg BID WC   morphine (PF) injection 2 mg Once   naloxegol (MOVANTIK) tablet 25 mg QAM   simethicone (MYLICON) chewable tablet 80 mg 4x Daily PRN   QUEtiapine (SEROQUEL) tablet 50 mg Nightly   nortriptyline (PAMELOR) capsule 10 mg Nightly   diazepam (VALIUM) tablet 5 mg TID   DULoxetine (CYMBALTA) extended release capsule 30 mg Daily   sodium chloride flush 0.9 % injection 10 mL 2 times per day   sodium chloride flush 0.9 % injection 10 mL PRN   ondansetron (ZOFRAN) injection 4 mg Q6H PRN   enoxaparin (LOVENOX) injection 40 mg Daily   potassium chloride 10 mEq/100 mL IVPB (Peripheral Line) PRN   magnesium sulfate 1 g in dextrose 5% 100 mL IVPB PRN   rosuvastatin (CRESTOR) tablet 5 mg Nightly   pregabalin (LYRICA) capsule 75 mg BID   famotidine (PEPCID) injection 20 mg BID   eplerenone (INSPRA) tablet 25 mg Daily   acetaminophen (TYLENOL) tablet 650 mg Q4H PRN   magnesium hydroxide (MILK OF MAGNESIA) 400 MG/5ML suspension 30 mL Daily PRN   melatonin tablet 3 mg Nightly PRN       Objective:  BP 91/61   Pulse 88   Temp 97.4 °F (36.3 °C) (Temporal)   Resp 18   Ht 5' 10\" (1.778 m)   Wt 179 lb 11.2 oz (81.5 kg)   SpO2 98%   BMI 25.78 kg/m²     Intake/Output Summary (Last 24 hours) at 6/5/2019 1750  Last data filed at 6/5/2019 1451  Gross per 24 hour   Intake 1200 ml   Output 750 ml   Net 450 ml      Wt Readings from Last 3 Encounters:   06/05/19 179 lb 11.2 oz (81.5 kg)   05/29/19 180 lb (81.6 kg)   05/14/19 172 lb (78 kg)       General appearance:  Appears comfortable  Eyes: Sclera clear. Pupils equal.  ENT: Moist oral mucosa. Trachea midline, no adenopathy. Cardiovascular: Regular rhythm, normal S1, S2. No murmur. No edema in lower extremities  Respiratory: Not using accessory muscles. Good inspiratory effort. Clear to auscultation bilaterally, no wheeze or crackles. GI: Abdomen soft, no tenderness, not distended, normal bowel sounds  Musculoskeletal: No cyanosis in digits, neck supple  Neurology: CN 2-12 grossly intact. No speech or motor deficits  Psych: Normal affect. Alert and oriented in time, place and person  Skin: Warm, dry, normal turgor    Labs and Tests:  CBC:   Recent Labs     06/03/19  1052 06/04/19  0420 06/05/19  0500   WBC 7.7 9.3 8.8   HGB 12.9* 13.0* 12.3*    213 208     BMP:    Recent Labs     06/03/19  1052 06/04/19  0420 06/05/19  0500    138 137   K 4.5 3.7 4.1   CL 98* 96* 97*   CO2 29 31 30   BUN 16 22* 25*   CREATININE 0.7* 0.7* 0.8*   GLUCOSE 107* 90 91     Hepatic:   No results for input(s): AST, ALT, ALB, BILITOT, ALKPHOS in the last 72 hours.     Problem List  Active Problems:    Acute on chronic systolic (congestive) heart failure (HCC)    Hypotension due to drugs  Resolved Problems:    * No resolved hospital problems. *       Assessment & Plan:   Acute on chronic NYHA Class III/IV systolic congestive heart failure exacerbation in a patient with nonischemic dilated cardiomyopathy. - off dobutamine  - blood pressure was low yesterday, better today  - diuresed total of 4 L  - continue to monitor Is/Os   - on entresto  - cardiology is following   -switched to oral torsemide. - coreg at 6.25  Blood pressure is low but his EF is 15 -38 %, BP varies through the day. Started on milrinone gtt today as he is not really diuresing with lasix. Also lasix gtt started as well at 5      Chronic pain with chronic narcotic dependence, no pain medication on board. Hypotension  -does not seem to be tolerating the entresto. -    Dyslipidemia, continue on statin          Diet: DIET CARDIAC;   Dietary Nutrition Supplements: Standard High Calorie Oral Supplement  Code:Full Code  DVT PPX zoey Kirkland MD   6/5/2019 5:50 PM

## 2019-06-05 NOTE — PLAN OF CARE
Problem: OXYGENATION/RESPIRATORY FUNCTION  Goal: Patient will maintain patent airway  Outcome: Ongoing  Note:   Lung sounds are clear with minimal crackles in the bases. Pt denies SOB, remains on room air. Goal: Patient will achieve/maintain normal respiratory rate/effort  Description  Respiratory rate and effort will be within normal limits for the patient  Outcome: Ongoing     Problem: HEMODYNAMIC STATUS  Goal: Patient has stable vital signs and fluid balance  Outcome: Ongoing  Note:   Pt's BP is stable low. Cardiology following. Currently now on a Primacor and Lasix gtt for fluid balance. Problem: FLUID AND ELECTROLYTE IMBALANCE  Goal: Fluid and electrolyte balance are achieved/maintained  Outcome: Ongoing  Note:   Electrolytes stable.

## 2019-06-05 NOTE — PROGRESS NOTES
Message sent via Particle to CARMEN Hernandez: Pt states he takes Zyrtec at home and is asking if it can be ordered along with cough drop? Please advise.  Thank you    Waiting response

## 2019-06-05 NOTE — PROGRESS NOTES
Zyrtec and cepacol ordered and given. Pt states he feels distended. Mylicon given to see if distention is related to gas pain. Will continue to monitor.

## 2019-06-05 NOTE — PROGRESS NOTES
with colostomy and hold it in place. So now he is seriously reconsidering going back to colostomy. ROS:  He is feeling bloated today. Did not diurese very well yesterday despite IV lasix. Blood pressure still running low. Not short of breath. No dizziness.     Medications/Labs all Reviewed    Lab Results   Component Value Date    WBC 8.8 06/05/2019    HGB 12.3 (L) 06/05/2019    HCT 35.8 (L) 06/05/2019    MCV 95.8 06/05/2019     06/05/2019     Lab Results   Component Value Date    CREATININE 0.8 (L) 06/05/2019    BUN 25 (H) 06/05/2019     06/05/2019    K 4.1 06/05/2019    CL 97 (L) 06/05/2019    CO2 30 06/05/2019     Lab Results   Component Value Date    INR 1.10 05/06/2019    PROTIME 12.5 05/06/2019        Physical Examination:    BP 88/61   Pulse 90   Temp 97.7 °F (36.5 °C) (Temporal)   Resp 18   Ht 5' 10\" (1.778 m)   Wt 179 lb 11.2 oz (81.5 kg)   SpO2 94%   BMI 25.78 kg/m²      WD/WN  HEENT:  NC/AT  Respiratory:  · Resp Assessment: Normal respiratory effort  · Resp Auscultation: Clear to auscultation bilaterally   Cardiovascular:  · Auscultation: regular rate and rhythm, normal S1S2, no murmur, rub or gallop  · Palpation:  Nl PMI  · JVP:  normal  · Extremities: No Edema  Abdomen:  · Soft, non-tender  · Normal bowel sounds  Extremities:  ·  No Cyanosis or Clubbing  Neurological/Psychiatric:  · Oriented to time, place, and person  · Non-anxious  Skin Warm and dry    Lab Results   Component Value Date     06/05/2019     06/04/2019     06/03/2019    K 4.1 06/05/2019    K 3.7 06/04/2019    K 4.5 06/03/2019    K 4.3 05/06/2019    K 3.8 04/09/2019    K 4.1 04/08/2019    BUN 25 06/05/2019    BUN 22 06/04/2019    BUN 16 06/03/2019    CREATININE 0.8 06/05/2019    CREATININE 0.7 06/04/2019    CREATININE 0.7 06/03/2019    GLUCOSE 91 06/05/2019    GLUCOSE 90 06/04/2019    GLUCOSE 131 07/05/2017    GLUCOSE 135 06/21/2017     Lab Results   Component Value Date    PROBNP 11,077 (H) 06/05/2019    PROBNP 12,229 (H) 06/03/2019    PROBNP 5,411 (H) 05/31/2019     Lab Results   Component Value Date    ALT 14 05/31/2019    ALT 14 05/08/2019    AST 15 05/31/2019    AST 17 05/08/2019     Lab Results   Component Value Date    HGB 12.3 06/05/2019    HGB 13.0 06/04/2019    HCT 35.8 06/05/2019    HCT 37.7 06/04/2019     06/05/2019     06/04/2019     Lab Results   Component Value Date    TRIG 88 04/09/2019    TRIG 286 08/16/2017    HDL 37 08/16/2017    HDL 40 06/21/2013    HDL 36 02/20/2012    HDL 45 02/22/2011    LDLCALC 133 08/16/2017    LDLCALC 76 06/21/2013     Labs were reviewed including labs from other hospital systems through [facility] Cardiac testing was reviewed including echos, nuclear scans, cardiac catheterization, including from other hospital systems through [facility] Assessment:    1. Chest pain, unspecified type    2. Diaphoresis     3. Acute on chronic systolic HF  4. Hypotension due to meds and HF  5. Chronic abdominal pain with chronic narcotic use     Plan:  1.  continue IV lasix  2.  stop Entresto  3.  continue carvedilol 6.25 mg po bid  4. Continue eplerenone    6. Get an optivol today to look at fluid status  7. CHF education reinforced. ~salt restriction  ~fluid restriction  ~medication compliance  ~daily weights and notify of any significant weight gain/loss  ~establish with CHF nurse  ~outpatient follow-up with our CHF team     Optivol today shows that he is not diuresing. I am concerned that he is not tolerating Entresto.   Get an echo  [address] discharge today  Start milrinone 0.1 mcg/kg/min  Continue daily IV lasix    NYHA Class: 3-4    Sunita Penny MD, 6/5/2019 11:10 AM

## 2019-06-05 NOTE — PROGRESS NOTES
Pt very upset regarding some family issues. Requested spouse be taken off contact list and for her not to be given information.  Complied with pt request.

## 2019-06-06 NOTE — PROGRESS NOTES
[facility]    6/6/2019 11:46 AM        Name: Ana Cee . Admitted: 5/31/2019  Primary Care Provider: Ravinder Comer MD (Tel: [phone])      [facility] course  Patient with a past medical history colon cancer, chronic pain, nonischemic cardiomyopathy who presented to the emergency department with the complaint of shortness of breath and was admitted for treatment of acute on chronic systolic CHF, patient was diuresed with Lasix, cardiology followed, discussed with cardiology, plan to discharge him tomorrow if stable. Subjective:    Transferred to CVU overnight due to tachyarrhythmias , ? Sustained VT   He was diaphoretic, hypotensive and disoriented  Confusion Possibly related to valium and lyrica. Rhythm probably due to milrinone.   Lasix and milrinone stopped          Current Medications    sodium chloride 0.9 % infusion    benzocaine-menthol (CEPACOL SORE THROAT) lozenge 1 lozenge Q2H PRN   cetirizine (ZYRTEC) tablet 10 mg Daily   torsemide (DEMADEX) tablet 20 mg Daily   perflutren lipid microspheres (DEFINITY) injection 1.65 mg ONCE PRN   milrinone (PRIMACOR) 20 mg in dextrose 5 % 100 mL infusion Continuous   furosemide (LASIX) 100 mg in dextrose 5 % 100 mL infusion Continuous   ketorolac (TORADOL) injection 30 mg Q6H PRN   carvedilol (COREG) tablet 6.25 mg BID WC   morphine (PF) injection 2 mg Once   naloxegol (MOVANTIK) tablet 25 mg QAM   simethicone (MYLICON) chewable tablet 80 mg 4x Daily PRN   QUEtiapine (SEROQUEL) tablet 50 mg Nightly   nortriptyline (PAMELOR) capsule 10 mg Nightly   diazepam (VALIUM) tablet 5 mg TID   DULoxetine (CYMBALTA) extended release capsule 30 mg Daily   sodium chloride flush 0.9 % injection 10 mL 2 times per day   sodium chloride flush 0.9 % injection 10 mL PRN   ondansetron (ZOFRAN) injection 4 mg Q6H PRN   enoxaparin (LOVENOX) injection 40 mg Daily

## 2019-06-06 NOTE — PROGRESS NOTES
RRT called for     · Altered mental status In patient   · tachyarrhythmias , ? Sustained VT     Also patient found to be very diaphoretic by staff     Patient is on Primacor and Lasix Gtt currently     Evaluated patient   SBP in 70s when I first saw him   Very drowsy   Oriented X self and place   CORREIA X 4   No focal deficits noted     Denies any chest pain   Denies any Resp Distress     EKG done and shows No acute or concerning findings/pathology noted on review    Plan   · Labs sent stat   · Labs noted   · No acute issues on Labs   · Tele monitor w/ recurrent PVCs   · Transferred to  U for closer watch tonight   · NS X 250 Ml X 1 given   · Defer decision on drips to cardiology   · Staff informed   · Patients BP always runs low in 80-90 SBP . Checked BP multiple times in room and BP improved from 70s to 80s in room   · Also ? Medications as cause of above since he received Valium and Lyrica last night ?? Medication related encephalopathy     Shayan Dillard MD    6/6/2019 2:31 AM    Hospitalist - [facility].

## 2019-06-06 NOTE — PROGRESS NOTES
Pt brought down to CVU via bed. Pt awake and oriented. Slow to respond but able to hold a conversation. Pt denies any pain./ VSS.

## 2019-06-06 NOTE — PLAN OF CARE
Problem: OXYGENATION/RESPIRATORY FUNCTION  Goal: Patient will maintain patent airway  Outcome: Ongoing  Goal: Patient will achieve/maintain normal respiratory rate/effort  Description  Respiratory rate and effort will be within normal limits for the patient  Outcome: Ongoing     Problem: HEMODYNAMIC STATUS  Goal: Patient has stable vital signs and fluid balance  Outcome: Ongoing     Problem: FLUID AND ELECTROLYTE IMBALANCE  Goal: Fluid and electrolyte balance are achieved/maintained  Outcome: Ongoing     Problem: Musculor/Skeletal Functional Status  Goal: Highest potential functional level  Outcome: Ongoing  Goal: Absence of falls  Outcome: Ongoing

## 2019-06-06 NOTE — TELEPHONE ENCOUNTER
Per RSM, pt can call us when he gets discharged from the hospital. Called and spoked to the pt and let him know what RSM stated. He said the hospital will give him enough patches to last a week, he will call us when he gets discharged to make an appointment.

## 2019-06-06 NOTE — PROGRESS NOTES
Assessment complete. Vitals:    06/05/19 2330   BP: 88/64   Pulse: 93   Resp: 18   Temp: 98.1 °F (36.7 °C)   SpO2: 97%     No SOB or any distress noted. Pt c/o pain at 9/10 that is generalized as well as a headache. Toradol given as ordered. POC discussed and agreed upon. Call light within reach, pt encouraged to call if any needs arise. No further requests at this time. Will continue to monitor.

## 2019-06-06 NOTE — PROGRESS NOTES
Rapid Response Quick Summary    Room: CVU-2906/2906-01    Assessment of concern / patient: RR called for pt in Vtach/Vfib per MT on 3T. Pt was diaphoretic. Nurse stated pt was unresponsive initially but did waken with Dr voice. Noting frequent trigeminal PVCs. Had been on Primacor and Lasix gtt. SBP 80s-90s  Physician involved:  Shelbie  Interventions:    EKG, BS 99,Labs, Call Cardiology , 250ml IV bolus. Disposition: Transfer to CVU 2906.

## 2019-06-06 NOTE — PROGRESS NOTES
[address]   Progress Note  CHF/Pulmonary Hypertension Cardiology    Chief complaint: We are following this patient for acute on chronic systolic HF, hypotension  HPI:  Jeannie Montemayor is a 51 yo male with PMH of NICM, sCHF, HTN, and rectal cancer. [facility] 9/15/16 did not show CAD. AT that time EF was 15%. He has an ICD placed 10/2016 for primary prevention. Most recent echo (9/2018) showed EF 25-30%. He was recently started again on Entresto. Hypotension has been a limiting factor in uptitrating meds. He has had multiple admissions for lower abdominal pain. CT shows pelvic fluid collection at colorectal anastomosis and chronic constipation. He had colonoscopy with colorectal anastomosis dilation 5/14/19.     He presented a few days ago feeling bloated, distended, more short of breath. His shortness of breath was worse with minimal exertional activity. He noticed that moving from room to room in his house causes SOB. He called into our office and lasix was doubled, but this did not help significantly. In the ED, initially, he was given 2.5 liters of NS because of possibility of sepsis. `                         He is s/p chemo and XRT. He also underwent surgery for repair of ventral hernia and new colostomy on 12/1/17. He has had abscesses post-op that have been drained.     Today he feels better after diuresis, but BNP level is still high and blood pressure remains low. His Entresto was restarted this am after being held through the weekend. He has been having a lot of stress at home due to marital discord. He has chronic pain in his abdomen and only recently has been referred to a pain specialist for management. He has had much more problems since the colorectal reanastomosis. Recently he has been contemplating possibly going back to colostomy. He has not had much discussion r.e.  Management of colostomy but recently had a patient educate him about bands to help with colostomy and hold it in place. So now he is seriously reconsidering going back to colostomy. ROS:  He had a rapid response last night due to hypotension and VT. Moved to ICU. He was confused and lethargic. He takes a lot of medicine at bedtime including Lyrica, valium, nortriptyline, Seroquel. Diuresed well yesterday but there was concern that the milrinone may have increased his ectopy. No urine output today. He claims that the staff was considering shocking him last night. He did not get an ICD shock. EP has not seen patient for this. Blood pressure running low, but stable. He is still feeling bloated.     Medications/Labs all Reviewed    Lab Results   Component Value Date    WBC 8.5 06/06/2019    HGB 12.6 (L) 06/06/2019    HCT 37.2 (L) 06/06/2019    MCV 96.7 06/06/2019     06/06/2019     Lab Results   Component Value Date    CREATININE 0.8 (L) 06/06/2019    BUN 22 (H) 06/06/2019     06/06/2019    K 4.2 06/06/2019    CL 99 06/06/2019    CO2 29 06/06/2019     Lab Results   Component Value Date    INR 1.32 (H) 06/06/2019    PROTIME 15.0 (H) 06/06/2019        Physical Examination:    BP (!) 92/54   Pulse 99   Temp 98 °F (36.7 °C) (Temporal)   Resp 18   Ht 5' 10\" (1.778 m)   Wt 171 lb 8.3 oz (77.8 kg)   SpO2 96%   BMI 24.61 kg/m²      WD/WN  HEENT:  NC/AT  Respiratory:  · Resp Assessment: Normal respiratory effort  · Resp Auscultation: Clear to auscultation bilaterally   Cardiovascular:  · Auscultation: regular rate and rhythm, normal S1S2, no murmur, rub or gallop  · Palpation:  Nl PMI  · JVP:  normal  · Extremities: No Edema  Abdomen:  Increased abdominal girth  · Soft, non-tender  · Normal bowel sounds  Extremities:  ·  No Cyanosis or Clubbing  Neurological/Psychiatric:  · Oriented to time, place, and person  · Non-anxious  Skin Warm and dry    Lab Results   Component Value Date     06/06/2019     06/06/2019     06/05/2019    K 4.2 06/06/2019    K 4.1 06/06/2019    K 4.1 06/05/2019    K 4.3 05/06/2019    K 3.8 04/09/2019    K 4.1 04/08/2019    BUN 22 06/06/2019    BUN 23 06/06/2019    BUN 25 06/05/2019    CREATININE 0.8 06/06/2019    CREATININE 0.8 06/06/2019    CREATININE 0.8 06/05/2019    GLUCOSE 91 06/06/2019    GLUCOSE 94 06/06/2019    GLUCOSE 131 07/05/2017    GLUCOSE 135 06/21/2017     Lab Results   Component Value Date    PROBNP 11,077 (H) 06/05/2019    PROBNP 12,229 (H) 06/03/2019    PROBNP 5,411 (H) 05/31/2019     Lab Results   Component Value Date    ALT 9 (L) 06/06/2019    ALT 14 05/31/2019    AST 11 (L) 06/06/2019    AST 15 05/31/2019     Lab Results   Component Value Date    HGB 12.6 06/06/2019    HGB 12.2 06/06/2019    HCT 37.2 06/06/2019    HCT 36.1 06/06/2019     06/06/2019     06/06/2019     Lab Results   Component Value Date    TRIG 88 04/09/2019    TRIG 286 08/16/2017    HDL 37 08/16/2017    HDL 40 06/21/2013    HDL 36 02/20/2012    HDL 45 02/22/2011    LDLCALC 133 08/16/2017    LDLCALC 76 06/21/2013     Labs were reviewed including labs from other hospital systems through [facility]. Cardiac testing was reviewed including echos, nuclear scans, cardiac catheterization, including from other hospital systems through [facility]. Assessment:    1. Chest pain, unspecified type    2. Diaphoresis     3. Acute on chronic systolic HF  4. Hypotension due to meds and HF  5. Chronic abdominal pain with chronic narcotic use   6. VT with rapid response last night  Plan:  1.  give one dose of IV lasix now  2. Entresto stopped yesterday, start lisinopril 5 mg po qd today  3.  continue carvedilol 6.25 mg po bid  4. Continue eplerenone  5. Have EP see the patient to make sure that the device is working fine. .  Not sure if device should have fired last night and didn't? 6.  Restart digoxin 0.125 mg po qd  7. CHF education reinforced.   ~salt restriction  ~fluid restriction  ~medication compliance  ~daily weights and notify of any significant weight gain/loss  ~establish with CHF nurse  ~outpatient follow-up with our CHF team     Echo looks relatively unchanged. Blood pressure running low without explanation unless it is all of his meds  Start digoxin, lisinopril  Lasix IV x 1 today, torsemide to restart tomorrow  EP to see today to be sure that the device is working properly    Can likely go home tomorrow if urine output goes up with IV lasix and blood pressure remains stable. Due to the high probability of clinically significant life threating deterioration of the patient's condition that required my urgent intervention, a total critical care time 35 minutes was used. This time excludes any time that may have been spent performing procedures. This includes but not limited to vital sign monitoring, telemetry monitoring, continuous pulse oximety, IV medication, clinical response to the IV medications, documentation time , consultation time, interpretation of lab data, review of nursing notes and old record review.      NYHA Class: 3-4    Marilee Buckner MD, 6/6/2019 2:59 PM

## 2019-06-06 NOTE — TELEPHONE ENCOUNTER
Pt called and had to cancel his f/u appt for tomorrow due to being in ICU. Pt needs to know what he needs to do about his pain patch refill? Pls call pt and advise if this can be refilled and called into the hospital he is at.

## 2019-06-06 NOTE — PROGRESS NOTES
4 Eyes Skin Assessment     The patient is being assess for  Transfer to New Unit    I agree that 2 RN's have performed a thorough Head to Toe Skin Assessment on the patient. ALL assessment sites listed below have been assessed. Areas assessed by both nurses:   [x]   Head, Face, and Ears   [x]   Shoulders, Back, and Chest  [x]   Arms, Elbows, and Hands   [x]   Coccyx, Sacrum, and IschIum  [x]   Legs, Feet, and Heels        Does the Patient have Skin Breakdown?   No         Matt Prevention initiated:  NA   Wound Care Orders initiated:  NA      [facility] nurse consulted for Pressure Injury (Stage 3,4, Unstageable, DTI, NWPT, and Complex wounds), New and Established Ostomies:  NA      Nurse 1 eSignature: Electronically signed by Rony Dixon RN on 6/6/19 at 2:56 AM    **SHARE this note so that the co-signing nurse is able to place an eSignature**    Nurse 2 eSignature: Electronically signed by Betito Scott RN on 6/6/19 at 5:50 AM

## 2019-06-06 NOTE — PROGRESS NOTES
Rapid response called on pt as he was found with increase in PVC's, unresponsive and diaphoretic. Blood glucose 99  BP 70's/50's  During RR pt having bi and tri - geminal pvc's. Orders given to give a 250nL bolus of normal saline, labs drawn as ordered and sent, primacor and lasix gtt's stopped at this time. MD's decision to transfer to CVU for closer observation. Explained what was going on to pt. Pt said \"OK\". Belongings gathered and oxygen tank retrieved. Pt hooked up to portable O2. Pt taken to CVU. Report given.

## 2019-06-06 NOTE — PROGRESS NOTES
Garrett Tristan is here today for Pre-Op Exam    Concerns/symptoms: none  Medications: medications verified and updated  Refills needed today? No     Tobacco history: verified  Advanced Directives: Yes on file.  BP greater than 140/90? No    Patient would like communication of their results via:      Cell Phone:   Telephone Information:   Mobile [phone]     Okay to leave a message containing results? Yes  Preferred language:  English.    Health Maintenance Due   Topic Date Due   • Shingles Vaccine (2 of 3) 02/13/2014   • Pneumococcal Vaccine 65+ High/Highest Risk (2 of 2 - PPSV23) 02/15/2018   • Medicare Wellness 65+  12/21/2018   • Depression Screening  12/21/2018      Patient is up to date, no discussion needed..    Medicare HRA:           Spoke with Dr Ruthie Dominique from cardiology, hold lasix gtt and primacor for now.

## 2019-06-06 NOTE — CONSULTS
[address]   Electrophysiology Consultation   Date: 6/6/2019  Reason for Consultation: VT  Consult Requesting Physician: Renee Infante MD     Chief Complaint   Patient presents with    Chest Pain     chest pain and sob for the past four days, worse today. pt diaphoretic upon arrival.      HPI: David Ashby is a 50 y.o. with Hx of NICM, sCHF, HTN, and rectal cancer. [facility] 9/15/16 did not show CAD was admitted with acute on chronic HFrEF  Last night telemetry suggested VF and he was about to be shocked  He was asymptomatic      Past Medical History:   Diagnosis Date    Allergic     Anesthesia     tremors    Arthritis     CAD (coronary artery disease)     Cancer ([facility] [address]     colon    Chemotherapy adverse reaction 4/29/2019    CH[facility]congestive heart failure) ([facility])     Chronic knee pain     Clostridium difficile infection 07/11/2016    PCR+    Depression motion     Diverticulitis     Diverticulosis     History of alcohol abuse     Hyperlipidemia     Hypertension     Irregular heart beat     states been told he has had in past. Never treated. Sees PCP every 3 mos.  and EKG yearly    Pneumonia     Right knee pain     Shoulder injury     and arthrisits, injury rotaotr cuff    Sleep difficulties     with depression        Past Surgical History:   Procedure Laterality Date    ABDOMEN SURGERY      ABSCESS DRAINAGE      BREAST SURGERY Right 1/21/2019    RIGHT BREAST EXCISIONAL BIOPSY performed by Ella Jha MD at [place] [address]  09/14/2016    No stents placed   [facility]  10/2017    COLECTOMY      COLONOSCOPY  08/29/2016    with biopsy and polypectomy    COLONOSCOPY  03/19/2018    COLONOSCOPY N/A 5/14/2019    COLONOSCOPY WITH DILATION performed by Ashly Espinoza MD at [address] [place] [address] ([facility])  09/14/2016    for staging - Dr. Jace Velazquez, COLON, DIAGNOSTIC     [address] REPAIR  12/01/2017    peristomal hernia repair    HERNIA REPAIR  04/18/2018    S/P: colostomy closure with hernia repair with mesh, with Dr. Emily Reynoso at [facility].  INNER EAR SURGERY  tube right ear    JOINT REPLACEMENT      left TKR    KNEE ARTHROSCOPY Right [id number]    KNEE ARTHROSCOPY Right 8/4/2014    medial meniscus    KNEE SURGERY  08/24/2011    removal of tibial component    OTHER SURGICAL HISTORY  7/1/13    ACL Rt knee meniscus repair synovectomy    OTHER SURGICAL HISTORY  12/01/2017    takedown of ileostomy    PACEMAKER PLACEMENT      icd 10/2017    REVISION COLOSTOMY  04/18/2018    S/P: colostomy closure with hernia repair with mesh, with Dr. Emily Reynoso at [facility].  SHOULDER ARTHROSCOPY Right 9/22/15    SUBACROMIAL DECOMPRESSION, DISTAL CLAVICLE EXCISION, LABRAL DEBRIDEMETN    SIGMOIDOSCOPY N/A 11/23/2018    SIGMOIDOSCOPY BIOPSY FLEXIBLE performed by Bonita Corley MD at [address]  4 surgeries    SMALL INTESTINE SURGERY  01/11/2017    LAPAROSCOPIC LOW ANTERIOR RESECTION, ILEOSTOMY    SMALL INTESTINE SURGERY  12/01/2017    small bowel resection    TUNNELED VENOUS PORT PLACEMENT Left 02/13/2017    PORT-A-CATH INSERTION                     TUNNELED VENOUS PORT PLACEMENT      UMBILICAL HERNIA REPAIR  11/11/14    UPPER GASTROINTESTINAL ENDOSCOPY  10/03/2017       Allergies   Allergen Reactions    Vancomycin Hives and Swelling     Throat swells    Bactrim [Sulfamethoxazole-Trimethoprim] Other (See Comments)     Thrush    Corn-Containing Products      Patient tells me his throat swells when he eats a large amount of corn or corn products. He can tolerate products with corn oil and corn syrup. Pt. Reports gets \"congestion\"    Eggs Or Egg-Derived Products Swelling    Seasonal        Social History:  Reviewed. reports that he quit smoking about 12 years ago. He has a 5.00 pack-year smoking history.  He quit smokeless tobacco use about regular rhythm, S1&S2. · Pulmonary/Chest: Bilateral respiratory sounds. No wheezes, No rhonchi. · Abdominal: Soft. Bowel sounds present. No distension, No tenderness. · Musculoskeletal: No tenderness. No edema    · Lymphadenopathy: Has no cervical adenopathy. · Neurological: Alert and oriented. Cranial nerve appears intact, No Gross deficit   · Skin: Skin is warm and dry. No rash noted. · Psychiatric: Has a normal behavior     Labs, diagnostic and imaging results reviewed. Reviewed.    Recent Labs     06/04/19  0420 06/05/19  0500 06/06/19  0150 06/06/19  0545    137 140 139   K 3.7 4.1 4.1 4.2   CL 96* 97* 99 99   CO2 31 30 30 29   PHOS 4.6 4.3  --  4.2   BUN 22* 25* 23* 22*   CREATININE 0.7* 0.8* 0.8* 0.8*     Recent Labs     06/05/19  0500 06/06/19  0150 06/06/19  0545   WBC 8.8 7.0 8.5   HGB 12.3* 12.2* 12.6*   HCT 35.8* 36.1* 37.2*   MCV 95.8 95.4 96.7    217 237     Lab Results   Component Value Date    CKTOTAL 129 09/06/2016    TROPONINI <0.01 06/06/2019     Lab Results   Component Value Date     12/06/2016     Lab Results   Component Value Date    PROTIME 15.0 06/06/2019    PROTIME 12.5 05/06/2019    PROTIME 15.6 01/28/2019    INR 1.32 06/06/2019    INR 1.10 05/06/2019    INR 1.37 01/28/2019     Lab Results   Component Value Date    CHOL 227 08/16/2017    HDL 37 08/16/2017    HDL 36 02/20/2012    TRIG 88 04/09/2019       ECG:   Echo:   Cath:     Scheduled Meds:   [START ON 6/7/2019] buprenorphine  1 patch Transdermal Weekly    lisinopril  5 mg Oral Daily    digoxin  125 mcg Oral Daily    [START ON 6/7/2019] torsemide  20 mg Oral Daily    cetirizine  10 mg Oral Daily    carvedilol  6.25 mg Oral BID WC    morphine  2 mg Intravenous Once    naloxegol  25 mg Oral QAM    QUEtiapine  50 mg Oral Nightly    nortriptyline  10 mg Oral Nightly    diazepam  5 mg Oral TID    DULoxetine  30 mg Oral Daily    sodium chloride flush  10 mL Intravenous 2 times per day    using cement 08/24/2011    Depression 08/24/2011    Mixed hyperlipidemia     Essential hypertension       Active Hospital Problems    Diagnosis Date Noted    Hypotension due to drugs [I95.2]     Acute on chronic systolic (congestive) heart failure (HCC) [I50.23] 06/01/2019       Assessment:       Plan:    - reportedly VF   Review of telemetry and device check showed no VF, only artifact/noise   No further action is needed    - VT   Last episode was in March   Continue current treatment.       - HTN   BP is well controlled. Continue current meds. - HFrEF   On diuretic   Followed by HF    - Implantable cardioverter defibrillator(ICD)   The CIED was interrogated and programmed and I supervised and reviewed all the data. All findings and changes are in device interrogation sheat and reflect my personal interpretation and changes and is scanned to Epic. No VT/VF    I independently reviewed *device check interrogation     Thank you for allowing me to participate in the care of [address] [place] [address]     NOTE: This report was transcribed using voice recognition software. Every effort was made to ensure accuracy, however, inadvertent computerized transcription errors may be present.

## 2019-06-07 NOTE — PLAN OF CARE
Pt able to turn self in bed, pt intake being monitored, ice water at bedside, urinal at bedside. Pt c/o all over pain, PRN pain medication given, pt now resting in bed comfortably. Call light in reach. Bed alarm engaged. Will continue to monitor.

## 2019-06-07 NOTE — TELEPHONE ENCOUNTER
[facility] called, Cirilo Zamora is not on their med formulary, so in order for the pt to get it in the hospital, they are asking us to Rx one patch to the [facility] pharmacy. Otherwise, is there something else they can give him while he is admitted.

## 2019-06-07 NOTE — TELEPHONE ENCOUNTER
Per RSM, pt can be treated under there doctors care with the pain medications. If pt has any patches left he can bring them in and use it.  Called and spoke to Yen Salcedo and let her know what RSM stated, she stated that he had no patches left and is aware that the doctor there can take over the care while in the hospital, she voiced her understanding

## 2019-06-07 NOTE — PROGRESS NOTES
[facility]   Progress Note  CHF/Pulmonary Hypertension Cardiology    Chief complaint: We are following this patient for acute on chronic systolic HF, hypotension  HPI:  Jamel Vitale is a 51 yo male with PMH of NICM, sCHF, HTN, and rectal cancer. [facility] 9/15/16 did not show CAD. AT that time EF was 15%. He has an ICD placed 10/2016 for primary prevention. Most recent echo (9/2018) showed EF 25-30%. He was recently started again on Entresto. Hypotension has been a limiting factor in uptitrating meds. He has had multiple admissions for lower abdominal pain. CT shows pelvic fluid collection at colorectal anastomosis and chronic constipation. He had colonoscopy with colorectal anastomosis dilation 5/14/19.     He presented a few days ago feeling bloated, distended, more short of breath. His shortness of breath was worse with minimal exertional activity. He noticed that moving from room to room in his house causes SOB. He called into our office and lasix was doubled, but this did not help significantly. In the ED, initially, he was given 2.5 liters of NS because of possibility of sepsis. `                         He is s/p chemo and XRT. He also underwent surgery for repair of ventral hernia and new colostomy on 12/1/17. He has had abscesses post-op that have been drained.     Today he feels better after diuresis, but BNP level is still high and blood pressure remains low. His Entresto was restarted this am after being held through the weekend. He has been having a lot of stress at home due to marital discord. He has chronic pain in his abdomen and only recently has been referred to a pain specialist for management. He has had much more problems since the colorectal reanastomosis. Recently he has been contemplating possibly going back to colostomy. He has not had much discussion r.e.  Management of colostomy but recently had a patient educate him about bands to help with colostomy and hold it in place. So now he is seriously reconsidering going back to colostomy. ROS:  Today he feels better. Blood pressure is stable. Tolerated lisinopril last night. Review of ICD did not show VT the night before. Blood pressure running low, but stable. He is still feeling slightly bloated.     Medications/Labs all Reviewed    Lab Results   Component Value Date    WBC 8.7 06/07/2019    HGB 12.9 (L) 06/07/2019    HCT 37.3 (L) 06/07/2019    MCV 95.4 06/07/2019     06/07/2019     Lab Results   Component Value Date    CREATININE 0.8 (L) 06/07/2019    BUN 28 (H) 06/07/2019     06/07/2019    K 4.4 06/07/2019    CL 97 (L) 06/07/2019    CO2 32 06/07/2019     Lab Results   Component Value Date    INR 1.32 (H) 06/06/2019    PROTIME 15.0 (H) 06/06/2019        Physical Examination:    /78   Pulse 98   Temp 98.3 °F (36.8 °C) (Temporal)   Resp 15   Ht 5' 10\" (1.778 m)   Wt 173 lb 1 oz (78.5 kg)   SpO2 98%   BMI 24.83 kg/m²      WD/WN  HEENT:  NC/AT  Respiratory:  · Resp Assessment: Normal respiratory effort  · Resp Auscultation: Clear to auscultation bilaterally   Cardiovascular:  · Auscultation: regular rate and rhythm, normal S1S2, no murmur, rub or gallop  · Palpation:  Nl PMI  · JVP:  10-11 cm H2O  · Extremities: No Edema  Abdomen:  Increased abdominal girth  · Soft, non-tender  · Normal bowel sounds  Extremities:  ·  No Cyanosis or Clubbing  Neurological/Psychiatric:  · Oriented to time, place, and person  · Non-anxious  Skin Warm and dry    Lab Results   Component Value Date     06/07/2019     06/06/2019     06/06/2019    K 4.4 06/07/2019    K 4.2 06/06/2019    K 4.1 06/06/2019    K 4.3 05/06/2019    K 3.8 04/09/2019    K 4.1 04/08/2019    BUN 28 06/07/2019    BUN 22 06/06/2019    BUN 23 06/06/2019    CREATININE 0.8 06/07/2019    CREATININE 0.8 06/06/2019    CREATININE 0.8 06/06/2019    GLUCOSE 97 06/07/2019    GLUCOSE 91 06/06/2019    GLUCOSE

## 2019-06-07 NOTE — PROGRESS NOTES
Pt expressed concern about his B/P, states \"I feel weird when I stand\". Orthostatic B/P's negative. Pt ambulated in room, tolerated well, B/P remains stable. Dr. Claudia Watts at bedside.   Will monitor

## 2019-06-07 NOTE — PROGRESS NOTES
Discharge instructions reviewed with patient. Patient verbalized understanding. All home medications have been reviewed, questions answered and patient voiced understanding. All medication side effects reviewed and patient verbalized understanding. Patient given prescriptions, discharge instructions, and appointment times. Patient discharged to home with via private car. Taken to ED via wheelchair. Follow up appointment(s) reviewed with patient and all attempts made to schedule within 7 days of discharge. [facility]  Depression Screen    1. During the past month, have you often been bothered by feeling down, depressed, or hopeless?   no    2. During the past month, have you often been bothered by little interest or pleasure in       doing things?    no

## 2019-06-07 NOTE — PROGRESS NOTES
CLINICAL PHARMACY NOTE: MEDS TO [address] Select Patient?: No  Total # of Prescriptions Filled: 3   The following medications were delivered to the patient:  · Digoxin 125mcg  · Lisinopril 5mg  · butrans 7.5mg patch  Total # of Interventions Completed: 1  Time Spent (min): 60    Additional Documentation:  Instructed patient to stop taking entresto while taking lisinopril.   Collected payment from patient then delivered meds  Patient signed  Sony Lies

## 2019-06-10 NOTE — TELEPHONE ENCOUNTER
[facility] FAILURE PROGRAM  TELEPHONE ENCOUNTER FORM    Alexis Guevara 1970    ASSESSMENT:   1. Baseline weight: 169.8lbs  2. Current weight: 169.6 lbs  3. Patient weighing daily: Yes  4. Symptoms: dyspnea, edema  5. Patient knows who to call with symptoms: Yes  6. Diet history:      Patient states sodium limitation is : 3,000 mg a day      Patient states fluid limitation is 64 ounces  Patient following diet as instructed: Yes  7. Medication history: taking medications as instructed Yes; medication side effects noted No  8. Patient is involved in exercise program: No  9. Patient knows date and time of follow up appointment: Yes  10. Patient has transportation to appointments: Yes    RECOMMENDATIONS:   1. Medication: none  2. Diet: Mostly drinking Ensure since his cancer. 3. MD/ Clinic appointment:  6/12/19 with Charlotte Astorga NP  4. Other: Does not take in much solid foods. Drinks 4 8 oz glasses of Ensure a day, 12 ounce soda and some water.  Does not go over 64 ounces

## 2019-06-12 NOTE — PROGRESS NOTES
[address] [facility] - Cardiology      Chief Complaint:     Chief Complaint   Patient presents with    Follow-Up from Hospital     Chest Pain patient states that he feels good just getting his strength back to normal     Congestive Heart Failure    Hypertension       History of present illness: Jr Quintanilla is a 50 y.o. male with PMH of NICM, sCHF, HTN and rectal cancer. C 9/15/16 with no coronary artery disease, EF 15%, LVEDP 20-25 mmHg. ICD placed 10/2016 for primary prevention. Echo (9/2018) showed dilated LV, EF 25-30%. On GDMT but hypotension has been limiting factor for up-titration. He underwent colonoscopy with colorectal anastomosis dilation 5/14/2019. Patient was hospitalized 5/31-6/7 after presenting with chest pain, bloating and shortness of breath. He was diuresed. Patient is here today for hospital follow up chronic systolic heart failure. He presents to office ambulatory and is unaccompanied. He reports he is feeling better but remains weak. Denies shortness of breath, orthopnea, PND, chest pain. Says bloating comes and goes, he continues to have issues with constipation. Continues to follow mostly diet, hesitant to eat solid food as it causes pain and cramping. Past Medical History:   Diagnosis Date    Allergic     Anesthesia     tremors    Arthritis     CAD (coronary artery disease)     Cancer ([facility] [address]     colon    Chemotherapy adverse reaction 4/29/2019    CHF [facility]congestive heart failure) ([facility])     Chronic knee pain     Clostridium difficile infection 07/11/2016    PCR+    Depression motion     Diverticulitis     Diverticulosis     History of alcohol abuse     Hyperlipidemia     Hypertension     Irregular heart beat     states been told he has had in past. Never treated. Sees PCP every 3 mos.  and EKG yearly    Pneumonia     Right knee pain     Shoulder injury     and arthrisits, injury rotaotr cuff    Sleep difficulties     with depression     Past Surgical History:   Procedure Laterality Date    ABDOMEN SURGERY      ABSCESS DRAINAGE      BREAST SURGERY Right 1/21/2019    RIGHT BREAST EXCISIONAL BIOPSY performed by Erica Harrison MD at [facility]  09/14/2016    No stents placed   [address]  10/2017    COLECTOMY      COLONOSCOPY  08/29/2016    with biopsy and polypectomy    COLONOSCOPY  03/19/2018    COLONOSCOPY N/A 5/14/2019    COLONOSCOPY WITH DILATION performed by Elli Live MD at [facility]  09/14/2016    for staging - Dr. Patt Buerger, COLON, DIAGNOSTIC     [address]  12/01/2017    peristomal hernia repair    HERNIA REPAIR  04/18/2018    S/P: colostomy closure with hernia repair with mesh, with Dr. Keshawn Christianson at [facility].  INNER EAR SURGERY  tube right ear    JOINT REPLACEMENT      left TKR    KNEE ARTHROSCOPY Right [id number]    KNEE ARTHROSCOPY Right 8/4/2014    medial meniscus    KNEE SURGERY  08/24/2011    removal of tibial component    OTHER SURGICAL HISTORY  7/1/13    ACL Rt knee meniscus repair synovectomy    OTHER SURGICAL HISTORY  12/01/2017    takedown of ileostomy    PACEMAKER PLACEMENT      icd 10/2017    REVISION COLOSTOMY  04/18/2018    S/P: colostomy closure with hernia repair with mesh, with Dr. Keshawn Christianson at [facility].     SHOULDER ARTHROSCOPY Right 9/22/15    SUBACROMIAL DECOMPRESSION, DISTAL CLAVICLE EXCISION, LABRAL DEBRIDEMETN    SIGMOIDOSCOPY N/A 11/23/2018    SIGMOIDOSCOPY BIOPSY FLEXIBLE performed by Elli Live MD at [facility]  4 surgeries    SMALL INTESTINE SURGERY  01/11/2017    LAPAROSCOPIC LOW ANTERIOR RESECTION, ILEOSTOMY    SMALL INTESTINE SURGERY  12/01/2017    small bowel resection    TUNNELED VENOUS PORT PLACEMENT Left 02/13/2017    PORT-A-CATH INSERTION                     TUNNELED VENOUS PORT PLACEMENT      UMBILICAL HERNIA REPAIR  14    UPPER GASTROINTESTINAL ENDOSCOPY  10/03/2017     Social History     Tobacco Use    Smoking status: Former Smoker     Packs/day: 1.00     Years: 5.00     Pack years: 5.00     Last attempt to quit: 2007     Years since quittin.4    Smokeless tobacco: Former User     Types: Chew     Quit date: 8/10/2017   Substance Use Topics    Alcohol use: No     Comment: history of alcohol abuse 20 years ago       Review of Systems:   Constitutional: + weight loss (3 lbs), + fatigue, + weakness. HEENT: No change in vision or ringing in the ears. Respiratory: No dyspnea, orthopnea, PND, or cough. Cardiovascular: No chest pain, palpitations, edema. GI: No n/v, + abdominal pain/bloating, + diarrhea, + constipation. No melena, no hematochezia  : No dysuria or hematuria. Skin: No rash or new skin lesions. Musculoskeletal: No new muscle or joint pain. Neurological: No lightheadedness, dizziness, syncope or TIA-like symptoms. Psychiatric: No anxiety, insomnia or depression    Physical Exam:  /70 (Site: Left Upper Arm, Position: Sitting, Cuff Size: Medium Adult)   Pulse 71   Ht 5' 10\" (1.778 m)   Wt 170 lb 8 oz (77.3 kg)   SpO2 99%   BMI 24.46 kg/m²     General:  Awake, alert, oriented in NAD  Skin:  Warm and dry. No unusual bruising or rash  HEENT: Normocephalic and atraumatic. Oral mucosa moist, no cyanosis. Neck:  Supple. No JVP appreciated  Chest:  Normal effort.   Clear to auscultation  Cardiovascular:  RRR, S1/S2, no murmur/gallop/rub  Abdomen:  Soft, nontender, +bowel sounds  Extremities:  No edema  Neurological: No focal deficits  Psychological: Normal mood and affect      Current Outpatient Medications   Medication Sig Dispense Refill    lisinopril (PRINIVIL;ZESTRIL) 5 MG tablet Take 1 tablet by mouth daily 90 tablet 3    carvedilol (COREG) 12.5 MG tablet Take 0.5 tablets by mouth 2 times daily (with meals) 60 tablet 3    digoxin (LANOXIN) 125 MCG tablet Take 1 tablet by mouth daily 90 tablet 3    torsemide (DEMADEX) 20 MG tablet Take 2 tablets by mouth daily 60 tablet 3    QUEtiapine (SEROQUEL) 50 MG tablet Take 1 tablet by mouth nightly 60 tablet 3    pregabalin (LYRICA) 75 MG capsule Take 1 capsule by mouth 2 times daily for 364 days. 60 capsule 3    DULoxetine (CYMBALTA) 30 MG extended release capsule Take 1 capsule by mouth daily 30 capsule 1    eplerenone (INSPRA) 25 MG tablet Take 1 tablet by mouth daily 30 tablet 3    nortriptyline (PAMELOR) 10 MG capsule Take 1 capsule by mouth nightly (Patient taking differently: Take 25 mg by mouth nightly ) 30 capsule 3    simethicone (MYLICON) 80 MG chewable tablet Take 80 mg by mouth every 6 hours as needed for Flatulence      ranitidine (ZANTAC) 150 MG tablet Take 150 mg by mouth daily      melatonin 3 MG TABS tablet Take 3 mg by mouth nightly as needed      lovastatin (MEVACOR) 40 MG tablet Take 40 mg by mouth. Take 1 tablet (40 mg total) by mouth at bedtime.  diazepam (VALIUM) 5 MG tablet Take 5 mg by mouth three times daily.  Cetirizine HCl (ZYRTEC PO) Take 1 tablet by mouth daily. Take only as needed for scrachy eyes.  torsemide (DEMADEX) 20 MG tablet Take 1 tablet by mouth daily 30 tablet 3    carvedilol (COREG) 12.5 MG tablet TAKE ONE-HALF TABLET BY MOUTH EVERY Night AND TAKE 1 TABLET BY MOUTH EVERY MORNING 180 tablet 0     No current facility-administered medications for this visit.         Labs:   Lab Results   Component Value Date    WBC 8.7 06/07/2019    HGB 12.9 (L) 06/07/2019    HCT 37.3 (L) 06/07/2019    MCV 95.4 06/07/2019     06/07/2019     Lab Results   Component Value Date     06/07/2019    K 4.4 06/07/2019    K 4.3 05/06/2019    CL 97 06/07/2019    CO2 32 06/07/2019    BUN 28 06/07/2019    CREATININE 0.8 06/07/2019    GLUCOSE 97 06/07/2019    GLUCOSE 131 07/05/2017    CALCIUM 9.6 06/07/2019      Lab Results   Component Value Date TRIG 88 04/09/2019    HDL 37 08/16/2017    HDL 36 02/20/2012    LDLCALC 133 08/16/2017    LABVLDL 57 08/16/2017       Diagnostics:    Echo 9/19/2018:  Summary   -Limited echo to evaluate ejection fraction.   -Left ventricular cavity size is dilated with normal wall thickness.   -Overall left ventricular systolic function appears reduced with a estimated ejection fraction of 25-30%. -3D ejection fraction calculated at 33.4%   Pacer / ICD wire is visualized in the right heart. [facility] 9/15/2016:  Cath via radial unable to manipulate catheter into ascending aorta-small subintimal dissection in subclavian occurred. Cath via right femoral shows normal coronaries,LVEDP 20-25. EF 15%    Echo 11/30/2017:  Summary  Left ventricular size is markedly dilated and hypokinetic. LV function is severely reduced at 20-25%  Mild mitral regurgitation is present. The left atrium is dilated. Pacer / ICD wire is visualized in the right ventricle. Mild tricuspid regurgitation with RVSP estimated at 30 mmHg. Assessment:  1. Chronic systolic heart failure. NYHA class III. Appears stable. 2. NICM (nonischemic cardiomyopathy). On ACE, evidence based beta blocker, eplerenone. Spironolactone previously discontinued secondary gynecomastoid hyperplasia   3. ICD (implantable cardioverter-defibrillator) in place. Followed in device clinic. Carelink not obtained today. Plan:  1. Continue current medications. 2. Check labs (BMP/BNP) in 1 week to monitor kidney numbers. 3. Follow up in 4 weeks, earlier for worsening. 4. Keep follow up with Dr Karli Lira in August as scheduled. Thank you for allowing me to participate in the care of your patient.     Tasha Higgins, BENITEZ-CNP

## 2019-06-13 NOTE — PROGRESS NOTES
Jennifer Jaramillo  1970  [id number]    HISTORY OF PRESENT ILLNESS:  Mr. Marium Garrido is a 50 y.o. male returns for a follow up visit for multiple medical problems. His current presenting problems are   1. Chronic pain syndrome    2. Primary insomnia    3. Fibromyalgia    4. Malignant neoplasm of colon, unspecified part of colon ([facility] [address]    . As per information/history obtained from the PADT(patient assessment and documentation tool) - He complains of pain in the knees Bilateral with radiation to the upper leg Bilateral and lower leg Bilateral He rates the pain 7/10 and describes it as aching, throbbing. Pain is made worse by: movement, walking, standing. Current treatment regimen has helped relieve about 70% of the pain. He denies side effects from the current pain regimen. Patient reports that since the last follow up visit the physical functioning is unchanged, family/social relationships are unchanged, mood is unchanged and sleep patterns are better, and that the overall functioning is unchanged. Patient denies neurological bowel or bladder. Patient denies misusing/abusing his narcotic pain medications or using any illegal drugs. There are No indicators for possible drug abuse, addiction or diversion problems. Upon obtaining the medical history from Mr. Marium Garrido regarding today's office visit for his presenting problems, Mr. Marium Garrido states he was Dx with CHF, he was in the ICU/hospital and had some changes in his medications prior to the hospital. He states he was swollen in the chest/upper extremity. He reports he was in the ICU for 2-1/2 days. He states he has been out of his medications. Patient is complaining of constipation symptoms, he is on OTC medications which is not helping. Patient states his sleep is fair. Has fairly normal sleep latency. Averages about 4-6 hours of sleep a night. Denies any signs of sleep apnea. Feels somewhat rested in the morning.  He states that his moods have been depressed, tearful, labile with c/o loss of energy, having occasional crying spells. Denies being suicidal or homicidal, \"feeling cranky. \"       ALLERGIES/PAST MED/FAM/SOC HISTORY: Mr. Adrienne Sutton allergies, past medical, family and social history were reviewed in the chart and also listed below.   Social History     Socioeconomic History    Marital status:      Spouse name: Not on file    Number of children: Not on file    Years of education: Not on file    Highest education level: Not on file   Occupational History    Not on file   Social Needs    Financial resource strain: Not on file    Food insecurity:     Worry: Not on file     Inability: Not on file    Transportation needs:     Medical: Not on file     Non-medical: Not on file   Tobacco Use    Smoking status: Former Smoker     Packs/day: 1.00     Years: 5.00     Pack years: 5.00     Last attempt to quit: 2007     Years since quittin.4    Smokeless tobacco: Former User     Types: Chew     Quit date: 8/10/2017   Substance and Sexual Activity    Alcohol use: No     Comment: history of alcohol abuse 20 years ago    Drug use: No    Sexual activity: Never     Partners: Female   Lifestyle    Physical activity:     Days per week: Not on file     Minutes per session: Not on file    Stress: Not on file   Relationships    Social connections:     Talks on phone: Not on file     Gets together: Not on file     Attends Mandaen service: Not on file     Active member of club or organization: Not on file     Attends meetings of clubs or organizations: Not on file     Relationship status: Not on file    Intimate partner violence:     Fear of current or ex partner: Not on file     Emotionally abused: Not on file     Physically abused: Not on file     Forced sexual activity: Not on file   Other Topics Concern    Not on file   Social History Narrative    Not on file       Mr. Adrienne Sutton current medications are   Outpatient Medications Prior to Visit   Medication Sig Dispense Refill    lisinopril (PRINIVIL;ZESTRIL) 5 MG tablet Take 1 tablet by mouth daily 90 tablet 3    carvedilol (COREG) 12.5 MG tablet Take 0.5 tablets by mouth 2 times daily (with meals) 60 tablet 3    digoxin (LANOXIN) 125 MCG tablet Take 1 tablet by mouth daily 90 tablet 3    torsemide (DEMADEX) 20 MG tablet Take 2 tablets by mouth daily 60 tablet 3    QUEtiapine (SEROQUEL) 50 MG tablet Take 1 tablet by mouth nightly 60 tablet 3    pregabalin (LYRICA) 75 MG capsule Take 1 capsule by mouth 2 times daily for 364 days. 60 capsule 3    DULoxetine (CYMBALTA) 30 MG extended release capsule Take 1 capsule by mouth daily 30 capsule 1    eplerenone (INSPRA) 25 MG tablet Take 1 tablet by mouth daily 30 tablet 3    nortriptyline (PAMELOR) 10 MG capsule Take 1 capsule by mouth nightly (Patient taking differently: Take 25 mg by mouth nightly ) 30 capsule 3    simethicone (MYLICON) 80 MG chewable tablet Take 80 mg by mouth every 6 hours as needed for Flatulence      ranitidine (ZANTAC) 150 MG tablet Take 150 mg by mouth daily      melatonin 3 MG TABS tablet Take 3 mg by mouth nightly as needed      lovastatin (MEVACOR) 40 MG tablet Take 40 mg by mouth. Take 1 tablet (40 mg total) by mouth at bedtime.  diazepam (VALIUM) 5 MG tablet Take 5 mg by mouth three times daily.  Cetirizine HCl (ZYRTEC PO) Take 1 tablet by mouth daily. Take only as needed for scrachy eyes. No facility-administered medications prior to visit. REVIEW OF SYSTEMS:   Constitutional: Negative for fatigue and unexpected weight change. Eyes: Negative for visual disturbance. Respiratory: Negative for shortness of breath. Cardiovascular: Negative for chest pain, palpitations  Gastrointestinal: Negative for blood in stool, abdominal distention, nausea, vomiting, abdominal pain, diarrhea,+constipation. Skin: Negative for color change or any abnormal bruising.    Neurological: Negative for speech difficulty, weakness and light-headedness, dizziness, tremors, sleepiness  Psychiatric/Behavioral: Negative for suicidal ideas, hallucinations, behavioral problems, self-injury, decreased concentration/cognition, agitation, confusion. PHYSICAL EXAM:   Nursing note and vitals reviewed. BP (!) 86/57   Pulse 86   Wt 170 lb (77.1 kg)   BMI 24.39 kg/m²   General Appearance: Patient is well nourished, well developed, well groomed and in no acute distress. Skin: Skin is warm and dry, good turgor . No rash or lesions noted. He is not diaphoretic. Pulmonary/Chest: Effort normal. No respiratory distress or use of accessory muscles. Auscultation revealing normal air entry. He does not have wheezes in the lung fields. He has no rales. Cardiovascular: Normal rate, regular rhythm, normal heart sounds, and does not have murmur. Exam reveals no gallop and no friction rub. Abdominal: Soft. Bowel sounds are normal.  On inspection of abdomen is flat no distension and no mass. No tenderness. He has no rebound and no guarding. Musculoskeletal / Extremities: Range of motion is normal. Gait is normal, assistive devices use: none. He exhibits edema: none, and no tenderness. Neurological/Psychiatric:He is alert and oriented to person, place, and time. Coordination is  normal.   Judgement and Insight is normal  His mood is Appropriate and affect is Flat/blunted and Anxious . His behavior is normal.   thought content normal.        IMPRESSION:     1. Chronic pain syndrome - INADEQUATELY CONTROLLED: treatment plan adjusted as below   2. Primary insomnia INADEQUATELY CONTROLLED: treatment plan adjusted as below   3. Fibromyalgia INADEQUATELY CONTROLLED: treatment plan adjusted as below   4. Malignant neoplasm of colon, unspecified part of colon ([facility] [address] INADEQUATELY CONTROLLED: treatment plan adjusted as below   5. Generalized abdominal pain INADEQUATELY CONTROLLED: treatment plan adjusted as below   6.  Rectal cancer ([facility] [address] INADEQUATELY CONTROLLED: treatment plan adjusted as below   7. Moderate episode of recurrent major depressive disorder ([facility] INADEQUATELY CONTROLLED: treatment plan adjusted as below       PLAN:  Informed verbal consent was obtained. -records from ER reviewed  -continue with Butrans 7.5 mcg Q 7 days, can start Norco 1-2 per day  -restart Lyrica 75 mg 1 am, 2 pm   -Urine drug screen with GC/MS for opiates and drugs of abuse was ordered and will follow up on results.   -restart Seroquel 25 mg 1-2 nightly   -Patient was advised to bring in all their medication bottles on the next follow up visit for medication review.    -He was advised to increase fluids ( 5-7  glasses of fluid daily), limit caffeine, avoid cheese products, increase dietary fiber, increase activity and exercise as tolerated and relax regularly and enjoy meals, start Amitiza 24 mg daily  -labs reviewed; shows +anemia, rest ok    Mr. Karina Castrejon will be prescribed  the medications  listed below which are for treatment of his presenting  medical problems which for this visit include:   Diagnoses of Chronic pain syndrome, Primary insomnia, Fibromyalgia, and Malignant neoplasm of colon, unspecified part of colon ([facility] were pertinent to this visit. Medications/orders associated with this visit:    Current Outpatient Medications   Medication Sig Dispense Refill    lisinopril (PRINIVIL;ZESTRIL) 5 MG tablet Take 1 tablet by mouth daily 90 tablet 3    carvedilol (COREG) 12.5 MG tablet Take 0.5 tablets by mouth 2 times daily (with meals) 60 tablet 3    digoxin (LANOXIN) 125 MCG tablet Take 1 tablet by mouth daily 90 tablet 3    torsemide (DEMADEX) 20 MG tablet Take 2 tablets by mouth daily 60 tablet 3    QUEtiapine (SEROQUEL) 50 MG tablet Take 1 tablet by mouth nightly 60 tablet 3    pregabalin (LYRICA) 75 MG capsule Take 1 capsule by mouth 2 times daily for 364 days.  60 capsule 3    DULoxetine (CYMBALTA) 30 MG extended release capsule Take 1 capsule by mouth daily 30 capsule 1    eplerenone (INSPRA) 25 MG tablet Take 1 tablet by mouth daily 30 tablet 3    nortriptyline (PAMELOR) 10 MG capsule Take 1 capsule by mouth nightly (Patient taking differently: Take 25 mg by mouth nightly ) 30 capsule 3    simethicone (MYLICON) 80 MG chewable tablet Take 80 mg by mouth every 6 hours as needed for Flatulence      ranitidine (ZANTAC) 150 MG tablet Take 150 mg by mouth daily      melatonin 3 MG TABS tablet Take 3 mg by mouth nightly as needed      lovastatin (MEVACOR) 40 MG tablet Take 40 mg by mouth. Take 1 tablet (40 mg total) by mouth at bedtime.  diazepam (VALIUM) 5 MG tablet Take 5 mg by mouth three times daily.  Cetirizine HCl (ZYRTEC PO) Take 1 tablet by mouth daily. Take only as needed for scrachy eyes. No current facility-administered medications for this visit. Goals of current treatment regimen include improvement in pain, restoration of functioning- with focus on improvement in physical performance, general activity, work or disability,emotional distress, health care utilization and  decreased medication consumption. Will continue to monitor progress towards achieving/maintaining therapeutic goals with special emphasis on  1. Improvement in perceived interfernce  of pain with ADL's. Ability to do home exercises independently. Ability to do household chores indoor and/or outdoor work and social and leisure activities. To increase flexibility/ROM, strength and endurance. Improve psychosocial and physical functioning.- he is not showing any significant progress/or showing regression  towards this goal and reassessment and adjustment of goals/treatment have been made. 2. Improving sleep to 6-7 hours a night.  Improve mood/ anxiety and depression symptoms such as crying spells, low energy, problems with concentration, motivation.- he is not showing any significant progress/or showing regression  towards this goal and reassessment and adjustment of goals/treatment have been made. 3. Reduction of reliance on opioid analgesia/more appropriate opioid use. - he is showing progression towards this treatment goal with the current regimen. Risks and benefits of the medications and other alternative treatments have been/were  discussed with the patient. Any questions on the  common side effects of these medications were also answered. He was advised against drinking alcohol with the narcotic pain medicines, advised against driving or handling machinery when  starting or adjusting the dose of medicines, feeling groggy or drowsy, or if having any cognitive issues related to the current medications. Heis fully aware of the risk of overdose and death, if medicines are misused and not taken as prescribed. If he develops new symptoms or if the symptoms worsen, he was told to call the office. .  Thank you for allowing me to participate in the care of this patient. Edyta Delarosa MD.    Cc: Manuel Jerez MD    I, Veronika Zuniga, scribing for in the presence  of Dr. Edyta Delarosa.   06/13/19  12:48 PM  Rajat Patiño Assistant    I, Dr. Edyta Delarosa, personally performed the services described in this documentation as scribed by  Veronika Zuniga MA in my presence and it is both accurate and complete

## 2019-06-17 NOTE — PROGRESS NOTES
Carelink transmission shows normal sensing and pacing function. See interrogation for more details.     [employer]ol is treading towards normal.

## 2019-06-19 NOTE — PROGRESS NOTES
REPAIR      HERNIA REPAIR  12/01/2017    peristomal hernia repair    HERNIA REPAIR  04/18/2018    S/P: colostomy closure with hernia repair with mesh, with Dr. Kar Candelario at [facility].  INNER EAR SURGERY  tube right ear    JOINT REPLACEMENT      left TKR    KNEE ARTHROSCOPY Right [id number]    KNEE ARTHROSCOPY Right 8/4/2014    medial meniscus    KNEE SURGERY  08/24/2011    removal of tibial component    OTHER SURGICAL HISTORY  7/1/13    ACL Rt knee meniscus repair synovectomy    OTHER SURGICAL HISTORY  12/01/2017    takedown of ileostomy    PACEMAKER PLACEMENT      icd 10/2017    REVISION COLOSTOMY  04/18/2018    S/P: colostomy closure with hernia repair with mesh, with Dr. Kar Candelario at [facility].     SHOULDER ARTHROSCOPY Right 9/22/15    SUBACROMIAL DECOMPRESSION, DISTAL CLAVICLE EXCISION, LABRAL DEBRIDEMETN    SIGMOIDOSCOPY N/A 11/23/2018    SIGMOIDOSCOPY BIOPSY FLEXIBLE performed by Norberto Zheng MD at [address]  4 surgeries    SMALL INTESTINE SURGERY  01/11/2017    LAPAROSCOPIC LOW ANTERIOR RESECTION, ILEOSTOMY    SMALL INTESTINE SURGERY  12/01/2017    small bowel resection    TUNNELED VENOUS PORT PLACEMENT Left 02/13/2017    PORT-A-CATH INSERTION                     TUNNELED VENOUS PORT PLACEMENT      UMBILICAL HERNIA REPAIR  11/11/14    UPPER GASTROINTESTINAL ENDOSCOPY  10/03/2017     Social History     Socioeconomic History    Marital status:      Spouse name: Not on file    Number of children: Not on file    Years of education: Not on file    Highest education level: Not on file   Occupational History    Not on file   Social Needs    Financial resource strain: Not on file    Food insecurity:     Worry: Not on file     Inability: Not on file    Transportation needs:     Medical: Not on file     Non-medical: Not on file   Tobacco Use    Smoking status: Former Smoker     Packs/day: 1.00     Years: 5.00     Pack years: 5.00 Last attempt to quit: 2007     Years since quittin.4    Smokeless tobacco: Former User     Types: Chew     Quit date: 8/10/2017   Substance and Sexual Activity    Alcohol use: No     Comment: history of alcohol abuse 20 years ago    Drug use: No    Sexual activity: Never     Partners: Female   Lifestyle    Physical activity:     Days per week: Not on file     Minutes per session: Not on file    Stress: Not on file   Relationships    Social connections:     Talks on phone: Not on file     Gets together: Not on file     Attends Jain service: Not on file     Active member of club or organization: Not on file     Attends meetings of clubs or organizations: Not on file     Relationship status: Not on file    Intimate partner violence:     Fear of current or ex partner: Not on file     Emotionally abused: Not on file     Physically abused: Not on file     Forced sexual activity: Not on file   Other Topics Concern    Not on file   Social History Narrative    Not on file      Family History   Problem Relation Age of Onset    Cancer Mother     High Blood Pressure Father     Cancer Father      Current Outpatient Medications   Medication Sig Dispense Refill    amoxicillin-clavulanate (AUGMENTIN) 875-125 MG per tablet Take 1 tablet by mouth 2 times daily for 10 days 20 tablet 0    DULoxetine (CYMBALTA) 30 MG extended release capsule Take 1 capsule by mouth daily 30 capsule 1    pregabalin (LYRICA) 75 MG capsule 1 tablet AM and 2 tablets PM 90 capsule 0    QUEtiapine (SEROQUEL) 25 MG tablet Take 1-2 tablets by mouth nightly 60 tablet 0    lubiprostone (AMITIZA) 24 MCG capsule Take 1 capsule by mouth daily (with breakfast) 30 capsule 1    HYDROcodone-acetaminophen (NORCO) 5-325 MG per tablet Take 1 tablet by mouth every 6 hours as needed for Pain (max 1-2/day) for up to 35 days. 70 tablet 0    buprenorphine (BUTRANS) 7.5 MCG/HR PTWK Place 1 patch onto the skin once a week for 28 days.  4 patch 1  lisinopril (PRINIVIL;ZESTRIL) 5 MG tablet Take 1 tablet by mouth daily 90 tablet 3    carvedilol (COREG) 12.5 MG tablet Take 0.5 tablets by mouth 2 times daily (with meals) 60 tablet 3    digoxin (LANOXIN) 125 MCG tablet Take 1 tablet by mouth daily 90 tablet 3    torsemide (DEMADEX) 20 MG tablet Take 2 tablets by mouth daily 60 tablet 3    QUEtiapine (SEROQUEL) 50 MG tablet Take 1 tablet by mouth nightly 60 tablet 3    pregabalin (LYRICA) 75 MG capsule Take 1 capsule by mouth 2 times daily for 364 days. 60 capsule 3    eplerenone (INSPRA) 25 MG tablet Take 1 tablet by mouth daily 30 tablet 3    nortriptyline (PAMELOR) 10 MG capsule Take 1 capsule by mouth nightly (Patient taking differently: Take 25 mg by mouth nightly ) 30 capsule 3    simethicone (MYLICON) 80 MG chewable tablet Take 80 mg by mouth every 6 hours as needed for Flatulence      ranitidine (ZANTAC) 150 MG tablet Take 150 mg by mouth daily      melatonin 3 MG TABS tablet Take 3 mg by mouth nightly as needed      lovastatin (MEVACOR) 40 MG tablet Take 40 mg by mouth. Take 1 tablet (40 mg total) by mouth at bedtime.  diazepam (VALIUM) 5 MG tablet Take 5 mg by mouth three times daily.  Cetirizine HCl (ZYRTEC PO) Take 1 tablet by mouth daily. Take only as needed for scrachy eyes. No current facility-administered medications for this visit. Allergies   Allergen Reactions    Vancomycin Hives and Swelling     Throat swells    Bactrim [Sulfamethoxazole-Trimethoprim] Other (See Comments)     Thrush    Corn-Containing Products      Patient tells me his throat swells when he eats a large amount of corn or corn products. He can tolerate products with corn oil and corn syrup.  Pt. Reports gets \"congestion\"    Eggs Or Egg-Derived Products Swelling    Seasonal         Review of Systems:  Review of systems performed and negative with the exception of the above findings    OBJECTIVE:  /68   Wt 171 lb (77.6 kg)   BMI 24.54 kg/m²      Physical Exam:  General appearance:   Abdomen: soft, non-distended, non-tender  Skin/wound: right buttock scar is mildly tender but with no erythema, cellulitis, fluctuance, drainage, induration or signs of infection    Admission on 05/31/2019, Discharged on 06/07/2019   No results displayed because visit has over 200 results. Orders Only on 05/08/2019   Component Date Value Ref Range Status    Sodium 05/08/2019 139  136 - 145 mmol/L Final    Potassium 05/08/2019 4.4  3.5 - 5.1 mmol/L Final    Chloride 05/08/2019 99  99 - 110 mmol/L Final    CO2 05/08/2019 27  21 - 32 mmol/L Final    Anion Gap 05/08/2019 13  3 - 16 Final    Glucose 05/08/2019 97  70 - 99 mg/dL Final    BUN 05/08/2019 12  7 - 20 mg/dL Final    CREATININE 05/08/2019 0.8* 0.9 - 1.3 mg/dL Final    GFR Non- 05/08/2019 >60  >60 Final    Comment: >60 mL/min/1.73m2 EGFR, calc. for ages 25 and older using the  MDRD formula (not corrected for weight), is valid for stable  renal function.  GFR  05/08/2019 >60  >60 Final    Comment: Chronic Kidney Disease: less than 60 ml/min/1.73 sq.m. Kidney Failure: less than 15 ml/min/1.73 sq.m. Results valid for patients 18 years and older.  Calcium 05/08/2019 9.5  8.3 - 10.6 mg/dL Final    Total Protein 05/08/2019 6.9  6.4 - 8.2 g/dL Final    Alb 05/08/2019 4.4  3.4 - 5.0 g/dL Final    Albumin/Globulin Ratio 05/08/2019 1.8  1.1 - 2.2 Final    Total Bilirubin 05/08/2019 <0.2  0.0 - 1.0 mg/dL Final    Alkaline Phosphatase 05/08/2019 80  40 - 129 U/L Final    ALT 05/08/2019 14  10 - 40 U/L Final    AST 05/08/2019 17  15 - 37 U/L Final    Globulin 05/08/2019 2.5  g/dL Final   Orders Only on 05/08/2019   Component Date Value Ref Range Status    CEA 05/08/2019 2.0  0.0 - 5.0 ng/mL Final    Comment: The CEA assay is an electrochemiluminescent immunoassay  performed on the Elis e immunoassay analyzer.          Ct Head Wo Contrast    Result Date: 6/1/2019  EXAMINATION: CT OF THE HEAD WITHOUT CONTRAST  5/31/2019 10:31 pm TECHNIQUE: CT of the head was performed without the administration of intravenous contrast. Dose modulation, iterative reconstruction, and/or weight based adjustment of the mA/kV was utilized to reduce the radiation dose to as low as reasonably achievable. COMPARISON: None. HISTORY: ORDERING SYSTEM PROVIDED HISTORY: headache, recent fall with head injury (near syncope with fall) TECHNOLOGIST PROVIDED HISTORY: Has a \"code stroke\" or \"stroke alert\" been called? ->No Ordering Physician Provided Reason for Exam: Chest Pain (chest pain and sob for the past four days, worse today. pt diaphoretic upon arrival. ) Acuity: Acute Type of Exam: Initial FINDINGS: BRAIN/VENTRICLES: There is no acute intracranial hemorrhage, mass effect or midline shift. No abnormal extra-axial fluid collection. The gray-white differentiation is maintained without evidence of an acute infarct. There is no evidence of hydrocephalus. ORBITS: The visualized portion of the orbits demonstrate no acute abnormality. SINUSES: The visualized paranasal sinuses and mastoid air cells demonstrate no acute abnormality. SOFT TISSUES/SKULL:  No acute abnormality of the visualized skull or soft tissues. No acute intracranial abnormality. Xr Chest Portable    Result Date: 5/31/2019  EXAMINATION: ONE XRAY VIEW OF THE CHEST 5/31/2019 10:05 pm COMPARISON: Chest radiographs 04/08/2019, 07/30/2018 HISTORY: ORDERING SYSTEM PROVIDED HISTORY: chest pain and shortness of breath TECHNOLOGIST PROVIDED HISTORY: Reason for exam:->chest pain and shortness of breath Ordering Physician Provided Reason for Exam: Chest Pain (chest pain and sob for the past four days, worse today. pt diaphoretic upon arrival. ) Acuity: Unknown Type of Exam: Unknown FINDINGS: No pneumothorax, pleural effusion or focal airspace consolidation. Stable enlargement of the cardiac silhouette.   Intact single lead AICD with right chest generator. Stable left chest port positioning. No acute osseous abnormality. Stable cardiomegaly without evidence of heart failure. No focal pneumonia. ASSESSMENT:  Right buttock pain    PLAN:  1. Start PO antibiotics  2. If wound worsens, return later this week for incision and drainage. If improves, follow with  in office next week    Jan Treadwell MD, FACS  6/18/2019  10:14 PM

## 2019-07-10 NOTE — PROGRESS NOTES
extremities well  · Exhibits normal gait balance and coordination  · No abnormalities of mood, affect, memory, mentation, or behavior are noted    Lab Data:    CBC:   Lab Results   Component Value Date    WBC 8.7 06/07/2019    WBC 8.5 06/06/2019    WBC 7.0 06/06/2019    RBC 3.91 06/07/2019    RBC 3.84 06/06/2019    RBC 3.78 06/06/2019    RBC 3.74 07/05/2017    RBC 3.91 06/21/2017    RBC 3.95 06/07/2017    HGB 12.9 06/07/2019    HGB 12.6 06/06/2019    HGB 12.2 06/06/2019    HCT 37.3 06/07/2019    HCT 37.2 06/06/2019    HCT 36.1 06/06/2019    MCV 95.4 06/07/2019    MCV 96.7 06/06/2019    MCV 95.4 06/06/2019    RDW 15.3 06/07/2019    RDW 15.6 06/06/2019    RDW 15.5 06/06/2019     06/07/2019     06/06/2019     06/06/2019     BMP:  Lab Results   Component Value Date     06/07/2019     06/06/2019     06/06/2019    K 4.4 06/07/2019    K 4.2 06/06/2019    K 4.1 06/06/2019    K 4.3 05/06/2019    K 3.8 04/09/2019    K 4.1 04/08/2019    CL 97 06/07/2019    CL 99 06/06/2019    CL 99 06/06/2019    CO2 32 06/07/2019    CO2 29 06/06/2019    CO2 30 06/06/2019    PHOS 3.7 06/07/2019    PHOS 4.2 06/06/2019    PHOS 4.3 06/05/2019    BUN 28 06/07/2019    BUN 22 06/06/2019    BUN 23 06/06/2019    CREATININE 0.8 06/07/2019    CREATININE 0.8 06/06/2019    CREATININE 0.8 06/06/2019     BNP:   Lab Results   Component Value Date    PROBNP 11,077 06/05/2019    PROBNP 12,229 06/03/2019    PROBNP 5,411 05/31/2019     Cardiac Imaging:  Echo 5/31/2019  Left ventricular cavity size is dilated. Normal left ventricular wall   thickness. Overall left ventricular systolic function appears severely   reduced with a estimated ejection fraction of 30-35% 3D EF=32%. There is   severe diffuse hypokinesis.   -Grade III diastolic dysfunction with elevated LV filling   pressures. E/\"=21.6.   -Mildly thickened and calcified without evidence of stenosis.   Moderate-severe mitral regurgitation is present.   -Moderate

## 2019-07-17 NOTE — PROGRESS NOTES
mouth 2 times daily (with meals) 30 tablet 2    DULoxetine (CYMBALTA) 30 MG extended release capsule Take 1 capsule by mouth daily 30 capsule 1    QUEtiapine (SEROQUEL) 25 MG tablet Take 1-2 tablets by mouth nightly 60 tablet 0    lubiprostone (AMITIZA) 24 MCG capsule Take 1 capsule by mouth daily (with breakfast) 30 capsule 1    HYDROcodone-acetaminophen (NORCO) 5-325 MG per tablet Take 1 tablet by mouth every 6 hours as needed for Pain (max 1-2/day) for up to 35 days. 70 tablet 0    lisinopril (PRINIVIL;ZESTRIL) 5 MG tablet Take 1 tablet by mouth daily 90 tablet 3    digoxin (LANOXIN) 125 MCG tablet Take 1 tablet by mouth daily 90 tablet 3    torsemide (DEMADEX) 20 MG tablet Take 2 tablets by mouth daily 60 tablet 3    nortriptyline (PAMELOR) 10 MG capsule Take 1 capsule by mouth nightly (Patient taking differently: Take 25 mg by mouth nightly ) 30 capsule 3    simethicone (MYLICON) 80 MG chewable tablet Take 80 mg by mouth every 6 hours as needed for Flatulence      ranitidine (ZANTAC) 150 MG tablet Take 150 mg by mouth daily      melatonin 3 MG TABS tablet Take 3 mg by mouth nightly as needed      lovastatin (MEVACOR) 40 MG tablet Take 40 mg by mouth. Take 1 tablet (40 mg total) by mouth at bedtime.  diazepam (VALIUM) 5 MG tablet Take 5 mg by mouth three times daily.  Cetirizine HCl (ZYRTEC PO) Take 1 tablet by mouth daily. Take only as needed for scrachy eyes. No current facility-administered medications for this visit. I will continue his current medication regimen  which is part of the above treatment schedule. It has been helping with Mr. Kyle Jain chronic  medical problems which for this visit include:   Diagnoses of Chronic pain syndrome, Fibromyalgia, Primary insomnia, Generalized abdominal pain, and Moderate episode of recurrent major depressive disorder ([address] were pertinent to this visit.    Risks and benefits of the medications and other alternative treatments including no treatment were discussed with the patient. The common side effects of these medications were also explained to the patient. Informed verbal consent was obtained. Goals of current treatment regimen include improvement in pain, restoration of functioning- with focus on improvement in physical performance, general activity, work or disability,emotional distress, health care utilization and  decreased medication consumption. Will continue to monitor progress towards achieving/maintaining therapeutic goals with special emphasis on  1. Improvement in perceived interfernce  of pain with ADL's. Ability to do home exercises independently. Ability to do household chores indoor and/or outdoor work and social and leisure activities. Improve psychosocial and physical functioning. - he is showing progression towards this treatment goal with the current regimen. He was advised against drinking alcohol with the narcotic pain medicines, advised against driving or handling machinery while adjusting the dose of medicines or if having cognitive  issues related to the current medications. Risk of overdose and death, if medicines not taken as prescribed, were also discussed. If the patient develops new symptoms or if the symptoms worsen, the patient should call the office. While transcribing every attempt was made to maintain the accuracy of the note in terms of it's contents,there may have been some errors made inadvertently. Thank you for allowing me to participate in the care of this patient. Caryle Ny, MD.    Cc: MD CHET Yen, Aziza Sutton, scribing for in the presence  of Dr. Caryle Ny.   07/17/19  12:01 PM  Tiffanie Antunez.  Jacobo Grubbs Assistant    I, Dr. Caryle Ny, personally performed the services described in this documentation as scribed by  Aziza Sutton MA in my presence and it is both accurate and complete

## 2019-08-02 NOTE — LETTER
[facility] CARDIOLOGY[address]  Dept: [phone]  Dept Fax: [phone]      2019    Patient:Дмитрий Guevara  : 1970  DOS: 2019    To Whom it May Concern:    Emmie Rule has been evaluated for cardiac clearance. Emmie Rule is considered at Moderate risk for hernia surgery. There is no further cardiac testing that could be done to lower the risk. Please let my office know if you have any questions or concerns.           Jacob Barnes MD                           [facility] serving [place] and [place]

## 2019-08-05 NOTE — TELEPHONE ENCOUNTER
Call placed to patient to speak with about his labs. He was not available to speak with lmor about lab results. Patient encouraged to call with any questions.

## 2019-08-05 NOTE — TELEPHONE ENCOUNTER
Patient LM for LAKHWINDER on 8/2/19 about needing his cardiac clearance letter to mention the possibility of him having a colostomy at the same time as the hernia repair . Can that be done ? Patient would like a call today because he has an appt with his surgeon tomorrow .

## 2019-08-05 NOTE — TELEPHONE ENCOUNTER
----- Message from Larisa Headley MD sent at 8/2/2019  5:57 PM EDT -----  Call pt. His labs look better. Fluid level is down. Anemia is improved. Kidneys are good. No changes.   LAKHWINDER

## 2019-08-09 NOTE — PROGRESS NOTES
Reviewed pt's medical history and cardiac history with Dr. Barrington chiang to do phone interview. Pt has cardiac clearance from Dr. Jos Chávez.

## 2019-08-20 NOTE — PROGRESS NOTES
no eye makeup) or nail polish on your fingers or toes. 10. DO NOT wear any jewelry or piercings on day of surgery. All body piercing jewelry must be removed. 11. If you have ___dentures, they will be removed before going to the OR; we will provide you a container. If you wear ___contact lenses or ___glasses, they will be removed; please bring a case for them. 12. Please see your family doctor/pediatrician for a history & physical and/or concerning medications. Bring any test results/reports from your physician's office. PCP________x__________Phone___________H&P Appt. Date________             13 If you  have a Living Will and Durable Power of  for Healthcare, please bring in a copy. 15. Notify your Surgeon if you develop any illness between now and surgery  time, cough, cold, fever, sore throat, nausea, vomiting, etc.  Please notify your surgeon if you experience dizziness, shortness of breath or blurred vision between now & the time of your surgery             15. DO NOT shave your operative site 96 hours prior to surgery. For face & neck surgery, men may use an electric razor 48 hours prior to surgery. 16. Shower the night before surgery with ___Antibacterial soap ___Hibiclens             17. To provide excellent care visitors will be limited to one in the room at any given time. 18.  Please bring picture ID and insurance card. 19.  Visit our web site for additional information:  [email or web address]              20.During flu season no children under the age of 15 are permitted in the hospital for the safety of all patients.                               21. If you take a long acting insulin in the evening only  take half of your usual  dose the night  before your procedure              22. If you use a c-pap please bring DOS if staying overnight,             23.For your convenience [facility] has a pharmacy on site to

## 2019-08-20 NOTE — PROGRESS NOTES
Dr. Claudette Crass' office notified that preop h & p will have to be done dos since pt did not see pcp.

## 2019-08-21 PROBLEM — K43.0 RECURRENT INCISIONAL HERNIA WITH INCARCERATION: Status: ACTIVE | Noted: 2019-01-01

## 2019-08-21 NOTE — PROGRESS NOTES
Pt meets d/c criteria for phase 1 in PACU and has been seen by anesthesia. Ok to move pt to ethology. Will alert anyone in waiting room for them and the nursing unit if applicable. Will continue to monitor for safety and comfort.     Victoriano MICHAELN, RN, [facility]  Pre-Op/Recovery   Same Day Surgery

## 2019-08-22 NOTE — PROGRESS NOTES
Morning assessment complete; pt rates pain 7/10; pt medicated per MAR    POC discussed - pt encouraged to ambulate as often as tolerated; The care plan and education has been reviewed and mutually agreed upon with the patient.      Call light and all needs in reach; pt denies needs at this time

## 2019-08-22 NOTE — CONSULTS
[address]   Advanced Heart Failure/Pulmonary Hypertension  Cardiac Follow up       Dominick Farnsworth  YOB: 1970    Requesting PHysician:  Dr. Paula Marx  REason for consult:  HF, perioperative management of HF     History of Present Illness:   Dominick Farnsworth is seen in regular follow up for sCHF, NICM, hx HTN, HLD and rectal cancer s/p chemo and XRT. C 9/15/16 with no coronary artery disease, EF 15%, LVEDP 20-25 mmHg. He had rectal surgery in January and has completed chemo and radiation. He has had problems with pelvic abscess and had a drain placed. He was started on Entresto and was wearing a life vest for a period of time. A MUGA EF 33% up from his initial echo of 10%. Entresto dose was increased 24/26 bid, but later discontinued due to hypotension. Lisinopril was resumed. He underwent ICD implant for primary prevention on 10/26/17. He also underwent surgery for repair of ventral hernia and new colostomy on 12/1/17. He has had abscesses post-op that have been drained. In August 2018, he was seen in the ED for abdominal pain and was found to be constipated; he underwent a colon cleansing and has been okay since. He was admitted for chest pain and SOB on 5-31-19 thru 6-7-19 and was found to be in acute CHF. ICD did not show VT while in the hospital.  He underwent colorectal anastomosis dilation 5/14/2019. He felt improved after diuresis. He is considering going back to a colostomy due to being incontinent of stool. We are consulted today as he is POD #1 after colostomy placement and hernia repair. We are consulted for heart failure management. He feels good today. He is not in nearly as much pain as he thought he would be. Denies shortness of breath. He is already taking sips of water and is passing fluid into his colostomy bag. No peripheral edema. He is much more optimistic about the colostomy than he was when it was first placed.       NYHA:   2-3  ACC/ AHA Stage: C    Past Medical History:   has a past medical history of Allergic, Anesthesia, Arthritis, CAD (coronary artery disease), Cancer ([facility] [address] Chemotherapy adverse reaction, CHF (congestive heart failure) ([facility] [address] Chronic knee pain, Clostridium difficile infection, Depression motion, Diverticulitis, Diverticulosis, History of alcohol abuse, Hyperlipidemia, Hypertension, Irregular heart beat, Pneumonia, Right knee pain, Shoulder injury, and Sleep difficulties. Surgical History:   has a past surgical history that includes sinus surgery (4 surgeries); Inner ear surgery (tube right ear); knee surgery (08/24/2011); other surgical history (7/1/13); Knee arthroscopy (Right, [id number]); Knee arthroscopy (Right, 8/4/2014); Umbilical hernia repair (11/11/14); joint replacement; Shoulder arthroscopy (Right, 9/22/15); Colonoscopy (08/29/2016); Endoscopic ultrasonography, GI (09/14/2016); Cardiac catheterization (09/14/2016); Small intestine surgery (01/11/2017); Tunneled venous port placement (Left, 02/13/2017); colectomy; Abscess Drainage; Tunneled venous port placement; hernia repair; Upper gastrointestinal endoscopy (10/03/2017); hernia repair (12/01/2017); Small intestine surgery (12/01/2017); other surgical history (12/01/2017); Colonoscopy (03/19/2018); Revision Colostomy (04/18/2018); hernia repair (04/18/2018); sigmoidoscopy (N/A, 11/23/2018); Breast surgery (Right, 1/21/2019); Abdomen surgery; Endoscopy, colon, diagnostic; Cardiac defibrillator placement (10/2017); Colonoscopy (N/A, 5/14/2019); pacemaker placement; and Small intestine surgery (N/A, 8/21/2019). Social History:   reports that he quit smoking about 12 years ago. He has a 5.00 pack-year smoking history. He quit smokeless tobacco use about 2 years ago. His smokeless tobacco use included chew. He reports that he does not drink alcohol or use drugs.    Family History:   Family History   Problem Relation Age of Onset    Cancer Mother     High Blood Pressure daily Indications: takes depending on his weight  6/7/19   Na Garcia MD   simethicone (MYLICON) 80 MG chewable tablet Take 80 mg by mouth every 6 hours as needed for Flatulence    Historical Provider, MD   lovastatin (MEVACOR) 40 MG tablet Take 40 mg by mouth. Take 1 tablet (40 mg total) by mouth at bedtime. 8/19/13   Historical Provider, MD      Allergies:  Vancomycin; Bactrim [sulfamethoxazole-trimethoprim]; Corn-containing products; Eggs or egg-derived products; and Seasonal     Review of Systems:   · Constitutional: there has been no unanticipated weight loss. There's been no change in energy level, sleep pattern, or activity level. · Eyes: No visual changes or diplopia. No scleral icterus. · ENT: No Headaches, hearing loss or vertigo. No mouth sores or sore throat. · Cardiovascular: Reviewed in HPI  · Respiratory: No cough or wheezing, no sputum production. No hematemesis. · Gastrointestinal: + for constipation and abdominal pain  · Genitourinary: No dysuria, trouble voiding, or hematuria. · Musculoskeletal:  No gait disturbance, weakness or joint complaints. · Integumentary: No rash or pruritis. · Neurological: No headache, diplopia, change in muscle strength, numbness or tingling. No change in gait, balance, coordination, mood, affect, memory, mentation, behavior. · Psychiatric: No anxiety, no depression. · Endocrine: No malaise, fatigue or temperature intolerance. No excessive thirst, fluid intake, or urination. No tremor. · Hematologic/Lymphatic: No abnormal bruising or bleeding, blood clots or swollen lymph nodes. · Allergic/Immunologic: No nasal congestion or hives.     Physical Examination:    Vitals:    08/22/19 0340 08/22/19 0525 08/22/19 0645 08/22/19 0813   BP: (!) 93/55 100/60 109/70 103/63   Pulse:  75 73 54   Resp:  16  16   Temp:  97.4 °F (36.3 °C)  97.5 °F (36.4 °C)   TempSrc:  Temporal  Oral   SpO2:  96%     Weight:  169 lb (76.7 kg)     Height:          Constitutional and 07/10/2019    CREATININE 0.5 2019    CREATININE 0.6 2019    CREATININE 0.6 07/10/2019     BNP:   Lab Results   Component Value Date    PROBNP 3,950 2019    PROBNP 3,931 2019    PROBNP 5,501 07/10/2019       Recent Hospitalization or Testing:   Echo 19  Left ventricular cavity size is dilated. Normal left ventricular wall   thickness. Overall left ventricular systolic function appears severely   reduced with a estimated ejection fraction of 30-35% 3D EF=32%. There is   severe diffuse hypokinesis.   -Grade III diastolic dysfunction with elevated LV filling   pressures. E/\"=21.6.   -Mildly thickened and calcified without evidence of stenosis.   Moderate-severe mitral regurgitation is present.   -Moderate tricuspid regurgitation.   -Mild pulmonic regurgitation present.   -Estimated pulmonary artery systolic pressure is severely elevated at 64   mmHg assuming a right atrial pressure of 3 mmHg.   -The left atrium is dilated.   -Pacemaker / ICD lead is visualized in the right heart. EK/31/19  Normal sinus rhythmPossible Left atrial enlargementPossible Anterior infarct , age undeterminedAbnormal     CT abdomen/pelvis 18:  1. Stable appearing amorphous soft tissue with central gas in the presacral   region along posterior aspect of the colorectal resection and anastomosis. 2. Unchanged hernia through right rectus abdominus muscle at level of   umbilicus containing small portion of small bowel probably related to prior   colostomy. 3. No significant new findings. Echo 17:  Left ventricular size is markedly dilated and hypokinetic. LV function is severely reduced at 20-25%   Mild mitral regurgitation is present.   The left atrium is dilated.   Pacer / ICD wire is visualized in the right ventricle.     Lab Results   Component Value Date    LVEF 30 2019    LVEFMODE Echo 2019     Echo 17:  Left ventricular size is markedly dilated and hypokinetic.  LV function is   severely reduced at 20-25%   Mild mitral regurgitation is present.   The left atrium is dilated.   Pacer / ICD wire is visualized in the right ventricle.   Mild tricuspid regurgitation with RVSP estimated at 30 mmHg. 10-26-17 Dr. Herr See  Procedures performed:  · Insertion of MRI compatible right ventricular defibrillator lead under fluoroscopy. · Insertion of a MRI compatible single chamber ICD. · Programming and interrogation of a single chamber AICD. · IV sedation. Echo 8/25/2017  Summary   Technically limited examination to eval LVEF.   Dilated left ventricle.   Severely decreased left ventricular systolic function with an estimated EF   20-25%.   Mild to moderate mitral regurgitation.  Justo De La O is mild-moderate tricuspid regurgitation with RVSP estimated at 46   mmHg.   There is a trivial posterior pericardial effusion noted.     Echo 8/15/2017   Summary   Limited bedside echocardiogram was performed to evaluate EF.   Left ventricular size is moderately increased.   Global ejection fraction is severely decreased and estimated at the 20%   range. EF 22% by 3D volume evaluation. Moderate mitral regurgitation is present. Left atria enlargement. MUGA 5/3/17  Abnormal resting left ventricular function with severe global hypokinesis  and EF= 29% . Echo 9/6/16:  Global ejection fraction is severely decreased and estimated from 15 % to 20 %. Global LV hypokinesis. Mildly dilated left atrium. RV systolic function appears to be reduced. Moderate mitral regurgitation is present. There is moderate tricuspid regurgitation with RVSP estimated at 38 mmHg. This is suggestive of mild pulmonary hypertension. Mild pulmonic regurgitation present. There is a moderate pleural effusion noted measuring 7.0 cm. [facility] 9/15/16:  Cath via radial unable to manipulate catheter into ascending aorta-small subintimal dissection in subclavian occurred.  Cath via right femoral shows normal

## 2019-08-22 NOTE — OP NOTE
He has had increasing concerns  with this lately and presents for his hernia repair. The intended procedure was reviewed with the patient. Antibiotics were  ordered. All questions were answered and he understands and consents to  proceed. ADDITIONAL DETAILS OF SURGERY:  The patient was brought to the operating  room, placed on the operative table in supine position. Compression  boots were placed. General anesthetic was administered. The abdomen  was prepped and draped sterilely. Time-out was done. In the right  upper quadrant at the hernia site, incision was made through the skin  and subcutaneous tissue and dissection was carried down through old scar  tissue and hernia sac was opened. The bowel inside the hernia sac was  dissected free with sharp scissor dissection and reduced back  intra-abdominally. The bowel was not damaged, no enterotomies were made  and the bowel was all viable. The hernia sac was then resected down off  the fascia and fascial margins and sent for pathology. I did not feel  any carcinomatosis or evidence of tumor, up around this area. The colon was mobilized and was  in the mid transverse colon  with a HAL 75 stapler and the mesentery to the colon was  and  taken down with LigaSure instrument. This was brought out as a  colostomy, eventually matured in [place] fashion with interrupted 3-0  Vicryl suture. At the hernia site, the hernia was repaired in layers,  the  peritoneum and posterior fascia and closing this with running loop  0-PDS. We then placed a [place]-Anupam mesh between the fascial layers and  then secured the upper fascia with running loop 0-PDS suture as well,  overlying the [place]-Anupam. Closure was performed of the skin, interrupted  with buried subdermal 3-0 Vicryl sutures. A colostomy bag was placed. The patient was taken to recovery room  postop.         Yahaira De La O MD    D: 08/21/2019 16:52:49       T: 08/21/2019

## 2019-08-22 NOTE — CARE COORDINATION
[address] Transitions Interview     2019    Patient: Kate Beauchamp Patient : 1970   MRN: [id number]  Reason for Admission: hernia repair and creation of new and transverse colostomy  RARS: Readmission Risk Score: 25         Spoke with: Tono Montes De Oca      Readmission Risk  Patient Active Problem List   Diagnosis    S/P total knee replacement using cement    Mixed hyperlipidemia    Benign essential HTN    Depression    Umbilical hernia    Lower abdominal pain    Rectal mass    Hepatitis    Localized edema    SOB (shortness of breath)    Acute systolic heart failure (HCC)    NICM (nonischemic cardiomyopathy) (HCC)    Abnormal nuclear cardiac imaging test    VT (ventricular tachycardia) (HCC)    Rectal cancer (HCC)    Neoplasm related pain    Chronic systolic heart failure (HCC)    Cardiomyopathy, idiopathic (HCC)    Poor venous access    Anemia    Insomnia due to medical condition    Chest pain    Dyspnea on exertion    Peristomal hernia    Episodic lightheadedness    AICD (automatic cardioverter/defibrillator) present    Ileostomy in place ([facility] [address]    Pneumonia    Preop cardiovascular exam    Colostomy in place [facility])    Breast mass, right    Abscess of sacrum (HCC)    Chronic pain    Abscess    Generalized abdominal pain    LLQ pain    Moderate malnutrition (HCC)    Hypervolemia    Chronic pain syndrome    Colon cancer (HCC)    Fibromyalgia    Primary insomnia    Acute on chronic systolic (congestive) heart failure (HCC)    Hypotension due to drugs    Recurrent incisional hernia with incarceration    Incontinence of feces       Inpatient Assessment  Care Transitions Summary    Care Transitions Inpatient Review  Medication Review  Are you able to afford your medications?:  Yes  How often do you have difficulty taking your medications?:  I always take them as prescribed.   Housing Review  Who do you live with?:  Partner/Spouse/SO  Are you an active

## 2019-08-22 NOTE — PROGRESS NOTES
[facility]    Surgery Progress Note           POD # 1    PATIENT NAME: Jim Robertson     TODAY'S DATE: 8/22/2019    SUBJECTIVE:    Pt  Feeling well post op, no concerns. OBJECTIVE:   VITALS:  /63   Pulse 54   Temp 97.5 °F (36.4 °C) (Oral)   Resp 16   Ht 5' 10\" (1.778 m)   Wt 169 lb (76.7 kg)   SpO2 96%   BMI 24.25 kg/m²     INTAKE/OUTPUT:    I/O last 3 completed shifts: In: 340 [P.O.:240; I.V.:100]  Out: 1125 [Urine:1100; Blood:25]  I/O this shift:  In: -   Out: 1325 [Urine:1325]              CONSTITUTIONAL:  awake and alert  LUNGS:  clear to auscultation  ABDOMEN:   normal bowel sounds, soft, non-distended, tenderness noted mildly at stoma site   INCISION: clean, dry, no drainage    Data:  CBC:   Recent Labs     08/22/19  0551   WBC 9.8   HGB 12.6*   HCT 36.8*        BMP:    Recent Labs     08/22/19  0551   *   K 4.4      CO2 26   BUN 11   CREATININE 0.5*   GLUCOSE 146*     Hepatic: No results for input(s): AST, ALT, ALB, BILITOT, ALKPHOS in the last 72 hours. Mag:    No results for input(s): MG in the last 72 hours. Phos:   No results for input(s): PHOS in the last 72 hours.    INR:   Recent Labs     08/21/19  1241   INR 1.13       Radiology Review:  None    ASSESSMENT AND PLAN:  52 y.o. male status post incisional hernia repair  Colostomy  Hx Rectal cancer    Doing well  Clears  Continue in care  Stomal care     Taryn Dalton

## 2019-08-22 NOTE — CARE COORDINATION
Ostomy Referral Progress Note      NAME:  Kate Beauchamp  MEDICAL RECORD NUMBER:  [id number]  AGE: 52 y.o. GENDER:  male  :  1970  TODAY'S DATE:  2019    Subjective     Kate Beauchamp is a 52 y.o. male referred by:   [] Physician  [x] Nursing  [] Other:     Pt S/P hernia repair and creation of new transverse colostomy    Surgeon Teo Fofana    PAST MEDICAL HISTORY:        Diagnosis Date    Allergic     Anesthesia     tremors    Arthritis     CAD (coronary artery disease)     Cancer ([facility] [address]     colon    Chemotherapy adverse reaction 2019    [facility]     Chronic knee pain     Clostridium difficile infection 2016    PCR+    Depression motion     Diverticulitis     Diverticulosis     History of alcohol abuse     Hyperlipidemia     Hypertension     Irregular heart beat     states been told he has had in past. Never treated. Sees PCP every 3 mos. and EKG yearly    Pneumonia     Right knee pain     Shoulder injury     and arthrisits, injury rotaotr cuff    Sleep difficulties     with depression       MEDICATIONS:    No current facility-administered medications on file prior to encounter.       Current Outpatient Medications on File Prior to Encounter   Medication Sig Dispense Refill    pregabalin (LYRICA) 75 MG capsule 1 tablet AM and 2 tablets PM 90 capsule 0    DULoxetine (CYMBALTA) 30 MG extended release capsule Take 1 capsule by mouth daily 30 capsule 0    eplerenone (INSPRA) 25 MG tablet Take 1 tablet by mouth daily 90 tablet 3    carvedilol (COREG) 12.5 MG tablet Take 0.5 tablets by mouth 2 times daily (with meals) 30 tablet 2    lisinopril (PRINIVIL;ZESTRIL) 5 MG tablet Take 1 tablet by mouth daily 90 tablet 3    digoxin (LANOXIN) 125 MCG tablet Take 1 tablet by mouth daily 90 tablet 3    nortriptyline (PAMELOR) 10 MG capsule Take 1 capsule by mouth nightly (Patient taking differently: Take 25 mg by mouth nightly ) 30 capsule 3    ranitidine (ZANTAC) 150 MG tablet Take 150 mg by mouth daily      melatonin 3 MG TABS tablet Take 10 mg by mouth nightly as needed       diazepam (VALIUM) 5 MG tablet Take 5 mg by mouth three times daily.  Cetirizine HCl (ZYRTEC PO) Take 1 tablet by mouth daily. Take only as needed for scrachy eyes.  buprenorphine (BUPRENEX) 7.5 MCG/HR PTWK Place 1 patch onto the skin once a week.  lubiprostone (AMITIZA) 24 MCG capsule Take 1 capsule by mouth daily (with breakfast) (Patient taking differently: Take 24 mcg by mouth daily (with breakfast) Indications: has been taken it as needed over past few weeks ) 30 capsule 0    torsemide (DEMADEX) 20 MG tablet Take 2 tablets by mouth daily (Patient taking differently: Take 40 mg by mouth daily Indications: takes depending on his weight ) 60 tablet 3    simethicone (MYLICON) 80 MG chewable tablet Take 80 mg by mouth every 6 hours as needed for Flatulence      lovastatin (MEVACOR) 40 MG tablet Take 40 mg by mouth. Take 1 tablet (40 mg total) by mouth at bedtime. ALLERGIES:    Allergies   Allergen Reactions    Vancomycin Hives and Swelling     Throat swells    Bactrim [Sulfamethoxazole-Trimethoprim] Other (See Comments)     Thrush    Corn-Containing Products      Patient tells me his throat swells when he eats a large amount of corn or corn products. He can tolerate products with corn oil and corn syrup.  Pt. Reports gets \"congestion\"    Eggs Or Egg-Derived Products Swelling    Seasonal        PAST SURGICAL HISTORY:    Past Surgical History:   Procedure Laterality Date    ABDOMEN SURGERY      ABSCESS DRAINAGE      BREAST SURGERY Right 1/21/2019    RIGHT BREAST EXCISIONAL BIOPSY performed by [facility]  09/14/2016    No stents placed   [facility]    COLECTOMY      COLONOSCOPY  08/29/2016    with biopsy and polypectomy Tobacco Use    Smoking status: Former Smoker     Packs/day: 1.00     Years: 5.00     Pack years: 5.00     Last attempt to quit: 2007     Years since quittin.6    Smokeless tobacco: Former User     Types: Chew     Quit date: 8/10/2017   Substance Use Topics    Alcohol use: No     Comment: history of alcohol abuse 20 years ago    Drug use: No       LABS:  WBC:    Lab Results   Component Value Date    WBC 9.8 2019     H/H:    Lab Results   Component Value Date    HGB 12.6 2019    HCT 36.8 2019     BMP:    Lab Results   Component Value Date     2019    K 4.4 2019    K 4.3 2019     2019    CO2 26 2019    BUN 11 2019    LABALBU 4.7 2019    CREATININE 0.5 2019    CALCIUM 8.8 2019    GFRAA >60 2019    GFRAA >60 2012    LABGLOM >60 2019    GLUCOSE 146 2019    GLUCOSE 131 2017     PTT:    Lab Results   Component Value Date    APTT 40.8 2019   [APTT}  PT/INR:    Lab Results   Component Value Date    PROTIME 12.9 2019    INR 1.13 2019       Objective    BP (!) 99/58   Pulse 59   Temp 97.5 °F (36.4 °C) (Axillary)   Resp 16   Ht 5' 10\" (1.778 m)   Wt 169 lb (76.7 kg)   SpO2 98%   BMI 24.25 kg/m²     Matt Risk Score Matt Scale Score: 20    Patient Active Problem List   Diagnosis Code    S/P total knee replacement using cement Z96.659    Mixed hyperlipidemia E78.2    Benign essential HTN I10    Depression A10.7    Umbilical hernia H38.0    Lower abdominal pain R10.30    Rectal mass K62.9    Hepatitis K75.9    Localized edema R60.0    SOB (shortness of breath) T51.80    Acute systolic heart failure (HCC) I50.21    NICM (nonischemic cardiomyopathy) (HCC) I42.8    Abnormal nuclear cardiac imaging test R93.1    VT (ventricular tachycardia) (HCC) I47.2    Rectal cancer (HCC) C20    Neoplasm related pain G89.3    Chronic systolic heart failure (HCC) I50.22   

## 2019-08-23 PROBLEM — E43 SEVERE MALNUTRITION (HCC): Chronic | Status: ACTIVE | Noted: 2019-01-01

## 2019-08-23 NOTE — CARE COORDINATION
8/23/2019- Per conversation with wound care nurse will likely discharge over weekend no home care needed for new colostomy.

## 2019-08-23 NOTE — PLAN OF CARE
Problem: Pain:  Goal: Pain level will decrease  Description  Pain level will decrease  8/23/2019 0925 by Ricarda Calles RN  Outcome: Ongoing   Pt using toradol, oxycontin, and morphine for pain control  Problem: Falls - Risk of:  Goal: Will remain free from falls  Description  Will remain free from falls  8/23/2019 0925 by Ricarda Calles RN  Outcome: Ongoing   Pt remains free from falls. Fall precautions in place--bed in lowest position, call light within reach. Pt aware to call for assistance before getting up.   Problem: HEMODYNAMIC STATUS  Goal: Patient has stable vital signs and fluid balance  8/23/2019 0925 by Ricarda Calles RN  Outcome: Ongoing     Problem: MOBILITY  Goal: Early mobilization is achieved  8/23/2019 3245 by Ricarda Calles RN  Outcome: Ongoing     Problem: ELIMINATION  Goal: Elimination patterns are normal or improving  Description  Elimination patterns return to pre-surgery normal patterns  Outcome: Ongoing     Problem: SKIN INTEGRITY  Goal: Skin integrity is maintained or improved  8/23/2019 0925 by Ricarda Calles RN  Outcome: Ongoing     Problem: Cardiac:  Goal: Ability to maintain vital signs within normal range will improve  Description  Ability to maintain vital signs within normal range will improve  Outcome: Ongoing

## 2019-08-23 NOTE — PROGRESS NOTES
Shift assessment completed, pt is alert and oriented, VSS, call light within reach. Oxy for pain, Pt is encouraged to ambulate. (see flowsheets and LDA). Will monitor pt. The care plan and education has been reviewed and mutually agreed upon with the patient.

## 2019-08-23 NOTE — PROGRESS NOTES
function     Signs and symptoms:  as evidenced by Diet history of poor intake, Weight loss    Objective Information:  · Nutrition-Focused Physical Findings: new transverse colostomy  · Wound Type:    · Current Nutrition Therapies:  · Oral Diet Orders: Clear Liquid   · Oral Diet intake: 1-25%, 26-50%  · Oral Nutrition Supplement (ONS) Orders: None  · ONS intake:    · Anthropometric Measures:  · Ht: 5' 10\" (177.8 cm)   · Current Body Wt: 170 lb (77.1 kg)  · Admission Body Wt: 163 lb (73.9 kg)  · % Weight Change:  ,  9/25/18- 190lb; 4/8/19- 196lb indicating a 26 lb/ 13% loss in 4 months  · Ideal Body Wt: 166 lb (75.3 kg), % Ideal Body 102%  · BMI Classification: BMI 18.5 - 24.9 Normal Weight    Nutrition Interventions:   Modify current diet  Continued Inpatient Monitoring, Education Not Indicated    Nutrition Evaluation:   · Evaluation: Goals set   · Goals: tolerance to diet as advanced with po intake 50% or greater    · Monitoring: Nutrition Progression, Diet Tolerance, Meal Intake, Weight      Electronically signed by Kala Velazquez RD, LD on 8/23/19 at 9:31 AM  Contact Number: [phone]

## 2019-08-23 NOTE — PROGRESS NOTES
[facility]   Daily Progress Note      Admit Date:  8/21/2019    HPI:    Mr. Jordan Arzola sHF, NICM, HTN, HLD and rectal cancer s/p chemo and XRT. [facility] 9/15/16 with no coronary artery disease, LVEF 15%, LVEDP 20-25 mmHg. He also underwent surgery for repair of ventral hernia and new colostomy 12/17 and then a reversal of colostomy, abscesses post op that have been drained. He underwent colorectal anastomosis dilation 5/14/2019    Seen s/p colostomy placement and hernia repair. He is being seen for heart failure management. Subjective:  Patient is being seen for heart failure management. There were no acute overnight cardiac events. Creat 0.5 potassium 4.4 vitals are stable probnp 3931->3950   His IV fluids are to be stopped today. He had some regular food today and is having some liquid in his ostomy. Pain is better controlled. He is hoping to go home tomorrow    Objective:   /71   Pulse 72   Temp 97.4 °F (36.3 °C) (Oral)   Resp 16   Ht 5' 10\" (1.778 m)   Wt 170 lb (77.1 kg)   SpO2 97%   BMI 24.39 kg/m²       Intake/Output Summary (Last 24 hours) at 8/23/2019 1455  Last data filed at 8/23/2019 1233  Gross per 24 hour   Intake 3503 ml   Output 2490 ml   Net 1013 ml          Physical Exam:  General:  Awake, alert, oriented in NAD  Skin:  Warm and dry. No unusual bruising or rash  Neck:  Supple. No JVD or carotid bruit appreciated  Chest:  Normal effort.   Clear to auscultation, no wheezes/rhonchi/rales  Cardiovascular:  RRR, S1/S2, no murmur/gallop/rub  Abdomen:  Soft, nontender, +bowel sounds, ostomy  Extremities:  No edema  Neurological: No focal deficits  Psychological: Normal mood and affect      Medications:    ciprofloxacin  400 mg Intravenous Q12H    metroNIDAZOLE  500 mg Intravenous Q8H    buprenorphine  1 patch Transdermal Weekly    carvedilol  6.25 mg Oral BID WC    digoxin  125 mcg Oral Daily    DULoxetine  30 mg Oral Daily    eplerenone  25 mg Oral Daily    catheter into ascending aorta-small subintimal dissection in subclavian occurred. Cath via right femoral shows normal coronaries,LVEDP 20-25. EF 15%     Echo 11/30/2017:  Summary  Left ventricular size is markedly dilated and hypokinetic. LV function is   severely reduced at 20-25%  Mild mitral regurgitation is present. The left atrium is dilated. Pacer / ICD wire is visualized in the right ventricle. Mild tricuspid regurgitation with RVSP estimated at 30 mmHg.       Assessment:    1. Chronic systolic heart failure, on ace, bb and aldosterone antagonist  2. ICD  3. Essential hypertension, controlled  4. S/p ostomy and hernia repair  5. NICM    Plan:    1. Continue coreg 6.25 mg bid  2. Continue lisinopril 5 mg daily  3. Continue digoxin 125 mcg daily  4. Continue eplerenone 25 mg daily  5. Continue torsemide 40 mg daily    Will not see over the weekend, please call with any issues    Discussed with patient who is agreeable with plan of care. Thank you for allowing me to participate in the care of your patient.     Royce Stone, CNS

## 2019-08-24 NOTE — PROGRESS NOTES
Shift assessment completed. VS obtained. Reviewed POC and evening meds. Given per order. Active bowel sounds x4. Pt. Encouraged to ambulate. Use IS. Pt. Used IS then ambulated in parrish. No other needs at this time. Call light and BST within reach. Fall precautions in place, will continue to monitor. The care plan and education has been reviewed and mutually agreed upon with the patient.

## 2019-08-26 NOTE — TELEPHONE ENCOUNTER
Patient was d/c this past weekend by  [address] [place] [address]. He recommended that he follow up in a week but patient states Dr. Ferdinand Levin usually wants him in sooner. Patient also wants clarification on this script for the stool softner. He states he had been taking one before his surgery but is unclear if he needs to be taking one after. He would like for Katty to give him a call tomorrow morning.

## 2019-09-01 PROBLEM — Z01.810 PREOP CARDIOVASCULAR EXAM: Status: RESOLVED | Noted: 2018-04-11 | Resolved: 2019-01-01

## 2019-10-10 NOTE — PROGRESS NOTES
Nutrition Assessment    Type and Reason for Visit: Initial, Positive Nutrition Screen    Nutrition Recommendations:   Ensure Enlive 3-6x/day  Low fiber diet as tolerated    Nutrition Assessment: Pt triggered nutrition eval for admission MST of 2, indicating wt loss and poor po intake. Pt nutritionally compromised related to hx of diverticulitis, colon cancer and colon resection. Pt typically consumes Ensure throughout the day at home as primary source of nutrition and will eat solid foods for dinner. Pt indicates that if he eats solid foods througout the day he will have multiple frequent bowel movements. The Ensure does not cause multiple frequent bowel movements. Pt reports ~12 lb loss in the past few weeks d/t an increase in diarrhea. Currently on full liquid diet and request Ensure. Pt at risk for greater nutrition compromise until diarrhea subsides and wt becomes stable. Will provide Ensure as requested. Malnutrition Assessment:  · Malnutrition Status: Meets the criteria for moderate malnutrition  · Context: Chronic illness  · Findings of the 6 clinical characteristics of malnutrition (Minimum of 2 out of 6 clinical characteristics is required to make the diagnosis of moderate or severe Protein Calorie Malnutrition based on AND/ASPEN Guidelines):  1. Energy Intake-Less than or equal to 75% of estimated energy requirement,      2. Weight Loss-(6%), (2 wks)  3. Fat Loss-No significant subcutaneous fat loss,    4. Muscle Loss-No significant muscle mass loss,    5. Fluid Accumulation-No significant fluid accumulation,    6.   Strength-Not measured    Nutrition Risk Level: High    Nutrient Needs:  · Estimated Daily Total Kcal: 1720 - 2150  · Estimated Daily Protein (g): 90 - 113  · Estimated Daily Total Fluid (ml/day): 2150 - 2580     Nutrition Diagnosis:   · Problem: Unintended weight loss  · Etiology: related to Diarrhea, Insufficient energy/nutrient consumption     Signs and symptoms:  as Pt to be notified of results by my office   evidenced by Diet history of poor intake, Patient report of, Weight loss    Objective Information:  · Nutrition-Focused Physical Findings: NS @ 150 mL/hr  · Current Nutrition Therapies:  · Oral Diet Orders: Full Liquid   · Oral Diet intake: 26-50%  · Anthropometric Measures:  · Ht: 5' 10\" (177.8 cm)   · Current Body Wt: 190 lb (86.2 kg)  · Usual Body Wt: 184 lb (83.5 kg)(at home )  · % Weight Change:  ,  reports 12 lb loss (6% in 2 wks)  · Ideal Body Wt: 166 lb (75.3 kg)   · BMI Classification: BMI 25.0 - 29.9 Overweight    Nutrition Interventions:   Continue current diet, Start ONS  Continued Inpatient Monitoring, Education not appropriate at this time    Nutrition Evaluation:   · Evaluation: Goals set   · Goals: po intake 75% or greater with consumption of Ensure Enlive    · Monitoring: Meal Intake, Supplement Intake, Weight, Pertinent Labs, Diarrhea, Diet Tolerance      Electronically signed by April Bledsoe RD, LD on 4/9/19 at 4:26 PM  Contact Number: [phone]

## 2019-11-07 PROBLEM — J18.9 PNA (PNEUMONIA): Status: ACTIVE | Noted: 2019-01-01

## 2019-12-08 PROBLEM — K83.1 BILIARY OBSTRUCTION: Status: ACTIVE | Noted: 2019-01-01

## 2019-12-10 PROBLEM — G93.41 METABOLIC ENCEPHALOPATHY: Status: ACTIVE | Noted: 2019-01-01

## 2019-12-13 PROBLEM — I38 ACUTE ON CHRONIC SYSTOLIC HEART FAILURE DUE TO VALVULAR DISEASE (HCC): Status: ACTIVE | Noted: 2019-01-01

## 2019-12-20 PROBLEM — E87.1 HYPONATREMIA: Status: ACTIVE | Noted: 2019-01-01

## 2019-12-20 PROBLEM — I50.21 SYSTOLIC CHF, ACUTE (HCC): Status: ACTIVE | Noted: 2019-01-01

## 2019-12-23 PROBLEM — A04.72 C. DIFFICILE COLITIS: Status: ACTIVE | Noted: 2019-01-01

## 2019-12-30 PROBLEM — N39.0 URINARY TRACT INFECTION: Status: ACTIVE | Noted: 2019-01-01

## 2019-12-30 NOTE — ED NOTES
Report to Brennen Irene, nurse resuming care of patient. No questions or concerns. Dobutamine gtt verified.       Mansi Whitt RN  78/22/38 7821

## 2019-12-30 NOTE — PROGRESS NOTES
Pt transported to ICU via Bipap on original settings of 20/ 8 21%. Spo2 remained 99% during transport. Pt tolerated well.

## 2019-12-30 NOTE — ED NOTES
Dr. Corey Silverio currently at bedside to evaluate patient.       Marylen Addison, RN  37/95/01 1678

## 2019-12-30 NOTE — CONSULTS
HERNIA REPAIR      HERNIA REPAIR  12/01/2017    peristomal hernia repair    HERNIA REPAIR  04/18/2018    S/P: colostomy closure with hernia repair with mesh, with Dr. Nilo Mclaughlin at [facility].  INNER EAR SURGERY  tube right ear    JOINT REPLACEMENT      left TKR    KNEE ARTHROSCOPY Right [id number]    KNEE ARTHROSCOPY Right 8/4/2014    medial meniscus    KNEE SURGERY  08/24/2011    removal of tibial component    OTHER SURGICAL HISTORY  7/1/13    ACL Rt knee meniscus repair synovectomy    OTHER SURGICAL HISTORY  12/01/2017    takedown of ileostomy    PACEMAKER PLACEMENT      icd 10/2017    REVISION COLOSTOMY  04/18/2018    S/P: colostomy closure with hernia repair with mesh, with Dr. Nilo Mclaughlin at [facility].     SHOULDER ARTHROSCOPY Right 9/22/15    SUBACROMIAL DECOMPRESSION, DISTAL CLAVICLE EXCISION, LABRAL DEBRIDEMETN    SIGMOIDOSCOPY N/A 11/23/2018    SIGMOIDOSCOPY BIOPSY FLEXIBLE performed by Peter Brown MD at [address]  4 surgeries    SMALL INTESTINE SURGERY  01/11/2017    LAPAROSCOPIC LOW ANTERIOR RESECTION, ILEOSTOMY    SMALL INTESTINE SURGERY  12/01/2017    small bowel resection    SMALL INTESTINE SURGERY N/A 8/21/2019    OPEN COLOSTOMY AND PARASTOMAL HERNIA REPAIR WITH MESH performed by Michele Gomes MD at [facility] TUNNELED VENOUS PORT PLACEMENT Left 02/13/2017    PORT-A-CATH INSERTION                     TUNNELED VENOUS PORT PLACEMENT      UMBILICAL HERNIA REPAIR  11/11/14    UPPER GASTROINTESTINAL ENDOSCOPY  10/03/2017     Current Medications:    Current Facility-Administered Medications: sodium chloride flush 0.9 % injection 10 mL, 10 mL, Intravenous, 2 times per day  sodium chloride flush 0.9 % injection 10 mL, 10 mL, Intravenous, PRN  ondansetron (ZOFRAN) injection 4 mg, 4 mg, Intravenous, Q6H PRN  potassium chloride 10 mEq/100 mL IVPB (Peripheral Line), 10 mEq, Intravenous, PRN  magnesium sulfate 1 g in dextrose 5% 100 anxiety. MUSCULOSKELETAL: No obvious misalignment or effusion of the joints. No clubbing, cyanosis of the digits. SKIN:  normal skin color, texture, turgor and no redness, warmth, or swelling. No palpable nodules    DATA:    Old records have been reviewed    CBC:  Recent Labs     12/29/19 2339   WBC 14.0*   RBC 5.01   HGB 15.4   HCT 46.9      MCV 93.6   MCH 30.7   MCHC 32.8   RDW 15.4      BMP:  Recent Labs     12/29/19  2339 12/30/19  0650   * 130*   K 4.9 3.8   CL 77* 83*   CO2 20* 25   BUN 45* 46*   CREATININE 1.9* 1.4*   CALCIUM 9.8 9.3   GLUCOSE 107* 154*      ABG:  Recent Labs     12/30/19  0933   PHART 7.529*   XRM2RXM 39.1   PO2ART 109.0*   DNO9VMH 32.6*   L4SNBDVT 98.9   BEART 9.1*       Lab Results   Component Value Date     12/06/2016     Lab Results   Component Value Date    CKTOTAL 129 09/06/2016    TROPONINI 0.01 12/29/2019       Cultures:     Abx:    Radiology Review:  All pertinent images / reports were reviewed as a part of this visit. Assessment:     1. Acute Hypoxemic Respiratory Failure  · ABG pending  · Significant desaturations  · Now on BiPAP 20/6 with VT in the 700cc range  · I reviewed and adjusted BiPAP at the bedside. 2. Acute Pulmonary Edema  · I have reviewed chest imaging  · Mild pulmonary edema noted  · IV Lasix    3. HFrEF  · EF about 20%  · Start Dobutamine  · Cardiology consulted    4. ALEN  · BUN/Cr is 46/1.4  · Monitor renal function    5. Ischemic Hepatitis  · AST >3500 and ALT 1300  · Likely 2/2 passive congestion related to CHF    6. Coagulopathy  · INR is 4.75  · Also like;y 2/2 passive congestion of the liver  · No active bleeding  · PLTs 254    7.  Acute Encephalpoathy  · Improving with BiPAP  · Metabolic in origin    Total critical care time caring for this patient with life threatening illness, including direct patient contact, management of life support systems, review of data including imaging and labs, discussions with other team members and physicians is at least 32 minutes so far today, excluding procedures.

## 2019-12-30 NOTE — ED PROVIDER NOTES
[facility]        Pt Name: Maryann Lechuga  MRN: [id number]  [place] 1970  Date of evaluation: 12/29/2019  Provider: Raquel Cantrell PA-C  PCP: Amarilis Salvador MD    This patient was seen and evaluated by the attending physician Dr. Ronak Elizondo. CHIEF COMPLAINT       Chief Complaint   Patient presents with    Shortness of Breath     From home via [facility] EMS, SOB and hx of CHF, pedal edema, placed on NC due to RA of 75-80-%. HISTORY OF PRESENT ILLNESS   (Location/Symptom, Timing/Onset, Context/Setting, Quality, Duration, Modifying Factors, Severity)  Note limiting factors. Maryann Lechuga is a 52 y.o. male with significant past medical history of colon cancer, coronary artery disease, congestive heart failure, C. difficile, alcohol abuse, hepatitis presents for evaluation of reported shortness of breath. EMS reports that they found him tachypneic and hypoxic 75 to 80% on room air placed on nasal cannula. They report increased pedal edema and were concerned with history of CHF. Patient states that he just does not feel well at all. He is unable to elaborate as to what this means. Anytime I asked him a question he just says he does not know. History is very limited secondary to current state versus patient noncompliance. There is no family at bedside to provide any additional information. Nursing Notes were all reviewed and agreed with or any disagreements were addressed in the HPI. REVIEW OF SYSTEMS    (2-9 systems for level 4, 10 or more for level 5)     Review of Systems    Positives and Pertinent negatives as per HPI. Except as noted above in the ROS, all other systems were reviewed and negative.        PAST MEDICAL HISTORY     Past Medical History:   Diagnosis Date    Allergic     Anesthesia     tremors    Arthritis     CAD (coronary artery disease)     Cancer ([facility] [address]     colon    Chemotherapy adverse reaction 4/29/2019    CHF (congestive heart failure) (HCC)     Chronic knee pain     Clostridium difficile infection 07/11/2016    PCR+    Depression motion     Diverticulitis     Diverticulosis     History of alcohol abuse     Hyperlipidemia     Hypertension     Irregular heart beat     states been told he has had in past. Never treated. Sees PCP every 3 mos. and EKG yearly    Pneumonia     Right knee pain     Shoulder injury     and arthrisits, injury rotaotr cuff    Sleep difficulties     with depression         SURGICAL HISTORY     Past Surgical History:   Procedure Laterality Date    ABDOMEN SURGERY      ABSCESS DRAINAGE      BREAST SURGERY Right 1/21/2019    RIGHT BREAST EXCISIONAL BIOPSY performed by Norberto Faustin MD at [facility]  09/14/2016    No stents placed   [facility]    COLECTOMY      COLONOSCOPY  08/29/2016    with biopsy and polypectomy    COLONOSCOPY  03/19/2018    COLONOSCOPY N/A 5/14/2019    COLONOSCOPY WITH DILATION performed by Popeye Lang MD at [address] [place] [address] ([facility])  09/14/2016    for staging - Dr. Vladislav Zapata, COLON, DIAGNOSTIC     [address]  12/01/2017    peristomal hernia repair    HERNIA REPAIR  04/18/2018    S/P: colostomy closure with hernia repair with mesh, with Dr. Alisa Guerrero at [facility].     INNER EAR SURGERY  tube right ear    JOINT REPLACEMENT      left TKR    KNEE ARTHROSCOPY Right [id number]    KNEE ARTHROSCOPY Right 8/4/2014    medial meniscus    KNEE SURGERY  08/24/2011    removal of tibial component    OTHER SURGICAL HISTORY  7/1/13    ACL Rt knee meniscus repair synovectomy    OTHER SURGICAL HISTORY  12/01/2017    takedown of ileostomy    PACEMAKER PLACEMENT      icd 10/2017    REVISION COLOSTOMY  04/18/2018    S/P: colostomy closure with hernia repair with mesh, with Dr. Alisa Guerrero at [facility] (*)     All other components within normal limits    Narrative:     Performed at:  [facility] [address], [facility] [address]   Phone [phone]   RAPID INFLUENZA A/B ANTIGENS    Narrative:     Performed at:  [facility]  [address], [address]   Phone [phone]   CULTURE BLOOD #1   CULTURE BLOOD #2   TROPONIN    Narrative:     Performed at:  [facility] [address], [address]   Phone [phone]   AMMONIA    Narrative:     Performed at:  [facility] [address] [facility], [facility] [address]   Phone [phone]   BRAIN NATRIURETIC PEPTIDE   LACTIC ACID, PLASMA   URINE RT REFLEX TO CULTURE       All other labs were within normal range or not returned as of this dictation. EKG: All EKG's are interpreted by the Emergency Department Physician in the absence of a cardiologist.  Please see their note for interpretation of EKG. RADIOLOGY:   Non-plain film images such as CT, Ultrasound and MRI are read by the radiologist. Plain radiographic images are visualized and preliminarily interpreted by the  ED Provider with the below findings:        Interpretation per the Radiologist below, if available at the time of this note:    XR CHEST PORTABLE   Final Result   No acute airspace disease identified. Xr Chest Standard (2 Vw)    Result Date: 12/23/2019  EXAMINATION: TWO XRAY VIEWS OF THE CHEST 12/23/2019 2:15 pm COMPARISON: 12/21/2019 HISTORY: ORDERING SYSTEM PROVIDED HISTORY: cough TECHNOLOGIST PROVIDED HISTORY: Reason for exam:->cough Reason for Exam: cough Acuity: Acute Type of Exam: Initial FINDINGS: There is a left subclavian port. There is a right subclavian AICD. There is cardiomegaly. Pulmonary vasculature appears within normal limits. Lungs clear.   Costophrenic angles sharp     Cardiomegaly with no evidence of edema or

## 2019-12-30 NOTE — CONSULTS
[address]  Advanced CHF/Pulmonary Hypertension   Cardiac Evaluation      Lázaro Guevara  YOB: 1970    Requesting PHysician:  Dr. Desiree Steven      Chief Complaint   Patient presents with    Shortness of Breath     From home via [facility], SOB and hx of CHF, pedal edema, placed on NC due to RA of 75-80-%. History of Present Illness:    Nacho Kirkland is a 53 yo male well known to me from the office with a history of chronic systolic HF and rectal cancer. He presented to the ED with severe subacute onset of gradually progressive increasing shortness of breath. In the ED, he had significant tachypneic and unable to hold full conversation in spite of being on supplemental oxygen. He was also confused. He has had fatigue, malaise and generalized weakness. No fever, chills. Denies chest pain, palpitations. Denies flank pain or hematuria. He denies dysuria, urgency, or frequency. On the previous admission, there was a question of choledocholithiasis. He has elevated bilirubin and liver function tests. He had normal enzymes 11/8/19. He is receiving pain meds. He has a history of chronic narcotic use from all of his surgeries.     He has a history of sCHF, NICM, hx HTN, HLD and rectal cancer s/p chemo and XRT. C 9/15/16 with no coronary artery disease, EF 15%, LVEDP 20-25 mmHg.  He had rectal surgery in January 2017 and has completed chemo and radiation. He had problems with pelvic abscess and had a drain placed.    He underwent ICD implant for primary prevention on 10/26/17. Shadia Sims also underwent surgery for repair of ventral hernia and new colostomy on 12/1/17. He has had abscesses post-op that have been drained. Jessi Hutchinson was admitted for chest pain and SOB on 5-31-19 thru 6-7-19 and was found to be in acute CHF.  ICD did not show VT while in the hospital. Shadia Patch underwent colorectal anastomosis dilation 5/14/2019.  He recently underwent colostomy placement recently due to problems with UMBILICAL HERNIA REPAIR  14    UPPER GASTROINTESTINAL ENDOSCOPY  10/03/2017     Family History   Problem Relation Age of Onset    Cancer Mother     High Blood Pressure Father     Cancer Father      Social History     Socioeconomic History    Marital status:      Spouse name: Not on file    Number of children: Not on file    Years of education: Not on file    Highest education level: Not on file   Occupational History    Not on file   Social Needs    Financial resource strain: Not on file    Food insecurity:     Worry: Not on file     Inability: Not on file    Transportation needs:     Medical: Not on file     Non-medical: Not on file   Tobacco Use    Smoking status: Former Smoker     Packs/day: 1.00     Years: 5.00     Pack years: 5.00     Last attempt to quit: 2007     Years since quittin.0    Smokeless tobacco: Former User     Types: Chew     Quit date: 8/10/2017   Substance and Sexual Activity    Alcohol use: No     Comment: history of alcohol abuse 20 years ago    Drug use: No    Sexual activity: Never     Partners: Female   Lifestyle    Physical activity:     Days per week: Not on file     Minutes per session: Not on file    Stress: Not on file   Relationships    Social connections:     Talks on phone: Not on file     Gets together: Not on file     Attends Restorationist service: Not on file     Active member of club or organization: Not on file     Attends meetings of clubs or organizations: Not on file     Relationship status: Not on file    Intimate partner violence:     Fear of current or ex partner: Not on file     Emotionally abused: Not on file     Physically abused: Not on file     Forced sexual activity: Not on file   Other Topics Concern    Not on file   Social History Narrative    Not on file       Review of Systems:   · Constitutional: there has been no unanticipated weight loss. There's been no change in energy level, sleep pattern, or activity level. · Eyes: No visual changes or diplopia. No scleral icterus. · ENT: No Headaches, hearing loss or vertigo. No mouth sores or sore throat. · Cardiovascular: Reviewed in HPI  · Respiratory: No cough or wheezing, no sputum production. No hematemesis. · Gastrointestinal: No abdominal pain, appetite loss, blood in stools. No change in bowel or bladder habits. · Genitourinary: No dysuria, trouble voiding, or hematuria. · Musculoskeletal:  No gait disturbance, weakness or joint complaints. · Integumentary: No rash or pruritis. · Neurological: No headache, diplopia, change in muscle strength, numbness or tingling. No change in gait, balance, coordination, mood, affect, memory, mentation, behavior. · Psychiatric: No anxiety, no depression. · Endocrine: No malaise, fatigue or temperature intolerance. No excessive thirst, fluid intake, or urination. No tremor. · Hematologic/Lymphatic: No abnormal bruising or bleeding, blood clots or swollen lymph nodes. · Allergic/Immunologic: No nasal congestion or hives. Physical Examination:    Vitals:    12/30/19 0645 12/30/19 0700 12/30/19 0813 12/30/19 1000   BP: 102/70 106/72 97/65 109/64   Pulse: 100 96 90 94   Resp: 15 8 13 19   Temp:   97.6 °F (36.4 °C) 98 °F (36.7 °C)   TempSrc:   Axillary Temporal   SpO2: 97% 96% 97% 96%   Weight:       Height:         Body mass index is 28.7 kg/m².      Wt Readings from Last 3 Encounters:   12/29/19 200 lb (90.7 kg)   12/23/19 192 lb 9.6 oz (87.4 kg)   12/18/19 194 lb 11.2 oz (88.3 kg)     BP Readings from Last 3 Encounters:   12/30/19 109/64   12/23/19 114/64   12/18/19 98/64     Constitutional and General Appearance:   Chronically ill appearing  HEENT:  NC/AT  MARJAN  No problems with hearing  Skin:  Warm, dry  Respiratory:  · Normal excursion and expansion without use of accessory muscles  · Resp Auscultation: Normal breath sounds without dullness  Cardiovascular:  · The apical impulses not displaced  · Heart tones are crisp and normal  · Cervical veins are not engorged  · The carotid upstroke is normal in amplitude and contour without delay or bruit  · JVP elevated to angle of jaw  RRR with nl S1 and S2 with S3 gallop  · Peripheral pulses are symmetrical and full  · There is no clubbing, cyanosis of the extremities. · 1+ bilateral edema  · Femoral Arteries: 2+ and equal  · Pedal Pulses: 2+ and equal   Neck:  · No thyromegaly  Abdomen:  · No masses or tenderness  · Liver/Spleen: No Abnormalities Noted  Neurological/Psychiatric:  · Alert and oriented in all spheres  · Moves all extremities well  · Exhibits normal gait balance and coordination  · No abnormalities of mood, affect, memory, mentation, or behavior are noted    Labs were reviewed including labs from other hospital systems through [facility]. Cardiac testing was reviewed including echos, nuclear scans, cardiac catheterization, including from other hospital systems through [facility]. Assessment:    1. ALEN (acute kidney injury) ([facility] [address]    2. Acute liver failure without hepatic coma :  Due to worsening heart failure   3. Elevated INR :  Due to liver congestion   4. Acute on chronic systolic HF :  Severely decompensated. 5.  Moderate sedation:  Likely due to accumulation of narcotics with liver dysfunction  6. Ventricular ectopy    Plan:  1. Increase lasix to 80 mg IV bid, may ultimately need a drip  2. Change dobutamine to milrinone:  He has responded better to milrinone in the past  3. Hold ACEI and aldosterone blocker for now  4. Continue coreg at lower dose, 3.125 mg po bid  5. Reduce narcotics  6. Need to consider more aggressive hf management. Not sure if he is a candidate for transplant, given his rectal cancer diagnosis. Might need to consider LVAD treatment. 7.  CHF education reinforced.   ~salt restriction  ~fluid restriction  ~medication compliance  ~daily weights and notify of any significant weight gain/loss  ~establish with CHF

## 2019-12-30 NOTE — H&P
examined by me in the emergency room. VITAL SIGNS:  Temperature 99.1, respiratory rate almost close to 42,  pulse 111, blood pressure 113/75, saturating 91%. CNS:  The patient is awake. Orientation at present in person cannot be  checked with. PSYCH:  The patient is not agitated. EYES:  Pupils are bilaterally equal.  ENT:  Nasal flaring noted. Intercostal muscle use and supraclavicular  muscle use noted. NECK:  The patient has got significant JVD even with the head up. When  the patient's head is laid down flat, the patient gets visibly short of  breath and the JVD increases. RESPIRATORY SYSTEMS:  Significant bibasilar posterior rales. CVS:  S1 and S2 are heard clearly. Clear cut S3 gallop is audible. ABDOMEN:  Soft. No guarding, rigidity, or rebound. MUSCULOSKELETAL:  Bilateral lower extremity, no deformities. SKIN:  The patient has got significant 2 to 3+ pitting edema over the  bilateral lower extremities. DIAGNOSTIC DATA:  The patient has got an EKG that shows sinus  tachycardia. CBC showed white count of 14.0, hemoglobin of 15.4. Chest x-ray has initially interpreted has no acute airspace disease or  strain. Rapid influenza A antigen negative. INR 4.24. , AST  1842. BUN 45, creatinine 1.9, anion gap 25, glucose 107, carbon dioxide  20, chloride 77, sodium 122. Initial lactic acid was 9.1, repeat is  12.0. The patient's repeat BMP shows an anion gap down to 22, glucose  154, BUN 46, creatinine 1.4, however, the ALT is up to 1339, AST is up  to 3650, albumin 2.9. REVIEW OF PREVIOUS MEDICAL RECORDS:  Shows echo from 12/23/2019, that  shows an LV ejection fraction of 20% with grade 3 diastolic dysfunction  with severely reduced LV systolic function. ASSESSMENT:  1. Acute encephalopathy due to unclear cause, possibility of hepatic  encephalopathy should be entertained.   2.  Acute-on-chronic systolic NYHA class IV congestive heart failure  with congestive hepatopathy and congestive nephropathy. 3.  Stage 3 chronic kidney disease. The patient possibly has acute  worsening with possibility of underlying cardiorenal syndrome. 4.  Lactic acidosis due to currently unclear cause. PLAN OF CARE:  The patient has been admitted to the Internal Medicine  Service to the intensive care unit in a critically ill condition. I  started the patient noninvasive positive pressure ventilation to improve  the forward function of the LV. Diuresis has been initiated given the  likelihood of renal congestion as a cause of the patient's renal  insufficiency along with dobutamine to improve the forward flow. I's  and O's will be monitored. Cardiology consult has been obtained. Plan  of care was discussed with Cardiology over the phone who agree with the  diureses and pump support. Given the worsening of the liver function, I have ordered GI  consultation. I have requested Tylenol and salicylic acid levels. I  have initiated the patient preemptively on Acetadote given the  possibility of the fact that the patient may be taking his home dose of  narcotic, which contains Tylenol excessively, if that is the case. We  will also obtain acute hepatitis panel. Repeat INR has been requested. If the patient starts showing continued  elevation of INR and/or increasing confusion, then the patient may need  transfer to a peripheral facility with transplant hepatology on board. We will follow GI recommendations. We will hold any DVT prophylaxis  given the coagulopathy. We will initiate the patient on IV Protonix for  GI prophylaxis due to a high risk of upper GI bleed given the  coagulopathy and liver dysfunction. Broad spectrum antibiotics will be initiated given the likelihood of  possibility of underlying infection as the cause of the patient's  presenting symptoms.   Though I believe that the patient's lactic  acidosis is mostly likely either due to a toxic injection or due to pump  failure

## 2019-12-30 NOTE — PROGRESS NOTES
Shift assessment complete. Pt is alert and oriented x 4 but drowsy and falls right to sleep. Pt remains on Bipap at this time 21%. Oxygen saturation remains greater than 90%. Blood pressure remains within normal range, pt remains on dobutamine.

## 2019-12-30 NOTE — ED NOTES
Call received from lab for panic lactic level of 12.0. Call placed to Dr. Ada Cesar and informed him of lab value. Dr. Ada Cesar request consult be placed to hospitalist for patient to be placed in unit. Dr. Dorise Boast notified and spoke with Dr. Pankaj Lima.      Monique Bose, RN  68/70/51 [id number]

## 2019-12-30 NOTE — CONSULTS
[place].  SHOULDER ARTHROSCOPY Right 9/22/15    SUBACROMIAL DECOMPRESSION, DISTAL CLAVICLE EXCISION, LABRAL DEBRIDEMETN    SIGMOIDOSCOPY N/A 11/23/2018    SIGMOIDOSCOPY BIOPSY FLEXIBLE performed by Tashia Newman MD at [address]  4 surgeries    SMALL INTESTINE SURGERY  01/11/2017    LAPAROSCOPIC LOW ANTERIOR RESECTION, ILEOSTOMY    SMALL INTESTINE SURGERY  12/01/2017    small bowel resection    SMALL INTESTINE SURGERY N/A 8/21/2019    OPEN COLOSTOMY AND PARASTOMAL HERNIA REPAIR WITH MESH performed by Kirk Ramirez MD at [address] TUNNELED VENOUS PORT PLACEMENT Left 02/13/2017    PORT-A-CATH INSERTION                     TUNNELED VENOUS PORT PLACEMENT      UMBILICAL HERNIA REPAIR  11/11/14    UPPER GASTROINTESTINAL ENDOSCOPY  10/03/2017      Past Endoscopic History:  colonoscopy with Dr Charis Hendricks 5/14/19 H/o rectal cancer.  Surveillance.  abd pain:  Impression:    - edematous colorectal anastomosis.  Dilated to 20mm                          - Post-surgical changes in the transverse colon c/w previous stoma takedown and re-anastomosis.                            - NO polyps.                            - known contained fistula/collection  Near the colorectal anastomosis.  This was not entered.      Admission Meds  No current facility-administered medications on file prior to encounter.       Current Outpatient Medications on File Prior to Encounter   Medication Sig Dispense Refill    pregabalin (LYRICA) 75 MG capsule 1 tablet AM and 2 tablets PM 90 capsule 0    DULoxetine (CYMBALTA) 30 MG extended release capsule Take 1 capsule by mouth daily 30 capsule 0    furosemide (LASIX) 40 MG tablet Take 1 tablet by mouth 2 times daily 60 tablet 3    metroNIDAZOLE (FLAGYL) 500 MG tablet Take 1 tablet by mouth every 8 hours for 14 days 42 tablet 0    BUPRENORPHINE PATCH REMOVAL 1 patch Pt replaces patch weekly on Fridays      HYDROcodone-acetaminophen (NORCO) 5-325 MG per patient factors. Physical Exam  Blood pressure 109/64, pulse 94, temperature 98 °F (36.7 °C), temperature source Temporal, resp. rate 19, height 5' 10\" (1.778 m), weight 200 lb (90.7 kg), SpO2 96 %. General appearance: alert, cooperative, no distress, appears stated age  Eyes: Anicteric  Head: Normocephalic, without obvious abnormality  Lungs: clear to auscultation bilaterally, Normal Effort  Heart: regular rate and rhythm, normal S1 and S2, no murmurs or rubs  Abdomen: soft, non-tender. Bowel sounds normal. No masses,  no organomegaly. Extremities: atraumatic, no cyanosis or edema  Skin: warm and dry, no jaundice  Neuro: Grossly intact, alert   Musculoskeletal: 5/5  strength BUE      Data Review:    Recent Labs     12/29/19 2339   WBC 14.0*   HGB 15.4   HCT 46.9   MCV 93.6        Recent Labs     12/29/19 2339 12/30/19  0650   * 130*   K 4.9 3.8   CL 77* 83*   CO2 20* 25   BUN 45* 46*   CREATININE 1.9* 1.4*     Recent Labs     12/29/19 2339 12/30/19  0650   AST 1,842* 3,650*   * 1,339*   BILITOT 2.5* 3.1*   ALKPHOS 187* 166*     No results for input(s): LIPASE, AMYLASE in the last 72 hours. Recent Labs     12/29/19 2339 12/30/19  0925   PROTIME 49.9* 56.0*   INR 4.24* 4.75*     No results for input(s): PTT in the last 72 hours. No results for input(s): OCCULTBLD in the last 72 hours. Assessment:     Active Problems:    Urinary tract infection  Resolved Problems:    * No resolved hospital problems. *    Acute hepatitis - AST 3,600, ALT 1,300, total bili 3.1, INR 4.75. we have seen pt twice in the past for similarly elevated liver enzymes felt to be from ischemia from poor forward flow in setting of CHF and passive congestion. This is likely the cause again. Prior neg w/u as above. INR significantly elevated which is concerning. Had some confusion but seems to be improving after he was placed on bipap.  No asterixis on exam and ammonia was normal. He was started on NAC

## 2019-12-30 NOTE — ED PROVIDER NOTES
I personally evaluated and examined the patient in conjunction with the APC and agree with the assessment, treatment plan and disposition of the patient has recorded by the APC. I reviewed pertinent nurse's notes, triage notes, vital signs, past medical history, family and social history, medications, and allergies. Complete review of systems was conducted by the mid-level provider and/or myself. Review of systems is negative except as documented in the history of present illness. This patient is chronically ill. History of colon cancer and elevated liver transaminases. History of colostomy as well. He has acute kidney injury and liver failure. Elevated INR. He states that he just does not feel well. He presents like severe sepsis as well. He is given some fluid and antibiotics are started. We talked to his wife about his CODE STATUS and for now it is a full code. Patient is admitted under Dr. Pasquale Varghese. We did discuss with Dr. Pasquale Varghese further imaging particularly since his lactate was elevated and he stated that he would take care of it    FINAL IMPRESSION     1. ALEN (acute kidney injury) ([facility] [address]    2. Acute liver failure without hepatic coma    3. Elevated INR            Electronically signed by: WILLIAM Thorne MD  12/30/19 [id number]

## 2019-12-30 NOTE — ED NOTES
Verbal orders received from Dr. Iain Walters. Medicated administered per eMAR. Pt temp rechecked, now 97.7. IVF infusing at this time. Continues on cardiac monitor.       Sina Almanzar RN  80/23/57 [id number]

## 2019-12-30 NOTE — PROGRESS NOTES
H/p dictation id [id number]. Date of service 12/30/19. Acute encephalopathy. Acute on chronic schf. Ibrahima. Liver failure.

## 2019-12-30 NOTE — PROGRESS NOTES
Patient removed from BIPAP at this time and placed on 2L NC. SpO2 100%, RR 21. No complaints of respiratory distress.

## 2019-12-31 PROBLEM — I50.43 ACUTE ON CHRONIC SYSTOLIC AND DIASTOLIC HEART FAILURE, NYHA CLASS 4 (HCC): Status: ACTIVE | Noted: 2019-01-01

## 2019-12-31 NOTE — PROGRESS NOTES
4.75 at admission. we have seen pt twice in the past for similarly elevated liver enzymes felt to be from ischemia from poor forward flow in setting of CHF and passive congestion. This is likely the cause again. Prior liver serology neg w/u. INR significantly elevated which is concerning. No asterixis on exam and ammonia was normal. He was started on NAC incase of accidental acetaminophen overdose per primary team. Acetaminophen level was not detectable. AST improved today with treatment of CHF. INR imprved as well. Did received Vitamin K on 12/20. Acute on chronic CHF - on milrinone and IV lasix. Respiratory failure  Encephalopathy - felt to be metabolic. No asterixis and normal ammonia so does not appear to be hepatic encephalopathy. PLAN    - monitor hepatic profile and INR    Discussed with Dr. Ganga Rodriguez, [address]    I have personally performed a face to face diagnostic evaluation on this patient. I have interviewed and examined the patient and I agree with the findings and recommended plan of care. In summary, my findings and plan are the following: pt improved. LFT better. No other etiology found so suspect passive congestion.  Will follow      Elena Babcock MD  [address] and [address]

## 2019-12-31 NOTE — PROGRESS NOTES
Intravenous Daily    linezolid  600 mg Intravenous Q12H    piperacillin-tazobactam  3.375 g Intravenous Q8H    furosemide  80 mg Intravenous BID    carvedilol  3.125 mg Oral BID WC      milrinone 0.2 mcg/kg/min (12/30/19 1619)    amiodarone 0.5 mg/min (12/31/19 0158)       Lab Data:  CBC:   Recent Labs     12/29/19 2339 12/31/19  0447   WBC 14.0* 14.8*   HGB 15.4 14.2    192     BMP:    Recent Labs     12/29/19 2339 12/30/19  0650 12/31/19  0435   * 130* 133*   K 4.9 3.8 2.5*   CO2 20* 25 37*   BUN 45* 46* 30*   CREATININE 1.9* 1.4* 1.0     INR:    Recent Labs     12/29/19 2339 12/30/19  0925 12/31/19  0435   INR 4.24* 4.75* 3.49*     BNP:    Recent Labs     12/29/19 2339   PROBNP 33,825*         Diagnostics:  Echo 12/23/19  Summary   -Left ventricular cavity size is moderately dilated. Normal left ventricular   wall thickness. Overall left ventricular systolic function appears severely   reduced. Ejection fraction is visually estimated to be 20%. Grade III   diastolic dysfunction with elevated LV filling pressures. -Moderate to severe mitral regurgitation.   -The left atrium is dilated. -Pacer / ICD wire is visualized in the right ventricle. -Moderate tricuspid regurgitation with PASP of 40 mmHg. -The right atrial size is dilated. -IVC size is dilated (>2.1 cm) but collapses > 50% with respiration   consistent with elevated RA pressure (8 mmHg). Echo 5/31/2019  Left ventricular cavity size is dilated. Normal left ventricular wall   thickness. Overall left ventricular systolic function appears severely   reduced with a estimated ejection fraction of 30-35% 3D EF=32%. There is   severe diffuse hypokinesis.   -Grade III diastolic dysfunction with elevated LV filling   pressures. E/\"=21.6.   -Mildly thickened and calcified without evidence of stenosis.   Moderate-severe mitral regurgitation is present.   -Moderate tricuspid regurgitation.   -Mild pulmonic regurgitation present.   -Estimated pulmonary artery systolic pressure is severely elevated at 61   mmHg assuming a right atrial pressure of 3 mmHg.   -The left atrium is dilated.   -Pacemaker / ICD lead is visualized in the right heart    Assessment:    1. ALEN  2. Acute liver failure without hepatic coma  3. Elevated INR due to liver congestion  4. Acute on chronic sHF  5. Mental status change  6. VT on amiodarone drip      Plan:    1. Continue lasix 80 mg IV bid  2. Continue milrinone 0.2 mcg/kg/min  3. Continue coreg 3.125 mg bid and digoxin 125 mcg daily  4. CHF nurse following for diet education, fluid restriction and daily weights  5.  GI and pulmonary following    May need to consider LVAD per consult note    Discussed with patient who is agreeable with plan of care. Thank you for allowing me to participate in the care of your patient.     Hien Mallory, CNS

## 2019-12-31 NOTE — CARE COORDINATION
Discharge Planning Assessment  RN/SW discharge planner met with patient/(and family member) to discuss reason for admission, current living situation, and potential needs at the time of discharge    Demographics/Insurance verified Yes- Medicare     Current type of dwelling: pt states tri-level with 4 steps to enter and 15 between levels    Patient from ECF/SW confirmed with:    Living arrangements: spouse and children     Level of function/Support: independent     PCP: Opal Gomez     Last Visit to PCP: a month ago     DME: none    Active with any community resources/agencies/skilled home care: no but had referral at last d/c for [facility]    Medication compliance issues: no     Financial issues that could impact healthcare: no    Transportation at the time of discharge: spouse    Tentative discharge plan: TBD possible home with home care after PT/OT evaluations when medically stable. CM will continue to follow for d/c needs.      Ira Best RN, BSN  [phone]

## 2019-12-31 NOTE — PROGRESS NOTES
[facility] PROGRESS NOTE    12/31/2019 11:37 AM        Name: Marshall Subramanian . Admitted: 12/29/2019  Primary Care Provider: Patience Cuevas MD (Tel: [phone])       Subjective:    Sitting in bed doing better shortness of breath has improved off of oxygen. Patient still lethargic and slow to answer questions. Reviewed interval ancillary notes    Current Medications  magnesium sulfate 1 g in dextrose 5% 100 mL IVPB, Once  sodium chloride flush 0.9 % injection 10 mL, 2 times per day  sodium chloride flush 0.9 % injection 10 mL, PRN  ondansetron (ZOFRAN) injection 4 mg, Q6H PRN  digoxin (LANOXIN) tablet 125 mcg, Daily  sodium chloride flush 0.9 % injection 10 mL, 2 times per day  sodium chloride flush 0.9 % injection 10 mL, PRN  potassium chloride 10 mEq/100 mL IVPB (Peripheral Line), PRN  magnesium sulfate 1 g in dextrose 5% 100 mL IVPB, PRN  pantoprazole (PROTONIX) injection 40 mg, Daily  linezolid (ZYVOX) IVPB 600 mg, Q12H  piperacillin-tazobactam (ZOSYN) 3.375 g in dextrose 50 mL IVPB extended infusion (premix), Q8H  milrinone (PRIMACOR) 20 mg in dextrose 5 % 100 mL infusion, Continuous  amiodarone (CORDARONE) 450 mg in sodium chloride 0.9 % 250 mL infusion, Continuous  furosemide (LASIX) injection 80 mg, BID  carvedilol (COREG) tablet 3.125 mg, BID WC        Objective:  BP 95/70   Pulse 103   Temp 97.1 °F (36.2 °C) (Temporal)   Resp 24   Ht 5' 10\" (1.778 m)   Wt 200 lb 2.8 oz (90.8 kg)   SpO2 100%   BMI 28.72 kg/m²     Intake/Output Summary (Last 24 hours) at 12/31/2019 1137  Last data filed at 12/31/2019 0601  Gross per 24 hour   Intake 2989 ml   Output 1970 ml   Net 1019 ml      Wt Readings from Last 3 Encounters:   12/31/19 200 lb 2.8 oz (90.8 kg)   12/23/19 192 lb 9.6 oz (87.4 kg)   12/18/19 194 lb 11.2 oz (88.3 kg)       General appearance:  Appears comfortable.  AAOx3 but slow to answer questinos  HEENT: atraumatic, Pupils equal, muscous membranes moist, no masses appreciated  Cardiovascular: Regular rate and rhythm no murmurs appreciated  Respiratory: CTAB no wheezing  Gastrointestinal: Abdomen soft, non-tender, BS+  EXT: 2+ BLE  Neurology: no gross focal deficts  Psychiatry: slow to answer questions no agitiations  Skin: Warm, dry, no rashes appreciated    Labs and Tests:  CBC:   Recent Labs     12/29/19 2339 12/31/19  0447   WBC 14.0* 14.8*   HGB 15.4 14.2    192     BMP:    Recent Labs     12/29/19 2339 12/30/19  0650 12/31/19  0435   * 130* 133*   K 4.9 3.8 2.5*   CL 77* 83* 88*   CO2 20* 25 37*   BUN 45* 46* 30*   CREATININE 1.9* 1.4* 1.0   GLUCOSE 107* 154* 132*     Hepatic:   Recent Labs     12/29/19 2339 12/30/19  0650 12/31/19  0435   AST 1,842* 3,650* 3,159*   * 1,339* 1,354*   BILITOT 2.5* 3.1* 2.3*   ALKPHOS 187* 166* 154*     US LIVER   Final Result   Hepatic steatosis. Patent hepatic and portal veins with normal antegrade flow. Normal gallbladder and bile ducts. US DUP ABD PEL RETRO SCROT COMPLETE   Final Result   Hepatic steatosis. Patent hepatic and portal veins with normal antegrade flow. Normal gallbladder and bile ducts. XR CHEST PORTABLE   Final Result   No acute airspace disease identified.              Recent imaging reviewed         Assessment & Plan:   Acute hypoxic respiratory failure secondary to acute pulmonary edema secondary to acute on chronic systolic heart failure: ef 20%, required bipap yesterday now on room air  - milirinone gtt, iv lasix bid,   - appreicate cards recs    Ventricular ectopy: continue amiodarone gtt per cards    Hypokalemia: 60 IV 80 oral 1 g mag and recheck mag and potassium     ALEN: improving avoid nephrotoxic medicatins, hold ACEI/    Ischemic hepatitis: imaging negative, lfts lsighting improving monitor  Coagulopathy:  S/p vit k improving monitor no bleeding    Acute encephalopathy: improving, may be component about global ischemia monitor    ?  C diff + 12/21 was on flagyl: will get id input on therapy        Diet: DIET LOW SODIUM 2 GM;  Dietary Nutrition Supplements: Standard High Calorie Oral Supplement  Code:Full Code  DVT PPXscd no ac given coagulpathy  Disposition 2-3 days, pt/ot once stable      Sonu Pozo MD   12/31/2019 11:37 AM

## 2019-12-31 NOTE — PROGRESS NOTES
Shift assessment completed, see flow sheet. Morning medications passed, see MAR. See MAR for drips infusing. VSS and afebrile. Pt has very delayed response to question and also has expressive aphasia. Lung sounds are clear in the upper lobes and diminished in the lower lobes. Bowel sounds are active. Pedal pulses are palpable. Pt denies pain and any other needs at this time. Will continue to monitor.

## 2019-12-31 NOTE — PROGRESS NOTES
polypectomy    COLONOSCOPY  03/19/2018    COLONOSCOPY N/A 5/14/2019    COLONOSCOPY WITH DILATION performed by Christiano Huber MD at [facility] (LOWER)  09/14/2016    for staging - Dr. Aftab Salamanca, COLON, DIAGNOSTIC     [address] HERNIA REPAIR  12/01/2017    peristomal hernia repair    HERNIA REPAIR  04/18/2018    S/P: colostomy closure with hernia repair with mesh, with Dr. Pradip Lim at [facility].  INNER EAR SURGERY  tube right ear    JOINT REPLACEMENT      left TKR    KNEE ARTHROSCOPY Right [id number]    KNEE ARTHROSCOPY Right 8/4/2014    medial meniscus    KNEE SURGERY  08/24/2011    removal of tibial component    OTHER SURGICAL HISTORY  7/1/13    ACL Rt knee meniscus repair synovectomy    OTHER SURGICAL HISTORY  12/01/2017    takedown of ileostomy    PACEMAKER PLACEMENT      icd 10/2017    REVISION COLOSTOMY  04/18/2018    S/P: colostomy closure with hernia repair with mesh, with Dr. Pradip Lim at [facility].     SHOULDER ARTHROSCOPY Right 9/22/15    SUBACROMIAL DECOMPRESSION, DISTAL CLAVICLE EXCISION, LABRAL DEBRIDEMETN    SIGMOIDOSCOPY N/A 11/23/2018    SIGMOIDOSCOPY BIOPSY FLEXIBLE performed by Christiano Huber MD at [facility]  4 surgeries    SMALL INTESTINE SURGERY  01/11/2017    LAPAROSCOPIC LOW ANTERIOR RESECTION, ILEOSTOMY    SMALL INTESTINE SURGERY  12/01/2017    small bowel resection    SMALL INTESTINE SURGERY N/A 8/21/2019    OPEN COLOSTOMY AND PARASTOMAL HERNIA REPAIR WITH MESH performed by Jose Ramon Irwin MD at [facility] TUNNELED VENOUS PORT PLACEMENT Left 02/13/2017    PORT-A-CATH INSERTION                     TUNNELED VENOUS PORT PLACEMENT      UMBILICAL HERNIA REPAIR  11/11/14    UPPER GASTROINTESTINAL ENDOSCOPY  10/03/2017     Current Medications:    Current Facility-Administered Medications: sodium chloride flush 0.9 % injection 10 mL, 10 mL, Intravenous, 2 times per day  sodium chloride flush 0.9 % injection 10 mL, 10 mL, Intravenous, PRN  ondansetron (ZOFRAN) injection 4 mg, 4 mg, Intravenous, Q6H PRN  digoxin (LANOXIN) tablet 125 mcg, 125 mcg, Oral, Daily  sodium chloride flush 0.9 % injection 10 mL, 10 mL, Intravenous, 2 times per day  sodium chloride flush 0.9 % injection 10 mL, 10 mL, Intravenous, PRN  potassium chloride 10 mEq/100 mL IVPB (Peripheral Line), 10 mEq, Intravenous, PRN  magnesium sulfate 1 g in dextrose 5% 100 mL IVPB, 1 g, Intravenous, PRN  pantoprazole (PROTONIX) injection 40 mg, 40 mg, Intravenous, Daily  linezolid (ZYVOX) IVPB 600 mg, 600 mg, Intravenous, Q12H  piperacillin-tazobactam (ZOSYN) 3.375 g in dextrose 50 mL IVPB extended infusion (premix), 3.375 g, Intravenous, Q8H  milrinone (PRIMACOR) 20 mg in dextrose 5 % 100 mL infusion, 0.2 mcg/kg/min, Intravenous, Continuous  [] amiodarone (CORDARONE) 450 mg in sodium chloride 0.9 % 250 mL infusion, 1 mg/min, Intravenous, Continuous **FOLLOWED BY** amiodarone (CORDARONE) 450 mg in sodium chloride 0.9 % 250 mL infusion, 0.5 mg/min, Intravenous, Continuous  furosemide (LASIX) injection 80 mg, 80 mg, Intravenous, BID  carvedilol (COREG) tablet 3.125 mg, 3.125 mg, Oral, BID     Allergies   Allergen Reactions    Vancomycin Hives and Swelling     Throat swells    Bactrim [Sulfamethoxazole-Trimethoprim] Other (See Comments)     Thrush    Corn-Containing Products      Patient tells me his throat swells when he eats a large amount of corn or corn products. He can tolerate products with corn oil and corn syrup. Pt. Reports gets \"congestion\"    Seasonal        Social History:    TOBACCO:   reports that he quit smoking about 13 years ago. He has a 5.00 pack-year smoking history. He quit smokeless tobacco use about 2 years ago. His smokeless tobacco use included chew. ETOH:   reports no history of alcohol use.   Patient currently lives independently  Environmental/chemical exposure: none known off  · Milrinone continues  · Also on amiodarone  · Still having frequent ectopy  · Potassium is low, replaced    4. ALEN  · Urine output is good  · BUN/creatinine are improved  · Tolerating diuresis    5. Ischemic Hepatitis  · LFTs slightly better  · Most likely the result of passive congestion    6. Coagulopathy  · INR slightly improved  · Follow    7.  Acute Encephalpoathy  · Metabolic encephalopathy  · Has improved  · Suspect uremic encephalopathy was at least part of the issue for him  · BUN is down to 90 now

## 2019-12-31 NOTE — CONSULTS
any additional questions, please do not hesitate to contact me. Subjective:     Presenting complaint in ER:     Chief Complaint   Patient presents with    Shortness of Breath     From home via [facility], SOB and hx of CHF, pedal edema, placed on NC due to RA of 75-80-%. HPI: Keila Thompson is a 52 y.o. male patient, who was seen at the request of Dr. Sonu Pozo MD.    History was obtained from chart review and the patient. The patient was admitted on 12/29/2019. I have been consulted to see the patient for above mentioned reason(s). The patient has multiple medical comorbidities, and presented to the ER originally for worsening shortness of breath. The patient was found to be tachypneic and tachycardic by EMS and was saturating 75 to 80% on room air. He was brought to the ER. The patient was disoriented. His white cell count was 14,000. He was admitted. Blood cultures were sent. He was started on IV meropenem due to concern for possible underlying infection. He is currently on IV linezolid, IV Zosyn and IV Flagyl. I have been asked to see the patient for this complicated infection. Past Medical History: All past medical history reviewed today. Past Medical History:   Diagnosis Date    Allergic     Anesthesia     tremors    Arthritis     CAD (coronary artery disease)     Cancer ([facility] [address]     colon    Chemotherapy adverse reaction 4/29/2019    CHF (congestive heart failure) (HCC)     Chronic knee pain     Clostridium difficile infection 07/11/2016    PCR+    Depression motion     Diverticulitis     Diverticulosis     History of alcohol abuse     Hyperlipidemia     Hypertension     Irregular heart beat     states been told he has had in past. Never treated. Sees PCP every 3 mos.  and EKG yearly    Pneumonia     Right knee pain     Shoulder injury     and arthrisits, injury rotaotr cuff    Sleep difficulties     with depression Past Surgical History: All pastsurgical history was reviewed today. Past Surgical History:   Procedure Laterality Date    ABDOMEN SURGERY      ABSCESS DRAINAGE      BREAST SURGERY Right 1/21/2019    RIGHT BREAST EXCISIONAL BIOPSY performed by MD Carmelina at [address]  09/14/2016    No stents placed   [address]  10/2017    COLECTOMY      COLONOSCOPY  08/29/2016    with biopsy and polypectomy    COLONOSCOPY  03/19/2018    COLONOSCOPY N/A 5/14/2019    COLONOSCOPY WITH DILATION performed by Angela Delgado MD at [address] (LOWER)  09/14/2016    for staging - Dr. Manny Parnell, COLON, DIAGNOSTIC     [address]  12/01/2017    peristomal hernia repair    HERNIA REPAIR  04/18/2018    S/P: colostomy closure with hernia repair with mesh, with Dr. Galilea Simmons at [facility].  INNER EAR SURGERY  tube right ear    JOINT REPLACEMENT      left TKR    KNEE ARTHROSCOPY Right [id number]    KNEE ARTHROSCOPY Right 8/4/2014    medial meniscus    KNEE SURGERY  08/24/2011    removal of tibial component    OTHER SURGICAL HISTORY  7/1/13    ACL Rt knee meniscus repair synovectomy    OTHER SURGICAL HISTORY  12/01/2017    takedown of ileostomy    PACEMAKER PLACEMENT      icd 10/2017    REVISION COLOSTOMY  04/18/2018    S/P: colostomy closure with hernia repair with mesh, with Dr. Galilea Simmons at [facility].     SHOULDER ARTHROSCOPY Right 9/22/15    SUBACROMIAL DECOMPRESSION, DISTAL CLAVICLE EXCISION, LABRAL DEBRIDEMETN    SIGMOIDOSCOPY N/A 11/23/2018    SIGMOIDOSCOPY BIOPSY FLEXIBLE performed by Angela Delgado MD at [address]  4 surgeries    SMALL INTESTINE SURGERY  01/11/2017    LAPAROSCOPIC LOW ANTERIOR RESECTION, ILEOSTOMY    SMALL INTESTINE SURGERY  12/01/2017    small bowel resection    SMALL INTESTINE SURGERY N/A 8/21/2019 Intravenous Once    metroNIDAZOLE  500 mg Intravenous Q8H    sodium chloride flush  10 mL Intravenous 2 times per day    digoxin  125 mcg Oral Daily    sodium chloride flush  10 mL Intravenous 2 times per day    pantoprazole  40 mg Intravenous Daily    linezolid  600 mg Intravenous Q12H    furosemide  80 mg Intravenous BID    carvedilol  3.125 mg Oral BID           REVIEW OF SYSTEMS:       Review of Systems   Constitutional: Positive for fatigue. Negative for chills, diaphoresis and fever. HENT: Negative for ear discharge, ear pain, rhinorrhea, sore throat and trouble swallowing. Eyes: Negative for discharge and redness. Respiratory: Positive for shortness of breath. Negative for cough and wheezing. Cardiovascular: Negative for chest pain and leg swelling. Gastrointestinal: Negative for abdominal pain, constipation, diarrhea and nausea. Endocrine: Negative for polyuria. Genitourinary: Negative for dysuria, flank pain, frequency, hematuria and urgency. Musculoskeletal: Negative for back pain and myalgias. Skin: Negative for rash. Neurological: Negative for dizziness, seizures and headaches. Hematological: Does not bruise/bleed easily. Psychiatric/Behavioral: Negative for hallucinations and suicidal ideas. All other systems reviewed and are negative. Objective:       PHYSICAL EXAM:      Vitals:   Vitals:    12/31/19 0630 12/31/19 0645 12/31/19 0700 12/31/19 0818   BP: 96/79 97/64 99/62 95/70   Pulse: 87 88 101 103   Resp: 19 14 19 24   Temp:    97.1 °F (36.2 °C)   TempSrc:    Temporal   SpO2: 99% 99% 97% 100%   Weight:       Height:           Physical Exam  Vitals signs and nursing note reviewed. Constitutional:       Appearance: Normal appearance. He is well-developed. HENT:      Head: Normocephalic and atraumatic. Right Ear: External ear normal.      Left Ear: External ear normal.      Nose: Nose normal. No congestion or rhinorrhea.       Mouth/Throat:      Mouth: tachycardia) (HCC) I47.2    Rectal cancer (HCC) C20    Neoplasm related pain M42.6    Systolic CHF, chronic (HCC) I50.22    Cardiomyopathy, idiopathic (HCC) I42.8    Poor venous access I87.8    Anemia D64.9    Insomnia due to medical condition G47.01    Chest pain R07.9    Dyspnea on exertion R06.09    Peristomal hernia K46.9    Episodic lightheadedness R42    ICD (implantable cardioverter-defibrillator) in place Z95.810    Ileostomy in place ([facility] [address] Z93.2    Pneumonia J18.9    Colostomy in place [facility]) Z93.3    Breast mass, right N63.10    Abscess of sacrum (HCC) M46.28    Chronic pain G89.29    Abscess L02.91    Generalized abdominal pain R10.84    LLQ pain R10.32    Moderate malnutrition (HCC) E44.0    Hypervolemia E87.70    Chronic pain syndrome G89.4    Colon cancer (HCC) C18.9    Fibromyalgia M79.7    Primary insomnia F51.01    Acute on chronic systolic and diastolic heart failure, NYHA class 4 (HCC) I50.43    Hypotension due to drugs I95.2    Recurrent incisional hernia with incarceration K43.0    Incontinence of feces R15.9    Severe malnutrition (HCC) E43    PNA (pneumonia) J18.9    Biliary obstruction J50.0    Metabolic encephalopathy S84.23    Choledocholithiasis K80.50    Hyponatremia G06.0    Systolic CHF, acute (HCC) I50.21    C. difficile colitis A04.72    Urinary tract infection N39.0    ALEN (acute kidney injury) ([facility] [address] N17.9    Acute liver failure without hepatic coma K72.00    Altered mental status R41.82    Sepsis (HCC) A41.9    Elevated INR R79.1    Acute respiratory failure with hypoxia (HCC) J96.01    Hypokalemia E87.6    Overweight E66.3    H/O alcohol abuse F10.11         Please note that this chart was generated using Dragon dictation software. Although every effort was made to ensure the accuracy of this automated transcription, some errors in transcription may have occurred inadvertently.  If you may need any clarification, please do not hesitate

## 2019-12-31 NOTE — PROGRESS NOTES
Reassessment completed. Pt continued to have frequent PVC's, amio, milrinone gtt infusing, pt still complaining of pain. No other acute issues overnight.

## 2019-12-31 NOTE — PROGRESS NOTES
I gave some verbal orders yesterday night to admit on behalf of Dr Larisa Tinsley . Soon after patient got very critical and ICU hospitalist has assumed the care .  I will sign off , never saw the patient

## 2020-01-01 ENCOUNTER — CARE COORDINATION (OUTPATIENT)
Dept: OTHER | Facility: CLINIC | Age: 50
End: 2020-01-01

## 2020-01-01 ENCOUNTER — HOSPITAL ENCOUNTER (OUTPATIENT)
Age: 50
Discharge: HOME | End: 2020-03-09
Payer: COMMERCIAL

## 2020-01-01 ENCOUNTER — APPOINTMENT (OUTPATIENT)
Dept: CT IMAGING | Age: 50
DRG: 291 | End: 2020-01-01
Payer: COMMERCIAL

## 2020-01-01 ENCOUNTER — TELEPHONE (OUTPATIENT)
Dept: CARDIOLOGY CLINIC | Age: 50
End: 2020-01-01

## 2020-01-01 ENCOUNTER — CARE COORDINATION (OUTPATIENT)
Dept: CASE MANAGEMENT | Age: 50
End: 2020-01-01

## 2020-01-01 ENCOUNTER — HOSPITAL ENCOUNTER (INPATIENT)
Age: 50
LOS: 5 days | Discharge: HOSPICE | DRG: 291 | End: 2020-03-15
Attending: EMERGENCY MEDICINE | Admitting: HOSPITALIST
Payer: COMMERCIAL

## 2020-01-01 ENCOUNTER — TELEPHONE (OUTPATIENT)
Dept: OTHER | Age: 50
End: 2020-01-01

## 2020-01-01 ENCOUNTER — TELEPHONE (OUTPATIENT)
Dept: SURGERY | Age: 50
End: 2020-01-01

## 2020-01-01 ENCOUNTER — APPOINTMENT (OUTPATIENT)
Dept: MRI IMAGING | Age: 50
DRG: 291 | End: 2020-01-01
Payer: COMMERCIAL

## 2020-01-01 ENCOUNTER — NURSE ONLY (OUTPATIENT)
Dept: CARDIOLOGY CLINIC | Age: 50
End: 2020-01-01

## 2020-01-01 ENCOUNTER — APPOINTMENT (OUTPATIENT)
Dept: GENERAL RADIOLOGY | Age: 50
DRG: 291 | End: 2020-01-01
Payer: COMMERCIAL

## 2020-01-01 ENCOUNTER — OFFICE VISIT (OUTPATIENT)
Dept: CARDIOLOGY CLINIC | Age: 50
End: 2020-01-01
Payer: COMMERCIAL

## 2020-01-01 ENCOUNTER — HOSPITAL ENCOUNTER (INPATIENT)
Age: 50
LOS: 7 days | Discharge: HOME | DRG: 291 | End: 2020-03-02
Attending: EMERGENCY MEDICINE | Admitting: INTERNAL MEDICINE
Payer: COMMERCIAL

## 2020-01-01 VITALS
SYSTOLIC BLOOD PRESSURE: 102 MMHG | DIASTOLIC BLOOD PRESSURE: 62 MMHG | HEIGHT: 70 IN | BODY MASS INDEX: 32.18 KG/M2 | OXYGEN SATURATION: 98 % | HEART RATE: 98 BPM | WEIGHT: 224.8 LBS

## 2020-01-01 VITALS
TEMPERATURE: 97.3 F | WEIGHT: 214 LBS | SYSTOLIC BLOOD PRESSURE: 92 MMHG | HEIGHT: 70 IN | RESPIRATION RATE: 16 BRPM | HEART RATE: 116 BPM | BODY MASS INDEX: 30.64 KG/M2 | DIASTOLIC BLOOD PRESSURE: 68 MMHG | OXYGEN SATURATION: 98 %

## 2020-01-01 VITALS
RESPIRATION RATE: 20 BRPM | OXYGEN SATURATION: 96 % | DIASTOLIC BLOOD PRESSURE: 53 MMHG | HEART RATE: 98 BPM | SYSTOLIC BLOOD PRESSURE: 89 MMHG | BODY MASS INDEX: 26.54 KG/M2 | TEMPERATURE: 97.4 F | HEIGHT: 70 IN | WEIGHT: 185.41 LBS

## 2020-01-01 VITALS
HEART RATE: 96 BPM | TEMPERATURE: 98.4 F | BODY MASS INDEX: 32.7 KG/M2 | OXYGEN SATURATION: 95 % | DIASTOLIC BLOOD PRESSURE: 70 MMHG | SYSTOLIC BLOOD PRESSURE: 86 MMHG | HEIGHT: 70 IN | WEIGHT: 228.4 LBS | RESPIRATION RATE: 17 BRPM

## 2020-01-01 LAB
A/G RATIO: 0.8 (ref 1.1–2.2)
A/G RATIO: 0.8 (ref 1.1–2.2)
A/G RATIO: 0.9 (ref 1.1–2.2)
A/G RATIO: 1 (ref 1.1–2.2)
A/G RATIO: 1.1 (ref 1.1–2.2)
A/G RATIO: 1.2 (ref 1.1–2.2)
ALBUMIN SERPL-MCNC: 2.2 G/DL (ref 3.4–5)
ALBUMIN SERPL-MCNC: 2.3 G/DL (ref 3.4–5)
ALBUMIN SERPL-MCNC: 2.4 G/DL (ref 3.4–5)
ALBUMIN SERPL-MCNC: 2.4 G/DL (ref 3.4–5)
ALBUMIN SERPL-MCNC: 2.5 G/DL (ref 3.4–5)
ALBUMIN SERPL-MCNC: 2.6 G/DL (ref 3.4–5)
ALBUMIN SERPL-MCNC: 2.7 G/DL (ref 3.4–5)
ALBUMIN SERPL-MCNC: 2.7 G/DL (ref 3.4–5)
ALBUMIN SERPL-MCNC: 2.9 G/DL (ref 3.4–5)
ALBUMIN SERPL-MCNC: 3.1 G/DL (ref 3.4–5)
ALBUMIN SERPL-MCNC: 3.2 G/DL (ref 3.4–5)
ALBUMIN SERPL-MCNC: 3.2 G/DL (ref 3.4–5)
ALBUMIN SERPL-MCNC: 3.3 G/DL (ref 3.4–5)
ALBUMIN SERPL-MCNC: 3.4 G/DL (ref 3.4–5)
ALBUMIN SERPL-MCNC: 3.5 G/DL (ref 3.4–5)
ALBUMIN SERPL-MCNC: 4 G/DL (ref 3.4–5)
ALP BLD-CCNC: 117 U/L (ref 40–129)
ALP BLD-CCNC: 120 U/L (ref 40–129)
ALP BLD-CCNC: 121 U/L (ref 40–129)
ALP BLD-CCNC: 124 U/L (ref 40–129)
ALP BLD-CCNC: 127 U/L (ref 40–129)
ALP BLD-CCNC: 129 U/L (ref 40–129)
ALP BLD-CCNC: 130 U/L (ref 40–129)
ALP BLD-CCNC: 131 U/L (ref 40–129)
ALP BLD-CCNC: 132 U/L (ref 40–129)
ALP BLD-CCNC: 132 U/L (ref 40–129)
ALP BLD-CCNC: 134 U/L (ref 40–129)
ALP BLD-CCNC: 137 U/L (ref 40–129)
ALP BLD-CCNC: 137 U/L (ref 40–129)
ALP BLD-CCNC: 143 U/L (ref 40–129)
ALP BLD-CCNC: 149 U/L (ref 40–129)
ALP BLD-CCNC: 171 U/L (ref 40–129)
ALP BLD-CCNC: 172 U/L (ref 40–129)
ALP BLD-CCNC: 194 U/L (ref 40–129)
ALT SERPL-CCNC: 113 U/L (ref 10–40)
ALT SERPL-CCNC: 1277 U/L (ref 10–40)
ALT SERPL-CCNC: 141 U/L (ref 10–40)
ALT SERPL-CCNC: 15 U/L (ref 10–40)
ALT SERPL-CCNC: 171 U/L (ref 10–40)
ALT SERPL-CCNC: 20 U/L (ref 10–40)
ALT SERPL-CCNC: 22 U/L (ref 10–40)
ALT SERPL-CCNC: 221 U/L (ref 10–40)
ALT SERPL-CCNC: 27 U/L (ref 10–40)
ALT SERPL-CCNC: 31 U/L (ref 10–40)
ALT SERPL-CCNC: 322 U/L (ref 10–40)
ALT SERPL-CCNC: 34 U/L (ref 10–40)
ALT SERPL-CCNC: 490 U/L (ref 10–40)
ALT SERPL-CCNC: 52 U/L (ref 10–40)
ALT SERPL-CCNC: 60 U/L (ref 10–40)
ALT SERPL-CCNC: 656 U/L (ref 10–40)
ALT SERPL-CCNC: 67 U/L (ref 10–40)
ALT SERPL-CCNC: 858 U/L (ref 10–40)
ANION GAP SERPL CALCULATED.3IONS-SCNC: 10 MMOL/L (ref 3–16)
ANION GAP SERPL CALCULATED.3IONS-SCNC: 10 MMOL/L (ref 3–16)
ANION GAP SERPL CALCULATED.3IONS-SCNC: 11 MMOL/L (ref 3–16)
ANION GAP SERPL CALCULATED.3IONS-SCNC: 12 MMOL/L (ref 3–16)
ANION GAP SERPL CALCULATED.3IONS-SCNC: 13 MMOL/L (ref 3–16)
ANION GAP SERPL CALCULATED.3IONS-SCNC: 14 MMOL/L (ref 3–16)
ANION GAP SERPL CALCULATED.3IONS-SCNC: 15 MMOL/L (ref 3–16)
ANION GAP SERPL CALCULATED.3IONS-SCNC: 17 MMOL/L (ref 3–16)
ANION GAP SERPL CALCULATED.3IONS-SCNC: 17 MMOL/L (ref 3–16)
ANION GAP SERPL CALCULATED.3IONS-SCNC: 7 MMOL/L (ref 3–16)
ANION GAP SERPL CALCULATED.3IONS-SCNC: 8 MMOL/L (ref 3–16)
ANION GAP SERPL CALCULATED.3IONS-SCNC: 9 MMOL/L (ref 3–16)
ANISOCYTOSIS: ABNORMAL
APTT: 39.5 SEC (ref 24.2–36.2)
AST SERPL-CCNC: 111 U/L (ref 15–37)
AST SERPL-CCNC: 1140 U/L (ref 15–37)
AST SERPL-CCNC: 146 U/L (ref 15–37)
AST SERPL-CCNC: 2219 U/L (ref 15–37)
AST SERPL-CCNC: 23 U/L (ref 15–37)
AST SERPL-CCNC: 25 U/L (ref 15–37)
AST SERPL-CCNC: 28 U/L (ref 15–37)
AST SERPL-CCNC: 289 U/L (ref 15–37)
AST SERPL-CCNC: 29 U/L (ref 15–37)
AST SERPL-CCNC: 37 U/L (ref 15–37)
AST SERPL-CCNC: 39 U/L (ref 15–37)
AST SERPL-CCNC: 42 U/L (ref 15–37)
AST SERPL-CCNC: 47 U/L (ref 15–37)
AST SERPL-CCNC: 54 U/L (ref 15–37)
AST SERPL-CCNC: 569 U/L (ref 15–37)
AST SERPL-CCNC: 89 U/L (ref 15–37)
AST SERPL-CCNC: 89 U/L (ref 15–37)
AST SERPL-CCNC: 98 U/L (ref 15–37)
BASE EXCESS ARTERIAL: -8 (ref -3–3)
BASE EXCESS VENOUS: -5.8 MMOL/L (ref -3–3)
BASE EXCESS VENOUS: 1.8 MMOL/L (ref -3–3)
BASOPHILS ABSOLUTE: 0 K/UL (ref 0–0.2)
BASOPHILS ABSOLUTE: 0.1 K/UL (ref 0–0.2)
BASOPHILS RELATIVE PERCENT: 0.2 %
BASOPHILS RELATIVE PERCENT: 0.3 %
BASOPHILS RELATIVE PERCENT: 0.4 %
BASOPHILS RELATIVE PERCENT: 0.5 %
BASOPHILS RELATIVE PERCENT: 0.6 %
BASOPHILS RELATIVE PERCENT: 0.7 %
BASOPHILS RELATIVE PERCENT: 0.8 %
BASOPHILS RELATIVE PERCENT: 1.1 %
BASOPHILS RELATIVE PERCENT: 1.2 %
BASOPHILS RELATIVE PERCENT: 1.4 %
BILIRUB SERPL-MCNC: 0.9 MG/DL (ref 0–1)
BILIRUB SERPL-MCNC: 1.1 MG/DL (ref 0–1)
BILIRUB SERPL-MCNC: 1.1 MG/DL (ref 0–1)
BILIRUB SERPL-MCNC: 1.2 MG/DL (ref 0–1)
BILIRUB SERPL-MCNC: 1.3 MG/DL (ref 0–1)
BILIRUB SERPL-MCNC: 1.5 MG/DL (ref 0–1)
BILIRUB SERPL-MCNC: 1.5 MG/DL (ref 0–1)
BILIRUB SERPL-MCNC: 1.6 MG/DL (ref 0–1)
BILIRUB SERPL-MCNC: 1.6 MG/DL (ref 0–1)
BILIRUB SERPL-MCNC: 2.2 MG/DL (ref 0–1)
BILIRUB SERPL-MCNC: 2.4 MG/DL (ref 0–1)
BILIRUB SERPL-MCNC: 2.6 MG/DL (ref 0–1)
BILIRUB SERPL-MCNC: 2.7 MG/DL (ref 0–1)
BILIRUB SERPL-MCNC: 2.7 MG/DL (ref 0–1)
BILIRUB SERPL-MCNC: 2.9 MG/DL (ref 0–1)
BILIRUB SERPL-MCNC: 3 MG/DL (ref 0–1)
BILIRUB SERPL-MCNC: 3.4 MG/DL (ref 0–1)
BILIRUB SERPL-MCNC: 3.4 MG/DL (ref 0–1)
BILIRUBIN URINE: ABNORMAL
BLOOD CULTURE, ROUTINE: NORMAL
BLOOD, URINE: NEGATIVE
BUN BLDV-MCNC: 15 MG/DL (ref 7–20)
BUN BLDV-MCNC: 16 MG/DL (ref 7–20)
BUN BLDV-MCNC: 17 MG/DL (ref 7–20)
BUN BLDV-MCNC: 18 MG/DL (ref 7–20)
BUN BLDV-MCNC: 18 MG/DL (ref 7–20)
BUN BLDV-MCNC: 19 MG/DL (ref 7–20)
BUN BLDV-MCNC: 21 MG/DL (ref 7–20)
BUN BLDV-MCNC: 22 MG/DL (ref 7–20)
BUN BLDV-MCNC: 23 MG/DL (ref 7–20)
BUN BLDV-MCNC: 24 MG/DL (ref 7–20)
BUN BLDV-MCNC: 25 MG/DL (ref 7–20)
BUN BLDV-MCNC: 27 MG/DL (ref 7–20)
BUN BLDV-MCNC: 27 MG/DL (ref 7–20)
BUN BLDV-MCNC: 28 MG/DL (ref 7–20)
BUN BLDV-MCNC: 28 MG/DL (ref 7–20)
BUN BLDV-MCNC: 30 MG/DL (ref 7–20)
BUN BLDV-MCNC: 30 MG/DL (ref 7–20)
BUN BLDV-MCNC: 31 MG/DL (ref 7–20)
BUN BLDV-MCNC: 31 MG/DL (ref 7–20)
BUN BLDV-MCNC: 32 MG/DL (ref 7–20)
BUN BLDV-MCNC: 33 MG/DL (ref 7–20)
BUN BLDV-MCNC: 39 MG/DL (ref 7–20)
CALCIUM IONIZED: 0.98 MMOL/L (ref 1.12–1.32)
CALCIUM IONIZED: 1.03 MMOL/L (ref 1.12–1.32)
CALCIUM IONIZED: 1.06 MMOL/L (ref 1.12–1.32)
CALCIUM IONIZED: 1.09 MMOL/L (ref 1.12–1.32)
CALCIUM IONIZED: 1.09 MMOL/L (ref 1.12–1.32)
CALCIUM SERPL-MCNC: 8 MG/DL (ref 8.3–10.6)
CALCIUM SERPL-MCNC: 8.1 MG/DL (ref 8.3–10.6)
CALCIUM SERPL-MCNC: 8.3 MG/DL (ref 8.3–10.6)
CALCIUM SERPL-MCNC: 8.4 MG/DL (ref 8.3–10.6)
CALCIUM SERPL-MCNC: 8.5 MG/DL (ref 8.3–10.6)
CALCIUM SERPL-MCNC: 8.6 MG/DL (ref 8.3–10.6)
CALCIUM SERPL-MCNC: 8.7 MG/DL (ref 8.3–10.6)
CALCIUM SERPL-MCNC: 8.8 MG/DL (ref 8.3–10.6)
CALCIUM SERPL-MCNC: 8.8 MG/DL (ref 8.3–10.6)
CALCIUM SERPL-MCNC: 8.9 MG/DL (ref 8.3–10.6)
CALCIUM SERPL-MCNC: 9.1 MG/DL (ref 8.3–10.6)
CALCIUM SERPL-MCNC: 9.1 MG/DL (ref 8.3–10.6)
CALCIUM SERPL-MCNC: 9.3 MG/DL (ref 8.3–10.6)
CARBOXYHEMOGLOBIN: 3.8 % (ref 0–1.5)
CARBOXYHEMOGLOBIN: 4.2 % (ref 0–1.5)
CHLORIDE BLD-SCNC: 100 MMOL/L (ref 99–110)
CHLORIDE BLD-SCNC: 101 MMOL/L (ref 99–110)
CHLORIDE BLD-SCNC: 104 MMOL/L (ref 99–110)
CHLORIDE BLD-SCNC: 106 MMOL/L (ref 99–110)
CHLORIDE BLD-SCNC: 107 MMOL/L (ref 99–110)
CHLORIDE BLD-SCNC: 87 MMOL/L (ref 99–110)
CHLORIDE BLD-SCNC: 88 MMOL/L (ref 99–110)
CHLORIDE BLD-SCNC: 92 MMOL/L (ref 99–110)
CHLORIDE BLD-SCNC: 92 MMOL/L (ref 99–110)
CHLORIDE BLD-SCNC: 93 MMOL/L (ref 99–110)
CHLORIDE BLD-SCNC: 93 MMOL/L (ref 99–110)
CHLORIDE BLD-SCNC: 94 MMOL/L (ref 99–110)
CHLORIDE BLD-SCNC: 96 MMOL/L (ref 99–110)
CHLORIDE BLD-SCNC: 97 MMOL/L (ref 99–110)
CHLORIDE BLD-SCNC: 98 MMOL/L (ref 99–110)
CHLORIDE URINE RANDOM: <20 MMOL/L
CHLORIDE URINE RANDOM: <20 MMOL/L
CHOLESTEROL, TOTAL: 113 MG/DL (ref 0–199)
CLARITY: ABNORMAL
CO2: 20 MMOL/L (ref 21–32)
CO2: 21 MMOL/L (ref 21–32)
CO2: 22 MMOL/L (ref 21–32)
CO2: 23 MMOL/L (ref 21–32)
CO2: 24 MMOL/L (ref 21–32)
CO2: 25 MMOL/L (ref 21–32)
CO2: 26 MMOL/L (ref 21–32)
CO2: 27 MMOL/L (ref 21–32)
CO2: 28 MMOL/L (ref 21–32)
CO2: 29 MMOL/L (ref 21–32)
CO2: 31 MMOL/L (ref 21–32)
CO2: 31 MMOL/L (ref 21–32)
CO2: 32 MMOL/L (ref 21–32)
CO2: 34 MMOL/L (ref 21–32)
CO2: 35 MMOL/L (ref 21–32)
CO2: 36 MMOL/L (ref 21–32)
COLOR: ABNORMAL
CREAT SERPL-MCNC: 0.8 MG/DL (ref 0.9–1.3)
CREAT SERPL-MCNC: 0.9 MG/DL (ref 0.9–1.3)
CREAT SERPL-MCNC: 1 MG/DL (ref 0.9–1.3)
CREAT SERPL-MCNC: 1.1 MG/DL (ref 0.9–1.3)
CREAT SERPL-MCNC: 1.1 MG/DL (ref 0.9–1.3)
CREAT SERPL-MCNC: 1.3 MG/DL (ref 0.9–1.3)
CREAT SERPL-MCNC: 1.4 MG/DL (ref 0.9–1.3)
CREAT SERPL-MCNC: 1.5 MG/DL (ref 0.9–1.3)
CREAT SERPL-MCNC: 1.5 MG/DL (ref 0.9–1.3)
CREAT SERPL-MCNC: 1.6 MG/DL (ref 0.9–1.3)
CREAT SERPL-MCNC: 1.7 MG/DL (ref 0.9–1.3)
CREAT SERPL-MCNC: 1.8 MG/DL (ref 0.9–1.3)
CREATININE URINE: 82.5 MG/DL (ref 39–259)
CULTURE, BLOOD 2: NORMAL
D DIMER: 379 NG/ML DDU (ref 0–229)
EKG ATRIAL RATE: 101 BPM
EKG ATRIAL RATE: 104 BPM
EKG ATRIAL RATE: 106 BPM
EKG ATRIAL RATE: 112 BPM
EKG ATRIAL RATE: 114 BPM
EKG ATRIAL RATE: 87 BPM
EKG ATRIAL RATE: 90 BPM
EKG ATRIAL RATE: 95 BPM
EKG DIAGNOSIS: NORMAL
EKG P AXIS: -5 DEGREES
EKG P AXIS: 61 DEGREES
EKG P AXIS: 66 DEGREES
EKG P AXIS: 69 DEGREES
EKG P AXIS: 71 DEGREES
EKG P AXIS: 73 DEGREES
EKG P-R INTERVAL: 186 MS
EKG P-R INTERVAL: 192 MS
EKG P-R INTERVAL: 192 MS
EKG P-R INTERVAL: 198 MS
EKG P-R INTERVAL: 200 MS
EKG P-R INTERVAL: 204 MS
EKG P-R INTERVAL: 218 MS
EKG P-R INTERVAL: 222 MS
EKG Q-T INTERVAL: 320 MS
EKG Q-T INTERVAL: 322 MS
EKG Q-T INTERVAL: 346 MS
EKG Q-T INTERVAL: 366 MS
EKG Q-T INTERVAL: 372 MS
EKG Q-T INTERVAL: 382 MS
EKG Q-T INTERVAL: 382 MS
EKG Q-T INTERVAL: 406 MS
EKG QRS DURATION: 112 MS
EKG QRS DURATION: 116 MS
EKG QRS DURATION: 118 MS
EKG QRS DURATION: 118 MS
EKG QRS DURATION: 120 MS
EKG QRS DURATION: 120 MS
EKG QTC CALCULATION (BAZETT): 439 MS
EKG QTC CALCULATION (BAZETT): 441 MS
EKG QTC CALCULATION (BAZETT): 454 MS
EKG QTC CALCULATION (BAZETT): 467 MS
EKG QTC CALCULATION (BAZETT): 474 MS
EKG QTC CALCULATION (BAZETT): 480 MS
EKG QTC CALCULATION (BAZETT): 488 MS
EKG QTC CALCULATION (BAZETT): 494 MS
EKG R AXIS: -16 DEGREES
EKG R AXIS: -31 DEGREES
EKG R AXIS: -33 DEGREES
EKG R AXIS: -35 DEGREES
EKG R AXIS: -38 DEGREES
EKG R AXIS: -41 DEGREES
EKG R AXIS: -49 DEGREES
EKG R AXIS: 161 DEGREES
EKG T AXIS: 110 DEGREES
EKG T AXIS: 40 DEGREES
EKG T AXIS: 64 DEGREES
EKG T AXIS: 68 DEGREES
EKG T AXIS: 70 DEGREES
EKG T AXIS: 72 DEGREES
EKG T AXIS: 84 DEGREES
EKG T AXIS: 86 DEGREES
EKG VENTRICULAR RATE: 101 BPM
EKG VENTRICULAR RATE: 104 BPM
EKG VENTRICULAR RATE: 106 BPM
EKG VENTRICULAR RATE: 112 BPM
EKG VENTRICULAR RATE: 114 BPM
EKG VENTRICULAR RATE: 87 BPM
EKG VENTRICULAR RATE: 90 BPM
EKG VENTRICULAR RATE: 95 BPM
EOSINOPHILS ABSOLUTE: 0 K/UL (ref 0–0.6)
EOSINOPHILS ABSOLUTE: 0.1 K/UL (ref 0–0.6)
EOSINOPHILS ABSOLUTE: 0.2 K/UL (ref 0–0.6)
EOSINOPHILS RELATIVE PERCENT: 0.1 %
EOSINOPHILS RELATIVE PERCENT: 0.2 %
EOSINOPHILS RELATIVE PERCENT: 0.3 %
EOSINOPHILS RELATIVE PERCENT: 0.4 %
EOSINOPHILS RELATIVE PERCENT: 0.5 %
EOSINOPHILS RELATIVE PERCENT: 0.5 %
EOSINOPHILS RELATIVE PERCENT: 1 %
EOSINOPHILS RELATIVE PERCENT: 1.2 %
EOSINOPHILS RELATIVE PERCENT: 1.5 %
EOSINOPHILS RELATIVE PERCENT: 1.6 %
EOSINOPHILS RELATIVE PERCENT: 1.6 %
EOSINOPHILS RELATIVE PERCENT: 1.8 %
EOSINOPHILS RELATIVE PERCENT: 2.1 %
EOSINOPHILS RELATIVE PERCENT: 2.4 %
EOSINOPHILS RELATIVE PERCENT: 2.4 %
EOSINOPHILS RELATIVE PERCENT: 2.5 %
EOSINOPHILS RELATIVE PERCENT: 2.5 %
EOSINOPHILS RELATIVE PERCENT: 2.6 %
EOSINOPHILS RELATIVE PERCENT: 3.1 %
EOSINOPHILS RELATIVE PERCENT: 3.3 %
EPITHELIAL CELLS, UA: 0 /HPF (ref 0–5)
GFR AFRICAN AMERICAN: 49
GFR AFRICAN AMERICAN: 52
GFR AFRICAN AMERICAN: 56
GFR AFRICAN AMERICAN: >60
GFR NON-AFRICAN AMERICAN: 40
GFR NON-AFRICAN AMERICAN: 43
GFR NON-AFRICAN AMERICAN: 46
GFR NON-AFRICAN AMERICAN: 50
GFR NON-AFRICAN AMERICAN: 50
GFR NON-AFRICAN AMERICAN: 54
GFR NON-AFRICAN AMERICAN: 59
GFR NON-AFRICAN AMERICAN: >60
GLOBULIN: 2.2 G/DL
GLOBULIN: 2.4 G/DL
GLOBULIN: 2.6 G/DL
GLOBULIN: 2.7 G/DL
GLOBULIN: 2.8 G/DL
GLOBULIN: 2.9 G/DL
GLOBULIN: 3 G/DL
GLOBULIN: 3.2 G/DL
GLOBULIN: 3.3 G/DL
GLOBULIN: 3.3 G/DL
GLUCOSE BLD-MCNC: 100 MG/DL (ref 70–99)
GLUCOSE BLD-MCNC: 105 MG/DL (ref 70–99)
GLUCOSE BLD-MCNC: 106 MG/DL (ref 70–99)
GLUCOSE BLD-MCNC: 108 MG/DL (ref 70–99)
GLUCOSE BLD-MCNC: 109 MG/DL (ref 70–99)
GLUCOSE BLD-MCNC: 110 MG/DL (ref 70–99)
GLUCOSE BLD-MCNC: 113 MG/DL (ref 70–99)
GLUCOSE BLD-MCNC: 114 MG/DL (ref 70–99)
GLUCOSE BLD-MCNC: 115 MG/DL (ref 70–99)
GLUCOSE BLD-MCNC: 115 MG/DL (ref 70–99)
GLUCOSE BLD-MCNC: 120 MG/DL (ref 70–99)
GLUCOSE BLD-MCNC: 125 MG/DL (ref 70–99)
GLUCOSE BLD-MCNC: 135 MG/DL (ref 70–99)
GLUCOSE BLD-MCNC: 136 MG/DL (ref 70–99)
GLUCOSE BLD-MCNC: 138 MG/DL (ref 70–99)
GLUCOSE BLD-MCNC: 139 MG/DL (ref 70–99)
GLUCOSE BLD-MCNC: 142 MG/DL (ref 70–99)
GLUCOSE BLD-MCNC: 155 MG/DL (ref 70–99)
GLUCOSE BLD-MCNC: 165 MG/DL (ref 70–99)
GLUCOSE BLD-MCNC: 177 MG/DL (ref 70–99)
GLUCOSE BLD-MCNC: 76 MG/DL (ref 70–99)
GLUCOSE BLD-MCNC: 77 MG/DL (ref 70–99)
GLUCOSE BLD-MCNC: 79 MG/DL (ref 70–99)
GLUCOSE BLD-MCNC: 79 MG/DL (ref 70–99)
GLUCOSE BLD-MCNC: 81 MG/DL (ref 70–99)
GLUCOSE BLD-MCNC: 82 MG/DL (ref 70–99)
GLUCOSE BLD-MCNC: 86 MG/DL (ref 70–99)
GLUCOSE BLD-MCNC: 87 MG/DL (ref 70–99)
GLUCOSE BLD-MCNC: 88 MG/DL (ref 70–99)
GLUCOSE BLD-MCNC: 91 MG/DL (ref 70–99)
GLUCOSE BLD-MCNC: 93 MG/DL (ref 70–99)
GLUCOSE BLD-MCNC: 95 MG/DL (ref 70–99)
GLUCOSE BLD-MCNC: 96 MG/DL (ref 70–99)
GLUCOSE URINE: NEGATIVE MG/DL
HCO3 ARTERIAL: 17.3 MMOL/L (ref 21–29)
HCO3 VENOUS: 18.4 MMOL/L (ref 23–29)
HCO3 VENOUS: 23.8 MMOL/L (ref 23–29)
HCT VFR BLD CALC: 39.4 % (ref 40.5–52.5)
HCT VFR BLD CALC: 39.8 % (ref 40.5–52.5)
HCT VFR BLD CALC: 40 % (ref 40.5–52.5)
HCT VFR BLD CALC: 40 % (ref 40.5–52.5)
HCT VFR BLD CALC: 40.1 % (ref 40.5–52.5)
HCT VFR BLD CALC: 40.2 % (ref 40.5–52.5)
HCT VFR BLD CALC: 40.3 % (ref 40.5–52.5)
HCT VFR BLD CALC: 41.2 % (ref 40.5–52.5)
HCT VFR BLD CALC: 42.4 % (ref 40.5–52.5)
HCT VFR BLD CALC: 43 % (ref 40.5–52.5)
HCT VFR BLD CALC: 43.1 % (ref 40.5–52.5)
HCT VFR BLD CALC: 43.2 % (ref 40.5–52.5)
HCT VFR BLD CALC: 43.5 % (ref 40.5–52.5)
HCT VFR BLD CALC: 44.9 % (ref 40.5–52.5)
HCT VFR BLD CALC: 45.3 % (ref 40.5–52.5)
HCT VFR BLD CALC: 46.8 % (ref 40.5–52.5)
HCT VFR BLD CALC: 47.1 % (ref 40.5–52.5)
HCT VFR BLD CALC: 48.3 % (ref 40.5–52.5)
HCT VFR BLD CALC: 49.2 % (ref 40.5–52.5)
HCT VFR BLD CALC: 49.8 % (ref 40.5–52.5)
HCT VFR BLD CALC: 50.6 % (ref 40.5–52.5)
HDLC SERPL-MCNC: 38 MG/DL (ref 40–60)
HEMOGLOBIN: 12.9 G/DL (ref 13.5–17.5)
HEMOGLOBIN: 13.1 G/DL (ref 13.5–17.5)
HEMOGLOBIN: 13.2 G/DL (ref 13.5–17.5)
HEMOGLOBIN: 13.2 G/DL (ref 13.5–17.5)
HEMOGLOBIN: 13.3 G/DL (ref 13.5–17.5)
HEMOGLOBIN: 13.9 G/DL (ref 13.5–17.5)
HEMOGLOBIN: 13.9 G/DL (ref 13.5–17.5)
HEMOGLOBIN: 14 G/DL (ref 13.5–17.5)
HEMOGLOBIN: 14.1 G/DL (ref 13.5–17.5)
HEMOGLOBIN: 14.4 G/DL (ref 13.5–17.5)
HEMOGLOBIN: 14.4 G/DL (ref 13.5–17.5)
HEMOGLOBIN: 15.1 G/DL (ref 13.5–17.5)
HEMOGLOBIN: 15.2 G/DL (ref 13.5–17.5)
HEMOGLOBIN: 15.4 G/DL (ref 13.5–17.5)
HEMOGLOBIN: 16 G/DL (ref 13.5–17.5)
HEMOGLOBIN: 16 G/DL (ref 13.5–17.5)
HEMOGLOBIN: 16.1 G/DL (ref 13.5–17.5)
HEMOGLOBIN: 16.3 G/DL (ref 13.5–17.5)
HYALINE CASTS: 9 /LPF (ref 0–8)
INR BLD: 1.33 (ref 0.86–1.14)
INR BLD: 1.37 (ref 0.86–1.14)
INR BLD: 1.37 (ref 0.86–1.14)
INR BLD: 1.41 (ref 0.86–1.14)
INR BLD: 1.5 (ref 0.86–1.14)
INR BLD: 1.54 (ref 0.86–1.14)
INR BLD: 1.62 (ref 0.86–1.14)
INR BLD: 1.71 (ref 0.86–1.14)
INR BLD: 1.98 (ref 0.86–1.14)
INR BLD: 2.56 (ref 0.86–1.14)
INR BLD: 2.67 (ref 0.86–1.14)
KETONES, URINE: ABNORMAL MG/DL
LACTIC ACID: 1.7 MMOL/L (ref 0.4–2)
LACTIC ACID: 2.6 MMOL/L (ref 0.4–2)
LACTIC ACID: 5 MMOL/L (ref 0.4–2)
LDL CHOLESTEROL CALCULATED: 57 MG/DL
LEUKOCYTE ESTERASE, URINE: NEGATIVE
LYMPHOCYTES ABSOLUTE: 0.4 K/UL (ref 1–5.1)
LYMPHOCYTES ABSOLUTE: 0.5 K/UL (ref 1–5.1)
LYMPHOCYTES ABSOLUTE: 0.6 K/UL (ref 1–5.1)
LYMPHOCYTES ABSOLUTE: 0.7 K/UL (ref 1–5.1)
LYMPHOCYTES ABSOLUTE: 0.8 K/UL (ref 1–5.1)
LYMPHOCYTES ABSOLUTE: 0.9 K/UL (ref 1–5.1)
LYMPHOCYTES ABSOLUTE: 1.1 K/UL (ref 1–5.1)
LYMPHOCYTES RELATIVE PERCENT: 10 %
LYMPHOCYTES RELATIVE PERCENT: 10.1 %
LYMPHOCYTES RELATIVE PERCENT: 10.5 %
LYMPHOCYTES RELATIVE PERCENT: 11.6 %
LYMPHOCYTES RELATIVE PERCENT: 12.2 %
LYMPHOCYTES RELATIVE PERCENT: 12.5 %
LYMPHOCYTES RELATIVE PERCENT: 12.9 %
LYMPHOCYTES RELATIVE PERCENT: 13.3 %
LYMPHOCYTES RELATIVE PERCENT: 3.8 %
LYMPHOCYTES RELATIVE PERCENT: 3.9 %
LYMPHOCYTES RELATIVE PERCENT: 3.9 %
LYMPHOCYTES RELATIVE PERCENT: 5.2 %
LYMPHOCYTES RELATIVE PERCENT: 6.1 %
LYMPHOCYTES RELATIVE PERCENT: 6.3 %
LYMPHOCYTES RELATIVE PERCENT: 6.3 %
LYMPHOCYTES RELATIVE PERCENT: 6.7 %
LYMPHOCYTES RELATIVE PERCENT: 7.9 %
LYMPHOCYTES RELATIVE PERCENT: 8.3 %
LYMPHOCYTES RELATIVE PERCENT: 8.9 %
LYMPHOCYTES RELATIVE PERCENT: 9.4 %
MACROCYTES: ABNORMAL
MAGNESIUM: 1.9 MG/DL (ref 1.8–2.4)
MAGNESIUM: 2 MG/DL (ref 1.8–2.4)
MAGNESIUM: 2.1 MG/DL (ref 1.8–2.4)
MAGNESIUM: 2.1 MG/DL (ref 1.8–2.4)
MAGNESIUM: 2.2 MG/DL (ref 1.8–2.4)
MAGNESIUM: 2.3 MG/DL (ref 1.8–2.4)
MAGNESIUM: 2.5 MG/DL (ref 1.8–2.4)
MAGNESIUM: 2.7 MG/DL (ref 1.8–2.4)
MCH RBC QN AUTO: 30.3 PG (ref 26–34)
MCH RBC QN AUTO: 30.4 PG (ref 26–34)
MCH RBC QN AUTO: 30.4 PG (ref 26–34)
MCH RBC QN AUTO: 30.5 PG (ref 26–34)
MCH RBC QN AUTO: 30.6 PG (ref 26–34)
MCH RBC QN AUTO: 30.7 PG (ref 26–34)
MCH RBC QN AUTO: 30.8 PG (ref 26–34)
MCH RBC QN AUTO: 30.9 PG (ref 26–34)
MCH RBC QN AUTO: 30.9 PG (ref 26–34)
MCH RBC QN AUTO: 31 PG (ref 26–34)
MCH RBC QN AUTO: 31.1 PG (ref 26–34)
MCH RBC QN AUTO: 31.2 PG (ref 26–34)
MCH RBC QN AUTO: 31.3 PG (ref 26–34)
MCH RBC QN AUTO: 31.4 PG (ref 26–34)
MCH RBC QN AUTO: 31.5 PG (ref 26–34)
MCH RBC QN AUTO: 31.5 PG (ref 26–34)
MCH RBC QN AUTO: 31.6 PG (ref 26–34)
MCH RBC QN AUTO: 31.7 PG (ref 26–34)
MCH RBC QN AUTO: 31.8 PG (ref 26–34)
MCHC RBC AUTO-ENTMCNC: 32 G/DL (ref 31–36)
MCHC RBC AUTO-ENTMCNC: 32.1 G/DL (ref 31–36)
MCHC RBC AUTO-ENTMCNC: 32.1 G/DL (ref 31–36)
MCHC RBC AUTO-ENTMCNC: 32.2 G/DL (ref 31–36)
MCHC RBC AUTO-ENTMCNC: 32.3 G/DL (ref 31–36)
MCHC RBC AUTO-ENTMCNC: 32.6 G/DL (ref 31–36)
MCHC RBC AUTO-ENTMCNC: 32.7 G/DL (ref 31–36)
MCHC RBC AUTO-ENTMCNC: 32.7 G/DL (ref 31–36)
MCHC RBC AUTO-ENTMCNC: 32.8 G/DL (ref 31–36)
MCHC RBC AUTO-ENTMCNC: 32.8 G/DL (ref 31–36)
MCHC RBC AUTO-ENTMCNC: 32.9 G/DL (ref 31–36)
MCHC RBC AUTO-ENTMCNC: 33 G/DL (ref 31–36)
MCHC RBC AUTO-ENTMCNC: 33.1 G/DL (ref 31–36)
MCHC RBC AUTO-ENTMCNC: 33.1 G/DL (ref 31–36)
MCHC RBC AUTO-ENTMCNC: 33.2 G/DL (ref 31–36)
MCHC RBC AUTO-ENTMCNC: 33.3 G/DL (ref 31–36)
MCHC RBC AUTO-ENTMCNC: 33.4 G/DL (ref 31–36)
MCHC RBC AUTO-ENTMCNC: 33.9 G/DL (ref 31–36)
MCV RBC AUTO: 91.7 FL (ref 80–100)
MCV RBC AUTO: 92.4 FL (ref 80–100)
MCV RBC AUTO: 92.7 FL (ref 80–100)
MCV RBC AUTO: 93.2 FL (ref 80–100)
MCV RBC AUTO: 93.3 FL (ref 80–100)
MCV RBC AUTO: 93.5 FL (ref 80–100)
MCV RBC AUTO: 94.1 FL (ref 80–100)
MCV RBC AUTO: 94.1 FL (ref 80–100)
MCV RBC AUTO: 94.3 FL (ref 80–100)
MCV RBC AUTO: 94.9 FL (ref 80–100)
MCV RBC AUTO: 95.1 FL (ref 80–100)
MCV RBC AUTO: 95.2 FL (ref 80–100)
MCV RBC AUTO: 95.3 FL (ref 80–100)
MCV RBC AUTO: 95.7 FL (ref 80–100)
MCV RBC AUTO: 95.8 FL (ref 80–100)
MCV RBC AUTO: 95.9 FL (ref 80–100)
MCV RBC AUTO: 96.2 FL (ref 80–100)
MCV RBC AUTO: 96.5 FL (ref 80–100)
MCV RBC AUTO: 96.8 FL (ref 80–100)
MCV RBC AUTO: 96.8 FL (ref 80–100)
MCV RBC AUTO: 98 FL (ref 80–100)
METHEMOGLOBIN VENOUS: 0.3 %
METHEMOGLOBIN VENOUS: 0.9 %
MICROSCOPIC EXAMINATION: YES
MONOCYTES ABSOLUTE: 0.4 K/UL (ref 0–1.3)
MONOCYTES ABSOLUTE: 0.4 K/UL (ref 0–1.3)
MONOCYTES ABSOLUTE: 0.5 K/UL (ref 0–1.3)
MONOCYTES ABSOLUTE: 0.6 K/UL (ref 0–1.3)
MONOCYTES ABSOLUTE: 0.6 K/UL (ref 0–1.3)
MONOCYTES ABSOLUTE: 0.7 K/UL (ref 0–1.3)
MONOCYTES ABSOLUTE: 0.7 K/UL (ref 0–1.3)
MONOCYTES ABSOLUTE: 0.8 K/UL (ref 0–1.3)
MONOCYTES ABSOLUTE: 0.9 K/UL (ref 0–1.3)
MONOCYTES ABSOLUTE: 1 K/UL (ref 0–1.3)
MONOCYTES ABSOLUTE: 1.1 K/UL (ref 0–1.3)
MONOCYTES ABSOLUTE: 1.2 K/UL (ref 0–1.3)
MONOCYTES RELATIVE PERCENT: 10.4 %
MONOCYTES RELATIVE PERCENT: 10.7 %
MONOCYTES RELATIVE PERCENT: 11.4 %
MONOCYTES RELATIVE PERCENT: 12.2 %
MONOCYTES RELATIVE PERCENT: 13.1 %
MONOCYTES RELATIVE PERCENT: 14.8 %
MONOCYTES RELATIVE PERCENT: 15 %
MONOCYTES RELATIVE PERCENT: 16.8 %
MONOCYTES RELATIVE PERCENT: 5.5 %
MONOCYTES RELATIVE PERCENT: 5.5 %
MONOCYTES RELATIVE PERCENT: 5.9 %
MONOCYTES RELATIVE PERCENT: 6.1 %
MONOCYTES RELATIVE PERCENT: 6.2 %
MONOCYTES RELATIVE PERCENT: 6.3 %
MONOCYTES RELATIVE PERCENT: 7.7 %
MONOCYTES RELATIVE PERCENT: 8.1 %
MONOCYTES RELATIVE PERCENT: 8.5 %
MONOCYTES RELATIVE PERCENT: 8.9 %
MONOCYTES RELATIVE PERCENT: 9.9 %
MONOCYTES RELATIVE PERCENT: 9.9 %
NEUTROPHILS ABSOLUTE: 11.7 K/UL (ref 1.7–7.7)
NEUTROPHILS ABSOLUTE: 4.4 K/UL (ref 1.7–7.7)
NEUTROPHILS ABSOLUTE: 4.9 K/UL (ref 1.7–7.7)
NEUTROPHILS ABSOLUTE: 4.9 K/UL (ref 1.7–7.7)
NEUTROPHILS ABSOLUTE: 5 K/UL (ref 1.7–7.7)
NEUTROPHILS ABSOLUTE: 5.1 K/UL (ref 1.7–7.7)
NEUTROPHILS ABSOLUTE: 5.5 K/UL (ref 1.7–7.7)
NEUTROPHILS ABSOLUTE: 5.8 K/UL (ref 1.7–7.7)
NEUTROPHILS ABSOLUTE: 5.8 K/UL (ref 1.7–7.7)
NEUTROPHILS ABSOLUTE: 6 K/UL (ref 1.7–7.7)
NEUTROPHILS ABSOLUTE: 6 K/UL (ref 1.7–7.7)
NEUTROPHILS ABSOLUTE: 6.2 K/UL (ref 1.7–7.7)
NEUTROPHILS ABSOLUTE: 6.5 K/UL (ref 1.7–7.7)
NEUTROPHILS ABSOLUTE: 7 K/UL (ref 1.7–7.7)
NEUTROPHILS ABSOLUTE: 7.1 K/UL (ref 1.7–7.7)
NEUTROPHILS ABSOLUTE: 7.5 K/UL (ref 1.7–7.7)
NEUTROPHILS ABSOLUTE: 7.6 K/UL (ref 1.7–7.7)
NEUTROPHILS ABSOLUTE: 7.9 K/UL (ref 1.7–7.7)
NEUTROPHILS ABSOLUTE: 8.2 K/UL (ref 1.7–7.7)
NEUTROPHILS ABSOLUTE: 8.3 K/UL (ref 1.7–7.7)
NEUTROPHILS RELATIVE PERCENT: 68.4 %
NEUTROPHILS RELATIVE PERCENT: 68.5 %
NEUTROPHILS RELATIVE PERCENT: 69 %
NEUTROPHILS RELATIVE PERCENT: 73.6 %
NEUTROPHILS RELATIVE PERCENT: 73.9 %
NEUTROPHILS RELATIVE PERCENT: 75.7 %
NEUTROPHILS RELATIVE PERCENT: 76.3 %
NEUTROPHILS RELATIVE PERCENT: 78.9 %
NEUTROPHILS RELATIVE PERCENT: 79.6 %
NEUTROPHILS RELATIVE PERCENT: 79.7 %
NEUTROPHILS RELATIVE PERCENT: 80 %
NEUTROPHILS RELATIVE PERCENT: 81.2 %
NEUTROPHILS RELATIVE PERCENT: 82.1 %
NEUTROPHILS RELATIVE PERCENT: 82.2 %
NEUTROPHILS RELATIVE PERCENT: 83.6 %
NEUTROPHILS RELATIVE PERCENT: 86.3 %
NEUTROPHILS RELATIVE PERCENT: 86.8 %
NEUTROPHILS RELATIVE PERCENT: 87.9 %
NEUTROPHILS RELATIVE PERCENT: 89 %
NEUTROPHILS RELATIVE PERCENT: 89.5 %
NITRITE, URINE: NEGATIVE
O2 CONTENT, VEN: 15 VOL %
O2 CONTENT, VEN: 19 VOL %
O2 SAT, ARTERIAL: 97 % (ref 93–100)
O2 SAT, VEN: 100 %
O2 SAT, VEN: 100 %
O2 THERAPY: ABNORMAL
O2 THERAPY: ABNORMAL
PCO2 ARTERIAL: 29.1 MM HG (ref 35–45)
PCO2, VEN: 28 MMHG (ref 40–50)
PCO2, VEN: 31.3 MMHG (ref 40–50)
PDW BLD-RTO: 15.5 % (ref 12.4–15.4)
PDW BLD-RTO: 15.7 % (ref 12.4–15.4)
PDW BLD-RTO: 15.8 % (ref 12.4–15.4)
PDW BLD-RTO: 16.1 % (ref 12.4–15.4)
PDW BLD-RTO: 16.5 % (ref 12.4–15.4)
PDW BLD-RTO: 16.7 % (ref 12.4–15.4)
PDW BLD-RTO: 17.1 % (ref 12.4–15.4)
PDW BLD-RTO: 17.2 % (ref 12.4–15.4)
PDW BLD-RTO: 17.2 % (ref 12.4–15.4)
PDW BLD-RTO: 18.8 % (ref 12.4–15.4)
PDW BLD-RTO: 19.1 % (ref 12.4–15.4)
PDW BLD-RTO: 19.4 % (ref 12.4–15.4)
PDW BLD-RTO: 19.6 % (ref 12.4–15.4)
PDW BLD-RTO: 19.7 % (ref 12.4–15.4)
PDW BLD-RTO: 19.9 % (ref 12.4–15.4)
PDW BLD-RTO: 20.1 % (ref 12.4–15.4)
PDW BLD-RTO: 20.4 % (ref 12.4–15.4)
PDW BLD-RTO: 20.6 % (ref 12.4–15.4)
PERFORMED ON: ABNORMAL
PERFORMED ON: NORMAL
PH ARTERIAL: 7.38 (ref 7.35–7.45)
PH UA: 5 (ref 5–8)
PH VENOUS: 7.31 (ref 7.35–7.45)
PH VENOUS: 7.35 (ref 7.35–7.45)
PH VENOUS: 7.38 (ref 7.35–7.45)
PH VENOUS: 7.41 (ref 7.35–7.45)
PH VENOUS: 7.42 (ref 7.35–7.45)
PH VENOUS: 7.49 (ref 7.35–7.45)
PH VENOUS: 7.52 (ref 7.35–7.45)
PHOSPHORUS: 2.6 MG/DL (ref 2.5–4.9)
PHOSPHORUS: 2.7 MG/DL (ref 2.5–4.9)
PHOSPHORUS: 2.9 MG/DL (ref 2.5–4.9)
PHOSPHORUS: 4.2 MG/DL (ref 2.5–4.9)
PHOSPHORUS: 4.6 MG/DL (ref 2.5–4.9)
PHOSPHORUS: 5.7 MG/DL (ref 2.5–4.9)
PLATELET # BLD: 106 K/UL (ref 135–450)
PLATELET # BLD: 118 K/UL (ref 135–450)
PLATELET # BLD: 127 K/UL (ref 135–450)
PLATELET # BLD: 128 K/UL (ref 135–450)
PLATELET # BLD: 148 K/UL (ref 135–450)
PLATELET # BLD: 149 K/UL (ref 135–450)
PLATELET # BLD: 161 K/UL (ref 135–450)
PLATELET # BLD: 173 K/UL (ref 135–450)
PLATELET # BLD: 177 K/UL (ref 135–450)
PLATELET # BLD: 177 K/UL (ref 135–450)
PLATELET # BLD: 181 K/UL (ref 135–450)
PLATELET # BLD: 186 K/UL (ref 135–450)
PLATELET # BLD: 187 K/UL (ref 135–450)
PLATELET # BLD: 187 K/UL (ref 135–450)
PLATELET # BLD: 193 K/UL (ref 135–450)
PLATELET # BLD: 194 K/UL (ref 135–450)
PLATELET # BLD: 199 K/UL (ref 135–450)
PLATELET # BLD: 213 K/UL (ref 135–450)
PLATELET # BLD: 219 K/UL (ref 135–450)
PLATELET # BLD: 80 K/UL (ref 135–450)
PLATELET # BLD: 91 K/UL (ref 135–450)
PLATELET # BLD: 92 K/UL (ref 135–450)
PLATELET # BLD: 93 K/UL (ref 135–450)
PLATELET SLIDE REVIEW: ABNORMAL
PMV BLD AUTO: 7.6 FL (ref 5–10.5)
PMV BLD AUTO: 7.7 FL (ref 5–10.5)
PMV BLD AUTO: 7.8 FL (ref 5–10.5)
PMV BLD AUTO: 7.9 FL (ref 5–10.5)
PMV BLD AUTO: 8 FL (ref 5–10.5)
PMV BLD AUTO: 8.1 FL (ref 5–10.5)
PMV BLD AUTO: 8.1 FL (ref 5–10.5)
PMV BLD AUTO: 8.2 FL (ref 5–10.5)
PMV BLD AUTO: 8.2 FL (ref 5–10.5)
PMV BLD AUTO: 8.3 FL (ref 5–10.5)
PMV BLD AUTO: 8.4 FL (ref 5–10.5)
PO2 ARTERIAL: 88.6 MM HG (ref 75–108)
PO2, VEN: 145 MMHG (ref 25–40)
PO2, VEN: 176 MMHG (ref 25–40)
POC SAMPLE TYPE: ABNORMAL
POTASSIUM REFLEX MAGNESIUM: 3.6 MMOL/L (ref 3.5–5.1)
POTASSIUM REFLEX MAGNESIUM: 4.1 MMOL/L (ref 3.5–5.1)
POTASSIUM REFLEX MAGNESIUM: 4.3 MMOL/L (ref 3.5–5.1)
POTASSIUM REFLEX MAGNESIUM: 4.5 MMOL/L (ref 3.5–5.1)
POTASSIUM REFLEX MAGNESIUM: 4.7 MMOL/L (ref 3.5–5.1)
POTASSIUM REFLEX MAGNESIUM: 4.9 MMOL/L (ref 3.5–5.1)
POTASSIUM REFLEX MAGNESIUM: 5.7 MMOL/L (ref 3.5–5.1)
POTASSIUM SERPL-SCNC: 3.6 MMOL/L (ref 3.5–5.1)
POTASSIUM SERPL-SCNC: 3.7 MMOL/L (ref 3.5–5.1)
POTASSIUM SERPL-SCNC: 4.2 MMOL/L (ref 3.5–5.1)
POTASSIUM SERPL-SCNC: 4.3 MMOL/L (ref 3.5–5.1)
POTASSIUM SERPL-SCNC: 4.4 MMOL/L (ref 3.5–5.1)
POTASSIUM SERPL-SCNC: 4.5 MMOL/L (ref 3.5–5.1)
POTASSIUM SERPL-SCNC: 4.6 MMOL/L (ref 3.5–5.1)
POTASSIUM SERPL-SCNC: 4.8 MMOL/L (ref 3.5–5.1)
POTASSIUM SERPL-SCNC: 5.1 MMOL/L (ref 3.5–5.1)
POTASSIUM SERPL-SCNC: 5.5 MMOL/L (ref 3.5–5.1)
POTASSIUM, UR: 30.6 MMOL/L
POTASSIUM, UR: 50.9 MMOL/L
PRO-BNP: 4812 PG/ML (ref 0–124)
PRO-BNP: 4851 PG/ML (ref 0–124)
PRO-BNP: 5346 PG/ML (ref 0–124)
PRO-BNP: 6992 PG/ML (ref 0–124)
PRO-BNP: 7513 PG/ML (ref 0–124)
PRO-BNP: 9219 PG/ML (ref 0–124)
PRO-BNP: 9253 PG/ML (ref 0–124)
PRO-BNP: ABNORMAL PG/ML (ref 0–124)
PROTEIN UA: 100 MG/DL
PROTHROMBIN TIME: 15.5 SEC (ref 10–13.2)
PROTHROMBIN TIME: 15.9 SEC (ref 10–13.2)
PROTHROMBIN TIME: 15.9 SEC (ref 10–13.2)
PROTHROMBIN TIME: 16.4 SEC (ref 10–13.2)
PROTHROMBIN TIME: 17.5 SEC (ref 10–13.2)
PROTHROMBIN TIME: 17.9 SEC (ref 10–13.2)
PROTHROMBIN TIME: 18.9 SEC (ref 10–13.2)
PROTHROMBIN TIME: 20 SEC (ref 10–13.2)
PROTHROMBIN TIME: 23.1 SEC (ref 10–13.2)
PROTHROMBIN TIME: 30 SEC (ref 10–13.2)
PROTHROMBIN TIME: 31.3 SEC (ref 10–13.2)
RBC # BLD: 4.07 M/UL (ref 4.2–5.9)
RBC # BLD: 4.14 M/UL (ref 4.2–5.9)
RBC # BLD: 4.16 M/UL (ref 4.2–5.9)
RBC # BLD: 4.16 M/UL (ref 4.2–5.9)
RBC # BLD: 4.17 M/UL (ref 4.2–5.9)
RBC # BLD: 4.19 M/UL (ref 4.2–5.9)
RBC # BLD: 4.2 M/UL (ref 4.2–5.9)
RBC # BLD: 4.2 M/UL (ref 4.2–5.9)
RBC # BLD: 4.22 M/UL (ref 4.2–5.9)
RBC # BLD: 4.23 M/UL (ref 4.2–5.9)
RBC # BLD: 4.46 M/UL (ref 4.2–5.9)
RBC # BLD: 4.46 M/UL (ref 4.2–5.9)
RBC # BLD: 4.5 M/UL (ref 4.2–5.9)
RBC # BLD: 4.55 M/UL (ref 4.2–5.9)
RBC # BLD: 4.66 M/UL (ref 4.2–5.9)
RBC # BLD: 4.74 M/UL (ref 4.2–5.9)
RBC # BLD: 4.88 M/UL (ref 4.2–5.9)
RBC # BLD: 5 M/UL (ref 4.2–5.9)
RBC # BLD: 5.06 M/UL (ref 4.2–5.9)
RBC # BLD: 5.18 M/UL (ref 4.2–5.9)
RBC # BLD: 5.23 M/UL (ref 4.2–5.9)
RBC # BLD: 5.3 M/UL (ref 4.2–5.9)
RBC # BLD: 5.33 M/UL (ref 4.2–5.9)
RBC UA: 2 /HPF (ref 0–4)
REASON FOR REJECTION: NORMAL
REJECTED TEST: NORMAL
SLIDE REVIEW: ABNORMAL
SODIUM BLD-SCNC: 128 MMOL/L (ref 136–145)
SODIUM BLD-SCNC: 128 MMOL/L (ref 136–145)
SODIUM BLD-SCNC: 129 MMOL/L (ref 136–145)
SODIUM BLD-SCNC: 129 MMOL/L (ref 136–145)
SODIUM BLD-SCNC: 130 MMOL/L (ref 136–145)
SODIUM BLD-SCNC: 131 MMOL/L (ref 136–145)
SODIUM BLD-SCNC: 131 MMOL/L (ref 136–145)
SODIUM BLD-SCNC: 132 MMOL/L (ref 136–145)
SODIUM BLD-SCNC: 133 MMOL/L (ref 136–145)
SODIUM BLD-SCNC: 134 MMOL/L (ref 136–145)
SODIUM BLD-SCNC: 136 MMOL/L (ref 136–145)
SODIUM BLD-SCNC: 137 MMOL/L (ref 136–145)
SODIUM BLD-SCNC: 138 MMOL/L (ref 136–145)
SODIUM BLD-SCNC: 139 MMOL/L (ref 136–145)
SODIUM BLD-SCNC: 141 MMOL/L (ref 136–145)
SODIUM BLD-SCNC: 146 MMOL/L (ref 136–145)
SODIUM BLD-SCNC: 147 MMOL/L (ref 136–145)
SODIUM BLD-SCNC: 150 MMOL/L (ref 136–145)
SODIUM BLD-SCNC: 152 MMOL/L (ref 136–145)
SODIUM URINE: <20 MMOL/L
SODIUM URINE: <20 MMOL/L
SPECIFIC GRAVITY UA: 1.02 (ref 1–1.03)
TCO2 ARTERIAL: 18 MMOL/L
TCO2 CALC VENOUS: 44 MMOL/L
TCO2 CALC VENOUS: 55 MMOL/L
TOTAL PROTEIN: 4.6 G/DL (ref 6.4–8.2)
TOTAL PROTEIN: 5 G/DL (ref 6.4–8.2)
TOTAL PROTEIN: 5.1 G/DL (ref 6.4–8.2)
TOTAL PROTEIN: 5.2 G/DL (ref 6.4–8.2)
TOTAL PROTEIN: 5.5 G/DL (ref 6.4–8.2)
TOTAL PROTEIN: 5.5 G/DL (ref 6.4–8.2)
TOTAL PROTEIN: 5.8 G/DL (ref 6.4–8.2)
TOTAL PROTEIN: 6.1 G/DL (ref 6.4–8.2)
TOTAL PROTEIN: 6.1 G/DL (ref 6.4–8.2)
TOTAL PROTEIN: 6.2 G/DL (ref 6.4–8.2)
TOTAL PROTEIN: 6.4 G/DL (ref 6.4–8.2)
TOTAL PROTEIN: 6.6 G/DL (ref 6.4–8.2)
TOTAL PROTEIN: 6.6 G/DL (ref 6.4–8.2)
TOTAL PROTEIN: 7.3 G/DL (ref 6.4–8.2)
TRIGL SERPL-MCNC: 91 MG/DL (ref 0–150)
TROPONIN: <0.01 NG/ML
URINE REFLEX TO CULTURE: ABNORMAL
URINE TYPE: ABNORMAL
UROBILINOGEN, URINE: 1 E.U./DL
VLDLC SERPL CALC-MCNC: 18 MG/DL
WBC # BLD: 13.1 K/UL (ref 4–11)
WBC # BLD: 6.4 K/UL (ref 4–11)
WBC # BLD: 6.5 K/UL (ref 4–11)
WBC # BLD: 6.8 K/UL (ref 4–11)
WBC # BLD: 6.9 K/UL (ref 4–11)
WBC # BLD: 7.1 K/UL (ref 4–11)
WBC # BLD: 7.2 K/UL (ref 4–11)
WBC # BLD: 7.3 K/UL (ref 4–11)
WBC # BLD: 7.3 K/UL (ref 4–11)
WBC # BLD: 7.5 K/UL (ref 4–11)
WBC # BLD: 7.5 K/UL (ref 4–11)
WBC # BLD: 7.6 K/UL (ref 4–11)
WBC # BLD: 7.6 K/UL (ref 4–11)
WBC # BLD: 7.8 K/UL (ref 4–11)
WBC # BLD: 7.9 K/UL (ref 4–11)
WBC # BLD: 8.2 K/UL (ref 4–11)
WBC # BLD: 8.7 K/UL (ref 4–11)
WBC # BLD: 8.8 K/UL (ref 4–11)
WBC # BLD: 9 K/UL (ref 4–11)
WBC # BLD: 9 K/UL (ref 4–11)
WBC # BLD: 9.3 K/UL (ref 4–11)
WBC # BLD: 9.3 K/UL (ref 4–11)
WBC # BLD: 9.4 K/UL (ref 4–11)
WBC UA: 1 /HPF (ref 0–5)

## 2020-01-01 PROCEDURE — 83880 ASSAY OF NATRIURETIC PEPTIDE: CPT

## 2020-01-01 PROCEDURE — 6360000002 HC RX W HCPCS: Performed by: INTERNAL MEDICINE

## 2020-01-01 PROCEDURE — 80048 BASIC METABOLIC PNL TOTAL CA: CPT

## 2020-01-01 PROCEDURE — 6370000000 HC RX 637 (ALT 250 FOR IP): Performed by: INTERNAL MEDICINE

## 2020-01-01 PROCEDURE — 97530 THERAPEUTIC ACTIVITIES: CPT

## 2020-01-01 PROCEDURE — 99233 SBSQ HOSP IP/OBS HIGH 50: CPT | Performed by: INTERNAL MEDICINE

## 2020-01-01 PROCEDURE — 84100 ASSAY OF PHOSPHORUS: CPT

## 2020-01-01 PROCEDURE — 93005 ELECTROCARDIOGRAM TRACING: CPT | Performed by: INTERNAL MEDICINE

## 2020-01-01 PROCEDURE — 2580000003 HC RX 258: Performed by: HOSPITALIST

## 2020-01-01 PROCEDURE — 2580000003 HC RX 258: Performed by: PHYSICIAN ASSISTANT

## 2020-01-01 PROCEDURE — 6360000004 HC RX CONTRAST MEDICATION: Performed by: PHYSICIAN ASSISTANT

## 2020-01-01 PROCEDURE — 6360000002 HC RX W HCPCS: Performed by: HOSPITALIST

## 2020-01-01 PROCEDURE — 99232 SBSQ HOSP IP/OBS MODERATE 35: CPT | Performed by: CLINICAL NURSE SPECIALIST

## 2020-01-01 PROCEDURE — 84484 ASSAY OF TROPONIN QUANT: CPT

## 2020-01-01 PROCEDURE — 6370000000 HC RX 637 (ALT 250 FOR IP): Performed by: HOSPITALIST

## 2020-01-01 PROCEDURE — 82803 BLOOD GASES ANY COMBINATION: CPT

## 2020-01-01 PROCEDURE — 85379 FIBRIN DEGRADATION QUANT: CPT

## 2020-01-01 PROCEDURE — 96365 THER/PROPH/DIAG IV INF INIT: CPT

## 2020-01-01 PROCEDURE — 99291 CRITICAL CARE FIRST HOUR: CPT | Performed by: INTERNAL MEDICINE

## 2020-01-01 PROCEDURE — 6360000002 HC RX W HCPCS: Performed by: PHYSICIAN ASSISTANT

## 2020-01-01 PROCEDURE — 80061 LIPID PANEL: CPT

## 2020-01-01 PROCEDURE — 2580000003 HC RX 258: Performed by: INTERNAL MEDICINE

## 2020-01-01 PROCEDURE — 85025 COMPLETE CBC W/AUTO DIFF WBC: CPT

## 2020-01-01 PROCEDURE — 2000000000 HC ICU R&B

## 2020-01-01 PROCEDURE — 93010 ELECTROCARDIOGRAM REPORT: CPT | Performed by: INTERNAL MEDICINE

## 2020-01-01 PROCEDURE — 2500000003 HC RX 250 WO HCPCS: Performed by: PHYSICIAN ASSISTANT

## 2020-01-01 PROCEDURE — 83735 ASSAY OF MAGNESIUM: CPT

## 2020-01-01 PROCEDURE — 80053 COMPREHEN METABOLIC PANEL: CPT

## 2020-01-01 PROCEDURE — 85610 PROTHROMBIN TIME: CPT

## 2020-01-01 PROCEDURE — 85027 COMPLETE CBC AUTOMATED: CPT

## 2020-01-01 PROCEDURE — 2060000000 HC ICU INTERMEDIATE R&B

## 2020-01-01 PROCEDURE — 97161 PT EVAL LOW COMPLEX 20 MIN: CPT

## 2020-01-01 PROCEDURE — C9113 INJ PANTOPRAZOLE SODIUM, VIA: HCPCS | Performed by: INTERNAL MEDICINE

## 2020-01-01 PROCEDURE — 96375 TX/PRO/DX INJ NEW DRUG ADDON: CPT

## 2020-01-01 PROCEDURE — 99232 SBSQ HOSP IP/OBS MODERATE 35: CPT | Performed by: NURSE PRACTITIONER

## 2020-01-01 PROCEDURE — 99233 SBSQ HOSP IP/OBS HIGH 50: CPT | Performed by: PSYCHIATRY & NEUROLOGY

## 2020-01-01 PROCEDURE — 36591 DRAW BLOOD OFF VENOUS DEVICE: CPT

## 2020-01-01 PROCEDURE — 85730 THROMBOPLASTIN TIME PARTIAL: CPT

## 2020-01-01 PROCEDURE — 70551 MRI BRAIN STEM W/O DYE: CPT

## 2020-01-01 PROCEDURE — 1200000000 HC SEMI PRIVATE

## 2020-01-01 PROCEDURE — 82330 ASSAY OF CALCIUM: CPT

## 2020-01-01 PROCEDURE — 93005 ELECTROCARDIOGRAM TRACING: CPT | Performed by: EMERGENCY MEDICINE

## 2020-01-01 PROCEDURE — 83605 ASSAY OF LACTIC ACID: CPT

## 2020-01-01 PROCEDURE — 71045 X-RAY EXAM CHEST 1 VIEW: CPT

## 2020-01-01 PROCEDURE — 99285 EMERGENCY DEPT VISIT HI MDM: CPT

## 2020-01-01 PROCEDURE — 2500000003 HC RX 250 WO HCPCS: Performed by: HOSPITALIST

## 2020-01-01 PROCEDURE — 95816 EEG AWAKE AND DROWSY: CPT | Performed by: PSYCHIATRY & NEUROLOGY

## 2020-01-01 PROCEDURE — 84133 ASSAY OF URINE POTASSIUM: CPT

## 2020-01-01 PROCEDURE — 99232 SBSQ HOSP IP/OBS MODERATE 35: CPT | Performed by: INTERNAL MEDICINE

## 2020-01-01 PROCEDURE — 36556 INSERT NON-TUNNEL CV CATH: CPT

## 2020-01-01 PROCEDURE — 96376 TX/PRO/DX INJ SAME DRUG ADON: CPT

## 2020-01-01 PROCEDURE — 94760 N-INVAS EAR/PLS OXIMETRY 1: CPT

## 2020-01-01 PROCEDURE — 70450 CT HEAD/BRAIN W/O DYE: CPT

## 2020-01-01 PROCEDURE — 36415 COLL VENOUS BLD VENIPUNCTURE: CPT

## 2020-01-01 PROCEDURE — 96374 THER/PROPH/DIAG INJ IV PUSH: CPT

## 2020-01-01 PROCEDURE — 82436 ASSAY OF URINE CHLORIDE: CPT

## 2020-01-01 PROCEDURE — 97535 SELF CARE MNGMENT TRAINING: CPT

## 2020-01-01 PROCEDURE — 97165 OT EVAL LOW COMPLEX 30 MIN: CPT

## 2020-01-01 PROCEDURE — 84300 ASSAY OF URINE SODIUM: CPT

## 2020-01-01 PROCEDURE — 36592 COLLECT BLOOD FROM PICC: CPT

## 2020-01-01 PROCEDURE — 99214 OFFICE O/P EST MOD 30 MIN: CPT | Performed by: NURSE PRACTITIONER

## 2020-01-01 PROCEDURE — 2700000000 HC OXYGEN THERAPY PER DAY

## 2020-01-01 PROCEDURE — 2500000003 HC RX 250 WO HCPCS: Performed by: INTERNAL MEDICINE

## 2020-01-01 PROCEDURE — 99232 SBSQ HOSP IP/OBS MODERATE 35: CPT | Performed by: PSYCHIATRY & NEUROLOGY

## 2020-01-01 PROCEDURE — 93005 ELECTROCARDIOGRAM TRACING: CPT | Performed by: HOSPITALIST

## 2020-01-01 PROCEDURE — 99231 SBSQ HOSP IP/OBS SF/LOW 25: CPT | Performed by: INTERNAL MEDICINE

## 2020-01-01 PROCEDURE — 97162 PT EVAL MOD COMPLEX 30 MIN: CPT

## 2020-01-01 PROCEDURE — 99233 SBSQ HOSP IP/OBS HIGH 50: CPT | Performed by: NURSE PRACTITIONER

## 2020-01-01 PROCEDURE — 72125 CT NECK SPINE W/O DYE: CPT

## 2020-01-01 PROCEDURE — 95816 EEG AWAKE AND DROWSY: CPT

## 2020-01-01 PROCEDURE — 81001 URINALYSIS AUTO W/SCOPE: CPT

## 2020-01-01 PROCEDURE — 93005 ELECTROCARDIOGRAM TRACING: CPT | Performed by: PHYSICIAN ASSISTANT

## 2020-01-01 PROCEDURE — 97166 OT EVAL MOD COMPLEX 45 MIN: CPT

## 2020-01-01 PROCEDURE — 97116 GAIT TRAINING THERAPY: CPT

## 2020-01-01 PROCEDURE — 99223 1ST HOSP IP/OBS HIGH 75: CPT | Performed by: PSYCHIATRY & NEUROLOGY

## 2020-01-01 PROCEDURE — 36556 INSERT NON-TUNNEL CV CATH: CPT | Performed by: INTERNAL MEDICINE

## 2020-01-01 PROCEDURE — 93308 TTE F-UP OR LMTD: CPT

## 2020-01-01 PROCEDURE — 71260 CT THORAX DX C+: CPT

## 2020-01-01 PROCEDURE — 82570 ASSAY OF URINE CREATININE: CPT

## 2020-01-01 RX ORDER — TAMSULOSIN HYDROCHLORIDE 0.4 MG/1
0.4 CAPSULE ORAL DAILY
Status: DISCONTINUED | OUTPATIENT
Start: 2020-01-01 | End: 2020-01-01 | Stop reason: HOSPADM

## 2020-01-01 RX ORDER — POLYETHYLENE GLYCOL 3350 17 G/17G
17 POWDER, FOR SOLUTION ORAL DAILY PRN
Status: DISCONTINUED | OUTPATIENT
Start: 2020-01-01 | End: 2020-01-01 | Stop reason: HOSPADM

## 2020-01-01 RX ORDER — FUROSEMIDE 40 MG/1
40 TABLET ORAL 2 TIMES DAILY
Status: DISCONTINUED | OUTPATIENT
Start: 2020-01-01 | End: 2020-01-01

## 2020-01-01 RX ORDER — SODIUM CHLORIDE 9 MG/ML
INJECTION, SOLUTION INTRAVENOUS CONTINUOUS
Status: DISCONTINUED | OUTPATIENT
Start: 2020-01-01 | End: 2020-01-01

## 2020-01-01 RX ORDER — FUROSEMIDE 10 MG/ML
40 INJECTION INTRAMUSCULAR; INTRAVENOUS 2 TIMES DAILY
Status: DISCONTINUED | OUTPATIENT
Start: 2020-01-01 | End: 2020-01-01

## 2020-01-01 RX ORDER — MIDODRINE HYDROCHLORIDE 5 MG/1
5 TABLET ORAL
Status: DISCONTINUED | OUTPATIENT
Start: 2020-01-01 | End: 2020-01-01

## 2020-01-01 RX ORDER — HYDROCODONE BITARTRATE AND ACETAMINOPHEN 5; 325 MG/1; MG/1
1 TABLET ORAL EVERY 8 HOURS PRN
Qty: 15 TABLET | Refills: 0 | Status: SHIPPED | OUTPATIENT
Start: 2020-01-01 | End: 2020-01-01

## 2020-01-01 RX ORDER — CARVEDILOL 3.12 MG/1
3.12 TABLET ORAL 2 TIMES DAILY WITH MEALS
Qty: 60 TABLET | Refills: 3 | Status: SHIPPED | OUTPATIENT
Start: 2020-01-01

## 2020-01-01 RX ORDER — EPLERENONE 25 MG/1
25 TABLET, FILM COATED ORAL DAILY
Qty: 90 TABLET | Refills: 1 | Status: SHIPPED | OUTPATIENT
Start: 2020-01-01

## 2020-01-01 RX ORDER — MORPHINE SULFATE 2 MG/ML
2 INJECTION, SOLUTION INTRAMUSCULAR; INTRAVENOUS EVERY 4 HOURS PRN
Status: DISCONTINUED | OUTPATIENT
Start: 2020-01-01 | End: 2020-01-01

## 2020-01-01 RX ORDER — NITROGLYCERIN 0.4 MG/1
0.4 TABLET SUBLINGUAL EVERY 5 MIN PRN
Status: DISCONTINUED | OUTPATIENT
Start: 2020-01-01 | End: 2020-01-01 | Stop reason: HOSPADM

## 2020-01-01 RX ORDER — ONDANSETRON 2 MG/ML
4 INJECTION INTRAMUSCULAR; INTRAVENOUS EVERY 6 HOURS PRN
Status: DISCONTINUED | OUTPATIENT
Start: 2020-01-01 | End: 2020-01-01 | Stop reason: HOSPADM

## 2020-01-01 RX ORDER — METRONIDAZOLE 500 MG/1
500 TABLET ORAL 3 TIMES DAILY
Qty: 12 TABLET | Refills: 0 | Status: SHIPPED | OUTPATIENT
Start: 2020-01-01 | End: 2020-01-01

## 2020-01-01 RX ORDER — DIGOXIN 125 MCG
125 TABLET ORAL DAILY
Status: DISCONTINUED | OUTPATIENT
Start: 2020-01-01 | End: 2020-01-01 | Stop reason: HOSPADM

## 2020-01-01 RX ORDER — EPLERENONE 25 MG/1
25 TABLET, FILM COATED ORAL DAILY
Status: DISCONTINUED | OUTPATIENT
Start: 2020-01-01 | End: 2020-01-01

## 2020-01-01 RX ORDER — SENNA AND DOCUSATE SODIUM 50; 8.6 MG/1; MG/1
2 TABLET, FILM COATED ORAL DAILY
Status: DISCONTINUED | OUTPATIENT
Start: 2020-01-01 | End: 2020-01-01 | Stop reason: HOSPADM

## 2020-01-01 RX ORDER — DEXTROSE MONOHYDRATE 25 G/50ML
12.5 INJECTION, SOLUTION INTRAVENOUS PRN
Status: DISCONTINUED | OUTPATIENT
Start: 2020-01-01 | End: 2020-01-01 | Stop reason: HOSPADM

## 2020-01-01 RX ORDER — ACETAMINOPHEN 650 MG/1
650 SUPPOSITORY RECTAL EVERY 6 HOURS PRN
Status: DISCONTINUED | OUTPATIENT
Start: 2020-01-01 | End: 2020-01-01 | Stop reason: HOSPADM

## 2020-01-01 RX ORDER — HYDROXYZINE HYDROCHLORIDE 25 MG/1
25 TABLET, FILM COATED ORAL EVERY 6 HOURS PRN
Qty: 120 TABLET | Refills: 0 | Status: SHIPPED | OUTPATIENT
Start: 2020-01-01 | End: 2020-04-01

## 2020-01-01 RX ORDER — LANOLIN ALCOHOL/MO/W.PET/CERES
9 CREAM (GRAM) TOPICAL NIGHTLY PRN
Status: DISCONTINUED | OUTPATIENT
Start: 2020-01-01 | End: 2020-01-01 | Stop reason: HOSPADM

## 2020-01-01 RX ORDER — FUROSEMIDE 10 MG/ML
40 INJECTION INTRAMUSCULAR; INTRAVENOUS ONCE
Status: COMPLETED | OUTPATIENT
Start: 2020-01-01 | End: 2020-01-01

## 2020-01-01 RX ORDER — BUSPIRONE HYDROCHLORIDE 10 MG/1
10 TABLET ORAL 2 TIMES DAILY
Qty: 60 TABLET | Refills: 0 | Status: SHIPPED | OUTPATIENT
Start: 2020-01-01

## 2020-01-01 RX ORDER — AMIODARONE HYDROCHLORIDE 200 MG/1
200 TABLET ORAL DAILY
Status: DISCONTINUED | OUTPATIENT
Start: 2020-01-01 | End: 2020-01-01 | Stop reason: HOSPADM

## 2020-01-01 RX ORDER — SIMETHICONE 80 MG
80 TABLET,CHEWABLE ORAL EVERY 6 HOURS PRN
Status: DISCONTINUED | OUTPATIENT
Start: 2020-01-01 | End: 2020-01-01 | Stop reason: HOSPADM

## 2020-01-01 RX ORDER — AMIODARONE HYDROCHLORIDE 200 MG/1
200 TABLET ORAL DAILY
Qty: 30 TABLET | Refills: 3 | Status: SHIPPED | OUTPATIENT
Start: 2020-01-01

## 2020-01-01 RX ORDER — HYDROCODONE BITARTRATE AND ACETAMINOPHEN 5; 325 MG/1; MG/1
2 TABLET ORAL EVERY 6 HOURS PRN
Status: DISCONTINUED | OUTPATIENT
Start: 2020-01-01 | End: 2020-01-01

## 2020-01-01 RX ORDER — FENTANYL CITRATE 50 UG/ML
25 INJECTION, SOLUTION INTRAMUSCULAR; INTRAVENOUS ONCE
Status: COMPLETED | OUTPATIENT
Start: 2020-01-01 | End: 2020-01-01

## 2020-01-01 RX ORDER — MORPHINE SULFATE 100 MG/5ML
10 SOLUTION ORAL
Qty: 15 ML | Refills: 0 | Status: SHIPPED | OUTPATIENT
Start: 2020-01-01 | End: 2020-01-01

## 2020-01-01 RX ORDER — SODIUM CHLORIDE 0.9 % (FLUSH) 0.9 %
10 SYRINGE (ML) INJECTION PRN
Status: DISCONTINUED | OUTPATIENT
Start: 2020-01-01 | End: 2020-01-01 | Stop reason: HOSPADM

## 2020-01-01 RX ORDER — POTASSIUM CHLORIDE 7.45 MG/ML
10 INJECTION INTRAVENOUS PRN
Status: DISCONTINUED | OUTPATIENT
Start: 2020-01-01 | End: 2020-01-01 | Stop reason: HOSPADM

## 2020-01-01 RX ORDER — DIAZEPAM 5 MG/1
5 TABLET ORAL EVERY 8 HOURS PRN
Status: DISCONTINUED | OUTPATIENT
Start: 2020-01-01 | End: 2020-01-01 | Stop reason: HOSPADM

## 2020-01-01 RX ORDER — EPLERENONE 25 MG/1
25 TABLET, FILM COATED ORAL DAILY
Status: DISCONTINUED | OUTPATIENT
Start: 2020-01-01 | End: 2020-01-01 | Stop reason: HOSPADM

## 2020-01-01 RX ORDER — LISINOPRIL 5 MG/1
5 TABLET ORAL DAILY
Status: DISCONTINUED | OUTPATIENT
Start: 2020-01-01 | End: 2020-01-01

## 2020-01-01 RX ORDER — ONDANSETRON 2 MG/ML
4 INJECTION INTRAMUSCULAR; INTRAVENOUS ONCE
Status: COMPLETED | OUTPATIENT
Start: 2020-01-01 | End: 2020-01-01

## 2020-01-01 RX ORDER — BUSPIRONE HYDROCHLORIDE 5 MG/1
10 TABLET ORAL 2 TIMES DAILY
Status: DISCONTINUED | OUTPATIENT
Start: 2020-01-01 | End: 2020-01-01 | Stop reason: HOSPADM

## 2020-01-01 RX ORDER — SODIUM CHLORIDE 9 MG/ML
INJECTION, SOLUTION INTRAVENOUS CONTINUOUS PRN
Status: COMPLETED | OUTPATIENT
Start: 2020-01-01 | End: 2020-01-01

## 2020-01-01 RX ORDER — ACETAMINOPHEN 325 MG/1
650 TABLET ORAL EVERY 6 HOURS PRN
Status: DISCONTINUED | OUTPATIENT
Start: 2020-01-01 | End: 2020-01-01 | Stop reason: HOSPADM

## 2020-01-01 RX ORDER — POTASSIUM CHLORIDE 20 MEQ/1
40 TABLET, EXTENDED RELEASE ORAL PRN
Status: DISCONTINUED | OUTPATIENT
Start: 2020-01-01 | End: 2020-01-01 | Stop reason: HOSPADM

## 2020-01-01 RX ORDER — HYDROCODONE BITARTRATE AND ACETAMINOPHEN 5; 325 MG/1; MG/1
2 TABLET ORAL EVERY 8 HOURS PRN
Status: DISCONTINUED | OUTPATIENT
Start: 2020-01-01 | End: 2020-01-01 | Stop reason: HOSPADM

## 2020-01-01 RX ORDER — PREGABALIN 75 MG/1
75 CAPSULE ORAL 3 TIMES DAILY
Status: DISCONTINUED | OUTPATIENT
Start: 2020-01-01 | End: 2020-01-01 | Stop reason: HOSPADM

## 2020-01-01 RX ORDER — DOCUSATE SODIUM 100 MG/1
100 CAPSULE, LIQUID FILLED ORAL DAILY
Status: DISCONTINUED | OUTPATIENT
Start: 2020-01-01 | End: 2020-01-01 | Stop reason: HOSPADM

## 2020-01-01 RX ORDER — DIAZEPAM 5 MG/1
5 TABLET ORAL 3 TIMES DAILY
Status: DISCONTINUED | OUTPATIENT
Start: 2020-01-01 | End: 2020-01-01 | Stop reason: HOSPADM

## 2020-01-01 RX ORDER — NALOXONE HYDROCHLORIDE 4 MG/.1ML
1 SPRAY NASAL PRN
Qty: 1 EACH | Refills: 5 | Status: SHIPPED | OUTPATIENT
Start: 2020-01-01

## 2020-01-01 RX ORDER — MIDODRINE HYDROCHLORIDE 5 MG/1
5 TABLET ORAL
Status: DISCONTINUED | OUTPATIENT
Start: 2020-01-01 | End: 2020-01-01 | Stop reason: HOSPADM

## 2020-01-01 RX ORDER — 0.9 % SODIUM CHLORIDE 0.9 %
1000 INTRAVENOUS SOLUTION INTRAVENOUS ONCE
Status: COMPLETED | OUTPATIENT
Start: 2020-01-01 | End: 2020-01-01

## 2020-01-01 RX ORDER — SERTRALINE HYDROCHLORIDE 25 MG/1
25 TABLET, FILM COATED ORAL DAILY
Qty: 30 TABLET | Refills: 3 | Status: SHIPPED | OUTPATIENT
Start: 2020-01-01 | End: 2020-01-01 | Stop reason: HOSPADM

## 2020-01-01 RX ORDER — FENTANYL CITRATE 50 UG/ML
50 INJECTION, SOLUTION INTRAMUSCULAR; INTRAVENOUS
Status: DISCONTINUED | OUTPATIENT
Start: 2020-01-01 | End: 2020-01-01 | Stop reason: HOSPADM

## 2020-01-01 RX ORDER — HEPARIN SODIUM 5000 [USP'U]/ML
5000 INJECTION, SOLUTION INTRAVENOUS; SUBCUTANEOUS EVERY 8 HOURS SCHEDULED
Status: DISCONTINUED | OUTPATIENT
Start: 2020-01-01 | End: 2020-01-01 | Stop reason: HOSPADM

## 2020-01-01 RX ORDER — LORAZEPAM 0.5 MG/1
0.5 TABLET ORAL ONCE
Status: COMPLETED | OUTPATIENT
Start: 2020-01-01 | End: 2020-01-01

## 2020-01-01 RX ORDER — SODIUM CHLORIDE 0.9 % (FLUSH) 0.9 %
10 SYRINGE (ML) INJECTION EVERY 12 HOURS SCHEDULED
Status: DISCONTINUED | OUTPATIENT
Start: 2020-01-01 | End: 2020-01-01 | Stop reason: HOSPADM

## 2020-01-01 RX ORDER — DOBUTAMINE HYDROCHLORIDE 200 MG/100ML
1.5 INJECTION INTRAVENOUS CONTINUOUS
Status: DISCONTINUED | OUTPATIENT
Start: 2020-01-01 | End: 2020-01-01

## 2020-01-01 RX ORDER — MIDODRINE HYDROCHLORIDE 5 MG/1
5 TABLET ORAL
Qty: 90 TABLET | Refills: 3 | Status: SHIPPED | OUTPATIENT
Start: 2020-01-01

## 2020-01-01 RX ORDER — LOVASTATIN 20 MG/1
40 TABLET ORAL NIGHTLY
Status: DISCONTINUED | OUTPATIENT
Start: 2020-01-01 | End: 2020-01-01 | Stop reason: ALTCHOICE

## 2020-01-01 RX ORDER — FAMOTIDINE 20 MG/1
20 TABLET, FILM COATED ORAL DAILY
Status: DISCONTINUED | OUTPATIENT
Start: 2020-01-01 | End: 2020-01-01 | Stop reason: HOSPADM

## 2020-01-01 RX ORDER — DULOXETIN HYDROCHLORIDE 30 MG/1
30 CAPSULE, DELAYED RELEASE ORAL DAILY
Status: DISCONTINUED | OUTPATIENT
Start: 2020-01-01 | End: 2020-01-01 | Stop reason: HOSPADM

## 2020-01-01 RX ORDER — LORAZEPAM 2 MG/ML
1 INJECTION INTRAMUSCULAR ONCE
Status: COMPLETED | OUTPATIENT
Start: 2020-01-01 | End: 2020-01-01

## 2020-01-01 RX ORDER — LORAZEPAM 2 MG/ML
0.5 INJECTION INTRAMUSCULAR EVERY 4 HOURS PRN
Status: DISCONTINUED | OUTPATIENT
Start: 2020-01-01 | End: 2020-01-01 | Stop reason: HOSPADM

## 2020-01-01 RX ORDER — MORPHINE SULFATE 2 MG/ML
1 INJECTION, SOLUTION INTRAMUSCULAR; INTRAVENOUS EVERY 4 HOURS PRN
Status: DISCONTINUED | OUTPATIENT
Start: 2020-01-01 | End: 2020-01-01

## 2020-01-01 RX ORDER — RANITIDINE 150 MG/1
150 TABLET ORAL DAILY
Status: DISCONTINUED | OUTPATIENT
Start: 2020-01-01 | End: 2020-01-01 | Stop reason: CLARIF

## 2020-01-01 RX ORDER — SODIUM POLYSTYRENE SULFONATE 15 G/60ML
15 SUSPENSION ORAL; RECTAL ONCE
Status: COMPLETED | OUTPATIENT
Start: 2020-01-01 | End: 2020-01-01

## 2020-01-01 RX ORDER — DEXTROSE MONOHYDRATE 25 G/50ML
25 INJECTION, SOLUTION INTRAVENOUS ONCE
Status: COMPLETED | OUTPATIENT
Start: 2020-01-01 | End: 2020-01-01

## 2020-01-01 RX ORDER — MORPHINE SULFATE 2 MG/ML
1 INJECTION, SOLUTION INTRAMUSCULAR; INTRAVENOUS
Status: DISCONTINUED | OUTPATIENT
Start: 2020-01-01 | End: 2020-01-01

## 2020-01-01 RX ORDER — PROMETHAZINE HYDROCHLORIDE 25 MG/1
12.5 TABLET ORAL EVERY 6 HOURS PRN
Status: DISCONTINUED | OUTPATIENT
Start: 2020-01-01 | End: 2020-01-01 | Stop reason: HOSPADM

## 2020-01-01 RX ORDER — FUROSEMIDE 40 MG/1
40 TABLET ORAL 2 TIMES DAILY
Qty: 180 TABLET | Refills: 1 | Status: SHIPPED | OUTPATIENT
Start: 2020-01-01 | End: 2020-01-01 | Stop reason: ALTCHOICE

## 2020-01-01 RX ORDER — HYDROXYZINE HYDROCHLORIDE 25 MG/1
25 TABLET, FILM COATED ORAL EVERY 6 HOURS PRN
Status: DISCONTINUED | OUTPATIENT
Start: 2020-01-01 | End: 2020-01-01 | Stop reason: HOSPADM

## 2020-01-01 RX ORDER — DIAZEPAM 5 MG/1
5 TABLET ORAL EVERY 6 HOURS PRN
Status: DISCONTINUED | OUTPATIENT
Start: 2020-01-01 | End: 2020-01-01

## 2020-01-01 RX ORDER — PREGABALIN 75 MG/1
75 CAPSULE ORAL
Status: DISCONTINUED | OUTPATIENT
Start: 2020-01-01 | End: 2020-01-01 | Stop reason: HOSPADM

## 2020-01-01 RX ORDER — HYDROCODONE BITARTRATE AND ACETAMINOPHEN 5; 325 MG/1; MG/1
1 TABLET ORAL EVERY 4 HOURS PRN
Status: DISCONTINUED | OUTPATIENT
Start: 2020-01-01 | End: 2020-01-01

## 2020-01-01 RX ORDER — TAMSULOSIN HYDROCHLORIDE 0.4 MG/1
0.4 CAPSULE ORAL DAILY
Qty: 30 CAPSULE | Refills: 3 | Status: SHIPPED | OUTPATIENT
Start: 2020-01-01

## 2020-01-01 RX ORDER — DIGOXIN 125 MCG
125 TABLET ORAL DAILY
Qty: 90 TABLET | Refills: 3 | Status: SHIPPED | OUTPATIENT
Start: 2020-01-01

## 2020-01-01 RX ORDER — PREGABALIN 75 MG/1
150 CAPSULE ORAL EVERY EVENING
Status: DISCONTINUED | OUTPATIENT
Start: 2020-01-01 | End: 2020-01-01 | Stop reason: HOSPADM

## 2020-01-01 RX ORDER — TORSEMIDE 20 MG/1
60 TABLET ORAL DAILY
Qty: 90 TABLET | Refills: 3 | Status: ON HOLD
Start: 2020-01-01 | End: 2020-01-01 | Stop reason: HOSPADM

## 2020-01-01 RX ORDER — LORAZEPAM 2 MG/ML
1 CONCENTRATE ORAL EVERY 8 HOURS PRN
Qty: 30 ML | Refills: 0 | Status: SHIPPED | OUTPATIENT
Start: 2020-01-01 | End: 2020-03-29

## 2020-01-01 RX ORDER — DIAZEPAM 2 MG/1
2 TABLET ORAL EVERY 8 HOURS PRN
Status: DISCONTINUED | OUTPATIENT
Start: 2020-01-01 | End: 2020-01-01

## 2020-01-01 RX ORDER — HYDROXYZINE HYDROCHLORIDE 25 MG/1
25 TABLET, FILM COATED ORAL EVERY 6 HOURS PRN
Qty: 60 TABLET | Refills: 0
Start: 2020-01-01 | End: 2020-01-01

## 2020-01-01 RX ORDER — TORSEMIDE 20 MG/1
40 TABLET ORAL 2 TIMES DAILY
Status: DISCONTINUED | OUTPATIENT
Start: 2020-01-01 | End: 2020-01-01 | Stop reason: HOSPADM

## 2020-01-01 RX ORDER — MORPHINE SULFATE 2 MG/ML
2 INJECTION, SOLUTION INTRAMUSCULAR; INTRAVENOUS
Status: DISCONTINUED | OUTPATIENT
Start: 2020-01-01 | End: 2020-01-01

## 2020-01-01 RX ORDER — FUROSEMIDE 40 MG/1
40 TABLET ORAL 2 TIMES DAILY
Status: DISCONTINUED | OUTPATIENT
Start: 2020-01-01 | End: 2020-01-01 | Stop reason: HOSPADM

## 2020-01-01 RX ORDER — DIAZEPAM 5 MG/1
5 TABLET ORAL EVERY 8 HOURS PRN
Status: DISCONTINUED | OUTPATIENT
Start: 2020-01-01 | End: 2020-01-01

## 2020-01-01 RX ORDER — PREGABALIN 75 MG/1
75 CAPSULE ORAL 2 TIMES DAILY
Status: DISCONTINUED | OUTPATIENT
Start: 2020-01-01 | End: 2020-01-01 | Stop reason: ALTCHOICE

## 2020-01-01 RX ORDER — 0.9 % SODIUM CHLORIDE 0.9 %
1000 INTRAVENOUS SOLUTION INTRAVENOUS ONCE
Status: DISCONTINUED | OUTPATIENT
Start: 2020-01-01 | End: 2020-01-01

## 2020-01-01 RX ORDER — MILRINONE LACTATE 0.2 MG/ML
0.1 INJECTION, SOLUTION INTRAVENOUS CONTINUOUS
Status: DISCONTINUED | OUTPATIENT
Start: 2020-01-01 | End: 2020-01-01 | Stop reason: HOSPADM

## 2020-01-01 RX ORDER — MORPHINE SULFATE 20 MG/ML
4 SOLUTION ORAL EVERY 4 HOURS PRN
Status: DISCONTINUED | OUTPATIENT
Start: 2020-01-01 | End: 2020-01-01

## 2020-01-01 RX ORDER — DEXTROSE MONOHYDRATE 50 MG/ML
100 INJECTION, SOLUTION INTRAVENOUS PRN
Status: DISCONTINUED | OUTPATIENT
Start: 2020-01-01 | End: 2020-01-01 | Stop reason: HOSPADM

## 2020-01-01 RX ORDER — 0.9 % SODIUM CHLORIDE 0.9 %
500 INTRAVENOUS SOLUTION INTRAVENOUS ONCE
Status: COMPLETED | OUTPATIENT
Start: 2020-01-01 | End: 2020-01-01

## 2020-01-01 RX ORDER — TORSEMIDE 20 MG/1
40 TABLET ORAL 2 TIMES DAILY
Qty: 30 TABLET | Refills: 3 | Status: SHIPPED | OUTPATIENT
Start: 2020-01-01

## 2020-01-01 RX ORDER — MORPHINE SULFATE 15 MG/1
15 TABLET, FILM COATED, EXTENDED RELEASE ORAL EVERY 12 HOURS SCHEDULED
Qty: 1 TABLET | Refills: 0
Start: 2020-01-01 | End: 2020-04-01

## 2020-01-01 RX ORDER — KETOROLAC TROMETHAMINE 30 MG/ML
15 INJECTION, SOLUTION INTRAMUSCULAR; INTRAVENOUS EVERY 6 HOURS PRN
Status: DISCONTINUED | OUTPATIENT
Start: 2020-01-01 | End: 2020-01-01

## 2020-01-01 RX ORDER — ATORVASTATIN CALCIUM 40 MG/1
40 TABLET, FILM COATED ORAL NIGHTLY
Status: DISCONTINUED | OUTPATIENT
Start: 2020-01-01 | End: 2020-01-01 | Stop reason: HOSPADM

## 2020-01-01 RX ORDER — CARVEDILOL 3.12 MG/1
3.12 TABLET ORAL 2 TIMES DAILY WITH MEALS
Status: DISCONTINUED | OUTPATIENT
Start: 2020-01-01 | End: 2020-01-01 | Stop reason: HOSPADM

## 2020-01-01 RX ORDER — MORPHINE SULFATE 15 MG/1
15 TABLET, FILM COATED, EXTENDED RELEASE ORAL EVERY 12 HOURS SCHEDULED
Status: DISCONTINUED | OUTPATIENT
Start: 2020-01-01 | End: 2020-01-01 | Stop reason: HOSPADM

## 2020-01-01 RX ORDER — NICOTINE POLACRILEX 4 MG
15 LOZENGE BUCCAL PRN
Status: DISCONTINUED | OUTPATIENT
Start: 2020-01-01 | End: 2020-01-01 | Stop reason: HOSPADM

## 2020-01-01 RX ORDER — MORPHINE SULFATE 4 MG/ML
4 INJECTION, SOLUTION INTRAMUSCULAR; INTRAVENOUS ONCE
Status: DISCONTINUED | OUTPATIENT
Start: 2020-01-01 | End: 2020-01-01

## 2020-01-01 RX ORDER — CARVEDILOL 3.12 MG/1
3.12 TABLET ORAL 2 TIMES DAILY WITH MEALS
Status: DISCONTINUED | OUTPATIENT
Start: 2020-01-01 | End: 2020-01-01

## 2020-01-01 RX ORDER — TOLVAPTAN 15 MG/1
15 TABLET ORAL EVERY 24 HOURS
Status: DISCONTINUED | OUTPATIENT
Start: 2020-01-01 | End: 2020-01-01

## 2020-01-01 RX ADMIN — MIDODRINE HYDROCHLORIDE 5 MG: 5 TABLET ORAL at 11:35

## 2020-01-01 RX ADMIN — SERTRALINE 25 MG: 50 TABLET, FILM COATED ORAL at 10:10

## 2020-01-01 RX ADMIN — PREGABALIN 75 MG: 75 CAPSULE ORAL at 12:20

## 2020-01-01 RX ADMIN — Medication 10 ML: at 20:34

## 2020-01-01 RX ADMIN — MIDODRINE HYDROCHLORIDE 5 MG: 5 TABLET ORAL at 16:59

## 2020-01-01 RX ADMIN — DIGOXIN 125 MCG: 125 TABLET ORAL at 12:20

## 2020-01-01 RX ADMIN — FAMOTIDINE 20 MG: 20 TABLET ORAL at 10:38

## 2020-01-01 RX ADMIN — HEPARIN SODIUM 5000 UNITS: 5000 INJECTION INTRAVENOUS; SUBCUTANEOUS at 12:11

## 2020-01-01 RX ADMIN — SODIUM CHLORIDE: 9 INJECTION, SOLUTION INTRAVENOUS at 23:06

## 2020-01-01 RX ADMIN — MORPHINE SULFATE 2 MG: 2 INJECTION, SOLUTION INTRAMUSCULAR; INTRAVENOUS at 06:10

## 2020-01-01 RX ADMIN — FAMOTIDINE 20 MG: 20 TABLET ORAL at 09:02

## 2020-01-01 RX ADMIN — AMIODARONE HYDROCHLORIDE 200 MG: 200 TABLET ORAL at 08:53

## 2020-01-01 RX ADMIN — SACUBITRIL AND VALSARTAN 0.5 TABLET: 24; 26 TABLET, FILM COATED ORAL at 21:10

## 2020-01-01 RX ADMIN — LINEZOLID 600 MG: 600 INJECTION, SOLUTION INTRAVENOUS at 23:49

## 2020-01-01 RX ADMIN — FIDAXOMICIN 200 MG: 200 TABLET, FILM COATED ORAL at 15:38

## 2020-01-01 RX ADMIN — PREGABALIN 75 MG: 75 CAPSULE ORAL at 08:53

## 2020-01-01 RX ADMIN — AMIODARONE HYDROCHLORIDE 200 MG: 200 TABLET ORAL at 08:42

## 2020-01-01 RX ADMIN — DIGOXIN 125 MCG: 125 TABLET ORAL at 17:54

## 2020-01-01 RX ADMIN — DIAZEPAM 5 MG: 5 TABLET ORAL at 21:50

## 2020-01-01 RX ADMIN — MORPHINE SULFATE 2 MG: 2 INJECTION, SOLUTION INTRAMUSCULAR; INTRAVENOUS at 21:45

## 2020-01-01 RX ADMIN — DIGOXIN 125 MCG: 125 TABLET ORAL at 10:10

## 2020-01-01 RX ADMIN — PREGABALIN 75 MG: 75 CAPSULE ORAL at 13:27

## 2020-01-01 RX ADMIN — LORAZEPAM 0.5 MG: 2 INJECTION INTRAMUSCULAR; INTRAVENOUS at 08:13

## 2020-01-01 RX ADMIN — MAGNESIUM HYDROXIDE 30 ML: 400 SUSPENSION ORAL at 01:59

## 2020-01-01 RX ADMIN — HEPARIN SODIUM 5000 UNITS: 5000 INJECTION INTRAVENOUS; SUBCUTANEOUS at 21:45

## 2020-01-01 RX ADMIN — DIGOXIN 125 MCG: 125 TABLET ORAL at 08:50

## 2020-01-01 RX ADMIN — DIGOXIN 125 MCG: 125 TABLET ORAL at 09:10

## 2020-01-01 RX ADMIN — FAMOTIDINE 20 MG: 20 TABLET ORAL at 07:57

## 2020-01-01 RX ADMIN — MORPHINE SULFATE 2 MG: 2 INJECTION, SOLUTION INTRAMUSCULAR; INTRAVENOUS at 00:34

## 2020-01-01 RX ADMIN — MIDODRINE HYDROCHLORIDE 5 MG: 5 TABLET ORAL at 08:16

## 2020-01-01 RX ADMIN — PREGABALIN 75 MG: 75 CAPSULE ORAL at 06:05

## 2020-01-01 RX ADMIN — CARVEDILOL 3.12 MG: 3.12 TABLET, FILM COATED ORAL at 17:37

## 2020-01-01 RX ADMIN — MORPHINE SULFATE 15 MG: 15 TABLET, FILM COATED, EXTENDED RELEASE ORAL at 07:57

## 2020-01-01 RX ADMIN — PREGABALIN 75 MG: 75 CAPSULE ORAL at 08:43

## 2020-01-01 RX ADMIN — MORPHINE SULFATE 2 MG: 2 INJECTION, SOLUTION INTRAMUSCULAR; INTRAVENOUS at 07:29

## 2020-01-01 RX ADMIN — FIDAXOMICIN 200 MG: 200 TABLET, FILM COATED ORAL at 08:58

## 2020-01-01 RX ADMIN — HEPARIN SODIUM 5000 UNITS: 5000 INJECTION INTRAVENOUS; SUBCUTANEOUS at 15:11

## 2020-01-01 RX ADMIN — HYDROCODONE BITARTRATE AND ACETAMINOPHEN 2 TABLET: 5; 325 TABLET ORAL at 16:19

## 2020-01-01 RX ADMIN — Medication 10 ML: at 21:36

## 2020-01-01 RX ADMIN — PREGABALIN 75 MG: 75 CAPSULE ORAL at 05:36

## 2020-01-01 RX ADMIN — FUROSEMIDE 5 MG/HR: 10 INJECTION, SOLUTION INTRAVENOUS at 04:51

## 2020-01-01 RX ADMIN — HYDROCODONE BITARTRATE AND ACETAMINOPHEN 1 TABLET: 5; 325 TABLET ORAL at 02:13

## 2020-01-01 RX ADMIN — SODIUM POLYSTYRENE SULFONATE 15 G: 15 SUSPENSION ORAL; RECTAL at 11:16

## 2020-01-01 RX ADMIN — HEPARIN SODIUM 5000 UNITS: 5000 INJECTION INTRAVENOUS; SUBCUTANEOUS at 16:25

## 2020-01-01 RX ADMIN — Medication 10 ML: at 20:25

## 2020-01-01 RX ADMIN — ASPIRIN 325 MG: 325 TABLET, DELAYED RELEASE ORAL at 12:20

## 2020-01-01 RX ADMIN — Medication 10 ML: at 10:18

## 2020-01-01 RX ADMIN — AMIODARONE HYDROCHLORIDE 200 MG: 200 TABLET ORAL at 09:12

## 2020-01-01 RX ADMIN — MORPHINE SULFATE 2 MG: 2 INJECTION, SOLUTION INTRAMUSCULAR; INTRAVENOUS at 13:37

## 2020-01-01 RX ADMIN — SODIUM CHLORIDE 1000 ML: 9 INJECTION, SOLUTION INTRAVENOUS at 14:36

## 2020-01-01 RX ADMIN — FAMOTIDINE 20 MG: 20 TABLET ORAL at 08:53

## 2020-01-01 RX ADMIN — DIAZEPAM 5 MG: 5 TABLET ORAL at 09:34

## 2020-01-01 RX ADMIN — SACUBITRIL AND VALSARTAN 0.5 TABLET: 24; 26 TABLET, FILM COATED ORAL at 10:11

## 2020-01-01 RX ADMIN — Medication 10 ML: at 20:31

## 2020-01-01 RX ADMIN — EPLERENONE 25 MG: 25 TABLET, FILM COATED ORAL at 14:25

## 2020-01-01 RX ADMIN — HYDROCODONE BITARTRATE AND ACETAMINOPHEN 2 TABLET: 5; 325 TABLET ORAL at 18:52

## 2020-01-01 RX ADMIN — MORPHINE SULFATE 15 MG: 15 TABLET, FILM COATED, EXTENDED RELEASE ORAL at 20:27

## 2020-01-01 RX ADMIN — MORPHINE SULFATE 2 MG: 2 INJECTION, SOLUTION INTRAMUSCULAR; INTRAVENOUS at 06:30

## 2020-01-01 RX ADMIN — SODIUM CHLORIDE 500 ML: 9 INJECTION, SOLUTION INTRAVENOUS at 01:07

## 2020-01-01 RX ADMIN — DIAZEPAM 5 MG: 5 TABLET ORAL at 14:39

## 2020-01-01 RX ADMIN — ONDANSETRON 4 MG: 2 INJECTION INTRAMUSCULAR; INTRAVENOUS at 21:41

## 2020-01-01 RX ADMIN — MIDODRINE HYDROCHLORIDE 5 MG: 5 TABLET ORAL at 17:16

## 2020-01-01 RX ADMIN — Medication 10 ML: at 20:44

## 2020-01-01 RX ADMIN — LORAZEPAM 1 MG: 2 INJECTION INTRAMUSCULAR; INTRAVENOUS at 03:52

## 2020-01-01 RX ADMIN — MORPHINE SULFATE 15 MG: 15 TABLET, FILM COATED, EXTENDED RELEASE ORAL at 21:11

## 2020-01-01 RX ADMIN — DESMOPRESSIN ACETATE 40 MG: 0.2 TABLET ORAL at 20:24

## 2020-01-01 RX ADMIN — DULOXETINE HYDROCHLORIDE 30 MG: 30 CAPSULE, DELAYED RELEASE ORAL at 08:54

## 2020-01-01 RX ADMIN — MILRINONE LACTATE IN DEXTROSE 0.2 MCG/KG/MIN: 200 INJECTION, SOLUTION INTRAVENOUS at 04:51

## 2020-01-01 RX ADMIN — LISINOPRIL 5 MG: 5 TABLET ORAL at 12:20

## 2020-01-01 RX ADMIN — KETOROLAC TROMETHAMINE 15 MG: 30 INJECTION, SOLUTION INTRAMUSCULAR; INTRAVENOUS at 23:34

## 2020-01-01 RX ADMIN — FUROSEMIDE 40 MG: 40 TABLET ORAL at 17:23

## 2020-01-01 RX ADMIN — BUSPIRONE HYDROCHLORIDE 10 MG: 5 TABLET ORAL at 20:27

## 2020-01-01 RX ADMIN — Medication 10 ML: at 20:27

## 2020-01-01 RX ADMIN — SODIUM CHLORIDE: 9 INJECTION, SOLUTION INTRAVENOUS at 04:43

## 2020-01-01 RX ADMIN — AMIODARONE HYDROCHLORIDE 200 MG: 200 TABLET ORAL at 10:32

## 2020-01-01 RX ADMIN — MORPHINE SULFATE 4 MG: 100 SOLUTION ORAL at 23:34

## 2020-01-01 RX ADMIN — FUROSEMIDE 40 MG: 10 INJECTION, SOLUTION INTRAMUSCULAR; INTRAVENOUS at 18:29

## 2020-01-01 RX ADMIN — DIGOXIN 125 MCG: 125 TABLET ORAL at 07:57

## 2020-01-01 RX ADMIN — MORPHINE SULFATE 4 MG: 100 SOLUTION ORAL at 19:40

## 2020-01-01 RX ADMIN — HYDROCODONE BITARTRATE AND ACETAMINOPHEN 1 TABLET: 5; 325 TABLET ORAL at 07:50

## 2020-01-01 RX ADMIN — Medication 10 ML: at 08:35

## 2020-01-01 RX ADMIN — MORPHINE SULFATE 2 MG: 2 INJECTION, SOLUTION INTRAMUSCULAR; INTRAVENOUS at 13:31

## 2020-01-01 RX ADMIN — FUROSEMIDE 40 MG: 10 INJECTION, SOLUTION INTRAMUSCULAR; INTRAVENOUS at 04:23

## 2020-01-01 RX ADMIN — PANTOPRAZOLE SODIUM 40 MG: 40 INJECTION, POWDER, LYOPHILIZED, FOR SOLUTION INTRAVENOUS at 09:13

## 2020-01-01 RX ADMIN — DIGOXIN 125 MCG: 125 TABLET ORAL at 08:55

## 2020-01-01 RX ADMIN — SODIUM CHLORIDE 999 ML/HR: 9 INJECTION, SOLUTION INTRAVENOUS at 23:11

## 2020-01-01 RX ADMIN — LINEZOLID 600 MG: 600 INJECTION, SOLUTION INTRAVENOUS at 12:26

## 2020-01-01 RX ADMIN — ASPIRIN 325 MG: 325 TABLET, DELAYED RELEASE ORAL at 08:50

## 2020-01-01 RX ADMIN — PREGABALIN 75 MG: 75 CAPSULE ORAL at 14:39

## 2020-01-01 RX ADMIN — HYDROCODONE BITARTRATE AND ACETAMINOPHEN 2 TABLET: 5; 325 TABLET ORAL at 08:13

## 2020-01-01 RX ADMIN — Medication 10 ML: at 08:02

## 2020-01-01 RX ADMIN — PREGABALIN 75 MG: 75 CAPSULE ORAL at 07:57

## 2020-01-01 RX ADMIN — SACUBITRIL AND VALSARTAN 0.5 TABLET: 24; 26 TABLET, FILM COATED ORAL at 20:32

## 2020-01-01 RX ADMIN — MILRINONE LACTATE IN DEXTROSE 0.1 MCG/KG/MIN: 200 INJECTION, SOLUTION INTRAVENOUS at 17:05

## 2020-01-01 RX ADMIN — Medication 10 ML: at 08:37

## 2020-01-01 RX ADMIN — DIGOXIN 125 MCG: 125 TABLET ORAL at 08:08

## 2020-01-01 RX ADMIN — FIDAXOMICIN 200 MG: 200 TABLET, FILM COATED ORAL at 09:12

## 2020-01-01 RX ADMIN — DULOXETINE HYDROCHLORIDE 30 MG: 30 CAPSULE, DELAYED RELEASE ORAL at 10:32

## 2020-01-01 RX ADMIN — MORPHINE SULFATE 15 MG: 15 TABLET, FILM COATED, EXTENDED RELEASE ORAL at 08:14

## 2020-01-01 RX ADMIN — Medication 10 ML: at 20:16

## 2020-01-01 RX ADMIN — SACUBITRIL AND VALSARTAN 0.5 TABLET: 24; 26 TABLET, FILM COATED ORAL at 10:19

## 2020-01-01 RX ADMIN — BUSPIRONE HYDROCHLORIDE 10 MG: 5 TABLET ORAL at 20:45

## 2020-01-01 RX ADMIN — MIDODRINE HYDROCHLORIDE 5 MG: 5 TABLET ORAL at 16:30

## 2020-01-01 RX ADMIN — MIDODRINE HYDROCHLORIDE 5 MG: 5 TABLET ORAL at 10:38

## 2020-01-01 RX ADMIN — MORPHINE SULFATE 15 MG: 15 TABLET, FILM COATED, EXTENDED RELEASE ORAL at 10:45

## 2020-01-01 RX ADMIN — PREGABALIN 75 MG: 75 CAPSULE ORAL at 20:45

## 2020-01-01 RX ADMIN — DIAZEPAM 5 MG: 5 TABLET ORAL at 08:54

## 2020-01-01 RX ADMIN — PANTOPRAZOLE SODIUM 40 MG: 40 INJECTION, POWDER, LYOPHILIZED, FOR SOLUTION INTRAVENOUS at 08:08

## 2020-01-01 RX ADMIN — LINEZOLID 600 MG: 600 INJECTION, SOLUTION INTRAVENOUS at 12:17

## 2020-01-01 RX ADMIN — Medication 10 ML: at 08:09

## 2020-01-01 RX ADMIN — TORSEMIDE 40 MG: 20 TABLET ORAL at 09:10

## 2020-01-01 RX ADMIN — TOLVAPTAN 15 MG: 15 TABLET ORAL at 12:18

## 2020-01-01 RX ADMIN — LORAZEPAM 0.5 MG: 0.5 TABLET ORAL at 04:02

## 2020-01-01 RX ADMIN — MORPHINE SULFATE 2 MG: 2 INJECTION, SOLUTION INTRAMUSCULAR; INTRAVENOUS at 04:24

## 2020-01-01 RX ADMIN — IOPAMIDOL 75 ML: 755 INJECTION, SOLUTION INTRAVENOUS at 15:32

## 2020-01-01 RX ADMIN — CARVEDILOL 3.12 MG: 3.12 TABLET, FILM COATED ORAL at 17:16

## 2020-01-01 RX ADMIN — MELATONIN TAB 3 MG 9 MG: 3 TAB at 22:58

## 2020-01-01 RX ADMIN — DESMOPRESSIN ACETATE 40 MG: 0.2 TABLET ORAL at 21:41

## 2020-01-01 RX ADMIN — PREGABALIN 75 MG: 75 CAPSULE ORAL at 21:10

## 2020-01-01 RX ADMIN — AMIODARONE HYDROCHLORIDE 200 MG: 200 TABLET ORAL at 08:13

## 2020-01-01 RX ADMIN — PREGABALIN 75 MG: 75 CAPSULE ORAL at 06:27

## 2020-01-01 RX ADMIN — DULOXETINE HYDROCHLORIDE 30 MG: 30 CAPSULE, DELAYED RELEASE ORAL at 09:02

## 2020-01-01 RX ADMIN — MIDODRINE HYDROCHLORIDE 5 MG: 5 TABLET ORAL at 16:41

## 2020-01-01 RX ADMIN — DIGOXIN 125 MCG: 125 TABLET ORAL at 09:12

## 2020-01-01 RX ADMIN — SACUBITRIL AND VALSARTAN 0.5 TABLET: 24; 26 TABLET, FILM COATED ORAL at 10:45

## 2020-01-01 RX ADMIN — CARVEDILOL 3.12 MG: 3.12 TABLET, FILM COATED ORAL at 08:08

## 2020-01-01 RX ADMIN — Medication 10 ML: at 19:50

## 2020-01-01 RX ADMIN — DOBUTAMINE IN DEXTROSE 2.5 MCG/KG/MIN: 200 INJECTION, SOLUTION INTRAVENOUS at 09:40

## 2020-01-01 RX ADMIN — LORAZEPAM 1 MG: 2 INJECTION INTRAMUSCULAR; INTRAVENOUS at 10:14

## 2020-01-01 RX ADMIN — HYDROXYZINE HYDROCHLORIDE 25 MG: 25 TABLET, FILM COATED ORAL at 01:15

## 2020-01-01 RX ADMIN — Medication 10 ML: at 20:13

## 2020-01-01 RX ADMIN — HEPARIN SODIUM 5000 UNITS: 5000 INJECTION INTRAVENOUS; SUBCUTANEOUS at 06:27

## 2020-01-01 RX ADMIN — MIDODRINE HYDROCHLORIDE 5 MG: 5 TABLET ORAL at 07:30

## 2020-01-01 RX ADMIN — FENTANYL CITRATE 50 MCG: 50 INJECTION INTRAMUSCULAR; INTRAVENOUS at 15:34

## 2020-01-01 RX ADMIN — MIDODRINE HYDROCHLORIDE 5 MG: 5 TABLET ORAL at 08:55

## 2020-01-01 RX ADMIN — SACUBITRIL AND VALSARTAN 0.5 TABLET: 24; 26 TABLET, FILM COATED ORAL at 22:58

## 2020-01-01 RX ADMIN — Medication 10 ML: at 08:45

## 2020-01-01 RX ADMIN — MIDODRINE HYDROCHLORIDE 5 MG: 5 TABLET ORAL at 09:02

## 2020-01-01 RX ADMIN — PREGABALIN 75 MG: 75 CAPSULE ORAL at 13:46

## 2020-01-01 RX ADMIN — MIDODRINE HYDROCHLORIDE 5 MG: 5 TABLET ORAL at 18:32

## 2020-01-01 RX ADMIN — BUSPIRONE HYDROCHLORIDE 10 MG: 5 TABLET ORAL at 10:41

## 2020-01-01 RX ADMIN — Medication 10 ML: at 09:14

## 2020-01-01 RX ADMIN — TAMSULOSIN HYDROCHLORIDE 0.4 MG: 0.4 CAPSULE ORAL at 10:38

## 2020-01-01 RX ADMIN — EPLERENONE 25 MG: 25 TABLET, FILM COATED ORAL at 12:18

## 2020-01-01 RX ADMIN — ENOXAPARIN SODIUM 40 MG: 40 INJECTION SUBCUTANEOUS at 08:47

## 2020-01-01 RX ADMIN — MORPHINE SULFATE 2 MG: 2 INJECTION, SOLUTION INTRAMUSCULAR; INTRAVENOUS at 02:56

## 2020-01-01 RX ADMIN — AMIODARONE HYDROCHLORIDE 200 MG: 200 TABLET ORAL at 17:53

## 2020-01-01 RX ADMIN — SODIUM CHLORIDE: 9 INJECTION, SOLUTION INTRAVENOUS at 23:19

## 2020-01-01 RX ADMIN — SACUBITRIL AND VALSARTAN 0.5 TABLET: 24; 26 TABLET, FILM COATED ORAL at 19:53

## 2020-01-01 RX ADMIN — SACUBITRIL AND VALSARTAN 0.5 TABLET: 24; 26 TABLET, FILM COATED ORAL at 21:17

## 2020-01-01 RX ADMIN — PREGABALIN 150 MG: 75 CAPSULE ORAL at 18:32

## 2020-01-01 RX ADMIN — SACUBITRIL AND VALSARTAN 0.5 TABLET: 24; 26 TABLET, FILM COATED ORAL at 20:52

## 2020-01-01 RX ADMIN — Medication 10 ML: at 00:00

## 2020-01-01 RX ADMIN — FENTANYL CITRATE 50 MCG: 50 INJECTION INTRAMUSCULAR; INTRAVENOUS at 18:55

## 2020-01-01 RX ADMIN — MAGNESIUM HYDROXIDE 30 ML: 400 SUSPENSION ORAL at 08:54

## 2020-01-01 RX ADMIN — DULOXETINE HYDROCHLORIDE 30 MG: 30 CAPSULE, DELAYED RELEASE ORAL at 08:53

## 2020-01-01 RX ADMIN — Medication 10 ML: at 10:17

## 2020-01-01 RX ADMIN — FIDAXOMICIN 200 MG: 200 TABLET, FILM COATED ORAL at 20:12

## 2020-01-01 RX ADMIN — Medication 0.04 MCG/KG/MIN: at 01:14

## 2020-01-01 RX ADMIN — FENTANYL CITRATE 50 MCG: 50 INJECTION INTRAMUSCULAR; INTRAVENOUS at 14:36

## 2020-01-01 RX ADMIN — MORPHINE SULFATE 15 MG: 15 TABLET, FILM COATED, EXTENDED RELEASE ORAL at 21:50

## 2020-01-01 RX ADMIN — DIGOXIN 125 MCG: 125 TABLET ORAL at 08:42

## 2020-01-01 RX ADMIN — PREGABALIN 75 MG: 75 CAPSULE ORAL at 21:17

## 2020-01-01 RX ADMIN — EPLERENONE 25 MG: 25 TABLET, FILM COATED ORAL at 09:33

## 2020-01-01 RX ADMIN — FIDAXOMICIN 200 MG: 200 TABLET, FILM COATED ORAL at 20:32

## 2020-01-01 RX ADMIN — HYDROCODONE BITARTRATE AND ACETAMINOPHEN 2 TABLET: 5; 325 TABLET ORAL at 02:06

## 2020-01-01 RX ADMIN — SACUBITRIL AND VALSARTAN 0.5 TABLET: 24; 26 TABLET, FILM COATED ORAL at 08:42

## 2020-01-01 RX ADMIN — EPLERENONE 25 MG: 25 TABLET, FILM COATED ORAL at 20:52

## 2020-01-01 RX ADMIN — POLYETHYLENE GLYCOL 3350 17 G: 17 POWDER, FOR SOLUTION ORAL at 13:04

## 2020-01-01 RX ADMIN — MIDODRINE HYDROCHLORIDE 5 MG: 5 TABLET ORAL at 12:20

## 2020-01-01 RX ADMIN — MIDODRINE HYDROCHLORIDE 5 MG: 5 TABLET ORAL at 08:19

## 2020-01-01 RX ADMIN — TAMSULOSIN HYDROCHLORIDE 0.4 MG: 0.4 CAPSULE ORAL at 10:32

## 2020-01-01 RX ADMIN — BUSPIRONE HYDROCHLORIDE 10 MG: 5 TABLET ORAL at 07:56

## 2020-01-01 RX ADMIN — MELATONIN TAB 3 MG 9 MG: 3 TAB at 20:45

## 2020-01-01 RX ADMIN — DIGOXIN 125 MCG: 125 TABLET ORAL at 10:38

## 2020-01-01 RX ADMIN — FUROSEMIDE 40 MG: 10 INJECTION, SOLUTION INTRAMUSCULAR; INTRAVENOUS at 23:19

## 2020-01-01 RX ADMIN — Medication 0.03 MCG/KG/MIN: at 01:06

## 2020-01-01 RX ADMIN — CARVEDILOL 3.12 MG: 3.12 TABLET, FILM COATED ORAL at 10:39

## 2020-01-01 RX ADMIN — TOLVAPTAN 15 MG: 15 TABLET ORAL at 11:37

## 2020-01-01 RX ADMIN — DIGOXIN 125 MCG: 125 TABLET ORAL at 10:11

## 2020-01-01 RX ADMIN — ONDANSETRON 4 MG: 2 INJECTION INTRAMUSCULAR; INTRAVENOUS at 20:21

## 2020-01-01 RX ADMIN — Medication 10 ML: at 09:37

## 2020-01-01 RX ADMIN — MELATONIN TAB 3 MG 9 MG: 3 TAB at 23:28

## 2020-01-01 RX ADMIN — AMIODARONE HYDROCHLORIDE 200 MG: 200 TABLET ORAL at 08:35

## 2020-01-01 RX ADMIN — DOBUTAMINE IN DEXTROSE 1.5 MCG/KG/MIN: 200 INJECTION, SOLUTION INTRAVENOUS at 10:27

## 2020-01-01 RX ADMIN — SACUBITRIL AND VALSARTAN 0.5 TABLET: 24; 26 TABLET, FILM COATED ORAL at 08:58

## 2020-01-01 RX ADMIN — Medication 10 ML: at 20:51

## 2020-01-01 RX ADMIN — MIDODRINE HYDROCHLORIDE 5 MG: 5 TABLET ORAL at 10:54

## 2020-01-01 RX ADMIN — HYDROCODONE BITARTRATE AND ACETAMINOPHEN 2 TABLET: 5; 325 TABLET ORAL at 09:33

## 2020-01-01 RX ADMIN — DIAZEPAM 5 MG: 5 TABLET ORAL at 12:47

## 2020-01-01 RX ADMIN — BUSPIRONE HYDROCHLORIDE 10 MG: 5 TABLET ORAL at 20:24

## 2020-01-01 RX ADMIN — DESMOPRESSIN ACETATE 40 MG: 0.2 TABLET ORAL at 21:45

## 2020-01-01 RX ADMIN — LINEZOLID 600 MG: 600 INJECTION, SOLUTION INTRAVENOUS at 01:10

## 2020-01-01 RX ADMIN — BUSPIRONE HYDROCHLORIDE 10 MG: 5 TABLET ORAL at 21:04

## 2020-01-01 RX ADMIN — Medication 10 ML: at 21:18

## 2020-01-01 RX ADMIN — FAMOTIDINE 20 MG: 20 TABLET ORAL at 08:19

## 2020-01-01 RX ADMIN — HEPARIN SODIUM 5000 UNITS: 5000 INJECTION INTRAVENOUS; SUBCUTANEOUS at 23:48

## 2020-01-01 RX ADMIN — ONDANSETRON 4 MG: 2 INJECTION INTRAMUSCULAR; INTRAVENOUS at 06:31

## 2020-01-01 RX ADMIN — FIDAXOMICIN 200 MG: 200 TABLET, FILM COATED ORAL at 10:10

## 2020-01-01 RX ADMIN — CALCIUM GLUCONATE 1 G: 98 INJECTION, SOLUTION INTRAVENOUS at 06:03

## 2020-01-01 RX ADMIN — CARVEDILOL 3.12 MG: 3.12 TABLET, FILM COATED ORAL at 16:30

## 2020-01-01 RX ADMIN — BUSPIRONE HYDROCHLORIDE 10 MG: 5 TABLET ORAL at 10:37

## 2020-01-01 RX ADMIN — DIAZEPAM 5 MG: 5 TABLET ORAL at 10:54

## 2020-01-01 RX ADMIN — MORPHINE SULFATE 15 MG: 15 TABLET, FILM COATED, EXTENDED RELEASE ORAL at 20:45

## 2020-01-01 RX ADMIN — DIAZEPAM 5 MG: 5 TABLET ORAL at 21:11

## 2020-01-01 RX ADMIN — MIDODRINE HYDROCHLORIDE 5 MG: 5 TABLET ORAL at 17:05

## 2020-01-01 RX ADMIN — CARVEDILOL 3.12 MG: 3.12 TABLET, FILM COATED ORAL at 09:12

## 2020-01-01 RX ADMIN — Medication 10 ML: at 08:18

## 2020-01-01 RX ADMIN — AMIODARONE HYDROCHLORIDE 200 MG: 200 TABLET ORAL at 07:57

## 2020-01-01 RX ADMIN — ASPIRIN 325 MG: 325 TABLET, DELAYED RELEASE ORAL at 08:13

## 2020-01-01 RX ADMIN — EPLERENONE 25 MG: 25 TABLET, FILM COATED ORAL at 10:21

## 2020-01-01 RX ADMIN — FIDAXOMICIN 200 MG: 200 TABLET, FILM COATED ORAL at 10:11

## 2020-01-01 RX ADMIN — ENOXAPARIN SODIUM 40 MG: 40 INJECTION SUBCUTANEOUS at 07:56

## 2020-01-01 RX ADMIN — FUROSEMIDE 40 MG: 10 INJECTION, SOLUTION INTRAMUSCULAR; INTRAVENOUS at 18:52

## 2020-01-01 RX ADMIN — DIGOXIN 125 MCG: 125 TABLET ORAL at 08:12

## 2020-01-01 RX ADMIN — TAMSULOSIN HYDROCHLORIDE 0.4 MG: 0.4 CAPSULE ORAL at 09:10

## 2020-01-01 RX ADMIN — FIDAXOMICIN 200 MG: 200 TABLET, FILM COATED ORAL at 20:40

## 2020-01-01 RX ADMIN — Medication 10 ML: at 07:58

## 2020-01-01 RX ADMIN — CARVEDILOL 3.12 MG: 3.12 TABLET, FILM COATED ORAL at 09:10

## 2020-01-01 RX ADMIN — MELATONIN TAB 3 MG 9 MG: 3 TAB at 21:20

## 2020-01-01 RX ADMIN — CARVEDILOL 3.12 MG: 3.12 TABLET, FILM COATED ORAL at 16:56

## 2020-01-01 RX ADMIN — MILRINONE LACTATE 0.2 MCG/KG/MIN: 200 INJECTION, SOLUTION INTRAVENOUS at 15:23

## 2020-01-01 RX ADMIN — BUSPIRONE HYDROCHLORIDE 10 MG: 5 TABLET ORAL at 08:42

## 2020-01-01 RX ADMIN — MORPHINE SULFATE 2 MG: 2 INJECTION, SOLUTION INTRAMUSCULAR; INTRAVENOUS at 10:15

## 2020-01-01 RX ADMIN — MORPHINE SULFATE 15 MG: 15 TABLET, FILM COATED, EXTENDED RELEASE ORAL at 10:37

## 2020-01-01 RX ADMIN — Medication 10 ML: at 21:11

## 2020-01-01 RX ADMIN — FUROSEMIDE 40 MG: 10 INJECTION, SOLUTION INTRAMUSCULAR; INTRAVENOUS at 11:35

## 2020-01-01 RX ADMIN — AMIODARONE HYDROCHLORIDE 200 MG: 200 TABLET ORAL at 08:08

## 2020-01-01 RX ADMIN — PREGABALIN 75 MG: 75 CAPSULE ORAL at 13:34

## 2020-01-01 RX ADMIN — AMIODARONE HYDROCHLORIDE 200 MG: 200 TABLET ORAL at 08:55

## 2020-01-01 RX ADMIN — Medication 10 ML: at 10:12

## 2020-01-01 RX ADMIN — PANTOPRAZOLE SODIUM 40 MG: 40 INJECTION, POWDER, LYOPHILIZED, FOR SOLUTION INTRAVENOUS at 09:36

## 2020-01-01 RX ADMIN — AMIODARONE HYDROCHLORIDE 200 MG: 200 TABLET ORAL at 10:12

## 2020-01-01 RX ADMIN — DIAZEPAM 5 MG: 5 TABLET ORAL at 08:44

## 2020-01-01 RX ADMIN — FIDAXOMICIN 200 MG: 200 TABLET, FILM COATED ORAL at 17:54

## 2020-01-01 RX ADMIN — Medication 10 ML: at 09:03

## 2020-01-01 RX ADMIN — MIDODRINE HYDROCHLORIDE 5 MG: 5 TABLET ORAL at 10:10

## 2020-01-01 RX ADMIN — MILRINONE LACTATE 0.2 MCG/KG/MIN: 200 INJECTION, SOLUTION INTRAVENOUS at 20:40

## 2020-01-01 RX ADMIN — EPLERENONE 25 MG: 25 TABLET, FILM COATED ORAL at 10:45

## 2020-01-01 RX ADMIN — CARVEDILOL 3.12 MG: 3.12 TABLET, FILM COATED ORAL at 08:55

## 2020-01-01 RX ADMIN — FUROSEMIDE 5 MG/HR: 10 INJECTION, SOLUTION INTRAVENOUS at 03:02

## 2020-01-01 RX ADMIN — DULOXETINE HYDROCHLORIDE 30 MG: 30 CAPSULE, DELAYED RELEASE ORAL at 12:20

## 2020-01-01 RX ADMIN — ONDANSETRON 4 MG: 2 INJECTION INTRAMUSCULAR; INTRAVENOUS at 20:45

## 2020-01-01 RX ADMIN — ENOXAPARIN SODIUM 40 MG: 40 INJECTION SUBCUTANEOUS at 08:53

## 2020-01-01 RX ADMIN — EPLERENONE 25 MG: 25 TABLET, FILM COATED ORAL at 09:37

## 2020-01-01 RX ADMIN — DULOXETINE HYDROCHLORIDE 30 MG: 30 CAPSULE, DELAYED RELEASE ORAL at 10:38

## 2020-01-01 RX ADMIN — FUROSEMIDE 5 MG/HR: 10 INJECTION, SOLUTION INTRAVENOUS at 11:36

## 2020-01-01 RX ADMIN — MIDODRINE HYDROCHLORIDE 5 MG: 5 TABLET ORAL at 08:43

## 2020-01-01 RX ADMIN — SACUBITRIL AND VALSARTAN 0.5 TABLET: 24; 26 TABLET, FILM COATED ORAL at 21:45

## 2020-01-01 RX ADMIN — EPLERENONE 25 MG: 25 TABLET, FILM COATED ORAL at 12:13

## 2020-01-01 RX ADMIN — MIDODRINE HYDROCHLORIDE 5 MG: 5 TABLET ORAL at 12:28

## 2020-01-01 RX ADMIN — ENOXAPARIN SODIUM 40 MG: 40 INJECTION SUBCUTANEOUS at 08:45

## 2020-01-01 RX ADMIN — FAMOTIDINE 20 MG: 20 TABLET ORAL at 08:13

## 2020-01-01 RX ADMIN — FUROSEMIDE 40 MG: 10 INJECTION, SOLUTION INTRAMUSCULAR; INTRAVENOUS at 08:12

## 2020-01-01 RX ADMIN — DIAZEPAM 5 MG: 5 TABLET ORAL at 10:25

## 2020-01-01 RX ADMIN — CARVEDILOL 3.12 MG: 3.12 TABLET, FILM COATED ORAL at 17:53

## 2020-01-01 RX ADMIN — DIGOXIN 125 MCG: 125 TABLET ORAL at 08:19

## 2020-01-01 RX ADMIN — DIAZEPAM 5 MG: 5 TABLET ORAL at 13:53

## 2020-01-01 RX ADMIN — PANTOPRAZOLE SODIUM 40 MG: 40 INJECTION, POWDER, LYOPHILIZED, FOR SOLUTION INTRAVENOUS at 08:36

## 2020-01-01 RX ADMIN — FUROSEMIDE 40 MG: 40 TABLET ORAL at 12:20

## 2020-01-01 RX ADMIN — Medication 10 ML: at 10:04

## 2020-01-01 RX ADMIN — AMIODARONE HYDROCHLORIDE 200 MG: 200 TABLET ORAL at 09:02

## 2020-01-01 RX ADMIN — BUSPIRONE HYDROCHLORIDE 10 MG: 5 TABLET ORAL at 08:54

## 2020-01-01 RX ADMIN — PANTOPRAZOLE SODIUM 40 MG: 40 INJECTION, POWDER, LYOPHILIZED, FOR SOLUTION INTRAVENOUS at 08:35

## 2020-01-01 RX ADMIN — HEPARIN SODIUM 5000 UNITS: 5000 INJECTION INTRAVENOUS; SUBCUTANEOUS at 21:04

## 2020-01-01 RX ADMIN — TORSEMIDE 40 MG: 20 TABLET ORAL at 10:38

## 2020-01-01 RX ADMIN — MIDODRINE HYDROCHLORIDE 5 MG: 5 TABLET ORAL at 12:11

## 2020-01-01 RX ADMIN — MORPHINE SULFATE 15 MG: 15 TABLET, FILM COATED, EXTENDED RELEASE ORAL at 09:01

## 2020-01-01 RX ADMIN — DOCUSATE SODIUM 100 MG: 100 CAPSULE ORAL at 10:19

## 2020-01-01 RX ADMIN — DULOXETINE HYDROCHLORIDE 30 MG: 30 CAPSULE, DELAYED RELEASE ORAL at 09:10

## 2020-01-01 RX ADMIN — EPLERENONE 25 MG: 25 TABLET, FILM COATED ORAL at 08:08

## 2020-01-01 RX ADMIN — PREGABALIN 75 MG: 75 CAPSULE ORAL at 21:40

## 2020-01-01 RX ADMIN — EPLERENONE 25 MG: 25 TABLET, FILM COATED ORAL at 17:54

## 2020-01-01 RX ADMIN — DEXTROSE MONOHYDRATE 25 G: 500 INJECTION PARENTERAL at 01:16

## 2020-01-01 RX ADMIN — MORPHINE SULFATE 2 MG: 2 INJECTION, SOLUTION INTRAMUSCULAR; INTRAVENOUS at 20:21

## 2020-01-01 RX ADMIN — CARVEDILOL 3.12 MG: 3.12 TABLET, FILM COATED ORAL at 18:29

## 2020-01-01 RX ADMIN — PREGABALIN 75 MG: 75 CAPSULE ORAL at 12:47

## 2020-01-01 RX ADMIN — CARVEDILOL 3.12 MG: 3.12 TABLET, FILM COATED ORAL at 16:41

## 2020-01-01 RX ADMIN — PREGABALIN 75 MG: 75 CAPSULE ORAL at 13:53

## 2020-01-01 RX ADMIN — EPLERENONE 25 MG: 25 TABLET, FILM COATED ORAL at 09:15

## 2020-01-01 RX ADMIN — SACUBITRIL AND VALSARTAN 0.5 TABLET: 24; 26 TABLET, FILM COATED ORAL at 20:51

## 2020-01-01 RX ADMIN — MORPHINE SULFATE 15 MG: 15 TABLET, FILM COATED, EXTENDED RELEASE ORAL at 08:19

## 2020-01-01 RX ADMIN — SACUBITRIL AND VALSARTAN 0.5 TABLET: 24; 26 TABLET, FILM COATED ORAL at 10:52

## 2020-01-01 RX ADMIN — Medication 10 ML: at 09:36

## 2020-01-01 RX ADMIN — HEPARIN SODIUM 5000 UNITS: 5000 INJECTION INTRAVENOUS; SUBCUTANEOUS at 14:26

## 2020-01-01 RX ADMIN — EPLERENONE 25 MG: 25 TABLET, FILM COATED ORAL at 08:59

## 2020-01-01 RX ADMIN — Medication 10 ML: at 10:39

## 2020-01-01 RX ADMIN — BUSPIRONE HYDROCHLORIDE 10 MG: 5 TABLET ORAL at 08:53

## 2020-01-01 RX ADMIN — EPLERENONE 25 MG: 25 TABLET, FILM COATED ORAL at 18:51

## 2020-01-01 RX ADMIN — MILRINONE LACTATE IN DEXTROSE 0.2 MCG/KG/MIN: 200 INJECTION, SOLUTION INTRAVENOUS at 16:35

## 2020-01-01 RX ADMIN — FUROSEMIDE 40 MG: 40 TABLET ORAL at 08:43

## 2020-01-01 RX ADMIN — DIAZEPAM 5 MG: 5 TABLET ORAL at 20:25

## 2020-01-01 RX ADMIN — HEPARIN SODIUM 5000 UNITS: 5000 INJECTION INTRAVENOUS; SUBCUTANEOUS at 05:55

## 2020-01-01 RX ADMIN — SODIUM CHLORIDE: 9 INJECTION, SOLUTION INTRAVENOUS at 13:04

## 2020-01-01 RX ADMIN — HYDROCODONE BITARTRATE AND ACETAMINOPHEN 2 TABLET: 5; 325 TABLET ORAL at 02:47

## 2020-01-01 RX ADMIN — NOREPINEPHRINE BITARTRATE 0.03 MCG/KG/MIN: 1 INJECTION, SOLUTION, CONCENTRATE INTRAVENOUS at 00:11

## 2020-01-01 RX ADMIN — HYDROCODONE BITARTRATE AND ACETAMINOPHEN 1 TABLET: 5; 325 TABLET ORAL at 21:17

## 2020-01-01 RX ADMIN — PREGABALIN 150 MG: 75 CAPSULE ORAL at 18:29

## 2020-01-01 RX ADMIN — FAMOTIDINE 20 MG: 20 TABLET ORAL at 08:54

## 2020-01-01 RX ADMIN — MORPHINE SULFATE 4 MG: 100 SOLUTION ORAL at 10:15

## 2020-01-01 RX ADMIN — DESMOPRESSIN ACETATE 40 MG: 0.2 TABLET ORAL at 21:11

## 2020-01-01 RX ADMIN — DIGOXIN 125 MCG: 125 TABLET ORAL at 10:17

## 2020-01-01 RX ADMIN — FENTANYL CITRATE 50 MCG: 50 INJECTION INTRAMUSCULAR; INTRAVENOUS at 17:10

## 2020-01-01 RX ADMIN — TORSEMIDE 40 MG: 20 TABLET ORAL at 22:12

## 2020-01-01 RX ADMIN — MORPHINE SULFATE 2 MG: 2 INJECTION, SOLUTION INTRAMUSCULAR; INTRAVENOUS at 09:29

## 2020-01-01 RX ADMIN — DESMOPRESSIN ACETATE 40 MG: 0.2 TABLET ORAL at 21:17

## 2020-01-01 RX ADMIN — DULOXETINE HYDROCHLORIDE 30 MG: 30 CAPSULE, DELAYED RELEASE ORAL at 07:57

## 2020-01-01 RX ADMIN — MIDODRINE HYDROCHLORIDE 5 MG: 5 TABLET ORAL at 18:29

## 2020-01-01 RX ADMIN — Medication 10 ML: at 12:28

## 2020-01-01 RX ADMIN — ASPIRIN 325 MG: 325 TABLET, DELAYED RELEASE ORAL at 08:44

## 2020-01-01 RX ADMIN — CARVEDILOL 3.12 MG: 3.12 TABLET, FILM COATED ORAL at 16:59

## 2020-01-01 RX ADMIN — BENZOCAINE AND MENTHOL 1 LOZENGE: 15; 3.6 LOZENGE ORAL at 10:24

## 2020-01-01 RX ADMIN — LINEZOLID 600 MG: 600 INJECTION, SOLUTION INTRAVENOUS at 00:12

## 2020-01-01 RX ADMIN — MIDODRINE HYDROCHLORIDE 5 MG: 5 TABLET ORAL at 16:19

## 2020-01-01 RX ADMIN — ONDANSETRON 4 MG: 2 INJECTION INTRAMUSCULAR; INTRAVENOUS at 08:13

## 2020-01-01 RX ADMIN — HEPARIN SODIUM 5000 UNITS: 5000 INJECTION INTRAVENOUS; SUBCUTANEOUS at 21:34

## 2020-01-01 RX ADMIN — ONDANSETRON 4 MG: 2 INJECTION INTRAMUSCULAR; INTRAVENOUS at 14:39

## 2020-01-01 RX ADMIN — BUSPIRONE HYDROCHLORIDE 10 MG: 5 TABLET ORAL at 21:45

## 2020-01-01 RX ADMIN — MORPHINE SULFATE 15 MG: 15 TABLET, FILM COATED, EXTENDED RELEASE ORAL at 21:04

## 2020-01-01 RX ADMIN — EPLERENONE 25 MG: 25 TABLET, FILM COATED ORAL at 10:51

## 2020-01-01 RX ADMIN — AMIODARONE HYDROCHLORIDE 200 MG: 200 TABLET ORAL at 08:50

## 2020-01-01 RX ADMIN — CARVEDILOL 3.12 MG: 3.12 TABLET, FILM COATED ORAL at 10:12

## 2020-01-01 RX ADMIN — AMIODARONE HYDROCHLORIDE 200 MG: 200 TABLET ORAL at 08:19

## 2020-01-01 RX ADMIN — HEPARIN SODIUM 5000 UNITS: 5000 INJECTION INTRAVENOUS; SUBCUTANEOUS at 14:57

## 2020-01-01 RX ADMIN — Medication 10 ML: at 08:20

## 2020-01-01 RX ADMIN — MORPHINE SULFATE 2 MG: 2 INJECTION, SOLUTION INTRAMUSCULAR; INTRAVENOUS at 23:27

## 2020-01-01 RX ADMIN — DIGOXIN 125 MCG: 125 TABLET ORAL at 09:02

## 2020-01-01 RX ADMIN — CARVEDILOL 3.12 MG: 3.12 TABLET, FILM COATED ORAL at 18:07

## 2020-01-01 RX ADMIN — ENOXAPARIN SODIUM 40 MG: 40 INJECTION SUBCUTANEOUS at 08:54

## 2020-01-01 RX ADMIN — DIGOXIN 125 MCG: 125 TABLET ORAL at 08:53

## 2020-01-01 RX ADMIN — PANTOPRAZOLE SODIUM 40 MG: 40 INJECTION, POWDER, LYOPHILIZED, FOR SOLUTION INTRAVENOUS at 10:17

## 2020-01-01 RX ADMIN — Medication 10 ML: at 08:59

## 2020-01-01 RX ADMIN — MORPHINE SULFATE 2 MG: 2 INJECTION, SOLUTION INTRAMUSCULAR; INTRAVENOUS at 16:56

## 2020-01-01 RX ADMIN — AMIODARONE HYDROCHLORIDE 200 MG: 200 TABLET ORAL at 10:38

## 2020-01-01 RX ADMIN — MIDODRINE HYDROCHLORIDE 5 MG: 5 TABLET ORAL at 11:50

## 2020-01-01 RX ADMIN — BUSPIRONE HYDROCHLORIDE 10 MG: 5 TABLET ORAL at 21:17

## 2020-01-01 RX ADMIN — PREGABALIN 75 MG: 75 CAPSULE ORAL at 22:58

## 2020-01-01 RX ADMIN — MIDODRINE HYDROCHLORIDE 5 MG: 5 TABLET ORAL at 09:10

## 2020-01-01 RX ADMIN — MIDODRINE HYDROCHLORIDE 5 MG: 5 TABLET ORAL at 10:32

## 2020-01-01 RX ADMIN — Medication 10 ML: at 20:45

## 2020-01-01 RX ADMIN — HYDROCODONE BITARTRATE AND ACETAMINOPHEN 2 TABLET: 5; 325 TABLET ORAL at 10:22

## 2020-01-01 RX ADMIN — EPLERENONE 25 MG: 25 TABLET, FILM COATED ORAL at 10:10

## 2020-01-01 RX ADMIN — Medication 10 ML: at 20:56

## 2020-01-01 RX ADMIN — AMIODARONE HYDROCHLORIDE 200 MG: 200 TABLET ORAL at 10:18

## 2020-01-01 RX ADMIN — DIGOXIN 125 MCG: 125 TABLET ORAL at 10:32

## 2020-01-01 RX ADMIN — FIDAXOMICIN 200 MG: 200 TABLET, FILM COATED ORAL at 08:08

## 2020-01-01 RX ADMIN — Medication 10 ML: at 08:14

## 2020-01-01 RX ADMIN — HYDROCODONE BITARTRATE AND ACETAMINOPHEN 1 TABLET: 5; 325 TABLET ORAL at 12:20

## 2020-01-01 RX ADMIN — Medication 10 ML: at 10:33

## 2020-01-01 RX ADMIN — PREGABALIN 75 MG: 75 CAPSULE ORAL at 21:45

## 2020-01-01 RX ADMIN — DIAZEPAM 5 MG: 5 TABLET ORAL at 08:13

## 2020-01-01 RX ADMIN — DESMOPRESSIN ACETATE 40 MG: 0.2 TABLET ORAL at 20:45

## 2020-01-01 RX ADMIN — MIDODRINE HYDROCHLORIDE 5 MG: 5 TABLET ORAL at 10:11

## 2020-01-01 RX ADMIN — TAMSULOSIN HYDROCHLORIDE 0.4 MG: 0.4 CAPSULE ORAL at 09:02

## 2020-01-01 RX ADMIN — MIDODRINE HYDROCHLORIDE 5 MG: 5 TABLET ORAL at 08:13

## 2020-01-01 RX ADMIN — Medication 10 ML: at 07:50

## 2020-01-01 RX ADMIN — LINEZOLID 600 MG: 600 INJECTION, SOLUTION INTRAVENOUS at 13:43

## 2020-01-01 RX ADMIN — BUSPIRONE HYDROCHLORIDE 10 MG: 5 TABLET ORAL at 21:41

## 2020-01-01 RX ADMIN — FIDAXOMICIN 200 MG: 200 TABLET, FILM COATED ORAL at 20:51

## 2020-01-01 RX ADMIN — MILRINONE LACTATE 0.2 MCG/KG/MIN: 200 INJECTION, SOLUTION INTRAVENOUS at 04:47

## 2020-01-01 RX ADMIN — MORPHINE SULFATE 2 MG: 2 INJECTION, SOLUTION INTRAMUSCULAR; INTRAVENOUS at 02:47

## 2020-01-01 RX ADMIN — TAMSULOSIN HYDROCHLORIDE 0.4 MG: 0.4 CAPSULE ORAL at 08:19

## 2020-01-01 RX ADMIN — MIDODRINE HYDROCHLORIDE 5 MG: 5 TABLET ORAL at 12:09

## 2020-01-01 RX ADMIN — MIDODRINE HYDROCHLORIDE 5 MG: 5 TABLET ORAL at 16:35

## 2020-01-01 RX ADMIN — SERTRALINE 25 MG: 50 TABLET, FILM COATED ORAL at 08:55

## 2020-01-01 RX ADMIN — CARVEDILOL 3.12 MG: 3.12 TABLET, FILM COATED ORAL at 12:20

## 2020-01-01 RX ADMIN — BUSPIRONE HYDROCHLORIDE 10 MG: 5 TABLET ORAL at 21:10

## 2020-01-01 RX ADMIN — PANTOPRAZOLE SODIUM 40 MG: 40 INJECTION, POWDER, LYOPHILIZED, FOR SOLUTION INTRAVENOUS at 10:11

## 2020-01-01 RX ADMIN — BUSPIRONE HYDROCHLORIDE 10 MG: 5 TABLET ORAL at 09:01

## 2020-01-01 RX ADMIN — ASPIRIN 325 MG: 325 TABLET, DELAYED RELEASE ORAL at 07:57

## 2020-01-01 RX ADMIN — MIDODRINE HYDROCHLORIDE 5 MG: 5 TABLET ORAL at 12:13

## 2020-01-01 RX ADMIN — MIDODRINE HYDROCHLORIDE 5 MG: 5 TABLET ORAL at 11:47

## 2020-01-01 RX ADMIN — SENNOSIDES AND DOCUSATE SODIUM 2 TABLET: 8.6; 5 TABLET ORAL at 10:19

## 2020-01-01 RX ADMIN — PREGABALIN 150 MG: 75 CAPSULE ORAL at 17:16

## 2020-01-01 RX ADMIN — MIDODRINE HYDROCHLORIDE 5 MG: 5 TABLET ORAL at 17:13

## 2020-01-01 RX ADMIN — SACUBITRIL AND VALSARTAN 0.5 TABLET: 24; 26 TABLET, FILM COATED ORAL at 22:11

## 2020-01-01 RX ADMIN — Medication 10 ML: at 20:35

## 2020-01-01 RX ADMIN — HEPARIN SODIUM 5000 UNITS: 5000 INJECTION INTRAVENOUS; SUBCUTANEOUS at 06:03

## 2020-01-01 RX ADMIN — INSULIN HUMAN 10 UNITS: 100 INJECTION, SOLUTION PARENTERAL at 01:16

## 2020-01-01 RX ADMIN — FUROSEMIDE 40 MG: 10 INJECTION, SOLUTION INTRAMUSCULAR; INTRAVENOUS at 07:56

## 2020-01-01 RX ADMIN — DIAZEPAM 5 MG: 5 TABLET ORAL at 11:50

## 2020-01-01 RX ADMIN — FAMOTIDINE 20 MG: 20 TABLET ORAL at 12:20

## 2020-01-01 RX ADMIN — FENTANYL CITRATE 25 MCG: 50 INJECTION, SOLUTION INTRAMUSCULAR; INTRAVENOUS at 06:03

## 2020-01-01 RX ADMIN — HYDROCODONE BITARTRATE AND ACETAMINOPHEN 1 TABLET: 5; 325 TABLET ORAL at 16:30

## 2020-01-01 RX ADMIN — Medication 10 ML: at 21:00

## 2020-01-01 RX ADMIN — FIDAXOMICIN 200 MG: 200 TABLET, FILM COATED ORAL at 19:53

## 2020-01-01 RX ADMIN — DIAZEPAM 5 MG: 5 TABLET ORAL at 20:45

## 2020-01-01 RX ADMIN — BUSPIRONE HYDROCHLORIDE 10 MG: 5 TABLET ORAL at 01:08

## 2020-01-01 RX ADMIN — MIDODRINE HYDROCHLORIDE 5 MG: 5 TABLET ORAL at 12:47

## 2020-01-01 RX ADMIN — SACUBITRIL AND VALSARTAN 0.5 TABLET: 24; 26 TABLET, FILM COATED ORAL at 15:49

## 2020-01-01 RX ADMIN — BUSPIRONE HYDROCHLORIDE 10 MG: 5 TABLET ORAL at 08:12

## 2020-01-01 RX ADMIN — DESMOPRESSIN ACETATE 40 MG: 0.2 TABLET ORAL at 22:58

## 2020-01-01 RX ADMIN — MORPHINE SULFATE 4 MG: 100 SOLUTION ORAL at 14:25

## 2020-01-01 RX ADMIN — ASPIRIN 325 MG: 325 TABLET, DELAYED RELEASE ORAL at 08:55

## 2020-01-01 RX ADMIN — DULOXETINE HYDROCHLORIDE 30 MG: 30 CAPSULE, DELAYED RELEASE ORAL at 08:13

## 2020-01-01 RX ADMIN — DIAZEPAM 5 MG: 5 TABLET ORAL at 21:41

## 2020-01-01 RX ADMIN — CARVEDILOL 3.12 MG: 3.12 TABLET, FILM COATED ORAL at 20:05

## 2020-01-01 RX ADMIN — PREGABALIN 75 MG: 75 CAPSULE ORAL at 20:25

## 2020-01-01 RX ADMIN — Medication 10 ML: at 22:59

## 2020-01-01 RX ADMIN — CARVEDILOL 3.12 MG: 3.12 TABLET, FILM COATED ORAL at 10:09

## 2020-01-01 RX ADMIN — MAGNESIUM HYDROXIDE 30 ML: 400 SUSPENSION ORAL at 06:41

## 2020-01-01 RX ADMIN — MORPHINE SULFATE 2 MG: 2 INJECTION, SOLUTION INTRAMUSCULAR; INTRAVENOUS at 12:29

## 2020-01-01 RX ADMIN — PANTOPRAZOLE SODIUM 40 MG: 40 INJECTION, POWDER, LYOPHILIZED, FOR SOLUTION INTRAVENOUS at 08:55

## 2020-01-01 RX ADMIN — FAMOTIDINE 20 MG: 20 TABLET ORAL at 09:10

## 2020-01-01 RX ADMIN — ENOXAPARIN SODIUM 40 MG: 40 INJECTION SUBCUTANEOUS at 12:20

## 2020-01-01 RX ADMIN — DIAZEPAM 5 MG: 5 TABLET ORAL at 13:37

## 2020-01-01 RX ADMIN — PANTOPRAZOLE SODIUM 40 MG: 40 INJECTION, POWDER, LYOPHILIZED, FOR SOLUTION INTRAVENOUS at 10:12

## 2020-01-01 RX ADMIN — HEPARIN SODIUM 5000 UNITS: 5000 INJECTION INTRAVENOUS; SUBCUTANEOUS at 06:05

## 2020-01-01 RX ADMIN — ONDANSETRON 4 MG: 2 INJECTION INTRAMUSCULAR; INTRAVENOUS at 05:27

## 2020-01-01 RX ADMIN — MORPHINE SULFATE 4 MG: 100 SOLUTION ORAL at 04:49

## 2020-01-01 RX ADMIN — FAMOTIDINE 20 MG: 20 TABLET ORAL at 10:33

## 2020-01-01 RX ADMIN — DIAZEPAM 5 MG: 5 TABLET ORAL at 07:57

## 2020-01-01 RX ADMIN — SACUBITRIL AND VALSARTAN 0.5 TABLET: 24; 26 TABLET, FILM COATED ORAL at 07:56

## 2020-01-01 RX ADMIN — DIAZEPAM 5 MG: 5 TABLET ORAL at 08:56

## 2020-01-01 RX ADMIN — MORPHINE SULFATE 15 MG: 15 TABLET, FILM COATED, EXTENDED RELEASE ORAL at 21:45

## 2020-01-01 RX ADMIN — SACUBITRIL AND VALSARTAN 0.5 TABLET: 24; 26 TABLET, FILM COATED ORAL at 17:55

## 2020-01-01 RX ADMIN — AMIODARONE HYDROCHLORIDE 200 MG: 200 TABLET ORAL at 10:11

## 2020-01-01 RX ADMIN — DIAZEPAM 5 MG: 5 TABLET ORAL at 13:46

## 2020-01-01 RX ADMIN — METRONIDAZOLE 500 MG: 500 INJECTION, SOLUTION INTRAVENOUS at 05:45

## 2020-01-01 RX ADMIN — ONDANSETRON 4 MG: 2 INJECTION INTRAMUSCULAR; INTRAVENOUS at 23:35

## 2020-01-01 RX ADMIN — HEPARIN SODIUM 5000 UNITS: 5000 INJECTION INTRAVENOUS; SUBCUTANEOUS at 05:36

## 2020-01-01 RX ADMIN — ONDANSETRON 4 MG: 2 INJECTION INTRAMUSCULAR; INTRAVENOUS at 13:53

## 2020-01-01 RX ADMIN — MIDODRINE HYDROCHLORIDE 5 MG: 5 TABLET ORAL at 17:24

## 2020-01-01 RX ADMIN — Medication 10 ML: at 21:45

## 2020-01-01 RX ADMIN — ENOXAPARIN SODIUM 40 MG: 40 INJECTION SUBCUTANEOUS at 08:14

## 2020-01-01 RX ADMIN — AMIODARONE HYDROCHLORIDE 200 MG: 200 TABLET ORAL at 09:10

## 2020-01-01 RX ADMIN — FUROSEMIDE 5 MG/HR: 10 INJECTION, SOLUTION INTRAVENOUS at 14:48

## 2020-01-01 RX ADMIN — SACUBITRIL AND VALSARTAN 0.5 TABLET: 24; 26 TABLET, FILM COATED ORAL at 09:15

## 2020-01-01 RX ADMIN — DIAZEPAM 5 MG: 5 TABLET ORAL at 21:45

## 2020-01-01 RX ADMIN — CARVEDILOL 3.12 MG: 3.12 TABLET, FILM COATED ORAL at 10:18

## 2020-01-01 RX ADMIN — DIAZEPAM 2 MG: 2 TABLET ORAL at 21:45

## 2020-01-01 RX ADMIN — MIDODRINE HYDROCHLORIDE 5 MG: 5 TABLET ORAL at 11:58

## 2020-01-01 RX ADMIN — TAMSULOSIN HYDROCHLORIDE 0.4 MG: 0.4 CAPSULE ORAL at 08:43

## 2020-01-01 RX ADMIN — Medication 10 ML: at 21:08

## 2020-01-01 RX ADMIN — AMIODARONE HYDROCHLORIDE 200 MG: 200 TABLET ORAL at 12:20

## 2020-01-01 RX ADMIN — Medication 2 MCG/MIN: at 22:21

## 2020-01-01 RX ADMIN — FIDAXOMICIN 200 MG: 200 TABLET, FILM COATED ORAL at 20:30

## 2020-01-01 RX ADMIN — FENTANYL CITRATE 50 MCG: 50 INJECTION, SOLUTION INTRAMUSCULAR; INTRAVENOUS at 21:34

## 2020-01-01 RX ADMIN — FAMOTIDINE 20 MG: 20 TABLET ORAL at 08:50

## 2020-01-01 RX ADMIN — FUROSEMIDE 40 MG: 40 TABLET ORAL at 16:30

## 2020-01-01 RX ADMIN — PREGABALIN 150 MG: 75 CAPSULE ORAL at 17:24

## 2020-01-01 RX ADMIN — ONDANSETRON 4 MG: 2 INJECTION INTRAMUSCULAR; INTRAVENOUS at 16:56

## 2020-01-01 RX ADMIN — Medication 10 ML: at 08:54

## 2020-01-01 RX ADMIN — MIDODRINE HYDROCHLORIDE 5 MG: 5 TABLET ORAL at 13:03

## 2020-01-01 RX ADMIN — SACUBITRIL AND VALSARTAN 0.5 TABLET: 24; 26 TABLET, FILM COATED ORAL at 12:20

## 2020-01-01 RX ADMIN — DULOXETINE HYDROCHLORIDE 30 MG: 30 CAPSULE, DELAYED RELEASE ORAL at 08:42

## 2020-01-01 RX ADMIN — PREGABALIN 75 MG: 75 CAPSULE ORAL at 08:54

## 2020-01-01 RX ADMIN — BUSPIRONE HYDROCHLORIDE 10 MG: 5 TABLET ORAL at 08:48

## 2020-01-01 RX ADMIN — Medication 10 ML: at 20:52

## 2020-01-01 RX ADMIN — PROMETHAZINE HYDROCHLORIDE 12.5 MG: 25 TABLET ORAL at 20:24

## 2020-01-01 RX ADMIN — PREGABALIN 75 MG: 75 CAPSULE ORAL at 08:50

## 2020-01-01 RX ADMIN — DULOXETINE HYDROCHLORIDE 30 MG: 30 CAPSULE, DELAYED RELEASE ORAL at 08:51

## 2020-01-01 RX ADMIN — MILRINONE LACTATE IN DEXTROSE 0.2 MCG/KG/MIN: 200 INJECTION, SOLUTION INTRAVENOUS at 10:40

## 2020-01-01 RX ADMIN — TAMSULOSIN HYDROCHLORIDE 0.4 MG: 0.4 CAPSULE ORAL at 07:57

## 2020-01-01 RX ADMIN — SACUBITRIL AND VALSARTAN 0.5 TABLET: 24; 26 TABLET, FILM COATED ORAL at 12:13

## 2020-01-01 RX ADMIN — ONDANSETRON 4 MG: 2 INJECTION INTRAMUSCULAR; INTRAVENOUS at 02:47

## 2020-01-01 RX ADMIN — DULOXETINE HYDROCHLORIDE 30 MG: 30 CAPSULE, DELAYED RELEASE ORAL at 08:19

## 2020-01-01 RX ADMIN — PREGABALIN 75 MG: 75 CAPSULE ORAL at 09:34

## 2020-01-01 RX ADMIN — MORPHINE SULFATE 15 MG: 15 TABLET, FILM COATED, EXTENDED RELEASE ORAL at 09:11

## 2020-01-01 RX ADMIN — ASPIRIN 325 MG: 325 TABLET, DELAYED RELEASE ORAL at 08:53

## 2020-01-01 RX ADMIN — CARVEDILOL 3.12 MG: 3.12 TABLET, FILM COATED ORAL at 17:13

## 2020-01-01 RX ADMIN — FUROSEMIDE 40 MG: 10 INJECTION, SOLUTION INTRAMUSCULAR; INTRAVENOUS at 08:54

## 2020-01-01 RX ADMIN — Medication 10 ML: at 09:11

## 2020-01-01 RX ADMIN — POLYETHYLENE GLYCOL 3350 17 G: 17 POWDER, FOR SOLUTION ORAL at 09:36

## 2020-01-01 RX ADMIN — FAMOTIDINE 20 MG: 20 TABLET ORAL at 08:44

## 2020-01-01 RX ADMIN — BUSPIRONE HYDROCHLORIDE 10 MG: 5 TABLET ORAL at 09:10

## 2020-01-01 RX ADMIN — PROMETHAZINE HYDROCHLORIDE 12.5 MG: 25 TABLET ORAL at 05:36

## 2020-01-01 RX ADMIN — TAMSULOSIN HYDROCHLORIDE 0.4 MG: 0.4 CAPSULE ORAL at 13:27

## 2020-01-01 RX ADMIN — BUSPIRONE HYDROCHLORIDE 10 MG: 5 TABLET ORAL at 08:19

## 2020-01-01 RX ADMIN — PREGABALIN 75 MG: 75 CAPSULE ORAL at 08:13

## 2020-01-01 RX ADMIN — MIDODRINE HYDROCHLORIDE 5 MG: 5 TABLET ORAL at 07:57

## 2020-01-01 RX ADMIN — MORPHINE SULFATE 15 MG: 15 TABLET, FILM COATED, EXTENDED RELEASE ORAL at 08:44

## 2020-01-01 ASSESSMENT — PAIN DESCRIPTION - LOCATION
LOCATION: GENERALIZED
LOCATION: GENERALIZED
LOCATION: CHEST
LOCATION: ABDOMEN
LOCATION: ABDOMEN
LOCATION: CHEST;HEAD
LOCATION: ABDOMEN
LOCATION: HEAD;CHEST
LOCATION: CHEST
LOCATION: ABDOMEN;CHEST
LOCATION: CHEST
LOCATION: CHEST
LOCATION: HEAD;CHEST
LOCATION: GENERALIZED
LOCATION: ABDOMEN;CHEST
LOCATION: ABDOMEN
LOCATION: ABDOMEN
LOCATION: CHEST
LOCATION: ABDOMEN
LOCATION: HEAD;CHEST
LOCATION: GENERALIZED
LOCATION: ABDOMEN;CHEST
LOCATION: CHEST
LOCATION: HEAD;CHEST
LOCATION: ABDOMEN;LEG
LOCATION: CHEST
LOCATION: ABDOMEN;CHEST
LOCATION: CHEST
LOCATION: CHEST
LOCATION: HEAD;CHEST
LOCATION: HEAD;CHEST
LOCATION: CHEST
LOCATION: ABDOMEN;CHEST
LOCATION: CHEST

## 2020-01-01 ASSESSMENT — PAIN SCALES - WONG BAKER
WONGBAKER_NUMERICALRESPONSE: 0
WONGBAKER_NUMERICALRESPONSE: 4
WONGBAKER_NUMERICALRESPONSE: 0
WONGBAKER_NUMERICALRESPONSE: 4
WONGBAKER_NUMERICALRESPONSE: 0
WONGBAKER_NUMERICALRESPONSE: 4
WONGBAKER_NUMERICALRESPONSE: 0
WONGBAKER_NUMERICALRESPONSE: 2
WONGBAKER_NUMERICALRESPONSE: 0

## 2020-01-01 ASSESSMENT — PAIN DESCRIPTION - DESCRIPTORS
DESCRIPTORS: ACHING
DESCRIPTORS: PRESSURE
DESCRIPTORS: ACHING
DESCRIPTORS: HEAVINESS
DESCRIPTORS: ACHING
DESCRIPTORS: PRESSURE
DESCRIPTORS: ACHING
DESCRIPTORS: PRESSURE
DESCRIPTORS: ACHING
DESCRIPTORS: PRESSURE
DESCRIPTORS: ACHING
DESCRIPTORS: ACHING

## 2020-01-01 ASSESSMENT — PAIN SCALES - GENERAL
PAINLEVEL_OUTOF10: 8
PAINLEVEL_OUTOF10: 0
PAINLEVEL_OUTOF10: 7
PAINLEVEL_OUTOF10: 10
PAINLEVEL_OUTOF10: 0
PAINLEVEL_OUTOF10: 8
PAINLEVEL_OUTOF10: 0
PAINLEVEL_OUTOF10: 7
PAINLEVEL_OUTOF10: 0
PAINLEVEL_OUTOF10: 8
PAINLEVEL_OUTOF10: 8
PAINLEVEL_OUTOF10: 0
PAINLEVEL_OUTOF10: 7
PAINLEVEL_OUTOF10: 0
PAINLEVEL_OUTOF10: 7
PAINLEVEL_OUTOF10: 0
PAINLEVEL_OUTOF10: 0
PAINLEVEL_OUTOF10: 7
PAINLEVEL_OUTOF10: 8
PAINLEVEL_OUTOF10: 0
PAINLEVEL_OUTOF10: 8
PAINLEVEL_OUTOF10: 7
PAINLEVEL_OUTOF10: 6
PAINLEVEL_OUTOF10: 6
PAINLEVEL_OUTOF10: 8
PAINLEVEL_OUTOF10: 0
PAINLEVEL_OUTOF10: 7
PAINLEVEL_OUTOF10: 7
PAINLEVEL_OUTOF10: 0
PAINLEVEL_OUTOF10: 7
PAINLEVEL_OUTOF10: 8
PAINLEVEL_OUTOF10: 0
PAINLEVEL_OUTOF10: 6
PAINLEVEL_OUTOF10: 8
PAINLEVEL_OUTOF10: 7
PAINLEVEL_OUTOF10: 7
PAINLEVEL_OUTOF10: 10
PAINLEVEL_OUTOF10: 6
PAINLEVEL_OUTOF10: 10
PAINLEVEL_OUTOF10: 10
PAINLEVEL_OUTOF10: 8
PAINLEVEL_OUTOF10: 0
PAINLEVEL_OUTOF10: 7
PAINLEVEL_OUTOF10: 0
PAINLEVEL_OUTOF10: 0
PAINLEVEL_OUTOF10: 6
PAINLEVEL_OUTOF10: 0
PAINLEVEL_OUTOF10: 8
PAINLEVEL_OUTOF10: 0
PAINLEVEL_OUTOF10: 8
PAINLEVEL_OUTOF10: 0
PAINLEVEL_OUTOF10: 0
PAINLEVEL_OUTOF10: 6
PAINLEVEL_OUTOF10: 0
PAINLEVEL_OUTOF10: 8
PAINLEVEL_OUTOF10: 0
PAINLEVEL_OUTOF10: 10
PAINLEVEL_OUTOF10: 0
PAINLEVEL_OUTOF10: 0
PAINLEVEL_OUTOF10: 8
PAINLEVEL_OUTOF10: 7
PAINLEVEL_OUTOF10: 8
PAINLEVEL_OUTOF10: 6
PAINLEVEL_OUTOF10: 0
PAINLEVEL_OUTOF10: 8
PAINLEVEL_OUTOF10: 7
PAINLEVEL_OUTOF10: 3
PAINLEVEL_OUTOF10: 6
PAINLEVEL_OUTOF10: 0
PAINLEVEL_OUTOF10: 0
PAINLEVEL_OUTOF10: 6
PAINLEVEL_OUTOF10: 6
PAINLEVEL_OUTOF10: 7
PAINLEVEL_OUTOF10: 0
PAINLEVEL_OUTOF10: 0
PAINLEVEL_OUTOF10: 7
PAINLEVEL_OUTOF10: 0
PAINLEVEL_OUTOF10: 0
PAINLEVEL_OUTOF10: 10
PAINLEVEL_OUTOF10: 4
PAINLEVEL_OUTOF10: 7
PAINLEVEL_OUTOF10: 10
PAINLEVEL_OUTOF10: 7
PAINLEVEL_OUTOF10: 0
PAINLEVEL_OUTOF10: 0
PAINLEVEL_OUTOF10: 6
PAINLEVEL_OUTOF10: 0
PAINLEVEL_OUTOF10: 6
PAINLEVEL_OUTOF10: 0
PAINLEVEL_OUTOF10: 0
PAINLEVEL_OUTOF10: 8
PAINLEVEL_OUTOF10: 6
PAINLEVEL_OUTOF10: 10
PAINLEVEL_OUTOF10: 8
PAINLEVEL_OUTOF10: 7
PAINLEVEL_OUTOF10: 8
PAINLEVEL_OUTOF10: 6
PAINLEVEL_OUTOF10: 7
PAINLEVEL_OUTOF10: 0
PAINLEVEL_OUTOF10: 0
PAINLEVEL_OUTOF10: 6
PAINLEVEL_OUTOF10: 0
PAINLEVEL_OUTOF10: 8
PAINLEVEL_OUTOF10: 0
PAINLEVEL_OUTOF10: 8
PAINLEVEL_OUTOF10: 6
PAINLEVEL_OUTOF10: 8
PAINLEVEL_OUTOF10: 0
PAINLEVEL_OUTOF10: 0
PAINLEVEL_OUTOF10: 8
PAINLEVEL_OUTOF10: 10
PAINLEVEL_OUTOF10: 0
PAINLEVEL_OUTOF10: 6
PAINLEVEL_OUTOF10: 7
PAINLEVEL_OUTOF10: 6
PAINLEVEL_OUTOF10: 7
PAINLEVEL_OUTOF10: 8
PAINLEVEL_OUTOF10: 0
PAINLEVEL_OUTOF10: 6
PAINLEVEL_OUTOF10: 0
PAINLEVEL_OUTOF10: 8
PAINLEVEL_OUTOF10: 8
PAINLEVEL_OUTOF10: 0
PAINLEVEL_OUTOF10: 7
PAINLEVEL_OUTOF10: 7
PAINLEVEL_OUTOF10: 0
PAINLEVEL_OUTOF10: 0
PAINLEVEL_OUTOF10: 8
PAINLEVEL_OUTOF10: 8
PAINLEVEL_OUTOF10: 10

## 2020-01-01 ASSESSMENT — PAIN DESCRIPTION - PAIN TYPE
TYPE: CHRONIC PAIN
TYPE: CHRONIC PAIN
TYPE: ACUTE PAIN
TYPE: CHRONIC PAIN
TYPE: CHRONIC PAIN;ACUTE PAIN
TYPE: CHRONIC PAIN
TYPE: ACUTE PAIN
TYPE: CHRONIC PAIN
TYPE: CHRONIC PAIN
TYPE: ACUTE PAIN
TYPE: CHRONIC PAIN
TYPE: ACUTE PAIN
TYPE: CHRONIC PAIN
TYPE: ACUTE PAIN
TYPE: CHRONIC PAIN
TYPE: CHRONIC PAIN
TYPE: ACUTE PAIN
TYPE: ACUTE PAIN
TYPE: CHRONIC PAIN
TYPE: ACUTE PAIN;DEEP SOMATIC PAIN
TYPE: CHRONIC PAIN
TYPE: CHRONIC PAIN
TYPE: ACUTE PAIN
TYPE: CHRONIC PAIN
TYPE: ACUTE PAIN
TYPE: ACUTE PAIN
TYPE: CHRONIC PAIN

## 2020-01-01 ASSESSMENT — ENCOUNTER SYMPTOMS
VOMITING: 0
TROUBLE SWALLOWING: 0
NAUSEA: 0
SORE THROAT: 0
VOMITING: 0
ABDOMINAL PAIN: 1
NAUSEA: 0
SHORTNESS OF BREATH: 0
EYE REDNESS: 0
NAUSEA: 0
COUGH: 0
DIARRHEA: 0
VOMITING: 0
STRIDOR: 0
CHEST TIGHTNESS: 0
EYE PAIN: 0
ABDOMINAL PAIN: 1
EYE REDNESS: 0
CONSTIPATION: 0
BACK PAIN: 0
WHEEZING: 0
COUGH: 0
ABDOMINAL DISTENTION: 1
COUGH: 0
SHORTNESS OF BREATH: 0
DIARRHEA: 0
BACK PAIN: 1
DIARRHEA: 0
CHEST TIGHTNESS: 0
NAUSEA: 0
WHEEZING: 0
RHINORRHEA: 0
FACIAL SWELLING: 0
WHEEZING: 0
SHORTNESS OF BREATH: 1
ABDOMINAL PAIN: 0
ABDOMINAL DISTENTION: 1
CHEST TIGHTNESS: 0
SORE THROAT: 0
ABDOMINAL PAIN: 0
SHORTNESS OF BREATH: 0
BACK PAIN: 0
SHORTNESS OF BREATH: 1
EYE DISCHARGE: 0
DIARRHEA: 0
BACK PAIN: 0

## 2020-01-01 ASSESSMENT — PAIN DESCRIPTION - PROGRESSION
CLINICAL_PROGRESSION: RAPIDLY IMPROVING
CLINICAL_PROGRESSION: GRADUALLY WORSENING
CLINICAL_PROGRESSION: GRADUALLY IMPROVING
CLINICAL_PROGRESSION: GRADUALLY WORSENING
CLINICAL_PROGRESSION: RAPIDLY IMPROVING
CLINICAL_PROGRESSION: GRADUALLY WORSENING
CLINICAL_PROGRESSION: RAPIDLY IMPROVING
CLINICAL_PROGRESSION: GRADUALLY WORSENING
CLINICAL_PROGRESSION: NOT CHANGED
CLINICAL_PROGRESSION: NOT CHANGED
CLINICAL_PROGRESSION: RAPIDLY IMPROVING
CLINICAL_PROGRESSION: GRADUALLY WORSENING
CLINICAL_PROGRESSION: RAPIDLY IMPROVING

## 2020-01-01 ASSESSMENT — PAIN DESCRIPTION - ONSET
ONSET: ON-GOING

## 2020-01-01 ASSESSMENT — PAIN - FUNCTIONAL ASSESSMENT
PAIN_FUNCTIONAL_ASSESSMENT: PREVENTS OR INTERFERES SOME ACTIVE ACTIVITIES AND ADLS
PAIN_FUNCTIONAL_ASSESSMENT: ACTIVITIES ARE NOT PREVENTED
PAIN_FUNCTIONAL_ASSESSMENT: PREVENTS OR INTERFERES SOME ACTIVE ACTIVITIES AND ADLS
PAIN_FUNCTIONAL_ASSESSMENT: ACTIVITIES ARE NOT PREVENTED
PAIN_FUNCTIONAL_ASSESSMENT: PREVENTS OR INTERFERES SOME ACTIVE ACTIVITIES AND ADLS
PAIN_FUNCTIONAL_ASSESSMENT: ACTIVITIES ARE NOT PREVENTED
PAIN_FUNCTIONAL_ASSESSMENT: ACTIVITIES ARE NOT PREVENTED

## 2020-01-01 ASSESSMENT — PAIN DESCRIPTION - FREQUENCY
FREQUENCY: CONTINUOUS

## 2020-01-01 ASSESSMENT — PAIN DESCRIPTION - ORIENTATION
ORIENTATION: OTHER (COMMENT)
ORIENTATION: OTHER (COMMENT)
ORIENTATION: MID
ORIENTATION: OTHER (COMMENT)
ORIENTATION: MID
ORIENTATION: OTHER (COMMENT)
ORIENTATION: MID
ORIENTATION: OTHER (COMMENT)
ORIENTATION: MID

## 2020-01-01 NOTE — PROGRESS NOTES
Lactic Acid today now 5. Hospitalist notified of result. Order from MD to notify Cardiology of result. Milrinone restarted per Cardiology.

## 2020-01-01 NOTE — PROGRESS NOTES
Temp  Av °F (36.1 °C)  Min: 96.8 °F (36 °C)  Max: 97.2 °F (36.2 °C)  Pulse  Av.2  Min: 74  Max: 118  BP  Min: 67/51  Max: 114/79  SpO2  Av %  Min: 87 %  Max: 100 %    Intake/Output Summary (Last 24 hours) at 2020 0821  Last data filed at 2020 0546  Gross per 24 hour   Intake 2919.7 ml   Output 1590 ml   Net 1329.7 ml       · Telemetry: Sinus rhythm , NSVT  · Constitutional: Oriented. No distress. · Head: Normocephalic and atraumatic. · Mouth/Throat: Oropharynx is clear and moist.   · Eyes: Conjunctivae normal. EOM are normal.   · Neck: Neck supple. No rigidity. No JVD present. · Cardiovascular: Normal rate, regular rhythm, S1&S2. · Pulmonary/Chest: Bilateral respiratory sounds. No wheezes, No rhonchi. · Abdominal: Soft. Bowel sounds present. No distension, No tenderness. · Musculoskeletal: No tenderness. + edema    · Lymphadenopathy: Has no cervical adenopathy. · Neurological: Alert and oriented. Cranial nerve appears intact, No Gross deficit   · Skin: Skin is warm and dry. No rash noted. · Psychiatric: Has a normal behavior     Labs, diagnostic and imaging results reviewed. Reviewed. Recent Labs     19  0650 19  0435 19  1745 20  0455   * 133*  --  133*   K 3.8 2.5* 3.4* 4.8   CL 83* 88*  --  88*   CO2 25 37*  --  28   BUN 46* 30*  --  33*   CREATININE 1.4* 1.0  --  1.6*     Recent Labs     19  2339 19  0447 20  0455   WBC 14.0* 14.8* 13.1*   HGB 15.4 14.2 16.0   HCT 46.9 42.8 48.3   MCV 93.6 91.9 93.2    192 219     Lab Results   Component Value Date    CKTOTAL 129 2016    TROPONINI 0.01 2019     Estimated Creatinine Clearance: 63 mL/min (A) (based on SCr of 1.6 mg/dL (H)).    Lab Results   Component Value Date     2016     Lab Results   Component Value Date    PROTIME 31.3 2020    PROTIME 41.0 2019    PROTIME 56.0 2019    INR 2.67 2020    INR 3.49 2019

## 2020-01-01 NOTE — PLAN OF CARE
Problem: Falls - Risk of:  Goal: Will remain free from falls  Description  Will remain free from falls  Outcome: Ongoing  Goal: Absence of physical injury  Description  Absence of physical injury  Outcome: Ongoing     Problem: OXYGENATION/RESPIRATORY FUNCTION  Goal: Patient will maintain patent airway  Outcome: Ongoing  Goal: Patient will achieve/maintain normal respiratory rate/effort  Description  Respiratory rate and effort will be within normal limits for the patient  Outcome: Ongoing     Problem: HEMODYNAMIC STATUS  Goal: Patient has stable vital signs and fluid balance  Outcome: Ongoing     Problem: Pain:  Goal: Pain level will decrease  Description  Pain level will decrease  Outcome: Ongoing  Goal: Control of acute pain  Description  Control of acute pain  Outcome: Ongoing  Goal: Control of chronic pain  Description  Control of chronic pain  Outcome: Ongoing     Problem: Nutrition  Goal: Optimal nutrition therapy  12/31/2019 2049 by Vanessa Berry RN  Outcome: Ongoing  12/31/2019 1122 by Leopoldo Curran RD, LD  Outcome: Ongoing     Problem: Risk for Impaired Skin Integrity  Goal: Tissue integrity - skin and mucous membranes  Description  Structural intactness and normal physiological function of skin and  mucous membranes.   Outcome: Ongoing

## 2020-01-01 NOTE — PROGRESS NOTES
P Pulmonary and Critical Care  Progress note      Reason for Consult: Acute Respiratory Failure, Pulmonary edema   Requesting Physician: Dr Dylan Elies:   [address] [facility] / HPI:                The patient is a 52 y.o. male with significant past medical history of:      Diagnosis Date    Allergic     Anesthesia     tremors    Arthritis     CAD (coronary artery disease)     Cancer ([facility] [address]     colon    Chemotherapy adverse reaction 4/29/2019    CHF (congestive heart failure) (HCC)     Chronic knee pain     Clostridium difficile infection 07/11/2016    PCR+    Depression motion     Diverticulitis     Diverticulosis     History of alcohol abuse     Hyperlipidemia     Hypertension     Irregular heart beat     states been told he has had in past. Never treated. Sees PCP every 3 mos. and EKG yearly    Pneumonia     Right knee pain     Shoulder injury     and arthrisits, injury rotaotr cuff    Sleep difficulties     with depression     The patient presented to the ER with progressively severe SOB over the last several days. He is known to have HFrEF with LVEF in the 20% range. He is a nonsmoker and denies past pulmonary history such as Asthma or COPD. In the ED, he has required BiPAP to treat his respiratory failure and he has been lethargic      Interval history: Continues to improve slowly. He will does have a oxygen requirement. .    Past Surgical History:        Procedure Laterality Date    ABDOMEN SURGERY      ABSCESS DRAINAGE      BREAST SURGERY Right 1/21/2019    RIGHT BREAST EXCISIONAL BIOPSY performed by Lexx Laura MD at [facility]  09/14/2016    No stents placed   [facility] 141  10/2017    COLECTOMY      COLONOSCOPY  08/29/2016    with biopsy and polypectomy    COLONOSCOPY  03/19/2018    COLONOSCOPY N/A 5/14/2019    COLONOSCOPY WITH DILATION performed by Dariusz Miller MD at [address] [place] [address] bicarb-citric acid (EFFER-K) effervescent tablet 40 mEq, 40 mEq, Oral, PRN **OR** potassium chloride 10 mEq/100 mL IVPB (Peripheral Line), 10 mEq, Intravenous, PRN  potassium chloride 20 mEq/50 mL IVPB (Central Line), 20 mEq, Intravenous, PRN  sodium chloride flush 0.9 % injection 10 mL, 10 mL, Intravenous, 2 times per day  sodium chloride flush 0.9 % injection 10 mL, 10 mL, Intravenous, PRN  ondansetron (ZOFRAN) injection 4 mg, 4 mg, Intravenous, Q6H PRN  digoxin (LANOXIN) tablet 125 mcg, 125 mcg, Oral, Daily  sodium chloride flush 0.9 % injection 10 mL, 10 mL, Intravenous, 2 times per day  sodium chloride flush 0.9 % injection 10 mL, 10 mL, Intravenous, PRN  magnesium sulfate 1 g in dextrose 5% 100 mL IVPB, 1 g, Intravenous, PRN  pantoprazole (PROTONIX) injection 40 mg, 40 mg, Intravenous, Daily  linezolid (ZYVOX) IVPB 600 mg, 600 mg, Intravenous, Q12H  milrinone (PRIMACOR) 20 mg in dextrose 5 % 100 mL infusion, 0.2 mcg/kg/min, Intravenous, Continuous  furosemide (LASIX) injection 80 mg, 80 mg, Intravenous, BID  carvedilol (COREG) tablet 3.125 mg, 3.125 mg, Oral, BID WC    Allergies   Allergen Reactions    Vancomycin Hives and Swelling     Throat swells    Bactrim [Sulfamethoxazole-Trimethoprim] Other (See Comments)     Thrush    Corn-Containing Products      Patient tells me his throat swells when he eats a large amount of corn or corn products. He can tolerate products with corn oil and corn syrup. Pt. Reports gets \"congestion\"    Seasonal        Social History:    TOBACCO:   reports that he quit smoking about 13 years ago. He has a 5.00 pack-year smoking history. He quit smokeless tobacco use about 2 years ago. His smokeless tobacco use included chew. ETOH:   reports no history of alcohol use.   Patient currently lives independently  Environmental/chemical exposure: none known     Family History:       Problem Relation Age of Onset    Cancer Mother     High Blood Pressure Father     Cancer Father REVIEW OF SYSTEMS:    CONSTITUTIONAL:  negative for fevers, chills, diaphoresis, activity change, appetite change, fatigue, night sweats and unexpected weight change. EYES:  negative for blurred vision, eye discharge, visual disturbance and icterus  HEENT:  negative for hearing loss, tinnitus, ear drainage, sinus pressure, nasal congestion, epistaxis and snoring  RESPIRATORY:  See HPI  CARDIOVASCULAR:  negative for chest pain, palpitations, exertional chest pressure/discomfort, edema, syncope  GASTROINTESTINAL:  negative for nausea, vomiting, diarrhea, constipation, blood in stool and abdominal pain  GENITOURINARY:  negative for frequency, dysuria, urinary incontinence, decreased urine volume, and hematuria  HEMATOLOGIC/LYMPHATIC:  negative for easy bruising, bleeding and lymphadenopathy  ALLERGIC/IMMUNOLOGIC:  negative for recurrent infections, angioedema, anaphylaxis and drug reactions  ENDOCRINE:  negative for weight changes and diabetic symptoms including polyuria, polydipsia and polyphagia  MUSCULOSKELETAL:  negative for  pain, joint swelling, decreased range of motion and muscle weakness  NEUROLOGICAL:  negative for headaches, slurred speech, unilateral weakness  PSYCHIATRIC/BEHAVIORAL: negative for hallucinations, behavioral problems, confusion and agitation.      Objective:   PHYSICAL EXAM:      VITALS:  /77   Pulse 97   Temp 97.1 °F (36.2 °C) (Temporal)   Resp 12   Ht 5' 10\" (1.778 m)   Wt 198 lb 3.1 oz (89.9 kg)   SpO2 97%   BMI 28.44 kg/m²      24HR INTAKE/OUTPUT:      Intake/Output Summary (Last 24 hours) at 1/1/2020 0953  Last data filed at 1/1/2020 0546  Gross per 24 hour   Intake 2919.7 ml   Output 1590 ml   Net 1329.7 ml     CONSTITUTIONAL:  awake, alert, cooperative, no apparent distress, and appears stated age  NECK:  Supple, symmetrical, trachea midline, no adenopathy, thyroid symmetric, not enlarged and no tenderness, skin normal  LUNGS:  no increased work of breathing and diminished to auscultation. No accessory muscle use  CARDIOVASCULAR: S1 and S2, 2+edema and no JVD  ABDOMEN:  normal bowel sounds, non-distended and no masses palpated, and no tenderness to palpation. No hepatospleenomegaly  LYMPHADENOPATHY:  no axillary or supraclavicular adenopathy. No cervical adnenopathy  PSYCHIATRIC: Oriented to person place and time. No obvious depression or anxiety. MUSCULOSKELETAL: No obvious misalignment or effusion of the joints. No clubbing, cyanosis of the digits. SKIN:  normal skin color, texture, turgor and no redness, warmth, or swelling. No palpable nodules    DATA:    Old records have been reviewed    CBC:  Recent Labs     12/29/19  2339 12/31/19  0447 01/01/20  0455   WBC 14.0* 14.8* 13.1*   RBC 5.01 4.66 5.18   HGB 15.4 14.2 16.0   HCT 46.9 42.8 48.3    192 219   MCV 93.6 91.9 93.2   MCH 30.7 30.5 31.0   MCHC 32.8 33.2 33.2   RDW 15.4 15.3 15.5*      BMP:  Recent Labs     12/30/19  0650 12/31/19  0435 12/31/19  1745 01/01/20  0455   * 133*  --  133*   K 3.8 2.5* 3.4* 4.8   CL 83* 88*  --  88*   CO2 25 37*  --  28   BUN 46* 30*  --  33*   CREATININE 1.4* 1.0  --  1.6*   CALCIUM 9.3 8.2*  --  8.6   GLUCOSE 154* 132*  --  155*      ABG:  Recent Labs     12/30/19  0933   PHART 7.529*   ZHC6SXZ 39.1   PO2ART 109.0*   BSL7TXR 32.6*   L9GXOYWE 98.9   BEART 9.1*       Lab Results   Component Value Date     12/06/2016     Lab Results   Component Value Date    CKTOTAL 129 09/06/2016    TROPONINI 0.01 12/29/2019       Cultures:     Abx:    Radiology Review:  All pertinent images / reports were reviewed as a part of this visit. Assessment:     1. Acute Hypoxemic Respiratory Failure  · Not requiring BiPAP mask ventilation  · Still on 1 to 2 L nasal cannula oxygen supplement and support saturations in the high 90s  · Wean as tolerated    2. Acute Pulmonary Edema  · Changing to p.o.  Lasix  · Creatinine is up some and Lasix is being held today    3. HFrEF  · EF about 20%  · Rhythm has stabilized  · Changing to p.o. amiodarone  · Milrinone is off    4. ALEN  · Creatinine is up today  · Lasix on hold s    5. Ischemic Hepatitis  · LFTs slightly better  · Most likely the result of passive congestion    6. Coagulopathy  · INR slightly improved  · Follow    7.  Acute Encephalpoathy  · Metabolic encephalopathy  · Has improved  · Suspect uremic encephalopathy was at least part of the issue for him  · BUN is down to 90 now    I will sign off

## 2020-01-01 NOTE — PROGRESS NOTES
Infectious Diseases   Progress Note      Admission Date: 12/29/2019  Hospital Day: Hospital Day: 4   Attending: Rosario Mena MD  Date of service: 1/1/2020     Chief complaint/ Reason for consult: The patient was seen today for the following:    · Acute respiratory failure with hypoxia  · Met sepsis criteria on admission  with bandemia, severe lactic acidosis, metabolic encephalopathy, likely secondary to C. difficile colitis. Encephalopathy may be secondary to hypoxia and hyponatremia  · Recent C. difficile diarrhea, diagnosed on 12/21/2019  · Worsening bandemia, likely due to C. difficile    Microbiology:        I have reviewed allavailable micro lab data and cultures    · Blood culture (2/2) - collected on 12/29/2019: In process      Antibiotics and immunizations:       Current antibiotics: All antibiotics and their doses were reviewed by me    Recent Abx Admin                   linezolid (ZYVOX) IVPB 600 mg (mg) 600 mg New Bag 01/01/20 1226     600 mg New Bag  0110     600 mg New Bag 12/31/19 1320    metronidazole (FLAGYL) 500 mg in NaCl 100 mL IVPB premix (mg) 500 mg New Bag 01/01/20 0545     500 mg New Bag 12/31/19 2003     500 mg New Bag  1322                  Immunization History: All immunization history was reviewed by me today. Immunization History   Administered Date(s) Administered    Influenza Virus Vaccine 12/03/2012       Known drug allergies: All allergies were reviewed and updated    Allergies   Allergen Reactions    Vancomycin Hives and Swelling     Throat swells    Bactrim [Sulfamethoxazole-Trimethoprim] Other (See Comments)     Thrush    Corn-Containing Products      Patient tells me his throat swells when he eats a large amount of corn or corn products. He can tolerate products with corn oil and corn syrup.  Pt. Reports gets \"congestion\"    Seasonal        Social history:     Social History:  All social andepidemiologic history was reviewed and updated by me today as questions, please do not hesitate to contact me. Subjective: Interval history: Patient was seen and examined at bedside. Interval history was obtained. The patient is a slightly more awake but is still quite encephalopathic. Seems to be tolerating antibiotics okay. Does not have much ostomy output according to ICU RN     REVIEW OF SYSTEMS:      Review of Systems   Unable to perform ROS: Mental status change         Past Medical History: All past medical history reviewed today. Past Medical History:   Diagnosis Date    Allergic     Anesthesia     tremors    Arthritis     CAD (coronary artery disease)     Cancer ([address]     colon    Chemotherapy adverse reaction 4/29/2019    [facility]est[facility])     Chronic knee pain     Clostridium difficile infection 07/11/2016    PCR+    Depression motion     Diverticulitis     Diverticulosis     History of alcohol abuse     Hyperlipidemia     Hypertension     Irregular heart beat     states been told he has had in past. Never treated. Sees PCP every 3 mos. and EKG yearly    Pneumonia     Right knee pain     Shoulder injury     and arthrisits, injury rotaotr cuff    Sleep difficulties     with depression       Past Surgical History: All past surgical history was reviewed today.     Past Surgical History:   Procedure Laterality Date    ABDOMEN SURGERY      ABSCESS DRAINAGE      BREAST SURGERY Right 1/21/2019    RIGHT BREAST EXCISIONAL BIOPSY [facility] Kat Coto [facility] at [facility]  09/14/2016    No stents placed   [facility] 141  10/2017    COLECTOMY      COLONOSCOPY  08/29/2016    with biopsy and polypectomy    COLONOSCOPY  03/19/2018    COLONOSCOPY N/A 5/14/2019    COLONOSCOPY WITH DILATION performed by Warner Lomax MD at [address] [place] [address])  09/14/2016    for staging - Dr. Alyssa Martinez, COLON, [address] [place] [address] 100%   Weight:       Height:           Physical Exam       General: Encephalopathic but protecting the airway so far  HEENT: normocephalic, atraumatic, sclera clear, pupils equal, light reflex preserved bilaterally  Cardiovascular: RRR, no murmurs/rubs/gallops detected  Pulmonary: CTABL, no rhonchi/rales   Abdomen/GI: soft, no organomegaly, bowel sounds positive. Ostomy site okay  Neuro: Encephalopathic, pupils are equal and reactive to light, moves all extremities  Skin: no rash,   Musculoskeletal:  No obvious joint swelling, redness. No limitation of range of passive motion  Genitourinary: Hurt's catheter in place   Psych: could not assess  Lymphatic/Immunologic: No obvious bruising, no cervical lymphadenopathy    Lines: All vascular access sites are healthy with no local erythema, discharge or tenderness      Intake and output:    I/O last 3 completed shifts: In: 2919.7 [I.V.:1553.7; IV Piggyback:1366]  Out: 6253 [Urine:1570; Stool:20]    Lab Data:   All available labs and old records have been reviewed by me.     CBC:  Recent Labs     12/29/19  2339 12/31/19  0447 01/01/20  0455   WBC 14.0* 14.8* 13.1*   RBC 5.01 4.66 5.18   HGB 15.4 14.2 16.0   HCT 46.9 42.8 48.3    192 219   MCV 93.6 91.9 93.2   MCH 30.7 30.5 31.0   MCHC 32.8 33.2 33.2   RDW 15.4 15.3 15.5*        BMP:  Recent Labs     12/30/19  0650 12/31/19  0435 12/31/19  1745 01/01/20  0455   * 133*  --  133*   K 3.8 2.5* 3.4* 4.8   CL 83* 88*  --  88*   CO2 25 37*  --  28   BUN 46* 30*  --  33*   CREATININE 1.4* 1.0  --  1.6*   CALCIUM 9.3 8.2*  --  8.6   GLUCOSE 154* 132*  --  155*        Hepatic Function Panel:   Lab Results   Component Value Date    ALKPHOS 171 01/01/2020    ALT 1,277 01/01/2020    AST 2,219 01/01/2020    PROT 5.8 01/01/2020    PROT 6.3 07/05/2017    BILITOT 2.9 01/01/2020    BILIDIR 0.3 12/18/2019    IBILI 0.3 12/18/2019    LABALBU 3.2 01/01/2020       CPK:   Lab Results   Component Value Date    CKTOTAL 129 09/06/2016 I50.22    Cardiomyopathy, idiopathic (HCC) I42.8    Poor venous access I87.8    Anemia D64.9    Insomnia due to medical condition G47.01    Chest pain R07.9    Dyspnea on exertion R06.09    Peristomal hernia K46.9    Episodic lightheadedness R42    ICD (implantable cardioverter-defibrillator) in place Z95.810    Ileostomy in place (HCC) Z93.2    Pneumonia J18.9    Colostomy in place [facility]) Z93.3    Breast mass, right N63.10    Abscess of sacrum (HCC) M46.28    Chronic pain G89.29    Abscess L02.91    Generalized abdominal pain R10.84    LLQ pain R10.32    Moderate malnutrition (HCC) E44.0    Hypervolemia E87.70    Chronic pain syndrome G89.4    Colon cancer (HCC) C18.9    Fibromyalgia M79.7    Primary insomnia F51.01    Acute on chronic systolic and diastolic heart failure, NYHA class 4 (HCC) I50.43    Hypotension due to drugs I95.2    Recurrent incisional hernia with incarceration K43.0    Incontinence of feces R15.9    Severe malnutrition (HCC) E43    PNA (pneumonia) J18.9    Biliary obstruction S31.8    Metabolic encephalopathy X63.81    Choledocholithiasis K80.50    Hyponatremia G30.9    Systolic CHF, acute (HCC) I50.21    C. difficile colitis A04.72    Urinary tract infection N39.0    ALEN (acute kidney injury) ([facility] [address] N17.9    Acute liver failure without hepatic coma K72.00    Altered mental status R41.82    Sepsis (HCC) A41.9    Elevated INR R79.1    Acute respiratory failure with hypoxia (HCC) J96.01    Hypokalemia E87.6    Overweight E66.3    H/O alcohol abuse F10.11    Acute encephalopathy G93.40       Please note that this chart was generated using Dragon dictation software. Although every effort was made to ensure the accuracy of this automated transcription, some errors in transcription may have occurred inadvertently.  If you may need any clarification, please do not hesitate to contact me through [facility] or at the phone number provided below with my electronic signature. Any pictures or media included in this note were obtained after taking informed verbal consent from the patient and with their approval to include those in the patient's medical record.     Duy Busby MD, MPH  1/1/2020 , 1:02 PM   [facility] Infectious Disease   Office: [phone]  Fax: [phone]  Tuesday AM clinic:   [address] [place] [address]  Thursday AM OETH:[id number] [address], [place]

## 2020-01-01 NOTE — CONSULTS
NEUROLOGY CONSULTATION     Patient's Name :   Maryann Lechuga        YOB: 1970                    Date of Consultation : 1/1/2020 12:11 PM    Referring Physician :  Edvin Morton MD    Reason for Consultation :  Mental status changes    HISTORY OF PRESENT ILLNESS      Ernie Meckel is a 52 y.o. male   History was obtained from the dictations in the chart. Additional history was obtained from the patient's nurse. Patient is not able to give much history at this time. He was admitted with respiratory failure and congestive heart failure. He is now improving but family felt that his mental status was still abnormal and hence his neurological consultation. They felt that he was slow to respond. Patient denies any focal weakness but states that he is tired and weak all over. He also complains of pain all over the body. Denies any vertigo or diplopia. Symptom onset was subacute. They are moderately severe without any other aggravating or relieving factors. REVIEW OF SYSTEMS     Patient has episodic breathlessness. Denies any chest pain or palpitations now. No abdominal pain. Rest of the 14 system review was reviewed and was negative except for the symptoms noted above. Past Medical History:   Diagnosis Date    Allergic     Anesthesia     tremors    Arthritis     CAD (coronary artery disease)     Cancer ([facility] [address]     colon    Chemotherapy adverse reaction 4/29/2019    [facility]     Chronic knee pain     Clostridium difficile infection 07/11/2016    PCR+    Depression motion     Diverticulitis     Diverticulosis     History of alcohol abuse     Hyperlipidemia     Hypertension     Irregular heart beat     states been told he has had in past. Never treated. Sees PCP every 3 mos.  and EKG yearly    Pneumonia     Right knee pain     Shoulder injury     and arthrisits, injury rotaotr cuff    Sleep difficulties     with depression     Family History   Problem Relation Age of Onset    Cancer Mother     High Blood Pressure Father     Cancer Father      Social History     Socioeconomic History    Marital status:      Spouse name: None    Number of children: None    Years of education: None    Highest education level: None   Occupational History    None   Social Needs    Financial resource strain: None    Food insecurity:     Worry: None     Inability: None    Transportation needs:     Medical: None     Non-medical: None   Tobacco Use    Smoking status: Former Smoker     Packs/day: 1.00     Years: 5.00     Pack years: 5.00     Last attempt to quit: 2007     Years since quittin.0    Smokeless tobacco: Former User     Types: [address] date: 8/10/2017   Substance and Sexual Activity    Alcohol use: No     Comment: history of alcohol abuse 20 years ago    Drug use: No    Sexual activity: Never     Partners: Female   Lifestyle    Physical activity:     Days per week: None     Minutes per session: None    Stress: None   Relationships    Social connections:     Talks on phone: None     Gets together: None     Attends Jewish service: None     Active member of club or organization: None     Attends meetings of clubs or organizations: None     Relationship status: None    Intimate partner violence:     Fear of current or ex partner: None     Emotionally abused: None     Physically abused: None     Forced sexual activity: None   Other Topics Concern    None   Social History Narrative    None     Current Facility-Administered Medications   Medication Dose Route Frequency Provider Last Rate Last Dose    amiodarone (CORDARONE) tablet 200 mg  200 mg Oral Daily Yaquelin Josue MD   200 mg at 20 1018    metronidazole (FLAGYL) 500 mg in NaCl 100 mL IVPB premix  500 mg Intravenous Marilu Hoffman MD   Stopped at 20 0646    potassium chloride (KLOR-CON M) products. He can tolerate products with corn oil and corn syrup. Pt. Reports gets \"congestion\"    Seasonal        PHYSICAL EXAMINATION:  BP 97/75   Pulse 96   Temp 97.1 °F (36.2 °C) (Temporal)   Resp (!) 35   Ht 5' 10\" (1.778 m)   Wt 198 lb 3.1 oz (89.9 kg)   SpO2 100%   BMI 28.44 kg/m²   Appearance: Well appearing, well nourished and in no distress  Mental Status Exam: Patient is awake and alert. He is oriented x2. He did not know the month but he knew that he was in the hospital.  He could not cooperate for detailed memory testing. Judgment seems fairly normal.  He was able to identify the last few presidents. Speech is slightly dysarthric but there was no aphasia. Funduscopic Exam: Could not cooperate with detailed testing  Cranial Nerves:   II: Visual fields: Full to confrontation  III: Pupils: Equal round reacting to light  III,IV,VI: Extraocular movements are intact  V: Facial sensation: Normal  VII: Face appears symmetrical  VIII: Hearing: Normal for conversation  IX: Palate moves symmetrically  XI: Shoulder strength was symmetrical  XII: Tongue is in the midline  Motor: Strength is 4/5 all over.   Tone is normal  Sensory: Normal for light touch in the arms and legs  Coordination:    Slightly impaired in both arms and legs           Reflexes: 1 in the upper extremities 1 at the knees and absent at the ankles  Plantar response: Flexor response bilaterally  Gait: Unable to ambulate at this time  Romberg: Could not be tested  Vascular: No carotid bruit bilaterally        DATA:  LABS:  General Labs:    CBC:   Lab Results   Component Value Date    WBC 13.1 01/01/2020    RBC 5.18 01/01/2020    RBC 3.74 07/05/2017    HGB 16.0 01/01/2020    HCT 48.3 01/01/2020    MCV 93.2 01/01/2020    MCH 31.0 01/01/2020    MCHC 33.2 01/01/2020    RDW 15.5 01/01/2020     01/01/2020    MPV 7.9 01/01/2020     BMP:    Lab Results   Component Value Date     01/01/2020    K 4.8 01/01/2020    K 3.6 12/09/2019  Recurrent incisional hernia with incarceration    Incontinence of feces    Severe malnutrition (HCC)    PNA (pneumonia)    Biliary obstruction    Metabolic encephalopathy    Choledocholithiasis    Hyponatremia    Systolic CHF, acute (HCC)    C. difficile colitis    Urinary tract infection    [facility] [address]    Acute liver failure without hepatic coma    Altered mental status    Sepsis ([facility] [address]    Elevated INR    Acute respiratory failure with hypoxia (HCC)    Hypokalemia    Overweight    H/O alcohol abuse     RECOMMENDATIONS ;  Discussed with patient and his nurse  I will get an EEG  Continue supportive care  If the mental status does not improve I will consider an MRI brain  Thank you for this consultation          Please note a portion of this chart was generated using dragon dictation software. Although every effort was made to ensure the accuracy of this automated transcription, some errors in transcription may have occurred.

## 2020-01-02 NOTE — PROGRESS NOTES
NEUROLOGY FOLLOWUP    HISTORY OF PRESENT ILLNESS :     Ruben Muir is a 52 y.o. male   History was obtained from the patient's nurse and dictations in the chart. Patient still continues to be significantly confused and is not able to give any reliable history. Patient seems to be repeating the same questions over and over again at times. Detailed history: He was admitted with respiratory failure and congestive heart failure. He is now improving but family felt that his mental status was still abnormal  neurological  Patient denies any focal weakness but states that he is tired and weak all over. He also complains of pain all over the body. Denies any vertigo or diplopia. Symptom onset was subacute. They are moderately severe without any other aggravating or relieving factorsatient's nurse in the ICU as well as dictations in the chart.         REVIEW OF SYSTEMS     Unable to obtain a detailed and reliable system review because of confusion  Constitutional:  []   Chills   []  Fatigue   []  Fevers   []  Malaise   []  Weight loss     [] Denies all of the above    Respiratory:   []  Cough    []  Shortness of breath         [] Denies all of the above     Cardiovascular:   []  Chest pain    []  Exertional chest pressure/discomfort           [] Palpitations    []  Syncope     [] Denies all of the above    Past Medical History:   Diagnosis Date    Allergic     Anesthesia     tremors    Arthritis     CAD (coronary artery disease)     Cancer ([facility] [address]     colon    Chemotherapy adverse reaction 4/29/2019    CHF (congestive heart failure) ([facility] [address]     Chronic knee pain     Clostridium difficile infection 07/11/2016, 12/21/19    PCR+    Depression motion     Diverticulitis     Diverticulosis     History of alcohol abuse     Hyperlipidemia     Hypertension     Irregular heart beat     states been told he has had in past. Never distress  Patient seems awake and alert but is quite confused. He is oriented x2. He seems to be obsessed with 1 particular thing and would keep repeating it over and over again. Judgment is impaired. Speech is normal.  Language function could not be assessed. Pupils equal round reactive to light. Extraocular movements are intact. Face symmetrical.  Tongue midline. Strength is 4/5 all over. Plantar reflexes withdrawal.  Coordination could not be checked because of limited cooperation. No carotid bruit. No neck stiffness. DATA :  LABS:  General Labs:    CBC:   Lab Results   Component Value Date    WBC 9.4 01/02/2020    RBC 4.55 01/02/2020    RBC 3.74 07/05/2017    HGB 14.0 01/02/2020    HCT 42.4 01/02/2020    MCV 93.3 01/02/2020    MCH 30.8 01/02/2020    MCHC 33.0 01/02/2020    RDW 15.8 01/02/2020     01/02/2020    MPV 7.6 01/02/2020     BMP:    Lab Results   Component Value Date     01/02/2020    K 3.7 01/02/2020    K 3.6 12/09/2019    CL 87 01/02/2020    CO2 35 01/02/2020    BUN 39 01/02/2020    LABALBU 2.4 01/02/2020    CREATININE 1.0 01/02/2020    CALCIUM 8.0 01/02/2020    GFRAA >60 01/02/2020    GFRAA >60 12/19/2012    LABGLOM >60 01/02/2020    GLUCOSE 125 01/02/2020    GLUCOSE 131 07/05/2017     RADIOLOGY REVIEW:  I have reviewed radiology image(s) and reports(s) of: CT head from December 2019      IMPRESSION :  Metabolic encephalopathy  Liver enzymes had been elevated.   CT head from a couple of weeks ago was reviewed and did not show any acute abnormalities  TSH and ammonia levels normal  Patient Active Problem List   Diagnosis    S/P total knee replacement using cement    Mixed hyperlipidemia    Essential hypertension    Depression    Umbilical hernia    Lower abdominal pain    Rectal mass    Hepatitis    Localized edema    SOB (shortness of breath)    Acute systolic heart failure (HCC)    NICM (nonischemic cardiomyopathy) (HCC)    Abnormal nuclear cardiac imaging test  VT (ventricular tachycardia) (HCC)    Rectal cancer (HCC)    Neoplasm related pain    Systolic CHF, chronic (HCC)    Cardiomyopathy, idiopathic (HCC)    Poor venous access    Anemia    Insomnia due to medical condition    Chest pain    Dyspnea on exertion    Peristomal hernia    Episodic lightheadedness    ICD (implantable cardioverter-defibrillator) in place    Ileostomy in place [facility])    Pneumonia    Colostomy in place [facility])    Breast mass, right    Abscess of sacrum (HCC)    Chronic pain    Abscess    Generalized abdominal pain    LLQ pain    Moderate malnutrition (HCC)    Hypervolemia    Chronic pain syndrome    Colon cancer (HCC)    Fibromyalgia    Primary insomnia    Acute on chronic systolic and diastolic heart failure, NYHA class 4 (HCC)    Hypotension due to drugs    Recurrent incisional hernia with incarceration    Incontinence of feces    Severe malnutrition (HCC)    PNA (pneumonia)    Biliary obstruction    Metabolic encephalopathy    Choledocholithiasis    Hyponatremia    Systolic CHF, acute (HCC)    C. difficile colitis    Urinary tract infection    ALEN (acute kidney injury) ([address]    Acute liver failure without hepatic coma    Altered mental status    Sepsis ([address]    Elevated INR    Acute respiratory failure with hypoxia (HCC)    Hypokalemia    Overweight    H/O alcohol abuse    Acute encephalopathy       RECOMMENDATIONS :  Discussed with patient's nurse at the bedside  No family available at the bedside  Continue supportive care  If there is no significant improvement in neuro status in the next day or 2 I will consider repeat brain imaging. Please note a portion of this chart was generated using dragon dictation software. Although every effort was made to ensure the accuracy of this automated transcription, some errors in transcription may have occurred.          Chandra Riedel M.D.

## 2020-01-02 NOTE — PROGRESS NOTES
[facility] GI  Gastroenterology Progress Note    Nic Avalos is a 52 y.o. male patient. 1. ALEN (acute kidney injury) ([address]    2. Acute liver failure without hepatic coma    3. Elevated INR        SUBJECTIVE:  No complaints. ROS:  Cardiovascular ROS: no chest pain or dyspnea on exertion  Gastrointestinal ROS: change in bowel habits, or black or bloody stools. Chronic abdominal pain which is stable. Respiratory ROS: no cough, shortness of breath, or wheezing    Physical    VITALS:  BP (!) 83/59   Pulse 82   Temp 96.9 °F (36.1 °C) (Temporal)   Resp 13   Ht 5' 10\" (1.778 m)   Wt 199 lb 1.2 oz (90.3 kg)   SpO2 100%   BMI 28.56 kg/m²   TEMPERATURE:  Current - Temp: 96.9 °F (36.1 °C); Max - Temp  Av.9 °F (36.1 °C)  Min: 96.9 °F (36.1 °C)  Max: 97 °F (36.1 °C)    NAD  tachycardia  Lungs CTA Bilaterally, normal effort  Abdomen soft, ND, NT, no HSM, Bowel sounds normal. Colostomy in right abdomen. AAOx3, No asterixis     Data    Data Review:    Recent Labs     19  0415   WBC 14.8* 13.1* 9.4   HGB 14.2 16.0 14.0   HCT 42.8 48.3 42.4   MCV 91.9 93.2 93.3    219 127*     Recent Labs     19  1745 20  04520  0415   *  --  133* 129*   K 2.5* 3.4* 4.8 3.7   CL 88*  --  88* 87*   CO2 37*  --  28 35*   BUN 30*  --  33* 39*   CREATININE 1.0  --  1.6* 1.0     Recent Labs     19  0455 20  0415   AST 3,159* 2,219* 1,140*   ALT 1,354* 1,277* 858*   BILITOT 2.3* 2.9* 2.6*   ALKPHOS 154* 171* 134*     No results for input(s): LIPASE, AMYLASE in the last 72 hours. Recent Labs     19  0435 20  0455 20  0415   PROTIME 41.0* 31.3* 30.0*   INR 3.49* 2.67* 2.56*     No results for input(s): PTT in the last 72 hours.     Radiology Review:  Liver US and doppler:   Impression   Hepatic steatosis.       Patent hepatic and portal veins with normal antegrade flow.       Normal gallbladder and bile ducts.           ASSESSMENT     Acute hepatitis - AST 3,600, ALT 1,300, total bili 3.1, INR 4.75 at admission. we have seen pt twice in the past for similarly elevated liver enzymes felt to be from ischemia from poor forward flow in setting of CHF and passive congestion. This is likely the cause again. Prior liver serology neg w/u. No asterixis on exam and ammonia was normal. Transaminases continue to improve with treatment of CHF. INR imprved as well. Did received Vitamin K on 12/20. Acute on chronic CHF - on milrinone and IV lasix. Encephalopathy - felt to be metabolic. No asterixis and normal ammonia so does not appear to be hepatic encephalopathy. C diff: pt diagnosed 12/21 and had been on flagyl at home. Now on Dificid which I agree with given pt's comorbidities and risk of relapse which I discussed with him today. Coco Watson (monoclonal Ab to decreased risk of relapse) would likely be contraindicated due to his CHF.        PLAN    - complete course of Dificid  - Will be available this admission if needed further      Ortiz MD Nohelia  [address]

## 2020-01-02 NOTE — PROGRESS NOTES
Physical Therapy    Facility/Department: [facility] ICU  Initial Assessment    NAME: Jeanine Carrera  : 1970  MRN: [id number]    Date of Service: 2020    Discharge Recommendations: Jeanine Carrera scored a 17/24 on the AM-PAC short mobility form. Current research shows that an AM-PAC score of 17 or less is typically not associated with a discharge to the patient's home setting. Based on the patients AM-PAC score and their current functional mobility deficits, it is recommended that the patient have 3-5 sessions per week of Physical Therapy at d/c to increase the patients independence. Patient currently significantly limited due to level of cognition/confusion at this time. Anticipate improved function with decreased confusion. PT Equipment Recommendations  Equipment Needed: No(pending progression with therapy)    Assessment   Body structures, Functions, Activity limitations: Decreased functional mobility ; Decreased balance;Decreased endurance;Decreased safe awareness  Assessment: Pt presents with impaired functional strength and endurance, decreased standing balance, and decreased safety awareness impairing his ability to perform functional mobility safely and independently. Pt would benefit from acute PT to address deficits. Treatment Diagnosis: impaired gait, transfers, and balance  Prognosis: Good  Decision Making: Medium Complexity  Clinical Presentation: evolving  Patient Education: PT POC and eval, d/c recommendations, transfer training, gait training, general safety  Barriers to Learning: cognition  REQUIRES PT FOLLOW UP: Yes  Activity Tolerance  Activity Tolerance: Patient limited by cognitive status  Activity Tolerance: Pt with confusion throughout session, requiring increased time for redirection to tasks. Patient Diagnosis(es): The primary encounter diagnosis was ALEN (acute kidney injury) ([facility] [address]  Diagnoses of Acute liver failure without hepatic coma and Elevated INR were also pertinent to this visit. has a past medical history of Allergic, Anesthesia, Arthritis, CAD (coronary artery disease), Cancer ([facility] [address] Chemotherapy adverse reaction, CHF (congestive heart failure) ([facility] [address] Chronic knee pain, Clostridium difficile infection, Depression motion, Diverticulitis, Diverticulosis, History of alcohol abuse, Hyperlipidemia, Hypertension, Irregular heart beat, Pneumonia, Right knee pain, Shoulder injury, and Sleep difficulties. has a past surgical history that includes sinus surgery (4 surgeries); Inner ear surgery (tube right ear); knee surgery (08/24/2011); other surgical history (7/1/13); Knee arthroscopy (Right, [id number]); Knee arthroscopy (Right, 8/4/2014); Umbilical hernia repair (11/11/14); joint replacement; Shoulder arthroscopy (Right, 9/22/15); Colonoscopy (08/29/2016); Endoscopic ultrasonography, GI (09/14/2016); Cardiac catheterization (09/14/2016); Small intestine surgery (01/11/2017); Tunneled venous port placement (Left, 02/13/2017); colectomy; Abscess Drainage; Tunneled venous port placement; hernia repair; Upper gastrointestinal endoscopy (10/03/2017); hernia repair (12/01/2017); Small intestine surgery (12/01/2017); other surgical history (12/01/2017); Colonoscopy (03/19/2018); Revision Colostomy (04/18/2018); hernia repair (04/18/2018); sigmoidoscopy (N/A, 11/23/2018); Breast surgery (Right, 1/21/2019); Abdomen surgery; Endoscopy, colon, diagnostic; Cardiac defibrillator placement (10/2017); Colonoscopy (N/A, 5/14/2019); pacemaker placement; and Small intestine surgery (N/A, 8/21/2019).     Restrictions  Restrictions/Precautions  Restrictions/Precautions: Isolation, Contact Precautions, Fall Risk(HIGH FALL RISK; CONTACT CDIFF)  Position Activity Restriction  Other position/activity restrictions: Leone Holstein is a 52 y.o. male with significant past medical history of colon cancer, coronary artery disease, congestive heart failure, C. difficile, alcohol abuse, hepatitis [employer]  Occupation: On disability  Additional Comments: Pt questionable historian--stating independent for tasks and then stating wife assisting with \"everything\". Per therapy evaluation on 12/21, pt with PLOF of independence. Pt is home alone during day when kids at school/wife at work. Pt states \"yes and no\" when asked about falls at home--reports \"a lot\" when asked about number of falls. Objective  AROM RLE (degrees)  RLE AROM: WFL  AROM LLE (degrees)  LLE AROM : WFL  Strength RLE  Strength RLE: WFL  Comment: unable to formally assess due to impaired command following and confusion throughout session; grossly 3+/5 based on observation of functional mobility  Strength LLE  Strength LLE: WFL  Comment: unable to formally assess due to impaired command following and confusion throughout session; grossly 3+/5 based on observation of functional mobility  Tone RLE  RLE Tone: Normotonic  Tone LLE  LLE Tone: Normotonic  Motor Control  Gross Motor?: WFL  Sensation  Overall Sensation Status: (unable to formally assess due to pt's cognition)  Bed mobility  Bridging: Supervision  Supine to Sit: Moderate assistance  Sit to Supine: Moderate assistance  Scooting: Supervision  Comment: Pt required increased time to perform supine>sit transfer requiring encouragement to participate as pt perseverating on movement of electric bed and wheeled tray table throughout. Transfers  Sit to Stand: Contact guard assistance(x3-4 from EOB)  Stand to sit: Contact guard assistance  Comment: Pt with hands on RW during all transfers, unable to follow commands for safe hand placement. Pt continues to perseverate on tray table mobility. Pt also slightly impulsive, quickly sitting down at bed without warning.    Ambulation  Ambulation?: Yes  Ambulation 1  Surface: level tile  Device: Rolling Walker  Assistance: Contact guard assistance  Quality of Gait: appropriate Jody, appropriate jatin, slight sway noted  Distance: 3'+7' forward and

## 2020-01-02 NOTE — PLAN OF CARE
Problem: Falls - Risk of:  Goal: Will remain free from falls  Description  Will remain free from falls  Outcome: Ongoing  Goal: Absence of physical injury  Description  Absence of physical injury  Outcome: Ongoing     Problem: OXYGENATION/RESPIRATORY FUNCTION  Goal: Patient will maintain patent airway  Outcome: Ongoing  Goal: Patient will achieve/maintain normal respiratory rate/effort  Description  Respiratory rate and effort will be within normal limits for the patient  Outcome: Ongoing     Problem: HEMODYNAMIC STATUS  Goal: Patient has stable vital signs and fluid balance  Outcome: Ongoing     Problem: Pain:  Goal: Pain level will decrease  Description  Pain level will decrease  Outcome: Ongoing  Goal: Control of acute pain  Description  Control of acute pain  Outcome: Ongoing  Goal: Control of chronic pain  Description  Control of chronic pain  Outcome: Ongoing     Problem: Nutrition  Goal: Optimal nutrition therapy  Outcome: Ongoing     Problem: Risk for Impaired Skin Integrity  Goal: Tissue integrity - skin and mucous membranes  Description  Structural intactness and normal physiological function of skin and  mucous membranes.   Outcome: Ongoing

## 2020-01-02 NOTE — CARE COORDINATION
Ivette Adan with [facility] notified that pt had a previous referral to them.   Marty Carranza, RN, BSN  [phone]

## 2020-01-02 NOTE — PROGRESS NOTES
[facility]   Progress Note  CHF/Pulmonary Hypertension Cardiology    Chief complaint: We are following this patient for acute on chronic systolic HF with decompensation    HPI:  Jose Oquendo is a 53 yo male well known to me from the office with a history of chronic systolic HF and rectal cancer. Clarice Gonzalez presented to the ED with severe subacute onset of gradually progressive increasing shortness of breath. In the ED, he had significant tachypneic and unable to hold full conversation in spite of being on supplemental oxygen. He was also confused.   Clarice Gonzalez has had fatigue, malaise and generalized weakness.  No fever, chills.  Denies chest pain, palpitations.  Denies flank pain or hematuria.  He denies dysuria, urgency, or frequency. On the previous admission, there was a question of choledocholithiasis.  He has elevated bilirubin and liver function tests. Clarice Gonzalez had normal enzymes 11/8/19. Clarice Gonzalez is receiving pain meds. Clarice Gonzalez has a history of chronic narcotic use from all of his surgeries.     He has a history of sCHF, NICM, hx HTN, HLD and rectal cancer s/p chemo and XRT. [facility] 9/15/16 with no coronary artery disease, EF 15%, LVEDP 20-25 mmHg.  He had rectal surgery in January 2017 and has completed chemo and radiation. He had problems with pelvic abscess and had a drain placed.    He underwent ICD implant for primary prevention on 10/26/17. Clarice Gonzalez also underwent surgery for repair of ventral hernia and new colostomy on 12/1/17. He has had abscesses post-op that have been drained. Terna Hirsch was admitted for chest pain and SOB on 5-31-19 thru 6-7-19 and was found to be in acute CHF.  ICD did not show VT while in the hospital. Clarice Gonzalez underwent colorectal anastomosis dilation 5/14/2019. He recently underwent colostomy placement recently due to problems with chronic abdominal pain.      He was just transferred up to the floor from the ED. He is diuresing and feeling better.   The patient tells me that he remembers feeling good, then not feeling good and then doesn't remember anything. During the last admission, there was an adjustment of narcotics due to over-sedation. It is unclear what meds he was taking on discharge. He is alert today but very drowsy. He says he was taking his diuretic. His LFTs are severely elevated. There is concern for tylenol toxicity.       ROS:  Over the weekend, he has been on and off milrinone. Milrinone was stopped due to worsening ectopy, but was restarted last night. Today he is more hemodynamically stable. His BNP has dropped from 30K to 4K. His shortness of breath is better. However, he continues to be confused. He is looking at his phone and repeatedly going back and forth between the sign on and the message area. He is receiving antibiotics for C diff, although his symptoms are improving.     Medications/Labs all Reviewed    Lab Results   Component Value Date    WBC 9.4 01/02/2020    HGB 14.0 01/02/2020    HCT 42.4 01/02/2020    MCV 93.3 01/02/2020     (L) 01/02/2020     Lab Results   Component Value Date    CREATININE 1.0 01/02/2020    BUN 39 (H) 01/02/2020     (L) 01/02/2020    K 3.7 01/02/2020    CL 87 (L) 01/02/2020    CO2 35 (H) 01/02/2020     Lab Results   Component Value Date    INR 2.56 (H) 01/02/2020    PROTIME 30.0 (H) 01/02/2020        Physical Examination:    BP (!) 86/56   Pulse 77   Temp 96.9 °F (36.1 °C) (Temporal)   Resp 10   Ht 5' 10\" (1.778 m)   Wt 199 lb 1.2 oz (90.3 kg)   SpO2 100%   BMI 28.56 kg/m²      Chronically ill appearing, slow in speech, drowsy appearing  HEENT:  NC/AT  Respiratory:  · Resp Assessment: Normal respiratory effort  · Resp Auscultation: Clear to auscultation bilaterally   Cardiovascular:  · Auscultation: regular rate and rhythm, normal S1S2, no murmur, rub or gallop  · Palpation:  Nl PMI  · JVP:  normal  · Extremities: No Edema  Abdomen:  · Soft, non-tender  · Normal bowel sounds  Extremities:  ·  No Cyanosis or not stop until ACEI or ARB has been started. He has ectopy but has an ICD. 3.  Hold ACEI for now  4. Continue coreg at lower dose, 3.125 mg po bid  5. Reduce narcotics  6. Need to consider more aggressive hf management. Not sure if he is a candidate for transplant, given his rectal cancer diagnosis. Might need to consider LVAD treatment. 7.  CHF education reinforced. ~salt restriction  ~fluid restriction  ~medication compliance  ~daily weights and notify of any significant weight gain/loss  ~establish with CHF nurse  ~outpatient follow-up with our CHF team     Restart eplerenone 25 mg po qd  tolvaptan 15 mg po qd for hyponatremia and fluid overload  Continue milrinone  Palliative care consultation. I am not sure how aggressive patient wants to be, but I do think we are at a decision making point and need to decide if he would want aggressive measures such as LVAD. Conditions that complicate LVAD/transplant include his relatively recent diagnosis of rectal cancer and his narcotic addiction. Also his memory issues and support system are also in question. I am also not sure about his medication compliance at home. Due to the high probability of clinically significant life threating deterioration of the patient's condition that required my urgent intervention, a total critical care time 45 minutes was used. This time excludes any time that may have been spent performing procedures. This includes but not limited to vital sign monitoring, telemetry monitoring, continuous pulse oximety, IV medication, clinical response to the IV medications, documentation time , consultation time, interpretation of lab data, review of nursing notes and old record review.            NYHA Class: 4    Clive Dahl MD, 1/2/2020 10:05 AM

## 2020-01-02 NOTE — CONSULTS
wire is visualized in the right ventricle.   -Moderate tricuspid regurgitation with PASP of 40 mmHg.   -The right atrial size is dilated.   -IVC size is dilated (>2.1 cm) but collapses > 50% with respiration   consistent with elevated RA pressure (8 mmHg). RIGHT UPPER QUADRANT ULTRASOUND  12/30/2019   FINDINGS:   LIVER:  The liver demonstrates increased parenchymal echogenicity without   evidence of intrahepatic biliary ductal dilatation or focal lesion.       BILIARY SYSTEM:  Gallbladder is unremarkable without evidence of   pericholecystic fluid, wall thickening or stones.  Negative sonographic   Fragoso's sign.       Common bile duct is within normal limits measuring 6 mm.       RIGHT KIDNEY: Not identified.       PANCREAS: Visualized portions of the pancreas are unremarkable.       OTHER: No evidence of right upper quadrant ascites.       DOPPLER EVALUATION: Patent hepatic and portal veins with normal antegrade   flow and waveforms.  Normal caliber abdominal aorta.  Patent IVC.  Patent   hepatic artery with peak systolic velocity of 20 centimeters/second.           Impression   Hepatic steatosis.       Patent hepatic and portal veins with normal antegrade flow.       Normal gallbladder and bile ducts. Past Medical History:   has a past medical history of Allergic, Anesthesia, Arthritis, CAD (coronary artery disease), Cancer ([address] Chemotherapy adverse reaction, CHF (congestive heart failure) ([address] Chronic knee pain, Clostridium difficile infection, Depression motion, Diverticulitis, Diverticulosis, History of alcohol abuse, Hyperlipidemia, Hypertension, Irregular heart beat, Pneumonia, Right knee pain, Shoulder injury, and Sleep difficulties. Past Surgical History:   has a past surgical history that includes sinus surgery (4 surgeries); Inner ear surgery (tube right ear); knee surgery (08/24/2011); other surgical history (7/1/13); Knee arthroscopy (Right, [id number]);  Knee arthroscopy (Right, 8/4/2014); Umbilical hernia repair (11/11/14); joint replacement; Shoulder arthroscopy (Right, 9/22/15); Colonoscopy (08/29/2016); Endoscopic ultrasonography, GI (09/14/2016); Cardiac catheterization (09/14/2016); Small intestine surgery (01/11/2017); Tunneled venous port placement (Left, 02/13/2017); colectomy; Abscess Drainage; Tunneled venous port placement; hernia repair; Upper gastrointestinal endoscopy (10/03/2017); hernia repair (12/01/2017); Small intestine surgery (12/01/2017); other surgical history (12/01/2017); Colonoscopy (03/19/2018); Revision Colostomy (04/18/2018); hernia repair (04/18/2018); sigmoidoscopy (N/A, 11/23/2018); Breast surgery (Right, 1/21/2019); Abdomen surgery; Endoscopy, colon, diagnostic; Cardiac defibrillator placement (10/2017); Colonoscopy (N/A, 5/14/2019); pacemaker placement; and Small intestine surgery (N/A, 8/21/2019). Advance Directives:   Patient does not have advance directives and current code status is full code. Emergency contacts are listed as his brother, Néstor Solitario @ [phone]. Next is Remigio Hurt (relationship - other) @ [phone]. His wife remains on the contact list, but they are not currently living together, Jinny Oquendo @ [phone]. Problem Severity: Pain/Other Symptoms:    Shortness of breath and patient is aware that he cannot find the appropriate words at times. Patient has a red area on his coccyx and has a protective dressing in place. Bed Mobility/Toileting/Transfer:       Transfer/ambulate per self does not use cane or walker. He states that he cares for his own colostomy. Performance Status:  Patient has said today that he is tired and weak. Currently unable to follow the conversation. Not sure if this is his baseline or still confused. Liver enzymes continue to be elevated. WBC have returned to normal with current treatment.   Flagyl was stopped and a different medication added since admission, since it can cause

## 2020-01-02 NOTE — PROGRESS NOTES
[address] 1970    History:  Past Medical History:   Diagnosis Date    Allergic     Anesthesia     tremors    Arthritis     CAD (coronary artery disease)     Cancer ([facility] [address] [facility]     colon    Chemotherapy adverse reaction 4/29/2019    CHF (congestive heart failure) (HCC)     Chronic knee pain     Clostridium difficile infection 07/11/2016, 12/21/19    PCR+    Depression motion     Diverticulitis     Diverticulosis     History of alcohol abuse     Hyperlipidemia     Hypertension     Irregular heart beat     states been told he has had in past. Never treated. Sees PCP every 3 mos. and EKG yearly    Pneumonia     Right knee pain     Shoulder injury     and arthrisits, injury rotaotr cuff    Sleep difficulties     with depression       ECHO:      Summary   -Left ventricular cavity size is moderately dilated. Normal left ventricular   wall thickness. Overall left ventricular systolic function appears severely   reduced. Ejection fraction is visually estimated to be 20%. Grade III   diastolic dysfunction with elevated LV filling pressures. -Moderate to severe mitral regurgitation.   -The left atrium is dilated. -Pacer / ICD wire is visualized in the right ventricle. -Moderate tricuspid regurgitation with PASP of 40 mmHg. -The right atrial size is dilated. -IVC size is dilated (>2.1 cm) but collapses > 50% with respiration   consistent with elevated RA pressure (8 mmHg). ACE/ARB: . On hold -- ALEN  BB: coreg 3.125 mg bid  Aldosterone Antagonist: on hold [address] Admission:   12/20-12/23 with CHF  12/8-12/18 with encephalopathy  11/7-118- with pna    Code Status: full code  Discharge plans: patient is active with [facility] acute hospital    Family Present: no    Early Harada was admitted to the hospital with increased shortness of breath. Patient is well known to CHF team and follows as an outpatient.  Patient has been educated many times in the past and had participated in outpatient [facility] visits for CHF. Today patient quiet, slow to respond at times. He states baseline weight 190 lb. He is unable to tell me if he was weighing daily or what weight was doing at home. Patient states he is limiting sodium and fluids. Does not respond when asked about specific restrictions. Asked patient about discussion with advanced therapies, patient states he has heard it before. Patient states compliant with medication and follow up. Reviewed with patient CHF signs/ symptoms, causes, treatments, limited fluid intake, daily weights, discharge medications, low sodium diet, activiety and follow-up. Pt to call physician if weight gain of 3 pounds in one day or 5 pounds in one week. Discussed importance of lifestyle changes: he needs close follow up    PATIENT/CAREGIVER TEACHING:    Level of patient/caregiver understanding able to:   [ x] Verbalize understanding [ ] Demonstrate understanding [ ] Teach back   [ ] Needs reinforcement [ ] Other:       Time spent teachin mins    1. WEIGHT: Admit Weight: 200 lb (90.7 kg)      Today  Weight: 199 lb 1.2 oz (90.3 kg)   2. I/O     Intake/Output Summary (Last 24 hours) at 2020 1126  Last data filed at 2020 1000  Gross per 24 hour   Intake 1339 ml   Output 1250 ml   Net 89 ml       Recommendations:   1. He needs close follow up at discharge. He is high risk for readmission  2. Will bring literature on VAD to patient to review once more clear. 3. Continue to educate on S/S.   4. Emphasize daily weights, diet, and knowing when and who to call  5. Provided patient with CHF Resource Line for questions and concerns.   6. CHF pathway on cameron for discharge with home care       MAXINE MEAD 2020 11:26 AM

## 2020-01-02 NOTE — DISCHARGE INSTR - COC
Continuity of Care Form  CHF Pathway  Systolic    EF 32%    _x_ Daily Visits x 3     _x_Cardiovascular Assessment.  Titrate O2 to keep SaO2 greater than 90%    _x_ Daily Weights- Baseline Wt: 185 lb   Call MD if:   3 pound weight gain or loss in one day OR 5 pound weight gain in one week       _x_ Labs:   BMP,BNP     Please start on     Frequency: Weekly x 4              Fax results to: CHF Clinic: [phone]        _x_ Med List attached:   Hold Coreg/Metoprolol if HR less than 45 or patient symptomatic*   Hold ACE/ARB if SBP less than 85 or patient symptomatic*   Do not hold Spironolactone (aldactone) for hypotension/bradycardia   Call MD for questions: CHF Clinic: [phone]    _x_Follow up appointment with cardiology: date/time: * with Dr. Adilene Francis      _x_ Palliative Care Consult          Patient Name: Karen Esquivel   :  1970  MRN:  [id number]    Admit date:  2019  Discharge date:  20    Code Status Order: Full Code   Advance Directives:   Advance Care Flowsheet Documentation     Date/Time Healthcare Directive Type of Healthcare Directive Copy in [address] Agent's Name Healthcare Agent's Phone Number    [phone]  No, patient does not have an advance directive for healthcare treatment -- -- -- -- --          Admitting Physician:  Elsy Fishman MD  PCP: Jos Huston MD    Discharging Nurse: [address] [place] [address] Unit/Room#: SRJ-4984/8312-92  Discharging Unit Phone Number: [phone]    Emergency Contact:   Extended Emergency Contact Information  Primary Emergency Contact: Kelinprudenceaugie [address] Phone: [phone]  Mobile Phone: [phone]  Relation: Brother/Sister  Secondary Emergency Contact: Ralph Gaming  [place] Phone: [phone]  Mobile Phone: [phone]  Relation: Other    Past Surgical History:  Past Surgical History:   Procedure Laterality Date     [address] [facility] [address]  BREAST SURGERY Right 1/21/2019    RIGHT BREAST EXCISIONAL BIOPSY performed by Jose Ramon Irwin MD at [address]  09/14/2016    No stents placed   [address]  10/2017    COLECTOMY      COLONOSCOPY  08/29/2016    with biopsy and polypectomy    COLONOSCOPY  03/19/2018    COLONOSCOPY N/A 5/14/2019    COLONOSCOPY WITH DILATION performed by Christiano Huber MD at [facility]  09/14/2016    for staging - Dr. Aftab Salamanca, COLON, DIAGNOSTIC     [address] HERNIA REPAIR  12/01/2017    peristomal hernia repair    HERNIA REPAIR  04/18/2018    S/P: colostomy closure with hernia repair with mesh, with Dr. Pradip Lim at [facility].  INNER EAR SURGERY  tube right ear    JOINT REPLACEMENT      left TKR    KNEE ARTHROSCOPY Right [id number]    KNEE ARTHROSCOPY Right 8/4/2014    medial meniscus    KNEE SURGERY  08/24/2011    removal of tibial component    OTHER SURGICAL HISTORY  7/1/13    ACL Rt knee meniscus repair synovectomy    OTHER SURGICAL HISTORY  12/01/2017    takedown of ileostomy    PACEMAKER PLACEMENT      icd 10/2017    REVISION COLOSTOMY  04/18/2018    S/P: colostomy closure with hernia repair with mesh, with Dr. Pradip Lim at [facility].     SHOULDER ARTHROSCOPY Right 9/22/15    SUBACROMIAL DECOMPRESSION, DISTAL CLAVICLE EXCISION, LABRAL DEBRIDEMETN    SIGMOIDOSCOPY N/A 11/23/2018    SIGMOIDOSCOPY BIOPSY FLEXIBLE performed by Christiano Huber MD at [facility]  4 surgeries    SMALL INTESTINE SURGERY  01/11/2017    LAPAROSCOPIC LOW ANTERIOR RESECTION, ILEOSTOMY    SMALL INTESTINE SURGERY  12/01/2017    small bowel resection    SMALL INTESTINE SURGERY N/A 8/21/2019    OPEN COLOSTOMY AND PARASTOMAL HERNIA REPAIR WITH MESH performed by Jose Ramon Irwin MD at [facility] TUNNELED VENOUS PORT PLACEMENT Left 02/13/2017    PORT-A-CATH INSERTION                     TUNNELED VENOUS PORT PLACEMENT      UMBILICAL HERNIA REPAIR  11/11/14    UPPER GASTROINTESTINAL ENDOSCOPY  10/03/2017       Immunization History:   Immunization History   Administered Date(s) Administered    Influenza Virus Vaccine 12/03/2012       Active Problems:  Patient Active Problem List   Diagnosis Code    S/P total knee replacement using cement Z96.659    Mixed hyperlipidemia E78.2    Essential hypertension I10    Depression W44.7    Umbilical hernia S38.4    Lower abdominal pain R10.30    Rectal mass K62.89    Hepatitis K75.9    Localized edema R60.0    SOB (shortness of breath) K05.61    Acute systolic heart failure (HCC) I50.21    NICM (nonischemic cardiomyopathy) (HCC) I42.8    Abnormal nuclear cardiac imaging test R93.1    VT (ventricular tachycardia) (HCC) I47.2    Rectal cancer (HCC) C20    Neoplasm related pain J16.3    Systolic CHF, chronic (HCC) I50.22    Cardiomyopathy, idiopathic (HCC) I42.8    Poor venous access I87.8    Anemia D64.9    Insomnia due to medical condition G47.01    Chest pain R07.9    Dyspnea on exertion R06.09    Peristomal hernia K46.9    Episodic lightheadedness R42    ICD (implantable cardioverter-defibrillator) in place Z95.810    Ileostomy in place ([facility] [address] Z93.2    Pneumonia J18.9    Colostomy in place [facility]) Z93.3    Breast mass, right N63.10    Abscess of sacrum (HCC) M46.28    Chronic pain G89.29    Abscess L02.91    Generalized abdominal pain R10.84    LLQ pain R10.32    Moderate malnutrition (HCC) E44.0    Hypervolemia E87.70    Chronic pain syndrome G89.4    Colon cancer (HCC) C18.9    Fibromyalgia M79.7    Primary insomnia F51.01    Acute on chronic systolic and diastolic heart failure, NYHA class 4 (HCC) I50.43    Hypotension due to drugs I95.2    Recurrent incisional hernia with incarceration K43.0    Incontinence of feces R15.9    Severe malnutrition (HCC) E43    PNA (pneumonia) J18.9    Rehab): Good    Recommended Labs or Other Treatments After Discharge:    Physician Certification: I certify the above information and transfer of Idalmis Points  is necessary for the continuing treatment of the diagnosis listed and that he requires Brent Garcia for greater 30 days.      Update Admission H&P: No change in H&P    PHYSICIAN SIGNATURE:  Electronically signed by Tato Cyr MD on 1/9/20 at 2:47 PM

## 2020-01-02 NOTE — PROGRESS NOTES
[facility] PROGRESS NOTE    1/2/2020 1:58 PM        Name: Marshall Subramanian . Admitted: 12/29/2019  Primary Care Provider: Patience Cuevas MD (Tel: [phone])      Subjective:    No acute events overnight. Patient currently undergoing EEG. Mentating well. Denies chest pain still with some shortness of breath.     Reviewed interval ancillary notes    Current Medications  eplerenone (INSPRA) tablet 25 mg, Daily  tolvaptan (SAMSCA) tablet 15 mg, Q24H  amiodarone (CORDARONE) tablet 200 mg, Daily  Fidaxomicin (DIFICID) tablet 200 mg, BID  potassium chloride (KLOR-CON M) extended release tablet 40 mEq, PRN    Or  potassium bicarb-citric acid (EFFER-K) effervescent tablet 40 mEq, PRN    Or  potassium chloride 10 mEq/100 mL IVPB (Peripheral Line), PRN  potassium chloride 20 mEq/50 mL IVPB (Central Line), PRN  sodium chloride flush 0.9 % injection 10 mL, 2 times per day  sodium chloride flush 0.9 % injection 10 mL, PRN  ondansetron (ZOFRAN) injection 4 mg, Q6H PRN  digoxin (LANOXIN) tablet 125 mcg, Daily  sodium chloride flush 0.9 % injection 10 mL, 2 times per day  sodium chloride flush 0.9 % injection 10 mL, PRN  magnesium sulfate 1 g in dextrose 5% 100 mL IVPB, PRN  pantoprazole (PROTONIX) injection 40 mg, Daily  linezolid (ZYVOX) IVPB 600 mg, Q12H  milrinone (PRIMACOR) 20 mg in dextrose 5 % 100 mL infusion, Continuous  carvedilol (COREG) tablet 3.125 mg, BID WC        Objective:  BP 99/63   Pulse 74   Temp 96.2 °F (35.7 °C) (Temporal)   Resp 15   Ht 5' 10\" (1.778 m)   Wt 199 lb 1.2 oz (90.3 kg)   SpO2 100%   BMI 28.56 kg/m²     Intake/Output Summary (Last 24 hours) at 1/2/2020 1358  Last data filed at 1/2/2020 1000  Gross per 24 hour   Intake 1339 ml   Output 1250 ml   Net 89 ml      Wt Readings from Last 3 Encounters:   01/02/20 199 lb 1.2 oz (90.3 kg)   12/23/19 192 lb 9.6 oz (87.4 kg)   12/18/19 194 lb 11.2 oz (88.3 kg)       General appearance:  Appears comfortable  Eyes: Sclera clear. Pupils equal.  ENT: Moist oral mucosa. Trachea midline, no adenopathy. Cardiovascular: Regular rhythm, normal S1, S2. No murmur. Respiratory: Not using accessory muscles. Good inspiratory effort. Clear to auscultation bilaterally, no wheeze or crackles. GI: Abdomen soft, no tenderness, not distended, normal bowel sounds  Musculoskeletal: No cyanosis in digits, neck supple  Neurology: CN 2-12 grossly intact. No speech or motor deficits  Psych: Normal affect. Alert and oriented in time, place and person  Skin: Warm, dry, normal turgor  Extremity bilateral lower extremity edema +2    Labs and Tests:  CBC:   Recent Labs     12/31/19  0447 01/01/20  0455 01/02/20  0415   WBC 14.8* 13.1* 9.4   HGB 14.2 16.0 14.0    219 127*     BMP:    Recent Labs     12/31/19  0435 12/31/19  1745 01/01/20  0455 01/02/20  0415   *  --  133* 129*   K 2.5* 3.4* 4.8 3.7   CL 88*  --  88* 87*   CO2 37*  --  28 35*   BUN 30*  --  33* 39*   CREATININE 1.0  --  1.6* 1.0   GLUCOSE 132*  --  155* 125*     Hepatic:   Recent Labs     12/31/19  0435 01/01/20  0455 01/02/20  0415   AST 3,159* 2,219* 1,140*   ALT 1,354* 1,277* 858*   BILITOT 2.3* 2.9* 2.6*   ALKPHOS 154* 171* 134*     US LIVER   Final Result   Hepatic steatosis. Patent hepatic and portal veins with normal antegrade flow. Normal gallbladder and bile ducts. US DUP ABD PEL RETRO SCROT COMPLETE   Final Result   Hepatic steatosis. Patent hepatic and portal veins with normal antegrade flow. Normal gallbladder and bile ducts. XR CHEST PORTABLE   Final Result   No acute airspace disease identified.              Problem List  Active Problems:    Acute on chronic systolic and diastolic heart failure, NYHA class 4 (HCC)    Urinary tract infection    ALEN (acute kidney injury) ([address]    Acute liver failure without hepatic coma    Altered mental status    Sepsis ([address] Elevated INR    Acute respiratory failure with hypoxia (HCC)    Hypokalemia    Overweight    H/O alcohol abuse    Acute encephalopathy  Resolved Problems:    * No resolved hospital problems. *       Assessment & Plan:   Acute hypoxemic respiratory failure  -Multifactorial secondary to acute pulmonary edema and CHF  -Intermittent use of BiPAP  -Currently on 2 L nasal cannula breathing comfortably    Acute decompensated HFrEF  -Ejection fraction 20%  -IV diuresis, monitor ins and outs, salt and fluid restriction, daily weight  -Standard of care medications  -Management per cardiology    Ischemic/congestive hepatitis  -LFTs improving  -Imaging negative  -GI eval; previous history of congestive hepatitis no further work-up    Ventricular tachycardia/arrhythmia  -On amiodarone daily    Acute renal failure: Resolving  -Current creatinine at baseline    Coagulopathy: Improving  -Status post vitamin K     Acute encephalopathy  -Neurology following  -Currently undergoing EEG    Positive C.  Difficile  -Taking metronidazole  -Now transition to fidoxomicin and linezolid per ID    Hyponatremia  -Status post tolvaptan     Diet: Dietary Nutrition Supplements: Standard High Calorie Oral Supplement  DIET LOW SODIUM 2 GM;  Dietary Nutrition Supplements: Standard High Calorie Oral Supplement  Code:Full Code  DVT PPX mechanical SCDs  Disposition remains critically ill, anticipate discharge home in the next 2 to 3 days      Donny Hale MD   1/2/2020 1:58 PM

## 2020-01-02 NOTE — PROGRESS NOTES
status change         Past Medical History: All past medical history reviewed today. Past Medical History:   Diagnosis Date    Allergic     Anesthesia     tremors    Arthritis     CAD (coronary artery disease)     Cancer ([address]     colon    Chemotherapy adverse reaction 4/29/2019    CHF (congestive heart failure) (HCC)     Chronic knee pain     Clostridium difficile infection 07/11/2016, 12/21/19    PCR+    Depression motion     Diverticulitis     Diverticulosis     History of alcohol abuse     Hyperlipidemia     Hypertension     Irregular heart beat     states been told he has had in past. Never treated. Sees PCP every 3 mos. and EKG yearly    Pneumonia     Right knee pain     Shoulder injury     and arthrisits, injury rotaotr cuff    Sleep difficulties     with depression       Past Surgical History: All past surgical history was reviewed today. Past Surgical History:   Procedure Laterality Date    ABDOMEN SURGERY      ABSCESS DRAINAGE      BREAST SURGERY Right 1/21/2019    RIGHT BREAST EXCISIONAL BIOPSY performed by Anne Gan MD at [address] [place] [address]  09/14/2016    No stents placed   [facility]  10/2017    COLECTOMY      COLONOSCOPY  08/29/2016    with biopsy and polypectomy    COLONOSCOPY  03/19/2018    COLONOSCOPY N/A 5/14/2019    COLONOSCOPY WITH DILATION performed by Jovanni Foote MD at [address] [place] [address] ([facility])  09/14/2016    for staging - Dr. Adelina Rowe, COLON, DIAGNOSTIC     [address]  12/01/2017    peristomal hernia repair    HERNIA REPAIR  04/18/2018    S/P: colostomy closure with hernia repair with mesh, with Dr. London Rincon at [facility].     INNER EAR SURGERY  tube right ear    JOINT REPLACEMENT      left TKR    KNEE ARTHROSCOPY Right [id number]    KNEE ARTHROSCOPY Right 8/4/2014    medial meniscus    KNEE SURGERY  08/24/2011    removal of tibial component    OTHER SURGICAL HISTORY  7/1/13    ACL Rt knee meniscus repair synovectomy    OTHER SURGICAL HISTORY  12/01/2017    takedown of ileostomy    PACEMAKER PLACEMENT      icd 10/2017    REVISION COLOSTOMY  04/18/2018    S/P: colostomy closure with hernia repair with mesh, with Dr. Bright Sanchez at [facility].  SHOULDER ARTHROSCOPY Right 9/22/15    SUBACROMIAL DECOMPRESSION, DISTAL CLAVICLE EXCISION, LABRAL DEBRIDEMETN    SIGMOIDOSCOPY N/A 11/23/2018    SIGMOIDOSCOPY BIOPSY FLEXIBLE performed by Fuad Cheatham MD at [address] [place] [address] surgeries    SMALL INTESTINE SURGERY  01/11/2017    LAPAROSCOPIC LOW ANTERIOR RESECTION, ILEOSTOMY    SMALL INTESTINE SURGERY  12/01/2017    small bowel resection    SMALL INTESTINE SURGERY N/A 8/21/2019    OPEN COLOSTOMY AND PARASTOMAL HERNIA REPAIR WITH MESH performed by Bo Gamez MD at [facility] TUNNELED VENOUS PORT PLACEMENT Left 02/13/2017    PORT-A-CATH INSERTION                     TUNNELED VENOUS PORT PLACEMENT      UMBILICAL HERNIA REPAIR  11/11/14    UPPER GASTROINTESTINAL ENDOSCOPY  10/03/2017       Family History: All family history was reviewed today. Problem Relation Age of Onset    Cancer Mother     High Blood Pressure Father     Cancer Father        Objective:       PHYSICAL EXAM:      Vitals:   Vitals:    01/02/20 1200 01/02/20 1300 01/02/20 1400 01/02/20 1500   BP: 99/63 97/60 (!) 92/59 92/71   Pulse: 74 78 79 78   Resp: 15 10 17 18   Temp: 96.2 °F (35.7 °C)      TempSrc: Temporal      SpO2:       Weight:       Height:           Physical Exam       General: Encephalopathic but protecting the airway so far, afebrile, more awake today  HEENT: normocephalic, atraumatic, sclera clear, pupils equal, light reflex preserved bilaterally  Cardiovascular: RRR, no murmurs/rubs/gallops detected  Pulmonary: CTABL, no rhonchi/rales   Abdomen/GI: Ostomy site okay.   Neuro: Encephalopathic, pupils are  Pneumonia J18.9    Colostomy in place [facility]) Z93.3    Breast mass, right N63.10    Abscess of sacrum (HCC) M46.28    Chronic pain G89.29    Abscess L02.91    Generalized abdominal pain R10.84    LLQ pain R10.32    Moderate malnutrition (HCC) E44.0    Hypervolemia E87.70    Chronic pain syndrome G89.4    Colon cancer (HCC) C18.9    Fibromyalgia M79.7    Primary insomnia F51.01    Acute on chronic systolic and diastolic heart failure, NYHA class 4 (HCC) I50.43    Hypotension due to drugs I95.2    Recurrent incisional hernia with incarceration K43.0    Incontinence of feces R15.9    Severe malnutrition (HCC) E43    PNA (pneumonia) J18.9    Biliary obstruction W74.5    Metabolic encephalopathy N29.69    Choledocholithiasis K80.50    Hyponatremia S56.9    Systolic CHF, acute (HCC) I50.21    C. difficile colitis A04.72    Urinary tract infection N39.0    ALEN (acute kidney injury) ([address] N17.9    Acute liver failure without hepatic coma K72.00    Altered mental status R41.82    Sepsis (HCC) A41.9    Elevated INR R79.1    Acute respiratory failure with hypoxia (HCC) J96.01    Hypokalemia E87.6    Overweight E66.3    H/O alcohol abuse F10.11    Acute encephalopathy G93.40       Please note that this chart was generated using Dragon dictation software. Although every effort was made to ensure the accuracy of this automated transcription, some errors in transcription may have occurred inadvertently. If you may need any clarification, please do not hesitate to contact me through EPIC or at the phone number provided below with my electronic signature. Any pictures or media included in this note were obtained after taking informed verbal consent from the patient and with their approval to include those in the patient's medical record.     Elisabet Suresh MD, MPH  1/2/2020 , 3:17 PM   [facility]   Office: [phone]  Fax: [phone]  Tuesday AM clinic:   [place] [address] [address], [address]  Thursday AM [id number] [address], [facility]

## 2020-01-02 NOTE — PROGRESS NOTES
shortness of breath. EMS reports that they found him tachypneic and hypoxic 75 to 80% on room air placed on nasal cannula. They report increased pedal edema and were concerned with history of CHF. Patient states that he just does not feel well at all. (BP supine: 96/60. BP seated at EOB: 95/64. BP supine in bed at end of sessioN: 96/66. )    Subjective   General  Chart Reviewed: Yes, Previous Admission  Patient assessed for rehabilitation services?: Yes  Family / Caregiver Present: No  Diagnosis: UTI  Subjective  Subjective: Patient pleasantly confused and perseverating on the mechanics of the bed. Patient repeating reporting he is \"dazed and confused\". Patient Currently in Pain: Denies  Vital Signs  Pulse: 78  Resp: 18  BP: 92/71  MAP (mmHg): 75  Patient Currently in Pain: Denies  Social/Functional History  Social/Functional History  Lives With: Family(wife and 3 kids)  Type of Home: House  Home Layout: Multi-level(tri-level; pt reports his bedroom is on main level)  Home Access: Stairs to enter with rails  Entrance Stairs - Number of Steps: 4 CHARLIE  Entrance Stairs - Rails: Both  Bathroom Shower/Tub: Tub/Shower unit, Walk-in shower  Bathroom Toilet: Standard  Bathroom Equipment: Grab bars in shower  Home Equipment: Crutches  ADL Assistance: (stating independent for tasks and then stating wife assisting with \"everything\". )  Homemaking Assistance: (wife does homemaking)  Homemaking Responsibilities: No  Ambulation Assistance: Independent  Transfer Assistance: Independent  Active : Yes  Mode of Transportation: Truck  Occupation: On disability  Additional Comments: Pt questionable historian--stating independent for tasks and then stating wife assisting with \"everything\". Per therapy evaluation on 12/21, pt with PLOF of independence. Pt is home alone during day when kids at school/wife at work. Pt states \"yes and no\" when asked about falls at home--reports \"a lot\" when asked about number of falls. Objective              Balance  Sitting Balance: Supervision  Standing Balance: Contact guard assistance  Functional Mobility  Functional - Mobility Device: Rolling Walker  Activity: Other(in room; 15 ft)  Assist Level: Contact guard assistance  Functional Mobility Comments: limited by cognition and perseverating on tasks  ADL  Toileting: Dependent/Total(colostomy; dela cruz)  Tone RUE  RUE Tone: Normotonic  Tone LUE  LUE Tone: Normotonic  Coordination  Movements Are Fluid And Coordinated: Yes  Quality of Movement Other  Comment: mild tremors noted with movement     Bed mobility  Supine to Sit: Moderate assistance  Sit to Supine: Moderate assistance  Transfers  Sit to stand: Contact guard assistance  Stand to sit: Contact guard assistance     Cognition  Overall Cognitive Status: Exceptions  Arousal/Alertness: Delayed responses to stimuli  Following Commands: Follows one step commands with increased time; Follows one step commands with repetition  Attention Span: Unable to maintain attention  Memory: Decreased short term memory;Decreased long term memory  Safety Judgement: Decreased awareness of need for assistance;Decreased awareness of need for safety  Problem Solving: Assistance required to implement solutions;Assistance required to identify errors made;Assistance required to generate solutions;Assistance required to correct errors made  Insights: Decreased awareness of deficits  Initiation: Requires cues for all  Sequencing: Requires cues for all  Cognition Comment: Patient perseverating on tasks and movement of bed  Perception  Overall Perceptual Status: Impaired  Perseveration: Perseverates during conversation     Sensation  Overall Sensation Status: (unable to formally assess due to pt's cognition)                               Plan   Plan  Times per week: 3-5x    G-Code     OutComes Score                                                  AM-PAC Score        AM-PAC Inpatient Daily Activity Raw Score: 14 (01/02/20 1620)  AM-PAC Inpatient ADL T-Scale Score : 33.39 (01/02/20 1620)  ADL Inpatient CMS 0-100% Score: 59.67 (01/02/20 1620)  ADL Inpatient CMS G-Code Modifier : CK (01/02/20 1620)    Goals  Short term goals  Time Frame for Short term goals: by discharge   Short term goal 1: Complete LB ADLs with min assistance  Short term goal 2: Complete functional transfers with supervision   Short term goal 3: Complete UB ADLs with set-up assistance  Short term goal 4: Complete functional mobility in bathroom for ADL participation with supervision        Therapy Time   Individual Concurrent Group Co-treatment   Time In 1520         Time Out 1600         Minutes 40         Timed Code Treatment Minutes: 25 Minutes(15 minute evaluation)       JOEY Esposito/L
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
## 2020-01-03 NOTE — PROGRESS NOTES
[address]   Progress Note  CHF/Pulmonary Hypertension Cardiology    Chief complaint: We are following this patient for acute on chronic systolic HF with decompensation    HPI:  Skylar Cristobal is a 51 yo male well known to me from the office with a history of chronic systolic HF and rectal cancer. Joey Soto presented to the ED with severe subacute onset of gradually progressive increasing shortness of breath. In the ED, he had significant tachypneic and unable to hold full conversation in spite of being on supplemental oxygen. He was also confused.   Joey Soto has had fatigue, malaise and generalized weakness.  No fever, chills.  Denies chest pain, palpitations.  Denies flank pain or hematuria.  He denies dysuria, urgency, or frequency. On the previous admission, there was a question of choledocholithiasis.  He has elevated bilirubin and liver function tests. Joey Soto had normal enzymes 11/8/19. Joey Soto is receiving pain meds. Joey Soto has a history of chronic narcotic use from all of his surgeries.     He has a history of sCHF, NICM, hx HTN, HLD and rectal cancer s/p chemo and XRT. C 9/15/16 with no coronary artery disease, EF 15%, LVEDP 20-25 mmHg.  He had rectal surgery in January 2017 and has completed chemo and radiation. He had problems with pelvic abscess and had a drain placed.    He underwent ICD implant for primary prevention on 10/26/17. Joey Soto also underwent surgery for repair of ventral hernia and new colostomy on 12/1/17. He has had abscesses post-op that have been drained. Marjorie Corrales was admitted for chest pain and SOB on 5-31-19 thru 6-7-19 and was found to be in acute CHF.  ICD did not show VT while in the hospital. Joey Soto underwent colorectal anastomosis dilation 5/14/2019. He recently underwent colostomy placement recently due to problems with chronic abdominal pain.      He was just transferred up to the floor from the ED. He is diuresing and feeling better.   The patient tells me that he remembers feeling good, po bid  5. Reduce narcotics  6. Would like to set up a conference for Monday with family if possible to discuss outlook and where we go from here. I am available from 1-5 pm Monday. 7.  CHF education reinforced. ~salt restriction  ~fluid restriction  ~medication compliance  ~daily weights and notify of any significant weight gain/loss  ~establish with CHF nurse  ~outpatient follow-up with our CHF team     Continue eplerenone 25 mg po qd  Stop tolvaptan  Change milrinone to [facility]  Palliative care consultation. I am not sure how aggressive patient wants to be, but I do think we are at a decision making point and need to decide if he would want aggressive measures such as LVAD. Conditions that complicate LVAD/transplant include his relatively recent diagnosis of rectal cancer and his narcotic addiction. Also his memory issues and support system are also in question. I am also not sure about his medication compliance at home. Addendum:  Patient's wife, Ginger Ganser came up from the ER where she works. We discussed his status and his dramatic recent decline. She says that he signed paperwork for advanced directives, but she doesn't know where they are. She knows that he has been deteriorating. She does not know if he takes his meds or not. He is responsible for his meds, so she is unaware about this. She does not think that he would want aggressive measures such as LVAD. At this time, it would be difficult for anyone to be his support person for LVAD training and would be a major hardship for the family for him to be transferred. We discussed that he would not be a heart transplant candidate now due to cancer diagnosis < 5 years and chronic abscess in his pelvis. After discussion, we have elected to make him a DNR-CCA. We will have a family meeting on Monday afternoon at 3 with Laura Beckwithscal, [phone] [facility] son, Дмитрий's brother, and Nicole's dad.   Her biggest concern is for her 3 children who are all

## 2020-01-03 NOTE — PROGRESS NOTES
Reassessment complete, vitals obtained. Assisted pt to turn. Am labs drawn from [facility]. Pt denies needs. Call light in reach. Safety precautions in place.

## 2020-01-03 NOTE — FLOWSHEET NOTE
80- Dr. Candida Escobar spoke with pts spouse Kosta Arreagajade outside pts room. Updated on pts status. Discussed plan of care. Code status discussed.

## 2020-01-03 NOTE — PROGRESS NOTES
He quit smokeless tobacco use about 2 years ago. His smokeless tobacco use included chew. · Alcohol use:   reports no history of alcohol use. · Currently lives in: [address] [place]  reports no history of drug use. Assessment:     The patient is a 52 y.o. old male who  has a past medical history of Allergic, Anesthesia, Arthritis, CAD (coronary artery disease), Cancer ([facility] Chemotherapy adverse reaction (4/29/2019), CHF (congestive heart failure) ([facility] Chronic knee pain, Clostridium difficile infection (07/11/2016, 12/21/19), Depression motion, Diverticulitis, Diverticulosis, History of alcohol abuse, Hyperlipidemia, Hypertension, Irregular heart beat, Pneumonia, Right knee pain, Shoulder injury, and Sleep difficulties. with following problems:    · Acute respiratory failure with hypoxia-this is slowly improving  · Met sepsis criteria on admission  with bandemia, severe lactic acidosis, metabolic encephalopathy, likely secondary to C. difficile colitis.-Not much ostomy output, currently on fidaxomicin  · Recent C. difficile diarrhea, diagnosed on 12/21/2019  · Worsening bandemia, likely due to C. difficile-white cell count has normalized to 9300  · Pacemaker in place  · Severe congestive heart failure with ejection fraction of 20%  · Hypokalemia has been corrected  · Significant transaminitis-this is improving  · Significant hyponatremia on admission-has been corrected  · Acute kidney injury on admission-resolved serum creatinine is 1.0 now  · Overweight due to excess calorie intake : Body mass index is 28.72 kg/m². · History of alcohol abuse      Discussion:      The patient is on oral fidaxomicin. His blood cultures from admission are negative so far. I had requested a limited echo to make sure he does not have any vegetations given the pacemaker. Serum creatinine 0.9 today.     Plan:     Diagnostic Workup:      · Continue to follow  fever curve, WBC count and blood cultures  · Follow up on visit (including interval history, physical exam, review of data including labs, cultures, imaging, development and implementation of treatment plan and coordination of complex care). - Over 50% of time spent with patient face to face on counseling and education. Thank you for involving me in the care of your patient. I will continue to follow. If you have anyadditional questions, please do not hesitate to contact me. Subjective: Interval history: Patient was seen and examined at bedside. Interval history was obtained. Patient appears tired awake but still encephalopathic. Seems to be tolerating Fidaxomicin okay. REVIEW OF SYSTEMS:      Review of Systems   Unable to perform ROS: Mental status change         Past Medical History: All past medical history reviewed today. Past Medical History:   Diagnosis Date    Allergic     Anesthesia     tremors    Arthritis     CAD (coronary artery disease)     Cancer ([facility] [address]     colon    Chemotherapy adverse reaction 4/29/2019    [facility]congestive heart failure) ([facility])     Chronic knee pain     Clostridium difficile infection 07/11/2016, 12/21/19    PCR+    Depression motion     Diverticulitis     Diverticulosis     History of alcohol abuse     Hyperlipidemia     Hypertension     Irregular heart beat     states been told he has had in past. Never treated. Sees PCP every 3 mos. and EKG yearly    Pneumonia     Right knee pain     Shoulder injury     and arthrisits, injury rotaotr cuff    Sleep difficulties     with depression       Past Surgical History: All past surgical history was reviewed today.     Past Surgical History:   Procedure Laterality Date    ABDOMEN SURGERY      ABSCESS DRAINAGE      BREAST SURGERY Right 1/21/2019    RIGHT BREAST EXCISIONAL BIOPSY performed by Maddy Cuevas MD at [facility]  09/14/2016    No stents placed   [facility] 141  10/2017    COLECTOMY      Component Value Date    ALKPHOS 172 01/03/2020     01/03/2020     01/03/2020    PROT 5.1 01/03/2020    PROT 6.3 07/05/2017    BILITOT 3.0 01/03/2020    BILIDIR 0.3 12/18/2019    IBILI 0.3 12/18/2019    LABALBU 2.7 01/03/2020       CPK:   Lab Results   Component Value Date    CKTOTAL 129 09/06/2016     ESR:   Lab Results   Component Value Date    SEDRATE 8 02/20/2012     CRP: No results found for: CRP        Imaging: All pertinent images and reports for the current visit were reviewed by me during this visit. US LIVER   Final Result   Hepatic steatosis. Patent hepatic and portal veins with normal antegrade flow. Normal gallbladder and bile ducts. US DUP ABD PEL RETRO SCROT COMPLETE   Final Result   Hepatic steatosis. Patent hepatic and portal veins with normal antegrade flow. Normal gallbladder and bile ducts. XR CHEST PORTABLE   Final Result   No acute airspace disease identified. Medications: All current and past medications were reviewed.      eplerenone  25 mg Oral Daily    tolvaptan  15 mg Oral Q24H    amiodarone  200 mg Oral Daily    Fidaxomicin  200 mg Oral BID    sodium chloride flush  10 mL Intravenous 2 times per day    digoxin  125 mcg Oral Daily    sodium chloride flush  10 mL Intravenous 2 times per day    pantoprazole  40 mg Intravenous Daily    linezolid  600 mg Intravenous Q12H    carvedilol  3.125 mg Oral BID WC        milrinone 0.2 mcg/kg/min (01/02/20 2040)       potassium chloride **OR** potassium alternative oral replacement **OR** potassium chloride, potassium chloride, sodium chloride flush, ondansetron, sodium chloride flush, magnesium sulfate      Problem list:       Patient Active Problem List   Diagnosis Code    S/P total knee replacement using cement Z96.659    Mixed hyperlipidemia E78.2    Essential hypertension I10    Depression J37.0    Umbilical hernia J05.5    Lower abdominal pain R10.30    Rectal

## 2020-01-03 NOTE — PROGRESS NOTES
[facility] PROGRESS NOTE    1/3/2020 10:27 AM        Name: Tyron Ybarra . Admitted: 12/29/2019  Primary Care Provider: Amos Hancock MD (Tel: [phone])    Brief Course:  Carmen Hanson is a 51 yo male with past medical history of HFrEF status post ICD, nonischemic cardiomyopathy, hypertension, dyslipidemia, rectal cancer status post chemo and radiation therapy presented with progressively worsening shortness of breath. Patient currently being treated for acute decompensated HFrEF, complicated by ischemic hepatitis and metabolic encephalopathy. Multiple improvement of LFTs, volume status after aggressive diuresis. Neurology also following for continued confusion status post EEG      CC: Shortness of breath    Subjective:  . Still with significant confusion. He is able to formulate sentences however he becomes fixated on her particular thought and when redirected returns back to that particular thought.   He is awake and alert and oriented x2    Reviewed interval ancillary notes    Current Medications  eplerenone (INSPRA) tablet 25 mg, Daily  tolvaptan (SAMSCA) tablet 15 mg, Q24H  amiodarone (CORDARONE) tablet 200 mg, Daily  Fidaxomicin (DIFICID) tablet 200 mg, BID  potassium chloride (KLOR-CON M) extended release tablet 40 mEq, PRN    Or  potassium bicarb-citric acid (EFFER-K) effervescent tablet 40 mEq, PRN    Or  potassium chloride 10 mEq/100 mL IVPB (Peripheral Line), PRN  potassium chloride 20 mEq/50 mL IVPB (Central Line), PRN  sodium chloride flush 0.9 % injection 10 mL, 2 times per day  sodium chloride flush 0.9 % injection 10 mL, PRN  ondansetron (ZOFRAN) injection 4 mg, Q6H PRN  digoxin (LANOXIN) tablet 125 mcg, Daily  sodium chloride flush 0.9 % injection 10 mL, 2 times per day  sodium chloride flush 0.9 % injection 10 mL, PRN  magnesium sulfate 1 g in dextrose 5% 100 mL IVPB, PRN  pantoprazole hepatic and portal veins with normal antegrade flow. Normal gallbladder and bile ducts. US DUP ABD PEL RETRO SCROT COMPLETE   Final Result   Hepatic steatosis. Patent hepatic and portal veins with normal antegrade flow. Normal gallbladder and bile ducts. XR CHEST PORTABLE   Final Result   No acute airspace disease identified. Problem List  Active Problems:    Acute on chronic systolic and diastolic heart failure, NYHA class 4 (HCC)    Urinary tract infection    ALEN (acute kidney injury) ([address]    Acute liver failure without hepatic coma    Altered mental status    Sepsis (HCC)    Elevated INR    Acute respiratory failure with hypoxia (HCC)    Hypokalemia    Overweight    H/O alcohol abuse    Acute encephalopathy  Resolved Problems:    * No resolved hospital problems. *       Assessment & Plan:   Acute hypoxemic respiratory failure  -Multifactorial secondary to acute pulmonary edema and CHF  -Intermittent use of BiPAP  -Currently on 2 L nasal cannula breathing comfortably     Acute decompensated HFrEF  -Ejection fraction 20%  -IV diuresis, monitor ins and outs, salt and fluid restriction, daily weight  -Standard of care medications  -Management per cardiology     Ischemic/congestive hepatitis: resolving   -LFTs improving  -Imaging negative  -GI eval; previous history of congestive hepatitis no further work-up     Ventricular tachycardia/arrhythmia  -On amiodarone and digoxin      Acute renal failure: Resolving / contraction alkalosis  -Current creatinine at baseline     Coagulopathy: Improving  -Status post vitamin K   -INR 1.98     Acute encephalopathy  -Neurology following  -EEG report pending  -Possible repeat imaging    Positive C.  Difficile  -was taking metronidazole  -on fidoxomicin and IV linezolid per ID     Hyponatremia: resolving   -Status post tolvaptan     Diet: Dietary Nutrition Supplements: Standard High Calorie Oral Supplement  DIET LOW SODIUM 2 GM;  Dietary

## 2020-01-03 NOTE — PROGRESS NOTES
Reassessment complete, vitals obtained. Pt appears in no distress. Reports comfortable @ this time. Call light in reach.  Safety precautions in place

## 2020-01-03 NOTE — PROGRESS NOTES
Nutrition Education    Type and Reason for Visit: Patient Education(RD triggered d/t pt on CHF list )     Attempted to provide CHF diet education, however pt was very slow to respond and demonstrated limited understanding of education. Education included low sodium diet guidelines (3-4 gm Na+/day) and fluid restriction (64 oz/day). Reviewed foods to choose and foods to avoid, along with label reading and ways to add flavor to food. Pt currently tries to follow a low sodium diet at home and does not add salt to food. Time spent with patient: 10 minutes.       Nutrition Intervention:  Nutrition Education/Counseling/Coordination of Care:  Education Completed(CHF diet education 1/3 )      Electronically signed by Aisha Cintron RD, LD on 1/3/20 at 11:06 AM    Contact Number: [phone]

## 2020-01-03 NOTE — PROGRESS NOTES
NEUROLOGY FOLLOWUP    HISTORY OF PRESENT ILLNESS :     Jose J Morales is a 52 y.o. male   History was obtained from the patient's nurse and dictations in the chart. Patient still has confusion. He is very slow to respond. Detailed history: He was admitted with respiratory failure and congestive heart failure. He is now improving but family felt that his mental status was still abnormal  neurological  Patient denies any focal weakness but states that he is tired and weak all over. He also complains of pain all over the body. Denies any vertigo or diplopia. Symptom onset was subacute. They are moderately severe without any other aggravating or relieving factorsatient's nurse in the ICU as well as dictations in the chart. REVIEW OF SYSTEMS     Unable to obtain a detailed and reliable system review because of confusion  Constitutional:  []   Chills   []  Fatigue   []  Fevers   []  Malaise   []  Weight loss     [] Denies all of the above    Respiratory:   []  Cough    []  Shortness of breath         [] Denies all of the above     Cardiovascular:   []  Chest pain    []  Exertional chest pressure/discomfort           [] Palpitations    []  Syncope     [] Denies all of the above    Past Medical History:   Diagnosis Date    Allergic     Anesthesia     tremors    Arthritis     CAD (coronary artery disease)     Cancer ([facility]     colon    Chemotherapy adverse reaction 4/29/2019    CHF (congestive heart failure) ([facility] [facility]     Chronic knee pain     Clostridium difficile infection 07/11/2016, 12/21/19    PCR+    Depression motion     Diverticulitis     Diverticulosis     History of alcohol abuse     Hyperlipidemia     Hypertension     Irregular heart beat     states been told he has had in past. Never treated. Sees PCP every 3 mos.  and EKG yearly    Pneumonia     Right knee pain     Shoulder injury     and arthrisits, injury rotaotr cuff    Sleep difficulties     with depression     Family History   Problem Relation Age of Onset    Cancer Mother     High Blood Pressure Father     Cancer Father      Social History     Socioeconomic History    Marital status:      Spouse name: None    Number of children: None    Years of education: None    Highest education level: None   Occupational History    None   Social Needs    Financial resource strain: None    Food insecurity:     Worry: None     Inability: None    Transportation needs:     Medical: None     Non-medical: None   Tobacco Use    Smoking status: Former Smoker     Packs/day: 1.00     Years: 5.00     Pack years: 5.00     Last attempt to quit: 2007     Years since quittin.0    Smokeless tobacco: Former User     Types: [address] date: 8/10/2017   Substance and Sexual Activity    Alcohol use: No     Comment: history of alcohol abuse 20 years ago    Drug use: No    Sexual activity: Never     Partners: Female   Lifestyle    Physical activity:     Days per week: None     Minutes per session: None    Stress: None   Relationships    Social connections:     Talks on phone: None     Gets together: None     Attends [employer] service: None     Active member of club or organization: None     Attends meetings of clubs or organizations: None     Relationship status: None    Intimate partner violence:     Fear of current or ex partner: None     Emotionally abused: None     Physically abused: None     Forced sexual activity: None   Other Topics Concern    None   Social History Narrative    None        PHYSICAL EXAMINATION      /74   Pulse 100   Temp 97 °F (36.1 °C) (Temporal)   Resp 14   Ht 5' 10\" (1.778 m)   Wt 199 lb 4.7 oz (90.4 kg)   SpO2 97%   BMI 28.60 kg/m²   This is a well-nourished patient in no acute distress  Patient seems awake and alert but is quite confused. He is oriented x2. .  Judgment is impaired.   Speech is normal. Language function could not be assessed. Pupils equal round reactive to light. Extraocular movements are intact. Face symmetrical.  Tongue midline. Strength is 4/5 all over. Plantar reflexes withdrawal.  Coordination could not be checked because of limited cooperation. No carotid bruit. No neck stiffness. DATA :  LABS:  General Labs:    CBC:   Lab Results   Component Value Date    WBC 9.3 01/03/2020    RBC 4.88 01/03/2020    RBC 3.74 07/05/2017    HGB 15.1 01/03/2020    HCT 45.3 01/03/2020    MCV 92.7 01/03/2020    MCH 30.9 01/03/2020    MCHC 33.4 01/03/2020    RDW 15.7 01/03/2020     01/03/2020    MPV 7.9 01/03/2020     BMP:    Lab Results   Component Value Date     01/03/2020    K 3.6 01/03/2020    K 3.6 12/09/2019    CL 92 01/03/2020    CO2 36 01/03/2020    BUN 28 01/03/2020    LABALBU 2.7 01/03/2020    CREATININE 0.9 01/03/2020    CALCIUM 8.5 01/03/2020    GFRAA >60 01/03/2020    GFRAA >60 12/19/2012    LABGLOM >60 01/03/2020    GLUCOSE 108 01/03/2020    GLUCOSE 131 07/05/2017     RADIOLOGY REVIEW:  I have reviewed radiology image(s) and reports(s) of: CT head from December 2019      IMPRESSION :  Metabolic encephalopathy however EEG is normal.  Liver enzymes had been elevated. CT head from a couple of weeks ago was reviewed and did not show any acute abnormalities  TSH and ammonia levels normal  Significant cardiomyopathy and poor ejection fraction, multiple embolic events to the brain should be considered and ruled out.   Patient Active Problem List   Diagnosis    S/P total knee replacement using cement    Mixed hyperlipidemia    Essential hypertension    Depression    Umbilical hernia    Lower abdominal pain    Rectal mass    Hepatitis    Localized edema    SOB (shortness of breath)    Acute systolic heart failure (HCC)    NICM (nonischemic cardiomyopathy) (HCC)    Abnormal nuclear cardiac imaging test    VT (ventricular tachycardia) (HCC)    Rectal [facility] ([facility]    Neoplasm related pain    Systolic CHF, chronic (HCC)    Cardiomyopathy, idiopathic (HCC)    Poor venous access    Anemia    Insomnia due to medical condition    Chest pain    Dyspnea on exertion    Peristomal hernia    Episodic lightheadedness    ICD (implantable cardioverter-defibrillator) in place    Ileostomy in place [facility])    Pneumonia    Colostomy in place [facility])    Breast mass, right    Abscess of sacrum (HCC)    Chronic pain    Abscess    Generalized abdominal pain    LLQ pain    Moderate malnutrition (HCC)    Hypervolemia    Chronic pain syndrome    Colon cancer (HCC)    Fibromyalgia    Primary insomnia    Acute on chronic systolic and diastolic heart failure, NYHA class 4 (HCC)    Hypotension due to drugs    Recurrent incisional hernia with incarceration    Incontinence of feces    Severe malnutrition (HCC)    PNA (pneumonia)    Biliary obstruction    Metabolic encephalopathy    Choledocholithiasis    Hyponatremia    Systolic CHF, acute (HCC)    C. difficile colitis    Urinary tract infection    ALEN (acute kidney injury) ([address]    Acute liver failure without hepatic coma    Altered mental status    Sepsis ([address]    Elevated INR    Acute respiratory failure with hypoxia (HCC)    Hypokalemia    Overweight    H/O alcohol abuse    Acute encephalopathy    Alcohol abuse counseling and surveillance       RECOMMENDATIONS :  Discussed with patient's nurse at the bedside  Discussed with Dr. Jo Tavares from cardiology  Continue supportive care  I will get an MRI brain to rule out multiple embolic strokes from a cardiac source. Please note a portion of this chart was generated using dragon dictation software. Although every effort was made to ensure the accuracy of this automated transcription, some errors in transcription may have occurred.          Arne Simmonds M.D.

## 2020-01-04 NOTE — PROGRESS NOTES
Attempted to administer PO meds again; pt refused despite max encouragement from myself and pt's family.

## 2020-01-04 NOTE — PROGRESS NOTES
[facility]    1/4/2020 9:39 AM        Name: Cuauhtemoc Sams . Admitted: 12/29/2019  Primary Care Provider: Colten Tomas MD (Tel: [phone])    Brief Course:  Price Morales is a 51 yo male with past medical history of HFrEF status post ICD, nonischemic cardiomyopathy, hypertension, dyslipidemia, rectal cancer status post chemo and radiation therapy presented with progressively worsening shortness of breath. Patient currently being treated for acute decompensated HFrEF, complicated by ischemic hepatitis and metabolic encephalopathy. Multiple improvement of LFTs, volume status after aggressive diuresis. Neurology also following for continued confusion status post EEG. MRI brain showing punctate acute lacunar infarcts in the left posterior hippocampal gyrus and left temporal lobe. CC:Shortness of breath    Subjective:  .  Episode of ST elevations in inferior leads overnight. Case was discussed with on-call cardiologist and recommended to continue observe the patient. Intervention difficult due to recent acute lacunar infarcts found on MRI with possibility of hemorrhagic conversion if need to proceed with cardiac cath.     Reviewed interval ancillary notes    Current Medications  sacubitril-valsartan (ENTRESTO) 24-26 MG per tablet 0.5 tablet, BID  eplerenone (INSPRA) tablet 25 mg, Daily  amiodarone (CORDARONE) tablet 200 mg, Daily  Fidaxomicin (DIFICID) tablet 200 mg, BID  potassium chloride (KLOR-CON M) extended release tablet 40 mEq, PRN    Or  potassium bicarb-citric acid (EFFER-K) effervescent tablet 40 mEq, PRN    Or  potassium chloride 10 mEq/100 mL IVPB (Peripheral Line), PRN  potassium chloride 20 mEq/50 mL IVPB (Central Line), PRN  sodium chloride flush 0.9 % injection 10 mL, 2 times per day  sodium chloride flush 0.9 % injection 10 mL, PRN  ondansetron (ZOFRAN) injection 4 mg, Q6H 172* 80*     MRI Brain WO Contrast   Final Result   Punctate acute lacunar infarcts identified left posterior Pallavi hippocampal   gyrus involving the subcortical white matter left temporal lobe images 15 and   16. No hemorrhagic conversion. Mild generalized involutional change. US LIVER   Final Result   Hepatic steatosis. Patent hepatic and portal veins with normal antegrade flow. Normal gallbladder and bile ducts. US DUP ABD PEL RETRO SCROT COMPLETE   Final Result   Hepatic steatosis. Patent hepatic and portal veins with normal antegrade flow. Normal gallbladder and bile ducts. XR CHEST PORTABLE   Final Result   No acute airspace disease identified. Problem List  Active Problems:    Acute on chronic systolic and diastolic heart failure, NYHA class 4 (HCC)    Urinary tract infection    ALEN (acute kidney injury) ([facility]    Acute liver failure without hepatic coma    Altered mental status    Sepsis (HCC)    Elevated INR    Acute respiratory failure with hypoxia (HCC)    Hypokalemia    Overweight    H/O alcohol abuse    Acute encephalopathy    Alcohol abuse counseling and surveillance  Resolved Problems:    * No resolved hospital problems.  *       Assessment & Plan:   Acute hypoxemic respiratory failure  -Multifactorial secondary to acute pulmonary edema and CHF  -Intermittent use of BiPAP  -Currently on 2 L nasal cannula breathing comfortably     Acute decompensated HFrEF  -Ejection fraction 20%  -IV diuresis, monitor ins and outs, salt and fluid restriction, daily weight  -Standard of care medications  -Management per cardiology     Ischemic/congestive hepatitis: resolving   -LFTs improving; continue to trend  -Imaging negative  -GI eval; previous history of congestive hepatitis no further work-up     Ventricular tachycardia/arrhythmia  -On amiodarone and digoxin   -ST elevations on EKG overnight  -Etiology following     Acute renal failure: Resolving / contraction alkalosis  -Current creatinine at baseline     Coagulopathy: Improving  -Status post vitamin K   -INR 1.98     Acute lacunar infarcts / Acute encephalopathy  -MRI brain showing punctate acute lacunar infarcts in the left posterior hippocampal gyrus and left temporal lobe. -Neurology following  -EEG report within normal limits     Positive C.  Difficile  -was taking metronidazole  -on fidoxomicin and IV linezolid per ID     Hyponatremia: resolved  -Status post tolvaptan x 1    Family meeting planned for Monday    CODE STATUS changed yesterday to DNR CC  Diet: Dietary Nutrition Supplements: Standard High Calorie Oral Supplement  DIET LOW SODIUM 2 GM;  Dietary Nutrition Supplements: Standard High Calorie Oral Supplement  Code:[id number]-CCA  DVT PPX mechanical scds  Disposition remains critically ill      Armaan Mast MD   1/4/2020 9:39 AM

## 2020-01-04 NOTE — PROGRESS NOTES
Reassessment complete, vitals obtained. Pt resting in bed , remains confused. Turned / repositioned. AM labs drawn from CVC. Call light in reach. Safety precautions in place.

## 2020-01-04 NOTE — PLAN OF CARE
Problem: Falls - Risk of:  Goal: Will remain free from falls  Description  Will remain free from falls  Outcome: Ongoing  Note:   Pt at risk for falls. See NOE Fall Risk Scale. Fall protocol in place. Bed in lowest position and locked. Personal belongings and call light within reach. Offered toileting. Instructed pt to call out and wait for assistance before getting out of bed. Pt v/u. Problem: HEMODYNAMIC STATUS  Goal: Patient has stable vital signs and fluid balance  Outcome: Ongoing  Note:   VSS. BP 92/68   Pulse 92   Temp 96.9 °F (36.1 °C) (Temporal)   Resp 21   Ht 5' 10\" (1.778 m)   Wt 196 lb 6.9 oz (89.1 kg)   SpO2 93%   BMI 28.18 kg/m²      Problem: Pain:  Goal: Control of acute pain  Description  Control of acute pain  Outcome: Ongoing  Note:   Pt denies pain so far this shift. Instructed pt to notify nurse of any new or worsening pain. Pt v/u. Problem: Nutrition  Goal: Optimal nutrition therapy  Outcome: Ongoing  Note:   Pt slept on and off today, declined breakfast and lunch despite encouragement. Pt now more alert, drinking diet soda and ordered dinner. Problem: Risk for Impaired Skin Integrity  Goal: Tissue integrity - skin and mucous membranes  Description  Structural intactness and normal physiological function of skin and  mucous membranes. Outcome: Ongoing  Note:   Pt at risk for impaired skin integrity. No new signs of skin breakdown thus far. Heels elevated off of mattress. Encouraged frequent position changes, assisting prn. Will continue current care.

## 2020-01-04 NOTE — PROGRESS NOTES
NEUROLOGY FOLLOWUP    HISTORY OF PRESENT ILLNESS :     Beatriz Gallardo is a 52 y.o. male   History was obtained from the patient's nurse and dictations in the chart. Patient continues to be slow in his responses. He still has episodic confusion. Detailed history: He was admitted with respiratory failure and congestive heart failure. He is now improving but family felt that his mental status was still abnormal  neurological  Patient denies any focal weakness but states that he is tired and weak all over. He also complains of pain all over the body. Denies any vertigo or diplopia. Symptom onset was subacute. They are moderately severe without any other aggravating or relieving factorsatient's nurse in the ICU as well as dictations in the chart. REVIEW OF SYSTEMS     Unable to obtain a detailed and reliable system review because of confusion  Constitutional:  []   Chills   []  Fatigue   []  Fevers   []  Malaise   []  Weight loss     [] Denies all of the above    Respiratory:   []  Cough    []  Shortness of breath         [] Denies all of the above     Cardiovascular:   []  Chest pain    []  Exertional chest pressure/discomfort           [] Palpitations    []  Syncope     [] Denies all of the above    Past Medical History:   Diagnosis Date    Allergic     Anesthesia     tremors    Arthritis     CAD (coronary artery disease)     Cancer ([facility] [address]     colon    Chemotherapy adverse reaction 4/29/2019    CHF (congestive heart failure) ([facility] [address]     Chronic knee pain     Clostridium difficile infection 07/11/2016, 12/21/19    PCR+    Depression motion     Diverticulitis     Diverticulosis     History of alcohol abuse     Hyperlipidemia     Hypertension     Irregular heart beat     states been told he has had in past. Never treated. Sees PCP every 3 mos.  and EKG yearly    Pneumonia     Right knee pain     Shoulder impaired. Speech is normal.  Language function could not be assessed. Pupils equal round reactive to light. Extraocular movements are intact. Face symmetrical.  Tongue midline. Strength is 4/5 all over. Plantar reflexes withdrawal.  Coordination could not be checked because of limited cooperation. No carotid bruit. No neck stiffness. DATA :  LABS:  General Labs:    CBC:   Lab Results   Component Value Date    WBC 9.0 01/04/2020    RBC 5.23 01/04/2020    RBC 3.74 07/05/2017    HGB 16.0 01/04/2020    HCT 49.2 01/04/2020    MCV 94.1 01/04/2020    MCH 30.7 01/04/2020    MCHC 32.6 01/04/2020    RDW 16.1 01/04/2020     01/04/2020    MPV 8.3 01/04/2020     BMP:    Lab Results   Component Value Date     01/04/2020    K 4.5 01/04/2020    K 3.6 12/09/2019     01/04/2020    CO2 36 01/04/2020    BUN 24 01/04/2020    LABALBU 2.6 01/04/2020    CREATININE 0.8 01/04/2020    CALCIUM 8.9 01/04/2020    GFRAA >60 01/04/2020    GFRAA >60 12/19/2012    LABGLOM >60 01/04/2020    GLUCOSE 110 01/04/2020    GLUCOSE 131 07/05/2017     RADIOLOGY REVIEW:  I have reviewed radiology image(s) and reports(s) of: CT head from December 2019  As well as current MRI brain    IMPRESSION :  Metabolic encephalopathy however EEG is normal.  MRI brain images were reviewed and showed a punctate left temporal acute infarction, this may be cardioembolic from low EF  Liver enzymes had been elevated.   CT head from a couple of weeks ago was reviewed and did not show any acute abnormalities  TSH and ammonia levels normal  Significant cardiomyopathy and poor ejection fraction,  Patient Active Problem List   Diagnosis    S/P total knee replacement using cement    Mixed hyperlipidemia    Essential hypertension    Depression    Umbilical hernia    Lower abdominal pain    Rectal mass    Hepatitis    Localized edema    SOB (shortness of breath)    Acute systolic heart failure (HCC)    NICM [facility]nonischemic cardiomyopathy) ([facility] [address]    Abnormal nuclear cardiac imaging test    VT (ventricular tachycardia) (HCC)    Rectal cancer (HCC)    Neoplasm related pain    Systolic CHF, chronic (HCC)    Cardiomyopathy, idiopathic (HCC)    Poor venous access    Anemia    Insomnia due to medical condition    Chest pain    Dyspnea on exertion    Peristomal hernia    Episodic lightheadedness    ICD (implantable cardioverter-defibrillator) in place    Ileostomy in place [facility])    Pneumonia    Colostomy in place [facility])    Breast mass, right    Abscess of sacrum (HCC)    Chronic pain    Abscess    Generalized abdominal pain    LLQ pain    Moderate malnutrition (HCC)    Hypervolemia    Chronic pain syndrome    Colon cancer (HCC)    Fibromyalgia    Primary insomnia    Acute on chronic systolic and diastolic heart failure, NYHA class 4 (HCC)    Hypotension due to drugs    Recurrent incisional hernia with incarceration    Incontinence of feces    Severe malnutrition (HCC)    PNA (pneumonia)    Biliary obstruction    Metabolic encephalopathy    Choledocholithiasis    Hyponatremia    Systolic CHF, acute (HCC)    C. difficile colitis    Urinary tract infection    ALEN (acute kidney injury) ([address]    Acute liver failure without hepatic coma    Altered mental status    Sepsis ([address]    Elevated INR    Acute respiratory failure with hypoxia (HCC)    Hypokalemia    Overweight    H/O alcohol abuse    Acute encephalopathy    Alcohol abuse counseling and surveillance       RECOMMENDATIONS :  Discussed with patient's nurse at the bedside  Continue supportive care  Since stroke is felt to be most likely cardioembolic, consider anticoagulation but given patient's poor compliance, patient may not be a good candidate for it. Will discuss with cardiology. Please note a portion of this chart was generated using dragon dictation software.  Although every effort was made to ensure the accuracy of this automated transcription, some

## 2020-01-05 NOTE — PROGRESS NOTES
NEUROLOGY FOLLOWUP    HISTORY OF PRESENT ILLNESS :     Jose J Morales is a 52 y.o. male   History was obtained from the patient's nurse and dictations in the chart. Patient continues to be slow in his responses. He still has episodic confusion. On further questioning, patient admits to significant depression today. Detailed history: He was admitted with respiratory failure and congestive heart failure. He is now improving but family felt that his mental status was still abnormal  neurological  Patient denies any focal weakness but states that he is tired and weak all over. He also complains of pain all over the body. Denies any vertigo or diplopia. Symptom onset was subacute. They are moderately severe without any other aggravating or relieving factorsatient's nurse in the ICU as well as dictations in the chart. REVIEW OF SYSTEMS     Unable to obtain a detailed and reliable system review because of confusion  Constitutional:  []   Chills   []  Fatigue   []  Fevers   []  Malaise   []  Weight loss     [] Denies all of the above    Respiratory:   []  Cough    []  Shortness of breath         [] Denies all of the above     Cardiovascular:   []  Chest pain    []  Exertional chest pressure/discomfort           [] Palpitations    []  Syncope     [] Denies all of the above    Past Medical History:   Diagnosis Date    Allergic     Anesthesia     tremors    Arthritis     CAD (coronary artery disease)     Cancer ([facility] [address]     colon    Chemotherapy adverse reaction 4/29/2019    CHF (congestive heart failure) ([facility] [address]     Chronic knee pain     Clostridium difficile infection 07/11/2016, 12/21/19    PCR+    Depression motion     Diverticulitis     Diverticulosis     History of alcohol abuse     Hyperlipidemia     Hypertension     Irregular heart beat     states been told he has had in past. Never treated.  Sees PCP every 3 mos. and EKG yearly    Pneumonia     Right knee pain     Shoulder injury     and arthrisits, injury rotaotr cuff    Sleep difficulties     with depression     Family History   Problem Relation Age of Onset    Cancer Mother     High Blood Pressure Father     Cancer Father      Social History     Socioeconomic History    Marital status:      Spouse name: None    Number of children: None    Years of education: None    Highest education level: None   Occupational History    None   Social Needs    Financial resource strain: None    Food insecurity:     Worry: None     Inability: None    Transportation needs:     Medical: None     Non-medical: None   Tobacco Use    Smoking status: Former Smoker     Packs/day: 1.00     Years: 5.00     Pack years: 5.00     Last attempt to quit: 2007     Years since quittin.0    Smokeless tobacco: Former User     Types: Chew     Quit date: 8/10/2017   Substance and Sexual Activity    Alcohol use: No     Comment: history of alcohol abuse 20 years ago    Drug use: No    Sexual activity: Never     Partners: Female   Lifestyle    Physical activity:     Days per week: None     Minutes per session: None    Stress: None   Relationships    Social connections:     Talks on phone: None     Gets together: None     Attends Jew service: None     Active member of club or organization: None     Attends meetings of clubs or organizations: None     Relationship status: None    Intimate partner violence:     Fear of current or ex partner: None     Emotionally abused: None     Physically abused: None     Forced sexual activity: None   Other Topics Concern    None   Social History Narrative    None        PHYSICAL EXAMINATION      BP 97/70   Pulse 102   Temp 97.5 °F (36.4 °C) (Temporal)   Resp 23   Ht 5' 10\" (1.778 m)   Wt 192 lb 0.3 oz (87.1 kg)   SpO2 96%   BMI 27.55 kg/m²   This is a well-nourished patient in no acute distress  Patient seems awake and  Acute systolic heart failure (HCC)    NICM (nonischemic cardiomyopathy) (HCC)    Abnormal nuclear cardiac imaging test    VT (ventricular tachycardia) (HCC)    Rectal cancer (HCC)    Neoplasm related pain    Systolic CHF, chronic (HCC)    Cardiomyopathy, idiopathic (HCC)    Poor venous access    Anemia    Insomnia due to medical condition    Chest pain    Dyspnea on exertion    Peristomal hernia    Episodic lightheadedness    ICD (implantable cardioverter-defibrillator) in place    Ileostomy in place [facility])    Pneumonia    Colostomy in place [facility])    Breast mass, right    Abscess of sacrum (HCC)    Chronic pain    Abscess    Generalized abdominal pain    LLQ pain    Moderate malnutrition (HCC)    Hypervolemia    Chronic pain syndrome    Colon cancer (HCC)    Fibromyalgia    Primary insomnia    Acute on chronic systolic and diastolic heart failure, NYHA class 4 (HCC)    Hypotension due to drugs    Recurrent incisional hernia with incarceration    Incontinence of feces    Severe malnutrition (HCC)    PNA (pneumonia)    Biliary obstruction    Metabolic encephalopathy    Choledocholithiasis    Hyponatremia    Systolic CHF, acute (HCC)    C. difficile colitis    Urinary tract infection    ALEN (acute kidney injury) ([address]    Acute liver failure without hepatic coma    Altered mental status    Sepsis ([address]    Elevated INR    Acute respiratory failure with hypoxia (HCC)    Hypokalemia    Overweight    H/O alcohol abuse    Acute encephalopathy    Alcohol abuse counseling and surveillance       RECOMMENDATIONS :  Discussed with patient  Continue supportive care  Since stroke is felt to be most likely cardioembolic, consider anticoagulation but given patient's poor compliance, patient may not be a good candidate for it. Will discuss with cardiology.   Patient may benefit from antidepressant medication but will leave this up to the hospitalist.          Please note a portion of this chart was generated using dragon dictation software. Although every effort was made to ensure the accuracy of this automated transcription, some errors in transcription may have occurred.          Susy Major M.D.

## 2020-01-05 NOTE — PROGRESS NOTES
[facility]    1/5/2020 12:00 PM        Name: Claudio Root . Admitted: 12/29/2019  Primary Care Provider: Kaylin Rose MD (Tel: [phone])    Brief Course:  Ricardo Weaver is a 53 yo male with past medical history of HFrEF status post ICD, nonischemic cardiomyopathy, hypertension, dyslipidemia, rectal cancer status post chemo and radiation therapy presented with progressively worsening shortness of breath. Patient currently being treated for acute decompensated HFrEF, complicated by ischemic hepatitis and metabolic encephalopathy. Multiple improvement of LFTs, volume status after aggressive diuresis. Neurology also following for continued confusion status post EEG. MRI brain showing punctate acute lacunar infarcts in the left posterior hippocampal gyrus and left temporal lobe. CC:Shortness of breath    Subjective:  .  Episode of ST elevations in inferior leads overnight. Case was discussed with on-call cardiologist and recommended to continue observe the patient. Intervention difficult due to recent acute lacunar infarcts found on MRI with possibility of hemorrhagic conversion if need to proceed with cardiac cath.     Reviewed interval ancillary notes    Current Medications  sacubitril-valsartan (ENTRESTO) 24-26 MG per tablet 0.5 tablet, BID  eplerenone (INSPRA) tablet 25 mg, Daily  amiodarone (CORDARONE) tablet 200 mg, Daily  Fidaxomicin (DIFICID) tablet 200 mg, BID  potassium chloride (KLOR-CON M) extended release tablet 40 mEq, PRN    Or  potassium bicarb-citric acid (EFFER-K) effervescent tablet 40 mEq, PRN    Or  potassium chloride 10 mEq/100 mL IVPB (Peripheral Line), PRN  potassium chloride 20 mEq/50 mL IVPB (Central Line), PRN  sodium chloride flush 0.9 % injection 10 mL, 2 times per day  sodium chloride flush 0.9 % injection 10 mL, PRN  ondansetron (ZOFRAN) injection 4 mg, Q6H PRN  digoxin (LANOXIN) tablet 125 mcg, Daily  sodium chloride flush 0.9 % injection 10 mL, 2 times per day  sodium chloride flush 0.9 % injection 10 mL, PRN  magnesium sulfate 1 g in dextrose 5% 100 mL IVPB, PRN  pantoprazole (PROTONIX) injection 40 mg, Daily  carvedilol (COREG) tablet 3.125 mg, BID WC        Objective:  BP 97/70   Pulse 102   Temp 97.5 °F (36.4 °C) (Temporal)   Resp 23   Ht 5' 10\" (1.778 m)   Wt 192 lb 0.3 oz (87.1 kg)   SpO2 96%   BMI 27.55 kg/m²     Intake/Output Summary (Last 24 hours) at 1/5/2020 1200  Last data filed at 1/5/2020 0612  Gross per 24 hour   Intake 1320 ml   Output 3550 ml   Net -2230 ml      Wt Readings from Last 3 Encounters:   01/05/20 192 lb 0.3 oz (87.1 kg)   12/23/19 192 lb 9.6 oz (87.4 kg)   12/18/19 194 lb 11.2 oz (88.3 kg)       General appearance: Drowsy and lethargic  Eyes: Sclera clear. Pupils equal.  ENT: Moist oral mucosa. Trachea midline, no adenopathy. Cardiovascular: Regular rhythm, normal S1, S2. No murmur. Respiratory: Not using accessory muscles. Good inspiratory effort. Clear to auscultation bilaterally, no wheeze or crackles. GI: Abdomen soft, no tenderness, not distended, normal bowel sounds  Musculoskeletal: No cyanosis in digits, neck supple  Neurology: CN 2-12 grossly intact. No speech or motor deficits  Psych: Flat affect.  Alert and oriented to person  Skin: Warm, dry, normal turgor  Extremity bilateral lower extremity edema +2    Labs and Tests:  CBC:   Recent Labs     01/03/20 0438 01/04/20  0500 01/05/20  0520   WBC 9.3 9.0 8.2   HGB 15.1 16.0 16.3   * 118* 106*     BMP:    Recent Labs     01/03/20 0438 01/04/20  0500 01/05/20  0520    146* 150*   K 3.6 4.5 4.3   CL 92* 101 106   CO2 36* 36* 36*   BUN 28* 24* 21*   CREATININE 0.9 0.8* 0.9   GLUCOSE 108* 110* 86     Hepatic:   Recent Labs     01/03/20  0438 01/04/20  0500 01/05/20  0520   * 289* 146*   * 490* 322*   BILITOT 3.0* 2.4* 2.7*   ALKPHOS 172* 194* 143*     MRI Brain WO Contrast   Final Result   Punctate acute lacunar infarcts identified left posterior Pallavi hippocampal   gyrus involving the subcortical white matter left temporal lobe images 15 and   16. No hemorrhagic conversion. Mild generalized involutional change. US LIVER   Final Result   Hepatic steatosis. Patent hepatic and portal veins with normal antegrade flow. Normal gallbladder and bile ducts. US DUP ABD PEL RETRO SCROT COMPLETE   Final Result   Hepatic steatosis. Patent hepatic and portal veins with normal antegrade flow. Normal gallbladder and bile ducts. XR CHEST PORTABLE   Final Result   No acute airspace disease identified. Problem List  Active Problems:    Acute on chronic systolic and diastolic heart failure, NYHA class 4 (HCC)    Urinary tract infection    ALEN (acute kidney injury) ([facility] [address]    Acute liver failure without hepatic coma    Altered mental status    Sepsis (HCC)    Elevated INR    Acute respiratory failure with hypoxia (HCC)    Hypokalemia    Overweight    H/O alcohol abuse    Acute encephalopathy    Alcohol abuse counseling and surveillance  Resolved Problems:    * No resolved hospital problems. *       Assessment & Plan:   Acute hypoxemic respiratory failure  -Multifactorial secondary to acute pulmonary edema and CHF  -Intermittent use of BiPAP  -Currently on 2 L nasal cannula breathing comfortably     Acute decompensated HFrEF  -Ejection fraction 20%  -IV diuresis, monitor ins and outs, salt and fluid restriction, daily weight  -Standard of care medications  -Management per cardiology    Acute lacunar infarcts / Acute encephalopathy  -MRI brain showing punctate acute lacunar infarcts in the left posterior hippocampal gyrus and left temporal lobe.   -Neurology following  -EEG report within normal limits  -Appreciate neurology recommendation       Ischemic/congestive hepatitis: resolving   -LFTs improving; continue to

## 2020-01-05 NOTE — PROGRESS NOTES
Plan Continue current meds   Reassess tomorrow   Family meeting planned for Monday with Dr. Brower December   Not candidate for LVAD or TXP with hx cancer and acute CVA  ~CVA  Evidence for acute infarct posteromedial temporal lobe  ~possible cardioembolic from low EF per neuro : AC considered by concern for compliance   Plan  Consider ASA if ok by neuro    ~Code status  DNR-CCA  ?  Care conference tomorrow : Dr. Brower December to follow

## 2020-01-05 NOTE — PROGRESS NOTES
Shift assessment completed; see flowsheet data. Pt oriented to self only, frequently repeats the same phrases. VSS on room air. Pt weak but able to move all extremities. Pt refuses to take oral meds at this time. Plan of care discussed with pt.

## 2020-01-05 NOTE — PROGRESS NOTES
Reassessment complete, vitals obtained. Hands / fingers icy cold. Wrapped in warm blankets though pt tries removing them. Temp in room increased. Pt turned / repositioned. In no signs of distress. Safety precautions in place.

## 2020-01-06 NOTE — PROGRESS NOTES
Calorie Oral Supplement  · ONS intake: %  · Anthropometric Measures:  · Ht: 5' 10\" (177.8 cm)   · Current Body Wt: 187 lb (84.8 kg)  · Admission Body Wt: 200 lb (90.7 kg)  · Ideal Body Wt: 166 lb (75.3 kg)   · BMI Classification: BMI 25.0 - 29.9 Overweight    Nutrition Interventions:   Continue current diet, Continue current ONS  Continued Inpatient Monitoring, Education Completed    Nutrition Evaluation:   · Evaluation: Progressing toward goals   · Goals: Pt will consume at least 50% of meals and supplements     · Monitoring: Meal Intake, Supplement Intake, Diet Tolerance, Weight, I&O, Pertinent Labs, Patient/Family Education      Electronically signed by Connie Webb RD, LD on 1/6/20 at 9:17 AM    Contact Number: [phone]

## 2020-01-06 NOTE — PROGRESS NOTES
2. 7* 3.4*   ALKPHOS 194* 143* 80*     MRI Brain WO Contrast   Final Result   Punctate acute lacunar infarcts identified left posterior Pallavi hippocampal   gyrus involving the subcortical white matter left temporal lobe images 15 and   16. No hemorrhagic conversion. Mild generalized involutional change. US LIVER   Final Result   Hepatic steatosis. Patent hepatic and portal veins with normal antegrade flow. Normal gallbladder and bile ducts. US DUP ABD PEL RETRO SCROT COMPLETE   Final Result   Hepatic steatosis. Patent hepatic and portal veins with normal antegrade flow. Normal gallbladder and bile ducts. XR CHEST PORTABLE   Final Result   No acute airspace disease identified. Problem List  Active Problems:    Acute on chronic systolic and diastolic heart failure, NYHA class 4 (HCC)    Urinary tract infection    ALEN (acute kidney injury) ([facility] [address]    Acute liver failure without hepatic coma    Altered mental status    Sepsis (HCC)    Elevated INR    Acute respiratory failure with hypoxia (HCC)    Hypokalemia    Overweight    H/O alcohol abuse    Acute encephalopathy    Alcohol abuse counseling and surveillance  Resolved Problems:    * No resolved hospital problems. *       Assessment & Plan:   Acute hypoxemic respiratory failure  -Multifactorial secondary to acute pulmonary edema and CHF    -Currently on 2 L nasal cannula breathing comfortably     Acute decompensated HFrEF  -Ejection fraction 20%  -IV diuresis, monitor ins and outs, salt and fluid restriction, daily weight  -Standard of care medications  -Management per cardiology    Acute lacunar infarcts / Acute encephalopathy  -MRI brain showing punctate acute lacunar infarcts in the left posterior hippocampal gyrus and left temporal lobe.   Recommend anticoagulation but likely noncompliance a bigger issue  -  -Neurology following  -EEG report within normal limits   -Appreciate neurology

## 2020-01-06 NOTE — PROGRESS NOTES
Refused to take meds today. Family and cardiologist met to discuss plan of care going forward. Psyche consult ordered.

## 2020-01-06 NOTE — PROGRESS NOTES
[address]   Progress Note  CHF/Pulmonary Hypertension Cardiology    Chief complaint: We are following this patient for acute on chronic systolic HF with decompensation    HPI:  Néstor Bermudez is a 51 yo male well known to me from the office with a history of chronic systolic HF and rectal cancer. Agustin Li presented to the ED with severe subacute onset of gradually progressive increasing shortness of breath. In the ED, he had significant tachypneic and unable to hold full conversation in spite of being on supplemental oxygen. He was also confused.   Agustin Li has had fatigue, malaise and generalized weakness.  No fever, chills.  Denies chest pain, palpitations.  Denies flank pain or hematuria.  He denies dysuria, urgency, or frequency. On the previous admission, there was a question of choledocholithiasis.  He has elevated bilirubin and liver function tests. Agustin Li had normal enzymes 11/8/19. Agustin Li is receiving pain meds. Agustin Li has a history of chronic narcotic use from all of his surgeries.     He has a history of sCHF, NICM, hx HTN, HLD and rectal cancer s/p chemo and XRT. C 9/15/16 with no coronary artery disease, EF 15%, LVEDP 20-25 mmHg.  He had rectal surgery in January 2017 and has completed chemo and radiation. He had problems with pelvic abscess and had a drain placed.    He underwent ICD implant for primary prevention on 10/26/17. Agustin Li also underwent surgery for repair of ventral hernia and new colostomy on 12/1/17. He has had abscesses post-op that have been drained. Malia Shelton was admitted for chest pain and SOB on 5-31-19 thru 6-7-19 and was found to be in acute CHF.  ICD did not show VT while in the hospital. Agustin Li underwent colorectal anastomosis dilation 5/14/2019. He recently underwent colostomy placement recently due to problems with chronic abdominal pain.      He was just transferred up to the floor from the ED. He is diuresing and feeling better.   The patient tells me that he remembers feeling good, reinforced. ~salt restriction  ~fluid restriction  ~medication compliance  ~daily weights and notify of any significant weight gain/loss  ~establish with CHF nurse  ~outpatient follow-up with our CHF team     Continue eplerenone 25 mg po qd  Get psychiatry consult    Had family meeting with Cherie Dennis wife, Stacys parents, Дмитрий's two brothers and spouses, Дмитрий's son with his wife. Also Ly Acosta of palliative care was present. Discussed the sudden change in his psyche and physical condition starting the day after Thanksgiving (no discernible cause). He shows little interest in food, taking his meds, in his children (very out of character for him). Family has seen a major change in his desire to improve. We discussed that he was not a candidate for advance therapies such as LVAD or transplant. We discussed that if he is not taking his meds in the hospital when given to him, he is most certainly not taking them at home. He has expressed desire to be a DNR to family and avoid aggressive measures. We agreed to the followin. Get psychiatry consult. Is he majorly depressed as a cause for his change in attitude or is more related to his chronic disease? 2. From there, options are to go to inpatient rehab for a trial, if no improvement, go home with hospice vs going straight home with hospice from here. Concern for wife and small children in the home, but it sounds as if he has been very sick for a long time at home and kids are used to this. 3.  All agreed that he would not live long without taking his meds. Perhaps if he doesn't want to take meds, we won't force him. It is clear to me that the three recent admissions have all been caused by him not taking meds  4. He is no longer on narcotics and abdominal pain does not seem to be an issue at this time. Furthermore, narcotics are not responsible for his drowsiness.   5.  All agreed with [facility] and agreed that returning to the hospital for

## 2020-01-06 NOTE — PROGRESS NOTES
Pt. Awake. Not really interactive. No real cooperative, but pleasant. Plenty of drinks to bedside per pt. Request.  Good po fluids and Ensure. Refuses meds. Otherwise is not really interactive. Turns and repositions self.

## 2020-01-06 NOTE — PROGRESS NOTES
Pt assessment complete, see flowsheets. Pt refused medications, see MAR. Pt has flat affect and is withdrawn. Pt on room air. Hurt remains. Pt has colostomy to RLQ. AREN stoma due to bag color and there is no stool currently in the bag. Pt remains in contact isolation. Pt repositioned for comfort. Call light within reach. Pt expresses no other needs at this time. Will continue to monitor.

## 2020-01-06 NOTE — PROGRESS NOTES
his care from ID standpoint at this time  · Discussed all above with patient and RN  · Discussed with his brother at bedside    Drug Monitoring:    · Continue monitoring for antibiotic toxicity as follows: CMP  · Continue to watch for following: new or worsening fever, new hypotension, hives, lip swelling and redness or purulence at vascular access sites. I/v access Management:    · Continue to monitor i.v access sites for erythema, induration, discharge or tenderness. · As always, continue efforts to minimize tubes/lines/drains as clinically appropriate to reduce chances of line associated infections. Thanks for allowing me to participate in your patient's care. I will sign off today, but will be available to answer any further questions or concerns that may arise during patient's stay in the hospital.        Subjective: Interval history: Patient was seen and examined at bedside. Interval history was obtained. Shortness of breath at baseline. He is afebrile. REVIEW OF SYSTEMS:      Review of Systems   Constitutional: Positive for fatigue. Negative for chills, diaphoresis and fever. HENT: Negative for ear discharge, ear pain, rhinorrhea, sore throat and trouble swallowing. Eyes: Negative for discharge and redness. Respiratory: Negative for cough, shortness of breath and wheezing. Cardiovascular: Negative for chest pain and leg swelling. Gastrointestinal: Negative for abdominal pain, constipation, diarrhea and nausea. Endocrine: Negative for polyuria. Genitourinary: Negative for dysuria, flank pain, frequency, hematuria and urgency. Musculoskeletal: Negative for back pain and myalgias. Skin: Negative for rash. Neurological: Negative for dizziness, seizures and headaches. Hematological: Does not bruise/bleed easily. Psychiatric/Behavioral: Negative for hallucinations and suicidal ideas. All other systems reviewed and are negative.         Past Medical History: All past posterior oropharyngeal erythema. Eyes:      General: No scleral icterus. Right eye: No discharge. Left eye: No discharge. Conjunctiva/sclera: Conjunctivae normal.      Pupils: Pupils are equal, round, and reactive to light. Neck:      Musculoskeletal: Normal range of motion and neck supple. No neck rigidity or muscular tenderness. Cardiovascular:      Rate and Rhythm: Normal rate and regular rhythm. Pulses: Normal pulses. Heart sounds: No murmur. No friction rub. Pulmonary:      Effort: Pulmonary effort is normal. No respiratory distress. Breath sounds: Normal breath sounds. No stridor. No wheezing, rhonchi or rales. Abdominal:      General: Bowel sounds are normal.      Palpations: Abdomen is soft. Tenderness: There is no tenderness. There is no right CVA tenderness, left CVA tenderness, guarding or rebound. Comments: Ostomy site okay. Musculoskeletal: Normal range of motion. General: No swelling or tenderness. Lymphadenopathy:      Cervical: No cervical adenopathy. Skin:     General: Skin is warm and dry. Coloration: Skin is not jaundiced. Findings: No erythema or rash. Neurological:      General: No focal deficit present. Mental Status: He is alert and oriented to person, place, and time. Mental status is at baseline. Motor: No abnormal muscle tone. Psychiatric:         Mood and Affect: Mood normal.         Behavior: Behavior normal.         Thought Content: Thought content normal.       Lines: All vascular access sites are healthy with no local erythema, discharge or tenderness      Intake and output:    I/O last 3 completed shifts:  In: -   Out: 2200 [Urine:2200]    Lab Data:   All available labs and old records have been reviewed by me.     CBC:  Recent Labs     01/04/20  0500 01/05/20  0520 01/06/20  0605   WBC 9.0 8.2 9.0   RBC 5.23 5.33 5.30   HGB 16.0 16.3 16.1   HCT 49.2 50.6 49.8   * 106* 93*   MCV 94.1 94.9 94.1   MCH 30.7 30.6 30.4   MCHC 32.6 32.2 32.3   RDW 16.1* 17.2* 17.2*        BMP:  Recent Labs     01/04/20  0500 01/05/20  0520 01/06/20  0605   * 150* 152*   K 4.5 4.3 4.4    106 107   CO2 36* 36* 34*   BUN 24* 21* 27*   CREATININE 0.8* 0.9 0.9   CALCIUM 8.9 8.7 8.4   GLUCOSE 110* 86 105*        Hepatic Function Panel:   Lab Results   Component Value Date    ALKPHOS 137 01/06/2020     01/06/2020    AST 98 01/06/2020    PROT 5.1 01/06/2020    PROT 6.3 07/05/2017    BILITOT 3.4 01/06/2020    BILIDIR 0.3 12/18/2019    IBILI 0.3 12/18/2019    LABALBU 2.3 01/06/2020       CPK:   Lab Results   Component Value Date    CKTOTAL 129 09/06/2016     ESR:   Lab Results   Component Value Date    SEDRATE 8 02/20/2012     CRP: No results found for: CRP        Imaging: All pertinent images and reports for the current visit were reviewed by me during this visit. MRI Brain WO Contrast   Final Result   Punctate acute lacunar infarcts identified left posterior Pallavi hippocampal   gyrus involving the subcortical white matter left temporal lobe images 15 and   16. No hemorrhagic conversion. Mild generalized involutional change. US LIVER   Final Result   Hepatic steatosis. Patent hepatic and portal veins with normal antegrade flow. Normal gallbladder and bile ducts. US DUP ABD PEL RETRO SCROT COMPLETE   Final Result   Hepatic steatosis. Patent hepatic and portal veins with normal antegrade flow. Normal gallbladder and bile ducts. XR CHEST PORTABLE   Final Result   No acute airspace disease identified. Medications: All current and past medications were reviewed.      sertraline  25 mg Oral Daily    sacubitril-valsartan  0.5 tablet Oral BID    eplerenone  25 mg Oral Daily    amiodarone  200 mg Oral Daily    Fidaxomicin  200 mg Oral BID    sodium chloride flush  10 mL Intravenous 2 times per day    digoxin  125 mcg Oral Daily    sodium chloride flush  10 mL Intravenous 2 times per day    pantoprazole  40 mg Intravenous Daily    carvedilol  3.125 mg Oral BID WC           potassium chloride **OR** potassium alternative oral replacement **OR** potassium chloride, potassium chloride, sodium chloride flush, ondansetron, sodium chloride flush, magnesium sulfate      Problem list:       Patient Active Problem List   Diagnosis Code    S/P total knee replacement using cement Z96.659    Mixed hyperlipidemia E78.2    Essential hypertension I10    Depression Q85.0    Umbilical hernia R67.6    Lower abdominal pain R10.30    Rectal mass K62.89    Hepatitis K75.9    Localized edema R60.0    SOB (shortness of breath) Q18.42    Acute systolic heart failure (HCC) I50.21    NICM (nonischemic cardiomyopathy) (HCC) I42.8    Abnormal nuclear cardiac imaging test R93.1    VT (ventricular tachycardia) (HCC) I47.2    Rectal cancer (HCC) C20    Neoplasm related pain W63.9    Systolic CHF, chronic (HCC) I50.22    Cardiomyopathy, idiopathic (HCC) I42.8    Poor venous access I87.8    Anemia D64.9    Insomnia due to medical condition G47.01    Chest pain R07.9    Dyspnea on exertion R06.09    Peristomal hernia K46.9    Episodic lightheadedness R42    ICD (implantable cardioverter-defibrillator) in place Z95.810    Ileostomy in place ([facility] [address] Z93.2    Pneumonia J18.9    Colostomy in place [facility]) Z93.3    Breast mass, right N63.10    Abscess of sacrum (HCC) M46.28    Chronic pain G89.29    Abscess L02.91    Generalized abdominal pain R10.84    LLQ pain R10.32    Moderate malnutrition (HCC) E44.0    Hypervolemia E87.70    Chronic pain syndrome G89.4    Colon cancer (HCC) C18.9    Fibromyalgia M79.7    Primary insomnia F51.01    Acute on chronic systolic and diastolic heart failure, NYHA class 4 (HCC) I50.43    Hypotension due to drugs I95.2    Recurrent incisional hernia with incarceration K43.0    Incontinence of feces R15.9    Severe malnutrition ([facility] E43    PNA (pneumonia) J18.9    Biliary obstruction S28.8    Metabolic encephalopathy E62.71    Choledocholithiasis K80.50    Hyponatremia T31.8    Systolic CHF, acute (HCC) I50.21    C. difficile colitis A04.72    Urinary tract infection N39.0    ALEN (acute kidney injury) ([facility] [address] N17.9    Acute liver failure without hepatic coma K72.00    Altered mental status R41.82    Sepsis (HCC) A41.9    Elevated INR R79.1    Acute respiratory failure with hypoxia (HCC) J96.01    Hypokalemia E87.6    Overweight E66.3    H/O alcohol abuse F10.11    Acute encephalopathy G93.40    Alcohol abuse counseling and surveillance Z71.41       Please note that this chart was generated using Dragon dictation software. Although every effort was made to ensure the accuracy of this automated transcription, some errors in transcription may have occurred inadvertently. If you may need any clarification, please do not hesitate to contact me through EPIC or at the phone number provided below with my electronic signature. Any pictures or media included in this note were obtained after taking informed verbal consent from the patient and with their approval to include those in the patient's medical record.     Tere Peacock MD, MPH  1/6/2020 , 12:56 PM   [facility] Infectious Disease   Office: [phone]  Fax: [phone]  Tuesday AM clinic:   [address] [facility] [address]  Thursday AM RNCZUS:[id number] [facility]

## 2020-01-06 NOTE — PLAN OF CARE
Nutrition Problem: Inadequate oral intake  Intervention: Food and/or Nutrient Delivery: Continue current diet, Continue current ONS  Nutritional Goals: Pt will consume at least 50% of meals and supplements

## 2020-01-06 NOTE — PROGRESS NOTES
sleepy but arousable and has some mild confusion. He is oriented x2. .  Judgment is impaired at times. Speech is normal.  Language function could not be assessed. Pupils equal round reactive to light. Extraocular movements are intact. Face symmetrical.  Tongue midline. Strength is 4/5 all over. Plantar reflexes withdrawal.  Coordination could not be checked because of limited cooperation. No carotid bruit. No neck stiffness. DATA :  LABS:  General Labs:    CBC:   Lab Results   Component Value Date    WBC 9.0 01/06/2020    RBC 5.30 01/06/2020    RBC 3.74 07/05/2017    HGB 16.1 01/06/2020    HCT 49.8 01/06/2020    MCV 94.1 01/06/2020    MCH 30.4 01/06/2020    MCHC 32.3 01/06/2020    RDW 17.2 01/06/2020    PLT 93 01/06/2020    MPV 7.8 01/06/2020     BMP:    Lab Results   Component Value Date     01/06/2020    K 4.4 01/06/2020    K 3.6 12/09/2019     01/06/2020    CO2 34 01/06/2020    BUN 27 01/06/2020    LABALBU 2.3 01/06/2020    CREATININE 0.9 01/06/2020    CALCIUM 8.4 01/06/2020    GFRAA >60 01/06/2020    GFRAA >60 12/19/2012    LABGLOM >60 01/06/2020    GLUCOSE 105 01/06/2020    GLUCOSE 131 07/05/2017     RADIOLOGY REVIEW:  I have reviewed radiology image(s) and reports(s) of: CT head from December 2019  As well as current MRI brain    IMPRESSION :  Metabolic encephalopathy however EEG is normal.  MRI brain images were reviewed and showed a punctate left temporal acute infarction, this may be cardioembolic from low EF  Liver enzymes had been elevated.   CT head from a couple of weeks ago was reviewed and did not show any acute abnormalities  TSH and ammonia levels normal  Significant cardiomyopathy and poor ejection fraction,  Depression  Patient Active Problem List   Diagnosis    S/P total knee replacement using cement    Mixed hyperlipidemia    Essential hypertension    Depression    Umbilical hernia    Lower abdominal pain    Rectal mass    Hepatitis    Localized edema    SOB the hospitalist.  Discussed with Dr. Celio Lobo from cardiology          Please note a portion of this chart was generated using dragon dictation software. Although every effort was made to ensure the accuracy of this automated transcription, some errors in transcription may have occurred.          Chandra Riedel M.D.

## 2020-01-06 NOTE — PROGRESS NOTES
Refused Coreg today. HR in the low 120's. Refuses to get up today. Turns and repositions in bed per self. Call light in reach. Visitors at bedside.

## 2020-01-07 NOTE — PROGRESS NOTES
Reassessment complete, see flowsheets. Pt talking more and remains oriented, talking about his past and family/ current cancer diagnosis-getting tearful. Pt remains able to follow commands. Pt does not seem as confused as he has been. Pt remains on room air. Hurt remains. Pt colostomy emptied with small, loose/soft, brown stool. Pt continues to drink plenty of water. Pt bathed with complete linen/gown change. Call light within reach. Pt expresses no other needs at this time. Will continue to monitor.

## 2020-01-07 NOTE — PROGRESS NOTES
Noted pt low BP, see flowsheets. Dr. Lizeth Kevin notified, no new orders at this time. Will continue to monitor.

## 2020-01-07 NOTE — PROGRESS NOTES
Pt assessment complete, see flowsheets. Medications given, see MAR. VSS. Pt oriented to self only. Pt on room air. Hurt in place and draining. Pt colostomy full of air, emptied, no stool. Pt repositioned for comfort. Call light within reach. Pt expresses no other needs at this time. Pt family at bedside. Will continue to monitor.

## 2020-01-07 NOTE — CONSULTS
Psychiatry Consultation  Steve Easley M.D.  1/7/2020  9:38 AM          Recommendations:    · Patient is delirious and mentating poorly at present. This seems to be slowly improving, per chart review and RN  · It is probable he is depressed, but I would suggest waiting for delirium to clear before aggressively treating MDD with meds  · Having said that, I see that zoloft 25 mg has started today, and I would not oppose continuing this medication for empirical reasons  · He assures me that he has decided to take all his medications, and is grateful that the medical team has \"saved his life\". 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)   Delirium due to multiple etiologies (liver, electrolyte, hypoperfusion, stroke, opioid)    Axis II  Deferred    Axis III       Diagnosis Date    Allergic     Anesthesia     tremors    Arthritis     CAD (coronary artery disease)     Cancer ([facility]     colon    Chemotherapy adverse reaction 4/29/2019    CH[facility]congest[facility] heart failure) ([facility])     Chronic knee pain     Clostridium difficile infection 07/11/2016, 12/21/19    PCR+    Depression motion     Diverticulitis     Diverticulosis     History of alcohol abuse     Hyperlipidemia     Hypertension     Irregular heart beat     states been told he has had in past. Never treated. Sees PCP every 3 mos.  and EKG yearly    Pneumonia     Right knee pain     Shoulder injury     and arthrisits, injury rotaotr cuff    Sleep difficulties     with depression      Active Problems:    Acute on chronic systolic and diastolic heart failure, NYHA class 4 (HCC)    Urinary tract infection    ALEN (acute kidney injury) ([facility] [address]    Acute liver failure without hepatic coma    Altered mental status    Sepsis (HCC)    Elevated INR    Acute respiratory failure with hypoxia (HCC)    Hypokalemia    Overweight    H/O alcohol abuse    Acute encephalopathy    Alcohol abuse counseling and surveillance    Acute cerebral infarction [facility])  Resolved Problems:    * No resolved hospital problems. *       Axis IV  Problems with primary support group    Axis V  41-50 serious symptoms    Examination:      Vital Signs BP 82/60   Pulse 91   Temp 97 °F (36.1 °C) (Temporal)   Resp 18   Ht 5' 10\" (1.778 m)   Wt 184 lb 11.9 oz (83.8 kg)   SpO2 97%   BMI 26.51 kg/m²     Appearance    alert, cooperative, crying, moderate distress  Speech    spontaneous, slow, increased latency and perseverative  Mood    Anxious Depressed  Affect    labile affect  Thought Content    intact  Thought Process    slow and perservative  Associations    logical connections  Insight    Fair  Judgment    Impaired  No abnormal movements, tics or mannerisms. Orientation    oriented to person, place, situation and month of year  Memory    remote memory intact, recent memory impaired  Attention/Concentration    impaired  Language    0 - no aphasia, normal  Fund of Knowledge    intact  Suicide Assessment    no suicidal ideation    Disoriented to year  Unable to perform serial 7's or WORLD backward  Registers 3/3 words but can recall only 2 at 3 minutes delay        History:    Reason for Admission and Hospital Course:  Pt with chronic disease CHF, GI, metabolic and electrolyte abnormalities, SOB, etc who was confused, disoriented, labile, tearful, refusing meds. He is able to recall believing that Dr Efrain Loaiza wanted him to take valium for \"shakes\", which he refused. In reality, he refused all meds. He is unaware of this, and unable to process that even today. Repeats same 6 phrases over and over, often instead of answering direct question. States he wants to live, is grateful for medical team helping him, and wants to take any medicine prescribed by Dr. Efrain Loaiza. No psychosis or jamarcus.     Past Medical History:        Diagnosis Date    Allergic     Anesthesia     tremors    Arthritis     CAD (coronary artery disease)    

## 2020-01-07 NOTE — PROGRESS NOTES
PRN  ondansetron (ZOFRAN) injection 4 mg, Q6H PRN  digoxin (LANOXIN) tablet 125 mcg, Daily  sodium chloride flush 0.9 % injection 10 mL, 2 times per day  sodium chloride flush 0.9 % injection 10 mL, PRN  magnesium sulfate 1 g in dextrose 5% 100 mL IVPB, PRN  pantoprazole (PROTONIX) injection 40 mg, Daily  carvedilol (COREG) tablet 3.125 mg, BID WC        Objective:  BP 90/67   Pulse 99   Temp 98 °F (36.7 °C) (Temporal)   Resp 16   Ht 5' 10\" (1.778 m)   Wt 184 lb 11.9 oz (83.8 kg)   SpO2 92%   BMI 26.51 kg/m²     Intake/Output Summary (Last 24 hours) at 1/7/2020 1241  Last data filed at 1/7/2020 0600  Gross per 24 hour   Intake 1316 ml   Output 3175 ml   Net -1859 ml      Wt Readings from Last 3 Encounters:   01/07/20 184 lb 11.9 oz (83.8 kg)   12/23/19 192 lb 9.6 oz (87.4 kg)   12/18/19 194 lb 11.2 oz (88.3 kg)       General appearance: Drowsy and lethargic  Eyes: Sclera clear. Pupils equal.  ENT: Moist oral mucosa. Trachea midline, no adenopathy. Cardiovascular: Regular rhythm, normal S1, S2. No murmur. Respiratory: Not using accessory muscles. Good inspiratory effort. Clear to auscultation bilaterally, no wheeze or crackles. GI: Abdomen soft, no tenderness, not distended, normal bowel sounds  Musculoskeletal: No cyanosis in digits, neck supple  Neurology: CN 2-12 grossly intact. No speech or motor deficits  Psych: Flat affect.  Alert and oriented to person  Skin: Warm, dry, normal turgor  Extremity bilateral lower extremity edema +2    Labs and Tests:  CBC:   Recent Labs     01/05/20 0520 01/06/20  0605 01/07/20  0538   WBC 8.2 9.0 7.6   HGB 16.3 16.1 15.2   * 93* 92*     BMP:    Recent Labs     01/05/20 0520 01/06/20  0605 01/07/20  0538   * 152* 147*   K 4.3 4.4 3.7    107 104   CO2 36* 34* 32   BUN 21* 27* 30*   CREATININE 0.9 0.9 0.8*   GLUCOSE 86 105* 177*     Hepatic:   Recent Labs     01/05/20  0520 01/06/20  0605 01/07/20  0538   * 98* 111*   * 221* 171* BILITOT 2.7* 3.4* 2.2*   ALKPHOS 143* 137* 137*     MRI Brain WO Contrast   Final Result   Punctate acute lacunar infarcts identified left posterior Pallavi hippocampal   gyrus involving the subcortical white matter left temporal lobe images 15 and   16. No hemorrhagic conversion. Mild generalized involutional change. US LIVER   Final Result   Hepatic steatosis. Patent hepatic and portal veins with normal antegrade flow. Normal gallbladder and bile ducts. US DUP ABD PEL RETRO SCROT COMPLETE   Final Result   Hepatic steatosis. Patent hepatic and portal veins with normal antegrade flow. Normal gallbladder and bile ducts. XR CHEST PORTABLE   Final Result   No acute airspace disease identified. Problem List  Active Problems:    Acute on chronic systolic and diastolic heart failure, NYHA class 4 (HCC)    Urinary tract infection    ALEN (acute kidney injury) ([facility] [address]    Acute liver failure without hepatic coma    Altered mental status    Sepsis (HCC)    Elevated INR    Acute respiratory failure with hypoxia (HCC)    Hypokalemia    Overweight    H/O alcohol abuse    Acute encephalopathy    Alcohol abuse counseling and surveillance    Acute cerebral infarction [facility])  Resolved Problems:    * No resolved hospital problems. *       Assessment & Plan:   Acute hypoxemic respiratory failure  -Multifactorial secondary to acute pulmonary edema and CHF  -Improving.       Acute decompensated HFrEF  -Ejection fraction 20%  -IV diuresis, monitor ins and outs, salt and fluid restriction, daily weight  -Standard of care medications  -Management per cardiology  -Difficult situation as patient has been refusing pain medication  -Per cardiology    Acute lacunar infarcts / Acute encephalopathy  -MRI brain showing punctate acute lacunar infarcts in the left posterior hippocampal gyrus and left temporal lobe.   Recommend anticoagulation but likely noncompliance a bigger issue  -Neurology

## 2020-01-07 NOTE — PROGRESS NOTES
Reassessment complete, see flowsheets. VSS. Pt more talkative and oriented to person, place, and time, disoriented to situation currently, pt is able to follow commands. Pt still having random conversations at time/confusion. Pt remains on room air. Hurt remains. Pt colostomy remains empty/free of air. Pt repositioned for comfort. Pt drank 100% Ensure drink and glasses of water. Pt requesting more water/ensure. Water given and informed pt more ensure drinks would come in morning time. Pt expresses no other needs and understands plan. Will continue to monitor.

## 2020-01-07 NOTE — PROGRESS NOTES
[address]  HEART FAILURE  Progress Note      Admit Date 12/29/2019     Reason for Consult:      Reason for Consultation/Chief Complaint: emesis    HPI:    Aston Hoffman is a 52 y.o. male with PMH NICM, HFrEF, ICD, rectal cancer, colostomy, HTN, HLD admitted with gradually progressive increasing shortness of breath.  In the ED, he had significant tachypneia and unable to hold full conversation in spite of being on supplemental oxygen.  He was also confused. He has diuresed 15L and today is less confused but keeps repeating the same statements. He is taking meds today and met with psychiatry for eval of mood disorder vs delirium. Subjective:  Patient is being seen for CHF. There were no acute overnight cardiac events. Today Mr. Sandra Sigala denies CP, palpitations, or SOB today.  Tells me he will take his meds         Objective:   BP 82/60   Pulse 91   Temp 97 °F (36.1 °C) (Temporal)   Resp 18   Ht 5' 10\" (1.778 m)   Wt 184 lb 11.9 oz (83.8 kg)   SpO2 97%   BMI 26.51 kg/m²       Intake/Output Summary (Last 24 hours) at 1/7/2020 1015  Last data filed at 1/7/2020 0600  Gross per 24 hour   Intake 1316 ml   Output 3175 ml   Net -1859 ml      Wt Readings from Last 3 Encounters:   01/07/20 184 lb 11.9 oz (83.8 kg)   12/23/19 192 lb 9.6 oz (87.4 kg)   12/18/19 194 lb 11.2 oz (88.3 kg)      In: 720 [P.O.:720]  Out: 2325       Physical Exam:  General Appearance:  Non-obese/Well Nourished  Respiratory:  · Resp Auscultation: CTA  Cardiovascular:  · Auscultation: Regular rate and rhythm, normal S1S2, no m/g/r/c  · Palpation: Normal    · JVD: none  · Pedal Pulses: 2+ and equal   Abdomen:  · Soft, NT, ND, + bs  Extremities:  · No Cyanosis or Clubbing  · Extremities:Positive edema  Neurological/Psychiatric:  · drowsy    MEDICATIONS:   Scheduled Meds:   Scheduled Meds:   sertraline  25 mg Oral Daily    sacubitril-valsartan  0.5 tablet Oral BID    eplerenone  25 mg Oral Daily    amiodarone  200 mg Oral Daily 2. I/O     Intake/Output Summary (Last 24 hours) at 1/7/2020 1015  Last data filed at 1/7/2020 0600  Gross per 24 hour   Intake 1316 ml   Output 3175 ml   Net -1859 ml       Cardiac Testing:     ECHO: 5/31/19  Summary   -Left ventricular cavity size is dilated. Normal left ventricular wall   thickness. Overall left ventricular systolic function appears severely   reduced with a estimated ejection fraction of 30-35% 3D EF=32%. There is   severe diffuse hypokinesis. -Grade III diastolic dysfunction with elevated LV filling   pressures. E/\"=21.6.   -Mildly thickened and calcified without evidence of stenosis. Moderate-severe mitral regurgitation is present.   -Moderate tricuspid regurgitation.   -Mild pulmonic regurgitation present.   -Estimated pulmonary artery systolic pressure is severely elevated at 61   mmHg assuming a right atrial pressure of 3 mmHg. -The left atrium is dilated. -Pacemaker / ICD lead is visualized in the right heart. Assessment/Plan:     1.  AHF - 15L out,  off diuretics now, check BNP tomorrow.    2.  Cardiomyopathy: Continue coreg at  3.125 mg po bid and Entresto 1/2 dose  3.   Hyponatremia - improved        I appreciate the opportunity of cooperating in the care of this individual.    Daria Harman, [facility], [address] 1/7/2020, 10:15 AM  Heart Failure  The [address], [address]  Ph: [phone]      Core Measures:   · Discharge instructions:   · LVEF documented:   · ACEI for LV dysfunction:   · Smoking Cessation:

## 2020-01-07 NOTE — PROGRESS NOTES
NEUROLOGY FOLLOWUP    HISTORY OF PRESENT ILLNESS :     Nena Pierson is a 52 y.o. male   History was obtained from the patient's nurse at the bedside and dictations in the chart. Patient continues to be slow in his responses. He still has episodic confusion. Patient had a psychiatry evaluation. Detailed history: He was admitted with respiratory failure and congestive heart failure. He is now improving but family felt that his mental status was still abnormal  neurological  Patient denies any focal weakness but states that he is tired and weak all over. He also complains of pain all over the body. Denies any vertigo or diplopia. Symptom onset was subacute. They are moderately severe without any other aggravating or relieving factorsatient's nurse in the ICU as well as dictations in the chart. REVIEW OF SYSTEMS     Unable to obtain a detailed and reliable system review because of confusion  Constitutional:  []   Chills   []  Fatigue   []  Fevers   []  Malaise   []  Weight loss     [] Denies all of the above    Respiratory:   []  Cough    []  Shortness of breath         [] Denies all of the above     Cardiovascular:   []  Chest pain    []  Exertional chest pressure/discomfort           [] Palpitations    []  Syncope     [] Denies all of the above    Past Medical History:   Diagnosis Date    Allergic     Anesthesia     tremors    Arthritis     CAD (coronary artery disease)     Cancer ([facility] [address]     colon    Chemotherapy adverse reaction 4/29/2019    [facility]     Chronic knee pain     Clostridium difficile infection 07/11/2016, 12/21/19    PCR+    Depression motion     Diverticulitis     Diverticulosis     History of alcohol abuse     Hyperlipidemia     Hypertension     Irregular heart beat     states been told he has had in past. Never treated. Sees PCP every 3 mos.  and EKG yearly confusion. He is oriented x2. .  Judgment is impaired at times. Speech is normal.  Language function could not be assessed. Pupils equal round reactive to light. Extraocular movements are intact. Face symmetrical.  Tongue midline. Strength is 4/5 all over. Plantar reflexes withdrawal.  Coordination could not be checked because of limited cooperation. No carotid bruit. No neck stiffness. DATA :  LABS:  General Labs:    CBC:   Lab Results   Component Value Date    WBC 7.6 01/07/2020    RBC 5.00 01/07/2020    RBC 3.74 07/05/2017    HGB 15.2 01/07/2020    HCT 47.1 01/07/2020    MCV 94.3 01/07/2020    MCH 30.5 01/07/2020    MCHC 32.3 01/07/2020    RDW 17.1 01/07/2020    PLT 92 01/07/2020    MPV 8.3 01/07/2020     BMP:    Lab Results   Component Value Date     01/07/2020    K 3.7 01/07/2020    K 3.6 12/09/2019     01/07/2020    CO2 32 01/07/2020    BUN 30 01/07/2020    LABALBU 2.4 01/07/2020    CREATININE 0.8 01/07/2020    CALCIUM 8.3 01/07/2020    GFRAA >60 01/07/2020    GFRAA >60 12/19/2012    LABGLOM >60 01/07/2020    GLUCOSE 177 01/07/2020    GLUCOSE 131 07/05/2017     RADIOLOGY REVIEW:  I have reviewed radiology image(s) and reports(s) of: CT head from December 2019 as well as current MRI brain    IMPRESSION :  Metabolic encephalopathy however EEG is normal.  MRI brain images were reviewed and showed a punctate left temporal acute infarction, this may be cardioembolic from low EF  Liver enzymes had been elevated.   CT head from a couple of weeks ago was reviewed and did not show any acute abnormalities  TSH and ammonia levels normal  Significant cardiomyopathy and poor ejection fraction,  Depression  Patient Active Problem List   Diagnosis    S/P total knee replacement using cement    Mixed hyperlipidemia    Essential hypertension    Depression    Umbilical hernia    Lower abdominal pain    Rectal mass    Hepatitis    Localized edema    SOB (shortness of breath)    Acute systolic using dragon dictation software. Although every effort was made to ensure the accuracy of this automated transcription, some errors in transcription may have occurred.          Rosalee Wray M.D.

## 2020-01-07 NOTE — PROGRESS NOTES
Verbal order from  for Midodrine. When placing the order, contraindication came up for corn products. Message sent to  and message received to go ahead and order Midodrine.

## 2020-01-07 NOTE — LETTER
[address] [facility] [phone]  [address] [address] [phone]    Pacemaker/Defibrillator Clinic          01/08/20        [address] [facility] [id number]        Dear Marti Acosta    This letter is to inform you that we received the transmission from your monitor at home that checks your implanted heart device on 12-18-19. The next date your monitor will automatically transmit will be 4-8-20. If your report needs attention we will notify you. Your device and monitor are wireless and most transmit cellularly, but please periodically check your monitor is still plugged in to the electrical outlet. If you still use the telephone land line to send please ensure the connection to the phone thuy is secure. This will help to ensure successful automatic transmissions in the future. Also, the monitor needs to be close to you while sleeping at night. Please be aware that the remote device transmission sites are periodically monitored only during regular business hours during which simultaneous in-office device clinics are being run. If your transmission requires attention, we will contact you as soon as possible. Thank you.             [address]

## 2020-01-08 NOTE — PROGRESS NOTES
reassessment complete, vitals stable. Pt repositioned to left side. Reports comfortable @ this time. Dinner tray removed since untouched, offered pt snack before bed and he declines,  denies needs. Call light in reach. Safety precautions in place.

## 2020-01-08 NOTE — PROGRESS NOTES
Physical Therapy  Facility/Department: [facility] ICU  Daily Treatment Note  NAME: Cuauhtemoc Sams  : 1970  MRN: [id number]    Date of Service: 2020    Discharge Recommendations:Дмитрий Martin scored a  on the AM-PAC short mobility form. Current research shows that an AM-PAC score of 17 or less is typically not associated with a discharge to the patient's home setting. Based on the patients AM-PAC score and their current functional mobility deficits, it is recommended that the patient have 3-5 sessions per week of Physical Therapy at d/c to increase the patients independence. PT Equipment Recommendations  Equipment Needed: No    Assessment   Body structures, Functions, Activity limitations: Decreased functional mobility ; Decreased balance;Decreased endurance;Decreased safe awareness  Assessment: Pt with significantly limited mobility compared to previous therapy session. PT/OT electing to cotx this date due to prolonged bedrest since last therapy session. This date, secondary to cognition and weakness, pt with significantly more difficulty completing mobility tasks, requriing assitanace of 2 therapists for mobility. Treatment Diagnosis: impaired gait, transfers, and balance  Prognosis: Fair  Clinical Presentation: evolving  Patient Education: PT POC and eval, d/c recommendations, transfer training, gait training, general safety  Barriers to Learning: cognition  REQUIRES PT FOLLOW UP: Yes  Activity Tolerance  Activity Tolerance: Patient limited by cognitive status; Patient limited by fatigue;Patient limited by endurance  Activity Tolerance: Pt with confusion throughout session, requiring increased time for redirection to tasks. Patient Diagnosis(es): The primary encounter diagnosis was ALEN (acute kidney injury) ([facility] [address] Diagnoses of Acute liver failure without hepatic coma and Elevated INR were also pertinent to this visit.      has a past medical history of Allergic, Anesthesia, Arthritis, CAD Dynamic: Fair;-  Standing - Static: Poor  Standing - Dynamic: Poor                           G-Code     OutComes Score                                                     AM-PAC Score  AM-PAC Inpatient Mobility Raw Score : 8 (01/08/20 1331)  AM-PAC Inpatient T-Scale Score : 28.52 (01/08/20 1331)  Mobility Inpatient CMS 0-100% Score: 86.62 (01/08/20 1331)  Mobility Inpatient CMS G-Code Modifier : CM (01/08/20 1331)          Goals  Short term goals  Time Frame for Short term goals: upon d/c  Short term goal 1: Pt will perform bed mobility with Min Ax1  Short term goal 2: Pt will perform transfers with LRAD and SBA  Short term goal 3: Pt will ambulate 48' with LRAD and CGA  Patient Goals   Patient goals : pt did not state   **no goals met this date    Plan    Plan  Times per week: 3-5x  Times per day: Daily  Current Treatment Recommendations: Strengthening, Transfer Training, Endurance Training, Balance Training, Gait Training, Stair training, Safety Education & Training, Modalities, Equipment Evaluation, Education, & procurement, Patient/Caregiver Education & Training, Neuromuscular Re-education  Safety Devices  Type of devices:  All fall risk precautions in place, Patient at risk for falls, Bed alarm in place, Call light within reach, Gait belt, Nurse notified, Left in bed  Restraints  Initially in place: No     Therapy Time   Individual Concurrent Group Co-treatment   Time In       1146   Time Out       1227   Minutes       41   Timed Code Treatment Minutes: [address] [facility], PT   Pal Alvarado, DPT, [id number]

## 2020-01-08 NOTE — PROGRESS NOTES
Assessment complete, vitals obtained. Pt c/o catheter causing occasional discomfort. Otherwise no complaints. On room air, lungs clear /diminished. Ab soft + bowel sounds. Ostomy noted. Skin warm and dry. Pulses palpable. BUE + BLE edema. Hurt draining. PM meds given, see MAR. Pt declines repositioning @ this time. Not interested in eating dinner. Denies needs. Call light in reach.  Safety precautions in place

## 2020-01-08 NOTE — PROGRESS NOTES
Occupational Therapy  Facility/Department: [facility] ICU  Daily Treatment Note  NAME: Aston Hoffman  : 1970  MRN: [id number]    Date of Service: 2020    Discharge Recommendations: Aston Hoffman scored a  on the AM-PAC ADL Inpatient form. Current research shows that an AM-PAC score of 17 or less is typically not associated with a discharge to the patient's home setting. Based on the patients AM-PAC score and their current ADL deficits, it is recommended that the patient have 3-5 sessions per week of Occupational Therapy at d/c to increase the patients independence. OT Equipment Recommendations  Equipment Needed: No  Other: CTA    Assessment   Performance deficits / Impairments: Decreased functional mobility ; Decreased endurance;Decreased strength;Decreased ADL status; Decreased safe awareness;Decreased balance;Decreased cognition  Assessment: Patient functioning below baseline due to the above mentioned deficits. Patient would benefit from ongoing OT in order to increase functional status. Prognosis: Fair  OT Education: OT Quotations Book Strategies  Patient Education: safety, role of OT, POC, DC planning, transfer training  Barriers to Learning: no evidence of learning; cognition   REQUIRES OT FOLLOW UP: Yes  Activity Tolerance  Activity Tolerance: Treatment limited secondary to decreased cognition  Safety Devices  Safety Devices in place: Yes  Type of devices: All fall risk precautions in place;Nurse notified; Patient at risk for falls;Call light within reach; Chair alarm in place;Gait belt;Left in chair         Patient Diagnosis(es): The primary encounter diagnosis was ALEN (acute kidney injury) ([address] Diagnoses of Acute liver failure without hepatic coma and Elevated INR were also pertinent to this visit.       has a past medical history of Allergic, Anesthesia, Arthritis, CAD (coronary artery disease), Cancer ([address] Chemotherapy adverse reaction, CHF (congestive heart failure) ([address] Chronic assistance;Decreased awareness of need for safety  Problem Solving: Assistance required to implement solutions;Assistance required to identify errors made;Assistance required to generate solutions;Assistance required to correct errors made  Insights: Decreased awareness of deficits  Initiation: Requires cues for all  Sequencing: Requires cues for all  Cognition Comment: Patient perseverating on tangential conversation, movement in bed     Perception  Overall Perceptual Status: Impaired  Perseveration: Perseverates during conversation               Plan   Plan  Times per week: 3-5x  Times per day: Daily  Current Treatment Recommendations: Functional Mobility Training, Strengthening, Endurance Training, Balance Training, Safety Education & Training, Self-Care / ADL, Equipment Evaluation, Education, & procurement, Patient/Caregiver Education & Training  G-Code     OutComes Score                                                  AM-PAC Score        AM-[facility] Inpatient Daily Activity Raw Score: 11 (01/08/20 1335)  AM-PAC Inpatient ADL T-Scale Score : 29.04 (01/08/20 1335)  ADL Inpatient CMS 0-100% Score: 70.42 (01/08/20 1335)  ADL Inpatient CMS G-Code Modifier : CL (01/08/20 1335)    Goals  Short term goals  Time Frame for Short term goals: by discharge   Short term goal 1: Complete LB ADLs with min assistance - not addressed 1/8  Short term goal 2: Complete functional transfers with supervision - dependent 1/8  Short term goal 3: Complete UB ADLs with set-up assistance - not addressed 1/8  Short term goal 4: Complete functional mobility in bathroom for ADL participation with supervision - not addressed 1/8       Therapy Time   Individual Concurrent Group Co-treatment   Time In 1146         Time Out 1227         Minutes 41            Timed Code Treatment Minutes:  26 Minutes    Total Treatment Minutes:  41 minutes    Carrillo Kerr OTR/L [id number]    David Dudley OT

## 2020-01-08 NOTE — PROGRESS NOTES
likely noncompliance a bigger issue  -Neurology following  -EEG report within normal limits   -Appreciate neurology recommendation     Depression  -Appreciate psych consult. -  Ischemic/congestive hepatitis: resolving   -LFTs improving; continue to trend  -Imaging negative  -GI eval; previous history of congestive hepatitis no further work-up        Acute renal failure: Resolving / contraction alkalosis  Resolved     Coagulopathy: Improving      Positive C. Difficile  -Continue Dificid for total 10-day course (third prescription       Hyponatremia: resolved  -Status post tolvaptan x 1    Diet family meeting with Dr. Camilla Snowden.     CODE STATUS changed yesterday to DNR CCA  Diet: DIET LOW SODIUM 2 GM;  Dietary Nutrition Supplements: Standard High Calorie Oral Supplement  Code:DNR-CCA  DVT PPX mechanical scds  Disposition -l patient stable for discharge from my impending cardiac clearance and disposal working with PT OT hopefully home versus skilled      Marjorie Mike MD   1/8/2020 11:59 AM

## 2020-01-08 NOTE — PROGRESS NOTES
gain/loss  ~establish with CHF nurse  ~outpatient follow-up with our CHF team     Patient say that he wants to live and seems more willing/able to take his meds. Discussed possibility with patient and his brother at bedside to possibly go to [facility] rehab to given him a chance to improve his strength and continue to get his meds offered to him. Will ask social work to talk to wife about options. Due to the high probability of clinically significant life threating deterioration of the patient's condition that required my urgent intervention, a total critical care time 35 minutes was used. This time excludes any time that may have been spent performing procedures. This includes but not limited to vital sign monitoring, telemetry monitoring, continuous pulse oximety, IV medication, clinical response to the IV medications, documentation time , consultation time, interpretation of lab data, review of nursing notes and old record review.      NYHA Class: 4    Apoorva Reed MD, 1/8/2020 1:47 PM

## 2020-01-09 NOTE — PROGRESS NOTES
Assessment complete, vitals obtained. Pt resting in bed w/ visitor @ bedside. Pt ate 50% of dinner. Denies pain. Alert, oriented to self, place, month and year. Pt on room air, lungs clear. Ab soft, + bowel sounds. Skin warm and dry. Pulses palpable. Hurt draining. Pt denies acute needs. Call light in reach.  Safety precautions in place

## 2020-01-09 NOTE — DISCHARGE SUMMARY
[facility] DISCHARGE SUMMARY    Patient Demographics    Patient. Cuauhtemoc Sams  Date of Birth. 1970  MRN. [id number]     Primary care provider. Colten Tomas MD  (Tel: [phone])    Admit date: 12/29/2019    Discharge date (blank if same as Note Date): Note Date: 1/9/2020     Reason for Hospitalization. Chief Complaint   Patient presents with    Shortness of Breath     From home via [facility], SOB and hx of CHF, pedal edema, placed on NC due to RA of 75-80-%. Price Morales is a 51 yo male with past medical history of HFrEF status post ICD, nonischemic cardiomyopathy, hypertension, dyslipidemia, rectal cancer status post chemo and radiation therapy presented with progressively worsening shortness of breath.  Patient currently being treated for acute decompensated HFrEF, complicated by ischemic hepatitis and metabolic encephalopathy.  Multiple improvement of LFTs, volume status after aggressive diuresis.  Neurology also following for continued confusion status post EEG. MRI brain showing punctate acute lacunar infarcts in the left posterior hippocampal gyrus and left temporal lobe.  [facility] Course. Acute hypoxic respiratory failure  -Secondary to CHF resolved back to baseline on room air    Acute decompensated CHF  -With low EF  -Treated with IV diuresis transition to p.o. diuretics  -See med rec  -Noncompliant a bigger issue for the patient. Acute lacunar infarct  -As noted above  -Was evaluated by cardiology and neurology  -Continue medication as stated    Depression  -Continue to work on acute medical issues if resolved and still continues to do present will need to see outpatient psychiatry    C. difficile colitis  -Treated with deficid      Consults.   IP CONSULT TO INTERNAL MEDICINE  IP CONSULT TO CARDIOLOGY  IP CONSULT TO NEPHROLOGY  IP CONSULT TO GI  IP CONSULT TO INFECTIOUS DISEASES  IP CONSULT TO NEUROLOGY  IP CONSULT TO PALLIATIVE CARE  IP CONSULT TO PSYCHIATRY    Physical examination on discharge day. BP (!) 74/45   Pulse 98   Temp 97.6 °F (36.4 °C) (Temporal)   Resp 20   Ht 5' 10\" (1.778 m)   Wt 185 lb 6.5 oz (84.1 kg)   SpO2 96%   BMI 26.60 kg/m²   General appearance. Alert. Looks comfortable. HEENT. Sclera clear. Moist mucus membranes. Cardiovascular. Regular rate and rhythm, normal S1, S2. No murmur. Respiratory. Not using accessory muscles. Clear to auscultation bilaterally, no wheeze. Gastrointestinal. Abdomen soft, non-tender, not distended, normal bowel sounds  Neurology. Facial symmetry. No speech deficits. Moving all extremities equally. Extremities. No edema in lower extremities. Skin. Warm, dry, normal turgor    Condition at time of discharge stable     Medication instructions provided to patient at discharge.      Medication List      START taking these medications    amiodarone 200 MG tablet  Commonly known as:  CORDARONE  Take 1 tablet by mouth daily     midodrine 5 MG tablet  Commonly known as:  PROAMATINE  Take 1 tablet by mouth 3 times daily (with meals)     sacubitril-valsartan 24-26 MG per tablet  Commonly known as:  ENTRESTO  Take 0.5 tablets by mouth 2 times daily        CHANGE how you take these medications    carvedilol 3.125 MG tablet  Commonly known as:  COREG  Take 1 tablet by mouth 2 times daily (with meals)  What changed:    · medication strength  · how much to take     metroNIDAZOLE 500 MG tablet  Commonly known as:  FLAGYL  Take 1 tablet by mouth 3 times daily for 4 days  What changed:  when to take this        CONTINUE taking these medications    BUPRENORPHINE PATCH REMOVAL     digoxin 125 MCG tablet  Commonly known as:  LANOXIN  Take 1 tablet by mouth daily     DULoxetine 30 MG extended release capsule  Commonly known as:  CYMBALTA  Take 1 capsule by mouth daily     eplerenone 25 MG tablet  Commonly known as: INSPRA  Take 1 tablet by mouth daily     furosemide 40 MG tablet  Commonly known as:  LASIX  Take 1 tablet by mouth 2 times daily     HYDROcodone-acetaminophen 5-325 MG per tablet  Commonly known as:  NORCO     lisinopril 5 MG tablet  Commonly known as:  PRINIVIL;ZESTRIL  Take 1 tablet by mouth daily     lovastatin 40 MG tablet  Commonly known as:  MEVACOR     melatonin 3 MG Tabs tablet     pregabalin 75 MG capsule  Commonly known as:  LYRICA  1 tablet AM and 2 tablets PM     simethicone 80 MG chewable tablet  Commonly known as:  MYLICON     ZANTAC 869 MG tablet  Generic drug:  ranitidine        STOP taking these medications    dicyclomine 10 MG capsule  Commonly known as:  BENTYL     ZYRTEC PO           Where to Get Your Medications      You can get these medications from any pharmacy    Bring a paper prescription for each of these medications  · amiodarone 200 MG tablet  · carvedilol 3.125 MG tablet  · metroNIDAZOLE 500 MG tablet  · midodrine 5 MG tablet  · sacubitril-valsartan 24-26 MG per tablet         Discharge recommendations given to patient. Follow Up. pcp in 1 week   Disposition. home  Activity. activity as tolerated  Diet: DIET LOW SODIUM 2 GM;  Dietary Nutrition Supplements: Standard High Calorie Oral Supplement      Spent 45  minutes in discharge process.     Signed:  Jana Garcia MD     1/9/2020 1:15 PM

## 2020-01-09 NOTE — PROGRESS NOTES
[address]   Progress Note  CHF/Pulmonary Hypertension Cardiology    Chief complaint: We are following this patient for acute on chronic systolic HF with decompensation    HPI:  Giuseppe Doll is a 51 yo male well known to me from the office with a history of chronic systolic HF and rectal cancer. Lakisha Loomis presented to the ED with severe subacute onset of gradually progressive increasing shortness of breath. In the ED, he had significant tachypneic and unable to hold full conversation in spite of being on supplemental oxygen. He was also confused.   Lakisha Loomis has had fatigue, malaise and generalized weakness.  No fever, chills.  Denies chest pain, palpitations.  Denies flank pain or hematuria.  He denies dysuria, urgency, or frequency. On the previous admission, there was a question of choledocholithiasis.  He has elevated bilirubin and liver function tests. Lakisha Loomis had normal enzymes 11/8/19. Lakisha Loomis is receiving pain meds. Lakisha Loomis has a history of chronic narcotic use from all of his surgeries.     He has a history of sCHF, NICM, hx HTN, HLD and rectal cancer s/p chemo and XRT. [facility] 9/15/16 with no coronary artery disease, EF 15%, LVEDP 20-25 mmHg.  He had rectal surgery in January 2017 and has completed chemo and radiation. He had problems with pelvic abscess and had a drain placed.    He underwent ICD implant for primary prevention on 10/26/17. Lakisha Loomis also underwent surgery for repair of ventral hernia and new colostomy on 12/1/17. He has had abscesses post-op that have been drained. Andrea Golden was admitted for chest pain and SOB on 5-31-19 thru 6-7-19 and was found to be in acute CHF.  ICD did not show VT while in the hospital. Lakisha Loomis underwent colorectal anastomosis dilation 5/14/2019. He recently underwent colostomy placement recently due to problems with chronic abdominal pain.      He was just transferred up to the floor from the ED. He is diuresing and feeling better.   The patient tells me that he remembers feeling good, then not feeling good and then doesn't remember anything. During the last admission, there was an adjustment of narcotics due to over-sedation. It is unclear what meds he was taking on discharge. He is alert today but very drowsy. He says he was taking his diuretic. His LFTs are severely elevated. There is concern for tylenol toxicity.       ROS: Дмитрий's breathing is better. Confusion improving, but reaction time to questions still very slow. He was seen by psychiatry who feels that he has delirium that is improving. He is now taking his meds. Drinking more fluids.   Planning for ECF    Medications/Labs all Reviewed    Lab Results   Component Value Date    WBC 7.3 01/09/2020    HGB 14.4 01/09/2020    HCT 43.5 01/09/2020    MCV 91.7 01/09/2020    PLT 80 (L) 01/09/2020     Lab Results   Component Value Date    CREATININE 0.8 (L) 01/09/2020    BUN 22 (H) 01/09/2020     01/09/2020    K 3.7 01/09/2020     01/09/2020    CO2 31 01/09/2020     Lab Results   Component Value Date    INR 1.33 (H) 01/09/2020    PROTIME 15.5 (H) 01/09/2020        Physical Examination:    BP 98/74   Pulse 98   Temp 97.6 °F (36.4 °C) (Temporal)   Resp 20   Ht 5' 10\" (1.778 m)   Wt 185 lb 6.5 oz (84.1 kg)   SpO2 96%   BMI 26.60 kg/m²      Chronically ill appearing, slow in speech, more alert  HEENT:  NC/AT  Respiratory:  · Resp Assessment: Normal respiratory effort  · Resp Auscultation: Clear to auscultation bilaterally   Cardiovascular:  · Auscultation: regular rate and rhythm, normal S1S2, no murmur, rub or gallop  · Palpation:  Nl PMI  · JVP:  normal  · Extremities: No Edema  Abdomen:  · Soft, non-tender  · Normal bowel sounds  Extremities:  ·  No Cyanosis or Clubbing  Neurological/Psychiatric:  · Oriented to time, place, and person  · Non-anxious  Skin Warm and dry    Lab Results   Component Value Date     01/09/2020     01/08/2020     01/07/2020    K 3.7 01/09/2020    K 4.2 01/08/2020    K 3.7

## 2020-01-17 NOTE — TELEPHONE ENCOUNTER
Pt called needing to know how much exercising he can do with his ostomy. He is currently in rehab from his last hospitalization. Dr. Edin Ch, are there any restrictions for him during PT/OT?

## 2020-01-29 PROBLEM — N39.0 URINARY TRACT INFECTION: Status: RESOLVED | Noted: 2019-01-01 | Resolved: 2020-01-01

## 2020-02-24 PROBLEM — Z45.02 AICD DISCHARGE: Status: ACTIVE | Noted: 2020-01-01

## 2020-02-24 NOTE — ED NOTES
Bed: 13  Expected date:   Expected time:   Means of arrival:   Comments:  RN has [address], RN  02/24/20 9895

## 2020-02-24 NOTE — ED NOTES
[employer] device placed on AICD, download complete and sent. Troponin collected and sent to lab as ordered.       Cornelio Waldrop RN  02/24/20 5511

## 2020-02-24 NOTE — ED PROVIDER NOTES
CARDIAC CATHETERIZATION  09/14/2016    No stents placed   [address]  10/2017    COLECTOMY      COLONOSCOPY  08/29/2016    with biopsy and polypectomy    COLONOSCOPY  03/19/2018    COLONOSCOPY N/A 5/14/2019    COLONOSCOPY WITH DILATION performed by Petra Gastelum MD at [facility] (LOWER)  09/14/2016    for staging - Dr. Hill Case, COLON, DIAGNOSTIC     [address] HERNIA REPAIR  12/01/2017    peristomal hernia repair    HERNIA REPAIR  04/18/2018    S/P: colostomy closure with hernia repair with mesh, with Dr. Sandra Tanner at [facility].  INNER EAR SURGERY  tube right ear    JOINT REPLACEMENT      left TKR    KNEE ARTHROSCOPY Right [id number]    KNEE ARTHROSCOPY Right 8/4/2014    medial meniscus    KNEE SURGERY  08/24/2011    removal of tibial component    OTHER SURGICAL HISTORY  7/1/13    ACL Rt knee meniscus repair synovectomy    OTHER SURGICAL HISTORY  12/01/2017    takedown of ileostomy    PACEMAKER PLACEMENT      icd 10/2017    REVISION COLOSTOMY  04/18/2018    S/P: colostomy closure with hernia repair with mesh, with Dr. Sandra Tanner at [facility].     SHOULDER ARTHROSCOPY Right 9/22/15    SUBACROMIAL DECOMPRESSION, DISTAL CLAVICLE EXCISION, LABRAL DEBRIDEMETN    SIGMOIDOSCOPY N/A 11/23/2018    SIGMOIDOSCOPY BIOPSY FLEXIBLE performed by Petra Gastelum MD at [facility]  4 surgeries    SMALL INTESTINE SURGERY  01/11/2017    LAPAROSCOPIC LOW ANTERIOR RESECTION, ILEOSTOMY    SMALL INTESTINE SURGERY  12/01/2017    small bowel resection    SMALL INTESTINE SURGERY N/A 8/21/2019    OPEN COLOSTOMY AND PARASTOMAL HERNIA REPAIR WITH MESH performed by Aurora Tai MD at [address] TUNNELED VENOUS PORT PLACEMENT Left 02/13/2017    PORT-A-CATH INSERTION                     TUNNELED VENOUS PORT PLACEMENT      UMBILICAL HERNIA REPAIR  11/11/14    UPPER GASTROINTESTINAL ENDOSCOPY  10/03/2017         CURRENTMEDICATIONS       Previous Medications    AMIODARONE (CORDARONE) 200 MG TABLET    Take 1 tablet by mouth daily    BUPRENORPHINE PATCH REMOVAL    1 patch Pt replaces patch weekly on     CARVEDILOL (COREG) 3.125 MG TABLET    Take 1 tablet by mouth 2 times daily (with meals)    DIGOXIN (LANOXIN) 125 MCG TABLET    Take 1 tablet by mouth daily    DULOXETINE (CYMBALTA) 30 MG EXTENDED RELEASE CAPSULE    Take 1 capsule by mouth daily    EPLERENONE (INSPRA) 25 MG TABLET    Take 1 tablet by mouth daily    FUROSEMIDE (LASIX) 40 MG TABLET    Take 1 tablet by mouth 2 times daily    HYDROCODONE-ACETAMINOPHEN (NORCO) 5-325 MG PER TABLET    Take 1-2 tablets by mouth every 4 hours as needed for Pain. LISINOPRIL (PRINIVIL;ZESTRIL) 5 MG TABLET    Take 1 tablet by mouth daily    LOVASTATIN (MEVACOR) 40 MG TABLET    Take 40 mg by mouth. Take 1 tablet (40 mg total) by mouth at bedtime. MELATONIN 3 MG TABS TABLET    Take 10 mg by mouth nightly as needed     MIDODRINE (PROAMATINE) 5 MG TABLET    Take 1 tablet by mouth 3 times daily (with meals)    PREGABALIN (LYRICA) 75 MG CAPSULE    1 tablet AM and 2 tablets PM    RANITIDINE (ZANTAC) 150 MG TABLET    Take 150 mg by mouth daily    SACUBITRIL-VALSARTAN (ENTRESTO) 24-26 MG PER TABLET    Take 0.5 tablets by mouth 2 times daily    SIMETHICONE (MYLICON) 80 MG CHEWABLE TABLET    Take 80 mg by mouth every 6 hours as needed for Flatulence         ALLERGIES     Vancomycin; Bactrim [sulfamethoxazole-trimethoprim];  Corn-containing products; and Seasonal    FAMILYHISTORY       Family History   Problem Relation Age of Onset    Cancer Mother     High Blood Pressure Father     Cancer Father           SOCIAL HISTORY       Social History     Tobacco Use    Smoking status: Former Smoker     Packs/day: 1.00     Years: 5.00     Pack years: 5.00     Last attempt to quit: 2007     Years since quittin.1    Smokeless tobacco: Former User     Types:

## 2020-02-25 NOTE — PROGRESS NOTES
Assessment complete, vitals stable. Medication given, see MAR. Plan of care discussed with pt, verbalizes understanding, no questions or concerns. States he is a hospice pt but cannot remember the number for his hospice nurse, [facility]. Cardiology consult ordered, NPO status at midnght. No other needs a this time, callbell in reach.   Agapito Clemente RN

## 2020-02-25 NOTE — H&P
History and Physical  Dr. Sekou Burch  2/24/2020    PCP: Katherine Lundy MD    Cc:   Chief Complaint   Patient presents with    Chest Pain     PT arrived via EMS with c/o pain in chest.  PT has ICD. glucose 147 en route    Headache     PT c/o headache/squeezeing to his head       HPI:  Rina Reagan is a 52 y.o. male who has a past medical history of Allergic, Anesthesia, Arthritis, CAD (coronary artery disease), Cancer ([facility] Chemotherapy adverse reaction, CHF (congestive heart failure) ([facility] Chronic knee pain, Clostridium difficile infection, Depression motion, Diverticulitis, Diverticulosis, History of alcohol abuse, Hyperlipidemia, Hypertension, Irregular heart beat, Pneumonia, Right knee pain, Shoulder injury, and Sleep difficulties. Patient presents with AICD discharge. HPI of presenting complaint in blockformat: (1-3 for expanded problem focused, ?4 for detailed/comprehensive)   Location: headache and chest pain  Duration: since noon today  Timing: acute onset, when icd fired  Context: 52 y.o. male presents the emergency department with difficulties as it pertains to chest pain, fatigue, malaise, weakness, and generalized headache pain. Patient states that he has been experiencing the symptoms since the lunchtime hour. He states the pain and discomfort in his chest feels nothing like what he is ever had before. He states that the severity is dramatically worse than anything before despite having had this multiple times. Patient states that it was noted in the middle portion of his chest.  He states he was having associated symptoms of palpitations. He did not state to me that his AICD had fired but he did tell this to the attending physician stating that the episode in question was around the lunchtime hour. He states he is also having significant headache related pain. It is global headache. He feels it in the front as well as the back.   He reports that it was insidious in onset and is not the Negative for pallor. Allergic/Immunologic: Negative for food allergies. Neurological: Positive for weakness and headaches. Hematological: Bruises/bleeds easily. Psychiatric/Behavioral: Negative for dysphoric mood. The patient is not nervous/anxious. Past Medical History:   Past Medical History:   Diagnosis Date    Allergic     Anesthesia     tremors    Arthritis     CAD (coronary artery disease)     Cancer ([address]     colon    Chemotherapy adverse reaction 4/29/2019    CHF (congestive heart failure) (HCC)     Chronic knee pain     Clostridium difficile infection 07/11/2016, 12/21/19    PCR+    Depression motion     Diverticulitis     Diverticulosis     History of alcohol abuse     Hyperlipidemia     Hypertension     Irregular heart beat     states been told he has had in past. Never treated. Sees PCP every 3 mos. and EKG yearly    Pneumonia     Right knee pain     Shoulder injury     and arthrisits, injury rotaotr cuff    Sleep difficulties     with depression       Past Surgical History:   Past Surgical History:   Procedure Laterality Date    ABDOMEN SURGERY      ABSCESS DRAINAGE      BREAST SURGERY Right 1/21/2019    RIGHT BREAST EXCISIONAL BIOPSY performed by Krista Nguyen MD at [facility]  09/14/2016    No stents placed   [facility]  10/2017    COLECTOMY      COLONOSCOPY  08/29/2016    with biopsy and polypectomy    COLONOSCOPY  03/19/2018    COLONOSCOPY N/A 5/14/2019    COLONOSCOPY WITH DILATION performed by Anahy Ivey MD at [address] [place] [address] ([facility])  09/14/2016    for staging - Dr. Judith Montiel, COLON, DIAGNOSTIC     [address]  12/01/2017    peristomal hernia repair    HERNIA REPAIR  04/18/2018    S/P: colostomy closure with hernia repair with mesh, with Dr. West Villarreal at [facility].     INNER EAR SURGERY  tube right ear    JOINT REPLACEMENT      left TKR    KNEE ARTHROSCOPY Right [id number]    KNEE ARTHROSCOPY Right 8/4/2014    medial meniscus    KNEE SURGERY  08/24/2011    removal of tibial component    OTHER SURGICAL HISTORY  7/1/13    ACL Rt knee meniscus repair synovectomy    OTHER SURGICAL HISTORY  12/01/2017    takedown of ileostomy    PACEMAKER PLACEMENT      icd 10/2017    REVISION COLOSTOMY  04/18/2018    S/P: colostomy closure with hernia repair with mesh, with Dr. Trent Geiger at [facility].     SHOULDER ARTHROSCOPY Right 9/22/15    SUBACROMIAL DECOMPRESSION, DISTAL CLAVICLE EXCISION, LABRAL DEBRIDEMETN    SIGMOIDOSCOPY N/A 11/23/2018    SIGMOIDOSCOPY BIOPSY FLEXIBLE performed by Jj Nair MD at [address]  4 surgeries    SMALL INTESTINE SURGERY  01/11/2017    LAPAROSCOPIC LOW ANTERIOR RESECTION, ILEOSTOMY    SMALL INTESTINE SURGERY  12/01/2017    small bowel resection    SMALL INTESTINE SURGERY N/A 8/21/2019    OPEN COLOSTOMY AND PARASTOMAL HERNIA REPAIR WITH MESH performed by Joana Pena MD at [facility] TUNNELED VENOUS PORT PLACEMENT Left 02/13/2017    PORT-A-CATH INSERTION                     TUNNELED VENOUS PORT PLACEMENT      UMBILICAL HERNIA REPAIR  11/11/14    UPPER GASTROINTESTINAL ENDOSCOPY  10/03/2017       Social History:   Social History     Tobacco History     Smoking Status  Former Smoker Quit date  1/1/2007 Smoking Frequency  1 pack/day for 5 years (5 pk yrs)    Smokeless Tobacco Use  Former User Quit date  8/10/2017 Smokeless Tobacco Type  Chew          Alcohol History     Alcohol Use Status  No Comment  history of alcohol abuse 20 years ago          Drug Use     Drug Use Status  No          Sexual Activity     Sexually Active  Never                Fam History:   Family History   Problem Relation Age of Onset    Cancer Mother     High Blood Pressure Father     Cancer Father        PFSH: The above PMHx, PSHx, SocHx, FamHx has been reviewed by myself. (1 area for detailed, 2-3 for comprehensive)      Code Status: Prior    Meds - following list of home medications fromelectronic chart has been reviewed by myself  Prior to Admission medications    Medication Sig Start Date End Date Taking? Authorizing Provider   carvedilol (COREG) 3.125 MG tablet Take 1 tablet by mouth 2 times daily (with meals) 1/8/20   Lanie Gallagher MD   amiodarone (CORDARONE) 200 MG tablet Take 1 tablet by mouth daily 1/8/20   Lanie Gallagher MD   midodrine (PROAMATINE) 5 MG tablet Take 1 tablet by mouth 3 times daily (with meals) 1/8/20   Lanie Gallagher MD   sacubitril-valsartan (ENTRESTO) 24-26 MG per tablet Take 0.5 tablets by mouth 2 times daily 1/8/20   Lanie Gallagher MD   pregabalin (LYRICA) 75 MG capsule 1 tablet AM and 2 tablets PM 12/24/19 1/30/20  Erich Lesch, MD   DULoxetine (CYMBALTA) 30 MG extended release capsule Take 1 capsule by mouth daily 12/24/19   Erich Lesch, MD   furosemide (LASIX) 40 MG tablet Take 1 tablet by mouth 2 times daily 12/23/19   Nubia Watts MD   [facility] REMOVAL 1 patch Pt replaces patch weekly on Fridays    Historical Provider, MD   HYDROcodone-acetaminophen (NORCO) 5-325 MG per tablet Take 1-2 tablets by mouth every 4 hours as needed for Pain. Historical Provider, MD   eplerenone (INSPRA) 25 MG tablet Take 1 tablet by mouth daily 7/9/19   Ilene Jain MD   lisinopril (PRINIVIL;ZESTRIL) 5 MG tablet Take 1 tablet by mouth daily 6/7/19   Ilene Jain MD   digoxin Mammie Earing) 125 MCG tablet Take 1 tablet by mouth daily 6/7/19   Ilene Jain MD   simethicone (MYLICON) 80 MG chewable tablet Take 80 mg by mouth every 6 hours as needed for Flatulence    Historical Provider, MD   ranitidine (ZANTAC) 150 MG tablet Take 150 mg by mouth daily    Historical Provider, MD   melatonin 3 MG TABS tablet Take 10 mg by mouth nightly as needed     Historical Provider, MD   lovastatin (MEVACOR) 40 MG tablet Take 40 mg by mouth.  Take 1 tablet (40 mg total) by mouth at bedtime. 8/19/13   Historical Provider, MD         Allergies   Allergen Reactions    Vancomycin Hives and Swelling     Throat swells    Bactrim [Sulfamethoxazole-Trimethoprim] Other (See Comments)     Thrush    Corn-Containing Products      Patient tells me his throat swells when he eats a large amount of corn or corn products. He can tolerate products with corn oil and corn syrup. Pt. Reports gets \"congestion\"    Seasonal              EXAM: (2-7 system for EPF/Detailed, ?8 for Comprehensive)  BP 98/76   Pulse 106   Temp 98.6 °F (37 °C) (Infrared)   Resp 11   SpO2 93%   Constitutional: vitals as above: alert and appears stated age  Psychiatric: normal insight and judgment, oriented to person, place, time, and general circumstances  Head: Normocephalic, without obvious abnormality, atraumatic  Eyes:lids and lashes normal, conjunctivae and sclerae normal and pupils equal, round, reactive to light and accomodation  EMNT: external ears normal, lips normal  Neck: no adenopathy, supple, symmetrical, trachea midline and thyroid not enlarged, symmetric, no tenderness/mass/nodules   Respiratory: rales bilat  Cardiovascular: regular rhythm, normal S1 and S2, no gallops, intact distal pulses, no carotid bruits and abnormal rate- tahycardic  Gastrointestinal: soft, mildly distended, nontender. No rebound  Extremities: 1+ edema bilat  Skin:No rashes or nodules noted.   Neurologic:    LABS:  Labs Reviewed   CBC WITH AUTO DIFFERENTIAL - Abnormal; Notable for the following components:       Result Value    RBC 4.16 (*)     Hemoglobin 13.2 (*)     Hematocrit 40.1 (*)     RDW 20.6 (*)     Lymphocytes Absolute 0.6 (*)     All other components within normal limits    Narrative:     Performed at:  [facility] [address], [place], [address]   Phone [phone]   COMPREHENSIVE METABOLIC PANEL W/ REFLEX TO MG FOR LOW K - Abnormal; Notable for the following components: or ordered. (1) Medical test (Echo, EKG, PFT/jane) were ordered. (1)History was not obtained from someone other than patient  (1) Old records  were reviewed - see HPI/MDM for pertinent details if review done. (2) Case wasdiscussed with another health care provider: Dr Maury Vuong  (2) Imaging was viewed by myself. (2) EKG  was viewed by myself. The patient isbeing placed in inpatient status with the expectation of requiring a hospital stay spanning at least two midnights for care and treatment of the problems noted in the problem list.  This determination is also based on thepatients comorbidities and past medical history, the severity and timing of the signs and symptoms upon presentation.         Electronically signed by: Denice Torres 2/24/2020

## 2020-02-25 NOTE — PLAN OF CARE
Problem: Pain:  Goal: Pain level will decrease  Description  Pain level will decrease  2/25/2020 0814 by Daisy Valencia RN  Outcome: Ongoing  Note:   Patient with c/o headache and chest pain 8/10. PRN Norco administered per orders - see MAR. Will continue to monitor. Problem: Falls - Risk of:  Goal: Will remain free from falls  Description  Will remain free from falls  2/25/2020 [id number] by Daisy Valencia RN  Outcome: Ongoing  Note:   Patient remains absent from falls at this time. Remains alert and oriented, in bed with call light and belongings in reach. Non-slip footwear on and 2/4 siderails raised. Bed remains in lowest/locked position at all times. Fall precautions in place. Patient encouraged to use call light to request assistance, v/u.  Will continue to monitor.

## 2020-02-25 NOTE — PROGRESS NOTES
Admit for chest pain,  Pt states AICD fired today  He has apparently discharged himself from home hospice  Will admit as medical patient with intent to treat aggressively as he is no longer in hospice    Full h+p to follow    Active Hospital Problems    Diagnosis Date Noted    AICD discharge [Z45.02] 02/24/2020    Colon cancer ([facility] [C18.9] 04/29/2019    Chest pain [V21.1]     Systolic CHF, chronic ([facility] [I50.22] 11/30/2016    Depression [F32.9] 08/24/2011    Essential hypertension [I10]

## 2020-02-25 NOTE — PLAN OF CARE
Problem: Pain:  Goal: Pain level will decrease  Description  Pain level will decrease  Outcome: Ongoing  Goal: Control of acute pain  Description  Control of acute pain  Outcome: Ongoing  Note:   C/O PAIN to head/chest, rates as 7 - prn pain medication given, see MAR. Pt repositioned self in bed for additional comfort     Problem: Falls - Risk of:  Goal: Will remain free from falls  Description  Will remain free from falls  Outcome: Ongoing  Goal: Absence of physical injury  Description  Absence of physical injury  Outcome: Ongoing  Note:   MEDIUM fall risk. Pt is alert and oriented, independent, aware of needs, rings for assistance when needed. Nonskid socks on at this time. Pt states he has some weakness to BLE, urinal at bedside to use. Callbell in reach.

## 2020-02-25 NOTE — CONSULTS
Palliative Care:      52year old male with history of colon cancer, CAD CHF presents to ED with chest pain, fatigue, malaise, weakness, and generalized headache pain. He thought his AICD may have fired. He was at [facility] and then was moved to the [facility] [address] [place] [address],[address] inpatient hospice unit. Patient told MD he has decided to revoke hospice care and continue with aggressive medical care. Patient reports he has myalgias, fatigue, back pain.          ECHO   12/23/2019:   Summary   -Left ventricular cavity size is moderately dilated. Normal left ventricular   wall thickness. Overall left ventricular systolic function appears severely   reduced. Ejection fraction is visually estimated to be 20%. Grade III   diastolic dysfunction with elevated LV filling pressures.   -Moderate to severe mitral regurgitation.   -The left atrium is dilated.   -Pacer / ICD wire is visualized in the right ventricle.   -Moderate tricuspid regurgitation with PASP of 40 mmHg.   -The right atrial size is dilated.   -IVC size is dilated (>2.1 cm) but collapses > 50% with respiration   consistent with elevated RA pressure (8 mmHg). ONE XRAY VIEW OF THE CHEST 2/24/2020    FINDINGS: LINES/TUBES/OTHER: Right-sided AICD is again noted and unchanged in position. Left subclavian port is again noted with its tip unchanged in position. HEART/MEDIASTINUM: The cardiac silhouette is enlarged, but stable. Millie Hazard PLEURA/LUNGS: There is left basilar atelectasis. There are no focal consolidations or pleural effusions. There is no appreciable pneumothorax. BONES/SOFT TISSUE: No acute abnormality.      No acute pulmonary disease. Stable cardiomegaly. CTA OF THE CHEST 2/24/2020  FINDINGS: Pulmonary Arteries: Pulmonary arteries are adequately opacified for evaluation. No evidence of intraluminal filling defect to suggest pulmonary embolism. Main pulmonary artery is normal in caliber. Mediastinum: No evidence of mediastinal lymphadenopathy.   The heart and pericardium 5/14/2019); pacemaker placement; and Small intestine surgery (N/A, 8/21/2019). Advance Directives:      Code status is full, son Vaibhav Salvador is DPOA    Problem Severity: Pain/Other Symptoms:      Patient has chronic back pain and he has been on Roxanol 4 mg every 4 hours as needed. Patient also has Earlis Grater available as needed and is on scheduled Lyrica tid for neuropathy. He has been followed in the past by Dr Jimena Escobar for pain management    Bed Mobility/Toileting/Transfer:      Needs assistance with transportation and homemaking. He states that he cares for his own colostomy. Performance Status:      Transfer/ambulate per self does not use cane or walker. States he is weak and deconditioned. Symptom Assessment: Appetite/Nausea/Bowels/Fatigue:      BMI is 28.9. Appetite has been fair. Social History:   reports that he quit smoking about 13 years ago. He has a 5.00 pack-year smoking history. He quit smokeless tobacco use about 2 years ago. His smokeless tobacco use included chew. He reports that he does not drink alcohol or use drugs. Family History:  family history includes Cancer in his father and mother; High Blood Pressure in his father. Psychological/Spiritual:       Patient is  and lives with his wife and children. Wife is an ER nurse. He and his wife are planning a divorce. He has four brothers and one sister. Family Discussion:     Met with patient, sister and brother. Patient is known to palliative care from recent hospital admissions. Discussed clinical status at length. He states he is not yet ready to close the door on future medical care. He has been under the care of [facility] at home. He revoked benefit to seek hospital admission. Discussed at length with him the difference between hospice and palliative care. His wishes are for palliative care.  He wanted to know if he could go back and forth with hospice care and I told him no as when a person chooses hospice they are opting for comfort care. He does not wish to change his code status to DNR at this time. He has made his son Philipp Salazar his DPOA. States he has been under considerable stress with the \"upcoming divorce. \" He states he and his wife are civil to one another and trying to move forward with the divorce. States he suffers from chronic pain and anxiety. Patient was very sedated this am and caution must be used when using both opioids and anti anxiety medications. Explained to patient. Perfect serve message to MD. [facility] Will make referral to palliative care upon discharge.

## 2020-02-25 NOTE — PROGRESS NOTES
Positive result with prn pain medication. Pt resting quietly in bed with eyes closed. callbell in reach.   Jose Mai rn

## 2020-02-26 NOTE — PROGRESS NOTES
Assessment/vitals complete, see flowsheets and HS meds given. Plan of care discussed, pt wants to try and \"get rest\", will ring when needs something and assessment/vitals to be done at that time. No other needs, callbell in reach.   Paulina Burrell RN

## 2020-02-26 NOTE — CONSULTS
Nephrology Consult Note        [phone]        [email or web address]          Assessment/Plan:    Non-oliguric ALEN (baseline Cr 0.8): -Etiology of ALEN likely due to diminished renal perfusion (Vikki+ <20) from hypotension/ACEi/entresto/diuretic use, with likely exacerbation from MARYAN  -Start IVF at this time  -Continue to hold diuretics/ACEi  -Dose medications for GFR<15 in setting of ALEN    Hypotension/Shock:  -On vasopressor support  -On midodrine TID  -IVF as above  -Management per ICU Team    Hyponatremia:  -Likely due to volume depletion (Vikki+ <20)  -IVF as above    Chest Pain:  -Etiology unclear (AICD interrogated and no firing per patient)  -Awaiting Cardiology consult    Colon Cancer:  -Management per Palliative    VTach:  -On amiodarone/digoxin  -Management per Cardiology      Emile Cantu MD, JANELLE  2/26/2020  [facility]                                                                                                                                                                                                                                                                                                    Reason for Consult:  ALEN    History of Present Illness:  52 y.o. male with no known renal history, admitted for chest pain attributed to AICD firing (though now patient states interrogation is negative), with hospital course complicated by RRT for hypotension/diaphoresis. Patient has no known renal history and thus does not follow with a nephrologist. On admission, patient's Cr was found to be 1.3, which improved to 1.1 yesterday, but pranay to 1.5 this AM (baseline Cr 0.8). Of note, patient underwent CT PE on 2/24/20, was on lisinopril until 2/25/20, and has had persistent hypotension (BP 80s-100s) requiring midodrine. Denies dysuria, hematuria, foamy urine, NSAID use. Currently, patient has no active complaints or uremic symptoms.  Nephrology consulted for evaluation/management of takedown of ileostomy    PACEMAKER PLACEMENT      icd 10/2017    REVISION COLOSTOMY  2018    S/P: colostomy closure with hernia repair with mesh, with Dr. West Villarreal at [facility].  SHOULDER ARTHROSCOPY Right 9/22/15    SUBACROMIAL DECOMPRESSION, DISTAL CLAVICLE EXCISION, LABRAL DEBRIDEMETN    SIGMOIDOSCOPY N/A 2018    SIGMOIDOSCOPY BIOPSY FLEXIBLE performed by Anahy Ivey MD at [address]  4 surgeries    SMALL INTESTINE SURGERY  2017    LAPAROSCOPIC LOW ANTERIOR RESECTION, ILEOSTOMY    SMALL INTESTINE SURGERY  2017    small bowel resection    SMALL INTESTINE SURGERY N/A 2019    OPEN COLOSTOMY AND PARASTOMAL HERNIA REPAIR WITH MESH performed by Krista Nguyen MD at [facility] TUNNELED VENOUS PORT PLACEMENT Left 2017    PORT-A-CATH INSERTION                     TUNNELED VENOUS PORT PLACEMENT      UMBILICAL HERNIA REPAIR  14    UPPER GASTROINTESTINAL ENDOSCOPY  10/03/2017       Allergies:  Vancomycin; Bactrim [sulfamethoxazole-trimethoprim]; Corn-containing products; and Seasonal    Social History:    Social History     Socioeconomic History    Marital status:      Spouse name: Not on file    Number of children: Not on file    Years of education: Not on file    Highest education level: Not on file   Occupational History    Not on file   Social Needs    Financial resource strain: Not on file    Food insecurity:     Worry: Not on file     Inability: Not on file    Transportation needs:     Medical: Not on file     Non-medical: Not on file   Tobacco Use    Smoking status: Former Smoker     Packs/day: 1.00     Years: 5.00     Pack years: 5.00     Last attempt to quit: 2007     Years since quittin.1    Smokeless tobacco: Former User     Types: Chew     Quit date: 8/10/2017   Substance and Sexual Activity    Alcohol use: No     Comment: history of alcohol abuse 20 years ago    Drug use:  No End Date Taking? Authorizing Provider   carvedilol (COREG) 3.125 MG tablet Take 1 tablet by mouth 2 times daily (with meals) 1/8/20   Rika Britton MD   amiodarone (CORDARONE) 200 MG tablet Take 1 tablet by mouth daily 1/8/20   Rika Britton MD   midodrine (PROAMATINE) 5 MG tablet Take 1 tablet by mouth 3 times daily (with meals) 1/8/20   Rika Britton MD   sacubitril-valsartan (ENTRESTO) 24-26 MG per tablet Take 0.5 tablets by mouth 2 times daily 1/8/20   Rika Britton MD   pregabalin (LYRICA) 75 MG capsule 1 tablet AM and 2 tablets PM 12/24/19 1/30/20  Snow Callaway MD   DULoxetine (CYMBALTA) 30 MG extended release capsule Take 1 capsule by mouth daily 12/24/19   Snow Callaway MD   furosemide (LASIX) 40 MG tablet Take 1 tablet by mouth 2 times daily 12/23/19   Kodak Justice MD   [facility] REMOVAL 1 patch Pt replaces patch weekly on Fridays    Historical Provider, MD   HYDROcodone-acetaminophen (NORCO) 5-325 MG per tablet Take 1-2 tablets by mouth every 4 hours as needed for Pain. Historical Provider, MD   eplerenone (INSPRA) 25 MG tablet Take 1 tablet by mouth daily 7/9/19   Juliana Godinez MD   lisinopril (PRINIVIL;ZESTRIL) 5 MG tablet Take 1 tablet by mouth daily 6/7/19   Juliana Godinez MD   digoxin [address]) 125 MCG tablet Take 1 tablet by mouth daily 6/7/19   Juliana Godinez MD   simethicone (MYLICON) 80 MG chewable tablet Take 80 mg by mouth every 6 hours as needed for Flatulence    Historical Provider, MD   ranitidine (ZANTAC) 150 MG tablet Take 150 mg by mouth daily    Historical Provider, MD   melatonin 3 MG TABS tablet Take 10 mg by mouth nightly as needed     Historical Provider, MD   lovastatin (MEVACOR) 40 MG tablet Take 40 mg by mouth. Take 1 tablet (40 mg total) by mouth at bedtime.  8/19/13   Historical Provider, MD     Scheduled Meds:   diazePAM  5 mg Oral TID    busPIRone  10 mg Oral BID    digoxin  125 mcg Oral Daily    pregabalin  75 mg Oral TID    DULoxetine  30 mg Oral Daily    amiodarone  200 mg Oral Daily    midodrine  5 mg Oral TID     sodium chloride flush  10 mL Intravenous 2 times per day    atorvastatin  40 mg Oral Nightly    aspirin  325 mg Oral Daily    enoxaparin  40 mg Subcutaneous Daily    famotidine  20 mg Oral Daily     Continuous Infusions:   norepinephrine 0.01 mcg/kg/min (02/26/20 0950)     PRN Meds:.hydrOXYzine, morphine, morphine, melatonin, simethicone, sodium chloride flush, potassium chloride **OR** potassium alternative oral replacement **OR** potassium chloride, acetaminophen, acetaminophen, magnesium hydroxide, promethazine, ondansetron, nitroGLYCERIN      Labs:  CBC with Differential:    Lab Results   Component Value Date    WBC 7.8 02/25/2020    RBC 4.66 02/25/2020    RBC 3.74 07/05/2017    HGB 14.4 02/25/2020    HCT 44.9 02/25/2020     02/25/2020    MCV 96.5 02/25/2020    MCH 30.9 02/25/2020    MCHC 32.0 02/25/2020    RDW 19.9 02/25/2020    SEGSPCT 69.7 12/19/2012    BANDSPCT 4 12/12/2019    METASPCT 1 11/18/2018    LYMPHOPCT 12.5 02/25/2020    LYMPHOPCT 14.8 07/05/2017    PROMYELOPCT 1 07/25/2017    MONOPCT 9.9 02/25/2020    MYELOPCT 2 10/10/2017    EOSPCT 4.6 02/20/2012    BASOPCT 0.7 02/25/2020    MONOSABS 0.7 02/25/2020    LYMPHSABS 0.9 02/25/2020    EOSABS 0.1 02/25/2020    BASOSABS 0.0 02/25/2020    DIFFTYPE Auto 12/19/2012     BMP:    Lab Results   Component Value Date     02/25/2020    K 5.1 02/25/2020    K 4.7 02/25/2020    CL 92 02/25/2020    CO2 23 02/25/2020    BUN 25 02/25/2020    LABALBU 3.4 02/25/2020    CREATININE 1.5 02/25/2020    CALCIUM 9.1 02/25/2020    GFRAA >60 02/25/2020    GFRAA >60 12/19/2012    LABGLOM 50 02/25/2020    GLUCOSE 165 02/25/2020    GLUCOSE 131 07/05/2017     U/A:    Lab Results   Component Value Date    COLORU DK YELLOW 02/26/2020    PROTEINU 100 02/26/2020    PHUR 5.0 02/26/2020    LABCAST 20-40 Hyaline 12/30/2019    WBCUA 1 02/26/2020    RBCUA 2 02/26/2020    BACTERIA 1+ 12/20/2019 CLARITYU CLOUDY 02/26/2020    SPECGRAV 1.024 02/26/2020    LEUKOCYTESUR Negative 02/26/2020    UROBILINOGEN 1.0 02/26/2020    BILIRUBINUR SMALL 02/26/2020    BILIRUBINUR NEGATIVE 02/22/2011    BLOODU Negative 02/26/2020    GLUCOSEU Negative 02/26/2020    GLUCOSEU NEGATIVE 02/22/2011

## 2020-02-26 NOTE — PROGRESS NOTES
Pt. Shaking with anxiety. Spiritual services offered to pt. He states he is up for anything. Consult placed.

## 2020-02-26 NOTE — CONSULTS
 ENDOSCOPIC ULTRASOUND (LOWER)  09/14/2016    for staging - Dr. Luis Landin, COLON, DIAGNOSTIC     [address] HERNIA REPAIR  12/01/2017    peristomal hernia repair    HERNIA REPAIR  04/18/2018    S/P: colostomy closure with hernia repair with mesh, with Dr. Yoandy Eid at [facility].  INNER EAR SURGERY  tube right ear    JOINT REPLACEMENT      left TKR    KNEE ARTHROSCOPY Right [id number]    KNEE ARTHROSCOPY Right 8/4/2014    medial meniscus    KNEE SURGERY  08/24/2011    removal of tibial component    OTHER SURGICAL HISTORY  7/1/13    ACL Rt knee meniscus repair synovectomy    OTHER SURGICAL HISTORY  12/01/2017    takedown of ileostomy    PACEMAKER PLACEMENT      icd 10/2017    REVISION COLOSTOMY  04/18/2018    S/P: colostomy closure with hernia repair with mesh, with Dr. Yoandy Eid at [facility].  SHOULDER ARTHROSCOPY Right 9/22/15    SUBACROMIAL DECOMPRESSION, DISTAL CLAVICLE EXCISION, LABRAL DEBRIDEMETN    SIGMOIDOSCOPY N/A 11/23/2018    SIGMOIDOSCOPY BIOPSY FLEXIBLE performed by Yolanda Hyatt MD at [address]  4 surgeries    SMALL INTESTINE SURGERY  01/11/2017    LAPAROSCOPIC LOW ANTERIOR RESECTION, ILEOSTOMY    SMALL INTESTINE SURGERY  12/01/2017    small bowel resection    SMALL INTESTINE SURGERY N/A 8/21/2019    OPEN COLOSTOMY AND PARASTOMAL HERNIA REPAIR WITH MESH performed by Tiki Faria MD at [address] TUNNELED VENOUS PORT PLACEMENT Left 02/13/2017    PORT-A-CATH INSERTION                     TUNNELED VENOUS PORT PLACEMENT      UMBILICAL HERNIA REPAIR  11/11/14    UPPER GASTROINTESTINAL ENDOSCOPY  10/03/2017       Social History:    TOBACCO:   reports that he quit smoking about 13 years ago. He has a 5.00 pack-year smoking history. He quit smokeless tobacco use about 2 years ago. His smokeless tobacco use included chew. ETOH:   reports no history of alcohol use.       Family History:       Problem Relation Age of Onset    Cancer Mother     High Blood Pressure Father     Cancer Father        REVIEW OF SYSTEMS:    CONSTITUTIONAL:  negative for fevers, chills, activity change, appetite change, fatigue, night sweats and unexpected weight change. EYES:  negative for blurred vision, eye discharge, visual disturbance and icterus  HEENT:  negative for hearing loss, tinnitus, ear drainage, sinus pressure, nasal congestion, epistaxis and snoring  RESPIRATORY:  Denies productive or dry cough, dyspnea at rest or with exertion, no hemoptysis. CARDIOVASCULAR:  negative for chest pain, palpitations, exertional chest pressure/discomfort, edema, syncope  GASTROINTESTINAL:  negative for nausea, vomiting, diarrhea, constipation, blood in stool and abdominal pain  GENITOURINARY:  negative for frequency, dysuria, urinary incontinence, decreased urine volume, and hematuria  HEMATOLOGIC/LYMPHATIC:  negative for easy bruising, bleeding and lymphadenopathy  ALLERGIC/IMMUNOLOGIC:  negative for recurrent infections, angioedema, anaphylaxis and drug reactions  ENDOCRINE:  negative for weight changes and diabetic symptoms including polyuria, polydipsia and polyphagia  MUSCULOSKELETAL:  negative for  pain, joint swelling, decreased range of motion and muscle weakness  NEUROLOGICAL:  negative for headaches, slurred speech, unilateral weakness  PSYCHIATRIC/BEHAVIORAL: negative for hallucinations, behavioral problems, confusion and agitation.      Objective:   PHYSICAL EXAM:      VITALS:  /87   Pulse 115   Temp 97.5 °F (36.4 °C) (Temporal)   Resp 20   Ht 5' 10\" (1.778 m)   Wt 198 lb 10.2 oz (90.1 kg)   SpO2 100%   BMI 28.50 kg/m²       24HR INTAKE/OUTPUT:      Intake/Output Summary (Last 24 hours) at 2020 0957  Last data filed at 2020 0901  Gross per 24 hour   Intake 531.9 ml   Output 600 ml   Net -68.1 ml     CURRENT PULSE OXIMETRY:  SpO2: 100 %  24HR PULSE OXIMETRY RANGE:  SpO2  Av.9 %  Min: 94 %  Max: 100 norepinephrine 0.01 mcg/kg/min (02/26/20 0950)       DATA:    Old records have been reviewed  CBC with Differential:    Lab Results   Component Value Date    WBC 7.8 02/25/2020    RBC 4.66 02/25/2020    RBC 3.74 07/05/2017    HGB 14.4 02/25/2020    HCT 44.9 02/25/2020     02/25/2020    MCV 96.5 02/25/2020    MCH 30.9 02/25/2020    MCHC 32.0 02/25/2020    RDW 19.9 02/25/2020    SEGSPCT 69.7 12/19/2012    BANDSPCT 4 12/12/2019    METASPCT 1 11/18/2018    LYMPHOPCT 12.5 02/25/2020    LYMPHOPCT 14.8 07/05/2017    PROMYELOPCT 1 07/25/2017    MONOPCT 9.9 02/25/2020    MYELOPCT 2 10/10/2017    EOSPCT 4.6 02/20/2012    BASOPCT 0.7 02/25/2020    MONOSABS 0.7 02/25/2020    LYMPHSABS 0.9 02/25/2020    EOSABS 0.1 02/25/2020    BASOSABS 0.0 02/25/2020    DIFFTYPE Auto 12/19/2012     BMP:    Lab Results   Component Value Date     02/25/2020    K 5.1 02/25/2020    K 4.7 02/25/2020    CL 92 02/25/2020    CO2 23 02/25/2020    BUN 25 02/25/2020    CREATININE 1.5 02/25/2020    CALCIUM 9.1 02/25/2020    GFRAA >60 02/25/2020    GFRAA >60 12/19/2012    LABGLOM 50 02/25/2020    GLUCOSE 165 02/25/2020    GLUCOSE 131 07/05/2017     Hepatic Function Panel:    Lab Results   Component Value Date    ALKPHOS 132 02/25/2020    ALT 52 02/25/2020    AST 37 02/25/2020    PROT 6.6 02/25/2020    PROT 6.3 07/05/2017    BILITOT 1.6 02/25/2020    BILIDIR 0.3 12/18/2019    IBILI 0.3 12/18/2019     ABG:    Lab Results   Component Value Date    HRY8NZW 32.6 12/30/2019    BEART 9.1 12/30/2019    O0KMPTHC 98.9 12/30/2019    PHART 7.529 12/30/2019    XSW7BOP 39.1 12/30/2019    PO2ART 109.0 12/30/2019    QDT7WYA 75.7 12/30/2019       Cultures:   Blood Culture:    Sputum Culture:        Radiology Review:  All pertinent images / reports were reviewed as a part of this visit. imaging reveals the following:   XR CHEST PORTABLE   Final Result   Cardiomegaly, with improved vascular congestion         CT CHEST PULMONARY EMBOLISM W CONTRAST   Final Result

## 2020-02-26 NOTE — CONSULTS
[facility]  Advanced CHF/Pulmonary Hypertension   Cardiac Evaluation      Leopoldo Simper Prater  YOB: 1970    Requesting PHysician:  Dr. Lester Feldman      Chief Complaint   Patient presents with    Chest Pain     PT arrived via EMS with c/o pain in chest.  PT has ICD. glucose 147 en route    Headache     PT c/o headache/squeezeing to his head        History of Present Illness:  Garrett Nino is a 51 yo male with a PMH of colon cancer, end stage HF, depression/anxiety who presented to the hospital after he thought his ICD had fired. Interrogation of his device did not show that it fired. He was recently in hospice for progressive heart failure and failure to take meds. However he improved. He was being more compliant with meds at home. He was admitted on the 24th. He had a rapid response on the floor after he became diaphoretic and hypotensive. He is currently on Levophed for low blood pressure. He has been started back on valium and feels better. He is apologetic to me for not taking his meds in the past.  His blood pressure normally runs around 100. Review of his optivol impedance shows that he is fluid overloaded. He has a history of chronic systolic HF and rectal cancer.  He has a history of chronic narcotic use from all of his surgeries.     He has a history of sCHF, NICM, hx HTN, HLD and rectal cancer s/p chemo and XRT. [facility] 9/15/16 with no coronary artery disease, EF 15%, LVEDP 20-25 mmHg.  He had rectal surgery in January 2017 and has completed chemo and radiation. He had problems with pelvic abscess and had a drain placed.    He underwent ICD implant for primary prevention on 10/26/17. Jose F Karimi also underwent surgery for repair of ventral hernia and new colostomy on 12/1/17.  He has had abscesses post-op that have been drained.      Allergies   Allergen Reactions    Vancomycin Hives and Swelling     Throat swells    Bactrim [Sulfamethoxazole-Trimethoprim] Other (See Comments)     Yoel Berry  Corn-Containing Products      Patient tells me his throat swells when he eats a large amount of corn or corn products. He can tolerate products with corn oil and corn syrup.  Pt. Reports gets \"congestion\"    Seasonal      Current Facility-Administered Medications   Medication Dose Route Frequency Provider Last Rate Last Dose    norepinephrine (LEVOPHED) 16 mg in dextrose 5% 250 mL infusion  0.03 mcg/kg/min Intravenous Continuous Varun Morfin MD 4.2 mL/hr at 02/26/20 1159 0.05 mcg/kg/min at 02/26/20 1159    hydrOXYzine (ATARAX) tablet 25 mg  25 mg Oral Q6H PRN Varun Morfin MD   25 mg at 02/26/20 0115    morphine (PF) injection 1 mg  1 mg Intravenous Q3H PRN Lalit Beck MD        morphine (PF) injection 2 mg  2 mg Intravenous Q3H PRN Varun Morfin MD   2 mg at 02/26/20 1337    diazePAM (VALIUM) tablet 5 mg  5 mg Oral TID Nancy Nieves MD   5 mg at 02/26/20 1337    0.9 % sodium chloride infusion   Intravenous Continuous Boo JOSE E Nieto  mL/hr at 02/26/20 1304      busPIRone (BUSPAR) tablet 10 mg  10 mg Oral BID Shakira Chilel MD   10 mg at 02/26/20 4163    melatonin tablet 9 mg  9 mg Oral Nightly PRN Shakira Chilel MD   9 mg at 02/25/20 2120    simethicone (MYLICON) chewable tablet 80 mg  80 mg Oral Q6H PRN Shakira Chilel MD        digoxin [facility]) tablet 125 mcg  125 mcg Oral Daily Shakira Chilel MD   125 mcg at 02/26/20 7613    pregabalin (LYRICA) capsule 75 mg  75 mg Oral TID Shakira Chilel MD   75 mg at 02/26/20 1334    DULoxetine (CYMBALTA) extended release capsule 30 mg  30 mg Oral Daily Shakira Chilel MD   30 mg at 02/26/20 0827    amiodarone (CORDARONE) tablet 200 mg  200 mg Oral Daily Shakira Chilel MD   200 mg at 02/26/20 0853    midodrine (PROAMATINE) tablet 5 mg  5 mg Oral TID  Shakira Chilel MD   5 mg at 02/26/20 1158    sodium chloride flush 0.9 % injection 10 mL  10 mL Intravenous 2 times per day Shakira Chilel MD   10 mL at 02/26/20 0818    sodium chloride flush 0.9 % injection 10 mL  10 mL Intravenous PRN Avelino Tang MD        potassium chloride (KLOR-CON M) extended release tablet 40 mEq  40 mEq Oral PRN Avelino Tang MD        Or    potassium bicarb-citric acid (EFFER-K) effervescent tablet 40 mEq  40 mEq Oral PRN Avelino Tang MD        Or    potassium chloride 10 mEq/100 mL IVPB (Peripheral Line)  10 mEq Intravenous PRN Avelino Tang MD        acetaminophen (TYLENOL) tablet 650 mg  650 mg Oral Q6H PRN Avelino Tang MD        acetaminophen (TYLENOL) suppository 650 mg  650 mg Rectal Q6H PRN Avelino Tang MD        magnesium hydroxide (MILK OF MAGNESIA) 400 MG/5ML suspension 30 mL  30 mL Oral Daily PRN Avelino Tang MD        [facility]) tablet 12.5 mg  12.5 mg Oral Q6H PRN Avelino Tang MD        ondansetron [facility]) injection 4 mg  4 mg Intravenous Q6H PRN Avelino Tang MD        atorvastatin (LIPITOR) tablet 40 mg  40 mg Oral Nightly Avelino Tang MD   40 mg at 02/25/20 2117    aspirin EC tablet 325 mg  325 mg Oral Daily Avelino Tang MD   325 mg at 02/26/20 2731    enoxaparin (LOVENOX) injection 40 mg  40 mg Subcutaneous Daily Avelino Tang MD   40 mg at 02/26/20 2414    nitroGLYCERIN (NITROSTAT) SL tablet 0.4 mg  0.4 mg Sublingual Q5 Min PRN Avelino Tang MD        famotidine (PEPCID) tablet 20 mg  20 mg Oral Daily Avelino Tang MD   20 mg at 02/26/20 4969       Past Medical History:   Diagnosis Date    Allergic     Anesthesia     tremors    Arthritis     CAD (coronary artery disease)     Cancer ([facility] [address]     colon    Chemotherapy adverse reaction 4/29/2019    CHF (congestive heart failure) (HCC)     Chronic knee pain     Clostridium difficile infection 07/11/2016, 12/21/19    PCR+    Depression motion     Diverticulitis     Diverticulosis     History of alcohol abuse     Hyperlipidemia     Hypertension     Irregular heart beat     states been told he has had in past. Never treated. Sees PCP every 3 mos. and EKG yearly    Pneumonia     Right knee pain     Shoulder injury     and arthrisits, injury rotaotr cuff    Sleep difficulties     with depression     Past Surgical History:   Procedure Laterality Date    ABDOMEN SURGERY      ABSCESS DRAINAGE      BREAST SURGERY Right 1/21/2019    RIGHT BREAST EXCISIONAL BIOPSY performed by Baron Maritza MD at [facility]  09/14/2016    No stents placed   [address]  10/2017    COLECTOMY      COLONOSCOPY  08/29/2016    with biopsy and polypectomy    COLONOSCOPY  03/19/2018    COLONOSCOPY N/A 5/14/2019    COLONOSCOPY WITH DILATION performed by Yifan Tamayo MD at [address] [place] [address] ([facility])  09/14/2016    for staging - Dr. Amy Wolff, COLON, DIAGNOSTIC     [address]  12/01/2017    peristomal hernia repair    HERNIA REPAIR  04/18/2018    S/P: colostomy closure with hernia repair with mesh, with Dr. Maryetta Lombard at [facility].  INNER EAR SURGERY  tube right ear    JOINT REPLACEMENT      left TKR    KNEE ARTHROSCOPY Right [id number]    KNEE ARTHROSCOPY Right 8/4/2014    medial meniscus    KNEE SURGERY  08/24/2011    removal of tibial component    OTHER SURGICAL HISTORY  7/1/13    ACL Rt knee meniscus repair synovectomy    OTHER SURGICAL HISTORY  12/01/2017    takedown of ileostomy    PACEMAKER PLACEMENT      icd 10/2017    REVISION COLOSTOMY  04/18/2018    S/P: colostomy closure with hernia repair with mesh, with Dr. Maryetta Lombard at [facility].     SHOULDER ARTHROSCOPY Right 9/22/15    SUBACROMIAL DECOMPRESSION, DISTAL CLAVICLE EXCISION, LABRAL DEBRIDEMETN    SIGMOIDOSCOPY N/A 11/23/2018    SIGMOIDOSCOPY BIOPSY FLEXIBLE performed by Yifan Tamayo MD at [facility]  4 surgeries    SMALL INTESTINE SURGERY  01/11/2017    LAPAROSCOPIC LOW ANTERIOR RESECTION, ILEOSTOMY    SMALL INTESTINE SURGERY  2017    small bowel resection    SMALL INTESTINE SURGERY N/A 2019    OPEN COLOSTOMY AND PARASTOMAL HERNIA REPAIR WITH MESH performed by Anne Gan MD at [facility] TUNNELED VENOUS PORT PLACEMENT Left 2017    PORT-A-CATH INSERTION                     TUNNELED VENOUS PORT PLACEMENT      UMBILICAL HERNIA REPAIR  14    UPPER GASTROINTESTINAL ENDOSCOPY  10/03/2017     Family History   Problem Relation Age of Onset    Cancer Mother     High Blood Pressure Father     Cancer Father      Social History     Socioeconomic History    Marital status:      Spouse name: Not on file    Number of children: Not on file    Years of education: Not on file    Highest education level: Not on file   Occupational History    Not on file   Social Needs    Financial resource strain: Not on file    Food insecurity:     Worry: Not on file     Inability: Not on file    Transportation needs:     Medical: Not on file     Non-medical: Not on file   Tobacco Use    Smoking status: Former Smoker     Packs/day: 1.00     Years: 5.00     Pack years: 5.00     Last attempt to quit: 2007     Years since quittin.1    Smokeless tobacco: Former User     Types: Chew     Quit date: 8/10/2017   Substance and Sexual Activity    Alcohol use: No     Comment: history of alcohol abuse 20 years ago    Drug use: No    Sexual activity: Never     Partners: Female   Lifestyle    Physical activity:     Days per week: Not on file     Minutes per session: Not on file    Stress: Not on file   Relationships    Social connections:     Talks on phone: Not on file     Gets together: Not on file     Attends Baptism service: Not on file     Active member of club or organization: Not on file     Attends meetings of clubs or organizations: Not on file     Relationship status: Not on file    Intimate partner violence:     Fear of current or ex consider lasix drip tomorrow  4. Need to use milrinone a few days to improve cardiac output, then try to restart Entresto  5. Restart eplerenone in the next couple of days  6. Continue digoxin  7. I discussed with him that his heart is very impaired. He seems to understand. However, he is much more reasonable and understandable than he has been in the last few months. 8.  Limit narcotics, this doesn't help his blood pressure and he has a history of dependence. Due to the high probability of clinically significant life threating deterioration of the patient's condition that required my urgent intervention, a total critical care time 45 minutes was used. This time excludes any time that may have been spent performing procedures. This includes but not limited to vital sign monitoring, telemetry monitoring, continuous pulse oximety, IV medication, clinical response to the IV medications, documentation time , consultation time, interpretation of lab data, review of nursing notes and old record review. I appreciate the opportunity of cooperating in the care of this patient.     Beverly Reyes M.D., [facility]

## 2020-02-26 NOTE — SIGNIFICANT EVENT
repair (11/11/14); joint replacement; Shoulder arthroscopy (Right, 9/22/15); Colonoscopy (08/29/2016); Endoscopic ultrasonography, GI (09/14/2016); Cardiac catheterization (09/14/2016); Small intestine surgery (01/11/2017); Tunneled venous port placement (Left, 02/13/2017); colectomy; Abscess Drainage; Tunneled venous port placement; hernia repair; Upper gastrointestinal endoscopy (10/03/2017); hernia repair (12/01/2017); Small intestine surgery (12/01/2017); other surgical history (12/01/2017); Colonoscopy (03/19/2018); Revision Colostomy (04/18/2018); hernia repair (04/18/2018); sigmoidoscopy (N/A, 11/23/2018); Breast surgery (Right, 1/21/2019); Abdomen surgery; Endoscopy, colon, diagnostic; Cardiac defibrillator placement (10/2017); Colonoscopy (N/A, 5/14/2019); pacemaker placement; and Small intestine surgery (N/A, 8/21/2019). Medications:        Current Outpatient Medications on File Prior to Encounter   Medication Sig Dispense Refill    carvedilol (COREG) 3.125 MG tablet Take 1 tablet by mouth 2 times daily (with meals) 60 tablet 3    amiodarone (CORDARONE) 200 MG tablet Take 1 tablet by mouth daily 30 tablet 3    midodrine (PROAMATINE) 5 MG tablet Take 1 tablet by mouth 3 times daily (with meals) 90 tablet 3    sacubitril-valsartan (ENTRESTO) 24-26 MG per tablet Take 0.5 tablets by mouth 2 times daily 60 tablet 0    pregabalin (LYRICA) 75 MG capsule 1 tablet AM and 2 tablets PM 90 capsule 0    DULoxetine (CYMBALTA) 30 MG extended release capsule Take 1 capsule by mouth daily 30 capsule 0    furosemide (LASIX) 40 MG tablet Take 1 tablet by mouth 2 times daily 60 tablet 3    BUPRENORPHINE PATCH REMOVAL 1 patch Pt replaces patch weekly on Fridays      HYDROcodone-acetaminophen (NORCO) 5-325 MG per tablet Take 1-2 tablets by mouth every 4 hours as needed for Pain.       eplerenone (INSPRA) 25 MG tablet Take 1 tablet by mouth daily 90 tablet 3    lisinopril (PRINIVIL;ZESTRIL) 5 MG tablet Take 1 , ? 2/2 Diuretics vs Hypovolemia ? · Cardiomyopathy   · Known systolic and diastolic CHF   · AICD in situ   · Anxiety and depression   · Known colon CA   · Mixed Hyperlipidemia   · GERD   · Neuropathy   ·       PLAN AND PERTINENT ORDERS FOR THIS ADMISSION/HOSPITALIZATION     · 2/2 Hypotension , holding home BP medications and Levophed prn to keep MAP > 65   · IVF X 1 given . Cautious further IVF 2/2 CMP issues   · ICU Transfer for closer monitoring   · Holding Narcotics 2/2 Hypotension issues   · Trend Lactic acid   · Trend Trops   · Non narcotics pain medications   · Will Avoid and hold nephrotoxic medications [ Including but Not limiting to ACEI/ARBs ] . Renally dose medications, as needed. Strict I/Os. Consider Hurt catheter placement for accurate assessment of I/Os, if needed. Will f/u trend of Renal functions, during inpatient stay and monitor closely. · Renal c/s for ARF issues   · UA and urine lytes to be sent   · CXR ordered and will f/u   · CT showed no PNA on 2/24 though   · ? Inc Midodrine dose   · Cardiology c/sed already . AICD interrogation ? · DVT Prophylaxis : Lovenox   ; + SCDs   · GI Prophylaxis : PPI     · Nutrition/Diet: DIET CARDIAC; No Caffeine  · Code Status: Full Code  · PT/OT and ambulatory Eval Status: Bedrest    · Probable LOS & future Disposition planned post discharge  - Home vs ECF in 2-3 days       CONSULTS ORDERED @ ADMISSION   IP CONSULT TO INTERNAL MEDICINE   IP CONSULT TO INTERNAL MEDICINE   IP CONSULT TO CARDIOLOGY   IP CONSULT TO PALLIATIVE CARE      Medical Decision Making : HIGH     Total patient  Critical [ d/t Hypotension and ARF  ]   Care time spent in evaluating the patient an discussing plan with appropriate staff/patient/family members is 64 min       Merry Juares MD    Hospitalist, [facility].    2/26/2020 12:03 AM

## 2020-02-26 NOTE — PROGRESS NOTES
Pt. Awake, alert, and oriented X4. Pt. Anxious, requesting Ativan for anxiety. VSS, afebrile. No distress noted except anxiety. Call light in reach. Po fluids to bedside.

## 2020-02-27 NOTE — PROGRESS NOTES
02/24/20  1430 02/25/20  0433 02/25/20  2300   AST 47* 39* 37   ALT 67* 60* 52*   BILITOT 0.9 1.1* 1.6*   ALKPHOS 127 132* 132*       CXR REVIEWED BY ME AND SHOWED:  XR CHEST PORTABLE   Final Result   Cardiomegaly, with improved vascular congestion         CT CHEST PULMONARY EMBOLISM W CONTRAST   Final Result   1. Negative for pulmonary embolus. 2. Mild central airways disease. CT Head WO Contrast   Final Result   No acute intracranial abnormality. XR CHEST PORTABLE   Final Result   No acute pulmonary disease. Stable cardiomegaly. ASSESSMENT/PLAN:  This is a 52 y.o. male with cardiogenic shock    Doing better with inotropic therapy. Diuresing well. Appreciate cardiology assistance. Can transfer out of ICU on milrinone infusion.     Nini Almonte MD

## 2020-02-27 NOTE — CARE COORDINATION
Chart reviewed for discharge planning. Barrier(s) to discharge-  Milrinone gtt    Tentative discharge plan- return home w/hhc and Palliative Care    Tentative discharge date-TBD    Remains in ICU at this time    Case management will continue to follow progress and update discharge plan as needed.     Nila Kraus RN BSN  Case Management  [phone]

## 2020-02-27 NOTE — PROGRESS NOTES
MCV 95.1 02/27/2020     02/27/2020     Lab Results   Component Value Date    CREATININE 1.3 02/27/2020    BUN 28 (H) 02/27/2020     (L) 02/27/2020    K 4.5 02/27/2020    CL 97 (L) 02/27/2020    CO2 23 02/27/2020     Lab Results   Component Value Date    INR 1.50 (H) 02/25/2020    PROTIME 17.5 (H) 02/25/2020        Physical Examination:    /74   Pulse 98   Temp 97.1 °F (36.2 °C) (Temporal)   Resp 19   Ht 5' 10\" (1.778 m)   Wt 205 lb 0.4 oz (93 kg)   SpO2 98%   BMI 29.42 kg/m²      WD/WN  HEENT:  NC/AT  Respiratory:  · Resp Assessment: Normal respiratory effort  · Resp Auscultation: Clear to auscultation bilaterally   Cardiovascular:  · Auscultation: regular rate and rhythm, normal S1S2, no murmur, rub or gallop  · Palpation:  Nl PMI  · JVP:  normal  · Extremities: 1+ bilateral Edema  Abdomen:  · Soft, non-tender  · Normal bowel sounds  Extremities:  ·  No Cyanosis or Clubbing  Neurological/Psychiatric:  · Oriented to time, place, and person  · Non-anxious  Skin Warm and dry    Lab Results   Component Value Date     02/27/2020     02/25/2020     02/25/2020    K 4.5 02/27/2020    K 5.1 02/25/2020    K 4.7 02/25/2020    K 3.6 02/24/2020    BUN 28 02/27/2020    BUN 25 02/25/2020    BUN 17 02/25/2020    CREATININE 1.3 02/27/2020    CREATININE 1.5 02/25/2020    CREATININE 1.1 02/25/2020    GLUCOSE 76 02/27/2020    GLUCOSE 165 02/25/2020    GLUCOSE 131 07/05/2017    GLUCOSE 135 06/21/2017     Lab Results   Component Value Date    PROBNP 9,253 (H) 02/24/2020    PROBNP 11,142 (H) 01/08/2020    PROBNP 7,513 (H) 01/04/2020     Lab Results   Component Value Date    ALT 52 (H) 02/25/2020    ALT 60 (H) 02/25/2020    AST 37 02/25/2020    AST 39 (H) 02/25/2020     Lab Results   Component Value Date    HGB 13.1 02/27/2020    HGB 14.4 02/25/2020    HCT 39.8 02/27/2020    HCT 44.9 02/25/2020     02/27/2020     02/25/2020     Lab Results   Component Value Date    TRIG 91

## 2020-02-27 NOTE — PROGRESS NOTES
Nephrology Progress Note        [phone]        [email or web address]          Assessment/Plan:    Non-oliguric ALEN (baseline Cr 0.8):   -Etiology of ALEN due to diminished renal perfusion (Vikki+ <20) from hypotension/ACEi/poor cardiac output, with likely exacerbation from MARYAN  -Cr improving with improved renal perfusion  -Continue milrinone gtt at this time  -Continue lasix gtt, but would be cautious not to develop intravascular volume depletion as will worsen renal perfusion  -Continue to hold ACEi     Hypotension/Shock:  -S/p vasopressor support  -On milrinone support  -On midodrine TID  -Management per ICU Team     Hyponatremia:  -Due to diminished renal perfusion (Vikki+ <20)  -Improving     Chest Pain:  -Etiology unclear (AICD interrogated and no firing per patient)  -Management per Cardiology     Colon Cancer:  -Management per Palliative     VTach:  -On amiodarone/digoxin  -Management per Cardiology     Cardiogenic Shock:  -On milrinone/lasix gtt  -Management per Cardiology          Ana Mcdaniel MD, JANELLE  2/27/2020  [facility]                                                                                                                                                                                                                                                                                                    Consult: ALEN  Brief HPI: 52 y.o. male admitted for chest pain attributed to AICD firing (though now patient states interrogation is negative), with hospital course complicated by RRT for hypotension/cardiogenic shock      Subjective:  -Started on milrinone gtt yesterday  -No acute events overnight  -Cr improving this AM      Review of Systems: No new or active complaints  Social History: No visitors present at bedside      Medications:  Scheduled Meds:   diazePAM  5 mg Oral TID    busPIRone  10 mg Oral BID    digoxin  125 mcg Oral Daily    pregabalin  75 mg Oral TID   [facility] 02/25/2020    BASOSABS 0.0 02/25/2020    DIFFTYPE Auto 12/19/2012     BMP:    Lab Results   Component Value Date     02/27/2020    K 4.5 02/27/2020    CL 97 02/27/2020    CO2 23 02/27/2020    BUN 28 02/27/2020    LABALBU 3.4 02/25/2020    CREATININE 1.3 02/27/2020    CALCIUM 8.3 02/27/2020    GFRAA >60 02/27/2020    GFRAA >60 12/19/2012    LABGLOM 59 02/27/2020    GLUCOSE 76 02/27/2020    GLUCOSE 131 07/05/2017     U/A:    Lab Results   Component Value Date    COLORU DK YELLOW 02/26/2020    PROTEINU 100 02/26/2020    PHUR 5.0 02/26/2020    LABCAST 20-40 Hyaline 12/30/2019    WBCUA 1 02/26/2020    RBCUA 2 02/26/2020    BACTERIA 1+ 12/20/2019    CLARITYU CLOUDY 02/26/2020    SPECGRAV 1.024 02/26/2020    LEUKOCYTESUR Negative 02/26/2020    UROBILINOGEN 1.0 02/26/2020    BILIRUBINUR SMALL 02/26/2020    BILIRUBINUR NEGATIVE 02/22/2011    BLOODU Negative 02/26/2020    GLUCOSEU Negative 02/26/2020    GLUCOSEU NEGATIVE 02/22/2011

## 2020-02-27 NOTE — PROGRESS NOTES
Patient is alert and oriented. In bed eating breakfast now. No signs of respiratory distress. Patient has steady gait. Stood up and used urinal. No dizziness upon standing and no  orthostatic hypotension noted.

## 2020-02-27 NOTE — PROGRESS NOTES
Hospitalist Progress Note      PCP: Pérez Bundy MD    Date of Admission: 2/24/2020    Chief Complaint: hypotension    Hospital Course: we are called for critical care for hypotension and Ibrahima    Subjective: Now off pressors. On milrinone drip      Medications:  Reviewed    Infusion Medications    norepinephrine Stopped (02/26/20 1415)    milrinone 0.2 mcg/kg/min (02/27/20 1134)     Scheduled Medications    furosemide  40 mg Intravenous BID    diazePAM  5 mg Oral TID    busPIRone  10 mg Oral BID    digoxin  125 mcg Oral Daily    pregabalin  75 mg Oral TID    DULoxetine  30 mg Oral Daily    amiodarone  200 mg Oral Daily    midodrine  5 mg Oral TID WC    sodium chloride flush  10 mL Intravenous 2 times per day    atorvastatin  40 mg Oral Nightly    aspirin  325 mg Oral Daily    enoxaparin  40 mg Subcutaneous Daily    famotidine  20 mg Oral Daily     PRN Meds: hydrOXYzine, morphine, morphine, melatonin, simethicone, sodium chloride flush, potassium chloride **OR** potassium alternative oral replacement **OR** potassium chloride, acetaminophen, acetaminophen, magnesium hydroxide, promethazine, ondansetron, nitroGLYCERIN      Intake/Output Summary (Last 24 hours) at 2/27/2020 1224  Last data filed at 2/27/2020 1135  Gross per 24 hour   Intake 2743.6 ml   Output 2200 ml   Net 543.6 ml       Physical Exam Performed:    BP (!) 94/56   Pulse 96   Temp 97.7 °F (36.5 °C) (Temporal)   Resp 28   Ht 5' 10\" (1.778 m)   Wt 205 lb 0.4 oz (93 kg)   SpO2 96%   BMI 29.42 kg/m²     General appearance: No apparent distress, appears stated age and cooperative. HEENT: Pupils equal, round, and reactive to light. Conjunctivae/corneas clear. Neck: Supple, with full range of motion. No jugular venous distention. Trachea midline. Respiratory:  Normal respiratory effort. Clear to auscultation, bilaterally without Rales/Wheezes/Rhonchi.   Cardiovascular: Regular rate and rhythm with normal S1/S2 without murmurs, rubs or gallops. Abdomen: Soft, non-tender, non-distended with normal bowel sounds. Musculoskeletal: No clubbing, cyanosis or edema bilaterally. Full range of motion without deformity. Skin: Skin color, texture, turgor normal.  No rashes or lesions. Neurologic:  Neurovascularly intact without any focal sensory/motor deficits. Cranial nerves: II-XII intact, grossly non-focal.  Psychiatric: Alert and oriented, thought content appropriate, normal insight  Capillary Refill: Brisk,< 3 seconds   Peripheral Pulses: +2 palpable, equal bilaterally       Labs:   Recent Labs     02/25/20  0433 02/25/20  2300 02/27/20  0432   WBC 6.8 7.8 7.3   HGB 13.9 14.4 13.1*   HCT 43.2 44.9 39.8*    193 161     Recent Labs     02/25/20  0433 02/25/20  2300 02/27/20  0432    129* 131*   K 4.7 5.1 4.5   CL 98* 92* 97*   CO2 23 23 23   BUN 17 25* 28*   CREATININE 1.1 1.5* 1.3   CALCIUM 9.1 9.1 8.3   PHOS  --  4.6  --      Recent Labs     02/24/20  1430 02/25/20  0433 02/25/20  2300   AST 47* 39* 37   ALT 67* 60* 52*   BILITOT 0.9 1.1* 1.6*   ALKPHOS 127 132* 132*     Recent Labs     02/25/20  2300   INR 1.50*     Recent Labs     02/25/20  2300 02/26/20  0300 02/26/20  0635   TROPONINI <0.01 <0.01 <0.01       Urinalysis:      Lab Results   Component Value Date    NITRU Negative 02/26/2020    WBCUA 1 02/26/2020    BACTERIA 1+ 12/20/2019    RBCUA 2 02/26/2020    BLOODU Negative 02/26/2020    SPECGRAV 1.024 02/26/2020    GLUCOSEU Negative 02/26/2020    GLUCOSEU NEGATIVE 02/22/2011       Radiology:  XR CHEST PORTABLE   Final Result   Cardiomegaly, with improved vascular congestion         CT CHEST PULMONARY EMBOLISM W CONTRAST   Final Result   1. Negative for pulmonary embolus. 2. Mild central airways disease. CT Head WO Contrast   Final Result   No acute intracranial abnormality. XR CHEST PORTABLE   Final Result   No acute pulmonary disease. Stable cardiomegaly.                  Assessment/Plan:    Active

## 2020-02-27 NOTE — PROGRESS NOTES
Reassessment complete, VSS, no changes. Pt remains off Levophed gtt. Tubing changed. 40mg Lasix given per Dr Deangelo Brantley orders. Pt resting comfortably in bed, relief of chronic pain w/ 2mg Morphine PRN as ordered, bed alarm on, call light within reach.

## 2020-02-28 NOTE — PROGRESS NOTES
Patient medicated with Morphine 2 mg IVP and Zofran 4 mg IVP for ABD pain and nausea. Will give his 2100 valium, buspar and lyrica closer to 2130 to prevent giving him a cocktail of depressive medications all at once and potentially dropping his BP. Last BP at 2012 was 99/81. . RR 14.  96% on RA. SR up x2. Patient stands at side of bed to void. Colostomy bag intact with loose brown stool in bag. +BSx4. Voiding yellow clear urine and 900 cc counted at this time. Taking PO well and provided fresh water. Call light in reach. Wheels locked and Bed in lowest position. Patient has his call light within close reach. No acute distress at this time.

## 2020-02-28 NOTE — PROGRESS NOTES
Drainage; Tunneled venous port placement; hernia repair; Upper gastrointestinal endoscopy (10/03/2017); hernia repair (12/01/2017); Small intestine surgery (12/01/2017); other surgical history (12/01/2017); Colonoscopy (03/19/2018); Revision Colostomy (04/18/2018); hernia repair (04/18/2018); sigmoidoscopy (N/A, 11/23/2018); Breast surgery (Right, 1/21/2019); Abdomen surgery; Endoscopy, colon, diagnostic; Cardiac defibrillator placement (10/2017); Colonoscopy (N/A, 5/14/2019); pacemaker placement; and Small intestine surgery (N/A, 8/21/2019). Bam Solitario He  reports that he quit smoking about 13 years ago. He has a 5.00 pack-year smoking history. He quit smokeless tobacco use about 2 years ago. His smokeless tobacco use included chew. He reports that he does not drink alcohol or use drugs. .        Active Hospital Problems    Diagnosis Date Noted    AICD discharge [Z45.02] 02/24/2020    Colon cancer ([facility] [address] [C18.9] 04/29/2019    Chest pain [L99.8]     Systolic CHF, chronic (HCC) [I50.22] 11/30/2016    Depression [F32.9] 08/24/2011    Essential hypertension [I10]        Current Facility-Administered Medications: furosemide (LASIX) injection 40 mg, 40 mg, Intravenous, BID  norepinephrine (LEVOPHED) 16 mg in dextrose 5% 250 mL infusion, 0.03 mcg/kg/min, Intravenous, Continuous  hydrOXYzine (ATARAX) tablet 25 mg, 25 mg, Oral, Q6H PRN  morphine (PF) injection 1 mg, 1 mg, Intravenous, Q3H PRN  morphine (PF) injection 2 mg, 2 mg, Intravenous, Q3H PRN  diazePAM (VALIUM) tablet 5 mg, 5 mg, Oral, TID  milrinone (PRIMACOR) 20 mg in dextrose 5 % 100 mL infusion, 0.2 mcg/kg/min, Intravenous, Continuous  busPIRone (BUSPAR) tablet 10 mg, 10 mg, Oral, BID  melatonin tablet 9 mg, 9 mg, Oral, Nightly PRN  simethicone (MYLICON) chewable tablet 80 mg, 80 mg, Oral, Q6H PRN  digoxin (LANOXIN) tablet 125 mcg, 125 mcg, Oral, Daily  pregabalin (LYRICA) capsule 75 mg, 75 mg, Oral, TID  DULoxetine (CYMBALTA) extended release capsule 30 mg, 30 mg, Oral, Daily  amiodarone (CORDARONE) tablet 200 mg, 200 mg, Oral, Daily  midodrine (PROAMATINE) tablet 5 mg, 5 mg, Oral, TID WC  sodium chloride flush 0.9 % injection 10 mL, 10 mL, Intravenous, 2 times per day  sodium chloride flush 0.9 % injection 10 mL, 10 mL, Intravenous, PRN  potassium chloride (KLOR-CON M) extended release tablet 40 mEq, 40 mEq, Oral, PRN **OR** potassium bicarb-citric acid (EFFER-K) effervescent tablet 40 mEq, 40 mEq, Oral, PRN **OR** potassium chloride 10 mEq/100 mL IVPB (Peripheral Line), 10 mEq, Intravenous, PRN  acetaminophen (TYLENOL) tablet 650 mg, 650 mg, Oral, Q6H PRN  acetaminophen (TYLENOL) suppository 650 mg, 650 mg, Rectal, Q6H PRN  magnesium hydroxide (MILK OF MAGNESIA) 400 MG/5ML suspension 30 mL, 30 mL, Oral, Daily PRN  promethazine (PHENERGAN) tablet 12.5 mg, 12.5 mg, Oral, Q6H PRN  ondansetron (ZOFRAN) injection 4 mg, 4 mg, Intravenous, Q6H PRN  atorvastatin (LIPITOR) tablet 40 mg, 40 mg, Oral, Nightly  aspirin EC tablet 325 mg, 325 mg, Oral, Daily  enoxaparin (LOVENOX) injection 40 mg, 40 mg, Subcutaneous, Daily  nitroGLYCERIN (NITROSTAT) SL tablet 0.4 mg, 0.4 mg, Sublingual, Q5 Min PRN  famotidine (PEPCID) tablet 20 mg, 20 mg, Oral, Daily         Objective:  /70   Pulse 105   Temp 97.4 °F (36.3 °C) (Temporal)   Resp 16   Ht 5' 10\" (1.778 m)   Wt 203 lb 4.2 oz (92.2 kg)   SpO2 99%   BMI 29.17 kg/m²      Patient Vitals for the past 24 hrs:   BP Temp Temp src Pulse Resp SpO2 Weight   02/28/20 0654 110/70 97.4 °F (36.3 °C) Temporal 105 16 99 % --   02/28/20 0615 103/71 -- -- 108 15 100 % --   02/28/20 0610 -- -- -- -- -- 100 % --   02/28/20 0600 102/70 -- -- 107 13 97 % --   02/28/20 0545 108/69 -- -- 109 15 98 % --   02/28/20 0530 112/72 -- -- 108 14 98 % --   02/28/20 0515 106/72 -- -- 107 14 98 % --   02/28/20 0500 93/65 -- -- 113 21 97 % --   02/28/20 0445 96/71 -- -- 100 20 -- --   02/28/20 0430 99/65 -- -- 102 15 98 % --   02/28/20 0400 90/63 98.2 °F (36.8 °C) Temporal 102 21 98 % 203 lb 4.2 oz (92.2 kg)   02/28/20 0304 100/66 -- -- 107 15 96 % --   02/28/20 0300 100/66 -- -- 106 14 96 % --   02/28/20 0200 101/64 -- -- 104 26 97 % --   02/28/20 0100 90/65 -- -- 100 12 98 % --   02/28/20 0027 99/68 -- -- 101 13 98 % --   02/28/20 0000 99/68 -- -- 98 15 96 % --   02/27/20 2333 97/66 98.4 °F (36.9 °C) Temporal 102 16 95 % --   02/27/20 2300 (!) 90/55 -- -- 109 16 96 % --   02/27/20 2200 102/67 -- -- 105 15 98 % --   02/27/20 2147 95/64 -- -- 102 17 96 % --   02/27/20 2121 -- -- -- 101 24 -- --   02/27/20 2100 (!) 87/57 -- -- 100 23 (P) 97 % --   02/27/20 2012 99/81 99.8 °F (37.7 °C) Temporal 100 14 96 % --   02/27/20 2000 94/64 -- -- 100 (P) 18 (P) 96 % --   02/27/20 1900 108/79 -- -- 102 17 94 % --   02/27/20 1840 (!) 93/58 97.7 °F (36.5 °C) Temporal 99 13 -- --   02/27/20 1823 (!) 93/58 -- -- 99 16 99 % --   02/27/20 1702 (!) 85/59 98 °F (36.7 °C) Temporal 95 14 98 % --   02/27/20 1404 (!) 87/51 97.5 °F (36.4 °C) Temporal 104 12 94 % --   02/27/20 1202 (!) 86/58 97.6 °F (36.4 °C) Temporal 99 16 98 % --   02/27/20 1100 (!) 94/56 -- -- 96 28 96 % --   02/27/20 1033 (!) 101/58 -- -- -- 24 -- --   02/27/20 1000 (!) 101/58 -- -- 104 21 98 % --     Patient Vitals for the past 96 hrs (Last 3 readings):   Weight   02/28/20 0400 203 lb 4.2 oz (92.2 kg)   02/27/20 0400 205 lb 0.4 oz (93 kg)   02/26/20 0056 198 lb 10.2 oz (90.1 kg)           Intake/Output Summary (Last 24 hours) at 2/28/2020 0825  Last data filed at 2/28/2020 0759  Gross per 24 hour   Intake 2265.34 ml   Output 4295 ml   Net -2029.66 ml         Physical Exam:   S1, S2 normal, no murmur, rub or gallop, regular rate and rhythm  Rales bilat  abdomen is soft without significant tenderness, masses, organomegaly or guarding  extremities normal, atraumatic, no cyanosis or edema    Labs:  Lab Results   Component Value Date    WBC 7.3 02/27/2020    HGB 13.1 (L) 02/27/2020    HCT 39.8 (L) 02/27/2020     02/27/2020 COMPARISON: February 24, 2020 HISTORY: ORDERING SYSTEM PROVIDED HISTORY: Chest Pain TECHNOLOGIST PROVIDED HISTORY: Reason for exam:->Chest Pain Reason for Exam: chest pain Acuity: Unknown Type of Exam: Unknown FINDINGS: Cardiomegaly is again noted. Vascular markings appear less engorged. ICD is in place. Left subclavian port is in position, tip in the distal SVC. No pneumothorax is noted. No focal area of consolidation is present. Cardiomegaly, with improved vascular congestion     Xr Chest Portable    Result Date: 2/24/2020  EXAMINATION: ONE XRAY VIEW OF THE CHEST 2/24/2020 2:37 pm COMPARISON: Chest x-ray dated 12/29/2019 HISTORY: ORDERING SYSTEM PROVIDED HISTORY: CP TECHNOLOGIST PROVIDED HISTORY: Reason for exam:->CP Reason for Exam: cp Acuity: Unknown Type of Exam: Unknown FINDINGS: LINES/TUBES/OTHER: Right-sided AICD is again noted and unchanged in position. Left subclavian port is again noted with its tip unchanged in position. HEART/MEDIASTINUM: The cardiac silhouette is enlarged, but stable. Ledon English PLEURA/LUNGS: There is left basilar atelectasis. There are no focal consolidations or pleural effusions. There is no appreciable pneumothorax. BONES/SOFT TISSUE: No acute abnormality. No acute pulmonary disease. Stable cardiomegaly. Ct Chest Pulmonary Embolism W Contrast    Result Date: 2/24/2020  EXAMINATION: CTA OF THE CHEST 2/24/2020 3:31 pm TECHNIQUE: CTA of the chest was performed after the administration of intravenous contrast.  Multiplanar reformatted images are provided for review. MIP images are provided for review. Dose modulation, iterative reconstruction, and/or weight based adjustment of the mA/kV was utilized to reduce the radiation dose to as low as reasonably achievable. COMPARISON: 12/01/2019.  HISTORY: ORDERING SYSTEM PROVIDED HISTORY: r/o PE TECHNOLOGIST PROVIDED HISTORY: Reason for exam:->r/o PE Reason for Exam: r/o PE Acuity: Unknown Type of Exam: Unknown FINDINGS: Pulmonary

## 2020-02-28 NOTE — PROGRESS NOTES
Nursing reassessment completed. Patient resting in bed with bilateral eyes closed. Remains on milrinone 0.2 mcg/kg/min. Occasionally requests prn pain and nausea medication. Gets up to side of bed to void. SR up x3. Call light in reach. Bed in lowest position. Wheels locked. Less anxiety tonight. Resting comfortably.

## 2020-02-28 NOTE — PLAN OF CARE
Problem: Pain:  Goal: Pain level will decrease  Description  Pain level will decrease  Outcome: Ongoing     Problem: Falls - Risk of:  Goal: Will remain free from falls  Description  Will remain free from falls  Outcome: Ongoing     Problem: HEMODYNAMIC STATUS  Goal: Patient has stable vital signs and fluid balance  Outcome: Ongoing     Problem: FLUID AND ELECTROLYTE IMBALANCE  Goal: Fluid and electrolyte balance are achieved/maintained  Outcome: Ongoing     Problem: ACTIVITY INTOLERANCE/IMPAIRED MOBILITY  Goal: Mobility/activity is maintained at optimum level for patient  Outcome: Ongoing

## 2020-02-28 NOTE — PROGRESS NOTES
Nephrology Progress Note        [phone]        [email or web address]          Assessment/Plan:    Non-oliguric ALEN (baseline Cr 0.8):   -Etiology of ALEN due to diminished renal perfusion (Vikki+ <20) from hypotension/ACEi/poor cardiac output, with likely exacerbation from MARYAN  -Cr improving daily with improved renal perfusion  -Continue milrinone gtt per Cardiology  -Continue to hold ACEi     Hypotension/Shock:  -S/p vasopressor support  -On milrinone support  -On midodrine TID  -Management per ICU Team     Hyponatremia:  -Likely due to diminished renal perfusion and hypervolemia (Vikki+ <20)  -Improving with milrinone gtt     Chest Pain:  -Etiology unclear (AICD interrogated and no firing per patient)  -Management per Cardiology     Colon Cancer:  -Management per Palliative     VTach:  -On amiodarone/digoxin  -Management per Cardiology     Cardiogenic Shock:  -On milrinone/lasix gtt  -Management per Cardiology          Katie Regalado MD, JANELLE  2/28/2020  [facility]                                                                                                                                                                                                                                                                                                    Consult: ALEN  Brief HPI: 52 y.o. male admitted for chest pain attributed to AICD firing (though now patient states interrogation is negative), with hospital course complicated by RRT for hypotension/cardiogenic shock      Subjective:  -No acute events overnight  -Remains on milrinone gtt  -Cr improving daily      Review of Systems: No new or active complaints  Social History: No visitors present at bedside      Medications:  Scheduled Meds:   furosemide  40 mg Intravenous BID    diazePAM  5 mg Oral TID    busPIRone  10 mg Oral BID    digoxin  125 mcg Oral Daily    pregabalin  75 mg Oral TID    DULoxetine  30 mg Oral Daily    amiodarone  200 mg Oral Daily    midodrine  5 mg Oral TID     sodium chloride flush  10 mL Intravenous 2 times per day    atorvastatin  40 mg Oral Nightly    aspirin  325 mg Oral Daily    enoxaparin  40 mg Subcutaneous Daily    famotidine  20 mg Oral Daily     Continuous Infusions:   norepinephrine Stopped (02/26/20 2335)    milrinone 0.2 mcg/kg/min (02/28/20 1040)     PRN Meds:.hydrOXYzine, morphine, morphine, melatonin, simethicone, sodium chloride flush, potassium chloride **OR** potassium alternative oral replacement **OR** potassium chloride, acetaminophen, acetaminophen, magnesium hydroxide, promethazine, ondansetron, nitroGLYCERIN        Vitals:  BP 95/65   Pulse 105   Temp 97.6 °F (36.4 °C) (Temporal)   Resp 16   Ht 5' 10\" (1.778 m)   Wt 203 lb 4.2 oz (92.2 kg)   SpO2 96%   BMI 29.17 kg/m²   I/O last 3 completed shifts: In: 1785.3 [P.O.:1320; I.V.:465.3]  Out: 4454 [Urine:4145; Stool:450]  I/O this shift:  In: 1200 [P.O.:1200]  Out: 1050 [Urine:1050]      Physical Exam:  Gen: NAD  HEENT: Sclera anicteric, MMM  Neck: Supple. No JVD  CV: RRR, S1/S2  Pulm: CTAB/L  Abd: Soft, NT, ND, (+)BS  Ext: 1+ edema in B/L LE  Neuro: Awake/Alert, Answers questions appropriately  Skin: Warm.  No visible rashes appreciated      Labs:  CBC with Differential:    Lab Results   Component Value Date    WBC 7.5 02/28/2020    RBC 4.23 02/28/2020    RBC 3.74 07/05/2017    HGB 13.3 02/28/2020    HCT 40.3 02/28/2020     02/28/2020    MCV 95.2 02/28/2020    MCH 31.3 02/28/2020    MCHC 32.9 02/28/2020    RDW 19.4 02/28/2020    SEGSPCT 69.7 12/19/2012    BANDSPCT 4 12/12/2019    METASPCT 1 11/18/2018    LYMPHOPCT 12.5 02/25/2020    LYMPHOPCT 14.8 07/05/2017    PROMYELOPCT 1 07/25/2017    MONOPCT 9.9 02/25/2020    MYELOPCT 2 10/10/2017    EOSPCT 4.6 02/20/2012    BASOPCT 0.7 02/25/2020    MONOSABS 0.7 02/25/2020    LYMPHSABS 0.9 02/25/2020    EOSABS 0.1 02/25/2020    BASOSABS 0.0 02/25/2020    DIFFTYPE Auto 12/19/2012     BMP:

## 2020-02-28 NOTE — PROGRESS NOTES
[address]   Progress Note  CHF/Pulmonary Hypertension Cardiology    Chief complaint: We are following this patient for acute on chronic systolic HF  HPI:  Brenda Moreau is a 53 yo male with a PMH of colon cancer, end stage HF, depression/anxiety who presented to the hospital after he thought his ICD had fired. Interrogation of his device did not show that it fired. He was recently in hospice for progressive heart failure and failure to take meds. However he improved. He was being more compliant with meds at home. He was admitted on the 24th. He had a rapid response on the floor after he became diaphoretic and hypotensive. He is currently on Levophed for low blood pressure. He has been started back on valium and feels better. He is apologetic to me for not taking his meds in the past.  His blood pressure normally runs around 100. Review of his optivol impedance shows that he is fluid overloaded.     He has a history of chronic systolic HF and rectal cancer. [facility] has a history of chronic narcotic use from all of his surgeries.     He has a history of sCHF, NICM, hx HTN, HLD and rectal cancer s/p chemo and XRT. [facility] 9/15/16 with no coronary artery disease, EF 15%, LVEDP 20-25 mmHg.  He had rectal surgery in January 2017 and has completed chemo and radiation. He had problems with pelvic abscess and had a drain placed.    He underwent ICD implant for primary prevention on 10/26/17. [facility] also underwent surgery for repair of ventral hernia and new colostomy on 12/1/17. He has had abscesses post-op that have been drained.         ROS:  Dana Barclay was moved to  today. He is getting morphine IV every 3 hours and valium. Complaining of chest pain. He has no reason to have chest pain from cardiac standpoint. He tells me that he was getting vicodin 2 -3 times a day with hospice and long acting morphine twice a day. I told him that we cannot manage his heart failure and give him narcotics.   We don't Date    PROBNP 9,253 (H) 02/24/2020    PROBNP 11,142 (H) 01/08/2020    PROBNP 7,513 (H) 01/04/2020     Lab Results   Component Value Date    ALT 52 (H) 02/25/2020    ALT 60 (H) 02/25/2020    AST 37 02/25/2020    AST 39 (H) 02/25/2020     Lab Results   Component Value Date    HGB 13.3 02/28/2020    HGB 13.1 02/27/2020    HCT 40.3 02/28/2020    HCT 39.8 02/27/2020     02/28/2020     02/27/2020     Lab Results   Component Value Date    TRIG 91 02/25/2020    TRIG 66 12/21/2019    HDL 38 02/25/2020    HDL 30 12/21/2019    HDL 36 02/20/2012    HDL 45 02/22/2011    LDLCALC 57 02/25/2020    LDLCALC 42 12/21/2019     Labs were reviewed including labs from other hospital systems through [facility]. Cardiac testing was reviewed including echos, nuclear scans, cardiac catheterization, including from other hospital systems through [facility]. Assessment:    1. Chest pain, unspecified type    2. Nonintractable headache, unspecified chronicity pattern, unspecified headache type    3. History of implantable cardiac defibrillator (ICD)    4. Tachycardia    5. History of colon cancer    6. Colostomy status ([facility] [address]     7. Hypotension due to heart failure  8. Decompensated HF:  He is fluid overloaded with low cardiac output  9. Acute renal failure:  Due to cardiorenal syndrome     Plan:  1.  continue milrinone 0.2 mcg/kg/min,   2.  stop IV fluids  3.  continue lasix 40 mg IV bid  4. Need to use milrinone a few days to improve cardiac output, then try to restart Entresto  5. Restart eplerenone   6. Continue digoxin  7. I discussed with him that his heart is very impaired. He seems to understand. However, he is much more reasonable and understandable than he has been in the last few months. 8.  he is taking morphine IV around the clock for chest pain. He has no reason to have chest pain. Says he was taking vicodin and morphine po at home through hospice.   Will stop IV morphine and give him po vicodin

## 2020-02-28 NOTE — PROGRESS NOTES
Patient with active chest pain and diaphoresis. Dr. Gruber Smiling on unit and notified. Stat EKG done, no changes from previous. Patient placed on 2L O2 for comfort.

## 2020-02-28 NOTE — FLOWSHEET NOTE
02/28/20 0654   Vital Signs   Temp 97.4 °F (36.3 °C)   Temp Source Temporal   Pulse 105   Heart Rate Source Monitor   Resp 16   /70   BP Location Left upper arm   BP Upper/Lower Upper   MAP (mmHg) 80   Patient Position Semi fowlers   Level of Consciousness 0   MEWS Score 2   Patient Currently in Pain Yes   Pain Assessment   Pain Assessment 0-10   Pain Level 7   Pain Location Generalized   Pain Type Chronic pain   Non-Pharmaceutical Pain Intervention(s) Rest;Repositioned;Distraction   Oxygen Therapy   SpO2 99 %   Pulse Oximeter Device Mode Intermittent   Pulse Oximeter Device Location Finger   O2 Device None (Room air)   Pt transferred to room 3323. Pt denies any needs at this time. Pt alert and oriented x4. Pt refuses bed alarm. Non-skid socks on. Pt resting in bed with no signs of distress. Will continue to monitor. Call light within reach.

## 2020-02-29 NOTE — PROGRESS NOTES
Pts BP sustaining in the 80's/50's. Verbal orders from Dr. Aurea Quintana to stop Milrinone drip and to hold tonight's IV lasix. Also verbal orders to give 250cc bolus if patients BP remains below 90 systolic.  Will continue to monitor

## 2020-02-29 NOTE — PROGRESS NOTES
HGB 12.9 02/29/2020    HGB 13.3 02/28/2020    HCT 39.4 02/29/2020    HCT 40.3 02/28/2020     02/29/2020     02/28/2020     Lab Results   Component Value Date    TRIG 91 02/25/2020    TRIG 66 12/21/2019    HDL 38 02/25/2020    HDL 30 12/21/2019    HDL 36 02/20/2012    HDL 45 02/22/2011    LDLCALC 57 02/25/2020    LDLCALC 42 12/21/2019     Labs were reviewed including labs from other hospital systems through [facility]. Cardiac testing was reviewed including echos, nuclear scans, cardiac catheterization, including from other hospital systems through [facility]. Assessment:    1. Chest pain, unspecified type    2. Nonintractable headache, unspecified chronicity pattern, unspecified headache type    3. History of implantable cardiac defibrillator (ICD)    4. Tachycardia    5. History of colon cancer    6. Colostomy status ([facility] [address]     7. Hypotension due to heart failure  8. Decompensated HF:  He is fluid overloaded with low cardiac output  9. Acute renal failure:  Due to cardiorenal syndrome     Plan:  1.  decrease milrinone 0.1 mcg/kg/min,   2.  stop IV fluids  3.  continue lasix 40 mg IV bid  4.  restart entresto 24/26 1/2 tab bid today  5.  continue eplerenone   6. Continue digoxin  7. I discussed with him that his heart is very impaired. He seems to understand. However, he is much more reasonable and understandable than he has been in the last few months. 8.  he is taking morphine IV around the clock for chest pain. He has no reason to have chest pain. Says he was taking vicodin and morphine po at home through hospice. Will stop IV morphine and give him po vicodin and MS contin. However, I don't know who is going to prescribe these on discharge. 9.  I told him that the narcotics are preventing us from treating his heart failure effectively. He needs to decide which way he wants to go.     Increase ambulation  Hopefully home in 2-3 days     NYHA Class: 3-4    Ashley Scott MD, 2/29/2020 10:50 AM

## 2020-02-29 NOTE — PROGRESS NOTES
PATCH REMOVAL 1 patch Pt replaces patch weekly on Fridays      HYDROcodone-acetaminophen (NORCO) 5-325 MG per tablet Take 1-2 tablets by mouth every 4 hours as needed for Pain.  eplerenone (INSPRA) 25 MG tablet Take 1 tablet by mouth daily 90 tablet 3    lisinopril (PRINIVIL;ZESTRIL) 5 MG tablet Take 1 tablet by mouth daily 90 tablet 3    digoxin (LANOXIN) 125 MCG tablet Take 1 tablet by mouth daily 90 tablet 3    simethicone (MYLICON) 80 MG chewable tablet Take 80 mg by mouth every 6 hours as needed for Flatulence      ranitidine (ZANTAC) 150 MG tablet Take 150 mg by mouth daily      melatonin 3 MG TABS tablet Take 10 mg by mouth nightly as needed       lovastatin (MEVACOR) 40 MG tablet Take 40 mg by mouth. Take 1 tablet (40 mg total) by mouth at bedtime. ALLERGIES:    Allergies   Allergen Reactions    Vancomycin Hives and Swelling     Throat swells    Bactrim [Sulfamethoxazole-Trimethoprim] Other (See Comments)     Thrush    Corn-Containing Products      Patient tells me his throat swells when he eats a large amount of corn or corn products. He can tolerate products with corn oil and corn syrup.  Pt. Reports gets \"congestion\"    Seasonal        PAST SURGICAL HISTORY:    Past Surgical History:   Procedure Laterality Date    ABDOMEN SURGERY      ABSCESS DRAINAGE      BREAST SURGERY Right 1/21/2019    RIGHT BREAST EXCISIONAL BIOPSY performed by Maricruz Diaz MD at [facility]  09/14/2016    No stents placed   [facility]  10/2017    COLECTOMY      COLONOSCOPY  08/29/2016    with biopsy and polypectomy    COLONOSCOPY  03/19/2018    COLONOSCOPY N/A 5/14/2019    COLONOSCOPY WITH DILATION performed by Sixto Yeh MD at [address] [place] [address])  09/14/2016    for staging - Dr. Ronaldo Espinoza, COLON, DIAGNOSTIC     [address]  12/01/2017    peristomal hernia repair   Peter Correa HERNIA REPAIR  2018    S/P: colostomy closure with hernia repair with mesh, with Dr. Yoandy Eid at [facility].  INNER EAR SURGERY  tube right ear    JOINT REPLACEMENT      left TKR    KNEE ARTHROSCOPY Right [id number]    KNEE ARTHROSCOPY Right 2014    medial meniscus    KNEE SURGERY  2011    removal of tibial component    OTHER SURGICAL HISTORY  13    ACL Rt knee meniscus repair synovectomy    OTHER SURGICAL HISTORY  2017    takedown of ileostomy    PACEMAKER PLACEMENT      icd 10/2017    REVISION COLOSTOMY  2018    S/P: colostomy closure with hernia repair with mesh, with Dr. Yoandy Eid at [facility].  SHOULDER ARTHROSCOPY Right 9/22/15    SUBACROMIAL DECOMPRESSION, DISTAL CLAVICLE EXCISION, LABRAL DEBRIDEMETN    SIGMOIDOSCOPY N/A 2018    SIGMOIDOSCOPY BIOPSY FLEXIBLE performed by Yolanda Hyatt MD at [facility]  4 surgeries    SMALL INTESTINE SURGERY  2017    LAPAROSCOPIC LOW ANTERIOR RESECTION, ILEOSTOMY    SMALL INTESTINE SURGERY  2017    small bowel resection    SMALL INTESTINE SURGERY N/A 2019    OPEN COLOSTOMY AND PARASTOMAL HERNIA REPAIR WITH MESH performed by Tiki Faria MD at [facility] TUNNELED VENOUS PORT PLACEMENT Left 2017    PORT-A-CATH INSERTION                     TUNNELED VENOUS PORT PLACEMENT      UMBILICAL HERNIA REPAIR  14    UPPER GASTROINTESTINAL ENDOSCOPY  10/03/2017       FAMILY HISTORY:    family history includes Cancer in his father and mother; High Blood Pressure in his father.     SOCIAL HISTORY:    Social History     Tobacco Use    Smoking status: Former Smoker     Packs/day: 1.00     Years: 5.00     Pack years: 5.00     Last attempt to quit: 2007     Years since quittin.1    Smokeless tobacco: Former User     Types: Chew     Quit date: 8/10/2017   Substance Use Topics    Alcohol use: No     Comment: history of alcohol abuse 20 years ago    Drug use: No       LABS:  WBC:    Lab Results   Component Value Date    WBC 7.1 02/29/2020     H/H:    Lab Results   Component Value Date    HGB 12.9 02/29/2020    HCT 39.4 02/29/2020     BMP:    Lab Results   Component Value Date     02/29/2020    K 4.6 02/29/2020    K 4.5 02/27/2020    CL 98 02/29/2020    CO2 26 02/29/2020    BUN 15 02/29/2020    LABALBU 3.4 02/25/2020    CREATININE 0.9 02/29/2020    CALCIUM 8.4 02/29/2020    GFRAA >60 02/29/2020    GFRAA >60 12/19/2012    LABGLOM >60 02/29/2020    GLUCOSE 96 02/29/2020    GLUCOSE 131 07/05/2017     PTT:    Lab Results   Component Value Date    APTT 39.5 02/25/2020   [APTT}  PT/INR:    Lab Results   Component Value Date    PROTIME 17.5 02/25/2020    INR 1.50 02/25/2020       Objective    BP 96/68   Pulse 102   Temp 97.7 °F (36.5 °C) (Temporal)   Resp 16   Ht 5' 10\" (1.778 m)   Wt 203 lb (92.1 kg) Comment: 2 pillows, sheet, hob flat  SpO2 98%   BMI 29.13 kg/m²     Matt Risk Score Matt Scale Score: 20    Patient Active Problem List   Diagnosis Code    S/P total knee replacement using cement Z96.659    Mixed hyperlipidemia E78.2    Essential hypertension I10    Depression J00.9    Umbilical hernia X41.9    Lower abdominal pain R10.30    Rectal mass K62.89    Hepatitis K75.9    Localized edema R60.0    SOB (shortness of breath) Q91.17    Acute systolic heart failure (HCC) I50.21    NICM (nonischemic cardiomyopathy) (HCC) I42.8    Abnormal nuclear cardiac imaging test R93.1    VT (ventricular tachycardia) (HCC) I47.2    Rectal cancer (HCC) C20    Neoplasm related pain N41.4    Systolic CHF, chronic (HCC) I50.22    Cardiomyopathy, idiopathic (HCC) I42.8    Poor venous access I87.8    Anemia D64.9    Insomnia due to medical condition G47.01    Chest pain R07.9    Dyspnea on exertion R06.09    Peristomal hernia K46.9    Episodic lightheadedness R42    ICD (implantable cardioverter-defibrillator) in place Z95.810   

## 2020-02-29 NOTE — PROGRESS NOTES
Tunneled venous port placement; hernia repair; Upper gastrointestinal endoscopy (10/03/2017); hernia repair (12/01/2017); Small intestine surgery (12/01/2017); other surgical history (12/01/2017); Colonoscopy (03/19/2018); Revision Colostomy (04/18/2018); hernia repair (04/18/2018); sigmoidoscopy (N/A, 11/23/2018); Breast surgery (Right, 1/21/2019); Abdomen surgery; Endoscopy, colon, diagnostic; Cardiac defibrillator placement (10/2017); Colonoscopy (N/A, 5/14/2019); pacemaker placement; and Small intestine surgery (N/A, 8/21/2019). Leti Mcdonnell He  reports that he quit smoking about 13 years ago. He has a 5.00 pack-year smoking history. He quit smokeless tobacco use about 2 years ago. His smokeless tobacco use included chew. He reports that he does not drink alcohol or use drugs. .        Active Hospital Problems    Diagnosis Date Noted    Acute on chronic systolic and diastolic heart failure, NYHA class 4 (HCC) [I50.43] 06/01/2019    Colon cancer ([facility] [address] [C18.9] 04/29/2019    Chest pain [W17.4]     Systolic CHF, chronic ([facility] [address] [I50.22] 11/30/2016    Depression [F32.9] 08/24/2011    Essential hypertension [I10]        Current Facility-Administered Medications: eplerenone (INSPRA) tablet 25 mg, 25 mg, Oral, Daily  HYDROcodone-acetaminophen (NORCO) 5-325 MG per tablet 2 tablet, 2 tablet, Oral, Q6H PRN  morphine (MS CONTIN) extended release tablet 15 mg, 15 mg, Oral, 2 times per day  furosemide (LASIX) injection 40 mg, 40 mg, Intravenous, BID  norepinephrine (LEVOPHED) 16 mg in dextrose 5% 250 mL infusion, 0.03 mcg/kg/min, Intravenous, Continuous  hydrOXYzine (ATARAX) tablet 25 mg, 25 mg, Oral, Q6H PRN  diazePAM (VALIUM) tablet 5 mg, 5 mg, Oral, TID  milrinone (PRIMACOR) 20 mg in dextrose 5 % 100 mL infusion, 0.2 mcg/kg/min, Intravenous, Continuous  busPIRone (BUSPAR) tablet 10 mg, 10 mg, Oral, BID  melatonin tablet 9 mg, 9 mg, Oral, Nightly PRN  simethicone (MYLICON) chewable tablet 80 mg, 80 mg, Oral, Q6H PRN  digoxin 104 16 98 % --   02/28/20 1615 -- -- -- 99 -- -- --   02/28/20 1440 (!) 97/57 97.4 °F (36.3 °C) Oral 111 16 97 % --   02/28/20 1350 -- -- -- 105 -- -- --   02/28/20 1215 95/65 97.6 °F (36.4 °C) Temporal 105 16 96 % --   02/28/20 1201 -- -- -- 115 -- -- --   02/28/20 0956 -- -- -- 111 -- -- --   02/28/20 0854 -- -- -- 110 -- -- --     Patient Vitals for the past 96 hrs (Last 3 readings):   Weight   02/29/20 0430 203 lb (92.1 kg)   02/29/20 0425 207 lb (93.9 kg)   02/28/20 0400 203 lb 4.2 oz (92.2 kg)           Intake/Output Summary (Last 24 hours) at 2/29/2020 2624  Last data filed at 2/29/2020 7294  Gross per 24 hour   Intake 2270 ml   Output 3050 ml   Net -780 ml         Physical Exam:   S1, S2 normal, no murmur, rub or gallop, regular rate and rhythm  Rales bilat  abdomen is soft without significant tenderness, masses, organomegaly or guarding  1+ edema    Labs:  Lab Results   Component Value Date    WBC 7.1 02/29/2020    HGB 12.9 (L) 02/29/2020    HCT 39.4 (L) 02/29/2020     02/29/2020    CHOL 113 02/25/2020    TRIG 91 02/25/2020    HDL 38 (L) 02/25/2020    ALT 52 (H) 02/25/2020    AST 37 02/25/2020     (L) 02/29/2020    K 4.6 02/29/2020    CL 98 (L) 02/29/2020    CREATININE 0.9 02/29/2020    BUN 15 02/29/2020    CO2 26 02/29/2020    TSH 2.49 12/12/2019    PSA 0.55 02/20/2012    INR 1.50 (H) 02/25/2020    LABA1C 5.5 06/21/2013    LABMICR YES 02/26/2020     Lab Results   Component Value Date    CKTOTAL 129 09/06/2016    TROPONINI <0.01 02/26/2020       Recent Imaging Results are Reviewed:  Ct Head Wo Contrast    Result Date: 2/24/2020  EXAMINATION: CT OF THE HEAD WITHOUT CONTRAST  2/24/2020 3:31 pm TECHNIQUE: CT of the head was performed without the administration of intravenous contrast. Dose modulation, iterative reconstruction, and/or weight based adjustment of the mA/kV was utilized to reduce the radiation dose to as low as reasonably achievable.  COMPARISON: 12/11/2019 HISTORY: [address] PROVIDED HISTORY: HA TECHNOLOGIST PROVIDED HISTORY: Reason for exam:->HA Has a \"code stroke\" or \"stroke alert\" been called? ->No Reason for Exam: HA Acuity: Unknown Type of Exam: Unknown FINDINGS: BRAIN/VENTRICLES: There is no acute intracranial hemorrhage, mass effect or midline shift. No abnormal extra-axial fluid collection. The gray-white differentiation is maintained without evidence of an acute infarct. There is no evidence of hydrocephalus. ORBITS: The visualized portion of the orbits demonstrate no acute abnormality. SINUSES: Mild mucosal thickening of the bilateral maxillary sinuses without air-fluid level. Left mastoid air cells are clear. Chronic mastoid scratch the chronic right mastoid opacification is noted without significant change since prior. SOFT TISSUES/SKULL:  No acute abnormality of the visualized skull or soft tissues. No acute intracranial abnormality. Xr Chest Portable    Result Date: 2/26/2020  EXAMINATION: ONE XRAY VIEW OF THE CHEST 2/25/2020 11:41 pm COMPARISON: February 24, 2020 HISTORY: ORDERING SYSTEM PROVIDED HISTORY: Chest Pain TECHNOLOGIST PROVIDED HISTORY: Reason for exam:->Chest Pain Reason for Exam: chest pain Acuity: Unknown Type of Exam: Unknown FINDINGS: Cardiomegaly is again noted. Vascular markings appear less engorged. ICD is in place. Left subclavian port is in position, tip in the distal SVC. No pneumothorax is noted. No focal area of consolidation is present. Cardiomegaly, with improved vascular congestion     Xr Chest Portable    Result Date: 2/24/2020  EXAMINATION: ONE XRAY VIEW OF THE CHEST 2/24/2020 2:37 pm COMPARISON: Chest x-ray dated 12/29/2019 HISTORY: ORDERING SYSTEM PROVIDED HISTORY: CP TECHNOLOGIST PROVIDED HISTORY: Reason for exam:->CP Reason for Exam: cp Acuity: Unknown Type of Exam: Unknown FINDINGS: LINES/TUBES/OTHER: Right-sided AICD is again noted and unchanged in position.  Left subclavian port is again noted with its tip unchanged Stable. Plan: Continue present orders/plan. Depression -Established problem. Stable. Mood appears stable  Plan: Continue present orders/plan. Chest pain -Established problem, now resolved. Plan: Continue present orders/plan. Colon cancer ([facility] [address] -Established problem. Stable. Pt has removed self from hospice. I am still not sure he understands difference between hospice and palliative care  Plan: pt plans to enroll with outpt palliative care        Disp - would cut down on benzos and narcotics. He wants to take them at hospice doses without being in hospice.           Jasmina Bertrand  2/29/2020

## 2020-02-29 NOTE — PROGRESS NOTES
Pt standing weight x 2 read 207 lbs. Pt did not believe this to be accurate. Pt weighed in bed as he had been this hospital stay, weight 203 lbs- similar to previous weight in bed.

## 2020-03-01 NOTE — PROGRESS NOTES
Nephrology Attending  Progress Note        SUMMARY :  We are following this patient for ALEN  52 y.o. male admitted for chest pain attributed to AICD firing (though now patient states interrogation is negative), with hospital course complicated by RRT for hypotension/cardiogenic shock    SUBJECTIVE:   Patient progress reviewed. The patient is having problems with urination , no dysuria , feels he is not emptying completely     Getting IV Furosemide and milrinone     Physical Exam:    VITALS:  BP 98/66   Pulse 91   Temp 96.8 °F (36 °C) (Temporal)   Resp 16   Ht 5' 10\" (1.778 m)   Wt 212 lb 6.4 oz (96.3 kg)   SpO2 98%   BMI 30.48 kg/m²   BLOOD PRESSURE RANGE:  Systolic (04CBT), SKQ:46 , Min:67 , ENDY:288   ; Diastolic (01KRW), RLX:71, Min:54, Max:82    24HR INTAKE/OUTPUT:      Intake/Output Summary (Last 24 hours) at 3/1/2020 1105  Last data filed at 3/1/2020 0954  Gross per 24 hour   Intake 840 ml   Output 1550 ml   Net -710 ml       Gen:  alert, oriented x 3  PERRL , EOM +  Pallor +, No icterus  JVP not raised   CV: RRR no murmur or rub . Defibrillator Rt side   Lungs:B/ L air entry, Normal breath sounds   Abd: soft, bowel sounds + , No organomegaly , Ostomy Rt side   Ext: No edema, no cyanosis  Skin: Warm. No rash, Tatoos   : No TTP over bladder, nondistended. Neuro: nonfocal.  Joints: No erythema noted over joints.     DATA:    CBC with Differential:    Lab Results   Component Value Date    WBC 6.4 03/01/2020    RBC 4.20 03/01/2020    RBC 3.74 07/05/2017    HGB 13.3 03/01/2020    HCT 40.2 03/01/2020     03/01/2020    MCV 95.7 03/01/2020    MCH 31.7 03/01/2020    MCHC 33.1 03/01/2020    RDW 19.6 03/01/2020    SEGSPCT 69.7 12/19/2012    BANDSPCT 4 12/12/2019    METASPCT 1 11/18/2018    LYMPHOPCT 12.2 03/01/2020    LYMPHOPCT 14.8 07/05/2017    PROMYELOPCT 1 07/25/2017    MONOPCT 15.0 03/01/2020    MYELOPCT 2 10/10/2017    EOSPCT 4.6 02/20/2012    BASOPCT 1.2 03/01/2020    MONOSABS 1.0 03/01/2020

## 2020-03-01 NOTE — PROGRESS NOTES
Progress Note - Dr. Rubio Marrow - Internal Medicine  PCP: Dora Anne, [address] / [facility] [phone]    Hospital Day: 6  Code Status: Full Code  Current Diet: DIET CARDIAC; No Caffeine        CC: follow up on medical issues    Subjective:   Marcelo Stokes is a 52 y.o. male. He denies new problems    Doing about the same  Pt still wants more pain meds  Have again advised him that if he wishes that, he should enroll in hospice and remain there  If we are to treat aggressively, I refuse to give him pain meds to the point where he is sedated around the clock    Remains on Iv meds per cards    OAARS reviewed - pt picked up pain meds 2/19. Was admitted 2/24. He should have 10d left of long acting morphine. He might be out of Norco. At time of d/c, I will write 5d worth, and he needs to get in touch with his pain mgmt MD for further fills. He denies chest pain, complains of shortness of breath, denies nausea,  denies emesis. 10 system Review of Systems is reviewed with patient, and pertinent positives are listed here: None . Otherwise, Review of systems is negative. I have reviewed the patient's medical and social history in detail and updated the computerized patient record. To recap: He  has a past medical history of Allergic, Anesthesia, Arthritis, CAD (coronary artery disease), Cancer ([facility] [address] Chemotherapy adverse reaction, CHF (congestive heart failure) ([facility] [address] Chronic knee pain, Clostridium difficile infection, Depression motion, Diverticulitis, Diverticulosis, History of alcohol abuse, Hyperlipidemia, Hypertension, Irregular heart beat, Pneumonia, Right knee pain, Shoulder injury, and Sleep difficulties. . He  has a past surgical history that includes sinus surgery (4 surgeries); Inner ear surgery (tube right ear); knee surgery (08/24/2011); other surgical history (7/1/13); Knee arthroscopy (Right, [id number]); Knee arthroscopy (Right, 8/4/2014);  Umbilical hernia repair (11/11/14); joint replacement; Shoulder arthroscopy (Right, 9/22/15); Colonoscopy (08/29/2016); Endoscopic ultrasonography, GI (09/14/2016); Cardiac catheterization (09/14/2016); Small intestine surgery (01/11/2017); Tunneled venous port placement (Left, 02/13/2017); colectomy; Abscess Drainage; Tunneled venous port placement; hernia repair; Upper gastrointestinal endoscopy (10/03/2017); hernia repair (12/01/2017); Small intestine surgery (12/01/2017); other surgical history (12/01/2017); Colonoscopy (03/19/2018); Revision Colostomy (04/18/2018); hernia repair (04/18/2018); sigmoidoscopy (N/A, 11/23/2018); Breast surgery (Right, 1/21/2019); Abdomen surgery; Endoscopy, colon, diagnostic; Cardiac defibrillator placement (10/2017); Colonoscopy (N/A, 5/14/2019); pacemaker placement; and Small intestine surgery (N/A, 8/21/2019). Cain Greerpam He  reports that he quit smoking about 13 years ago. He has a 5.00 pack-year smoking history. He quit smokeless tobacco use about 2 years ago. His smokeless tobacco use included chew. He reports that he does not drink alcohol or use drugs. .        Active Hospital Problems    Diagnosis Date Noted    Acute on chronic systolic and diastolic heart failure, NYHA class 4 (HCC) [I50.43] 06/01/2019    Colon cancer ([facility] [address] [C18.9] 04/29/2019    Chest pain [N15.9]     Systolic CHF, chronic (HCC) [I50.22] 11/30/2016    Depression [F32.9] 08/24/2011    Essential hypertension [I10]        Current Facility-Administered Medications: benzocaine-menthol (CEPACOL SORE THROAT) lozenge 1 lozenge, 1 lozenge, Oral, Q2H PRN  sacubitril-valsartan (ENTRESTO) 24-26 MG per tablet 0.5 tablet, 0.5 tablet, Oral, BID  tamsulosin (FLOMAX) capsule 0.4 mg, 0.4 mg, Oral, Daily  eplerenone (INSPRA) tablet 25 mg, 25 mg, Oral, Daily  HYDROcodone-acetaminophen (NORCO) 5-325 MG per tablet 2 tablet, 2 tablet, Oral, Q6H PRN  morphine (MS CONTIN) extended release tablet 15 mg, 15 mg, Oral, 2 times per day  furosemide (LASIX) injection 40 mg, 40

## 2020-03-01 NOTE — PROGRESS NOTES
Assessment completed. Please see flowsheets. Pt resting quietly in bed. No needs stated at this time. Call light within reach.  Will continue to monitor

## 2020-03-01 NOTE — PROGRESS NOTES
BP 92/59. Its his base line per pt. Asymptomatic. Night time meds given. Pt denied any needs this time.

## 2020-03-02 NOTE — PROGRESS NOTES
Initial assessment completed, VSS, afebrile, pt currently reports 8/10 pain to abdomen, aching in nature, scheduled MC contin and Lyrica administered per order. Pt states he takes Lyrica, MS contin and diazepam at home all at once, this RN verbalized concerns re: potential drop in BP but pt request to receive all meds which was done at this time, will continue to monitor BP closely. L chest wall port flushed with blood return, intact. POC discussed with pt, questions/concerns addressed at this time, fall px in place, call light within reach, will continue to monitor.

## 2020-03-02 NOTE — DISCHARGE INSTR - COC
([facility] E43    PNA (pneumonia) J18.9    Biliary obstruction P16.0    Metabolic encephalopathy V53.53    Choledocholithiasis K80.50    Hyponatremia G10.2    Systolic CHF, acute (HCC) I50.21    C. difficile colitis A04.72    ALEN (acute kidney injury) ([facility] N17.9    Acute liver failure without hepatic coma K72.00    Altered mental status R41.82    Sepsis (HCC) A41.9    Elevated INR R79.1    Acute respiratory failure with hypoxia (HCC) J96.01    Hypokalemia E87.6    Overweight E66.3    H/O alcohol abuse F10.11    Acute encephalopathy G93.40    Alcohol abuse counseling and surveillance Z71.41    Acute cerebral infarction ([facility] I63.9    AICD discharge Z45.02       Isolation/Infection:   Isolation          No Isolation        Patient Infection Status     Infection Onset Added Last Indicated Last Indicated By Review Planned Expiration Resolved Resolved By    None active    Resolved    C-diff Rule Out  12/21/19 12/21/19 Clostridium difficile toxin/antigen (Ordered)   12/22/19 Rule-Out Order Resulted          Nurse Assessment:  Last Vital Signs: /75   Pulse 112   Temp 97.4 °F (36.3 °C) (Oral)   Resp 16   Ht 5' 10\" (1.778 m)   Wt 214 lb (97.1 kg)   SpO2 98%   BMI 30.71 kg/m²     Last documented pain score (0-10 scale): Pain Level: 8  Last Weight:   Wt Readings from Last 1 Encounters:   03/02/20 214 lb (97.1 kg)     Mental Status:  {IP PT MENTAL STATUS:16910}    IV Access:  { JOSE IV ACCESS:893171870}    Nursing Mobility/ADLs:  Walking   {P DME CGZF:159302920}  Transfer  {P DME DOIH:712486810}  Bathing  {P DME MWRR:821870756}  Dressing  {P DME ASNR:371271223}  Toileting  {P DME PEFS:753876734}  Feeding  {[facility] DME DTZN:288461604}  Med Admin  {[facility] DME XMDL:186568947}  Med Delivery   {[facility] [place] Delivery:235889705}    Wound Care Documentation and Therapy:        Elimination:  Continence:   · Bowel: {YES / CP:08965}  · Bladder: {YES / WW:37491}  Urinary Catheter: {Urinary Catheter:936133230} 2/24/2020    Readmission Risk Assessment Score:  Readmission Risk              Risk of Unplanned Readmission:        46       You were referred to [facility]- if you do not hear from them within the next couple of days please contact them at [phone]. Discharging to Facility/ Agency   Name: Eliane Barney will call for Appointment  Phone: [phone]  Fax: [phone]    Dialysis Facility (if applicable)   · Name:  · Address:  · Dialysis Schedule:  · Phone:  · Fax:    / signature: Denisa Cisse, RN, BSN  [phone]       PHYSICIAN SECTION    Prognosis: guarded    Condition at Discharge: fair    Rehab Potential (if transferring to Rehab):     Recommended Labs or Other Treatments After Discharge:     Physician Certification: I certify the above information and transfer of Lety Rojas  is necessary for the continuing treatment of the diagnosis listed and that he requires home care for 30 days.      Update Admission H&P: updated    PHYSICIAN SIGNATURE:  Uriel Mohan MD on 03/02/20

## 2020-03-02 NOTE — PLAN OF CARE
Problem: Pain:  Goal: Pain level will decrease  Description  Pain level will decrease  Outcome: Ongoing  Goal: Control of acute pain  Description  Control of acute pain  Outcome: Ongoing  Goal: Control of chronic pain  Description  Control of chronic pain  Outcome: Ongoing     Problem: Falls - Risk of:  Goal: Will remain free from falls  Description  Will remain free from falls  Outcome: Ongoing  Goal: Absence of physical injury  Description  Absence of physical injury  Outcome: Ongoing     Problem: OXYGENATION/RESPIRATORY FUNCTION  Goal: Patient will maintain patent airway  Outcome: Ongoing  Goal: Patient will achieve/maintain normal respiratory rate/effort  Description  Respiratory rate and effort will be within normal limits for the patient  Outcome: Ongoing     Problem: HEMODYNAMIC STATUS  Goal: Patient has stable vital signs and fluid balance  Outcome: Ongoing  Note:   Cardiology follows. PO Lasix. EF 15%. On tele monitoring rate and rhythm. Intake and output recorded. Daily lab monitor.       Problem: FLUID AND ELECTROLYTE IMBALANCE  Goal: Fluid and electrolyte balance are achieved/maintained  Outcome: Ongoing     Problem: ACTIVITY INTOLERANCE/IMPAIRED MOBILITY  Goal: Mobility/activity is maintained at optimum level for patient  Outcome: Ongoing

## 2020-03-02 NOTE — CARE COORDINATION
Received call back from [facility] palliative care and they do not take pt's insurance, sent referral to [facility] at [phone].   Donta Denis RN, BSN  [phone]

## 2020-03-02 NOTE — PROGRESS NOTES
D/C instructions discussed with pt, new prescriptions and their side effects/purpose were explained to pt who verbalized understanding. Pt disconnected from tele at this time.   Will continue to monitor,

## 2020-03-02 NOTE — PROGRESS NOTES
Occupational Therapy   Occupational Therapy Initial Assessment/Discharge Summary  Date: 3/2/2020   Patient Name: Jaylin Haney  MRN: [id number]     : 1970    Date of Service: 3/2/2020    Discharge Recommendations: Jaylin Haney scored a 24/ on the AM-[facility] ADL Inpatient form. At this time, no further OT is recommended upon discharge due to patient near baseline. Recommend patient returns to prior setting with prior services. OT Equipment Recommendations  Equipment Needed: No    Assessment   Assessment: Pt near baseline level of function; no OT indicated at this time  Treatment Diagnosis: Above deficits associated with AICD discharge  Decision Making: Low Complexity  Exam: as above  OT Education: OT Role;Plan of Care  Patient Education: eval, discharge - pt verbalizes understanding  REQUIRES OT FOLLOW UP: No  Activity Tolerance  Activity Tolerance: Patient Tolerated treatment well  Safety Devices  Safety Devices in place: Yes  Type of devices: Call light within reach;Nurse notified; Left in bed; All fall risk precautions in place;Gait belt           Patient Diagnosis(es): The primary encounter diagnosis was Chest pain, unspecified type. Diagnoses of Nonintractable headache, unspecified chronicity pattern, unspecified headache type, History of implantable cardiac defibrillator (ICD), Tachycardia, History of colon cancer, Colostomy status ([facility] and Malignant neoplasm of colon, unspecified part of colon [facility]) were also pertinent to this visit. has a past medical history of Allergic, Anesthesia, Arthritis, CAD (coronary artery disease), Cancer ([facility] Chemotherapy adverse reaction, CHF (congestive heart failure) ([facility] Chronic knee pain, Clostridium difficile infection, Depression motion, Diverticulitis, Diverticulosis, History of alcohol abuse, Hyperlipidemia, Hypertension, Irregular heart beat, Pneumonia, Right knee pain, Shoulder injury, and Sleep difficulties.    has a past surgical history that includes sinus surgery (4 surgeries); Inner ear surgery (tube right ear); knee surgery (08/24/2011); other surgical history (7/1/13); Knee arthroscopy (Right, 57251725); Knee arthroscopy (Right, 8/4/2014); Umbilical hernia repair (11/11/14); joint replacement; Shoulder arthroscopy (Right, 9/22/15); Colonoscopy (08/29/2016); Endoscopic ultrasonography, GI (09/14/2016); Cardiac catheterization (09/14/2016); Small intestine surgery (01/11/2017); Tunneled venous port placement (Left, 02/13/2017); colectomy; Abscess Drainage; Tunneled venous port placement; hernia repair; Upper gastrointestinal endoscopy (10/03/2017); hernia repair (12/01/2017); Small intestine surgery (12/01/2017); other surgical history (12/01/2017); Colonoscopy (03/19/2018); Revision Colostomy (04/18/2018); hernia repair (04/18/2018); sigmoidoscopy (N/A, 11/23/2018); Breast surgery (Right, 1/21/2019); Abdomen surgery; Endoscopy, colon, diagnostic; Cardiac defibrillator placement (10/2017); Colonoscopy (N/A, 5/14/2019); pacemaker placement; and Small intestine surgery (N/A, 8/21/2019). Treatment Diagnosis: Above deficits associated with AICD discharge      Restrictions  Restrictions/Precautions  Restrictions/Precautions: Fall Risk(high)  Required Braces or Orthoses?: No  Position Activity Restriction  Other position/activity restrictions: 53 yo male with a PMH of colon cancer, end stage HF, depression/anxiety who presented to the hospital after he thought his ICD had fired. Interrogation of his device did not show that it fired. He was recently in hospice for progressive heart failure and failure to take meds. However he improved. He was being more compliant with meds at home. He was admitted on the 24th. He had a rapid response on the floor after he became diaphoretic and hypotensive. He is currently on Levophed for low blood pressure. He has been started back on valium and feels better.   He is apologetic to me for not taking his meds in denies recent falls       Objective   Vision: Within Functional Limits  Hearing: Within functional limits    Orientation  Overall Orientation Status: Within Functional Limits     Balance  Sitting Balance: Independent  Standing Balance: Independent  Functional Mobility  Functional - Mobility Device: No device  Activity: Other  Assist Level: Independent  ADL  Feeding: Independent  Additional Comments: Declines all ADL  Tone RUE  RUE Tone: Normotonic  Tone LUE  LUE Tone: Normotonic  Coordination  Movements Are Fluid And Coordinated: Yes     Bed mobility  Supine to Sit: Modified independent  Scooting: Modified independent  Transfers  Sit to stand: Independent  Stand to sit:  Independent     Cognition  Overall Cognitive Status: WFL        Sensation  Overall Sensation Status: WFL        LUE AROM (degrees)  LUE AROM : WFL  RUE AROM (degrees)  RUE AROM : WFL                      Plan   Plan  Times per week: eval/dc    G-Code     OutComes Score                                                  AM-PAC Score        AM-[facility] Inpatient Daily Activity Raw Score: 24 (03/02/20 0933)  AM-PAC Inpatient ADL T-Scale Score : 57.54 (03/02/20 0933)  ADL Inpatient CMS 0-100% Score: 0 (03/02/20 0933)  ADL Inpatient CMS G-Code Modifier : [facility] (03/02/20 0407)    Goals  Short term goals  Time Frame for Short term goals: eval/dc       Therapy Time   Individual Concurrent Group Co-treatment   Time In [address] [place] [address]         Time Out 0853         Minutes 17            Timed Code Treatment Minutes:  0 Minutes    Total Treatment Minutes:  17 minutes    Carrillo Montejo OTR/L [id number]    Nerissa Betts OT

## 2020-03-02 NOTE — PROGRESS NOTES
[address], [address] [place] [address]  Ph: [phone]      Core Measures:   · Discharge instructions:   · LVEF documented:   · ACEI for LV dysfunction:   · Smoking Cessation:

## 2020-03-02 NOTE — PROGRESS NOTES
CLINICAL PHARMACY NOTE: MEDS TO [address] Select Patient?: No  Total # of Prescriptions Filled: 5   The following medications were delivered to the patient:  · Flomax 0.4mg  · Diazepam 5mg  · Hydroxyzine 25mg  · Buspirone 10mg  · Norco 5-325mg  Total # of Interventions Completed: 0  Time Spent (min): 45    Additional Documentation:    Delivered to Patient    Liv Starkey CPhT

## 2020-03-02 NOTE — CARE COORDINATION
Referral for palliative care sent to [facility] at fax number [phone].   Marixa Rothman RN, BSN  [phone]

## 2020-03-02 NOTE — PROGRESS NOTES
present at bedside      Medications:  Scheduled Meds:   furosemide  40 mg Oral BID    sacubitril-valsartan  0.5 tablet Oral BID    tamsulosin  0.4 mg Oral Daily    eplerenone  25 mg Oral Daily    morphine  15 mg Oral 2 times per day    diazePAM  5 mg Oral TID    busPIRone  10 mg Oral BID    digoxin  125 mcg Oral Daily    pregabalin  75 mg Oral TID    DULoxetine  30 mg Oral Daily    amiodarone  200 mg Oral Daily    midodrine  5 mg Oral TID WC    sodium chloride flush  10 mL Intravenous 2 times per day    atorvastatin  40 mg Oral Nightly    aspirin  325 mg Oral Daily    enoxaparin  40 mg Subcutaneous Daily    famotidine  20 mg Oral Daily     Continuous Infusions:   milrinone Stopped (02/29/20 1620)     PRN Meds:.benzocaine-menthol, HYDROcodone 5 mg - acetaminophen, hydrOXYzine, melatonin, simethicone, sodium chloride flush, potassium chloride **OR** potassium alternative oral replacement **OR** potassium chloride, acetaminophen, acetaminophen, magnesium hydroxide, promethazine, ondansetron, nitroGLYCERIN        Vitals:  /75   Pulse 112   Temp 97.4 °F (36.3 °C) (Oral)   Resp 16   Ht 5' 10\" (1.778 m)   Wt 214 lb (97.1 kg)   SpO2 98%   BMI 30.71 kg/m²   I/O last 3 completed shifts: In: 1200 [P.O.:1200]  Out: 1942 [Urine:1770]  I/O this shift:  In: -   Out: 150 [Urine:150]      Physical Exam:  Gen: NAD  HEENT: Sclera anicteric, MMM  Neck: Supple. No JVD  CV: RRR, S1/S2  Pulm: CTAB/L  Abd: Soft, NT, ND, (+)BS  Ext: 1+ edema in B/L LE  Neuro: Awake/Alert, Answers questions appropriately  Skin: Warm.  No visible rashes appreciated      Labs:  CBC with Differential:    Lab Results   Component Value Date    WBC 6.5 03/02/2020    RBC 4.17 03/02/2020    RBC 3.74 07/05/2017    HGB 13.1 03/02/2020    HCT 40.0 03/02/2020     03/02/2020    MCV 95.9 03/02/2020    MCH 31.5 03/02/2020    MCHC 32.9 03/02/2020    RDW 19.6 03/02/2020    SEGSPCT 69.7 12/19/2012    BANDSPCT 4 12/12/2019    METASPCT 1 11/18/2018    LYMPHOPCT 7.9 03/02/2020    LYMPHOPCT 14.8 07/05/2017    PROMYELOPCT 1 07/25/2017    MONOPCT 12.2 03/02/2020    MYELOPCT 2 10/10/2017    EOSPCT 4.6 02/20/2012    BASOPCT 1.1 03/02/2020    MONOSABS 0.8 03/02/2020    LYMPHSABS 0.5 03/02/2020    EOSABS 0.2 03/02/2020    BASOSABS 0.1 03/02/2020    DIFFTYPE Auto 12/19/2012     BMP:    Lab Results   Component Value Date     03/02/2020    K 5.5 03/02/2020    K 4.5 02/27/2020    CL 93 03/02/2020    CO2 29 03/02/2020    BUN 18 03/02/2020    LABALBU 3.4 02/25/2020    CREATININE 1.0 03/02/2020    CALCIUM 8.9 03/02/2020    GFRAA >60 03/02/2020    GFRAA >60 12/19/2012    LABGLOM >60 03/02/2020    GLUCOSE 114 03/02/2020    GLUCOSE 131 07/05/2017     U/A:    Lab Results   Component Value Date    COLORU DK YELLOW 02/26/2020    PROTEINU 100 02/26/2020    PHUR 5.0 02/26/2020    LABCAST 20-40 Hyaline 12/30/2019    WBCUA 1 02/26/2020    RBCUA 2 02/26/2020    BACTERIA 1+ 12/20/2019    CLARITYU CLOUDY 02/26/2020    SPECGRAV 1.024 02/26/2020    LEUKOCYTESUR Negative 02/26/2020    UROBILINOGEN 1.0 02/26/2020    BILIRUBINUR SMALL 02/26/2020    BILIRUBINUR NEGATIVE 02/22/2011    BLOODU Negative 02/26/2020    GLUCOSEU Negative 02/26/2020    GLUCOSEU NEGATIVE 02/22/2011

## 2020-03-02 NOTE — DISCHARGE SUMMARY
discomfort both in his chest as well as his head at the present time rates to be 10 out of 10.  It is because of this emergency medical services were summoned and the patient was brought to the ED for evaluation and treatment. Here, labs are reassuring, however pt continues to state he is unwell. EKG from ER is reviewed by self, it shows sinus tachycardia at 106. Device cannot be interrogated in ER at this time. Pt is well known to me from previous admissions; my last few d/c summaries are reveiwed. I thought that pt was enrolled in hospice at home, but pt states unequivocally that he has signed himself out as hospice and wishes to be aggressively treated. Per his wishes, he is admitted, treated with iv lasix. Does get transferred to ICU for poss unreponsiveness. It does appear that pt is over narcosed. His pain meds are cut down. Seen by the CHF team    Pt does require iv milrinone drip for a few days; this is later transitioned to oral entresto    Long discussion is held with pt. He is advised that if he wishes aggressive care, he cannot continue taking high doses of narcotics and benzos.     Advised to stop valium  Advised to stop norco PRN  To continue MS contin only    OARRS is reviewed, pt should have 10d worth of MS contin left  To see PCP for further rx for narcotics  Small rx for norco given so pt can taper off    At time of d/c, pt wants to go home with palliative care    Consults made during Hospitalization:  IP CONSULT TO INTERNAL MEDICINE  IP CONSULT TO INTERNAL MEDICINE  IP CONSULT TO CARDIOLOGY  IP CONSULT TO PALLIATIVE CARE  IP CONSULT TO NEPHROLOGY  IP CONSULT TO CRITICAL CARE  IP CONSULT  [address] CONSULT  [facility]    Treatment team at time of Discharge: Treatment Team: Attending Provider: Susie Bautista MD; Consulting Physician: Susie Zamora RN [facility]; Consulting Physician: Asmita Ashraf MD; Consulting Physician: Olive Taylor MD; Consulting Physician: Charo Flor MD; Registered Nurse: Donya Luna RN; Respiratory Therapist (Day): Sudhir Granados RCP    Imaging Results:  Ct Head Wo Contrast    Result Date: 2/24/2020  EXAMINATION: CT OF THE HEAD WITHOUT CONTRAST  2/24/2020 3:31 pm TECHNIQUE: CT of the head was performed without the administration of intravenous contrast. Dose modulation, iterative reconstruction, and/or weight based adjustment of the mA/kV was utilized to reduce the radiation dose to as low as reasonably achievable. COMPARISON: 12/11/2019 HISTORY: ORDERING SYSTEM PROVIDED HISTORY: HA TECHNOLOGIST PROVIDED HISTORY: Reason for exam:->HA Has a \"code stroke\" or \"stroke alert\" been called? ->No Reason for Exam: HA Acuity: Unknown Type of Exam: Unknown FINDINGS: BRAIN/VENTRICLES: There is no acute intracranial hemorrhage, mass effect or midline shift. No abnormal extra-axial fluid collection. The gray-white differentiation is maintained without evidence of an acute infarct. There is no evidence of hydrocephalus. ORBITS: The visualized portion of the orbits demonstrate no acute abnormality. SINUSES: Mild mucosal thickening of the bilateral maxillary sinuses without air-fluid level. Left mastoid air cells are clear. Chronic mastoid scratch the chronic right mastoid opacification is noted without significant change since prior. SOFT TISSUES/SKULL:  No acute abnormality of the visualized skull or soft tissues. No acute intracranial abnormality. Xr Chest Portable    Result Date: 2/26/2020  EXAMINATION: ONE XRAY VIEW OF THE CHEST 2/25/2020 11:41 pm COMPARISON: February 24, 2020 HISTORY: ORDERING SYSTEM PROVIDED HISTORY: Chest Pain TECHNOLOGIST PROVIDED HISTORY: Reason for exam:->Chest Pain Reason for Exam: chest pain Acuity: Unknown Type of Exam: Unknown FINDINGS: Cardiomegaly is again noted. Vascular markings appear less engorged. ICD is in place. Left subclavian port is in position, tip in the distal SVC.   No

## 2020-03-03 NOTE — CARE COORDINATION
[facility] Transitions Initial Follow Up Call    Call within 2 business days of discharge: Yes    Patient: Nic Avalos Patient : 1970   MRN: [id number]  Reason for Admission:   Discharge Date: 3/2/20 RARS: Readmission Risk Score: 51      Last Discharge [address] [address]       Complaint Diagnosis Description Type Department Provider    20 Chest Pain; Headache Chest pain, unspecified type . .. ED to Hosp-Admission (Discharged) (ADMITTED) [facility] Frankey Bis, MD; Alexis Dunaway...            Initial 24 hr call attempted, contact info left on       Follow Up  Future Appointments   Date Time Provider Adis Finnegan   3/9/2020  8:30 AM BENITEZ Lovell CNP  Cardio NATHAN   2020  9:30 AM SCHEDULE, [facility] TRANSMISSION  Cardio NATHAN Neves RN

## 2020-03-04 NOTE — CARE COORDINATION
This CM received vm today from [address] with Julianna Johnson stating that referral was received and they will be reaching out to the pt and getting him set up with services. If any questions she can be reached at [phone].   Donta Denis RN, BSN  [phone]

## 2020-03-04 NOTE — CARE COORDINATION
[facility] Transitions Initial Follow Up Call    Call within 2 business days of discharge: Yes    Patient: Keila Thompson Patient : 1970   MRN: [id number]  Reason for Admission:   Discharge Date: 3/2/20 RARS: Readmission Risk Score: 51      Last Discharge [facility]       Complaint Diagnosis Description Type Department Provider    20 Chest Pain; Headache Chest pain, unspecified type . .. ED to Hosp-Admission (Discharged) (ADMITTED) [facility] 3A Rizwan Wagner MD; Korey Arceo...        2nd attempt at an initial 24 hour call, contact info left on vm. PC made to [facility], they have the referral and will be reaching out to the pt today again, left vm yesterday.          Follow Up  Future Appointments   Date Time Provider Adis Finnegan   3/9/2020  8:30 AM Ben Fothergill, APRN - CNP  Cardio NATHAN   2020  9:30 AM SCHEDULE, [facility] TRANSMISSION  Cardio NATHAN Taveras RN

## 2020-03-05 NOTE — PATIENT INSTRUCTIONS
Instructions:   1. Medications: call Krysta Fernandes when you get home and let me know about lisinopril and entresto, if you are taking both we need to stop both for 3 days. Stop lasix and start torsemide 40mg in the morning and 20mg in the afternoon  2. Labs: today  3. Lifestyle Recommendations: Weigh yourself every day in the morning after urination, call Krysta Fernandes if wt increases 2-3lb in one day or 5lb in one week, Limit sodium to 2000mg/day and fluids to 2L or 64oz/day.    4. Follow up: 2 weeks      [facility]: [phone]

## 2020-03-05 NOTE — TELEPHONE ENCOUNTER
[address] FAILURE PROGRAM  TELEPHONE ENCOUNTER FORM    Pamela Bettye [address] 1970    Attempted to call patient for HF follow-up. No answer at this time.       Next MD/ Clinic appointment: 3/9 with Carlos MEAD 3/5/2020 2:54 PM

## 2020-03-06 NOTE — PROGRESS NOTES
[facility]   Congestive Heart Failure    Primary Care Doctor:  Jonathan Marquez MD    Chief Complaint:  SOB    History of Present Illness:  Flower Arceo is a 52 y.o. male with PMH colonc CA, colostomy, CAD, ICM, HFrEF (30-35%), ICD who presents today for hospital f/u. Flower Arceo was admitted 20 and discharged 20. Hospital course:admitted, treated with iv lasix. Does get transferred to ICU for poss unreponsiveness. It does appear that pt is over narcosed. His pain meds are cut down. Seen by the CHF team  Pt does require iv milrinone drip for a few days; this is later transitioned to oral entresto   Long discussion is held with pt. He is advised that if he wishes aggressive care, he cannot continue taking high doses of narcotics and benzos- he signed out of Hospice  Advised to stop valium  Advised to stop norco PRN  To continue MS contin only  OARRS is reviewed, pt should have 10d worth of MS contin left  To see PCP for further rx for narcotics  Small rx for norco given so pt can taper off  At time of d/c, pt wants to go home with palliative care    Since hospitalization he does not think lasix works as well as torsemide, states wt is going up- almost 20lb per his scale, 10 on ours compare to hosp scale. He c/o increased edema, dyspnea and bendopnea, anxiety and denies orthopnea, PND,  denies palpitations, exertional chest pressure/discomfort, early saiety, syncope. He has taken  40mg extra lasix a \"couple of times\". Optivol shows increased fluid index. D/C list has both entresto and lisinopril, he is unsure if he is taking both- puts all his meds in a bowl    hospital weight on d/c was 214lb and discharge home weight was 203, 221 today on his scale. He thinks it has been stable      Baseline Weight: 203? Per his scale    Admit BNP: 9253   Discharge BNP: 6992    EF: 30-35%  Cardiac Imagin19   -Left ventricular cavity size is dilated. Normal left ventricular wall   thickness. Overall left ventricular systolic function appears severely   reduced with a estimated ejection fraction of 30-35% 3D EF=32%. There is   severe diffuse hypokinesis. -Grade III diastolic dysfunction with elevated LV filling   pressures. E/\"=21.6.   -Mildly thickened and calcified without evidence of stenosis. Moderate-severe mitral regurgitation is present.   -Moderate tricuspid regurgitation.   -Mild pulmonic regurgitation present.   -Estimated pulmonary artery systolic pressure is severely elevated at 61   mmHg assuming a right atrial pressure of 3 mmHg. -The left atrium is dilated. -Pacemaker / ICD lead is visualized in the right heart. Echo 11/13/17:  Left ventricular size is markedly dilated and hypokinetic. LV function is   severely reduced at 20-25%   Mild mitral regurgitation is present.   The left atrium is dilated.   Pacer / ICD wire is visualized in the right ventricle.   Mild tricuspid regurgitation with RVSP estimated at 30 mmHg. [facility] 9/15/16:  Cath via radial unable to manipulate catheter into ascending aorta-small subintimal dissection in subclavian occurred. Cath via right femoral shows normal coronaries,LVEDP 20-25. EF 15%  No coronary artery disease, EF 15%, LVEDP 20-25 mmHg.     Device: Yes, single chamber ICD Oct 2017    Activity: decreased  Can you walk 1-2 blocks or do a moderate amount of house/yard work? Yes     NYHA Class: III     Sodium Restrictions: 3g  Fluid Restrictions: 48-64 oz/day  Sodium and fluid restriction compliance: fair    Pt Education: The patient has received education on the following topics: dietary sodium restriction, heart failure medications, the importance of physical activity, symptom management and weight monitoring        Past Medical History:   has a past medical history of Allergic, Anesthesia, Arthritis, CAD (coronary artery disease), Cancer ([address] Chemotherapy adverse reaction, CHF (congestive heart failure) ([address] Chronic knee pain, Clostridium difficile CNP   eplerenone (INSPRA) 25 MG tablet Take 1 tablet by mouth daily 3/6/20  Yes Savannah Ruiz MD   morphine (MS CONTIN) 15 MG extended release tablet Take 1 tablet by mouth every 12 hours for 30 days. 3/2/20 4/1/20 Yes Hima Green MD   busPIRone (BUSPAR) 10 MG tablet Take 1 tablet by mouth 2 times daily 3/2/20  Yes Hima Green MD   tamsulosin [facility]) 0.4 MG capsule Take 1 capsule by mouth daily 3/2/20  Yes Hima Green MD   hydrOXYzine (ATARAX) 25 MG tablet Take 1 tablet by mouth every 6 hours as needed for Anxiety 3/2/20 4/1/20 Yes Hima Green MD   carvedilol (COREG) 3.125 MG tablet Take 1 tablet by mouth 2 times daily (with meals) 1/8/20  Yes Shwan Jessica MD   amiodarone (CORDARONE) 200 MG tablet Take 1 tablet by mouth daily 1/8/20  Yes Shawn Jessica MD   midodrine (PROAMATINE) 5 MG tablet Take 1 tablet by mouth 3 times daily (with meals) 1/8/20  Yes Shawn Jessica MD   sacubitril-valsartan (ENTRESTO) 24-26 MG per tablet Take 0.5 tablets by mouth 2 times daily 1/8/20  Yes Shawn Jessica MD   pregabalin (LYRICA) 75 MG capsule 1 tablet AM and 2 tablets PM 12/24/19 3/9/20 Yes Marilia Quiñones MD   DULoxetine (CYMBALTA) 30 MG extended release capsule Take 1 capsule by mouth daily 12/24/19  Yes Marilia Quiñones MD   lisinopril (PRINIVIL;ZESTRIL) 5 MG tablet Take 1 tablet by mouth daily 6/7/19  Yes Savannah Cancer, MD   digoxin (LANOXIN) 125 MCG tablet Take 1 tablet by mouth daily 6/7/19  Yes Savannah Cancer, MD   simethicone (MYLICON) 80 MG chewable tablet Take 80 mg by mouth every 6 hours as needed for Flatulence   Yes Historical Provider, MD   ranitidine (ZANTAC) 150 MG tablet Take 150 mg by mouth daily   Yes Historical Provider, MD   melatonin 3 MG TABS tablet Take 10 mg by mouth nightly as needed    Yes Historical Provider, MD   lovastatin (MEVACOR) 40 MG tablet Take 40 mg by mouth. Take 1 tablet (40 mg total) by mouth at bedtime.  8/19/13  Yes Historical Provider, MD        Allergies:  Vancomycin; Bactrim [sulfamethoxazole-trimethoprim]; Corn-containing products; and Seasonal     ROS:   Review of Systems   Constitutional: Positive for fatigue. Respiratory: Positive for shortness of breath. Cardiovascular: Positive for leg swelling. Gastrointestinal: Positive for abdominal distention. Genitourinary: Negative. Musculoskeletal: Negative. Skin: Negative. Neurological: Negative. Hematological: Negative. Psychiatric/Behavioral: The patient is nervous/anxious. Physical Examination:    Vitals:    03/09/20 0851   BP: 102/62   Site: Left Upper Arm   Position: Sitting   Cuff Size: Large Adult   Pulse: 98   SpO2: 98%   Weight: 224 lb 12.8 oz (102 kg)   Height: 5' 10\" (1.778 m)           Physical Exam  Constitutional:       Appearance: Normal appearance. HENT:      Head: Normocephalic and atraumatic. Eyes:      Extraocular Movements: Extraocular movements intact. Pupils: Pupils are equal, round, and reactive to light. Neck:      Musculoskeletal: Normal range of motion and neck supple. Cardiovascular:      Rate and Rhythm: Normal rate and regular rhythm. Pulses: Normal pulses. Heart sounds: Normal heart sounds. Pulmonary:      Effort: Pulmonary effort is normal.      Breath sounds: Rales present. Abdominal:      General: There is distension. Palpations: Abdomen is soft. Musculoskeletal: Normal range of motion. Right lower leg: Edema present. Left lower leg: Edema present. Comments: 2+   Skin:     General: Skin is warm and dry. Neurological:      General: No focal deficit present. Mental Status: He is alert and oriented to person, place, and time. Mental status is at baseline. Psychiatric:         Mood and Affect: Mood normal.         Behavior: Behavior normal.         Thought Content:  Thought content normal.         Lab Data:    CBC:   Lab Results   Component Value Date    WBC 6.5 03/02/2020    WBC 6.4 03/01/2020    WBC 7.1 02/29/2020 yourself every day in the morning after urination, call [facility] if wt increases 2-3lb in one day or 5lb in one week, Limit sodium to 2000mg/day and fluids to 2L or 64oz/day. 4. Follow up: 2 weeks      [place] CHF Resource Line: [phone]      I appreciate the opportunity of cooperating in the care of this individual.    Beryle Fusi, ARUNA, 3/6/2020, 1:08 PM    QUALITY MEASURES  1. Tobacco Cessation Counseling: NA  2. Retake of BP if >140/90:   NA  3. Documentation to PCP/referring for new patient:  Sent to PCP at close of office visit  4. CAD patient on anti-platelet: NA  5. CAD patient on STATIN therapy:  NA  6. Patient with CHF and aFib on anticoagulation:  NA   7. Patient Education:  Yes   8. BB for LVSD :  Yes   9. ACE/ARB for LVSD:  Yes   10.  Left Ventricular Ejection Fraction (LVEF) Assessment:  Yes

## 2020-03-06 NOTE — TELEPHONE ENCOUNTER
Pt calling he was coming to the out pt Pharmacy today to get his medication he ask for yesterday and he realized that he needs a refill on eplerenone 25 mg 1 tab daily too. Can this be called in today so he can pick both up at the same time?  Pls call to advise Thank you

## 2020-03-09 NOTE — TELEPHONE ENCOUNTER
Pt states he was to call to let NPKV her what medication he is taking. Lisinopril 5 mg bid daily  And entresto 24-26 mg .5 tab twice a day. Please call to advise which one to stop.

## 2020-03-09 NOTE — TELEPHONE ENCOUNTER
----- Message from BENITEZ Muñoz CNP sent at 3/9/2020  4:07 PM EDT -----  Lamont Copas only 1/2 pill twice a day on Thursday, we'll repeat bloodwork next week.  Cole Mcgill

## 2020-03-10 PROBLEM — R55 SYNCOPE AND COLLAPSE: Status: ACTIVE | Noted: 2020-01-01

## 2020-03-11 NOTE — CARE COORDINATION
Discharge Planning Assessment    RN/SW discharge planner met with patient/ (and family member) to discuss reason for admission, current living situation, and potential needs at the time of discharge    Demographics/Insurance verified Yes    Current type of dwelling:  House    Patient from ECF/SW confirmed with:  N/A    Living arrangements: lives alone    Level of function/Support:  independent    PCP:  Dr. Mayela Newberry    DME: walker    Active with any community resources/agencies/skilled home care:  [facility]    Medication compliance issues:  No      Tentative discharge plan: home w/hhc      Discussed with patient and/or family that on the day of discharge home tentative time of discharge will be between 10 AM and noon.     Transportation at the time of discharge:  Family    Admitted to ICU on Dobutamine gtt, Lasix gtt, Levophed gtt    Sailaja Benitez RN BSN  Case Management

## 2020-03-11 NOTE — PROGRESS NOTES
10 units of insulin humulin given and 25g of dextrose 50% IV given per order. Will continue to monitor pt during shift.

## 2020-03-11 NOTE — DISCHARGE INSTR - COC
SECTION    Prognosis: Good    Condition at Discharge: Terminal    Rehab Potential (if transferring to Rehab): Fair    Recommended Labs or Other Treatments After Discharge:     Physician Certification: I certify the above information and transfer of Chloe Osullivan  is necessary for the continuing treatment of the diagnosis listed and that he requires Hospice for greater 30 days.      Update Admission H&P: No change in H&P    PHYSICIAN SIGNATURE:  Electronically signed by Shelby Ho MD on 3/15/20 at 5:59 PM EDT

## 2020-03-11 NOTE — PLAN OF CARE
injury  Description: Free from accidental physical injury  Outcome: Ongoing  Goal: Free from intentional harm  Description: Free from intentional harm  Outcome: Ongoing     Problem: Daily Care:  Goal: Daily care needs are met  Description: Daily care needs are met  Outcome: Ongoing     Problem: Skin Integrity:  Goal: Skin integrity will stabilize  Description: Skin integrity will stabilize  Outcome: Ongoing     Problem: Discharge Planning:  Goal: Patients continuum of care needs are met  Description: Patients continuum of care needs are met  Outcome: Ongoing

## 2020-03-11 NOTE — FLOWSHEET NOTE
Admission note:  Patient alert and oriented. Normal gait and sensation. Patient is well appearing and no acute distress noted, interactive and cooperative. Normocephalic, atraumatic, and old semi circular line of scar present occipitally. PERRLA, extraocular membrane intact,  and patient has good conjugate gaze. Nares patent, no drainage noted, and patient has moist mucus membrane. Patient has supple neck with normal ROM. Normal respiratory effort, no wheezing, positive fine bibasilar crackles to ausculation. Patient denies any chest pain at this time, and no adventitious heart sounds auscultated at this time. Abdomen soft, non distended, and  c/o tenderness. Colostomy bag in place, stoma appears protruded, kishore stomal area appears red and excoriated, formed bowel in bag and no active blood noted. Bowel sounds present in all quadrants and patient denies any N,V, D at this time. Patient has normal tone and strength. Peripheral pulses present in all extremities, normal dorsalis pedis bilaterally and brisk cap refill. Positive for peripheral edema +3 and sacral edema +2. Skin bruising located on his hypogastric region and left hip region. Patient has ICD in his right chest, and port A cath in his left chest which is accessed and infusing upon admission.  Patient in a comfortable position with call light within reach and will continue to monitor patient during shift.     03/11/20 0031   Vitals   Temp 97.2 °F (36.2 °C)   Temp Source Temporal   Pulse 103   Heart Rate Source Monitor   Resp 18   BP 96/75   MAP (mmHg) 80   BP Location Left upper arm   BP Upper/Lower Upper   BP Method Automatic   Patient Position Semi fowlers   Level of Consciousness 0   MEWS Score 3   Patient Currently in Pain Yes   Cardiac Rhythm NSR   Height and Weight   Height 5' 10\" (1.778 m)   Weight 226 lb 6.6 oz (102.7 kg)   Weight Method Bed scale   BSA (Calculated - sq m) 2.25 sq meters   BMI (Calculated) 32.6   Oxygen Therapy   SpO2 99 %   Pulse Oximeter Device Mode Intermittent   Pulse Oximeter Device Location Finger   O2 Device None (Room air)   Pain Assessment   Pain Assessment 0-10   Pain Level 7   Pain Type Acute pain;Deep Somatic pain   Pain Location Abdomen;Leg

## 2020-03-11 NOTE — ED NOTES
Pt transported via bed, on life pack, with levo infusing, pt in gown, clean and dry, wanted to keep pants on.       Jeffrey Torres RN  03/11/20 4414

## 2020-03-11 NOTE — PROCEDURES
Right IJ central line placement     Indication: cardiogenic shock      Inserted by Samantha Allen MD    Anesthesia: propofol drip    Consent was obtained from the patient. Risks and benefits discussed. Procedure:  Patient was placed in trendelenburg position and right neck area was cleaned and draped under aseptic technique the usual way. Right IJ was located with US and was accessed using push needle from the 1st attempt. Catheter slid over the needle. Guide wire then inserted into the catheter. Catheter was then removed and small incision at the insertion site was made with size 11 blade. Dilator was then inserted over the wire then exchanged with triple lumen central line. Central line was inserted down to 15cm ortega. All ports were flushing and drawing blood freely. Line was sutured in place and all area cleaned one more time with alcohol swab, bio disc applied and area was covered with tegaderm.     Post procedure CXR confirmed good positioning    Blood loss less than 5cc    Pt tolerated the procedure well with no immediate complication

## 2020-03-11 NOTE — ED NOTES
Pt is drowsy, states that he took his MS contin before coming here, pt able to answer questions no s/s of distress.       Neil Figueroa RN  03/10/20 9695

## 2020-03-11 NOTE — ED PROVIDER NOTES
I independently performed a history and physical on [facility]. All diagnostic, treatment, and disposition decisions were made by myself in conjunction with the advanced practice provider. Briefly, this is a 52 y.o. male here for fluid overload. The patient was sent in by his primary care provider as he is gained at least 20 pounds in approximately 1 week. On his way into the emergency room he had a syncopal episode. There was concern for seizure-like activity. .    On exam, chronically ill-appearing adult male in no acute distress, hypotensive, heart sounds are normal.  He does have significant peripheral edema. Abdomen is soft, slightly distended, he has colostomy in the right lower abdomen. No surrounding signs of infection besides mild skin breakdown. EKG  The Ekg interpreted by me in the absence of a cardiologist shows:  Sinus rhythm with first-degree AV block at a rate of 87 bpm, OK interval QRS both prolonged. QTC normal.  No acute ischemic changes. No significant changes when compared to previous EKG February 20, 2020. Screenings            Critical Time  Total Critical Care time was 35 minutes, excluding separately reportable procedures. There was a high probability of clinically significant/life threatening deterioration in the patient's condition which required my urgent intervention. MDM  Adult male with a history of colon cancer also has had failure. Being admitted to hospital for fluid overload. Did have a seizure/syncopal episode. Imaging is negative for acute process. Patient is hypotensive he is started on a Levophed drip. He is also given Lasix for his volume overload. Patient Referrals:  Patience Cuevas MD  [address]  [phone]            Discharge Medications:  New Prescriptions    No medications on file       FINAL IMPRESSION  1. Syncope and collapse    2.  Acute on chronic congestive heart failure, unspecified heart failure type ([address]    3. Hypotension, unspecified hypotension type    4. Malignant neoplasm of colon, unspecified part of colon ([address]    5. Hyperkalemia    6. ALEN (acute kidney injury) ([address]        Blood pressure 84/69, pulse 92, temperature 96.2 °F (35.7 °C), temperature source Temporal, resp. rate 12, height 5' 10\" (1.778 m), weight 222 lb (100.7 kg), SpO2 98 %. For further details of [facility] emergency department encounter, please see documentation by advanced practice provider.       Moira Jackson MD  03/10/20 0792

## 2020-03-11 NOTE — PROGRESS NOTES
[facility] PROGRESS NOTE    3/11/2020 10:58 AM        Name: Clive Fitzpatrick . Admitted: 3/10/2020  Primary Care Provider: Veronika Watkins MD (Tel: [phone])                        Subjective:  . No acute events overnight. Resting well. Pain control. Diet ok. Labs reviewed  Denies any chest pain sob.      Reviewed interval ancillary notes    Current Medications  amiodarone (CORDARONE) tablet 200 mg, Daily  busPIRone (BUSPAR) tablet 10 mg, BID  digoxin (LANOXIN) tablet 125 mcg, Daily  DULoxetine (CYMBALTA) extended release capsule 30 mg, Daily  tamsulosin (FLOMAX) capsule 0.4 mg, Daily  morphine (MS CONTIN) extended release tablet 15 mg, 2 times per day  midodrine (PROAMATINE) tablet 5 mg, TID WC  famotidine (PEPCID) tablet 20 mg, Daily  sodium chloride flush 0.9 % injection 10 mL, 2 times per day  sodium chloride flush 0.9 % injection 10 mL, PRN  acetaminophen (TYLENOL) tablet 650 mg, Q6H PRN    Or  acetaminophen (TYLENOL) suppository 650 mg, Q6H PRN  polyethylene glycol (GLYCOLAX) packet 17 g, Daily PRN  promethazine (PHENERGAN) tablet 12.5 mg, Q6H PRN    Or  ondansetron (ZOFRAN) injection 4 mg, Q6H PRN  norepinephrine (LEVOPHED) 16 mg in dextrose 5% 250 mL infusion, Continuous  heparin (porcine) injection 5,000 Units, 3 times per day  glucose (GLUTOSE) 40 % oral gel 15 g, PRN  dextrose 50 % IV solution, PRN  glucagon (rDNA) injection 1 mg, PRN  dextrose 5 % solution, PRN  DOBUTamine (DOBUTREX) 500 mg in dextrose 5 % 250 mL infusion, Continuous  furosemide (LASIX) 100 mg in dextrose 5 % 100 mL infusion, Continuous  diazePAM (VALIUM) tablet 5 mg, Q8H PRN  pregabalin (LYRICA) capsule 75 mg, QAM AC    And  pregabalin (LYRICA) capsule 150 mg, QPM        Objective:  BP 92/64   Pulse 106   Temp 97.4 °F (36.3 °C) (Temporal)   Resp 20   Ht 5' 10\" (1.778 m)   Wt 226 lb 6.6 oz (102.7 kg)   SpO2 100%   BMI 32.49 kg/m²     Intake/Output Summary (Last 24 hours) at 3/11/2020 1058  Last data filed at 3/11/2020 0550  Gross per 24 hour   Intake 10 ml   Output 850 ml   Net -840 ml      Wt Readings from Last 3 Encounters:   03/11/20 226 lb 6.6 oz (102.7 kg)   03/09/20 224 lb 12.8 oz (102 kg)   03/02/20 214 lb (97.1 kg)       General appearance:  Appears comfortable  Eyes: Sclera clear. Pupils equal.  ENT: Moist oral mucosa. Trachea midline, no adenopathy. Cardiovascular: Regular rhythm, normal S1, S2. No murmur. No edema in lower extremities  Respiratory: Not using accessory muscles. Good inspiratory effort. Clear to auscultation bilaterally, no wheeze or crackles. GI: Abdomen soft, no tenderness, not distended, normal bowel sounds  Musculoskeletal: No cyanosis in digits, neck supple  Neurology: CN 2-12 grossly intact. No speech or motor deficits  Psych: Normal affect. Alert and oriented in time, place and person  Skin: Warm, dry, normal turgor    Labs and Tests:  CBC:   Recent Labs     03/10/20  2139 03/11/20  0531   WBC 6.9 8.8   HGB 13.2* 13.9    213     BMP:    Recent Labs     03/09/20  0958 03/10/20  2139 03/11/20  0531    128* 132*   K 5.1 5.7* 4.9    94* 94*   CO2 26 20* 21   BUN 27* 32* 31*   CREATININE 1.5* 1.7* 1.8*   GLUCOSE 82 115* 87     Hepatic:   Recent Labs     03/10/20  2139 03/11/20  0531   AST 23 29   ALT 15 20   BILITOT 1.1* 1.5*   ALKPHOS 129 149*       Discussed care with family and patient             Spent 30  minutes with patient and family at bedside and on unit reviewing medical records and labs, spent greater than 50% time counseling patient and family on diagnosis and plan   Problem List  Active Problems:    Syncope and collapse  Resolved Problems:    * No resolved hospital problems. *       Assessment & Plan:   1.  Syncope   - likely multifactorial in  Setting  Of CHF and meds  - started on dobutamin and lasix gtt  - continue per nephrology recs    Acute CHF exab  -as noted above. - AICD in place     3. Acute on chronic renal faulure  - creatine is 1.8 today.          Diet: Diet NPO, After Midnight  Code:Full Code  DVT PPX zoey Lucero MD   3/11/2020 10:58 AM

## 2020-03-11 NOTE — CONSULTS
[address]  Advanced CHF/Pulmonary Hypertension   Cardiac Evaluation      Leslie Guevara  YOB: 1970    Requesting PHysician:  Dr. Sandra Mix      Chief Complaint   Patient presents with    Loss of Consciousness     pt. was walking from car to ED entrance when staff saw him collapse with seizure like activity at desk. pt. was placed on gurney and brought into [facility]. pt. was coming to ED because he called his PCP and told him he gained 20 pounds since d/c 1 week ago today. pt. states he was dizzy. History of Present Illness:  Vikram Carlton is a 53 yo male who presents to the ED with symptoms consistent with syncope and shortness of breath. He has been on Entresto since his last discharge. On follow up in the office, he had lasix changed to torsemide without improvement. He has had a 10 pound weight gain with peripheral edema. He was recently discharged just a little over a week ago. He was walking across the parking lot when he had a syncopal event and fell to the ground. He was shaking without evidence of seizures. He has felt achy with discomfort in his bilateral lower extremities and abdomen. No fever or chills. No chest pain, palpitations, or shortness of breath. He has a PMH of colon cancer, end stage HF, depression/anxiety. He was recently in hospice for progressive heart failure and failure to take meds. He has a history of chronic systolic HF and rectal cancer.  He has a history of chronic narcotic use from all of his surgeries.     He has a history of sCHF, NICM, hx HTN, HLD and rectal cancer s/p chemo and XRT. [facility] 9/15/16 with no coronary artery disease, EF 15%, LVEDP 20-25 mmHg.  He had rectal surgery in January 2017 and has completed chemo and radiation.  He had problems with pelvic abscess and had a drain placed.    He underwent ICD implant for primary prevention on 10/26/17. Dominick Huertas also underwent surgery for repair of ventral hernia and new colostomy on 12/1/17. He has had abscesses post-op that have been drained.      Allergies   Allergen Reactions    Vancomycin Hives and Swelling     Throat swells    Bactrim [Sulfamethoxazole-Trimethoprim] Other (See Comments)     Thrush    Corn-Containing Products      Patient tells me his throat swells when he eats a large amount of corn or corn products. He can tolerate products with corn oil and corn syrup.  Pt. Reports gets \"congestion\"    Seasonal      Current Facility-Administered Medications   Medication Dose Route Frequency Provider Last Rate Last Dose    amiodarone (CORDARONE) tablet 200 mg  200 mg Oral Daily Varun Morfin MD   200 mg at 03/11/20 0819    busPIRone (BUSPAR) tablet 10 mg  10 mg Oral BID Varun Morfin MD   10 mg at 03/11/20 0819    digoxin (LANOXIN) tablet 125 mcg  125 mcg Oral Daily Varun Morfin MD   125 mcg at 03/11/20 0819    DULoxetine (CYMBALTA) extended release capsule 30 mg  30 mg Oral Daily Varun Morfin MD   30 mg at 03/11/20 0819    tamsulosin (FLOMAX) capsule 0.4 mg  0.4 mg Oral Daily Varun Morfin MD   0.4 mg at 03/11/20 0819    morphine (MS CONTIN) extended release tablet 15 mg  15 mg Oral 2 times per day Camilo Bennett MD   15 mg at 03/11/20 0819    midodrine (PROAMATINE) tablet 5 mg  5 mg Oral TID WC Varun Morfin MD   5 mg at 03/11/20 0819    famotidine (PEPCID) tablet 20 mg  20 mg Oral Daily Varnu Morfin MD   20 mg at 03/11/20 0819    sodium chloride flush 0.9 % injection 10 mL  10 mL Intravenous 2 times per day Camilo Bennett MD   10 mL at 03/11/20 0820    sodium chloride flush 0.9 % injection 10 mL  10 mL Intravenous PRN Camilo Bennett MD        acetaminophen (TYLENOL) tablet 650 mg  650 mg Oral Q6H PRN Varun Morfin MD        Or    acetaminophen (TYLENOL) suppository 650 mg  650 mg Rectal Q6H PRN Varun Morfin MD        polyethylene glycol (GLYCOLAX) packet 17 g  17 g Oral Daily PRN MD Rick Carrera promethazine (PHENERGAN) tablet 12.5 mg  12.5 mg Oral Q6H PRN Marta Lambert MD        Or    ondansetron (ZOFRAN) injection 4 mg  4 mg Intravenous Q6H PRN Marta Lambert MD   4 mg at 03/11/20 0527    norepinephrine (LEVOPHED) 16 mg in dextrose 5% 250 mL infusion  10 mcg/min Intravenous Continuous Varun Morfin MD 5.6 mL/hr at 03/11/20 1020 6 mcg/min at 03/11/20 1020    heparin (porcine) injection 5,000 Units  5,000 Units Subcutaneous 3 times per day Marta Lambert MD   5,000 Units at 03/11/20 0603    glucose (GLUTOSE) 40 % oral gel 15 g  15 g Oral PRN Varun Morfin MD        dextrose 50 % IV solution  12.5 g Intravenous PRN Marta Lambert MD        glucagon (rDNA) injection 1 mg  1 mg Intramuscular PRN Marta Lambert MD        dextrose 5 % solution  100 mL/hr Intravenous PRN Marta Lambert MD        DOBUTamine (DOBUTREX) 500 mg in dextrose 5 % 250 mL infusion  2.5 mcg/kg/min Intravenous Continuous Amol Lopez MD 7.7 mL/hr at 03/11/20 0940 2.5 mcg/kg/min at 03/11/20 0940    furosemide (LASIX) 100 mg in dextrose 5 % 100 mL infusion  10 mg/hr Intravenous Continuous Amol Lopez MD        diazePAM (VALIUM) tablet 5 mg  5 mg Oral Q8H PRN Amol Lopez MD   5 mg at 03/11/20 0934    pregabalin (LYRICA) capsule 75 mg  75 mg Oral QAM AC Amol Lopez MD   75 mg at 03/11/20 0934    And    pregabalin (LYRICA) capsule 150 mg  150 mg Oral QPM Amol Lopez MD           Past Medical History:   Diagnosis Date    Allergic     Anesthesia     tremors    Arthritis     CAD (coronary artery disease)     Cancer ([facility]     colon    Chemotherapy adverse reaction 4/29/2019    CHF (congestive heart failure) ([facility]     Chronic knee pain     Clostridium difficile infection 07/11/2016, 12/21/19    PCR+    Depression motion     Diverticulitis     Diverticulosis     History of alcohol abuse     Hyperlipidemia     Hypertension     Irregular heart beat states been told he has had in past. Never treated. Sees PCP every 3 mos. and EKG yearly    Pneumonia     Right knee pain     Shoulder injury     and arthrisits, injury rotaotr cuff    Sleep difficulties     with depression     Past Surgical History:   Procedure Laterality Date    ABDOMEN SURGERY      ABSCESS DRAINAGE      BREAST SURGERY Right 1/21/2019    RIGHT BREAST EXCISIONAL BIOPSY performed by Keira Arriaza MD at [facility]  09/14/2016    No stents placed   [address]  10/2017    COLECTOMY      COLONOSCOPY  08/29/2016    with biopsy and polypectomy    COLONOSCOPY  03/19/2018    COLONOSCOPY N/A 5/14/2019    COLONOSCOPY WITH DILATION performed by Orlin Lin MD at [address] [place] [address] ([facility])  09/14/2016    for staging - Dr. Valeria Spaulding, COLON, DIAGNOSTIC     [address]  12/01/2017    peristomal hernia repair    HERNIA REPAIR  04/18/2018    S/P: colostomy closure with hernia repair with mesh, with Dr. Soledad Montano at [facility].  INNER EAR SURGERY  tube right ear    JOINT REPLACEMENT      left TKR    KNEE ARTHROSCOPY Right [id number]    KNEE ARTHROSCOPY Right 8/4/2014    medial meniscus    KNEE SURGERY  08/24/2011    removal of tibial component    OTHER SURGICAL HISTORY  7/1/13    ACL Rt knee meniscus repair synovectomy    OTHER SURGICAL HISTORY  12/01/2017    takedown of ileostomy    PACEMAKER PLACEMENT      icd 10/2017    REVISION COLOSTOMY  04/18/2018    S/P: colostomy closure with hernia repair with mesh, with Dr. Soledad Montano at [facility].     SHOULDER ARTHROSCOPY Right 9/22/15    SUBACROMIAL DECOMPRESSION, DISTAL CLAVICLE EXCISION, LABRAL DEBRIDEMETN    SIGMOIDOSCOPY N/A 11/23/2018    SIGMOIDOSCOPY BIOPSY FLEXIBLE performed by Orlin Lin MD at [facility]  4 surgeries    SMALL INTESTINE SURGERY  01/11/2017 or ex partner: Not on file     Emotionally abused: Not on file     Physically abused: Not on file     Forced sexual activity: Not on file   Other Topics Concern    Not on file   Social History Narrative    Not on file       Review of Systems:   · Constitutional: there has been no unanticipated weight loss. There's been no change in energy level, sleep pattern, or activity level. · Eyes: No visual changes or diplopia. No scleral icterus. · ENT: No Headaches, hearing loss or vertigo. No mouth sores or sore throat. · Cardiovascular: Reviewed in HPI  · Respiratory: No cough or wheezing, no sputum production. No hematemesis. · Gastrointestinal: No abdominal pain, appetite loss, blood in stools. No change in bowel or bladder habits. · Genitourinary: No dysuria, trouble voiding, or hematuria. · Musculoskeletal:  No gait disturbance, weakness or joint complaints. · Integumentary: No rash or pruritis. · Neurological: No headache, diplopia, change in muscle strength, numbness or tingling. No change in gait, balance, coordination, mood, affect, memory, mentation, behavior. · Psychiatric: No anxiety, no depression. · Endocrine: No malaise, fatigue or temperature intolerance. No excessive thirst, fluid intake, or urination. No tremor. · Hematologic/Lymphatic: No abnormal bruising or bleeding, blood clots or swollen lymph nodes. · Allergic/Immunologic: No nasal congestion or hives. Physical Examination:    Vitals:    03/11/20 0630 03/11/20 0645 03/11/20 0700 03/11/20 0800   BP: 97/73  92/64    Pulse: 105 97 106    Resp: 16 18 20    Temp:    97.4 °F (36.3 °C)   TempSrc:    Temporal   SpO2: 100% 100% 100% 100%   Weight:       Height:         Body mass index is 32.49 kg/m².      Wt Readings from Last 3 Encounters:   03/11/20 226 lb 6.6 oz (102.7 kg)   03/09/20 224 lb 12.8 oz (102 kg)   03/02/20 214 lb (97.1 kg)     BP Readings from Last 3 Encounters:   03/11/20 92/64   03/09/20 102/62   03/02/20 92/68 Constitutional and General Appearance:   Chronically ill appearing, pale appearing, not very responsive  HEENT:  NC/AT  MARJAN  No problems with hearing  Skin:  Cool, dry, decreased capillary refill  Respiratory:  · Normal excursion and expansion without use of accessory muscles  · Resp Auscultation: Normal breath sounds without dullness  Cardiovascular:  · The apical impulses not displaced  · Heart tones are crisp and normal  · Cervical veins are not engorged  · The carotid upstroke is normal in amplitude and contour without delay or bruit  · JVP less than 8 cm H2O  RRR with nl S1 and S2 without m,r,g  · Peripheral pulses are symmetrical and full  · There is no clubbing, cyanosis of the extremities. ·  1+ bilateral edema  · Femoral Arteries: 2+ and equal  · Pedal Pulses: 2+ and equal   Neck:  · No thyromegaly  Abdomen:  · No masses or tenderness  · Liver/Spleen: No Abnormalities Noted  Neurological/Psychiatric:  · Alert and oriented in all spheres  · Moves all extremities well  · Exhibits normal gait balance and coordination  · No abnormalities of mood, affect, memory, mentation, or behavior are noted    Labs were reviewed including labs from other hospital systems through [facility]. Cardiac testing was reviewed including echos, nuclear scans, cardiac catheterization, including from other hospital systems through [facility]. Assessment:    1. Syncope and collapse    2. chronic systolic congestive heart failure, unspecified heart failure type ([address]    3. Hypotension, unspecified hypotension type    4. Malignant neoplasm of colon, unspecified part of colon ([address]    5. Hyperkalemia    6. ALEN (acute kidney injury) ([address]         Plan:  1. He is not significantly fluid overloaded. BNP is the lowest it has been  2. Decrease lasix drip to 5 mg/hr  3. Continue dobutamine  4. Limit narcotics and valium  5. Palliative care consult. We really need to push for hospice for this gentleman.   He wants his

## 2020-03-11 NOTE — PROGRESS NOTES
Pt c/o chest pain, appears diaphoretic, attempting to vomit but unable to get anything out, MD notified, labs and EKG ordered and will continue to monitor pt.

## 2020-03-11 NOTE — ED PROVIDER NOTES
vagal-like event versus true seizure-like activity. He did not have a post ictal period and was able to be near immediately responsive following the above-mentioned. He was placed on a gurney and brought to the room for evaluation and treatment. He states he continues to feel poorly. He is feeling light in the head. He states that he has achy pain and discomfort in his bilateral lower extremities as well as over his abdomen which he reports is fluid overloaded. Patient is not experiencing difficulties as a pertains to fevers and or chills. He has no additional complaints including that of chest pain, palpitations or shortness of breath at present. Nursing Notes were all reviewed and agreed with or any disagreements were addressed in the HPI. REVIEW OF SYSTEMS    (2-9 systems for level 4, 10 or more for level 5)     Review of Systems   Constitutional: Negative for activity change, chills and fever. HENT: Negative for ear pain and sore throat. Respiratory: Negative for chest tightness and shortness of breath. Cardiovascular: Positive for leg swelling. Negative for chest pain. Gastrointestinal: Positive for abdominal pain. Negative for diarrhea, nausea and vomiting. Genitourinary: Negative for dysuria and flank pain. Musculoskeletal: Negative for back pain and myalgias. Skin: Negative for pallor and wound. Neurological: Positive for syncope and light-headedness. Negative for seizures. Positives and Pertinent negatives as per HPI. Except as noted above in the ROS, all other systems were reviewed and negative.        PAST MEDICAL HISTORY     Past Medical History:   Diagnosis Date    Allergic     Anesthesia     tremors    Arthritis     CAD (coronary artery disease)     [facility] ([facility]     colon    Chemotherapy adverse reaction 4/29/2019    CHF (congestive heart failure) (HCC)     Chronic knee pain     Clostridium difficile infection 07/11/2016, 12/21/19    PCR+    Depression mouth every 6 hours as needed for Flatulence    TAMSULOSIN (FLOMAX) 0.4 MG CAPSULE    Take 1 capsule by mouth daily    TORSEMIDE (DEMADEX) 20 MG TABLET    Take 3 tablets by mouth daily 2 in the morning and 1 in the afternoon         ALLERGIES     Vancomycin; Bactrim [sulfamethoxazole-trimethoprim]; Corn-containing products; and Seasonal    FAMILYHISTORY       Family History   Problem Relation Age of Onset    Cancer Mother     High Blood Pressure Father     Cancer Father           SOCIAL HISTORY       Social History     Tobacco Use    Smoking status: Former Smoker     Packs/day: 1.00     Years: 5.00     Pack years: 5.00     Last attempt to quit: 2007     Years since quittin.1    Smokeless tobacco: Former User     Types: Chew     Quit date: 8/10/2017   Substance Use Topics    Alcohol use: No     Comment: history of alcohol abuse 20 years ago    Drug use: No       SCREENINGS             PHYSICAL EXAM    (up to 7 for level 4, 8 or more for level 5)     ED Triage Vitals [03/10/20 2022]   BP Temp Temp src Pulse Resp SpO2 Height Weight   (!) 94/58 -- -- 88 19 100 % 5' 10\" (1.778 m) 222 lb (100.7 kg)       Physical Exam  Vitals signs and nursing note reviewed. Constitutional:       General: He is not in acute distress. Appearance: Normal appearance. He is well-developed. He is ill-appearing (chronically). He is not diaphoretic. HENT:      Head: Normocephalic and atraumatic. Right Ear: External ear normal.      Left Ear: External ear normal.   Eyes:      General: No scleral icterus. Right eye: No discharge. Left eye: No discharge. Conjunctiva/sclera: Conjunctivae normal.   Neck:      Musculoskeletal: Normal range of motion. Vascular: No JVD. Cardiovascular:      Rate and Rhythm: Normal rate and regular rhythm. Heart sounds: No murmur. No friction rub. No gallop.     Pulmonary:      Effort: Pulmonary effort is normal. No accessory muscle usage or respiratory distress. Breath sounds: Normal breath sounds. No wheezing, rhonchi or rales. Abdominal:      General: There is no distension. Palpations: Abdomen is soft. Abdomen is not rigid. There is no mass. Tenderness: There is no abdominal tenderness. There is no right CVA tenderness, left CVA tenderness, guarding or rebound. Musculoskeletal:      Right lower le+ Pitting Edema present. Left lower le+ Pitting Edema present. Skin:     General: Skin is warm and dry. Neurological:      Mental Status: He is alert and oriented to person, place, and time. GCS: GCS eye subscore is 4. GCS verbal subscore is 5. GCS motor subscore is 6. Cranial Nerves: No cranial nerve deficit. Sensory: No sensory deficit. Coordination: Coordination normal.   Psychiatric:         Behavior: Behavior normal. Behavior is cooperative.          DIAGNOSTIC RESULTS   LABS:    Labs Reviewed   CBC WITH AUTO DIFFERENTIAL - Abnormal; Notable for the following components:       Result Value    Hemoglobin 13.2 (*)     RDW 20.1 (*)     Lymphocytes Absolute 0.4 (*)     All other components within normal limits    Narrative:     Performed at:  [facility] [address], [place], [facility] [employer]   Phone [phone]   COMPREHENSIVE METABOLIC PANEL W/ REFLEX TO MG FOR LOW K - Abnormal; Notable for the following components:    Sodium 128 (*)     Potassium reflex Magnesium 5.7 (*)     Chloride 94 (*)     CO2 20 (*)     Glucose 115 (*)     BUN 32 (*)     CREATININE 1.7 (*)     GFR Non- 43 (*)     GFR  52 (*)     Total Protein 6.1 (*)     Alb 3.1 (*)     Albumin/Globulin Ratio 1.0 (*)     Total Bilirubin 1.1 (*)     All other components within normal limits    Narrative:     Performed at:  [facility]  [address] [facility],  [facility], [address] [employer]   Phone [phone]   BRAIN NATRIURETIC PEPTIDE - Abnormal; Notable for the ([facility]    5. Hyperkalemia    6.  ALEN (acute kidney injury) [facility])          [address] Admitted 03/10/2020 11:26:56 PM         (Please note that portions of this note were completed with a voice recognition program.  Efforts were made to edit the dictations but occasionally words are mis-transcribed.)    Clyde Lyle PA-C (electronically signed)            Surinder Lowe PA-C  03/10/20 [id number]

## 2020-03-11 NOTE — H&P
noted   · Poor health in general and appears weak and debilitated all over   ·       REVIEW OF SYSTEMS:       Constitutional:  Negative for fever, chills or night sweats  ENT:   Negative for rhinorrhea, epistaxis, hoarseness, sore throat. Respiratory: as above Cardiovascular:   Negative for  chest pain, palpitations   Gastrointestinal:   Negative for nausea, vomiting, diarrhea  Genitourinary:   Negative for polyuria, dysuria   Hematologic/Lymphatic:   Negative for  bleeding tendency, easy bruising  Musculoskeletal:   Negative for myalgias and arthralgias  Neurologic:   Negative for  Confusion, dysarthria. Skin:   Negative for itching,rash  Psychiatric:  Negative for depression, anxiety, agitation. Endocrine:  Negative for polydipsia, polyuria,heat /cold intolerance. Past Medical History:         has a past medical history of Allergic, Anesthesia, Arthritis, CAD (coronary artery disease), Cancer ([facility] [address] Chemotherapy adverse reaction, CHF (congestive heart failure) ([facility] [address] Chronic knee pain, Clostridium difficile infection, Depression motion, Diverticulitis, Diverticulosis, History of alcohol abuse, Hyperlipidemia, Hypertension, Irregular heart beat, Pneumonia, Right knee pain, Shoulder injury, and Sleep difficulties. Past Surgical History:         has a past surgical history that includes sinus surgery (4 surgeries); Inner ear surgery (tube right ear); knee surgery (08/24/2011); other surgical history (7/1/13); Knee arthroscopy (Right, [id number]); Knee arthroscopy (Right, 8/4/2014); Umbilical hernia repair (11/11/14); joint replacement; Shoulder arthroscopy (Right, 9/22/15); Colonoscopy (08/29/2016); Endoscopic ultrasonography, GI (09/14/2016); Cardiac catheterization (09/14/2016); Small intestine surgery (01/11/2017); Tunneled venous port placement (Left, 02/13/2017); colectomy; Abscess Drainage; Tunneled venous port placement; hernia repair;  Upper gastrointestinal endoscopy (10/03/2017); hernia repair (12/01/2017); Small intestine surgery (12/01/2017); other surgical history (12/01/2017); Colonoscopy (03/19/2018); Revision Colostomy (04/18/2018); hernia repair (04/18/2018); sigmoidoscopy (N/A, 11/23/2018); Breast surgery (Right, 1/21/2019); Abdomen surgery; Endoscopy, colon, diagnostic; Cardiac defibrillator placement (10/2017); Colonoscopy (N/A, 5/14/2019); pacemaker placement; and Small intestine surgery (N/A, 8/21/2019). Medications:        Current Outpatient Medications on File Prior to Encounter   Medication Sig Dispense Refill    digoxin (LANOXIN) 125 MCG tablet Take 1 tablet by mouth daily 90 tablet 3    torsemide (DEMADEX) 20 MG tablet Take 3 tablets by mouth daily 2 in the morning and 1 in the afternoon 90 tablet 3    eplerenone (INSPRA) 25 MG tablet Take 1 tablet by mouth daily 90 tablet 1    morphine (MS CONTIN) 15 MG extended release tablet Take 1 tablet by mouth every 12 hours for 30 days.  1 tablet 0    busPIRone (BUSPAR) 10 MG tablet Take 1 tablet by mouth 2 times daily 60 tablet 0    tamsulosin (FLOMAX) 0.4 MG capsule Take 1 capsule by mouth daily 30 capsule 3    hydrOXYzine (ATARAX) 25 MG tablet Take 1 tablet by mouth every 6 hours as needed for Anxiety 120 tablet 0    carvedilol (COREG) 3.125 MG tablet Take 1 tablet by mouth 2 times daily (with meals) 60 tablet 3    amiodarone (CORDARONE) 200 MG tablet Take 1 tablet by mouth daily 30 tablet 3    midodrine (PROAMATINE) 5 MG tablet Take 1 tablet by mouth 3 times daily (with meals) 90 tablet 3    sacubitril-valsartan (ENTRESTO) 24-26 MG per tablet Take 0.5 tablets by mouth 2 times daily 60 tablet 0    DULoxetine (CYMBALTA) 30 MG extended release capsule Take 1 capsule by mouth daily 30 capsule 0    simethicone (MYLICON) 80 MG chewable tablet Take 80 mg by mouth every 6 hours as needed for Flatulence      ranitidine (ZANTAC) 150 MG tablet Take 150 mg by mouth daily      melatonin 3 MG TABS tablet Take 10 mg by mouth nightly as 5.7*    94*   CO2 26 20*   BUN 27* 32*   CREATININE 1.5* 1.7*   CALCIUM 8.9 8.6     Recent Labs     03/10/20  2139   AST 23   ALT 15   BILITOT 1.1*   ALKPHOS 129     Recent Labs     03/10/20  2139   INR 1.62*     Recent Labs     03/10/20  2139   TROPONINI <0.01         Urinalysis:      Lab Results   Component Value Date    NITRU Negative 02/26/2020    WBCUA 1 02/26/2020    BACTERIA 1+ 12/20/2019    RBCUA 2 02/26/2020    BLOODU Negative 02/26/2020    SPECGRAV 1.024 02/26/2020    GLUCOSEU Negative 02/26/2020    [facility] [place] [address] NEGATIVE 02/22/2011         Radiology:     CXR:   I have reviewed the CXR  Mild CHF     EKG/Telemonitor :    I have reviewed the EKG  SR   LAE   RAD   1st degree HB     IMAGING :     CT Cervical Spine WO Contrast   Final Result   No acute abnormality of the cervical spine. CT Head WO Contrast   Final Result   No acute intracranial abnormality. XR CHEST PORTABLE   Final Result   Cardiomegaly with mild pulmonary vascular congestion. Limited evaluation of the retrocardiac aspect of the left lung base. Lungs are grossly clear. ASSESSMENT/ACTIVE ISSUES & PROBLEMS FOR THIS HOSPITALIZATION     Active Hospital Problems    Diagnosis Date Noted    Syncope and collapse [R55] 03/10/2020       · Near Syncope and collapse [ Acute/Present on arrival/admission/Reason for Admission ]  ? 2/2 Narcotics and Hypotension and Poor Health in general   · Mild Decompensated CHFrEF [ Acute/Present on arrival/admission/Reason for Admission ]  , Last EF 30-35 %   · Worsened Hypotension ? Baseline [ Acute/Present on arrival/admission/Reason for Admission ]  , is on Midodrine @ Home ? 2/2 Poor PO Intake and 2/2 Home medications related [ narcotics and cardiac medications ]   · Mild Hyperkalemia ? 2/2 Home medications of ACE I and Eplerenone ? [ Acute/Present on arrival/admission/Reason for Admission ]    · Hyponatremia ? 2/2 Poor PO Intake and Volume OL ?  [ Acute/Present on arrival/admission/Reason for Admission ]    · Hypochloremia 2/2 Poor PO Intake and Volume OL [ Acute/Present on arrival/admission/Reason for Admission ]    · ALEN/CKD III [ Acute/Present on arrival/admission/Reason for Admission ]     · Colon CA , s/p radiation and Chemo Rx   · H/o Colostomy   · ICM   · S/p AICD +   · Chronic pain d/o   · Anxiety and depression   · Mixed Hyperlipidemia   · Neuropathy   · BPH   · GERD       PLAN AND PERTINENT ORDERS FOR THIS ADMISSION/HOSPITALIZATION     · CT head shows No acute or concerning findings/pathology noted on review   · Neuro checks closely   · ICU admission   · Lasix Given in ED   · Strict I/os   · Levophed for Hypotension issues . Though he runs low @ Baseline . C/w Midodrine PO   · Avoid further diuresis until Renal has evaluated in AM   · Renal c/s in AM   · Urine lytes to be sent   · Caution with Home medications . Holding Coreg 2/2 Hypotension issues . Holding Entresto 2/2 Hyperkalemia and Hypotension issues . Resume as per cardiology and renal recommendations in AM  · Poor Prognosis overall ? ? . ? Palliative care c/s in AM       · Home medications Held/Resumed, and Pertinent changes made in home medications on admission, as needed, according to current medical status, as mentioned above.  Please see EPIC orders for detailed recommendations of plan and medications     · Home medications  Reviewed     · DVT Prophylaxis : Heparin   ; + SCDs   · GI Prophylaxis : H2RB    · Nutrition/Diet: Diet NPO, After Midnight  · Code Status: Full Code  · PT/OT and ambulatory Eval Status:  Ambulate with Assist   · Probable LOS & future Disposition planned post discharge  - Home in 2-3 days       CONSULTS ORDERED @ [address] TO HOSPITALIST   IP CONSULT TO NEPHROLOGY      Medical Decision Making : HIGH     Total patient  Critical [ d/t Hypotension issues ]   Care time spent in evaluating the patient an discussing plan with appropriate staff/patient/family members is 56 min Cameron Parada MD    Hospitalist, [facility].    3/10/2020 11:34 PM

## 2020-03-11 NOTE — FLOWSHEET NOTE
Bp after dose change.  Pt tolerating well and will continue to wean appropriately during shift.     03/11/20 0145   Vitals   BP 91/66   MAP (mmHg) 72   Oxygen Therapy   SpO2 99 %

## 2020-03-11 NOTE — CONSULTS
Pulmonary Consult    Patient's PCP: Souleymane Ricardo MD  Referred by: Kita Lima MD for cardiogenic shock, pulmonary edema     HISTORY OF PRESENT ILLNESS:    This is a  52 y.o. male with PMH of severe systolic CHF, rectal cancer and others as listed below presented to the hospital with weight gain and SOB. He has questionable syncope and seizure like activity while waiting in the ED  There is no chest pain or palpitations. At the time of evaluation he was on levophed drip at 14 mcg/min. He was alert, but diaphoretic. He was concerned about anxiety medications. He denied any cough, fever or chills. There is no nausea or vomiting. Past Medical / Surgical History:    Past Medical History:   Diagnosis Date    Allergic     Anesthesia     tremors    Arthritis     CAD (coronary artery disease)     Cancer ([facility] [address]     colon    Chemotherapy adverse reaction 4/29/2019    CHF (congestive heart failure) (HCC)     Chronic knee pain     Clostridium difficile infection 07/11/2016, 12/21/19    PCR+    Depression motion     Diverticulitis     Diverticulosis     History of alcohol abuse     Hyperlipidemia     Hypertension     Irregular heart beat     states been told he has had in past. Never treated. Sees PCP every 3 mos.  and EKG yearly    Pneumonia     Right knee pain     Shoulder injury     and arthrisits, injury rotaotr cuff    Sleep difficulties     with depression     Past Surgical History:   Procedure Laterality Date    ABDOMEN SURGERY      ABSCESS DRAINAGE      BREAST SURGERY Right 1/21/2019    RIGHT BREAST EXCISIONAL BIOPSY performed by Radha Cornell MD at [facility]  09/14/2016    No stents placed   [facility]  10/2017    COLECTOMY      COLONOSCOPY  08/29/2016    with biopsy and polypectomy    COLONOSCOPY  03/19/2018    COLONOSCOPY N/A 5/14/2019    COLONOSCOPY WITH DILATION performed by Donya Soria MD at [facility] tablet 3    torsemide (DEMADEX) 20 MG tablet Take 3 tablets by mouth daily 2 in the morning and 1 in the afternoon 90 tablet 3    eplerenone (INSPRA) 25 MG tablet Take 1 tablet by mouth daily 90 tablet 1    morphine (MS CONTIN) 15 MG extended release tablet Take 1 tablet by mouth every 12 hours for 30 days. 1 tablet 0    busPIRone (BUSPAR) 10 MG tablet Take 1 tablet by mouth 2 times daily 60 tablet 0    tamsulosin (FLOMAX) 0.4 MG capsule Take 1 capsule by mouth daily 30 capsule 3    hydrOXYzine (ATARAX) 25 MG tablet Take 1 tablet by mouth every 6 hours as needed for Anxiety 120 tablet 0    carvedilol (COREG) 3.125 MG tablet Take 1 tablet by mouth 2 times daily (with meals) 60 tablet 3    amiodarone (CORDARONE) 200 MG tablet Take 1 tablet by mouth daily 30 tablet 3    sacubitril-valsartan (ENTRESTO) 24-26 MG per tablet Take 0.5 tablets by mouth 2 times daily 60 tablet 0    DULoxetine (CYMBALTA) 30 MG extended release capsule Take 1 capsule by mouth daily 30 capsule 0    simethicone (MYLICON) 80 MG chewable tablet Take 80 mg by mouth every 6 hours as needed for Flatulence      ranitidine (ZANTAC) 150 MG tablet Take 150 mg by mouth daily      melatonin 3 MG TABS tablet Take 10 mg by mouth nightly as needed       lovastatin (MEVACOR) 40 MG tablet Take 40 mg by mouth. Take 1 tablet (40 mg total) by mouth at bedtime.  midodrine (PROAMATINE) 5 MG tablet Take 1 tablet by mouth 3 times daily (with meals) 90 tablet 3    pregabalin (LYRICA) 75 MG capsule 1 tablet AM and 2 tablets PM 90 capsule 0       Allergies:  Vancomycin; Bactrim [sulfamethoxazole-trimethoprim]; Corn-containing products; and Seasonal    Social History:   TOBACCO:   reports that he quit smoking about 13 years ago. He has a 5.00 pack-year smoking history. He quit smokeless tobacco use about 2 years ago. His smokeless tobacco use included chew. ETOH:   reports no history of alcohol use.     Family History:       Problem Relation Age of Onset    Cancer Mother     High Blood Pressure Father     Cancer Father          Review of Systems   Constitutional: Positive for fatigue. Negative for chills and fever. HENT: Negative for congestion. Respiratory: Positive for shortness of breath. Negative for cough, chest tightness and wheezing. Cardiovascular: Positive for leg swelling. Negative for chest pain and palpitations. Gastrointestinal: Negative for diarrhea, nausea and vomiting. Neurological: Negative for weakness. Psychiatric/Behavioral: The patient is not nervous/anxious. All other systems reviewed and are negative. PHYSICAL EXAM:  BP 92/64   Pulse 106   Temp 97.4 °F (36.3 °C) (Temporal)   Resp 19   Ht 5' 10\" (1.778 m)   Wt 226 lb 6.6 oz (102.7 kg)   SpO2 100%   BMI 32.49 kg/m²     Physical Exam  Vitals signs reviewed. Constitutional:       Appearance: He is well-developed. He is diaphoretic. HENT:      Head: Normocephalic and atraumatic. Eyes:      Extraocular Movements: Extraocular movements intact. Pupils: Pupils are equal, round, and reactive to light. Neck:      Musculoskeletal: Neck supple. Vascular: No JVD. Cardiovascular:      Rate and Rhythm: Normal rate. Rhythm irregular. Heart sounds: No murmur. Pulmonary:      Effort: Pulmonary effort is normal. No respiratory distress. Breath sounds: No stridor. Rales present. No wheezing. Abdominal:      General: Bowel sounds are normal. There is no distension. Palpations: Abdomen is soft. Tenderness: There is no abdominal tenderness. Musculoskeletal:         General: No deformity. Right lower leg: Edema present. Left lower leg: Edema present. Skin:     General: Skin is warm. Neurological:      Mental Status: He is alert and oriented to person, place, and time.    Psychiatric:         Behavior: Behavior normal.         LABS:  Recent Labs     03/10/20  2139 03/11/20  0531   WBC 6.9 8.8   HGB 13.2* 13.9   HCT 41.2 43.0    213                                                                  Recent Labs     03/09/20  0958 03/10/20  2139 03/11/20  0531    128* 132*   K 5.1 5.7* 4.9    94* 94*   CO2 26 20* 21   BUN 27* 32* 31*   CREATININE 1.5* 1.7* 1.8*   GLUCOSE 82 115* 87     Recent Labs     03/10/20  2139 03/11/20  0531   AST 23 29   ALT 15 20   BILITOT 1.1* 1.5*   ALKPHOS 129 149*     Recent Labs     03/10/20  2139 03/11/20  0445 03/11/20  0531   TROPONINI <0.01 <0.01 <0.01     No results for input(s): BNP in the last 72 hours. Lab Results   Component Value Date    PHART 7.384 03/11/2020    BBI8RBI 29.1 03/11/2020    PO2ART 88.6 03/11/2020     Recent Labs     03/10/20  2139   INR 1.62*     @LABRCNT(NITRITE,COLORU,PHUR,LABCAST,WBCUA,RBCUA,MUCUS,TRICHOMONAS,YEAST,BACTERIA,CLARITYU,SPECGRAV,LEUKOCYTESUR,UROBILINOGEN,BILIRUBINUR,BLOODU,GLUCOSEU,KETONESU,AMORPHOUS)@     DATA:      CT chest on 2/24/20  1. Negative for pulmonary embolus. 2. Mild central airways disease. CT abdomen 12/21/19  1. Stable postoperative changes in the abdomen and pelvis.  This includes   stable presacral soft tissue, fluid, and gas.  This appears to be external to   the rectum and may represent an abscess or anastomotic leak. 2. Cardiomegaly with small right pleural effusion   3. New bibasilar infiltrates have the appearance of an infectious process. Correlate with any associated respiratory symptoms     Echo on 1/3/20  Summary   -Limited exam per Dr. Ale Macias to rule out endocarditis. Patient had a previous   exam 12/23/2019   -The left ventricle is severely dilated. -Severely reduced global systolic function with an ejection fraction   estimated at 20%. -Severe global hypokinesis with inferior-lateral akinesis noted. -Biatrial enlargement noted.    -Moderate to severe mitral regurgitation.   -There is mild-to-moderate tricuspid regurgitation with a RVSP estimation of   50 mmHg.   -Trivial pulmonic regurgitation present.   -Pacer / ICD wire is visualized in the right heart.   -No obvious valvular vegetation. EKG  Sinus rhythm with Fusion complexes  Possible Left atrial enlargement  Low voltage QRS  Cannot rule out Anterior infarct (cited on or before 10-MAR-2020)  Abnormal ECG  When compared with ECG of 11-MAR-2020 04:39,  No significant change was found    CXR  Placement of right internal jugular central venous catheter without   pneumothorax.  Tip in the mid SVC.  Marked cardiomegaly.  Moderate opacity   may be present in the left retrocardiac area although this finding is   probably related to the cardiomegaly.  Small left pleural effusion may also   be present.        Assessment &Plan:    Patient Active Problem List:     S/P total knee replacement using cement     Mixed hyperlipidemia     Essential hypertension     Depression     Umbilical hernia     Lower abdominal pain     Rectal mass     Hepatitis     Localized edema     SOB (shortness of breath)     Acute systolic heart failure (HCC)     NICM (nonischemic cardiomyopathy) (HCC)     Abnormal nuclear cardiac imaging test     VT (ventricular tachycardia) (HCC)     Rectal cancer (HCC)     Neoplasm related pain     Systolic CHF, chronic (HCC)     Cardiomyopathy, idiopathic (HCC)     Poor venous access     Anemia     Insomnia due to medical condition     Chest pain     Dyspnea on exertion     Peristomal hernia     Episodic lightheadedness     ICD (implantable cardioverter-defibrillator) in place     Ileostomy in place [facility])     Pneumonia     Colostomy in place [facility])     Breast mass, right     Abscess of sacrum (HCC)     Chronic pain     Abscess     Generalized abdominal pain     LLQ pain     Moderate malnutrition (HCC)     Hypervolemia     Chronic pain syndrome     Colon cancer (HCC)     Fibromyalgia     Primary insomnia     Acute on chronic systolic and diastolic heart failure, NYHA class 4 (HCC)     Hypotension due to drugs     Recurrent incisional hernia with incarceration     Incontinence of feces     Severe malnutrition (HCC)     PNA (pneumonia)     Biliary obstruction     Metabolic encephalopathy     Choledocholithiasis     Hyponatremia     Systolic CHF, acute (HCC)     C. difficile colitis     ALEN (acute kidney injury) ([facility] [address]     Acute liver failure without hepatic coma     Altered mental status     Sepsis (HCC)     Elevated INR     Acute respiratory failure with hypoxia (HCC)     Hypokalemia     Overweight     H/O alcohol abuse     Acute encephalopathy     Alcohol abuse counseling and surveillance     Acute cerebral infarction [facility])     AICD discharge     Syncope and collapse      Acute on chronic systolic CHF  Volume overload due to #1  Hypervolemic hyponatremia   ALEN    Will start dobutamine and try to wean off levophed   Start lasix drip  Central line placed due to poor IV access and to get ScvPO2 and assess cardiac output. Strict Is/Os, monitor lytes and UOP  F/u on cardiology consultation     Pt has a high probability of imminent or life-threatening deterioration requiring close monitoring, and highly complex decision-making and/or interventions of high intensity to assess, manipulate, and support his critical organ systems to prevent a likely inevitable decline which could occur if left untreated.      A total critical care time 50 minutes was used. This includes but not limited to examining patient, collaborating with other physicians, monitoring vital signs, telemetry, continuous pulse oximetry, and clinical response to IV medications, documentation time, review and interpretation of laboratory and radiological data, review of nursing notes and old record review. This time excludes any time that may have been spent performing procedures for life threatening organ failure.     The patient and / or the family were informed of the results of any tests, a time was given to answer questions, a plan was proposed and they agreed with plan.     Thank you Dr. Papito Lott Sandra Holter, MD for the opportunity to be involved in this patients care.  If you have any questions or concerns please feel free to contact me  Full Code

## 2020-03-12 NOTE — TELEPHONE ENCOUNTER
Tried patient again, he is currently admitted in hospital, here at [facility] - he had gotten the message

## 2020-03-12 NOTE — PROGRESS NOTES
Dobutamine stopped as ordered. RIJ removed with pulmonologist at bedside. Lasix gtt remains at 5, now infusing through port in left chest. VSS.

## 2020-03-12 NOTE — PROGRESS NOTES
Units Subcutaneous 3 times per day    pregabalin  75 mg Oral QAM AC    And    pregabalin  150 mg Oral QPM         Diet: DIET CARDIAC; Results:  CBC:   Recent Labs     03/10/20  2139 03/11/20  0531 03/12/20  0430   WBC 6.9 8.8 9.3   HGB 13.2* 13.9 13.3*   HCT 41.2 43.0 39.4*   MCV 98.0 96.5 93.5    213 187     BMP:   Recent Labs     03/10/20  2139 03/11/20  0531 03/12/20  0430   * 132* 134*   K 5.7* 4.9 4.3   CL 94* 94* 98*   CO2 20* 21 24   PHOS  --  5.7* 4.2   BUN 32* 31* 31*   CREATININE 1.7* 1.8* 1.3     LIVER PROFILE:   Recent Labs     03/10/20  2139 03/11/20  0531 03/12/20  0430   AST 23 29 54*   ALT 15 20 34   BILITOT 1.1* 1.5* 1.5*   ALKPHOS 129 149* 120     PT/INR:   Recent Labs     03/10/20  2139   PROTIME 18.9*   INR 1.62*     APTT: No results for input(s): APTT in the last 72 hours. UA:No results for input(s): NITRITE, COLORU, PHUR, LABCAST, WBCUA, RBCUA, MUCUS, TRICHOMONAS, YEAST, BACTERIA, CLARITYU, SPECGRAV, LEUKOCYTESUR, UROBILINOGEN, BILIRUBINUR, BLOODU, GLUCOSEU, AMORPHOUS in the last 72 hours. Invalid input(s): [place]      Assessment/Plan:  52 y.o. male with     Acute on chronic systolic CHF  Volume overload due to #1  Hypervolemic hyponatremia - improving   ALEN - improving      Levophed weaned off   Agree on decreasing lasix drip  Dobutamine decreased to 1mg/hr then weaned off. I was concerned about arterial placement of central line given the VBG results, I checked the line and zahra blood from it. Blood was dark and not pulsatile. Central line was removed, there was no excessive bleeding.       Tomás Beverly MD

## 2020-03-12 NOTE — CONSULTS
Palliative Care:      52year old male with history of colon cancer, CAD CHF presents to ED with with syncopal episode and shortness of breath. He has been on Entresto since his last hospital discharge. . Patient  had lasix changed to torsemide without improvement. He has had a 10 pound weight gain with peripheral edema. He was recently discharged just a little over a week ago. He was walking across the parking lot when he had a syncopal event and fell to the ground. Patient reports he has myalgias, fatigue, back pain. Patient is on Lasix gtt and Dobutamine.       Patient has a history of colon cancer, end stage HF, depression/anxiety. He was recently in hospice for progressive heart failure and failure to take meds. He has a history of chronic systolic HF and rectal cancer.  He has a history of chronic narcotic use from all of his surgeries. Echocardiogram from 12/19 shows LVEF of 20%. He is extremely end stage. He would benefit from hospice. His life expectancy is < 2-3 months.       ECHO   12/23/2019:   Summary   -Left ventricular cavity size is moderately dilated. Normal left ventricular wall thickness. Overall left ventricular systolic function appears severely reduced. Ejection fraction is visually estimated to be 20%. Grade III   diastolic dysfunction with elevated LV filling pressures.   -Moderate to severe mitral regurgitation.   -The left atrium is dilated.   -Pacer / ICD wire is visualized in the right ventricle.   -Moderate tricuspid regurgitation with PASP of 40 mmHg.   -The right atrial size is dilated.   -IVC size is dilated (>2.1 cm) but collapses > 50% with respiration   consistent with elevated RA pressure (8 mmHg).     ONE XRAY VIEW OF THE CHEST 2/24/2020    FINDINGS: LINES/TUBES/OTHER: Right-sided AICD is again noted and unchanged in position. Left subclavian port is again noted with its tip unchanged in position. HEART/MEDIASTINUM: The cardiac silhouette is enlarged, but stable. Anthony Delronald intestine surgery (01/11/2017); Tunneled venous port placement (Left, 02/13/2017); colectomy; Abscess Drainage; Tunneled venous port placement; hernia repair; Upper gastrointestinal endoscopy (10/03/2017); hernia repair (12/01/2017); Small intestine surgery (12/01/2017); other surgical history (12/01/2017); Colonoscopy (03/19/2018); Revision Colostomy (04/18/2018); hernia repair (04/18/2018); sigmoidoscopy (N/A, 11/23/2018); Breast surgery (Right, 1/21/2019); Abdomen surgery; Endoscopy, colon, diagnostic; Cardiac defibrillator placement (10/2017); Colonoscopy (N/A, 5/14/2019); pacemaker placement; and Small intestine surgery (N/A, 8/21/2019).    Advance Directives:      Code status is full, son Francine Bates is DPOA     Problem Severity: Pain/Other Symptoms:      Patient has chronic back pain and he is on MS Contin 15 mg bid and has scheduled Lyrica tid for neuropathy. He is also on Valium and Buspar.     Bed Mobility/Toileting/Transfer:      Needs assistance with transportation and homemaking. He states that he cares for his own colostomy.      Performance Status:      Transfer/ambulate per self does not use cane or walker.  States he is weak and deconditioned.      Symptom Assessment: Appetite/Nausea/Bowels/Fatigue:      BMI is 31.1. Appetite has been fair.      Social History:   reports that he quit smoking about 13 years ago. He has a 5.00 pack-year smoking history. He quit smokeless tobacco use about 2 years ago. His smokeless tobacco use included chew. He reports that he does not drink alcohol or use drugs.     Family History:  family history includes Cancer in his father and mother; High Blood Pressure in his father.     Psychological/Spiritual:       Patient is  and lives with his wife and children. Wife is an ER nurse. He and his wife are planning a divorce. He has four brothers and one sister.     Family Discussion:     Met with patient for over one hour.  He is known to palliative care from recent hospital admissions. Discussed clinical status at length. He was recently [facility] with [facility] and [facility]. Prior to this he was active with [facility] [address] [place] [address],[address]. He has anxiety and stress surrounding his divorce and advanced illness He spoke at length about the difficult home situation, stress from advanced end stage illness, limited support, etc. He has made his son Maria M Velarde his Nathan Asters states he doesn't want to die and \"I am so young. My parents  young, I will be like them. \" Encouraged to reconsider hospice care. States he will. He would like to speak with his past nurse Florentin Chau. Referral to [facility] faxed and called.  Offered support.

## 2020-03-12 NOTE — PROGRESS NOTES
Shift assessment complete, VSS on dobutamine. Levo currently stopped. Lasix gtt running. Lungs clear/diminished, no shortness of breath. Bowel sounds active. Ostomy producing stool and gas. Abdomen is soft/non-tender. Ankles are swollen with + 1 pitting edema. Port dressing and RIJ dressing clean, dry, intact. Plan of care discussed and pt states he would like to continue with aggressive care.

## 2020-03-12 NOTE — PROGRESS NOTES
0.8 oz (98 kg)   SpO2 97%   BMI 31.00 kg/m²     Intake/Output Summary (Last 24 hours) at 3/12/2020 1020  Last data filed at 3/12/2020 0600  Gross per 24 hour   Intake 432.76 ml   Output 3875 ml   Net -3442.24 ml      Wt Readings from Last 3 Encounters:   03/12/20 216 lb 0.8 oz (98 kg)   03/09/20 224 lb 12.8 oz (102 kg)   03/02/20 214 lb (97.1 kg)       General appearance:  Appears comfortable  Eyes: Sclera clear. Pupils equal.  ENT: Moist oral mucosa. Trachea midline, no adenopathy. Cardiovascular: Regular rhythm, normal S1, S2. No murmur. No edema in lower extremities  Respiratory: Not using accessory muscles. Good inspiratory effort. Clear to auscultation bilaterally, no wheeze or crackles. GI: Abdomen soft, no tenderness, not distended, normal bowel sounds  Musculoskeletal: No cyanosis in digits, neck supple  Neurology: CN 2-12 grossly intact. No speech or motor deficits  Psych: Normal affect. Alert and oriented in time, place and person  Skin: Warm, dry, normal turgor    Labs and Tests:  CBC:   Recent Labs     03/10/20  2139 03/11/20  0531 03/12/20  0430   WBC 6.9 8.8 9.3   HGB 13.2* 13.9 13.3*    213 187     BMP:    Recent Labs     03/10/20  2139 03/11/20  0531 03/12/20  0430   * 132* 134*   K 5.7* 4.9 4.3   CL 94* 94* 98*   CO2 20* 21 24   BUN 32* 31* 31*   CREATININE 1.7* 1.8* 1.3   GLUCOSE 115* 87 77     Hepatic:   Recent Labs     03/10/20  2139 03/11/20  0531 03/12/20  0430   AST 23 29 54*   ALT 15 20 34   BILITOT 1.1* 1.5* 1.5*   ALKPHOS 129 149* 120       Discussed care with family and patient             Spent 30  minutes with patient and family at bedside and on unit reviewing medical records and labs, spent greater than 50% time counseling patient and family on diagnosis and plan   Problem List  Active Problems:    Acute on chronic systolic CHF (congestive heart failure) (HCC)    Syncope and collapse  Resolved Problems:    * No resolved hospital problems.  *       Assessment & Plan:

## 2020-03-12 NOTE — PROGRESS NOTES
Resting in bed, lethargic but does open eyes and respond when spoken to. See complex flowsheet. Currently receiving Lasix and Dubutamine per order. Levophed being titrated per parameters in orders for patient BP. Hurt is patent, Colostomy noted with small amount of brown stool.

## 2020-03-12 NOTE — PROGRESS NOTES
03/12/2020    ALT 20 03/11/2020    AST 54 (H) 03/12/2020    AST 29 03/11/2020     Lab Results   Component Value Date    HGB 13.3 03/12/2020    HGB 13.9 03/11/2020    HCT 39.4 03/12/2020    HCT 43.0 03/11/2020     03/12/2020     03/11/2020     Lab Results   Component Value Date    TRIG 91 02/25/2020    TRIG 66 12/21/2019    HDL 38 02/25/2020    HDL 30 12/21/2019    HDL 36 02/20/2012    HDL 45 02/22/2011    LDLCALC 57 02/25/2020    LDLCALC 42 12/21/2019     Labs were reviewed including labs from other hospital systems through [facility]. Cardiac testing was reviewed including echos, nuclear scans, cardiac catheterization, including from other hospital systems through [facility]. Assessment:    1. Syncope and collapse    2. chronic systolic congestive heart failure, unspecified heart failure type ([address]    3. Hypotension, unspecified hypotension type    4. Malignant neoplasm of colon, unspecified part of colon ([address]    5. Hyperkalemia    6. ALEN (acute kidney injury) ([facility]          Plan:  1.  diuresing well with lasix 5 mg/hr  2.  dobutamine was stopped today  3. Start entresto 24/26 1/2 tab bid  4. Limit narcotics and valium  5. Palliative care consult. We really need to push for hospice for this gentleman. He wants his narcotics and valium, but this is competing for the blood pressure needed to treat his HF. He is extremely end stage. Need to get hospice involved again. His life expectancy is < 2-3 months.     Due to the high probability of clinically significant life threating deterioration of the patient's condition that required my urgent intervention, a total critical care time 35 minutes was used. This time excludes any time that may have been spent performing procedures.  This includes but not limited to vital sign monitoring, telemetry monitoring, continuous pulse oximety, IV medication, clinical response to the IV medications, documentation time , consultation time,

## 2020-03-12 NOTE — PROGRESS NOTES
Nephrology Progress Note  [phone]  [phone]   [email or web address].        Reason for Consult:  syncope    HISTORY OF PRESENT ILLNESS:                This is a patient with significant past medical history who was recently hospitalized for chest pain, AICD firing,complicated by ALEN peak Scr 1.5-improved to 1.0 at discharge,  h/o CHF, h/o colon cancer s/p XRT, radiation, who had a witnessed syncopal episode while waiting in ER Pt states he came to Er for worsening SOB, weight gain of 20#. He was asked to come to ER further evaluation. He was seen by cardiology 3/6 and lasix PO was changed to torsemide. He was taking both entresto and lisinopril after last discharge. He is also on eplerenone. He denies chest pain at this time. He denies fever, chill, N/V. He denies any difficulty breathing. Given lasix in ER  -Scr at adm 1.7--> 1.8  -started on dobutamine and lasix drip  -hypotensive with BP in 60's range ON-started on pressors    Subjective:  -pt seen and examined  -PMSHx and meds reviewed  -No family at bedside    No acute events ON  Diuresed well  Wt is lower  Feels better    ROS: neg chest pain/SOB better/edema improved    PHYSICAL EXAM:    Vitals:    BP 86/69   Pulse 101   Temp 97.2 °F (36.2 °C) (Core)   Resp 15   Ht 5' 10\" (1.778 m)   Wt 216 lb 0.8 oz (98 kg)   SpO2 100%   BMI 31.00 kg/m²   I/O last 3 completed shifts: In: 432.8 [I.V.:432.8]  Out: 8143 [Urine:3600; Stool:275]  No intake/output data recorded. Physical Exam:  Gen: Resting in bed, NAD.  obese  HEENT: MMM, OP clear. Anicteric, NC/AT  CV: +S1/S2, RRR  Lungs: Good respiratory effort, diminished in bases   Left PORT intact  Abd: S/NT +BS-obese  Ext: + edema, no cyanosis  Skin: Warm. No rashes appreciated. : No TTP over bladder, nondistended. Neuro: Alert and oriented x 3, nonfocal.  Joints: No erythema noted over joints.     DATA:    CBC:   Lab Results   Component Value Date    WBC 9.3 03/12/2020    RBC 4.22 03/12/2020    RBC 3.74 07/05/2017    HGB 13.3 03/12/2020    HCT 39.4 03/12/2020    MCV 93.5 03/12/2020    MCH 31.6 03/12/2020    MCHC 33.9 03/12/2020    RDW 19.1 03/12/2020     03/12/2020    MPV 7.7 03/12/2020     BMP:    Lab Results   Component Value Date     03/12/2020    K 4.3 03/12/2020    CL 98 03/12/2020    CO2 24 03/12/2020    BUN 31 03/12/2020    LABALBU 2.9 03/12/2020    CREATININE 1.3 03/12/2020    CALCIUM 8.8 03/12/2020    GFRAA >60 03/12/2020    GFRAA >60 12/19/2012    LABGLOM 59 03/12/2020    GLUCOSE 77 03/12/2020    GLUCOSE 131 07/05/2017       IMPRESSION/RECOMMENDATIONS:    ALEN on CKD stage 2: recurrent-urine studies c/w diminished perfusion/CRS-exacerbated by combined dual RASI and Enteresto/hypotension  -continue lasix/dobutamine  -cr improved  -continue midodrine/pressors to keep MAP > 60  -strict I/O  -daily weight    Syncope: due to hypotension  -plan as below    Hypotension: multifactorial, CHF combined with meds  -continue midodrine and pressors  -started on dobutamine/lasix drip  -BP stable    Hyponatremia: hypervolemic  -continue lasix    CHF: per cardiololy  -on lasix and dobutamine    VT: s/p AICD    H/o colon CA: in remission per patient      Critical care time 35mins: 08:15-08:45        Romero Vicente MD

## 2020-03-12 NOTE — PROGRESS NOTES
Resting in bed, patient is on room air and saturating without difficulty. Levophed infusion stopped per orders. BP stable. Continues Dobutamine and Lasix drips.

## 2020-03-12 NOTE — TELEPHONE ENCOUNTER
Tried to contact the patient. LM with instructions from [address] [place] [address], but requested the patient please call us to verify receipt of call and to confirm that he understands the instructions as it is very important not to take both medications and to stop the lisinopril 36 hours before restarting the Entresto.

## 2020-03-13 NOTE — PROGRESS NOTES
HGB 13.1 03/13/2020    HCT 39.4 03/13/2020    MCV 95.3 03/13/2020    MCH 31.7 03/13/2020    MCHC 33.3 03/13/2020    RDW 19.6 03/13/2020     03/13/2020    MPV 7.6 03/13/2020     BMP:    Lab Results   Component Value Date     03/13/2020    K 4.1 03/13/2020    CL 96 03/13/2020    CO2 26 03/13/2020    BUN 30 03/13/2020    LABALBU 3.2 03/13/2020    CREATININE 1.4 03/13/2020    CALCIUM 8.4 03/13/2020    GFRAA >60 03/13/2020    GFRAA >60 12/19/2012    LABGLOM 54 03/13/2020    GLUCOSE 100 03/13/2020    GLUCOSE 131 07/05/2017       IMPRESSION/RECOMMENDATIONS:    ALEN on CKD stage 2: recurrent-urine studies c/w diminished perfusion/CRS-exacerbated by combined dual RASI and Enteresto/hypotension  -continue lasix/off dobutamine  -cr improved  -Enresto restarted  -cant transition to torsemide  -continue supportive care    Syncope: due to hypotension  -per cardiology    Hypotension: multifactorial, CHF combined with meds  -stable    Hyponatremia: hypervolemic  -improved with diuresis    CHF: per cardiology, end stage, hospice recommended  -off dobutamine  -Ok to transition to torsemide    VT: s/p AICD    H/o colon CA: in remission per patient      Emilee Fernandez MD

## 2020-03-13 NOTE — PROGRESS NOTES
Palliative Care:     [facility] called, reports they are out of network. Reports [facility] is in network.  Will make referral.

## 2020-03-13 NOTE — PROGRESS NOTES
Shift assessment completed, see doc flowsheets. Patient awake resting in bed, wanting dela cruz out. Lungs diminished, clear, has spontaneous cough with small amount of thick white sputum. Edema to arms, legs and trunk. States some MONTEZ. Colostomy with small amt of liquid brown stool. SR/ST on monitor with PVCs and 1st degree AVB. Lasix gtt infusing per MD order. Fall precautions in place and call light in reach. Will continue to monitor.

## 2020-03-13 NOTE — PROGRESS NOTES
Patient assessment completed, no acute changes. Lasix continues at 5mg/hr. Patient with low grade core temps 99.1 to 99.6. Ice chips given to patient per his request. Education provided on fluid intake with CHF. Assisted patient in repositioning in bed. No further needs at this time. Will continue to monitor.

## 2020-03-13 NOTE — PROGRESS NOTES
Occupational Therapy  Facility/Department: [facility] ICU  Daily Treatment Note  NAME: Valeria De Guzman  : 1970  MRN: [id number]    Date of Service: 3/13/2020    Discharge Recommendations:    Valeria De Guzman scored a 20/24 on the AM-PAC ADL Inpatient form. Current research shows that an AM-PAC score of 18 or greater is typically associated with a discharge to the patient's home setting. Based on the patients AM-PAC score and their current ADL deficits, it is recommended that the patient have 2-3 sessions per week of Occupational Therapy at d/c to increase the patients independence. If patient discharges prior to next session this note will serve as a discharge summary. Please see below for the latest assessment towards goals. HOME HEALTH CARE: LEVEL 1 STANDARD    - Initial home health evaluation to occur within 24-48 hours, in patient home   - Therapy to evaluate with goal of regaining prior level of functioning   - Therapy to evaluate if patient has [address] needs for personal care     OT Equipment Recommendations  Equipment Needed: Yes  Mobility Devices: ADL Assistive Devices  ADL Assistive Devices: Shower Chair with back    Assessment   Performance deficits / Impairments: Decreased functional mobility ; Decreased endurance;Decreased balance;Decreased strength;Decreased ADL status  Assessment: Pt presents below baseline due to above deficits. Pt would continue to benefit from skilled OT services in order to return to PLOF.   Treatment Diagnosis: above deficits associated with syncope and collapse  Prognosis: Good  Decision Making: Low Complexity  Assistance / Modification: Mod I bed mobility, SBA functional mobility, supervision transfers  OT Education: OT Role;Plan of Care;Energy Conservation  Patient Education: Pt verbalized understanding of OT role, POC, pursed lip breathing, eval, d/c.   REQUIRES OT FOLLOW UP: Yes  Activity Tolerance  Activity Tolerance: Patient Tolerated treatment well  Safety Score: 20 (03/13/20 1411)  AM-PAC Inpatient ADL T-Scale Score : 42.03 (03/13/20 1411)  ADL Inpatient CMS 0-100% Score: 38.32 (03/13/20 1411)  ADL Inpatient CMS G-Code Modifier : Scott Strickland (03/13/20 1411)    Goals  Short term goals  Time Frame for Short term goals: d/c  Short term goal 1: Mod I for functional mobility to complete ADL tasks  Short term goal 2: Mod I for functional ADL transfers  Short term goal 3: Mod I LB ADLs  Short term goal 4: Mod I UB ADLs  Long term goals  Time Frame for Long term goals : STG = LTG  Patient Goals   Patient goals : to return home       Therapy Time   Individual Concurrent Group Co-treatment   Time In 1156         Time Out 1238         Minutes 42              Timed Code Treatment Minutes:  27 Minutes    Total Treatment Minutes:  42 minutes    JUVE Rowan    Occupational Therapist present and directed patient care, made skilled clinical decisions and was responsible for assessment and treatment this date.   Cindy Nagy OTR/L [id number]      Cindy Nagy OT

## 2020-03-13 NOTE — PLAN OF CARE
Problem: Falls - Risk of:  Goal: Absence of physical injury  Description: Absence of physical injury  Outcome: Ongoing   Patient placed on fall Precautions per [facility] FOR CONTINUING [facility] CARE TERESE Fall Risk Assessment Scale (Score> 45). Fall risk armband on, yellow blanket placed on foot of bed, S.A.F.E. sign or orange light displayed at door. Bed in locked and low position, bed alarm armed and audible, call light and bedside table in reach. Patient acknowledges the need to call before getting out of bed. Q2 monitoring, and toileting performed. Problem: Pain:  Goal: Control of chronic pain  Description: Control of chronic pain  Outcome: Ongoing   Patient utilizing Morphine for chronic pain. Problem: Daily Care:  Goal: Daily care needs are met  Description: Daily care needs are met  Outcome: Ongoing   Patient bathed, linens changed, and repositioned in bed.

## 2020-03-13 NOTE — PROGRESS NOTES
Reassessment completed, see doc flowsheets. Patient awake, resting in bed. Offers no complaints at this time. SR on monitor. Off Lasix gtt.

## 2020-03-13 NOTE — PROGRESS NOTES
Patient well known to CHF team. Admitted with increased shortness of breath, weight gain and edema. Patient was seen early this week in CHF clinic by NP with 10 lb weight gain per patient. Patient also noted to have confusion over medications. Was taking lisinopril and Entresto and notified Awais Aguilera NP, and both were stopped. Patient's baseline weight after last admission, - 214 lb. Weight on this admission 226 lb. Patient avoids answering about sodium and fluid limitation. Patient was supposed to be discharged with home care, he delayed a nurse coming to the house. States \"I may have not heard them at the door\". Patient is high risk for readmission and does not have good support at home. Palliative Care consult is pending. If patient discharges home, will make sure CHF pathway [facility] --encourage patient to let home care in to see him sooner than later. Patient will also need an office visit within 5 days of discharge d/t high risk of readmission.     Ed time: 20 mins

## 2020-03-13 NOTE — PROGRESS NOTES
states that he is not taking as much narcotics at home. I pointed out to him that he was actually doing better at home when he was enrolled in hospice. He is willing to discuss with hospice again. His discussion with me revolved around him thinking that he was going to get better and get his abdominal hernia fixed. I told him that he was unlikely to improve.     Medications/Labs all Reviewed    Lab Results   Component Value Date    WBC 7.5 03/13/2020    HGB 13.1 (L) 03/13/2020    HCT 39.4 (L) 03/13/2020    MCV 95.3 03/13/2020     03/13/2020     Lab Results   Component Value Date    CREATININE 1.4 (H) 03/13/2020    BUN 30 (H) 03/13/2020     03/13/2020    K 4.1 03/13/2020    CL 96 (L) 03/13/2020    CO2 26 03/13/2020     Lab Results   Component Value Date    INR 1.62 (H) 03/10/2020    PROTIME 18.9 (H) 03/10/2020        Physical Examination:    BP (!) 69/45   Pulse 102   Temp 98.3 °F (36.8 °C) (Core)   Resp 14   Ht 5' 10\" (1.778 m)   Wt 219 lb 12.8 oz (99.7 kg)   SpO2 98%   BMI 31.54 kg/m²      Chronically ill appearing  HEENT:  NC/AT  Respiratory:  · Resp Assessment: Normal respiratory effort  · Resp Auscultation: Clear to auscultation bilaterally   Cardiovascular:  · Auscultation: regular rate and rhythm, normal S1S2, no murmur, rub or gallop  · Palpation:  Nl PMI  · JVP:  Elevated to angle of jaw  · Extremities: 1+ bilateral Edema  Abdomen:  · Soft, non-tender  · Normal bowel sounds  Extremities:  ·  No Cyanosis or Clubbing  Neurological/Psychiatric:  · Oriented to time, place, and person  · Non-anxious  Skin Warm and dry    Lab Results   Component Value Date     03/13/2020     03/12/2020     03/11/2020    K 4.1 03/13/2020    K 4.3 03/12/2020    K 4.9 03/11/2020    BUN 30 03/13/2020    BUN 31 03/12/2020    BUN 31 03/11/2020    CREATININE 1.4 03/13/2020    CREATININE 1.3 03/12/2020    CREATININE 1.8 03/11/2020    GLUCOSE 100 03/13/2020    GLUCOSE 77 03/12/2020    GLUCOSE 131

## 2020-03-13 NOTE — PROGRESS NOTES
[facility] PROGRESS NOTE    3/13/2020 12:52 PM        Name: Isra Jackson . Admitted: 3/10/2020  Primary Care Provider: Lucinda Shaw MD (Tel: [phone])                        Subjective:  . No acute events overnight. Resting well. Pain control. Diet ok.    Denies any cp or sob  No weakness or numbness   Reviewed interval ancillary notes    Current Medications  diazePAM (VALIUM) tablet 5 mg, Q8H PRN  sacubitril-valsartan (ENTRESTO) 24-26 MG per tablet 0.5 tablet, BID  eplerenone (INSPRA) tablet 25 mg, Daily  amiodarone (CORDARONE) tablet 200 mg, Daily  busPIRone (BUSPAR) tablet 10 mg, BID  digoxin (LANOXIN) tablet 125 mcg, Daily  DULoxetine (CYMBALTA) extended release capsule 30 mg, Daily  tamsulosin (FLOMAX) capsule 0.4 mg, Daily  morphine (MS CONTIN) extended release tablet 15 mg, 2 times per day  midodrine (PROAMATINE) tablet 5 mg, TID WC  famotidine (PEPCID) tablet 20 mg, Daily  sodium chloride flush 0.9 % injection 10 mL, 2 times per day  sodium chloride flush 0.9 % injection 10 mL, PRN  acetaminophen (TYLENOL) tablet 650 mg, Q6H PRN    Or  acetaminophen (TYLENOL) suppository 650 mg, Q6H PRN  polyethylene glycol (GLYCOLAX) packet 17 g, Daily PRN  promethazine (PHENERGAN) tablet 12.5 mg, Q6H PRN    Or  ondansetron (ZOFRAN) injection 4 mg, Q6H PRN  heparin (porcine) injection 5,000 Units, 3 times per day  glucose (GLUTOSE) 40 % oral gel 15 g, PRN  dextrose 50 % IV solution, PRN  glucagon (rDNA) injection 1 mg, PRN  dextrose 5 % solution, PRN  furosemide (LASIX) 100 mg in dextrose 5 % 100 mL infusion, Continuous  pregabalin (LYRICA) capsule 75 mg, QAM AC    And  pregabalin (LYRICA) capsule 150 mg, QPM        Objective:  BP (!) 69/45   Pulse 102   Temp 98.3 °F (36.8 °C) (Core)   Resp 14   Ht 5' 10\" (1.778 m)   Wt 219 lb 12.8 oz (99.7 kg)   SpO2 98%   BMI 31.54 kg/m²     Intake/Output Summary (Last 24 hours) at 3/13/2020 1252  Last data filed at 3/13/2020 1211  Gross per 24 hour   Intake 867 ml   Output 3575 ml   Net -2708 ml      Wt Readings from Last 3 Encounters:   03/13/20 219 lb 12.8 oz (99.7 kg)   03/09/20 224 lb 12.8 oz (102 kg)   03/02/20 214 lb (97.1 kg)       General appearance:  Appears comfortable  Eyes: Sclera clear. Pupils equal.  ENT: Moist oral mucosa. Trachea midline, no adenopathy. Cardiovascular: Regular rhythm, normal S1, S2. No murmur. No edema in lower extremities  Respiratory: Not using accessory muscles. Good inspiratory effort. Clear to auscultation bilaterally, no wheeze or crackles. GI: Abdomen soft, no tenderness, not distended, normal bowel sounds  Musculoskeletal: No cyanosis in digits, neck supple  Neurology: CN 2-12 grossly intact. No speech or motor deficits  Psych: Normal affect. Alert and oriented in time, place and person  Skin: Warm, dry, normal turgor    Labs and Tests:  CBC:   Recent Labs     03/11/20  0531 03/12/20  0430 03/13/20  0607   WBC 8.8 9.3 7.5   HGB 13.9 13.3* 13.1*    187 177     BMP:    Recent Labs     03/11/20  0531 03/12/20  0430 03/13/20  0607   * 134* 136   K 4.9 4.3 4.1   CL 94* 98* 96*   CO2 21 24 26   BUN 31* 31* 30*   CREATININE 1.8* 1.3 1.4*   GLUCOSE 87 77 100*     Hepatic:   Recent Labs     03/11/20  0531 03/12/20  0430 03/13/20  0607   AST 29 54* 42*   ALT 20 34 31   BILITOT 1.5* 1.5* 1.3*   ALKPHOS 149* 120 121       Discussed care with family and patient             Spent 30  minutes with patient and family at bedside and on unit reviewing medical records and labs, spent greater than 50% time counseling patient and family on diagnosis and plan   Problem List  Active Problems:    Acute on chronic systolic CHF (congestive heart failure) (HCC)    Syncope and collapse  Resolved Problems:    * No resolved hospital problems. *       Assessment & Plan:   1.  Syncope and collapse  - likely multifactorial in  Setting  Of CHF and meds  -Continue management per cardio.  -Entresto restarted. Creatinine is better.  -Off dobutamine drip with goal to transition to torsemide appreciate nephrology recommendation      Acute CHF exab  -as noted above. - AICD     3. Acute on chronic renal faulure  -improved 1.3  -    Hypotension-.        Palliative care consulted further recommendation to follow    Diet: DIET CARDIAC;  Code:Full Code  DVT PPX lovenox       Devonte Marino MD   3/13/2020 12:52 PM

## 2020-03-14 NOTE — PROGRESS NOTES
Nephrology Progress Note  [phone]   [email or web address]        Reason for Consult:  syncope    HISTORY OF PRESENT ILLNESS:                This is a patient with significant past medical history who was recently hospitalized for chest pain, AICD firing,complicated by ALEN peak Scr 1.5-improved to 1.0 at discharge,  h/o CHF, h/o colon cancer s/p XRT, radiation, who had a witnessed syncopal episode while waiting in ER Pt states he came to Er for worsening SOB, weight gain of 20#. He was asked to come to ER further evaluation. He was seen by cardiology 3/6 and lasix PO was changed to torsemide. He was taking both entresto and lisinopril after last discharge. He is also on eplerenone. He denies chest pain at this time. He denies fever, chill, N/V. He denies any difficulty breathing. Given lasix in ER  -Scr at adm 1.7--> 1.8  -started on dobutamine and lasix drip  -hypotensive with BP in 60's range ON-started on pressors    Subjective:  -pt seen and examined  -PMSHx and meds reviewed  -No family at bedside       ROS: neg chest pain/SOB better/edema improved    PHYSICAL EXAM:    Vitals:    /83   Pulse 114   Temp 97.4 °F (36.3 °C) (Temporal)   Resp 21   Ht 5' 10\" (1.778 m)   Wt 225 lb 8.5 oz (102.3 kg)   SpO2 96%   BMI 32.36 kg/m²   I/O last 3 completed shifts: In: 1014 [P.O.:960; I.V.:54]  Out: 1500 [Urine:1400; Stool:100]  I/O this shift:  In: -   Out: 225 [Urine:225]    Physical Exam:  Gen: Resting in bed, NAD.  obese  HEENT: MMM, OP clear. Anicteric, NC/AT  CV: +S1/S2, RRR  Lungs: Good respiratory effort, diminished in bases   Left PORT intact  Abd: S/NT +BS-obese, colostomy   Ext: + edema, no cyanosis  Skin: Warm. No rashes appreciated. Neuro: Alert and oriented x 3, nonfocal.  Joints: No erythema noted over joints.     DATA:    CBC:   Lab Results   Component Value Date    WBC 8.7 03/14/2020    RBC 4.50 03/14/2020    RBC 3.74 07/05/2017    HGB 14.1 03/14/2020    HCT 43.1 03/14/2020 MCV 95.8 03/14/2020    MCH 31.3 03/14/2020    MCHC 32.7 03/14/2020    RDW 19.6 03/14/2020     03/14/2020    MPV 7.8 03/14/2020     BMP:    Lab Results   Component Value Date     03/14/2020    K 4.4 03/14/2020    K 4.1 03/13/2020    CL 94 03/14/2020    CO2 25 03/14/2020    BUN 23 03/14/2020    LABALBU 3.5 03/14/2020    CREATININE 1.1 03/14/2020    CALCIUM 8.7 03/14/2020    GFRAA >60 03/14/2020    GFRAA >60 12/19/2012    LABGLOM >60 03/14/2020    GLUCOSE 115 03/14/2020    GLUCOSE 131 07/05/2017       IMPRESSION/RECOMMENDATIONS:    ALEN on CKD stage 2: recurrent-urine studies c/w diminished perfusion/CRS-exacerbated by combined dual RASI and Enteresto/hypotension  -continue lasix/off dobutamine  -cr improved  -Enresto restarted  -on po torsemide  -continue supportive care  -renal function stable. Will sign off.  Please do not hesitate to call if any changes     Syncope: due to hypotension  -per cardiology    Hypotension: multifactorial, CHF combined with meds  -stable    Hyponatremia: hypervolemic  -improved with diuresis    CHF: per cardiology, end stage, hospice recommended  -off dobutamine  -Ok to transition to torsemide    VT: s/p AICD    H/o colon CA: in remission per patient      Deonte Mike MD

## 2020-03-14 NOTE — PROGRESS NOTES
[facility]   Daily Progress Note      Admit Date:  3/10/2020    HPI:    Mr. Nolan Wallace 52year old male with sHF, NICM, HTN, HLD and rectal cancer s/p chemo and XRT. ANANYA RETREAT 9/15/16 with no coronary artery disease, LVEF 15%, LVEDP 20-25 mmHg.  He also underwent surgery for repair of ventral hernia and new colostomy. Depression and anxiety    He is admitted with syncope and SOB. His weight was up 10 pounds with peripheral edema. He has been non compliant with medications in the past.  Recently lasix changed to torsemide    Subjective:  Patient is being seen for chronic sHF. There were no acute overnight cardiac events. Creat 1.1 potassium 4.4 probnp 5346->4851 weight is stable. He complains that he is not receiving his ativan and pain medications even though his BP is low. Discussed this with him. He is awaiting [facility] meeting today    Objective:   /79   Pulse 111   Temp 97.4 °F (36.3 °C) (Temporal)   Resp 18   Ht 5' 10\" (1.778 m)   Wt 225 lb 8.5 oz (102.3 kg)   SpO2 96%   BMI 32.36 kg/m²       Intake/Output Summary (Last 24 hours) at 3/14/2020 0910  Last data filed at 3/14/2020 2405  Gross per 24 hour   Intake 1014 ml   Output 1725 ml   Net -711 ml          Physical Exam:  General:  Awake, alert, oriented in NAD  Skin:  Warm and dry. No unusual bruising or rash  Neck:  Supple. No JVD or carotid bruit appreciated  Chest:  Normal effort.   Clear to auscultation, no wheezes/rhonchi/rales  Cardiovascular:  RRR, S1/S2, no murmur/gallop/rub  Abdomen:  Soft, nontender, +bowel sounds  Extremities:  No edema  Neurological: No focal deficits  Psychological: Normal mood and affect      Medications:    torsemide  40 mg Oral BID    carvedilol  3.125 mg Oral BID     sacubitril-valsartan  0.5 tablet Oral BID    eplerenone  25 mg Oral Daily    amiodarone  200 mg Oral Daily    busPIRone  10 mg Oral BID    digoxin  125 mcg Oral Daily    DULoxetine  30 mg Oral Daily    tamsulosin  0.4 mg

## 2020-03-14 NOTE — PROGRESS NOTES
Shift assessment completed, see doc flowsheets. Patient sitting on side of bed, coughing hard and bringing up thick white mucous. Lungs diminished, MONTEZ, anxious and asking for medication. Edema to arms, abdomen, sacrum and legs. Repositioned in bed, arms and legs elevated. Given ativan and zofran, ST on monitor with PVCs , SpO2 96% on room air. Call light in reach, will continue to monitor.

## 2020-03-14 NOTE — PROGRESS NOTES
Reassessment completed, see doc fowsheet. Patient states relief from nausea and anxiety with ativan and zofran. SR on monitor. Offers no complaints at this time. Fall precautions in place and call light in reach. Will continue to monitor.

## 2020-03-14 NOTE — PROGRESS NOTES
Patient complaining of chest pain, arm across forehead and is holding his chest.  Systolic in the 90E. ST on monitor with few PVCs. SpO2 96% on room air. Skin is warm and dry.   Will notify hospitalist.

## 2020-03-15 NOTE — PROGRESS NOTES
This RN went over AVS and discharge paperwork with patient and wife at bedside. Answered any and all questions and went over medications that are due tonight. Patient given paperwork and also prescriptions. No further questions at this time. Patient taken with wife and all belongings via wheelchair safely.

## 2020-03-15 NOTE — CARE COORDINATION
CM received notification from [facility] that they will meet with the patient at 6pm today.      [address] [facility], [place], [facility], [address] [place] [address]

## 2020-03-15 NOTE — PROGRESS NOTES
morphine  15 mg Oral 2 times per day    midodrine  5 mg Oral TID WC    famotidine  20 mg Oral Daily    sodium chloride flush  10 mL Intravenous 2 times per day    heparin (porcine)  5,000 Units Subcutaneous 3 times per day    pregabalin  75 mg Oral QAM AC    And    pregabalin  150 mg Oral QPM      dextrose         Lab Data:  CBC:   Recent Labs     03/13/20  0607 03/14/20  0743 03/15/20  0424   WBC 7.5 8.7 7.2   HGB 13.1* 14.1 13.1*    186 148     BMP:    Recent Labs     03/13/20  0607 03/14/20  0743 03/15/20  0424    130* 128*   K 4.1 4.4 4.4   CO2 26 25 22   BUN 30* 23* 24*   CREATININE 1.4* 1.1 1.3     INR:  No results for input(s): INR in the last 72 hours. BNP:  No results for input(s): PROBNP in the last 72 hours. Diagnostics:    Echo 12/23/19  Summary   -Left ventricular cavity size is moderately dilated. Normal left ventricular   wall thickness. Overall left ventricular systolic function appears severely   reduced. Ejection fraction is visually estimated to be 20%. Grade III   diastolic dysfunction with elevated LV filling pressures.   -Moderate to severe mitral regurgitation.   -The left atrium is dilated.   -Pacer / ICD wire is visualized in the right ventricle.   -Moderate tricuspid regurgitation with PASP of 40 mmHg.   -The right atrial size is dilated.   -IVC size is dilated (>2.1 cm) but collapses > 50% with respiration   consistent with elevated RA pressure (8 mmHg).    Echo 5/31/2019  Left ventricular cavity size is dilated. Normal left ventricular wall   thickness. Overall left ventricular systolic function appears severely   reduced with a estimated ejection fraction of 30-35% 3D EF=32%. There is   severe diffuse hypokinesis.   -Grade III diastolic dysfunction with elevated LV filling   pressures. E/\"=21.6.   -Mildly thickened and calcified without evidence of stenosis.   Moderate-severe mitral regurgitation is present.   -Moderate tricuspid regurgitation.   -Mild pulmonic regurgitation present.   -Estimated pulmonary artery systolic pressure is severely elevated at 64   mmHg assuming a right atrial pressure of 3 mmHg.   -The left atrium is dilated.   -Pacemaker / ICD lead is visualized in the right heart    Assessment:    1. Syncopal and collapse  2. Chronic systolic heart failure, on arni and bb; no aldosterone due to non compliance  3. Hypotension  4. Malignant neoplasm of colon  5. ALEN  6. Narcotic addiction      Plan:    1. Continue torsemide 40 mg po bid  2. Continue entresto 24-26 mg bid  3. Limit narcotics and valium  4. Continue coreg 3.125 mg po bid  5. Discussed with bedside nurse. She will speak with social work and call [facility] hospice to come and speak with patient. He wants to sign with hospice. Discussed with patient who is agreeable with plan of care. Thank you for allowing me to participate in the care of your patient.     Stephane Pizano, CNS

## 2020-03-15 NOTE — FLOWSHEET NOTE
0830-pt alert and oriented. VSS NSR pvcs. RA. Pt c/o pain 7/10. Assess completed. Discussed plan of care with pt.

## 2020-03-15 NOTE — PLAN OF CARE
Taina Haines RN  Outcome: Ongoing  3/15/2020 1442 by Taina Haines RN  Outcome: Ongoing     Problem: Daily Care:  Goal: Daily care needs are met  Description: Daily care needs are met  3/15/2020 1827 by Taina Haines RN  Outcome: Not Met This Shift  3/15/2020 1826 by Taina Haines RN  Outcome: Ongoing  3/15/2020 1442 by Taina Haines RN  Outcome: Ongoing     Problem: Skin Integrity:  Goal: Skin integrity will stabilize  Description: Skin integrity will stabilize  3/15/2020 1827 by Taina Haines RN  Outcome: Not Met This Shift  3/15/2020 1826 by Taina Haines RN  Outcome: Ongoing  3/15/2020 1442 by Taina Haines RN  Outcome: Ongoing     Problem: Discharge Planning:  Goal: Patients continuum of care needs are met  Description: Patients continuum of care needs are met  3/15/2020 1827 by Taina Haines RN  Outcome: Not Met This Shift  3/15/2020 1826 by Taina Haines RN  Outcome: Ongoing  3/15/2020 1442 by Taina Haines RN  Outcome: Ongoing     Problem: Musculor/Skeletal Functional Status  Goal: Highest potential functional level  3/15/2020 1827 by Taina Haines RN  Outcome: Not Met This Shift  3/15/2020 1826 by Taina Haines RN  Outcome: Ongoing  3/15/2020 1442 by Taina Haines RN  Outcome: Ongoing  Goal: Absence of falls  3/15/2020 1827 by Taian Haines RN  Outcome: Not Met This Shift  3/15/2020 1826 by Taina Haines RN  Outcome: Ongoing  3/15/2020 1442 by Taina Haines RN  Outcome: Ongoing

## 2020-03-15 NOTE — FLOWSHEET NOTE
1415-colostomy leaking around the edges. Removed and cleaned. allacare applied around the site. New ostomy fitted and secured. Pt tolerated well.

## 2020-03-15 NOTE — FLOWSHEET NOTE
7941-spoke with pt about being discharged to home with hospice. Will call Leanna-spouse regarding pickup.

## 2020-03-15 NOTE — PLAN OF CARE
Problem: Falls - Risk of:  Goal: Will remain free from falls  Description: Will remain free from falls  3/15/2020 1827 by Linda Angeles RN  Outcome: Completed  3/15/2020 1827 by Linda Angeles RN  Outcome: Not Met This Shift  3/15/2020 1826 by Linda Angeles RN  Outcome: Ongoing  3/15/2020 1442 by Linda Angeles RN  Outcome: Ongoing  Goal: Absence of physical injury  Description: Absence of physical injury  3/15/2020 1827 by Linda Angeles RN  Outcome: Completed  3/15/2020 1827 by Linda Angeles RN  Outcome: Not Met This Shift  3/15/2020 1826 by Linda Angeles RN  Outcome: Ongoing  3/15/2020 1442 by Linda Angeles RN  Outcome: Ongoing     Problem: Pain:  Goal: Pain level will decrease  Description: Pain level will decrease  3/15/2020 1827 by Linda Angeles RN  Outcome: Completed  3/15/2020 1827 by Linda Angeles RN  Outcome: Not Met This Shift  3/15/2020 1826 by Linda Angeles RN  Outcome: Ongoing  3/15/2020 1442 by Linda Angeles RN  Outcome: Ongoing  Goal: Control of acute pain  Description: Control of acute pain  3/15/2020 1827 by Linda Angeles RN  Outcome: Completed  3/15/2020 1827 by Linda Angeles RN  Outcome: Not Met This Shift  3/15/2020 1826 by Linda Angeles RN  Outcome: Ongoing  3/15/2020 1442 by Linda Angeles RN  Outcome: Ongoing  Goal: Control of chronic pain  Description: Control of chronic pain  3/15/2020 1827 by Linda Angeles RN  Outcome: Completed  3/15/2020 1827 by Linda Angeles RN  Outcome: Not Met This Shift  3/15/2020 1826 by Linda Angeles RN  Outcome: Ongoing  3/15/2020 1442 by Linda Angeles RN  Outcome: Ongoing  Goal: Patient's pain/discomfort is manageable  Description: Patient's pain/discomfort is manageable  3/15/2020 1827 by Linda Angeles RN  Outcome: Completed  3/15/2020 1827 by Linda Angeles RN  Outcome: Not Met This Shift  3/15/2020 1826 by Linda Angeles RN  Outcome: Ongoing  3/15/2020 1442 by Kimberly Ascencio RN  Outcome: Ongoing     Problem: OXYGENATION/RESPIRATORY FUNCTION  Goal: Patient will maintain patent airway  3/15/2020 1827 by Kimberly Ascencio RN  Outcome: Completed  3/15/2020 1827 by Kimberly Ascencio RN  Outcome: Not Met This Shift  3/15/2020 1826 by Kimberly Ascencio RN  Outcome: Ongoing  3/15/2020 1442 by Kimberly Ascencio RN  Outcome: Ongoing  Goal: Patient will achieve/maintain normal respiratory rate/effort  Description: Respiratory rate and effort will be within normal limits for the patient  3/15/2020 1827 by Kimberly Ascencio RN  Outcome: Completed  3/15/2020 1827 by Kimberly Ascencio RN  Outcome: Not Met This Shift  3/15/2020 1826 by Kimberly Ascencio RN  Outcome: Ongoing  3/15/2020 1442 by Kimberly Ascencio RN  Outcome: Ongoing     Problem: HEMODYNAMIC STATUS  Goal: Patient has stable vital signs and fluid balance  3/15/2020 1827 by Kimberly Ascencio RN  Outcome: Completed  3/15/2020 1827 by Kimberly Ascencio RN  Outcome: Not Met This Shift  3/15/2020 1826 by Kimberly Ascencio RN  Outcome: Ongoing  3/15/2020 1442 by Kimberly Ascencio RN  Outcome: Ongoing     Problem: FLUID AND ELECTROLYTE IMBALANCE  Goal: Fluid and electrolyte balance are achieved/maintained  3/15/2020 1827 by Kimberly Ascencio RN  Outcome: Completed  3/15/2020 1827 by Kimberly Ascencio RN  Outcome: Not Met This Shift  3/15/2020 1826 by Kimberly Ascencio RN  Outcome: Ongoing  3/15/2020 1442 by Kimberly Ascencio RN  Outcome: Ongoing     Problem: ACTIVITY INTOLERANCE/IMPAIRED MOBILITY  Goal: Mobility/activity is maintained at optimum level for patient  3/15/2020 1827 by Kimberly Ascencio RN  Outcome: Completed  3/15/2020 1827 by Kimberly Ascencio RN  Outcome: Not Met This Shift  3/15/2020 1826 by Kimberly Ascencio RN  Outcome: Ongoing  3/15/2020 1442 by Kimberly Ascencio RN  Outcome: Ongoing     Problem: Infection:  Goal: Will remain free from infection  Description: Shift  3/15/2020 1826 by Linda Angeles RN  Outcome: Ongoing  3/15/2020 1442 by Linda Angeles RN  Outcome: Ongoing     Problem: Musculor/Skeletal Functional Status  Goal: Highest potential functional level  3/15/2020 1827 by Linda Angeles RN  Outcome: Completed  3/15/2020 1827 by Linda Angeles RN  Outcome: Not Met This Shift  3/15/2020 1826 by Linda Angeles RN  Outcome: Ongoing  3/15/2020 1442 by Linda Angeles RN  Outcome: Ongoing  Goal: Absence of falls  3/15/2020 1827 by Linda Angeles RN  Outcome: Completed  3/15/2020 1827 by Linda Angeles RN  Outcome: Not Met This Shift  3/15/2020 1826 by Linda Angeles RN  Outcome: Ongoing  3/15/2020 1442 by Linda Angeles RN  Outcome: Ongoing

## 2020-03-15 NOTE — PLAN OF CARE
Problem: Falls - Risk of:  Goal: Will remain free from falls  Description: Will remain free from falls  Outcome: Ongoing  Goal: Absence of physical injury  Description: Absence of physical injury  Outcome: Ongoing     Problem: Pain:  Goal: Pain level will decrease  Description: Pain level will decrease  Outcome: Ongoing  Goal: Control of acute pain  Description: Control of acute pain  Outcome: Ongoing  Goal: Control of chronic pain  Description: Control of chronic pain  Outcome: Ongoing  Goal: Patient's pain/discomfort is manageable  Description: Patient's pain/discomfort is manageable  Outcome: Ongoing     Problem: OXYGENATION/RESPIRATORY FUNCTION  Goal: Patient will maintain patent airway  Outcome: Ongoing  Goal: Patient will achieve/maintain normal respiratory rate/effort  Description: Respiratory rate and effort will be within normal limits for the patient  Outcome: Ongoing     Problem: HEMODYNAMIC STATUS  Goal: Patient has stable vital signs and fluid balance  Outcome: Ongoing     Problem: FLUID AND ELECTROLYTE IMBALANCE  Goal: Fluid and electrolyte balance are achieved/maintained  Outcome: Ongoing     Problem: ACTIVITY INTOLERANCE/IMPAIRED MOBILITY  Goal: Mobility/activity is maintained at optimum level for patient  Outcome: Ongoing     Problem: Infection:  Goal: Will remain free from infection  Description: Will remain free from infection  Outcome: Ongoing     Problem: Safety:  Goal: Free from accidental physical injury  Description: Free from accidental physical injury  Outcome: Ongoing  Goal: Free from intentional harm  Description: Free from intentional harm  Outcome: Ongoing     Problem: Daily Care:  Goal: Daily care needs are met  Description: Daily care needs are met  Outcome: Ongoing     Problem: Skin Integrity:  Goal: Skin integrity will stabilize  Description: Skin integrity will stabilize  Outcome: Ongoing     Problem: Discharge Planning:  Goal: Patients continuum of care needs are

## 2020-03-19 NOTE — DISCHARGE SUMMARY
[facility] DISCHARGE SUMMARY    Patient Demographics    Patient. Nic Avalos  Date of Birth. 1970  MRN. [id number]     Primary care provider. Anita Bray MD  (Tel: [phone])    Admit date: 3/10/2020    Discharge date (blank if same as Note Date): 3/15/2020  Note Date: 3/19/2020     Reason for Hospitalization. Chief Complaint   Patient presents with    Loss of Consciousness     pt. was walking from car to ED entrance when staff saw him collapse with seizure like activity at desk. pt. was placed on gurney and brought into [facility]. pt. was coming to ED because he called his PCP and told him he gained 20 pounds since d/c 1 week ago today. pt. states he was dizzy. [facility] Course. 1. Syncope and collapse  - likely multifactorial in  Setting  Of CHF and meds   pt is end stage CHF and recommneded hospice   -Continue management per cardio.  Mily Handing restarted. Creatinine is better.  -Discharge home with hospice       Acute CHF exab  -as noted above. - AICD      3. Acute on chronic renal faulure  -improved 1.3  -     Hypotension-. Consults. IP CONSULT TO HOSPITALIST  IP CONSULT TO NEPHROLOGY  IP CONSULT TO CARDIOLOGY  IP CONSULT TO PALLIATIVE CARE  IP CONSULT TO HOSPICE  IP CONSULT TO HOSPICE  IP CONSULT TO HOME CARE NEEDS    Physical examination on discharge day. BP 86/70   Pulse 96   Temp 98.4 °F (36.9 °C) (Temporal)   Resp 17   Ht 5' 10\" (1.778 m)   Wt 228 lb 6.3 oz (103.6 kg)   SpO2 95%   BMI 32.77 kg/m²   General appearance. Alert. Looks comfortable. HEENT. Sclera clear. Moist mucus membranes. Cardiovascular. Regular rate and rhythm, normal S1, S2. No murmur. Respiratory. Not using accessory muscles. Clear to auscultation bilaterally, no wheeze. Gastrointestinal. Abdomen soft, non-tender, not distended, normal bowel sounds  Neurology. Facial symmetry. mouth 3 times daily (with meals)     * morphine 15 MG extended release tablet  Commonly known as:  MS CONTIN  Take 1 tablet by mouth every 12 hours for 30 days. pregabalin 75 MG capsule  Commonly known as:  Lyrica  1 tablet AM and 2 tablets PM     sacubitril-valsartan 24-26 MG per tablet  Commonly known as:  ENTRESTO  Take 0.5 tablets by mouth 2 times daily     simethicone 80 MG chewable tablet  Commonly known as:  MYLICON     tamsulosin 0.4 MG capsule  Commonly known as:  FLOMAX  Take 1 capsule by mouth daily     Zantac 150 MG tablet  Generic drug:  raNITIdine         * This list has 1 medication(s) that are the same as other medications prescribed for you. Read the directions carefully, and ask your doctor or other care provider to review them with you. ASK your doctor about these medications    * morphine sulfate 20 MG/ML concentrated oral solution  Take 0.5 mLs by mouth every 2 hours as needed for Pain for up to 3 days. Ask about: Should I take this medication? * This list has 1 medication(s) that are the same as other medications prescribed for you. Read the directions carefully, and ask your doctor or other care provider to review them with you. Where to Get Your Medications      These medications were sent to [facility], [address], [address] [place] [id number]    Phone:  [phone]   · naloxone 4 MG/0.1ML Liqd nasal spray  · naloxone 4 MG/0.1ML Liqd nasal spray  · torsemide 20 MG tablet     You can get these medications from any pharmacy    Bring a paper prescription for each of these medications  · LORazepam 2 MG/ML concentrated solution  · morphine sulfate 20 MG/ML concentrated oral solution         Discharge recommendations given to patient. Follow Up. pcp in 1 week   Disposition. Home with hospice   Activity.  activity as tolerated  Diet: No diet orders on file      Spent 45  minutes in discharge process.     Signed:  Nicole Wade MD     3/19/2020 9:17 AM

## 2020-03-23 ENCOUNTER — TELEPHONE (OUTPATIENT)
Dept: CARDIOLOGY CLINIC | Age: 50
End: 2020-03-23

## 2020-03-23 NOTE — TELEPHONE ENCOUNTER
Dariusz with [facility] called, wife has asked that LAKHWINDER sign the death certificate. He is asking how this can be done (email or need to come to office) Please call him at [phone] to advise. Thank you.

## 2022-03-22 NOTE — PROGRESS NOTES
[facility] [place]  Heart failure progress note        Urvashi Arce, female  : 3/31/1933  PCP: Lore Beltran MD  Attending/Consulting Provider: Obdulio Marmolejo MD     Consults    Reason for Consultation:  Chief Complaint   Patient presents with   • Breathing Problem     pt co of worsening sob since last night. pt deneis chest pain         SUBJECTIVE:     History of Present Illness:  Urvashi Arce is a 88 year oldfemale with PMHx of hypertension, hyperlipidemia, severe AS status post bioprosthetic SAVR (19 mm Saint Madhav trifecta) now with bioprosthetic dysfunction with critical AS, heart failure preserved ejection fraction 65% NYHA III ACC AHA stage C, who presents due to acutely worsening shortness of breath.  Shares that usually she has orthopnea and PN, with a functional capacity able to ambulate slowly in a flat surface for a few meters, however, for the past 2 days has had worsening lower extremity edema as well as shortness of breath now at rest.  She denies having chest pain, syncope, cough, fever, chills, hematemesis.    She shares that she is going to undergo valve in valve TAVR by Dr. Desai mid April.     Subjective:  No overnight events.  Endorses lower extremities are cramping a little bit.  Still not able to lay flat, had orthopnea and PND yesterday.  Denies having chest pain.  Endorses significant respiratory improvement since admission.    Current Facility-Administered Medications   Medication Dose Route Frequency Provider Last Rate Last Admin   • [START ON 3/23/2022] furosemide (LASIX INJECT) injection 40 mg  40 mg Intravenous Daily Govind Interiano MD       • atorvastatin (LIPITOR) tablet 40 mg  40 mg Oral QHS Sg Rosado MD   40 mg at 22   • aspirin (ECOTRIN) enteric coated tablet 81 mg  81 mg Oral Daily Sg Rosado MD   81 mg at 22 0818   • calcium carbonate-vitamin D (CALTRATE+D) 600-400 MG-UNIT tablet 1 tablet  1 tablet Oral Daily with  Progress Note - Dr. Evelio Vincent - Internal Medicine  PCP: [facility], [facility] [address] [phone]    Hospital Day: 1  Code Status: Full Code  Current Diet: Diet NPO, After Midnight        CC: follow up on medical issues    Subjective:   Claudio Root is a 52 y.o. male. Doing about the same  Still c/o chest pain/discomfort  Case d/w dr Dilan Cheatham - icd to be interrogated today    Trop neg x 2 so far      He complains of chest pain, denies shortness of breath, denies nausea,  denies emesis. 10 system Review of Systems is reviewed with patient, and pertinent positives are listed here: None . Otherwise, Review of systems is negative. I have reviewed the patient's medical and social history in detail and updated the computerized patient record. To recap: He  has a past medical history of Allergic, Anesthesia, Arthritis, CAD (coronary artery disease), Cancer ([facility] [address] Chemotherapy adverse reaction, CHF (congestive heart failure) ([facility] [address] Chronic knee pain, Clostridium difficile infection, Depression motion, Diverticulitis, Diverticulosis, History of alcohol abuse, Hyperlipidemia, Hypertension, Irregular heart beat, Pneumonia, Right knee pain, Shoulder injury, and Sleep difficulties. . He  has a past surgical history that includes sinus surgery (4 surgeries); Inner ear surgery (tube right ear); knee surgery (08/24/2011); other surgical history (7/1/13); Knee arthroscopy (Right, [id number]); Knee arthroscopy (Right, 8/4/2014); Umbilical hernia repair (11/11/14); joint replacement; Shoulder arthroscopy (Right, 9/22/15); Colonoscopy (08/29/2016); Endoscopic ultrasonography, GI (09/14/2016); Cardiac catheterization (09/14/2016); Small intestine surgery (01/11/2017); Tunneled venous port placement (Left, 02/13/2017); colectomy; Abscess Drainage; Tunneled venous port placement; hernia repair; Upper gastrointestinal endoscopy (10/03/2017); hernia repair (12/01/2017);  Small intestine surgery (12/01/2017); other surgical history (12/01/2017); Colonoscopy (03/19/2018); Revision Colostomy (04/18/2018); hernia repair (04/18/2018); sigmoidoscopy (N/A, 11/23/2018); Breast surgery (Right, 1/21/2019); Abdomen surgery; Endoscopy, colon, diagnostic; Cardiac defibrillator placement (10/2017); Colonoscopy (N/A, 5/14/2019); pacemaker placement; and Small intestine surgery (N/A, 8/21/2019). Millie Betancourt He  reports that he quit smoking about 13 years ago. He has a 5.00 pack-year smoking history. He quit smokeless tobacco use about 2 years ago. His smokeless tobacco use included chew. He reports that he does not drink alcohol or use drugs. .        Active Hospital Problems    Diagnosis Date Noted    AICD discharge [Z45.02] 02/24/2020    Colon cancer ([facility] [address] [C18.9] 04/29/2019    Chest pain [D50.2]     Systolic CHF, chronic (HCC) [I50.22] 11/30/2016    Depression [F32.9] 08/24/2011    Essential hypertension [I10]        Current Facility-Administered Medications: melatonin tablet 9 mg, 9 mg, Oral, Nightly PRN  simethicone (MYLICON) chewable tablet 80 mg, 80 mg, Oral, Q6H PRN  lisinopril (PRINIVIL;ZESTRIL) tablet 5 mg, 5 mg, Oral, Daily  digoxin (LANOXIN) tablet 125 mcg, 125 mcg, Oral, Daily  eplerenone (INSPRA) tablet 25 mg, 25 mg, Oral, Daily  HYDROcodone-acetaminophen (NORCO) 5-325 MG per tablet 1 tablet, 1 tablet, Oral, Q4H PRN  furosemide (LASIX) tablet 40 mg, 40 mg, Oral, BID  pregabalin (LYRICA) capsule 75 mg, 75 mg, Oral, TID  DULoxetine (CYMBALTA) extended release capsule 30 mg, 30 mg, Oral, Daily  carvedilol (COREG) tablet 3.125 mg, 3.125 mg, Oral, BID WC  amiodarone (CORDARONE) tablet 200 mg, 200 mg, Oral, Daily  midodrine (PROAMATINE) tablet 5 mg, 5 mg, Oral, TID WC  sacubitril-valsartan (ENTRESTO) 24-26 MG per tablet 0.5 tablet, 0.5 tablet, Oral, BID  morphine 20MG/ML concentrated solution 4 mg, 4 mg, Oral, Q4H PRN  sodium chloride flush 0.9 % injection 10 mL, 10 mL, Intravenous, 2 times per day  sodium chloride breakfast Sg Rosado MD   1 tablet at 03/22/22 0818   • sodium chloride 0.9 % flush bag 25 mL  25 mL Intravenous PRN Sg Rosado MD       • sodium chloride (PF) 0.9 % injection 2 mL  2 mL Intracatheter 2 times per day Sg Rosado MD   2 mL at 03/22/22 0819   • sodium chloride 0.9 % flush bag 25 mL  25 mL Intravenous PRN Sg Rosado MD       • sodium chloride (NORMAL SALINE) 0.9 % bolus 500 mL  500 mL Intravenous PRN Sg Rosado MD       • enoxaparin (LOVENOX) injection 40 mg  40 mg Subcutaneous Nightly Sg Rosado MD   40 mg at 03/21/22 2040   • Potassium Standard Replacement Protocol   Does not apply See Admin Instructions Sg Rosado MD       • Magnesium Standard Replacement Protocol   Does not apply See Admin Instructions Sg Rosado MD       • acetaminophen (TYLENOL) tablet 650 mg  650 mg Oral Q4H PRN Sg Rosado MD       • docusate sodium-sennosides (SENOKOT S) 50-8.6 MG 2 tablet  2 tablet Oral Daily PRN Sg Rosado MD       • hydrALAZINE (APRESOLINE) tablet 10 mg  10 mg Oral TID Sg Rosado MD   10 mg at 03/22/22 0818   • isosorbide dinitrate (ISORDIL) tablet 5 mg  5 mg Oral TID Sg Rosado MD   5 mg at 03/22/22 0818       Review of Systems:  ROS   A complete 14 point ROS was done and negative except as stated above        OBJECTIVE:     Vital Last Value 24 Hour Range   Temperature 97.5 °F (36.4 °C) (03/22/22 1047) Temp  Min: 97.3 °F (36.3 °C)  Max: 98.4 °F (36.9 °C)   Pulse 87 (03/22/22 1047) Pulse  Min: 69  Max: 91   Respiratory 16 (03/22/22 1047) Resp  Min: 15  Max: 30   Non-Invasive  Blood Pressure 125/61 (03/22/22 1047) BP  Min: 103/67  Max: 157/80   Pulse Oximetry 95 % (03/22/22 1047) SpO2  Min: 91 %  Max: 97 %   Arterial   Blood Pressure   No data recorded        Intake/Output Summary (Last 24 hours) at 3/22/2022 1052  Last data filed at 3/22/2022 0643  Gross per 24 hour   Intake 300 ml   Output 1600 ml   Net -1300 ml        Wt Readings from Last 3 Encounters:   03/22/22 62.4 kg (137 lb 9.1  flush 0.9 % injection 10 mL, 10 mL, Intravenous, PRN  potassium chloride (KLOR-CON M) extended release tablet 40 mEq, 40 mEq, Oral, PRN **OR** potassium bicarb-citric acid (EFFER-K) effervescent tablet 40 mEq, 40 mEq, Oral, PRN **OR** potassium chloride 10 mEq/100 mL IVPB (Peripheral Line), 10 mEq, Intravenous, PRN  acetaminophen (TYLENOL) tablet 650 mg, 650 mg, Oral, Q6H PRN  acetaminophen (TYLENOL) suppository 650 mg, 650 mg, Rectal, Q6H PRN  magnesium hydroxide (MILK OF MAGNESIA) 400 MG/5ML suspension 30 mL, 30 mL, Oral, Daily PRN  promethazine (PHENERGAN) tablet 12.5 mg, 12.5 mg, Oral, Q6H PRN  ondansetron (ZOFRAN) injection 4 mg, 4 mg, Intravenous, Q6H PRN  atorvastatin (LIPITOR) tablet 40 mg, 40 mg, Oral, Nightly  aspirin EC tablet 325 mg, 325 mg, Oral, Daily  enoxaparin (LOVENOX) injection 40 mg, 40 mg, Subcutaneous, Daily  nitroGLYCERIN (NITROSTAT) SL tablet 0.4 mg, 0.4 mg, Sublingual, Q5 Min PRN  famotidine (PEPCID) tablet 20 mg, 20 mg, Oral, Daily         Objective:  BP (!) 112/49   Pulse 112   Temp 98.8 °F (37.1 °C) (Temporal)   Resp 18   Ht 5' 10\" (1.778 m)   Wt 200 lb 14.4 oz (91.1 kg)   SpO2 95%   BMI 28.83 kg/m²      Patient Vitals for the past 24 hrs:   BP Temp Temp src Pulse Resp SpO2 Height Weight   02/25/20 0448 -- -- -- -- -- -- -- 200 lb 14.4 oz (91.1 kg)   02/25/20 0443 (!) 112/49 98.8 °F (37.1 °C) Temporal 112 18 -- -- 198 lb 11.2 oz (90.1 kg)   02/25/20 0212 110/75 98.3 °F (36.8 °C) Temporal 116 18 95 % -- --   02/25/20 0038 100/68 98.7 °F (37.1 °C) Temporal 110 17 94 % -- --   02/24/20 2331 108/74 97.4 °F (36.3 °C) Temporal 112 16 95 % -- --   02/24/20 2304 -- -- -- -- -- -- 5' 10\" (1.778 m) 201 lb 2 oz (91.2 kg)   02/24/20 2242 103/69 97.6 °F (36.4 °C) Temporal 110 -- -- -- --   02/24/20 2134 96/68 97.2 °F (36.2 °C) Oral 110 17 95 % -- --   02/24/20 2045 100/78 -- -- 116 -- 96 % -- --   02/24/20 2030 101/77 -- -- 122 -- 95 % -- --   02/24/20 2015 -- -- -- 121 -- 95 % -- --   02/24/20 oz)   03/17/22 66.7 kg (147 lb)   03/16/22 67 kg (147 lb 11.3 oz)             Tele:  NSR.     PHYSICAL EXAMINATION:  Physical Exam   Constitutional: Elderly female in moderate respiratory distress, not able to speak in full sentences.  Hent: Normocephalic, atraumatic, PERRLA, EOMI  Neck: JVD to mid neck  Heart: 4 out of 6 holosystolic, late peaking, no S2, pulses parvus at tardus.  Lungs: Ascending expiratory crackles from bases to midlung fields bilaterally  Lower extremities.  1+ pitting edema from dorsum of the feet to below the knee  Neuro: Grossly intact    DIAGNOSTIC STUDIES:     Labs:  CBC:   Recent Labs   Lab 03/22/22  0447 03/21/22  0924 03/02/22  0956 02/21/22  1005 02/12/22  0431 02/11/22  1152   WBC 7.6 6.9 5.5 7.0 6.7 7.0   RBC 4.44 4.72 4.48 4.78 4.44 4.64   HGB 13.3 14.4 13.2 13.8 12.7 13.8   HCT 40.2 42.9 39.6 42.9 41.1 41.4   MCV 90.5 90.9 88.4 89.7 92.6 89.2   MCHC 33.1 33.6 33.3 32.2 30.9* 33.3   RDW-CV 15.1* 15.2* 14.8 14.6 14.4 14.6    156 159 190 165 177   Lymphocytes, Percent 21 17  --   --  22 18     CMP:  Recent Labs   Lab 03/22/22  0447 03/21/22  0925   SODIUM 140 140   POTASSIUM 3.8 3.8   CHLORIDE 103 104   CO2 32 29   BUN 14 13   CREATININE 0.89 0.81   GLUCOSE 91 99   ALBUMIN  --  3.7   AST  --  30   GPT  --  21   ALKPT  --  71   BILIRUBIN  --  1.8*   MG 2.0  --      COAGULATION STUDIES: No results found  TSH:    Lab Results   Component Value Date    TSH 5.055 (H) 03/22/2022    TSH 2.452 01/19/2021    TSH 2.108 06/25/2019     HbA1c:   Hemoglobin A1C (%)   Date Value   01/19/2021 5.3     LIPID PANEL: No results found  NT-PRONBP:   Recent Labs   Lab 03/21/22  0925 02/19/22  0953 02/11/22  1152   NT-proBNP 3,021* 1,887* 2,617*     Troponin: No results found    Data:  Encounter Date: 03/21/22   Electrocardiogram 12-Lead   Result Value    Ventricular Rate EKG/Min (BPM) 74    Atrial Rate (BPM) 74    AL-Interval (MSEC) 180    QRS-Interval (MSEC) 84    QT-Interval (MSEC) 424    QTc 470     P Axis (Degrees) -6    R Axis (Degrees) 28    T Axis (Degrees) 49    REPORT TEXT      Normal sinus rhythm  Septal infarct  , age undetermined  Abnormal ECG  When compared with ECG of  02-MAR-2022 10:23,  premature ventricular complexes  are no longer  present  VT interval  has decreased  Septal infarct  is now  present  Nonspecific T wave abnormality, improved in  Inferior leads  Nonspecific T wave abnormality now evident in  Anterior leads  Confirmed by RAMON FRIEDMAN [facility] ([id number]) on 3/21/2022 8:43:12 PM       2D TTE 02/12/2022:     1. Left ventricle: The cavity size is normal. Wall thickness is mildly     increased. Hypertrophy is noted. The ejection fraction was measured by     3D assessment. Doppler parameters are consistent with abnormal left     ventricular relaxation and increased filling pressure (grade 2     diastolic dysfunction). The ejection fraction is 62%.  2. Aortic valve: There is a malfunctioning 19mm Trifecta prosthetic valve.     There is critical stenosis (SUDARSHAN 0.43 cm2, mean gradient 65 mmHg, peak     velocity 5.1 m/s, DI 0.2, SVI 36 mL/m2). Mild regurgitation.  3. Mitral valve: Moderate to severe regurgitation (EROA 0.23 cm2, MR     Volume 57 mL).  4. Left atrium: The atrium is dilated.  Recommendations:  Valve clinic follow-up to consider for TAVR. Structural  heart team will arrange.     TAVR CTA 02/212/2022:  ATRIA  Left atrium:   mildly dilated without filling defects.  Right atrium:   Normal in size without filling defects.     VENTRICLES  Left ventricle:   Chamber size is normal and wall thickness is increased.  No filling defects or regional wall motion abnormalities. The membranous  septum measures 8.4 mm.  Right ventricle:   No gross abnormalities.       VALVES  Aortic valve: Bioprosthetic valve.     Annulus Measurements (Systole):  +0: 254 mm2, 17.4 x 18.6 mm; perimeter 57 mm  -3, LVOT: 358 mm2, 20 x 23 mm     The tube angle projecting the right coronary sinus symmetrically over the  left  and non-coronary sinuses at systole is LAO16 CRA12.     There are mild left ventricular outflow tract calcifications.     Mitral valve  No valve thickening; small annular calcification.       The tricuspid and pulmonic valves demonstrate no gross abnormalities.     CORONARY ARTERIES:  Limited evaluation for stenosis due to technique.      Right coronary artery - annulus height is 8 mm. Valve to RCA 6 mm  Left coronary artery - annulus height is 7 mm. Valve to LM 7 mm     AORTA    The following orthogonal measurements of the thoracic aorta were measured  in diastole:  - Sinus of Valsalva: 27 x 22 x 25 mm,    - Sino-tubular junction: 22 x 26 mm     Pericardium:   No pericardial effusion.     IMPRESSION:  Pre-TAVR measurements were obtained  Low insertion of coronary arteries    ASSESSMENT AND PLAN:     Impression:   Ms. Arce is an 88-year-old female with a past medical history of hypertension, hyperlipidemia, prior severe AS status post bioprosthetic SAVR (19 mm Saint Madhav trifecta) now with bioprosthetic dysfunction with now critical AS, heart failure preserved ejection fraction 65% NYHA III ACC AHA stage C who presented acutely decompensated heart failure in the setting of critical AS.    Assessment:   Acutely decompensated diastolic heart failure  HFpEF NYHA III ACC AHA stage C  Acute hypoxic respiratory failure  Critical bioprosthetic AS undergoing valve in valve TAVR at [facility].  Gradient 60 mmHg, peak velocity 5.2, dimensionless index 0.2 SUDARSHAN by VTI 0.4.  Moderate to severe MR  Uncontrolled hypertension  Hyperlipidemia    Plan:  She is currently in acutely decompensated heart failure in the setting of critical bioprosthetic AS.  She is in acute hypoxic respiratory failure and unable to speak in full sentences.     There is no evidence of cardiogenic shock at this point, no benefit from BAV.  However if continues in profound heart failure, might consider balloon valvuloplasty in the setting of critical  AS.  Decrease Lasix frequency to 40 mg IV daily.  Continue hydralazine 10 mg p.o. every 8 hours and isosorbide 5 mg p.o. 3 times daily.  Hold if systolic blood pressure is less than 100mmHg.   Absolutely avoid any beta-blockers or calcium channel blockers given critical AS and acutely decompensated state.  Continue Lipitor 40 mg p.o. daily  Strict I/Os  Daily weights  Please keep K >4 and mag >2.   Fall precautions  Plan to see as a structural consult tomorrow for further planning regarding upcoming valve in valve TAVR.    Pt was seen and discussed with Dr. Westbrook.       Govind Fernandes M.D.  PGY-5 Cardiology Fellow.     Govind Interiano MD  3/22/569840:52 AM

## 2022-07-14 NOTE — ED NOTES
Personalized Preventive Plan for Alexis Rodriguez - 7/14/2022  Medicare offers a range of preventive health benefits. Some of the tests and screenings are paid in full while other may be subject to a deductible, co-insurance, and/or copay. Some of these benefits include a comprehensive review of your medical history including lifestyle, illnesses that may run in your family, and various assessments and screenings as appropriate. After reviewing your medical record and screening and assessments performed today your provider may have ordered immunizations, labs, imaging, and/or referrals for you. A list of these orders (if applicable) as well as your Preventive Care list are included within your After Visit Summary for your review. Other Preventive Recommendations:    · A preventive eye exam performed by an eye specialist is recommended every 1-2 years to screen for glaucoma; cataracts, macular degeneration, and other eye disorders. · A preventive dental visit is recommended every 6 months. · Try to get at least 150 minutes of exercise per week or 10,000 steps per day on a pedometer . · Order or download the FREE \"Exercise & Physical Activity: Your Everyday Guide\" from [employer]. Call 3-422.186.5780 or search [employer] online. · You need 9131-9593 mg of calcium and 8012-9094 IU of vitamin D per day. It is possible to meet your calcium requirement with diet alone, but a vitamin D supplement is usually necessary to meet this goal.  · When exposed to the sun, use a sunscreen that protects against both UVA and UVB radiation with an SPF of 30 or greater. Reapply every 2 to 3 hours or after sweating, drying off with a towel, or swimming. · Always wear a seat belt when traveling in a car. Always wear a helmet when riding a bicycle or motorcycle. Pt given urinal upon request.     Rajat Fernandes, RN  04/02/19 0873

## 2022-08-16 NOTE — PLAN OF CARE
Pt has PRN pain medication available upon request. Pt aware to let nursing know when pain medication is needed. Pt remains free from falls. Fall precautions in place--bed in lowest position, call light within reach, bed alarm in use. Pt aware to call for assistance before getting up. Patient ambulating independently. Ostomy bag in place to RLQ. Low BP this AM, patient encouraged to dangle before getting OOB. Call for assistance. Ivermectin Pregnancy And Lactation Text: This medication is Pregnancy Category C and it isn't known if it is safe during pregnancy. It is also excreted in breast milk.

## 2023-08-22 NOTE — TELEPHONE ENCOUNTER
Called and spoke with patient. Explain providers advise.  Patient understood Per [facility], Since patient No Show at his last appointment, he must be seen. Patient appointment was moved to 8/29/19 from 9/4/19. Patient is aware that he will not get any medications until his appointment.

## 2024-02-22 NOTE — PROGRESS NOTES
February 22, 2024       Neema Fofana MD  [address] [facility] [address]  Via In Basket      Patient: Lio Hernandez Jr.   YOB: 1955   Date of Visit: 2/22/2024       Dear Dr. Fofana:    Thank you for referring Lio Hernandez to me for evaluation. Below are my notes for this visit with him.    If you have questions, please do not hesitate to call me. I look forward to following your patient along with you.      Sincerely,        Татьяна Arredondo CNP        CC: No Recipients  Татьяна Arredondo CNP  2/22/2024 11:31 AM  Signed  Chief Complaint/Reason for Visit:   Chief Complaint   Patient presents with   • Hospital F/U     HISTORY OF PRESENT ILLNESS: Lio Hernandez Jr. is a 68 year old male with history of coronary artery disease with prior PCI, hypertension, hypercholesterolemia who presented to [facility] with unstable angina was found to have an NSTEMI.  He underwent PCI and stenting to 99% lesion of the LAD.  Echocardiogram shows preserved LV systolic function.    He presents to the office today for hospital follow-up visit.  He is without any cardiac complaints.  He reports he is walking 1 to 2 miles daily without any limiting symptoms.    REVIEW OF SYSTEMS:  Review of Systems   Constitutional: Negative for chills, decreased appetite and fever.   HENT: Negative.     Eyes: Negative.    Cardiovascular:  Negative for chest pain, dyspnea on exertion, leg swelling, orthopnea and palpitations.   Respiratory:  Negative for cough and shortness of breath.    Endocrine: Negative.    Skin: Negative.    Musculoskeletal: Negative.    Gastrointestinal: Negative.    Genitourinary: Negative.    Neurological:  Negative for dizziness and light-headedness.   Psychiatric/Behavioral: Negative.         PAST MEDICAL HISTORY:   Past Medical History:   Diagnosis Date   • Essential (primary) hypertension    • High cholesterol    • Myocardial infarction (CMD)    • Prostate CA (CMD) 04/01/2012     PAST SURGICAL HISTORY:  Lisa Spain  1970  [id number]      HISTORY OF PRESENT ILLNESS:  Mr. Tash Galindo is a 52 y.o. male returns for a follow up visit for pain management  He has a diagnosis of   1. Chronic pain syndrome    2. Fibromyalgia    3. Generalized abdominal pain    4. Moderate episode of recurrent major [facility] disorder ([facility]    5. Primary insomnia    . He complains of pain in the Low back, bilateral legs He rates the pain 6/10 and describes it as sharp, aching. Current treatment regimen has helped relieve about 30% of the pain. He denies any side effects from the current pain regimen. Patient reports that since the last follow up visit the physical functioning is worse, family/social relationships are unchanged, mood is unchanged sleep patterns are unchanged, and that the overall functioning is unchanged. Patient denies misusing/abusing his narcotic pain medications or using any illegal drugs. There are No indicators for possible drug abuse, addiction or diversion problems. Patient states he is doing about the same. He complains he is having problems wit hhis colostomy bag. He mentions he is having a lot of family stressors and is going through a divorce. He reports he is not working and is on disability. ALLERGIES: Patients list of allergies were reviewed     MEDICATIONS: Mr. Tash Galindo list of medications were reviewed. His current medications are   Outpatient Medications Prior to Visit   Medication Sig Dispense Refill    QUEtiapine (SEROQUEL) 25 MG tablet Take 1-2 tablets by mouth nightly 60 tablet 0    pregabalin (LYRICA) 75 MG capsule 1 tablet AM and 2 tablets PM 90 capsule 0    DULoxetine (CYMBALTA) 30 MG extended release capsule Take 1 capsule by mouth daily 30 capsule 0    HYDROcodone-acetaminophen (NORCO) 7.5-325 MG per tablet Take 0.5-1 tablets by mouth 3 times daily for 28 days. MAX 2/DAY 56 tablet 0    buprenorphine (BUPRENEX) 7.5 MCG/HR PTWK Place 1 patch onto the skin once a week.         Past Surgical History:   Procedure Laterality Date   • Colonoscopy  2018   • Knee arthroscopy w/ arthrotomy      Left knee arthroscopy.   • Knee cartilage surgery Left 1980   • Prostatectomy       FAMILY HISTORY:   Family History   Problem Relation Age of Onset   • Parkinsonism Mother         Passed away due to parkinsonism at the age of 81.   • Cancer Father         prostate   • Aneurysm Father          of aneurysm at the age of 80.     SOCIAL HISTORY:   Social History     Tobacco Use   • Smoking status: Never     Passive exposure: Never   • Smokeless tobacco: Never   Vaping Use   • Vaping Use: never used   Substance Use Topics   • Alcohol use: Yes     Alcohol/week: 4.0 standard drinks of alcohol     Types: 4 Standard drinks or equivalent per week     Comment: Varies   • Drug use: Never       Drug Use:    Never           ALLERGIES:   ALLERGIES:   Allergen Reactions   • Codeine Other (See Comments)     Unknown   • Codeine GI UPSET     MEDICATIONS:   Current Outpatient Medications   Medication Sig Dispense Refill   • lisinopril (ZESTRIL) 40 MG tablet Take 1 tablet by mouth daily. 90 tablet 3   • atorvastatin (LIPITOR) 80 MG tablet Take 1 tablet by mouth daily. Do not start before 2024. (Patient not taking: Reported on 2024) 90 tablet 3   • famotidine (PEPCID) 20 MG tablet Take 1 tablet by mouth nightly. 30 tablet 11   • clopidogrel (PLAVIX) 75 MG tablet Take 1 tablet by mouth daily. Do not start before 2024. 90 tablet 3   • metoPROLOL succinate (TOPROL-XL) 25 MG 24 hr tablet Take 12.5 mg by mouth daily.     • aspirin 81 MG chewable tablet Chew 1 tablet by mouth daily. Do not start before 2022. 60 tablet 0   • nitroGLYCERIN (NITROSTAT) 0.4 MG sublingual tablet Place 1 tablet under the tongue every 5 minutes as needed for Chest pain (Call your heart doctor if you need to use this). (Patient not taking: Reported on 2024) 30 tablet 0     No current  facility-administered medications for this visit.     PHYSICAL  EXAMINATION  Visit Vitals  BP (!) 140/72 (BP Location: LUE - Left upper extremity, Patient Position: Sitting, Cuff Size: Regular)   Pulse 67   Wt 76.8 kg (169 lb 3.3 oz)   BMI 24.99 kg/m²     Physical Exam  Vitals reviewed.   Constitutional:       General: He is not in acute distress.     Appearance: Normal appearance. He is well-developed. He is not diaphoretic.   HENT:      Head: Normocephalic and atraumatic.   Eyes:      Conjunctiva/sclera: Conjunctivae normal.   Neck:      Vascular: No carotid bruit or JVD.   Cardiovascular:      Rate and Rhythm: Normal rate and regular rhythm.      Pulses: Normal pulses.      Heart sounds: S1 normal and S2 normal. No murmur heard.     Comments: Right groin with mild residual bruising.  No bleeding, hematoma  Pulmonary:      Effort: Pulmonary effort is normal. No respiratory distress.      Breath sounds: Normal breath sounds. No wheezing or rales.   Chest:      Chest wall: No tenderness.   Abdominal:      General: There is no distension.   Musculoskeletal:         General: Normal range of motion.      Cervical back: Normal range of motion.   Skin:     General: Skin is warm and dry.   Neurological:      Mental Status: He is alert and oriented to person, place, and time.   Psychiatric:         Mood and Affect: Mood normal.         Behavior: Behavior normal.         Diagnostic Testing:     TTE 12/11/24  SUMMARY:  1. Left ventricle: The cavity size is normal. Wall thickness is normal. Systolic function is normal. The ejection fraction was measured by single plane method of disks. The ejection fraction is 62%.  2. Regional wall motion: There is hypokinesis of the basal inferoseptal wall.  3. Right ventricle: The cavity size is normal. Systolic function is normal.  4. Tricuspid valve: There is trace regurgitation.  5. Pulmonic valve: There is trivial regurgitation.     Cardiac cath:       Results for orders placed or  Indications: takes depending on his weight ) 60 tablet 3    simethicone (MYLICON) 80 MG chewable tablet Take 80 mg by mouth every 6 hours as needed for Flatulence      ranitidine (ZANTAC) 150 MG tablet Take 150 mg by mouth daily      melatonin 3 MG TABS tablet Take 10 mg by mouth nightly as needed       lovastatin (MEVACOR) 40 MG tablet Take 40 mg by mouth. Take 1 tablet (40 mg total) by mouth at bedtime.  diazepam (VALIUM) 5 MG tablet Take 5 mg by mouth three times daily.  Cetirizine HCl (ZYRTEC PO) Take 1 tablet by mouth daily. Take only as needed for scrachy eyes. No current facility-administered medications for this visit. I will continue his current medication regimen  which is part of the above treatment schedule. It has been helping with Mr. Tracy Shoemaker chronic  medical problems which for this visit include:   Diagnoses of Chronic pain syndrome, Fibromyalgia, Generalized abdominal pain, Moderate episode of recurrent major depressive disorder ([address] and Primary insomnia were pertinent to this visit. Risks and benefits of the medications and other alternative treatments  including no treatment were discussed with the patient. The common side effects of these medications were also explained to the patient. Informed verbal consent was obtained. Goals of current treatment regimen include improvement in pain, restoration of functioning- with focus on improvement in physical performance, general activity, work or disability,emotional distress, health care utilization and  decreased medication consumption. Will continue to monitor progress towards achieving/maintaining therapeutic goals with special emphasis on  1. Improvement in perceived interfernce  of pain with ADL's. Ability to do home exercises independently. Ability to do household chores indoor and/or outdoor work and social and leisure activities. Improve psychosocial and physical functioning. - he is showing progression towards performed during the hospital encounter of 02/10/24   Cath/PV Case     Narrative     •  Ost Cx to Mid Cx lesion with 5% stenosis.  •  Prox LAD to Mid LAD lesion with 99% stenosis.     left heart catheterization  coronary angiography  stenting angioplasty of the left anterior descending coronary artery.  intravascular ultrasound of the left anterior descending coronary artery  conscious sedation  ultrasound guided placement of a micropuncture 6 [place] sheath in the   right common femoral artery     Patient arrived to the cardiac catheterization lab, I personally   supervised administration and the monitoring of the conscious sedation,   conscious sedation is as noted on the report in regard to times and   medications used and the nurse administration.  Keily RN and Bernadine RN   administered and monitored conscious sedation starting at 7:18 AM ended up   at 8:01 AM.  Patient received Versed and fentanyl under my supervision     The right groin was prepped in normal sterilization technique.    Ultrasound-guided placement of a micropuncture 6 [place] sheath was done to   the right common femoral artery, the ultrasound sonography showed mild   disease with posterior wall of the common femoral artery having an arc of   calcification.  Manuel technique was used for coronary angiography.  Left   heart catheterization was done by crossing the aortic valve in a   retrograde fashion.     Hemodynamics: Please refer to the original hemodynamics sheet generated in   the cardiac catheterization lab.  There was no gradient across aortic   valve.     Angiography:  Left main bifurcates into LAD and left circumflex, the left main is   normal.   The left anterior descending artery has of severe stenosis in   the range of 99% of the biker furcation of a large second diagonal branch.    The left circumflex has a patent stent.  Right coronary artery is dominant and has minimal disease.     Angioplasty: The guide was a XB 3.5 guide placed  in the ostium of the left   main coronary artery.  The BMW wire was advanced across the lesion,   another BMW wire was advanced and placed in the diagonal branch to protect   it.  Predilatation was done with a 2.0 mm balloon.  Followed by the   placement of a Synergy drug-eluting stent 2.75 x 24.  Postdilatation was   done with a 3.0 x 12 mm noncompliant balloon.  Intracoronary nitroglycerin   was given multiple times, intravascular ultrasound showed excellent   apposition of the struts of the stents without any dissection proximally   or distally.  Wire was removed and final images were obtained      summary:     Successful angioplasty and stenting of the left anterior descending   coronary artery.  The ostium of the diagonal branch was normal so no need   for intervention to that branch.  The left circumflex artery stent is   patent.  The right coronary artery is dominant with mild disease.        Labs:    Recent Labs   Lab 02/12/24  0504   Cholesterol 103   HDL 36*   Triglycerides 82   CALCLDL 51   Non-HDL Cholesterol 67     Recent Labs   Lab 02/12/24  0504 02/10/24  2351 11/29/23  0757   Sodium 141   < > 138   Chloride 113*   < > 106   BUN 12   < > 12   Potassium 4.1   < > 4.3   Glucose 105*   < > 98   Creatinine 0.94   < > 0.82   Calcium 8.8   < > 9.3   TSH  --   --  1.540    < > = values in this interval not displayed.     IMPRESSION/PLAN:    1. Hospital discharge follow-up  -Patient presented to [facility] with chest pain/NSTEMI    2. NSTEMI (non-ST elevated myocardial infarction) (CMD)  3. CAD S/P percutaneous coronary angioplasty  -History of NSTEMI in 2022 status post PCI and stenting to Lcx-OM  -TTE showed EF 62%, regional wall motion hypokinesis of basal inferoseptal wall, tricuspid valve trace regurgitation, pulmonic valve trivial regurgitation   -Presented with unstable angina and found to have NSTEMI  -Status post PCI and stenting to 99% lesion of the LAD  -On DAPT with aspirin and Plavix  -Continue statin,  beta-blocker, ACE  -Plans to pursue phase 2 cardiac rehab  -No anginal symptoms    4. Benign essential hypertension  -Blood pressure is above goal  -Increase lisinopril to 40 mg daily  -Repeat BMP in 1 week    5. Dyslipidemia  -Lipid panel reviewed  -On atorvastatin 80 mg daily  -Check LP(a)    6. JOHN (obstructive sleep apnea)  -Mild JOHN on HST  -Reports he had difficulty tolerating CPAP and had difficulty with obtaining follow-up with sleep medicine  -Not currently on CPAP    2/22/2024    Return for follow up as scheduled.    Татьяна Arredondo, SHA   [facility]

## 2025-03-19 NOTE — TELEPHONE ENCOUNTER
Talked to pt, said that he feels better today and things are getting better. Told him to continue pushing the fluids. Home

## 2025-07-08 NOTE — TELEPHONE ENCOUNTER
Personal history of skin cancer: yes   Basal Cell skin cancer Right popliteal 2016 ED&C  Basal Cell skin cancel left shoulder 2011 excision   12/13/22: Skin, left lower sofy of antihelix, shave biopsy: -Invasive squamous cell carcinoma ( [facility] with Dr. Fried 12/28/2022)      Biopsy History: Yes  7/8/25: right lateral lower cheek; shave biopsy; ACL:   12/13/22: Skin, left lower sofy of antihelix, shave biopsy: -Invasive squamous cell carcinoma. ([id number])     Patient would like communication of their results via:    Home Phone: [phone] (home)  Okay to leave a message containing results? Yes        Medications, allergies, and tobacco use verified  No vitals needed per    Pls call pt after this is called in.  Thank you      Medication Refill    Medication needing refilled: furosemide (LASIX) 40 MG tablet - PT IS OUT OF MEDICATION TOOK LAST ONE THIS MORNING NEEDS FOR TONIGHT'S DOSE    Doseage of the medication:40 mg     How are you taking this medication (QD, BID, TID, QID, PRN): 1 tab BID    30 or 90 day supply called in: 80    Which [facility] are we sending the medication to?:[facility] [place] [facility], [address] [place] [address]

## (undated) DEVICE — GOWN SIRUS NONREIN LG W/TWL: Brand: MEDLINE INDUSTRIES, INC.

## (undated) DEVICE — 3M™ IOBAN™ 2 ANTIMICROBIAL INCISE DRAPE 6650EZ: Brand: IOBAN™ 2

## (undated) DEVICE — STAPLER SKIN H3.9MM WIRE DIA0.58MM CRWN 6.9MM 35 STPL ROT

## (undated) DEVICE — SET VLV 3 PC AWS DISPOSABLE GRDIAN SCOPEVALET

## (undated) DEVICE — ELECTRODE PT RET AD L9FT HI MOIST COND ADH HYDRGEL CORDED

## (undated) DEVICE — SYRINGE MED 10ML TRNSLUC BRL PLUNG BLK MRK POLYPR CTRL

## (undated) DEVICE — SHEET,DRAPE,53X77,STERILE: Brand: MEDLINE

## (undated) DEVICE — 3M™ IOBAN™ 2 ANTIMICROBIAL INCISE DRAPE 6651EZ: Brand: IOBAN™ 2

## (undated) DEVICE — FORCEPS BX L240CM WRK CHN 2.8MM STD CAP W/ NDL MIC MESH

## (undated) DEVICE — SPONGE LAP W18XL18IN WHT COT 4 PLY FLD STRUNG RADPQ DISP ST

## (undated) DEVICE — SOLUTION IV IRRIG WATER 500ML POUR BRL ST 2F7113

## (undated) DEVICE — ESOPHAGEAL/COLONIC/BILIARY WIREGUIDED BALLOON DILATATION CATHETER: Brand: CRE™ PRO

## (undated) DEVICE — SUTURE VCRL SZ 3-0 L18IN ABSRB UD L26MM SH 1/2 CIR J864D

## (undated) DEVICE — TRAY PREP DRY W/ PREM GLV 2 APPL 6 SPNG 2 UNDPD 1 OVERWRAP

## (undated) DEVICE — NEEDLE HYPO 22GA L1.5IN BLK POLYPR HUB S STL REG BVL STR

## (undated) DEVICE — SUTURE MCRYL SZ 4-0 L18IN ABSRB UD L19MM PS-2 3/8 CIR PRIM Y496G

## (undated) DEVICE — DRAPE ADOLESCENT  LAPAROTOMY

## (undated) DEVICE — MAJOR SET UP PK

## (undated) DEVICE — BW-412T DISP COMBO CLEANING BRUSH: Brand: SINGLE USE COMBINATION CLEANING BRUSH

## (undated) DEVICE — SUTURE PDS II SZ 0 L60IN ABSRB VLT L48MM CTX 1/2 CIR Z990G

## (undated) DEVICE — SUTURE VCRL SZ 3-0 L18IN ABSRB UD W/O NDL POLYGLACTIN 910 J110T

## (undated) DEVICE — PROCEDURE KIT ENDOSCP CUST

## (undated) DEVICE — BLADE ES ELASTOMERIC COAT INSUL DURABLE BEND UPTO 90DEG

## (undated) DEVICE — COLOSTOMY/ILEOSTOMY KIT, FLEXWEAR: Brand: NEW IMAGE

## (undated) DEVICE — CHLORAPREP 26ML ORANGE

## (undated) DEVICE — DEVICE SEAL L23CM NANO COAT MARYLAND JAW OPN DIV LIGASURE

## (undated) DEVICE — INTENDED FOR TISSUE SEPARATION, AND OTHER PROCEDURES THAT REQUIRE A SHARP SURGICAL BLADE TO PUNCTURE OR CUT.: Brand: BARD-PARKER ® STAINLESS STEEL BLADES

## (undated) DEVICE — GLOVE SURG SZ 6.5 L11.2IN FNGR THK9.8MIL STRW LTX POLYMER

## (undated) DEVICE — TOWEL,OR,DSP,ST,BLUE,DLX,10/PK,8PK/CS: Brand: MEDLINE

## (undated) DEVICE — TOWEL,OR,DSP,ST,BLUE,STD,6/PK,12PK/CS: Brand: MEDLINE

## (undated) DEVICE — STAPLER INT L75MM H1.5X1.8X2MM STD TI 6 ROW LIN CUT

## (undated) DEVICE — PAD TBL OP RM TRENDELENBURG STATIC TORSO W/STRAPS

## (undated) DEVICE — SYRINGE INFL 60ML DISP ALLIANCE II

## (undated) DEVICE — DEVICE SECUREMENT AD W/ TRICOT ANCHR PD FOR F LTX SIL CATH

## (undated) DEVICE — ULTRACLEAN ACCESSORY ELECTRODE, 6 INCH COATED BLADE WITH EXTENDED INSULATION: Brand: ULTRACLEAN

## (undated) DEVICE — SOLUTION IV IRRIG POUR BRL 0.9% SODIUM CHL 2F7124

## (undated) DEVICE — CONTROL SYRINGE LUER-LOCK TIP: Brand: MONOJECT

## (undated) DEVICE — CATHETER TRAY 16 FR 5 CC FOL ANTIREFLX SAMPLING PRT DOVER

## (undated) DEVICE — PAD THRM M SPL TORSO

## (undated) DEVICE — SKIN AFFIX SURG ADHESIVE 72/CS 0.55ML: Brand: MEDLINE

## (undated) DEVICE — STAPLER INT H4.4X1.5MM DIA75MM 0DEG TI UNIV 6 ROW CART

## (undated) DEVICE — GOWN AURORA NONREINF LG: Brand: MEDLINE INDUSTRIES, INC.

## (undated) DEVICE — KIT OR ROOM TURNOVER W/STRAP

## (undated) DEVICE — DRAPE,LAP,CHOLE,W/TROUGHS,STERILE: Brand: MEDLINE